# Patient Record
Sex: FEMALE | Race: WHITE | NOT HISPANIC OR LATINO | Employment: OTHER | URBAN - METROPOLITAN AREA
[De-identification: names, ages, dates, MRNs, and addresses within clinical notes are randomized per-mention and may not be internally consistent; named-entity substitution may affect disease eponyms.]

---

## 2017-08-29 ENCOUNTER — TELEPHONE (OUTPATIENT)
Dept: SURGERY | Facility: CLINIC | Age: 62
End: 2017-08-29

## 2017-08-29 NOTE — TELEPHONE ENCOUNTER
----- Message from Elisabeth Felisa sent at 8/25/2017 12:44 PM CDT -----  Regarding: PT AND DAUGHTER INTERESTED IN INTRA-GASTRIC BALLOON SURGERY  Contact: [phone]  Pt's daughter Zelda called and wanted to get more information/consultantion on Intra-Gastric Balloon surgery for both her and her mother.     Please give Zelda a call back at [phone].    Thank you,    Elisabeth  Call Center    Please DO NOT send message and or reply back to sender. Call center Representatives DO NOT respond to Messages.

## 2017-08-29 NOTE — TELEPHONE ENCOUNTER
Reviewed gastric balloon process with daughter.   Patient does not have any insurance and did not want to pay for the initial consult or initial dietician visit.  She also felt that we were too expensive compared to other places.  Recommend (after discussing with , Susan Whittaker RN) that she consider other locations that would better suit her needs. Given contact info for [facility].

## 2018-03-21 ENCOUNTER — NURSE TRIAGE (OUTPATIENT)
Dept: NURSING | Facility: CLINIC | Age: 63
End: 2018-03-21

## 2018-03-22 NOTE — TELEPHONE ENCOUNTER
"Daughter, Asia calls, Mom is present and on speaker phone.  Large wall cabinet fell from wall and hit patient in the lip.  She has a 1 cm gash on lip which is no longer bleeding, but is \"gaping and seems deep.\"  Placed an ice pack on lip.   Denies falling down or hitting head.  Has remained alert and oriented with normal activity.  Patient does not have insurance and is concerned about cost of an Emergency Room visit.    Protocol and care advice reviewed  Caller states understanding of the recommended disposition.  Advised patient proceed to Emergency Room or Urgent Care.  They will consider their options.  Advised to call back if further questions or concerns      Reason for Disposition    Skin is split open or gaping  (or length > 1/2 inch or 12 mm on the skin, 1/4 inch or 6 mm on the face)    Additional Information    Negative: [1] Major bleeding (e.g., actively dripping or spurting) AND [2] can't be stopped    Negative: Amputation    Negative: Shock suspected (e.g., cold/pale/clammy skin, too weak to stand, low BP, rapid pulse)    Negative: [1] Knife wound (or other possibly deep cut) AND [2] to chest, abdomen, back, neck, or head    Negative: [1] Cutter (self-mutilator) AND [2] suicidal or out-of-control    Negative: Sounds like a life-threatening emergency to the triager    Negative: [1] Bleeding AND [2] won't stop after 10 minutes of direct pressure (using correct technique)    Protocols used: CUTS AND LACERATIONS-ADULT-    "

## 2018-11-29 ENCOUNTER — HOSPITAL ENCOUNTER (EMERGENCY)
Facility: CLINIC | Age: 63
Discharge: HOME | End: 2018-11-30
Attending: EMERGENCY MEDICINE | Admitting: EMERGENCY MEDICINE
Payer: MEDICAID

## 2018-11-29 ENCOUNTER — NURSE TRIAGE (OUTPATIENT)
Dept: NURSING | Facility: CLINIC | Age: 63
End: 2018-11-29

## 2018-11-29 ENCOUNTER — APPOINTMENT (OUTPATIENT)
Dept: GENERAL RADIOLOGY | Facility: CLINIC | Age: 63
End: 2018-11-29
Attending: EMERGENCY MEDICINE
Payer: MEDICAID

## 2018-11-29 DIAGNOSIS — R10.13 ABDOMINAL PAIN, EPIGASTRIC: ICD-10-CM

## 2018-11-29 LAB
BASOPHILS # BLD AUTO: 0.1 10E9/L (ref 0–0.2)
BASOPHILS NFR BLD AUTO: 0.7 %
DIFFERENTIAL METHOD BLD: NORMAL
EOSINOPHIL # BLD AUTO: 0.2 10E9/L (ref 0–0.7)
EOSINOPHIL NFR BLD AUTO: 2.1 %
ERYTHROCYTE [DISTWIDTH] IN BLOOD BY AUTOMATED COUNT: 12.8 % (ref 10–15)
HCT VFR BLD AUTO: 41.5 % (ref 35–47)
HGB BLD-MCNC: 13.6 G/DL (ref 11.7–15.7)
IMM GRANULOCYTES # BLD: 0 10E9/L (ref 0–0.4)
IMM GRANULOCYTES NFR BLD: 0.4 %
LYMPHOCYTES # BLD AUTO: 3.2 10E9/L (ref 0.8–5.3)
LYMPHOCYTES NFR BLD AUTO: 35.7 %
MCH RBC QN AUTO: 30.3 PG (ref 26.5–33)
MCHC RBC AUTO-ENTMCNC: 32.8 G/DL (ref 31.5–36.5)
MCV RBC AUTO: 92 FL (ref 78–100)
MONOCYTES # BLD AUTO: 0.9 10E9/L (ref 0–1.3)
MONOCYTES NFR BLD AUTO: 9.8 %
NEUTROPHILS # BLD AUTO: 4.6 10E9/L (ref 1.6–8.3)
NEUTROPHILS NFR BLD AUTO: 51.3 %
NRBC # BLD AUTO: 0 10*3/UL
NRBC BLD AUTO-RTO: 0 /100
PLATELET # BLD AUTO: 258 10E9/L (ref 150–450)
RBC # BLD AUTO: 4.49 10E12/L (ref 3.8–5.2)
WBC # BLD AUTO: 9.1 10E9/L (ref 4–11)

## 2018-11-29 PROCEDURE — 83735 ASSAY OF MAGNESIUM: CPT | Performed by: EMERGENCY MEDICINE

## 2018-11-29 PROCEDURE — 93005 ELECTROCARDIOGRAM TRACING: CPT

## 2018-11-29 PROCEDURE — 85025 COMPLETE CBC W/AUTO DIFF WBC: CPT | Performed by: EMERGENCY MEDICINE

## 2018-11-29 PROCEDURE — 83605 ASSAY OF LACTIC ACID: CPT | Performed by: EMERGENCY MEDICINE

## 2018-11-29 PROCEDURE — 25000125 ZZHC RX 250: Performed by: EMERGENCY MEDICINE

## 2018-11-29 PROCEDURE — 80053 COMPREHEN METABOLIC PANEL: CPT | Performed by: EMERGENCY MEDICINE

## 2018-11-29 PROCEDURE — 99285 EMERGENCY DEPT VISIT HI MDM: CPT | Mod: 25

## 2018-11-29 PROCEDURE — 84484 ASSAY OF TROPONIN QUANT: CPT | Performed by: EMERGENCY MEDICINE

## 2018-11-29 PROCEDURE — 71046 X-RAY EXAM CHEST 2 VIEWS: CPT

## 2018-11-29 PROCEDURE — 25000132 ZZH RX MED GY IP 250 OP 250 PS 637: Performed by: EMERGENCY MEDICINE

## 2018-11-29 PROCEDURE — 96374 THER/PROPH/DIAG INJ IV PUSH: CPT

## 2018-11-29 RX ADMIN — RANITIDINE 150 MG: 150 TABLET ORAL at 23:51

## 2018-11-29 RX ADMIN — LIDOCAINE HYDROCHLORIDE 30 ML: 20 SOLUTION ORAL; TOPICAL at 23:52

## 2018-11-29 ASSESSMENT — ENCOUNTER SYMPTOMS
ABDOMINAL PAIN: 1
VOMITING: 0
NAUSEA: 0
CHILLS: 0
DIZZINESS: 0
LIGHT-HEADEDNESS: 0
DIARRHEA: 0
FEVER: 0
TROUBLE SWALLOWING: 1
SHORTNESS OF BREATH: 1

## 2018-11-29 NOTE — ED AVS SNAPSHOT
[facility] Emergency Department    [address]    [facility] [address]    Phone:  [phone]    Fax:  [phone]                                       Ramona Ferrer   MRN: [id number]    Department:  [facility] Emergency Department   Date of Visit:  11/29/2018           Patient Information     Date Of Birth          1955        Your diagnoses for this visit were:     Abdominal pain, epigastric        You were seen by Josse Self MD.      Follow-up Information     Go to [facility] Emergency Department.    Specialty:  EMERGENCY MEDICINE    Why:  If symptoms worsen    Contact information:    [address]  [facility] [id number] [phone]        Discharge Instructions       Discharge Instructions  Abdominal Pain    Abdominal pain (belly pain) can be caused by many things. Your evaluation today does not show the exact cause for your pain. Your provider today has decided that it is unlikely your pain is due to a life threatening problem, or a problem requiring surgery or hospital admission. Sometimes those problems cannot be found right away, so it is very important that you follow up as directed.  Sometimes only the changes which occur over time allow the cause of your pain to be found.    Generally, every Emergency Department visit should have a follow-up clinic visit with either a primary or a specialty clinic/provider. Please follow-up as instructed by your emergency provider today. With abdominal pain, we often recommend very close follow-up, such as the following day.    ADULTS:  Return to the Emergency Department right away if:      You get an oral temperature above 102oF or as directed by your provider.    You have blood in your stools. This may be bright red or appear as black, tarry stools.      You keep vomiting (throwing up) or cannot drink liquids.    You see blood when you vomit.     You cannot have a bowel movement or  you cannot pass gas.    Your stomach gets bloated or bigger.    Your skin or the whites of your eyes look yellow.    You faint.    You have bloody, frequent or painful urination (peeing).    You have new symptoms or anything that worries you.    CHILDREN:  Return to the Emergency Department right away if your child has any of the above-listed symptoms or the following:      Pushes your hand away or screams/cries when his/her belly is touched.    You notice your child is very fussy or weak.    Your child is very tired and is too tired to eat or drink.    Your child is dehydrated.  Signs of dehydration can be:  o Significant change in the amount of wet diapers/urine.  o Your infant or child starts to have dry mouth and lips, or no saliva (spit) or tears.    PREGNANT WOMEN:  Return to the Emergency Department right away if you have any of the above-listed symptoms or the following:      You have bleeding, leaking fluid or passing tissue from the vagina.    You have worse pain or cramping, or pain in your shoulder or back.    You have vomiting that will not stop.    You have a temperature of 100oF or more.    Your baby is not moving as much as usual.    You faint.    You get a bad headache with or without eye problems and abdominal pain.    You have a seizure.    You have unusual discharge from your vagina and abdominal pain.    Abdominal pain is pretty common during pregnancy.  Your pain may or may not be related to your pregnancy. You should follow-up closely with your OB provider so they can evaluate you and your baby.  Until you follow-up with your regular provider, do the following:       Avoid sex and do not put anything in your vagina.    Drink clear fluids.    Only take medications approved by your provider.    MORE INFORMATION:    Appendicitis:  A possible cause of abdominal pain in any person who still has their appendix is acute appendicitis. Appendicitis is often hard to diagnose.  Testing does not always  "rule out early appendicitis or other causes of abdominal pain. Close follow-up with your provider and re-evaluations may be needed to figure out the reason for your abdominal pain.    Follow-up:  It is very important that you make an appointment with your clinic and go to the appointment.  If you do not follow-up with your primary provider, it may result in missing an important development which could result in permanent injury or disability and/or lasting pain.  If there is any problem keeping your appointment, call your provider or return to the Emergency Department.    Medications:  Take your medications as directed by your provider today.  Before using over-the-counter medications, ask your provider and make sure to take the medications as directed.  If you have any questions about medications, ask your provider.    Diet:  Resume your normal diet as much as possible, but do not eat fried, fatty or spicy foods while you have pain.  Do not drink alcohol or have caffeine.  Do not smoke tobacco.    Probiotics: If you have been given an antibiotic, you may want to also take a probiotic pill or eat yogurt with live cultures. Probiotics have \"good bacteria\" to help your intestines stay healthy. Studies have shown that probiotics help prevent diarrhea (loose stools) and other intestine problems (including C. diff infection) when you take antibiotics. You can buy these without a prescription in the pharmacy section of the store.     If you were given a prescription for medicine here today, be sure to read all of the information (including the package insert) that comes with your prescription.  This will include important information about the medicine, its side effects, and any warnings that you need to know about.  The pharmacist who fills the prescription can provide more information and answer questions you may have about the medicine.  If you have questions or concerns that the pharmacist cannot address, please call " or return to the Emergency Department.       Remember that you can always come back to the Emergency Department if you are not able to see your regular provider in the amount of time listed above, if you get any new symptoms, or if there is anything that worries you.      24 Hour Appointment Hotline       To make an appointment at any [facility], call [phone] ([phone]). If you don't have a family doctor or clinic, we will help you find one. [facility] are conveniently located to serve the needs of you and your family.             Review of your medicines      START taking        Dose / Directions Last dose taken    ranitidine 150 MG tablet   Commonly known as:  ZANTAC   Dose:  150 mg   Quantity:  20 tablet        Take 1 tablet (150 mg) by mouth 2 times daily for 10 days   Refills:  0          Our records show that you are taking the medicines listed below. If these are incorrect, please call your family doctor or clinic.        Dose / Directions Last dose taken    acetaminophen 325 MG tablet   Commonly known as:  TYLENOL   Quantity:  100 tablet        Take  by mouth every 4 hours as needed for pain.   Refills:  0        ALLEGRA PO        Take  by mouth. 1 tablet daily   Refills:  0        aspirin-acetaminophen-caffeine 250-250-65 MG tablet   Commonly known as:  EXCEDRIN MIGRAINE   Dose:  1 tablet   Quantity:  30 tablet        Take 1 tablet by mouth every 6 hours as needed for pain.   Refills:  0        cyclobenzaprine 10 MG tablet   Commonly known as:  FLEXERIL   Dose:  10 mg   Quantity:  30 tablet        Take 1 tablet by mouth 2 times daily as needed for muscle spasms.   Refills:  0        fluticasone 50 MCG/ACT nasal spray   Commonly known as:  FLONASE   Dose:  2 spray   Quantity:  1 Package        Spray 2 sprays into both nostrils daily.   Refills:  10        levothyroxine 100 MCG tablet   Commonly known as:  SYNTHROID/LEVOTHROID   Dose:  100 mcg   Quantity:  31 tablet        Take 1  tablet by mouth daily.   Refills:  3        order for DME   Quantity:  30 each        Provent nasal EPAP Use over nostrils nightly.   Refills:  0        PATANOL 0.1 % ophthalmic solution   Dose:  1 drop   Quantity:  5 mL   Generic drug:  olopatadine        Place 1 drop into both eyes 2 times daily.   Refills:  3        zolpidem 10 MG tablet   Commonly known as:  AMBIEN   Quantity:  1 tablet        Take PO on night of the study   Refills:  0                Prescriptions were sent or printed at these locations (1 Prescription)                   Other Prescriptions                Printed at Department/Unit printer (1 of 1)         ranitidine (ZANTAC) 150 MG tablet                Procedures and tests performed during your visit     Procedure/Test Number of Times Performed    Abdomen US, limited (RUQ only) 1    CBC with platelets differential 1    Comprehensive metabolic panel 1    EKG 12-lead, tracing only 1    Lactic acid whole blood 1    Lipase 1    Magnesium 1    Troponin I 2    XR Chest 2 Views 1      Orders Needing Specimen Collection     None      Pending Results     Date and Time Order Name Status Description    11/29/2018 2327 EKG 12-lead, tracing only Preliminary             Pending Culture Results     No orders found for last 3 day(s).            Pending Results Instructions     If you had any lab results that were not finalized at the time of your Discharge, you can call the ED Lab Result RN at [phone]. You will be contacted by this team for any positive Lab results or changes in treatment. The nurses are available 7 days a week from 10A to 6:30P.  You can leave a message 24 hours per day and they will return your call.        Test Results From Your Hospital Stay        11/29/2018 11:57 PM      Component Results     Component Value Ref Range & Units Status    WBC 9.1 4.0 - 11.0 10e9/L Final    RBC Count 4.49 3.8 - 5.2 10e12/L Final    Hemoglobin 13.6 11.7 - 15.7 g/dL Final    Hematocrit 41.5 35.0 - 47.0  % Final    MCV 92 78 - 100 fl Final    MCH 30.3 26.5 - 33.0 pg Final    MCHC 32.8 31.5 - 36.5 g/dL Final    RDW 12.8 10.0 - 15.0 % Final    Platelet Count 258 150 - 450 10e9/L Final    Diff Method Automated Method  Final    % Neutrophils 51.3 % Final    % Lymphocytes 35.7 % Final    % Monocytes 9.8 % Final    % Eosinophils 2.1 % Final    % Basophils 0.7 % Final    % Immature Granulocytes 0.4 % Final    Nucleated RBCs 0 0 /100 Final    Absolute Neutrophil 4.6 1.6 - 8.3 10e9/L Final    Absolute Lymphocytes 3.2 0.8 - 5.3 10e9/L Final    Absolute Monocytes 0.9 0.0 - 1.3 10e9/L Final    Absolute Eosinophils 0.2 0.0 - 0.7 10e9/L Final    Absolute Basophils 0.1 0.0 - 0.2 10e9/L Final    Abs Immature Granulocytes 0.0 0 - 0.4 10e9/L Final    Absolute Nucleated RBC 0.0  Final         11/30/2018 12:16 AM      Component Results     Component Value Ref Range & Units Status    Sodium 140 133 - 144 mmol/L Final    Potassium 3.8 3.4 - 5.3 mmol/L Final    Chloride 106 94 - 109 mmol/L Final    Carbon Dioxide 25 20 - 32 mmol/L Final    Anion Gap 9 3 - 14 mmol/L Final    Glucose 103 (H) 70 - 99 mg/dL Final    Urea Nitrogen 18 7 - 30 mg/dL Final    Creatinine 0.74 0.52 - 1.04 mg/dL Final    GFR Estimate 80 >60 mL/min/1.7m2 Final    Non  GFR Calc    GFR Estimate If Black >90 >60 mL/min/1.7m2 Final    African American GFR Calc    Calcium 9.1 8.5 - 10.1 mg/dL Final    Bilirubin Total 0.4 0.2 - 1.3 mg/dL Final    Albumin 3.6 3.4 - 5.0 g/dL Final    Protein Total 7.5 6.8 - 8.8 g/dL Final    Alkaline Phosphatase 79 40 - 150 U/L Final    ALT 41 0 - 50 U/L Final    AST 23 0 - 45 U/L Final         11/30/2018 12:26 AM      Component Results     Component Value Ref Range & Units Status    Lactic Acid 0.9 0.7 - 2.0 mmol/L Final         11/30/2018 12:16 AM      Component Results     Component Value Ref Range & Units Status    Troponin I ES <0.015 0.000 - 0.045 ug/L Final    The 99th percentile for upper reference range is 0.045 ug/L.   Troponin values   in the range of 0.045 - 0.120 ug/L may be associated with risks of adverse   clinical events.           11/30/2018  1:50 AM      Narrative     CHEST 2 VIEWS  11/30/2018 12:29 AM     HISTORY: Dyspnea and chest pain.    COMPARISON: 1/22/2006.    FINDINGS: The lungs are clear. Normal-sized cardiac silhouette.  Tortuous or ectatic thoracic aorta.        Impression     IMPRESSION: No evidence of active cardiopulmonary disease.    TIANA MASTERS MD         11/30/2018 12:16 AM      Component Results     Component Value Ref Range & Units Status    Magnesium 2.0 1.6 - 2.3 mg/dL Final         11/30/2018  2:34 AM      Narrative     ULTRASOUND ABDOMEN LIMITED RIGHT UPPER QUADRANT  11/30/2018 1:24 AM     HISTORY: Epigastric pain.    COMPARISON: 7/14/2011 - CT abdomen and pelvis.    FINDINGS: Moderate diffuse increased hepatic echogenicity, consistent  with fatty infiltration. No hepatic masses. The gallbladder is  unremarkable without gallstones, wall thickening or pericholecystic  fluid. No focal tenderness over the gallbladder. No intra- or  extrahepatic biliary dilatation. The common duct measures 0.4 cm in  diameter. The right kidney has normal size and echogenicity, measuring  12.0 cm in length. No right intrarenal collecting system dilatation or  calculi. 3.1 x 3.0 x 3.0 cm simple cyst in the inferior pole of the  right kidney. No free fluid in the upper right hemiabdomen.        Impression     IMPRESSION:  1. Unremarkable appearance of the gallbladder.  2. No biliary dilatation.  3. Moderate diffuse fatty infiltration of the liver.    TIANA MASTERS MD         11/30/2018  2:36 AM      Component Results     Component Value Ref Range & Units Status    Troponin I ES <0.015 0.000 - 0.045 ug/L Final    The 99th percentile for upper reference range is 0.045 ug/L.  Troponin values   in the range of 0.045 - 0.120 ug/L may be associated with risks of adverse   clinical events.           11/30/2018  2:36 AM       Component Results     Component Value Ref Range & Units Status    Lipase 208 73 - 393 U/L Final                Clinical Quality Measure: Blood Pressure Screening     Your blood pressure was checked while you were in the emergency department today. The last reading we obtained was  BP: 112/50 . Please read the guidelines below about what these numbers mean and what you should do about them.  If your systolic blood pressure (the top number) is less than 120 and your diastolic blood pressure (the bottom number) is less than 80, then your blood pressure is normal. There is nothing more that you need to do about it.  If your systolic blood pressure (the top number) is 120-139 or your diastolic blood pressure (the bottom number) is 80-89, your blood pressure may be higher than it should be. You should have your blood pressure rechecked within a year by a primary care provider.  If your systolic blood pressure (the top number) is 140 or greater or your diastolic blood pressure (the bottom number) is 90 or greater, you may have high blood pressure. High blood pressure is treatable, but if left untreated over time it can put you at risk for heart attack, stroke, or kidney failure. You should have your blood pressure rechecked by a primary care provider within the next 4 weeks.  If your provider in the emergency department today gave you specific instructions to follow-up with your doctor or provider even sooner than that, you should follow that instruction and not wait for up to 4 weeks for your follow-up visit.        Thank you for choosing [facility]       Thank you for choosing [facility] for your care. Our goal is always to provide you with excellent care. Hearing back from our patients is one way we can continue to improve our services. Please take a few minutes to complete the written survey that you may receive in the mail after you visit with us. Thank you!        [employer] Information     [employer] lets you send messages  "to your doctor, view your test results, renew your prescriptions, schedule appointments and more. To sign up, go to [email or web address] . Click on \"Log in\" on the left side of the screen, which will take you to the Welcome page. Then click on \"Sign up Now\" on the right side of the page.     You will be asked to enter the access code listed below, as well as some personal information. Please follow the directions to create your username and password.     Your access code is: [email or web address]  Expires: 2019  2:52 AM     Your access code will  in 90 days. If you need help or a new code, please call your [place] clinic or [phone].        Care EveryWhere ID     This is your Care EveryWhere ID. This could be used by other organizations to access your [place] medical records  [id number]        Equal Access to Services     STEVENSON LENTZ : Hadii curry Jenkins, watony payne, qaybta kaalmada raymond, alecia brenner . [facility] [phone].    ATENCIÓN: Si habla español, tiene a blanchard disposición servicios gratuitos de asistencia lingüística. Llame al [phone].    We comply with applicable federal civil rights laws and [place] laws. We do not discriminate on the basis of race, color, national origin, age, disability, sex, sexual orientation, or gender identity.            After Visit Summary       This is your record. Keep this with you and show to your community pharmacist(s) and doctor(s) at your next visit.                  "

## 2018-11-29 NOTE — ED AVS SNAPSHOT
[facility] Emergency Department    [address]    [facility] [address]    Phone:  [phone]    Fax:  [phone]                                       Ramona Ferrer   MRN: [id number]    Department:  [facility] Emergency Department   Date of Visit:  11/29/2018           After Visit Summary Signature Page     I have received my discharge instructions, and my questions have been answered. I have discussed any challenges I see with this plan with the nurse or doctor.    ..........................................................................................................................................  Patient/Patient Representative Signature      ..........................................................................................................................................  Patient Representative Print Name and Relationship to Patient    ..................................................               ................................................  Date                                   Time    ..........................................................................................................................................  Reviewed by Signature/Title    ...................................................              ..............................................  Date                                               Time          22EPIC Rev 08/18

## 2018-11-30 ENCOUNTER — APPOINTMENT (OUTPATIENT)
Dept: ULTRASOUND IMAGING | Facility: CLINIC | Age: 63
End: 2018-11-30
Attending: EMERGENCY MEDICINE
Payer: MEDICAID

## 2018-11-30 VITALS
RESPIRATION RATE: 12 BRPM | WEIGHT: 220 LBS | OXYGEN SATURATION: 95 % | DIASTOLIC BLOOD PRESSURE: 67 MMHG | TEMPERATURE: 97.8 F | HEART RATE: 70 BPM | SYSTOLIC BLOOD PRESSURE: 109 MMHG | HEIGHT: 64 IN | BODY MASS INDEX: 37.56 KG/M2

## 2018-11-30 LAB
ALBUMIN SERPL-MCNC: 3.6 G/DL (ref 3.4–5)
ALP SERPL-CCNC: 79 U/L (ref 40–150)
ALT SERPL W P-5'-P-CCNC: 41 U/L (ref 0–50)
ANION GAP SERPL CALCULATED.3IONS-SCNC: 9 MMOL/L (ref 3–14)
AST SERPL W P-5'-P-CCNC: 23 U/L (ref 0–45)
BILIRUB SERPL-MCNC: 0.4 MG/DL (ref 0.2–1.3)
BUN SERPL-MCNC: 18 MG/DL (ref 7–30)
CALCIUM SERPL-MCNC: 9.1 MG/DL (ref 8.5–10.1)
CHLORIDE SERPL-SCNC: 106 MMOL/L (ref 94–109)
CO2 SERPL-SCNC: 25 MMOL/L (ref 20–32)
CREAT SERPL-MCNC: 0.74 MG/DL (ref 0.52–1.04)
GFR SERPL CREATININE-BSD FRML MDRD: 80 ML/MIN/1.7M2
GLUCOSE SERPL-MCNC: 103 MG/DL (ref 70–99)
INTERPRETATION ECG - MUSE: NORMAL
LACTATE BLD-SCNC: 0.9 MMOL/L (ref 0.7–2)
LIPASE SERPL-CCNC: 208 U/L (ref 73–393)
MAGNESIUM SERPL-MCNC: 2 MG/DL (ref 1.6–2.3)
POTASSIUM SERPL-SCNC: 3.8 MMOL/L (ref 3.4–5.3)
PROT SERPL-MCNC: 7.5 G/DL (ref 6.8–8.8)
SODIUM SERPL-SCNC: 140 MMOL/L (ref 133–144)
TROPONIN I SERPL-MCNC: <0.015 UG/L (ref 0–0.04)
TROPONIN I SERPL-MCNC: <0.015 UG/L (ref 0–0.04)

## 2018-11-30 PROCEDURE — 25000128 H RX IP 250 OP 636: Performed by: EMERGENCY MEDICINE

## 2018-11-30 PROCEDURE — 84484 ASSAY OF TROPONIN QUANT: CPT | Performed by: EMERGENCY MEDICINE

## 2018-11-30 PROCEDURE — 76705 ECHO EXAM OF ABDOMEN: CPT

## 2018-11-30 PROCEDURE — 83690 ASSAY OF LIPASE: CPT | Performed by: EMERGENCY MEDICINE

## 2018-11-30 PROCEDURE — 36415 COLL VENOUS BLD VENIPUNCTURE: CPT | Performed by: EMERGENCY MEDICINE

## 2018-11-30 RX ORDER — NITROGLYCERIN 0.4 MG/1
0.4 TABLET SUBLINGUAL EVERY 5 MIN PRN
Status: DISCONTINUED | OUTPATIENT
Start: 2018-11-30 | End: 2018-11-30 | Stop reason: HOSPADM

## 2018-11-30 RX ORDER — LORAZEPAM 2 MG/ML
0.5 INJECTION INTRAMUSCULAR ONCE
Status: COMPLETED | OUTPATIENT
Start: 2018-11-30 | End: 2018-11-30

## 2018-11-30 RX ADMIN — LORAZEPAM 0.5 MG: 2 INJECTION INTRAMUSCULAR; INTRAVENOUS at 02:36

## 2018-11-30 NOTE — ED PROVIDER NOTES
"  History     Chief Complaint:  Allergic Reaction      HPI   Ramona Ferrer is a 63 year old female who presents with abdominal pain. The patient states that she ingested hazelnuts two hours ago. Right after eating the nut she noticed epigastric pain with associated chest pain. She also noticed a change in her voice, describing it as more hoarse. Here in the ED, she feels slightly short of breath and feels like her throat is swollen. The  reports to eating the same hazelnuts with no symptoms. She denies having known hazelnut allergy. The patient denies fever, chills, nausea, vomiting, or diarrhea.     Allergies:  NKDA    Medications:    Flexeril  Levothyroxine  Ambien    Past Medical History:    DDD  Endometriosis  Hypothyroidism  Spinal Stenosis    Past Surgical History:    Hysterectomy  Colonoscopy  Lithotripsy  Pelvis Laparoscopy    Family History:    DM  Thyroid disease  Cancer  Heart disease    Social History:  Marital Status:   [2]  Negative for tobacco use.  Negative for alcohol use.      Review of Systems   Constitutional: Negative for chills and fever.   HENT: Positive for trouble swallowing.    Respiratory: Positive for shortness of breath.    Cardiovascular: Positive for chest pain.   Gastrointestinal: Positive for abdominal pain. Negative for diarrhea, nausea and vomiting.   Neurological: Negative for dizziness and light-headedness.   All other systems reviewed and are negative.    Physical Exam     Patient Vitals for the past 24 hrs:   BP Temp Temp src Pulse Heart Rate Resp SpO2 Height Weight   11/30/18 0215 112/50 - - - - - 93 % - -   11/30/18 0055 113/50 - - - - - 95 % - -   11/30/18 0041 95/73 - - - - - 95 % - -   11/29/18 2345 - - - - 75 12 94 % - -   11/29/18 2319 - - - - - - 96 % - -   11/29/18 2318 132/74 - - - - - - - -   11/29/18 2308 137/86 97.8  F (36.6  C) Oral 70 70 18 98 % 1.626 m (5' 4\") 99.8 kg (220 lb)         Physical Exam  Constitutional: vitals reviewed  HENT: Moist " oral mucosa.  No oral pharyngeal, tongue, lip swelling.  Posterior pharynx is clear  Eyes: Grossly normal vision. Pupils are equal and round. Extraocular movements intact.  Sclera clear and without icterus. Conjunctiva without pallor.  Cardiovascular: Normal rate. Regular rhythm. S1 and S2 without audible murmurs. 2+ radial pulses. Normal capillary refill.  Respiratory: Effort normal. Clear breath sounds bilaterally.  Gastrointestinal: Soft. No distension.  Epigastric tenderness without guarding.  Neurologic: Alert and awake. Coordinated movement of extremities. Speech normal.  Skin: No diaphoresis, rashes, ecchymoses, or lesions.  Musculoskeletal: No lower extremity edema. No gross deformity. No joint swelling.  Psychiatric: Appropriate affect. Behavior is normal. Intact recent and remote memory. Linear thought process.  Emergency Department Course   ECG:  Indication: Chest pain  Time: 2346  Vent. Rate 79 bpm. OH interval 174. QRS duration 82. QT/QTc 438/502. P-R-T axis 33 -7 24.  Normal sinus rhythm. Voltage criteria for left ventricular hypertrophy. Prolonged QT. Abnormal EKG. Read time: 2349    Imaging:  XR Chest 2 views:   IMPRESSION: No evidence of active cardiopulmonary disease.  As per radiology.     US Abdomen, RUQ:  IMPRESSION:  1. Unremarkable appearance of the gallbladder.  2. No biliary dilatation.  3. Moderate diffuse fatty infiltration of the liver.  As per radiology.       Laboratory:  CBC: WBC: 9.1, HGB: 13.6, PLT: 258  CMP: Glucose 103(H) o/w WNL (Creatinine: 0.74)  Magnesium: 2.0    2349 Lactic acid: 0.9    2349 Troponin: <0.015  0211 Troponin: <0.015    Interventions:  2351 Zantac 150mg Oral  2352 GI Cocktail 30ml Oral  0236 Ativan 0.5mg IV    Emergency Department Course:  Nursing notes and vitals reviewed. (1117) I performed an exam of the patient as documented above.     IV inserted. Medicine administered as documented above. Blood drawn. This was sent to the lab for further testing, results  above.    EKG was done, interpretation as above.    The patient was sent for a Chest XR while in the emergency department, findings above.     (0245) I rechecked the patient and discussed the results of her workup thus far.     Findings and plan explained to the Patient. Patient discharged home with instructions regarding supportive care, medications, and reasons to return. The importance of close follow-up was reviewed. The patient was prescribed Zantac    I personally reviewed the laboratory results with the Patient and answered all related questions prior to discharge.       Impression & Plan    Medical Decision Making:  Patient presents with symptoms of epigastric and low chest pain that started after eating hazelnuts 2 hours ago.  No recent exertional symptoms and no history of similar decreases my suspicion of ACS.  EKG without signs of ischemia and troponin testing x2 are undetectable.  Her ED ACS score is 4, making her very low risk with the above investigations.  She had no improvement in her symptoms after GI cocktail and Zantac.  Other serum labs are unremarkable.  She does not have any urinary symptoms to raise this as a concern.  She was observed for multiple hours without any development of allergic symptoms.  Gallbladder ultrasound unremarkable and chest x-ray unremarkable.  Repeat abdominal exam shows no change and some ongoing epigastric tenderness.  It was explained to the patient that the next step in investigation would be a CT scan of the abdomen and pelvis, but she elects to be discharged home at this time and would rather follow-up with her regular doctor if her symptoms do not improve.  Given the location of her pain and onset after eating, we will treat her as gastritis with a 10-day prescription for Zantac.  Return precautions discussed and she left the emergency department in stable condition.    Diagnosis:    ICD-10-CM    1. Abdominal pain, epigastric R10.13         Disposition:  discharged to home    Discharge Medications:  New Prescriptions    RANITIDINE (ZANTAC) 150 MG TABLET    Take 1 tablet (150 mg) by mouth 2 times daily for 10 days       Scribe Disclosure:  I, Priya Ortiz, am serving as a scribe on 11/29/2018 at 11:17 PM to personally document services performed by Josse Self MD based on my observations and the provider's statements to me.       Priya Ortiz  11/29/2018   [facility] EMERGENCY DEPARTMENT       Josse Self MD  11/30/18 0347

## 2018-11-30 NOTE — ED TRIAGE NOTES
Epigastric pain started 2 hours ago right after eating some hazelnut, associated with chest pain and voice changed, feels like throat is swelling, mild difficulty breathing. No known hazelnut allergy. Denies any hives/itchiness. ABCs intact.

## 2018-11-30 NOTE — TELEPHONE ENCOUNTER
Daughter calls with patient.  Patient was eating hazelnuts about an hour ago and now she feels a lump in her stomach and swelling in her throat.  Patient is able to speak and breath but feels tightness.  Reviewed guideline and care advice with caller.  Daughter says they live 5 minutes away from the ED and she will take them there. FNA advised to call 911 if symptoms worsen.  Caller verbalizes understanding.          Reason for Disposition    [1] Tightness in the chest or throat AND [2] begins within 2 hours of exposure to allergic substance    Additional Information    Negative: Wheezing, stridor, hoarseness, or difficulty breathing    Negative: [1] Life-threatening reaction in the past to similar substance (e.g., food, insect bite/sting, medication, etc.) AND [2] < 2 hours since exposure    Protocols used: ANAPHYLAXIS-ADULT-AH

## 2018-12-07 ENCOUNTER — OFFICE VISIT (OUTPATIENT)
Dept: INTERNAL MEDICINE | Facility: CLINIC | Age: 63
End: 2018-12-07
Payer: MEDICAID

## 2018-12-07 DIAGNOSIS — I10 ESSENTIAL HYPERTENSION: ICD-10-CM

## 2018-12-07 DIAGNOSIS — E03.9 HYPOTHYROIDISM, UNSPECIFIED TYPE: ICD-10-CM

## 2018-12-07 DIAGNOSIS — Z23 NEED FOR PROPHYLACTIC VACCINATION AND INOCULATION AGAINST INFLUENZA: ICD-10-CM

## 2018-12-07 DIAGNOSIS — Z23 NEED FOR TDAP VACCINATION: ICD-10-CM

## 2018-12-07 DIAGNOSIS — K29.70 GASTRITIS, PRESENCE OF BLEEDING UNSPECIFIED, UNSPECIFIED CHRONICITY, UNSPECIFIED GASTRITIS TYPE: ICD-10-CM

## 2018-12-07 DIAGNOSIS — Z00.00 ENCOUNTER FOR ROUTINE ADULT HEALTH EXAMINATION WITHOUT ABNORMAL FINDINGS: Primary | ICD-10-CM

## 2018-12-07 DIAGNOSIS — M51.369 DDD (DEGENERATIVE DISC DISEASE), LUMBAR: ICD-10-CM

## 2018-12-07 DIAGNOSIS — E66.01 MORBID OBESITY (H): ICD-10-CM

## 2018-12-07 LAB — HGB BLD-MCNC: 13.9 G/DL (ref 11.7–15.7)

## 2018-12-07 PROCEDURE — 90471 IMMUNIZATION ADMIN: CPT | Performed by: INTERNAL MEDICINE

## 2018-12-07 PROCEDURE — 80053 COMPREHEN METABOLIC PANEL: CPT | Performed by: INTERNAL MEDICINE

## 2018-12-07 PROCEDURE — 90682 RIV4 VACC RECOMBINANT DNA IM: CPT | Performed by: INTERNAL MEDICINE

## 2018-12-07 PROCEDURE — 36415 COLL VENOUS BLD VENIPUNCTURE: CPT | Performed by: INTERNAL MEDICINE

## 2018-12-07 PROCEDURE — 90715 TDAP VACCINE 7 YRS/> IM: CPT | Performed by: INTERNAL MEDICINE

## 2018-12-07 PROCEDURE — 84443 ASSAY THYROID STIM HORMONE: CPT | Performed by: INTERNAL MEDICINE

## 2018-12-07 PROCEDURE — 85018 HEMOGLOBIN: CPT | Performed by: INTERNAL MEDICINE

## 2018-12-07 PROCEDURE — 99386 PREV VISIT NEW AGE 40-64: CPT | Mod: 25 | Performed by: INTERNAL MEDICINE

## 2018-12-07 PROCEDURE — 99213 OFFICE O/P EST LOW 20 MIN: CPT | Mod: 25 | Performed by: INTERNAL MEDICINE

## 2018-12-07 PROCEDURE — 80061 LIPID PANEL: CPT | Performed by: INTERNAL MEDICINE

## 2018-12-07 PROCEDURE — 90472 IMMUNIZATION ADMIN EACH ADD: CPT | Performed by: INTERNAL MEDICINE

## 2018-12-07 RX ORDER — LISINOPRIL 10 MG/1
10 TABLET ORAL DAILY
Qty: 30 TABLET | Refills: 1 | Status: SHIPPED | OUTPATIENT
Start: 2018-12-07 | End: 2019-03-16

## 2018-12-07 ASSESSMENT — ENCOUNTER SYMPTOMS
DIARRHEA: 0
ABDOMINAL PAIN: 1
PSYCHIATRIC NEGATIVE: 1
COUGH: 0
HEMATURIA: 0
CONSTIPATION: 0
CHILLS: 0
EYE PAIN: 1
MUSCULOSKELETAL NEGATIVE: 1
HEMATOCHEZIA: 0
DIZZINESS: 0

## 2018-12-07 NOTE — NURSING NOTE
"/82  Pulse 84  Temp 98  F (36.7  C) (Oral)  Resp 17  Ht 5' 4\" (1.626 m)  Wt 223 lb 14.4 oz (101.6 kg)  SpO2 96%  BMI 38.43 kg/m2  Patient in for annual female physical.  Analisa Pantoja CMA    "

## 2018-12-07 NOTE — MR AVS SNAPSHOT
After Visit Summary   12/7/2018    Ramona Ferrer    MRN: [id number]           Patient Information     Date Of Birth          1955        Visit Information        Provider Department      12/7/2018 1:00 PM Eugene Michelle MD [facility]        Today's Diagnoses     Encounter for routine adult health examination without abnormal findings    -  1    Morbid obesity (H)        Essential hypertension        Gastritis, presence of bleeding unspecified, unspecified chronicity, unspecified gastritis type        Hypothyroidism, unspecified type        DDD (degenerative disc disease), lumbar        Need for prophylactic vaccination and inoculation against influenza        Need for Tdap vaccination          Care Instructions      Preventive Health Recommendations  Female Ages 50 - 64    Yearly exam: See your health care provider every year in order to  o Review health changes.   o Discuss preventive care.    o Review your medicines if your doctor has prescribed any.      Get a Pap test every three years (unless you have an abnormal result and your provider advises testing more often).    If you get Pap tests with HPV test, you only need to test every 5 years, unless you have an abnormal result.     You do not need a Pap test if your uterus was removed (hysterectomy) and you have not had cancer.    You should be tested each year for STDs (sexually transmitted diseases) if you're at risk.     Have a mammogram every 1 to 2 years.    Have a colonoscopy at age 50, or have a yearly FIT test (stool test). These exams screen for colon cancer.      Have a cholesterol test every 5 years, or more often if advised.    Have a diabetes test (fasting glucose) every three years. If you are at risk for diabetes, you should have this test more often.     If you are at risk for osteoporosis (brittle bone disease), think about having a bone density scan (DEXA).    Shots: Get a flu shot each year.  Get a tetanus shot every 10 years.    Nutrition:     Eat at least 5 servings of fruits and vegetables each day.    Eat whole-grain bread, whole-wheat pasta and brown rice instead of white grains and rice.    Get adequate Calcium and Vitamin D.     Lifestyle    Exercise at least 150 minutes a week (30 minutes a day, 5 days a week). This will help you control your weight and prevent disease.    Limit alcohol to one drink per day.    No smoking.     Wear sunscreen to prevent skin cancer.     See your dentist every six months for an exam and cleaning.    See your eye doctor every 1 to 2 years.            Follow-ups after your visit        Additional Services     GASTROENTEROLOGY ADULT REF PROCEDURE ONLY       Last Lab Result: Creatinine (mg/dL)       Date                     Value                 11/29/2018               0.74             ----------  Body mass index is 38.43 kg/(m^2).      Patient will be contacted to schedule procedure.     Please be aware that coverage of these services is subject to the terms and limitations of your health insurance plan.  Call member services at your health plan with any benefit or coverage questions.  Any procedures must be performed at a [facility] facility OR coordinated by your clinic's referral office.    Please bring the following with you to your appointment:    (1) Any X-Rays, CTs or MRIs which have been performed.  Contact the facility where they were done to arrange for  prior to your scheduled appointment.    (2) List of current medications   (3) This referral request   (4) Any documents/labs given to you for this referral            [facility] PT, HAND, AND CHIROPRACTIC REFERRAL       Physical Therapy, Hand Therapy and Chiropractic Care are available through:  *[facility]  *Hand Therapy (Occupational Therapy or Physical Therapy)  *[facility]    Call one number to schedule at any of the above locations: [phone].    Physical therapy, Hand therapy and/or Chiropractic care has been recommended by your physician as an excellent treatment option to reduce pain and help people return to normal activities, including sports.  Therapy and/or chiropractic care services are a great complement or alternative to expensive and invasive surgery, injections, or long-term use of prescription medications. The primary goal is to identify the underlying problem and provide you the tools to manage your condition on your own.     Please be aware that coverage of these services is subject to the terms and limitations of your health insurance plan.  Call member services at your health plan with any benefit or coverage questions.      Please bring the following to your appointment:  *Your personal calendar for scheduling future appointments  *Comfortable clothing                  Future tests that were ordered for you today     Open Future Orders        Priority Expected Expires Ordered    MG PT, HAND, AND CHIROPRACTIC REFERRAL Routine  12/7/2019 12/7/2018    DX Hip/Pelvis/Spine Routine  12/7/2019 12/7/2018            Who to contact     If you have questions or need follow up information about today's clinic visit or your schedule please contact [facility] directly at [phone].  Normal or non-critical lab and imaging results will be communicated to you by MyChart, letter or phone within 4 business days after the clinic has received the results. If you do not hear from us within 7 days, please contact the clinic through [employer] or phone. If you have a critical or abnormal lab result, we will notify you by phone as soon as possible.  Submit refill requests through [employer] or call your pharmacy and they will forward the refill request to us. Please allow 3 business days for your refill to be completed.          Additional Information About Your Visit        [employer] Information     [employer] lets you send messages to your  "doctor, view your test results, renew your prescriptions, schedule appointments and more. To sign up, go to [email or web address] . Click on \"Log in\" on the left side of the screen, which will take you to the Welcome page. Then click on \"Sign up Now\" on the right side of the page.     You will be asked to enter the access code listed below, as well as some personal information. Please follow the directions to create your username and password.     Your access code is: [email or web address]  Expires: 2019  2:52 AM     Your access code will  in 90 days. If you need help or a new code, please call your [facility] clinic or [phone].        Care EveryWhere ID     This is your Care EveryWhere ID. This could be used by other organizations to access your [facility] medical records  [id number]        Your Vitals Were     Pulse Temperature Respirations Height Pulse Oximetry BMI (Body Mass Index)    84 98  F (36.7  C) (Oral) 17 5' 4\" (1.626 m) 96% 38.43 kg/m2       Blood Pressure from Last 3 Encounters:   18 126/82   18 109/67   12 113/42    Weight from Last 3 Encounters:   18 223 lb 14.4 oz (101.6 kg)   18 220 lb (99.8 kg)   12 212 lb (96.2 kg)              We Performed the Following     Comprehensive metabolic panel     FLU VACCINE, (RIV4) RECOMBINANT HA  , IM (FluBlok, egg free) [06909]- >18 YRS (FM[facility] recommended  50-64 YRS)     GASTROENTEROLOGY ADULT REF PROCEDURE ONLY     Hemoglobin     Lipid panel reflex to direct LDL Fasting     OFFICE/OUTPT VISIT,EST,LEVL III     TDAP VACCINE (ADACEL)     TSH with free T4 reflex     Vaccine Administration, Initial [77683]          Today's Medication Changes          These changes are accurate as of 18 11:59 PM.  If you have any questions, ask your nurse or doctor.               Start taking these medicines.        Dose/Directions    lisinopril 10 MG tablet   Commonly known as:  PRINIVIL/ZESTRIL   Used for:  Encounter for routine adult " health examination without abnormal findings   Started by:  Eugene Michelle MD        Dose:  10 mg   Take 1 tablet (10 mg) by mouth daily   Quantity:  30 tablet   Refills:  1       omeprazole 20 MG DR capsule   Commonly known as:  priLOSEC   Used for:  Gastritis, presence of bleeding unspecified, unspecified chronicity, unspecified gastritis type   Started by:  Eugene Michelle MD        Dose:  20 mg   Take 1 capsule (20 mg) by mouth daily   Quantity:  30 capsule   Refills:  1         Stop taking these medicines if you haven't already. Please contact your care team if you have questions.     cyclobenzaprine 10 MG tablet   Commonly known as:  FLEXERIL   Stopped by:  Eugene Michelle MD           zolpidem 10 MG tablet   Commonly known as:  AMBIEN   Stopped by:  Eugene Michelle MD                Where to get your medicines      These medications were sent to [id number] - [facility] [address]  [address] [id number]     Phone:  [phone]     lisinopril 10 MG tablet    omeprazole 20 MG DR capsule                Primary Care Provider Office Phone # Fax #    Eugene Michelle [phone]       [address] [facility] [address]        Equal Access to Services     [facility] CenterSARAHI : Hadii aad ku hadasho Soomaali, waaxda luqadaha, qaybta kaalmada adeegyada, alecia morse. So [facility] [phone].    ATENCIÓN: Si habla español, tiene a blanchard disposición servicios gratuitos de asistencia lingüística. Llame al [phone].    We comply with applicable federal civil rights laws and [place] laws. We do not discriminate on the basis of race, color, national origin, age, disability, sex, sexual orientation, or gender identity.            Thank you!     Thank you for choosing [facility]  for your care. Our goal is always to provide you with excellent care. Hearing back from  our patients is one way we can continue to improve our services. Please take a few minutes to complete the written survey that you may receive in the mail after your visit with us. Thank you!             Your Updated Medication List - Protect others around you: Learn how to safely use, store and throw away your medicines at www.disposemymeds.org.          This list is accurate as of 12/7/18 11:59 PM.  Always use your most recent med list.                   Brand Name Dispense Instructions for use Diagnosis    acetaminophen 325 MG tablet    TYLENOL    100 tablet    Take  by mouth every 4 hours as needed for pain.        ALLEGRA PO      Take  by mouth. 1 tablet daily        aspirin-acetaminophen-caffeine 250-250-65 MG tablet    EXCEDRIN MIGRAINE    30 tablet    Take 1 tablet by mouth every 6 hours as needed for pain.        fluticasone 50 MCG/ACT nasal spray    FLONASE    1 Package    Spray 2 sprays into both nostrils daily.    ETD (eustachian tube dysfunction)       levothyroxine 100 MCG tablet    SYNTHROID/LEVOTHROID    31 tablet    Take 1 tablet by mouth daily.    Hypothyroidism       lisinopril 10 MG tablet    PRINIVIL/ZESTRIL    30 tablet    Take 1 tablet (10 mg) by mouth daily    Encounter for routine adult health examination without abnormal findings       omeprazole 20 MG DR capsule    priLOSEC    30 capsule    Take 1 capsule (20 mg) by mouth daily    Gastritis, presence of bleeding unspecified, unspecified chronicity, unspecified gastritis type       order for DME     30 each    Provent nasal EPAP Use over nostrils nightly.    LALO (obstructive sleep apnea)       PATANOL 0.1 % ophthalmic solution   Generic drug:  olopatadine     5 mL    Place 1 drop into both eyes 2 times daily.        ranitidine 150 MG tablet    ZANTAC    20 tablet    Take 1 tablet (150 mg) by mouth 2 times daily for 10 days

## 2018-12-07 NOTE — Clinical Note
Please abstract the following data from this visit with this patient into the appropriate field in Epic:  Mammogram done on this date: 07/18 (approximately), by this group: [facility] [place] , results were normal .

## 2018-12-07 NOTE — LETTER
December 10, 2018      Ramona Ferrer  [address]  [place] [address]        Dear ,    Your lab results came back within normal limits. Please review the attach results and give us a call with any questions you may have.    Resulted Orders   Hemoglobin   Result Value Ref Range    Hemoglobin 13.9 11.7 - 15.7 g/dL   Comprehensive metabolic panel   Result Value Ref Range    Sodium 140 133 - 144 mmol/L    Potassium 3.9 3.4 - 5.3 mmol/L    Chloride 104 94 - 109 mmol/L    Carbon Dioxide 27 20 - 32 mmol/L    Anion Gap 9 3 - 14 mmol/L    Glucose 98 70 - 99 mg/dL      Comment:      Fasting specimen    Urea Nitrogen 11 7 - 30 mg/dL    Creatinine 0.72 0.52 - 1.04 mg/dL    GFR Estimate 83 >60 mL/min/1.7m2      Comment:      Non  GFR Calc    GFR Estimate If Black >90 >60 mL/min/1.7m2      Comment:       GFR Calc    Calcium 9.6 8.5 - 10.1 mg/dL    Bilirubin Total 0.5 0.2 - 1.3 mg/dL    Albumin 3.8 3.4 - 5.0 g/dL    Protein Total 7.5 6.8 - 8.8 g/dL    Alkaline Phosphatase 65 40 - 150 U/L    ALT 42 0 - 50 U/L    AST 31 0 - 45 U/L   Lipid panel reflex to direct LDL Fasting   Result Value Ref Range    Cholesterol 182 <200 mg/dL    Triglycerides 89 <150 mg/dL      Comment:      Fasting specimen    HDL Cholesterol 93 >49 mg/dL    LDL Cholesterol Calculated 71 <100 mg/dL      Comment:      Desirable:       <100 mg/dl    Non HDL Cholesterol 89 <130 mg/dL   TSH with free T4 reflex   Result Value Ref Range    TSH 2.58 0.40 - 4.00 mU/L       If you have any questions or concerns, please call the clinic at the number listed above.       Sincerely,        Eugene Michelle MD

## 2018-12-07 NOTE — PROGRESS NOTES

## 2018-12-07 NOTE — PROGRESS NOTES
SUBJECTIVE:   CC: Ramona Ferrer is an 63 year old woman who presents for preventive health visit.     Physical   Annual:     Getting at least 3 servings of Calcium per day:  NO    Bi-annual eye exam:  Yes    Dental care twice a year:  NO    Sleep apnea or symptoms of sleep apnea:  Sleep apnea    Diet:  Low salt and Low fat/cholesterol    Frequency of exercise:  1 day/week    Duration of exercise:  Less than 15 minutes    Taking medications regularly:  Yes    Barriers to taking medications:  None    Medication side effects:  None    Additional concerns today:  No    PHQ-2 Total Score: 0       Pt is here to follow up on high BP, pt says she has been checking her blood pressures and has been elevated a few days ago her blood pressure was 160/103 patient has been taking her mother's lisinopril.  Last dose 3 days ago..  Patient also has history of degenerative lumbar disc disease. patient has had cortisone injection in the back, continues to have back pain .   Patient is also here to follow-up on hypothyroidism, currently on Levoxy l100 mcg daily  Patient has h/o allergies and is on Flonase,patanol,and allegra with symptom relief.   Pt also c/o upper abd pain since many months, has belching/burping and nausea , no vomiting , takes occ zantac w/o much relief       Today's PHQ-2 Score:   PHQ-2 ( 1999 Pf[employer]) 12/7/2018   Q1: Little interest or pleasure in doing things 0   Q2: Feeling down, depressed or hopeless 0   PHQ-2 Score 0   Q1: Little interest or pleasure in doing things Not at all   Q2: Feeling down, depressed or hopeless Not at all   PHQ-2 Score 0       Abuse: Current or Past(Physical, Sexual or Emotional)- No  Do you feel safe in your environment? Yes    Past Medical History:   Diagnosis Date     DDD (degenerative disc disease), lumbar      Endometriosis      Hypothyroidism           Seasonal allergies      Spinal stenosis, lumbar        Past Surgical History:   Procedure Laterality Date     C TOTAL ABDOM  HYSTERECTOMY      MARYA BSO for stage 4 endometriosis     COLONOSCOPY       COLONOSCOPY  2012    Procedure:COLONOSCOPY; COLONOSCOPY ; Surgeon:ARUN KITCHEN; Location:[facility]     HC CYSTOURETHROSCOPY W/ URETEROSCOPY &/OR PYELOSCOPY; W/ LITHOTRIPSY       HYSTERECTOMY, PAP NO LONGER INDICATED       LASER YAG IRIDOTOMY  2012    Procedure: LASER YAG IRIDOTOMY;  LEFT EYE YAG LASER PUPIL IRIDOTOMY ;  Surgeon: Dakotah Humphreys MD;  Location:  [facility]     LIGATN/STRIP LONG OR SHORT SAPHEN      x's2     PELVIS LAPAROSCOPY,DX         Current Outpatient Prescriptions   Medication Sig Dispense Refill     acetaminophen (TYLENOL) 325 MG tablet Take  by mouth every 4 hours as needed for pain. 100 tablet 0     aspirin-acetaminophen-caffeine (EXCEDRIN MIGRAINE) 250-250-65 MG per tablet Take 1 tablet by mouth every 6 hours as needed for pain. 30 tablet 0     Fexofenadine HCl (ALLEGRA PO) Take  by mouth. 1 tablet daily         fluticasone (FLONASE) 50 MCG/ACT nasal spray Spray 2 sprays into both nostrils daily. 1 Package 10     levothyroxine (SSYNTHROID,LEVOTHROID) 100 MCG tablet Take 1 tablet by mouth daily. 31 tablet 3     lisinopril (PRINIVIL/ZESTRIL) 10 MG tablet Take 1 tablet (10 mg) by mouth daily 30 tablet 1     olopatadine (PATANOL) 0.1 % ophthalmic solution Place 1 drop into both eyes 2 times daily. 5 mL 3     omeprazole (PRILOSEC) 20 MG DR capsule Take 1 capsule (20 mg) by mouth daily 30 capsule 1     ORDER FOR DME Provent nasal EPAP  Use over nostrils nightly.   30 each 0     ranitidine (ZANTAC) 150 MG tablet Take 1 tablet (150 mg) by mouth 2 times daily for 10 days 20 tablet 0     Family History   Problem Relation Age of Onset     Diabetes Mother      Thyroid Disease Mother      Hypertension Mother      Cancer Father       of stomach CA     Heart Disease Father      MI at age 58     Cerebrovascular Disease No family hx of        Social History   Substance Use Topics     Smoking status: Never Smoker     Smokeless  "tobacco: Never Used     Alcohol use No       Alcohol Use 12/7/2018   If you drink alcohol do you typically have greater than 3 drinks per day OR greater than 7 drinks per week? No   No flowsheet data found.    Reviewed orders with patient.  Reviewed health maintenance and updated orders accordingly - Yes        Last mammograms  07/18 at [facility]     History of abnormal Pap smear: NO - age 30- 65 PAP every 3 years recommended  PAP / HPV 9/8/2008   PAP NIL     Reviewed and updated as needed this visit by clinical staff  Tobacco  Allergies  Meds  Med Hx  Surg Hx  Fam Hx  Soc Hx        Reviewed and updated as needed this visit by Provider          Review of Systems   Constitutional: Negative for chills.   HENT: Negative for congestion and ear pain.    Respiratory: Negative for cough.    Cardiovascular: Negative for chest pain.   Gastrointestinal: Positive for abdominal pain. Negative for constipation, diarrhea and hematochezia.   Endocrine:        Thyroid dz   Genitourinary: Negative for hematuria.   Musculoskeletal: Negative.    Neurological: Negative for dizziness.   Psychiatric/Behavioral: Negative.          OBJECTIVE:   /90  Pulse 84  Temp 98  F (36.7  C) (Oral)  Resp 17  Ht 5' 4\" (1.626 m)  Wt 223 lb 14.4 oz (101.6 kg)  SpO2 96%  BMI 38.43 kg/m2  Physical Exam  GENERAL: healthy, alert and no distress  EYES: Eyes grossly normal to inspection, PERRL and conjunctivae and sclerae normal  HENT: ear canals and TM's normal, nose and mouth without ulcers or lesions  NECK: no adenopathy, no asymmetry, masses, or scars and thyroid normal to palpation  RESP: lungs clear to auscultation - no rales, rhonchi or wheezes  BREAST: normal without masses, tenderness or nipple discharge and no palpable axillary masses or adenopathy  CV: regular rate and rhythm, normal S1 S2, no S3 or S4, no murmur, click or rub, no peripheral edema and peripheral pulses strong  ABDOMEN: soft,  Tenderness in epigastric area, and " bowel sounds normal  MS: no vertebral tenderness at this belle, SLR negative at this time no gross musculoskeletal defects noted, no edema  NEURO: Normal strength and tone, mentation intact and speech normal  PSYCH: mentation appears normal, affect normal/bright       ASSESSMENT/PLAN:       (Z00.00) Encounter for routine adult health examination without abnormal findings  (primary encounter diagnosis)  Plan:Hemoglobin, Comprehensive metabolic panel,         Lipid panel reflex to direct LDL Fasting, DX         Hip/Pelvis/Spine, GASTROENTEROLOGY ADULT REF         PROCEDURE ONLY                  (I10) Essential hypertension  Plan:elevated BP , started on lisinopril (PRINIVIL/ZESTRIL) 10 MG tablet,as directed.explained clearly about the medication,insructions and side effects.Discussed sodium restriction, maintaining ideal body weight and regular exercise program as physiologic means to achieve blood pressure control. The patient will strive towards this.   Continue home readings and return in 1 month.     (E66.01) Morbid obesity (H)  Plan;. Discussed in detail about Diet,calorie intake,and importance of regular exercise    (K29.70) Gastritis, presence of bleeding unspecified, unspecified chronicity, unspecified gastritis type  Plan: started on omeprazole (PRILOSEC) 20 MG DR capsule, as directed.explained clearly about the medication,insructions and side effects. Discussed the etiology of GERD with patient.   Discussed raising the head of the bed 4 to 6 inches; avoiding chocolate, coffee, peppermint, fruit juices, tomatoes, NSAID's,greasy and spicy foods.  Encouraged moderation of alcoholic beverages, and avoidance of smoking.          (E03.9) Hypothyroidism, unspecified type  Plan: on levoxyl 100 mcg daily, check TSH with free T4 reflex,            (M51.36) DDD (degenerative disc disease), lumbar  Comment:s/p cortisone inj, continue to have pain   Plan:referred to  [facility] REFERR[facility]          (Z23)  "Need for prophylactic vaccination and inoculation against influenza  Plan: FLU VACCINE, (RIV4) RECOMBINANT HA  , IM         (FluBlok, egg free) [42884]- >18 YRS ([facility]         recommended  50-64 YRS), Vaccine         Administration, Initial [67565]            (Z23) Need for Tdap vaccination  Plan: TDAP VACCINE (ADACEL)        COUNSELING:  Reviewed preventive health counseling, as reflected in patient instructions       Regular exercise       Healthy diet/nutrition       Immunizations    Vaccinated for: Influenza and TDAP          BP Readings from Last 1 Encounters:   12/07/18 126/82     Estimated body mass index is 38.43 kg/(m^2) as calculated from the following:    Height as of this encounter: 5' 4\" (1.626 m).    Weight as of this encounter: 223 lb 14.4 oz (101.6 kg).       Weight management plan: Discussed healthy diet and exercise guidelines     reports that she has never smoked. She has never used smokeless tobacco.    Additional 15 minutes is spent with the patient discussing multiple health concerns; Over 50% Counselling/Education provided.    Counseling Resources:  ATP IV Guidelines  Pooled Cohorts Equation Calculator  Breast Cancer Risk Calculator  FRAX Risk Assessment  ICSI Preventive Guidelines  Dietary Guidelines for Americans, 2010  USDA's MyPlate  ASA Prophylaxis  Lung CA Screening    Eugene Michelle MD  [facility]      Please abstract the following data from this visit with this patient into the appropriate field in Epic:    Mammogram done on this date: 07/18 (approximately), by this group: [facility] , results were normal .   "

## 2018-12-08 VITALS
HEIGHT: 64 IN | SYSTOLIC BLOOD PRESSURE: 150 MMHG | OXYGEN SATURATION: 96 % | WEIGHT: 223.9 LBS | DIASTOLIC BLOOD PRESSURE: 90 MMHG | BODY MASS INDEX: 38.22 KG/M2 | TEMPERATURE: 98 F | HEART RATE: 84 BPM | RESPIRATION RATE: 17 BRPM

## 2018-12-08 LAB
ALBUMIN SERPL-MCNC: 3.8 G/DL (ref 3.4–5)
ALP SERPL-CCNC: 65 U/L (ref 40–150)
ALT SERPL W P-5'-P-CCNC: 42 U/L (ref 0–50)
ANION GAP SERPL CALCULATED.3IONS-SCNC: 9 MMOL/L (ref 3–14)
AST SERPL W P-5'-P-CCNC: 31 U/L (ref 0–45)
BILIRUB SERPL-MCNC: 0.5 MG/DL (ref 0.2–1.3)
BUN SERPL-MCNC: 11 MG/DL (ref 7–30)
CALCIUM SERPL-MCNC: 9.6 MG/DL (ref 8.5–10.1)
CHLORIDE SERPL-SCNC: 104 MMOL/L (ref 94–109)
CHOLEST SERPL-MCNC: 182 MG/DL
CO2 SERPL-SCNC: 27 MMOL/L (ref 20–32)
CREAT SERPL-MCNC: 0.72 MG/DL (ref 0.52–1.04)
GFR SERPL CREATININE-BSD FRML MDRD: 83 ML/MIN/1.7M2
GLUCOSE SERPL-MCNC: 98 MG/DL (ref 70–99)
HDLC SERPL-MCNC: 93 MG/DL
LDLC SERPL CALC-MCNC: 71 MG/DL
NONHDLC SERPL-MCNC: 89 MG/DL
POTASSIUM SERPL-SCNC: 3.9 MMOL/L (ref 3.4–5.3)
PROT SERPL-MCNC: 7.5 G/DL (ref 6.8–8.8)
SODIUM SERPL-SCNC: 140 MMOL/L (ref 133–144)
TRIGL SERPL-MCNC: 89 MG/DL
TSH SERPL DL<=0.005 MIU/L-ACNC: 2.58 MU/L (ref 0.4–4)

## 2018-12-18 ENCOUNTER — THERAPY VISIT (OUTPATIENT)
Dept: PHYSICAL THERAPY | Facility: CLINIC | Age: 63
End: 2018-12-18
Payer: MEDICAID

## 2018-12-18 DIAGNOSIS — M51.369 DDD (DEGENERATIVE DISC DISEASE), LUMBAR: ICD-10-CM

## 2018-12-18 DIAGNOSIS — M54.50 LUMBAGO: ICD-10-CM

## 2018-12-18 PROCEDURE — 97161 PT EVAL LOW COMPLEX 20 MIN: CPT | Mod: GP | Performed by: PHYSICAL THERAPIST

## 2018-12-18 PROCEDURE — 97110 THERAPEUTIC EXERCISES: CPT | Mod: GP | Performed by: PHYSICAL THERAPIST

## 2018-12-18 NOTE — PROGRESS NOTES
"[facility] Initial Evaluation  Subjective:  The history is provided by the patient. No  was used.   Ramona Ferrer is a 63 year old female with a lumbar condition.  Condition occurred with:  Insidious onset.  Condition occurred: for unknown reasons.  This is a new condition  Patient reports a gradual onset of low back pain beginning about 6-7 months ago.  Patient c/o difficulty walking, lifting, sitting in bleachers, sitting in car, and transfers.  Patient's sleep is impaired d/t LBP.  Physical therapy was ordered 12/7/2018.    Patient reports pain:  Lumbar spine right.  Radiates to:  Thigh right.  Quality: \"just terrible pain, bone pain, deep inside\" and is constant and reported as 7/10.  Associated with: patient denies radicular symptoms. Pain is worse in the P.M..  Symptoms are exacerbated by walking, standing, sitting and lifting and relieved by analgesics.  Since onset symptoms are gradually worsening.  Special testing: none.  Previous treatment: none.    General health as reported by patient is fair.  Pertinent medical history includes:  Overweight.  Medical allergies: no.  Other surgeries include:  Other.  Current medications:  Thyroid medication, sleep medication and pain medication.  Current occupation is PCA.        Barriers include:  None as reported by the patient.    Red flags:  None as reported by the patient.                        Objective:  Standing Alignment:        Lumbar:  Lordosis incr            Gait:    Gait Type:  Antalgic                    Lumbar/SI Evaluation  ROM:    AROM Lumbar:   Flexion:          Min Loss (++)  Ext:                    Min Loss (-) - Pain and ROM improve with repeated movement.   Side Bend:        Left:  WNL    Right:  ERP  Rotation:           Left:     Right:   Side Glide:        Left:     Right:         Strength: Poor core strength  Lumbar Myotomes:    T12-L3 (Hip Flex):  Left: 5    Right: 5  L2-4 (Quads):  Left:  5    " Right:  5  L4 (Ankle DF):  Left:  5    Right:  5  L5 (Great Toe Ext): Left: 5    Right: 5   S1 (Toe Raise):  Left: 5    Right: 5        Neural Tension/Mobility:      Left side:SLR or SLR w/DF  negative.     Right side:   SLR w/DF or SLR  negative.   Lumbar Palpation:      Tenderness not present at Left:    Quadratus Lumborum; Erector Spinae or Vertebral    Tenderness not present at Right:  Quadratus Lumborum; Erector Spinae or Vertebral    Lumbar Provocation:      Left negative with:  PROM hip    Right negative with:  PROM hip                                                 General     ROS    Assessment/Plan:    Patient is a 63 year old female with lumbar complaints.    Patient has the following significant findings with corresponding treatment plan.                Diagnosis 1:  Lumbar derangement  Pain -  self management, education and home program  Decreased ROM/flexibility - therapeutic exercise, therapeutic activity and home program  Decreased strength - therapeutic exercise, therapeutic activities and home program  Impaired gait - gait training and home program  Impaired muscle performance - neuro re-education and home program  Decreased function - therapeutic activities and home program  Impaired posture - neuro re-education, therapeutic activities and home program    Therapy Evaluation Codes:   1) History comprised of:   Personal factors that impact the plan of care:      None.    Comorbidity factors that impact the plan of care are:      Overweight.     Medications impacting care: Pain and Sleep.  2) Examination of Body Systems comprised of:   Body structures and functions that impact the plan of care:      Lumbar spine.   Activity limitations that impact the plan of care are:      Lifting, Standing, Walking, Sleeping and Transfers, Sitting.  3) Clinical presentation characteristics are:   Stable/Uncomplicated.  4) Decision-Making    Low complexity using standardized patient assessment instrument and/or  measureable assessment of functional outcome.  Cumulative Therapy Evaluation is: Low complexity.    Previous and current functional limitations:  (See Goal Flow Sheet for this information)    Short term and Long term goals: (See Goal Flow Sheet for this information)     Communication ability:  Patient appears to be able to clearly communicate and understand verbal and written communication and follow directions correctly.  Treatment Explanation - The following has been discussed with the patient:   RX ordered/plan of care  Anticipated outcomes  Possible risks and side effects  This patient would benefit from PT intervention to resume normal activities.   Rehab potential is good.    Frequency:  1 X week, once daily  Duration:  for 8 weeks  Discharge Plan:  Achieve all LTG.  Independent in home treatment program.  Reach maximal therapeutic benefit.    Please refer to the daily flowsheet for treatment today, total treatment time and time spent performing 1:1 timed codes.

## 2018-12-18 NOTE — LETTER
"DEPARTMENT OF HEALTH AND HUMAN SERVICES  CENTERS FOR MEDICARE & MEDICAID SERVICES    PLAN/UPDATED PLAN OF PROGRESS FOR OUTPATIENT REHABILITATION    PATIENTS NAME:  Ramona Ferrer   : 1955  PROVIDER NUMBER:    [id number]  [id number]  [facility]OVIDER NAME: [facility]  MEDICAL RECORD NUMBER: [id number]   START OF CARE DATE:  SOC Date: 18   TYPE:  PT  PRIMARY/TREATMENT DIAGNOSIS: (Pertinent Medical Diagnosis)     DDD (degenerative disc disease), lumbar  Lumbago    VISITS FROM START OF CARE:  Rxs Used: 1     [facility] Initial Evaluation  Subjective:  The history is provided by the patient. No  was used.   Ramona Ferrer is a 63 year old female with a lumbar condition.  Condition occurred with:  Insidious onset.  Condition occurred: for unknown reasons.  This is a new condition  Patient reports a gradual onset of low back pain beginning about 6-7 months ago.  Patient c/o difficulty walking, lifting, sitting in bleachers, sitting in car, and transfers.  Patient's sleep is impaired d/t LBP.  Physical therapy was ordered 2018.    Patient reports pain:  Lumbar spine right.  Radiates to:  Thigh right.  Quality: \"just terrible pain, bone pain, deep inside\" and is constant and reported as 7/10.  Associated with: patient denies radicular symptoms. Pain is worse in the P.M..  Symptoms are exacerbated by walking, standing, sitting and lifting and relieved by analgesics.  Since onset symptoms are gradually worsening.  Special testing: none.  Previous treatment: none.    General health as reported by patient is fair.  Pertinent medical history includes:  Overweight.  Medical allergies: no.  Other surgeries include:  Other.  Current medications:  Thyroid medication, sleep medication and pain medication.  Current occupation is PCA.        Barriers include:  None as reported by the patient.    Red flags:  None as reported by the patient.       Objective:  Standing " Alignment:      Lumbar:  Lordosis incr    Gait:    Gait Type:  Antalgic          Lumbar/SI Evaluation  ROM:    AROM Lumbar:   Flexion:          Min Loss (++)  Ext:                    Min Loss (-) - Pain and ROM improve with repeated movement.   Side Bend:        Left:  WNL    Right:  ERP  Rotation:           Left:     Right:   Side Glide:        Left:     Right:         Strength: Poor core strength  PATIENTS NAME:  Ramona Ferrer   : 1955    Lumbar Myotomes:    T12-L3 (Hip Flex):  Left: 5    Right: 5  L2-4 (Quads):  Left:  5    Right:  5  L4 (Ankle DF):  Left:  5    Right:  5  L5 (Great Toe Ext): Left: 5    Right: 5   S1 (Toe Raise):  Left: 5    Right: 5    Neural Tension/Mobility:      Left side:SLR or SLR w/DF  negative.     Right side:   SLR w/DF or SLR  negative.   Lumbar Palpation:      Tenderness not present at Left:    Quadratus Lumborum; Erector Spinae or Vertebral    Tenderness not present at Right:  Quadratus Lumborum; Erector Spinae or Vertebral    Lumbar Provocation:      Left negative with:  PROM hip    Right negative with:  PROM hip    Assessment/Plan:    Patient is a 63 year old female with lumbar complaints.    Patient has the following significant findings with corresponding treatment plan.                Diagnosis 1:  Lumbar derangement  Pain -  self management, education and home program  Decreased ROM/flexibility - therapeutic exercise, therapeutic activity and home program  Decreased strength - therapeutic exercise, therapeutic activities and home program  Impaired gait - gait training and home program  Impaired muscle performance - neuro re-education and home program  Decreased function - therapeutic activities and home program  Impaired posture - neuro re-education, therapeutic activities and home program    Therapy Evaluation Codes:   1) History comprised of:   Personal factors that impact the plan of care:      None.    Comorbidity factors that impact the plan of care are:   "    Overweight.     Medications impacting care: Pain and Sleep.  2) Examination of Body Systems comprised of:   Body structures and functions that impact the plan of care:      Lumbar spine.   Activity limitations that impact the plan of care are:      Lifting, Standing, Walking, Sleeping and Transfers, Sitting.  3) Clinical presentation characteristics are:   Stable/Uncomplicated.  4) Decision-Making    Low complexity using standardized patient assessment instrument and/or measureable assessment of functional outcome.  Cumulative Therapy Evaluation is: Low complexity.      PATIENTS NAME:  Ramona Ferrer   : 1955    Previous and current functional limitations:  (See Goal Flow Sheet for this information)    Short term and Long term goals: (See Goal Flow Sheet for this information)     Communication ability:  Patient appears to be able to clearly communicate and understand verbal and written communication and follow directions correctly.  Treatment Explanation - The following has been discussed with the patient:   RX ordered/plan of care  Anticipated outcomes  Possible risks and side effects  This patient would benefit from PT intervention to resume normal activities.   Rehab potential is good.    Frequency:  1 X week, once daily  Duration:  for 8 weeks  Discharge Plan:  Achieve all LTG.  Independent in home treatment program.  Reach maximal therapeutic benefit.          Caregiver Signature/Credentials _____________________________ Date ________       Treating Provider: Jose Miguel Jama PT   I have reviewed and certified the need for these services and plan of treatment while under my care.        PHYSICIAN'S SIGNATURE:   _____________________________________  Date___________                      Eugene Michelle MD    Certification period:  Beginning of Cert date period: 18 to  End of Cert period date: 02/15/19     Functional Level Progress Report: Please see attached \"Goal Flow sheet for " "Functional level.\"    ____X____ Continue Services or       ________ DC Services                Service dates: From  SOC Date: 12/18/18 date to present                         "

## 2018-12-21 ENCOUNTER — TELEPHONE (OUTPATIENT)
Dept: INTERNAL MEDICINE | Facility: CLINIC | Age: 63
End: 2018-12-21

## 2018-12-21 DIAGNOSIS — M48.061 SPINAL STENOSIS, LUMBAR: ICD-10-CM

## 2018-12-21 DIAGNOSIS — M51.369 DDD (DEGENERATIVE DISC DISEASE), LUMBAR: Primary | ICD-10-CM

## 2018-12-21 NOTE — TELEPHONE ENCOUNTER
Reason for Call:  Other call back    Detailed comments: pt states the appt for  hip dexa on 7th is incorrect it should be for hand? Would  Also like it sooner    Phone Number Patient can be reached at: Cell number on file:    Telephone Information:   Mobile [phone]       Best Time: any    Can we leave a detailed message on this number? YES    Call taken on 12/21/2018 at 4:40 PM by Marga Sierra

## 2018-12-24 ENCOUNTER — TELEPHONE (OUTPATIENT)
Dept: INTERNAL MEDICINE | Facility: CLINIC | Age: 63
End: 2018-12-24

## 2018-12-24 NOTE — TELEPHONE ENCOUNTER
Fax received from [facility] for review and signature.  Put in Dr. Michelle's in basket.

## 2018-12-26 NOTE — TELEPHONE ENCOUNTER
Pts message below states her scheduled Dexa should be of the hand? Not the hip?     Please advise.     Pt weighs 223 lbs.

## 2018-12-27 ENCOUNTER — THERAPY VISIT (OUTPATIENT)
Dept: PHYSICAL THERAPY | Facility: CLINIC | Age: 63
End: 2018-12-27
Payer: MEDICAID

## 2018-12-27 DIAGNOSIS — M54.50 LUMBAGO: ICD-10-CM

## 2018-12-27 PROCEDURE — 97110 THERAPEUTIC EXERCISES: CPT | Mod: GP | Performed by: PHYSICAL THERAPIST

## 2018-12-27 PROCEDURE — 97140 MANUAL THERAPY 1/> REGIONS: CPT | Mod: GP | Performed by: PHYSICAL THERAPIST

## 2018-12-31 NOTE — TELEPHONE ENCOUNTER
Bone density is usually done with back and hip densities,   . I am not sure why pt is requesting bone density of hand. Please check with Dr Hampton who reads bone densities if we do dexa of hand?

## 2018-12-31 NOTE — TELEPHONE ENCOUNTER
Reviewed with Dr Hampotn. They do not do the hand xrays.     Call to pt. She agrees with the Dexa scan.     She is also asking about getting regular x-rays of her hip and low back.    She states she is having Terrible pain in her low back and right buttocks/hip.     Had OV on 12/7/18.     She states PT is not helping at all.     Please advise.

## 2019-01-01 NOTE — TELEPHONE ENCOUNTER
Pt has had MRI lumbar spine in the past and it showed multilevel degenerative(arthritic)  disc and facet disease. Also foraminal stenosis. Please advise pt to f/u with her ortho or [facility] ortho  or we can refer her to [facility].. Pl inform pt .

## 2019-01-07 ENCOUNTER — ANCILLARY PROCEDURE (OUTPATIENT)
Dept: BONE DENSITY | Facility: CLINIC | Age: 64
End: 2019-01-07
Attending: INTERNAL MEDICINE
Payer: MEDICAID

## 2019-01-07 DIAGNOSIS — Z00.00 ENCOUNTER FOR ROUTINE ADULT HEALTH EXAMINATION WITHOUT ABNORMAL FINDINGS: ICD-10-CM

## 2019-01-07 PROCEDURE — 77080 DXA BONE DENSITY AXIAL: CPT | Performed by: INTERNAL MEDICINE

## 2019-01-10 ENCOUNTER — OFFICE VISIT (OUTPATIENT)
Dept: NEUROSURGERY | Facility: CLINIC | Age: 64
End: 2019-01-10
Attending: NURSE PRACTITIONER
Payer: MEDICAID

## 2019-01-10 ENCOUNTER — HOSPITAL ENCOUNTER (OUTPATIENT)
Dept: MRI IMAGING | Facility: CLINIC | Age: 64
Discharge: HOME | End: 2019-01-10
Attending: NURSE PRACTITIONER | Admitting: NURSE PRACTITIONER
Payer: MEDICAID

## 2019-01-10 VITALS
HEART RATE: 80 BPM | TEMPERATURE: 98.8 F | HEIGHT: 64 IN | OXYGEN SATURATION: 94 % | DIASTOLIC BLOOD PRESSURE: 82 MMHG | BODY MASS INDEX: 37.56 KG/M2 | SYSTOLIC BLOOD PRESSURE: 128 MMHG | WEIGHT: 220 LBS

## 2019-01-10 DIAGNOSIS — M54.16 LUMBAR RADICULAR PAIN: Primary | ICD-10-CM

## 2019-01-10 DIAGNOSIS — M54.16 LUMBAR RADICULAR PAIN: ICD-10-CM

## 2019-01-10 PROCEDURE — 72148 MRI LUMBAR SPINE W/O DYE: CPT

## 2019-01-10 PROCEDURE — G0463 HOSPITAL OUTPT CLINIC VISIT: HCPCS | Mod: 25

## 2019-01-10 PROCEDURE — 99203 OFFICE O/P NEW LOW 30 MIN: CPT | Performed by: NURSE PRACTITIONER

## 2019-01-10 ASSESSMENT — PAIN SCALES - GENERAL: PAINLEVEL: MODERATE PAIN (5)

## 2019-01-10 ASSESSMENT — MIFFLIN-ST. JEOR: SCORE: 1547.91

## 2019-01-10 NOTE — LETTER
1/10/2019         RE: Ramona Ferrer  [address]  [place] [address]        Dear Colleague,    Thank you for referring your patient, Ramona Ferrer, to the [facility]. Please see a copy of my visit note below.    Dr. Niranjan Mayer  [facility]  Neurosurgery Clinic Visit        CC: low back and right leg pain     Primary care Provider: Eugene Michelle      Reason For Visit:   I was asked by Dr. Michelle to consult on the patient for lumbar radicular pain on the right.      HPI: Ramona Ferrer is a 63 year old female with lumbar radicular pain on the right. She notes that this started over 6months ago.  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  She notes that the pain has gotten worse and now is in her low back and radiates down her right lateral thigh pain to the knee. She notes that the leg pain is severe and is worse then the back pain. She notes activity makes it worse.  She states that sitting makes it better.  She states that night time is very hard for her.  Denies any foot drop or drag. She started PT and it caused severe pain so she stopped. She has not had any injections for her pain.     Pain at its worst 10  Pain right now:  5    Past Medical History:   Diagnosis Date     DDD (degenerative disc disease), lumbar      Endometriosis      Hypothyroidism           Seasonal allergies      Spinal stenosis, lumbar        Past Medical History reviewed with patient during visit.    Past Surgical History:   Procedure Laterality Date     C TOTAL ABDOM HYSTERECTOMY  2003    MARYA BSO for stage 4 endometriosis     COLONOSCOPY       COLONOSCOPY  2/20/2012    Procedure:COLONOSCOPY; COLONOSCOPY ; Surgeon:ARUN KITCHEN; Location: GI     HC CYSTOURETHROSCOPY W/ URETEROSCOPY &/OR PYELOSCOPY; W/ LITHOTRIPSY       HYSTERECTOMY, PAP NO LONGER INDICATED       LASER YAG IRIDOTOMY  8/8/2012    Procedure: LASER YAG IRIDOTOMY;  LEFT EYE  YAG LASER PUPIL IRIDOTOMY ;  Surgeon: Dakotah Humphreys MD;  Location:  [facility]     LIGATN/STRIP LONG OR SHORT SAPHEN      x's2     PELVIS LAPAROSCOPY,DX       Past Surgical History reviewed with patient during visit.    Current Outpatient Medications   Medication     acetaminophen (TYLENOL) 325 MG tablet     aspirin-acetaminophen-caffeine (EXCEDRIN MIGRAINE) 250-250-65 MG per tablet     Fexofenadine HCl (ALLEGRA PO)     fluticasone (FLONASE) 50 MCG/ACT nasal spray     levothyroxine (SSYNTHROID,LEVOTHROID) 100 MCG tablet     lisinopril (PRINIVIL/ZESTRIL) 10 MG tablet     olopatadine (PATANOL) 0.1 % ophthalmic solution     omeprazole (PRILOSEC) 20 MG DR capsule     ORDER FOR DME     No current facility-administered medications for this visit.        Allergies   Allergen Reactions     Seasonal Allergies        Social History     Socioeconomic History     Marital status:      Spouse name: Not on file     Number of children: Not on file     Years of education: Not on file     Highest education level: Not on file   Social Needs     Financial resource strain: Not on file     Food insecurity - worry: Not on file     Food insecurity - inability: Not on file     Transportation needs - medical: Not on file     Transportation needs - non-medical: Not on file   Occupational History     Not on file   Tobacco Use     Smoking status: Never Smoker     Smokeless tobacco: Never Used   Substance and Sexual Activity     Alcohol use: No     Drug use: No     Sexual activity: Yes     Partners: Male     Comment: MARYA LONDONO   Other Topics Concern     Parent/sibling w/ CABG, MI or angioplasty before 65F 55M? Not Asked   Social History Narrative     Not on file       Family History   Problem Relation Age of Onset     Diabetes Mother      Thyroid Disease Mother      Hypertension Mother      Cancer Father          of stomach CA     Heart Disease Father         MI at age 58     Cerebrovascular Disease No family hx of          Review Of  "Systems  Skin: negative  Eyes: negative  Ears/Nose/Throat: negative  Respiratory: LALO  Cardiovascular: HTN/HLD  Gastrointestinal: negative  Genitourinary: negative  Musculoskeletal: back pain  Neurologic: right leg pain   Psychiatric: depression  Hematologic/Lymphatic/Immunologic: negative  Endocrine: thyroid disorder     ROS: 10 point ROS neg other than the symptoms noted above in the HPI.    Vital Signs: /82   Pulse 80   Temp 98.8  F (37.1  C) (Oral)   Ht 5' 4\" (1.626 m)   Wt 220 lb (99.8 kg)   SpO2 94%   BMI 37.76 kg/m          Examination:  Constitutional:  Alert, well nourished, NAD.  Memory: recent and remote memory intact   HEENT: Normocephalic, atraumatic.   Pulm:  Without shortness of breath   CV:  No pitting edema of BLE.    Neurological:  Awake  Alert  Oriented x 3  Speech clear  Cranial nerves II - XII intact  PERRL  EOMI  Face symmetric  Tongue midline  Motor exam   Shoulder Abduction:  Right:  5/5   Left:  5/5  Biceps:                      Right:  5/5   Left:  5/5  Triceps:                     Right:  5/5   Left:  5/5  Wrist Extensors:       Right:  5/5   Left:  5/5  Wrist Flexors:           Right:  5/5   Left:  5/5  Intrinsics:                   Right:  5/5   Left:  5/5   Hip Flexor:                Right: 5/5  Left:  5/5  Hip Adductor:             Right:  5/5  Left:  5/5  Hip Abductor:             Right:  5/5  Left:  5/5  Gastroc Soleus:        Right:  5/5  Left:  5/5  Tib/Ant:                      Right:  5/5  Left:  5/5  EHL:                          Right:  5/5  Left:  5/5   Sensation normal to bilateral upper and lower extremities  Muscle tone to bilateral upper and lower extremities normal   Gait: Able to stand from a seated position. Normal non-antalgic, non-myelopathic gait.  Able to heel/toe walk without loss of balance    Radicular pain to right lateral hip and thigh with heel and toe walking.     Lumbar examination reveals tenderness of the spine and paraspinous muscles. Pain " with lumbar extension. Hip height is symmetrical. Negative SI joint, sciatic notch or greater trochanteric tenderness to palpation bilaterally.  Straight leg raise is negative bilaterally.      Imaging:     NONE    Assessment/Plan:   Ramona Ferrer is a 63 year old female with lumbar radicular pain on the right. She notes that this started over 6months ago.  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  She notes that the pain has gotten worse and now is in her low back and radiates down her right lateral thigh pain to the knee. She notes that the leg pain is severe and is worse then the back pain. She notes activity makes it worse.  She states that sitting makes it better.  She states that night time is very hard for her.  Denies any foot drop or drag. She started PT and it caused severe pain so she stopped. She has not had any injections for her pain. The pt is neurologically intact. She does note radicular pain on the right with walking. At this time it was recommended that she undergo a lumbar MRI. We did briefly discuss the use of injections for pain relief as well.     Patient Instructions   1. Please call [facility] to have lumbar MRI completed. Call [phone] to schedule your scan.  I will contact you with results.                Jocelin Mancuso CNP  [facility]  [address] [address]    Tel [phone]  Pager [phone]      Again, thank you for allowing me to participate in the care of your patient.        Sincerely,        EFRAIN Jacobson CNP

## 2019-01-10 NOTE — PROGRESS NOTES
Dr. Niranjan Mayer  [facility]  Neurosurgery Clinic Visit        CC: low back and right leg pain     Primary care Provider: Eugene Michelle      Reason For Visit:   I was asked by Dr. Michelle to consult on the patient for lumbar radicular pain on the right.      HPI: Ramona Ferrer is a 63 year old female with lumbar radicular pain on the right. She notes that this started over 6months ago.  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  She notes that the pain has gotten worse and now is in her low back and radiates down her right lateral thigh pain to the knee. She notes that the leg pain is severe and is worse then the back pain. She notes activity makes it worse.  She states that sitting makes it better.  She states that night time is very hard for her.  Denies any foot drop or drag. She started PT and it caused severe pain so she stopped. She has not had any injections for her pain.     Pain at its worst 10  Pain right now:  5    Past Medical History:   Diagnosis Date     DDD (degenerative disc disease), lumbar      Endometriosis      Hypothyroidism           Seasonal allergies      Spinal stenosis, lumbar        Past Medical History reviewed with patient during visit.    Past Surgical History:   Procedure Laterality Date     C TOTAL ABDOM HYSTERECTOMY  2003    MARYA BSO for stage 4 endometriosis     COLONOSCOPY       COLONOSCOPY  2/20/2012    Procedure:COLONOSCOPY; COLONOSCOPY ; Surgeon:ARUN KITCHEN; Location: GI     HC CYSTOURETHROSCOPY W/ URETEROSCOPY &/OR PYELOSCOPY; W/ LITHOTRIPSY       HYSTERECTOMY, PAP NO LONGER INDICATED       LASER YAG IRIDOTOMY  8/8/2012    Procedure: LASER YAG IRIDOTOMY;  LEFT EYE YAG LASER PUPIL IRIDOTOMY ;  Surgeon: Dakotah Humphreys MD;  Location: [facility]     LIGATN/STRIP LONG OR SHORT SAPHEN      x's2     PELVIS LAPAROSCOPY,DX       Past Surgical History reviewed with patient during visit.    Current Outpatient Medications   Medication      acetaminophen (TYLENOL) 325 MG tablet     aspirin-acetaminophen-caffeine (EXCEDRIN MIGRAINE) 250-250-65 MG per tablet     Fexofenadine HCl (ALLEGRA PO)     fluticasone (FLONASE) 50 MCG/ACT nasal spray     levothyroxine (SSYNTHROID,LEVOTHROID) 100 MCG tablet     lisinopril (PRINIVIL/ZESTRIL) 10 MG tablet     olopatadine (PATANOL) 0.1 % ophthalmic solution     omeprazole (PRILOSEC) 20 MG DR capsule     ORDER FOR DME     No current facility-administered medications for this visit.        Allergies   Allergen Reactions     Seasonal Allergies        Social History     Socioeconomic History     Marital status:      Spouse name: Not on file     Number of children: Not on file     Years of education: Not on file     Highest education level: Not on file   Social Needs     Financial resource strain: Not on file     Food insecurity - worry: Not on file     Food insecurity - inability: Not on file     Transportation needs - medical: Not on file     Transportation needs - non-medical: Not on file   Occupational History     Not on file   Tobacco Use     Smoking status: Never Smoker     Smokeless tobacco: Never Used   Substance and Sexual Activity     Alcohol use: No     Drug use: No     Sexual activity: Yes     Partners: Male     Comment: MARYA LONDONO   Other Topics Concern     Parent/sibling w/ CABG, MI or angioplasty before 65F 55M? Not Asked   Social History Narrative     Not on file       Family History   Problem Relation Age of Onset     Diabetes Mother      Thyroid Disease Mother      Hypertension Mother      Cancer Father          of stomach CA     Heart Disease Father         MI at age 58     Cerebrovascular Disease No family hx of          Review Of Systems  Skin: negative  Eyes: negative  Ears/Nose/Throat: negative  Respiratory: LALO  Cardiovascular: HTN/HLD  Gastrointestinal: negative  Genitourinary: negative  Musculoskeletal: back pain  Neurologic: right leg pain   Psychiatric:  "depression  Hematologic/Lymphatic/Immunologic: negative  Endocrine: thyroid disorder     ROS: 10 point ROS neg other than the symptoms noted above in the HPI.    Vital Signs: /82   Pulse 80   Temp 98.8  F (37.1  C) (Oral)   Ht 5' 4\" (1.626 m)   Wt 220 lb (99.8 kg)   SpO2 94%   BMI 37.76 kg/m         Examination:  Constitutional:  Alert, well nourished, NAD.  Memory: recent and remote memory intact   HEENT: Normocephalic, atraumatic.   Pulm:  Without shortness of breath   CV:  No pitting edema of BLE.    Neurological:  Awake  Alert  Oriented x 3  Speech clear  Cranial nerves II - XII intact  PERRL  EOMI  Face symmetric  Tongue midline  Motor exam   Shoulder Abduction:  Right:  5/5   Left:  5/5  Biceps:                      Right:  5/5   Left:  5/5  Triceps:                     Right:  5/5   Left:  5/5  Wrist Extensors:       Right:  5/5   Left:  5/5  Wrist Flexors:           Right:  5/5   Left:  5/5  Intrinsics:                   Right:  5/5   Left:  5/5   Hip Flexor:                Right: 5/5  Left:  5/5  Hip Adductor:             Right:  5/5  Left:  5/5  Hip Abductor:             Right:  5/5  Left:  5/5  Gastroc Soleus:        Right:  5/5  Left:  5/5  Tib/Ant:                      Right:  5/5  Left:  5/5  EHL:                          Right:  5/5  Left:  5/5   Sensation normal to bilateral upper and lower extremities  Muscle tone to bilateral upper and lower extremities normal   Gait: Able to stand from a seated position. Normal non-antalgic, non-myelopathic gait.  Able to heel/toe walk without loss of balance    Radicular pain to right lateral hip and thigh with heel and toe walking.     Lumbar examination reveals tenderness of the spine and paraspinous muscles. Pain with lumbar extension. Hip height is symmetrical. Negative SI joint, sciatic notch or greater trochanteric tenderness to palpation bilaterally.  Straight leg raise is negative bilaterally.      Imaging:     NONE    Assessment/Plan: "   Ramona Ferrer is a 63 year old female with lumbar radicular pain on the right. She notes that this started over 6months ago.  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  She notes that the pain has gotten worse and now is in her low back and radiates down her right lateral thigh pain to the knee. She notes that the leg pain is severe and is worse then the back pain. She notes activity makes it worse.  She states that sitting makes it better.  She states that night time is very hard for her.  Denies any foot drop or drag. She started PT and it caused severe pain so she stopped. She has not had any injections for her pain. The pt is neurologically intact. She does note radicular pain on the right with walking. At this time it was recommended that she undergo a lumbar MRI. We did briefly discuss the use of injections for pain relief as well.     Patient Instructions   1. Please call [facility] to have lumbar MRI completed. Call [phone] to schedule your scan.  I will contact you with results.                Jocelni Mancuso [facility]  [facility]  [address] [address]    Tel [phone]  Pager [phone]

## 2019-01-10 NOTE — PATIENT INSTRUCTIONS
1. Please call [facility] to have lumbar MRI completed. Call [phone] to schedule your scan.  I will contact you with results.

## 2019-01-11 NOTE — NURSING NOTE
"Ramona Ferrer is a 63 year old female who presents for:  Chief Complaint   Patient presents with     Neurologic Problem     low back pain that radiates to right hip and leg         Initial Vitals:  /82   Pulse 80   Temp 98.8  F (37.1  C) (Oral)   Ht 5' 4\" (1.626 m)   Wt 220 lb (99.8 kg)   SpO2 94%   BMI 37.76 kg/m   Estimated body mass index is 37.76 kg/m  as calculated from the following:    Height as of this encounter: 5' 4\" (1.626 m).    Weight as of this encounter: 220 lb (99.8 kg).. Body surface area is 2.12 meters squared. BP completed using cuff size: large  Moderate Pain (5)        Nursing Comments: Patient is here for low back, right hip and leg pain.        Lisa Kinney RN    "

## 2019-01-14 ENCOUNTER — TELEPHONE (OUTPATIENT)
Dept: NEUROSURGERY | Facility: CLINIC | Age: 64
End: 2019-01-14

## 2019-01-14 DIAGNOSIS — M54.16 LUMBAR RADICULAR PAIN: Primary | ICD-10-CM

## 2019-01-23 ENCOUNTER — ANCILLARY PROCEDURE (OUTPATIENT)
Dept: GENERAL RADIOLOGY | Facility: CLINIC | Age: 64
End: 2019-01-23
Payer: MEDICAID

## 2019-01-23 ENCOUNTER — RADIOLOGY INJECTION OFFICE VISIT (OUTPATIENT)
Dept: PALLIATIVE MEDICINE | Facility: CLINIC | Age: 64
End: 2019-01-23
Payer: MEDICAID

## 2019-01-23 VITALS — HEART RATE: 71 BPM | DIASTOLIC BLOOD PRESSURE: 60 MMHG | OXYGEN SATURATION: 94 % | SYSTOLIC BLOOD PRESSURE: 119 MMHG

## 2019-01-23 DIAGNOSIS — M54.16 LUMBAR RADICULOPATHY: Primary | ICD-10-CM

## 2019-01-23 DIAGNOSIS — M54.16 LUMBAR RADICULAR PAIN: ICD-10-CM

## 2019-01-23 PROCEDURE — 64483 NJX AA&/STRD TFRM EPI L/S 1: CPT | Mod: RT | Performed by: ANESTHESIOLOGY

## 2019-01-23 NOTE — PATIENT INSTRUCTIONS
[facility] Procedure Discharge Instructions    Today you saw:   Dr. Marine Reynolds   Your procedure: Lumbar Epidural steroid injection     Medications used:  Lidocaine (anesthetic) Dexamethasone (steroid) Omnipaque (contrast)       If you were holding your blood thinning medication, please restart taking it: N/A          Be cautious when walking as numbness and/or weakness in the legs may occur up to 6-8 hours after the procedure due to effect of the local anesthetic    Do not drive for 6 hours. The effect of the local anesthetic could slow your reflexes.     Avoid strenuous activity for the first 24 hours. You may resume your regular activities after that.     You may shower, however avoid swimming, tub baths or hot tubs for 24 hours following your procedure    You may have a mild to moderate increase in pain for several days following the injection.      You may use ice packs for 10-15 minutes, 3 to 4 times a day at the injection site for comfort    Do not use heat to painful areas for 6 to 8 hours. This will give the local anesthetic time to wear off and prevent you from accidentally burning your skin.    You may use anti-inflammatory medications (such as Ibuprofen/Advil or Aleve) or Tylenol for pain control if necessary    With diabetes, check your blood sugar more frequently than usual as your blood sugar may be higher than normal for 10-14 days following a steroid injection. Contact your doctor who manages your diabetes if your blood sugar is higher than usual    Possible side effects of steroids that you may experience include flushing, elevated blood pressure, increased appetite, mild headaches and restlessness.  All of these symptoms will get better with time.    It may take up to 14 days for the steroid medication to start working although you may feel the effect as early as a few days after the procedure.     Follow up with your referring provider in 2-3 weeks      If you experience any of the  following, call the pain center line during work hours at [phone] or on-call physician after hours at [phone]:  -Fever over 100 degree F  -Swelling, bleeding, redness, drainage, warmth at the injection site  -Progressive weakness or numbness in your legs  -Loss of bowel or bladder function  -Unusual headache that is not relieved by Tylenol or your regular headache medication  -Unusual new onset of pain that is not improving

## 2019-01-23 NOTE — PROGRESS NOTES
[facility] - Procedure Note    Date of Service: 1/23/2019    Procedure performed: Attempted right L5-S1 transforaminal epidural steroid injection with fluoroscopic guidance; Performed Right S1 transforaminal epidural steroid injection  Diagnosis: Lumbar spondylosis; Lumbar radiculitis/radiculopathy  : Marine Reynolds MD  Anesthesia: none    Indications: Ramona Ferrer is a 63 year old female who is seen at the request of Jocelin Mancuso CNP for lumbar transforaminal epidural steroid injection. The patient describes bilateral low back pain and right hip and lateral thigh pain not extending below the knee. The patient has been exhibiting symptoms consistent with lumbar intraspinal inflammation and radiculopathy. Symptoms have been persistent, disabling, and intermittently severe. The patient reports minimal improvement with conservative treatment, including physical therapy and medications.    Lumbar MRI was done on 1/10/2019 which showed   FINDINGS: There are five lumbar-type vertebrae for the purposes of  this dictation.      There is minimal degenerative anterolisthesis of L3 upon L4 and of L5  upon S1. Alignment of the lumbar vertebrae is otherwise normal.  Vertebral body heights of the lumbar spine are normal. There is  reactive, degenerative-type bone marrow signal change in the posterior  aspects of the opposing endplates of the L1-L2 level. There is  scattered reactive, degenerative Type II marrow signal change in the  opposing endplates at the L2-L3, L4-L5 and L5-S1 levels. Marrow signal  throughout the lumbar vertebrae is otherwise normal. There is no  evidence for fracture or pathologic bony lesion of the lumbar spine.     There is loss of disc height, disc desiccation and posterior disc  bulging to varying degrees at all levels of the lumbar spine.     The tip of the conus medullaris is at the lower L1 level which is  within normal limits. There is no evidence for  intrathecal  abnormality.      Level by level:      L1-L2: There is a circumferential disc bulge with a tiny superimposed  left lateral (foraminal zone) disc herniation (protrusion) and mild  facet arthropathy bilaterally. These findings result in mild bilateral  neural foraminal narrowing and minimal spinal canal narrowing.     L2-L3: There is a circumferential disc bulge, moderate thickening of  the ligamentum flavum bilaterally and moderate facet arthropathy  bilaterally. These findings result in mild spinal canal narrowing but  no foraminal stenosis on either side.     L3-L4: There is a circumferential disc bulge, moderate thickening of  the ligamentum flavum bilaterally and severe facet arthropathy  bilaterally. These findings result in mild spinal canal narrowing and  minimal bilateral neural foraminal narrowing.     L4-L5: There is a circumferential disc bulge, mild thickening of the  ligamentum flavum bilaterally and moderate facet arthropathy  bilaterally. These findings result in mild left foraminal narrowing,  minimal right foraminal narrowing and mild spinal canal narrowing with  moderate narrowing of the lateral recesses of the spinal canal  bilaterally.     L5-S1: There is a circumferential disc bulge and moderate facet  arthropathy bilaterally. These findings result in severe left  foraminal stenosis, severe right foraminal stenosis and minimal spinal  canal narrowing.                                                                    IMPRESSION: Degenerative changes of the lumbar spine as described  above.    Allergies:      Allergies   Allergen Reactions     Seasonal Allergies         Vitals:  /68   Pulse 77   SpO2 94%     Review of Systems: The patient denies recent fever, chills, illness, use of antibiotics or anticoagulants. All other 10-point review of systems negative.     Procedure: The procedure and risks were explained, and informed written consent was obtained from the patient.  Risks include but are not limited to: infection, bleeding, increased pain, and damage to soft tissue, nerve, muscle, and vasculature structures. After getting informed consent, patient was brought into the procedure suite and was placed in a prone position on the procedure table. A Pause for the Cause was performed. Patient was prepped and draped in sterile fashion.     After identifying the right S1 neuroforamen, the C-arm was rotated to a cephalad angle.  A total of 4.5 mL of Lidocaine 1% was used to anesthetize the skin and the needle track at a skin entry site coaxial with the fluoroscopy beam, and overriding the superior aspect of the neuroforamen.  A 22 gauge 5 inch spinal needle was advanced under intermittent fluoroscopy until it entered the foramen superiorly.    The position was then inspected from anteroposterior and lateral views, and the needle adjusted appropriately.  After negative aspiration, a total of 2 mL of Omnipaque-300 was injected using static and continuous fluoroscopy confirming appropriate position, with spread along the nerve root sheath and into the epidural space, with no intravascular or intrathecal uptake. 8 mL of Omnipaque-300 was wasted.    2mL of 1% lidocaine with 20 mg of dexamethasone was injected.  The needle was removed. Hemostasis was achieved, the area was cleaned, and bandaids were placed when appropriate. Images were saved to PACS.    The patient tolerated the procedure well, and was taken to the recovery room, and there was no evidence of procedural complications. No new sensory or motor deficits were noted following the procedure. The patient was stable and able to ambulate on discharge home. Post-procedure instructions were provided.     Pre-procedure pain score: 6/10 in the back, 8/10 in the leg  Post-procedure pain score: 0/10 in the back, 0/10 in the leg    Assessment/Plan: Ramona Ferrer is a 63 year old female s/p Right L5-S1 transforaminal epidural steroid  injection today for lumbar spondylosis, radiculitis/radiculopathy.     1. Following today's procedure, the patient was advised to contact the [facility] for any of the following:   Fever, chills, or night sweats   New onset of pain, numbness, or weakness   Any questions/concerns regarding the procedure  If unable to contact the Pain Center, the patient was instructed to go to a local Emergency Room for any complications.   2. The patient will receive a follow-up call in 1 week.  3. The patient should follow-up with the referring provider in 2 weeks for post-procedure evaluation.      Marine Reynolds MD   [facility]

## 2019-01-23 NOTE — NURSING NOTE
Discharge Information    IV Discontiued Time:  NA    Amount of Fluid Infused:  NA    Discharge Criteria = When patient returns to baseline or as per MD order    Consciousness:  Pt is fully awake    Circulation:  BP +/- 20% of pre-procedure level    Respiration:  Patient is able to breathe deeply    O2 Sat:  Patient is able to maintain O2 Sat >92% on room air    Activity:  Moves 4 extremities on command    Ambulation:  Patient is able to stand and walk or stand and pivot into wheelchair    Dressing:  Clean/dry or No Dressing    Notes:   Discharge instructions and AVS given to patient    Patient meets criteria for discharge?  YES    Admitted to PCU?  No    Responsible adult present to accompany patient home?  Yes    Signature/Title:    Lesley Santos RN Care Coordinator  [facility]

## 2019-01-25 ENCOUNTER — OFFICE VISIT (OUTPATIENT)
Dept: OPTOMETRY | Facility: CLINIC | Age: 64
End: 2019-01-25
Payer: MEDICAID

## 2019-01-25 DIAGNOSIS — H52.203 HYPEROPIA OF BOTH EYES WITH ASTIGMATISM: Primary | ICD-10-CM

## 2019-01-25 DIAGNOSIS — H10.13 ALLERGIC CONJUNCTIVITIS OF BOTH EYES: ICD-10-CM

## 2019-01-25 DIAGNOSIS — H50.111 EXOTROPIA OF RIGHT EYE: ICD-10-CM

## 2019-01-25 DIAGNOSIS — H52.4 PRESBYOPIA: ICD-10-CM

## 2019-01-25 DIAGNOSIS — H02.889 MEIBOMIAN GLAND DYSFUNCTION: ICD-10-CM

## 2019-01-25 DIAGNOSIS — H53.001 AMBLYOPIA OF RIGHT EYE: ICD-10-CM

## 2019-01-25 DIAGNOSIS — H40.039 ANATOMICAL NARROW ANGLE: ICD-10-CM

## 2019-01-25 DIAGNOSIS — H04.129 DRY EYE: ICD-10-CM

## 2019-01-25 DIAGNOSIS — H52.03 HYPEROPIA OF BOTH EYES WITH ASTIGMATISM: Primary | ICD-10-CM

## 2019-01-25 PROCEDURE — 92004 COMPRE OPH EXAM NEW PT 1/>: CPT | Performed by: OPTOMETRIST

## 2019-01-25 PROCEDURE — 92015 DETERMINE REFRACTIVE STATE: CPT | Performed by: OPTOMETRIST

## 2019-01-25 RX ORDER — GLYCERIN/PROPYLENE GLYCOL 0.6 %-0.6%
1 DROPPERETTE, SINGLE-USE DROP DISPENSER OPHTHALMIC (EYE) 3 TIMES DAILY
Qty: 1 BOTTLE | Refills: 11 | COMMUNITY
Start: 2019-01-25 | End: 2022-01-06

## 2019-01-25 RX ORDER — PREDNISOLONE ACETATE 10 MG/ML
1 SUSPENSION/ DROPS OPHTHALMIC
Qty: 1 BOTTLE | Refills: 0 | Status: SHIPPED | OUTPATIENT
Start: 2019-01-25 | End: 2019-02-01

## 2019-01-25 RX ORDER — OLOPATADINE HYDROCHLORIDE 1 MG/ML
SOLUTION/ DROPS OPHTHALMIC
Qty: 5 ML | Refills: 6 | Status: SHIPPED | OUTPATIENT
Start: 2019-01-25 | End: 2020-04-20

## 2019-01-25 ASSESSMENT — REFRACTION_MANIFEST
OS_ADD: +2.50
OD_SPHERE: +3.50
OD_ADD: +2.50
OD_SPHERE: +2.75
OS_CYLINDER: +1.75
OD_AXIS: 103
OS_SPHERE: +2.75
METHOD_AUTOREFRACTION: 1
OS_AXIS: 094
OS_SPHERE: +3.25
OS_CYLINDER: +1.25
OD_CYLINDER: +3.00
OD_AXIS: 105
OD_CYLINDER: +3.00
OS_AXIS: 092

## 2019-01-25 ASSESSMENT — REFRACTION_WEARINGRX
OD_SPHERE: +3.75
OS_AXIS: 064
OD_CYLINDER: +2.25
OD_ADD: +2.25
OS_SPHERE: +3.25
OS_CYLINDER: +1.75
SPECS_TYPE: PAL
OS_ADD: +2.25
OD_AXIS: 118

## 2019-01-25 ASSESSMENT — TONOMETRY
IOP_METHOD: APPLANATION
OS_IOP_MMHG: 14
OD_IOP_MMHG: 14

## 2019-01-25 ASSESSMENT — VISUAL ACUITY
OS_CC: 20/40
METHOD: SNELLEN - LINEAR
OD_SC+: -1
OS_SC: 20/200
CORRECTION_TYPE: GLASSES
OS_CC: 20/25
OD_CC+: -2
OS_CC+: -2
OD_CC: 20/60
OD_SC: 20/125
OD_CC: 20/120

## 2019-01-25 ASSESSMENT — CONF VISUAL FIELD
OS_NORMAL: 1
OD_NORMAL: 1
METHOD: COUNTING FINGERS

## 2019-01-25 ASSESSMENT — EXTERNAL EXAM - RIGHT EYE: OD_EXAM: NORMAL

## 2019-01-25 ASSESSMENT — SLIT LAMP EXAM - LIDS
COMMENTS: NORMAL
COMMENTS: NORMAL

## 2019-01-25 ASSESSMENT — CUP TO DISC RATIO
OS_RATIO: 0.15
OD_RATIO: 0.15

## 2019-01-25 ASSESSMENT — EXTERNAL EXAM - LEFT EYE: OS_EXAM: NORMAL

## 2019-01-25 NOTE — LETTER
1/25/2019         RE: Ramona Ferrer  [address]  [place] [id number]        Dear Colleague,    Thank you for referring your patient, Ramona Ferrer, to the [facility]. Please see a copy of my visit note below.    Chief Complaint   Patient presents with     Annual Eye Exam      Patient states she had lazy eye as a child, also had laser procedure for glaucoma done about 4 years ago  POHX history of LPI each eye  She has tried over the counter allergy drops and no relief, she uses PF once in a while from Dr Humphreys and gone  Has history of dry eye and   Last Eye Exam: 1 year ago at [facility]  Dilated Previously: Yes    What are you currently using to see?  glasses       Distance Vision Acuity: Noticed gradual change in both eyes    Near Vision Acuity: Satisfied with vision while reading  with glasses    Eye Comfort: dry and tired  Do you use eye drops? 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       OBJECTIVE: See Ophthalmology exam    ASSESSMENT:    ICD-10-CM    1. Hyperopia of both eyes with astigmatism H52.03     H52.203    2. Presbyopia H52.4    3. Meibomian gland dysfunction H02.889    4. Dry eye H04.129    5. Allergic conjunctivitis of both eyes H10.13     narrow angle with patent PI    PLAN:   Long time discussion of eye drops  Discontinue visine   increase use of patanol in spring before symptoms two times   daily so she does not get so inflamed that she is needing the pred forte , we discussed glaucoma and cataracts as side effect    Ana Ludwig OD     Again, thank you for allowing me to participate in the care of your patient.         Sincerely,        Ana Ludwig, OD

## 2019-01-25 NOTE — PATIENT INSTRUCTIONS
Discontinue clear eyes or red eye relief   DRY EYE TREATMENT    I recommend using artificial tears for your dry eye. There are over the counter drops that work well and may be used up to 4x daily. ( systane balance, refresh optive, soothe xp)   If you need more than 4 drops daily, use a preservative free product which come in individual vials which may be used for 24 hours and discarded.     Artificial tears work best as a preventative and not as well after your eyes are starting to bother you.  It may take 4- 6 weeks of using the drops before you notice improvement.  If after that time you are still having problems schedule an appointment for an evaluation and discussion of different treatments.  Dry eyes are a chronic condition and you may have more symptoms at certain times of the year.      Additional recommended treatment:  Warm compresses once to twice daily for 5-10 minutes    Directions for warm soaks  There are few methods for hot compresses. Moisten a washcloth with hot water, or microwave for 10 seconds, being careful to not get the cloth too hot.   Then put the washcloth onto your eyelids for 5 minutes. It will cool quickly so a rice pack or eyemask that can be heated and laid on top of the washcloth will help retain the heat.          Omega 3 fatty acid supplements taken 1-2x daily  Recommend  at least  2000mg omega 3  800 EPA  600 DHA    Blink regularly  Stay hydrated/ increase humidity  Wear sunglasses        Meibomian gland dysfunction or Posterior Blepharitis, is characterized by inflammation along both the uppper and lower eyelid margins. A single row of these glands is present in each lid with openings along the lid margins.  It is often found in association with skin conditions such as rosacea and seborrheic dermatitis.    Symptoms include:  ?Red eyes  ?Gritty or burning sensation  ?Excessive tearing  ?Itchy eyelids  ?Red, swollen eyelids  ?Crusting or matting of eyelashes in the morning  ?Light  sensitivity  ?Blurred vision    It is important to keep cosmetics from blocking these oil glands. If blocked, they do not   excrete oil into the tear film, which causes the tears to evaporate quickly.   This may result in watery eyes.  There is also an increase of bacterial growth when the tear film is unstable, leading to further ocular surface inflammation.    Warm compresses are very beneficial to the normal functioning of the eye.  Warm compresses for 5-10 minutes twice daily and keeping the lid margins clean  and help maintain a good tear film.   Moisten a washcloth with hot water, or microwave for 10 seconds, being careful to not get the cloth too hot.   Then put the washcloth onto your eyelids for 5 minutes. It will cool quickly so a rice pack or eyemask that can be heated and laid on top of the washcloth will help retain the heat.    Omega 3 fatty acid supplements taken once to twice daily and artificial tears such as Soothe xp, Refresh optive , Retaine and systane balance are also an additional treatment to control inflammation and help soothe your eyes.     use patanol twice daily for allergies at least 2 weeks during allergy season  Use pred forte once in a while during spring if patanol is not helping , but stay on the patanol  Keep eyes clean  Overabundance of bacterial microorganisms along the eyelashes and lid margins induce stress on the tear film and promote inflammation.  Regular lid hygiene helps diminish the bacterial population to prevent inflammation and infection.  Use a warm compress to loosen crusts   Cleanse lids once daily with a lid cleansing product as directed such as Ocusoft or Sterilid which can be purchased at most pharmacies. Diluted baby shampoo will also work, but not as well.

## 2019-01-25 NOTE — PROGRESS NOTES
Chief Complaint   Patient presents with     Annual Eye Exam      Patient states she had lazy eye as a child, also had laser procedure for glaucoma done about 4 years ago  POHX history of LPI each eye  She has tried over the counter allergy drops and no relief, she uses PF once in a while from Dr Humphreys and gone  Has history of dry eye and   Last Eye Exam: 1 year ago at [facility]  Dilated Previously: Yes    What are you currently using to see?  glasses       Distance Vision Acuity: Noticed gradual change in both eyes    Near Vision Acuity: Satisfied with vision while reading  with glasses    Eye Comfort: dry and tired  Do you use eye drops? 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       OBJECTIVE: See Ophthalmology exam    ASSESSMENT:    ICD-10-CM    1. Hyperopia of both eyes with astigmatism H52.03     H52.203    2. Presbyopia H52.4    3. Meibomian gland dysfunction H02.889    4. Dry eye H04.129    5. Allergic conjunctivitis of both eyes H10.13     narrow angle with patent PI    PLAN:   Long time discussion of eye drops  Discontinue visine   increase use of patanol in spring before symptoms two times   daily so she does not get so inflamed that she is needing the pred forte , we discussed glaucoma and cataracts as side effect    Ana Ludwig OD

## 2019-01-28 ENCOUNTER — NURSE TRIAGE (OUTPATIENT)
Dept: NURSING | Facility: CLINIC | Age: 64
End: 2019-01-28

## 2019-01-28 ENCOUNTER — OFFICE VISIT (OUTPATIENT)
Dept: OBGYN | Facility: CLINIC | Age: 64
End: 2019-01-28
Payer: MEDICAID

## 2019-01-28 VITALS — WEIGHT: 224 LBS | DIASTOLIC BLOOD PRESSURE: 80 MMHG | SYSTOLIC BLOOD PRESSURE: 122 MMHG | BODY MASS INDEX: 38.45 KG/M2

## 2019-01-28 DIAGNOSIS — N02.9 IDIOPATHIC HEMATURIA, UNSPECIFIED WHETHER GLOMERULAR MORPHOLOGIC CHANGES PRESENT: ICD-10-CM

## 2019-01-28 DIAGNOSIS — R10.32 LLQ ABDOMINAL PAIN: Primary | ICD-10-CM

## 2019-01-28 PROCEDURE — 99214 OFFICE O/P EST MOD 30 MIN: CPT | Performed by: OBSTETRICS & GYNECOLOGY

## 2019-01-28 PROCEDURE — 88112 CYTOPATH CELL ENHANCE TECH: CPT | Performed by: OBSTETRICS & GYNECOLOGY

## 2019-01-28 PROCEDURE — 88112 CYTOPATH CELL ENHANCE TECH: CPT | Mod: 26 | Performed by: OBSTETRICS & GYNECOLOGY

## 2019-01-28 PROCEDURE — 88120 CYTP URNE 3-5 PROBES EA SPEC: CPT | Performed by: OBSTETRICS & GYNECOLOGY

## 2019-01-28 NOTE — TELEPHONE ENCOUNTER
Pain on going for months. Physical done. They didn't check anything OB/GYN related. Treated for bladder infection. That's not what this is.  Constant pain below belly button, can't walk, painful to walk. Daughter wanted to speak with the OB/GYN at the [facility]. I connected her to that line.  Lexie Vila RN-[facility]

## 2019-01-28 NOTE — PATIENT INSTRUCTIONS
You can reach your [facility] Care Team any time of the day by calling [phone]. This number will put you in touch with the 24 hour nurse line if the clinic is closed.    To contact your OB/GYN Station Coordinator/Surgery Scheduler please call [phone]. This is a direct number for your care team between 8 a.m. and 4 p.m. Monday through Friday.    [facility] is open for your convenience:  Monday through Friday 8 a.m. to 6 p.m.  Closed weekends and all major holidays.

## 2019-01-28 NOTE — Clinical Note
Can you check to see when the CT scan is scheduled and when her F/U appointment with me is.  My note says that the CBC and CT were cancelledSaige Lezama M.D.

## 2019-01-28 NOTE — Clinical Note
Neel Lezama M.D. FACOGFairview Ridges Clinic[address], [place]  [phone]    [phone]    FaxDear Dr Michelle,       Thank you for the opportunity to see your patient. Please see my office visit notes for your review.  As always, I appreciate the opportunity to participate in your patient's care. Sincerely yours,Neel Lezama M.D.

## 2019-01-28 NOTE — PROGRESS NOTES
HPI:  Ramona Ferrer is a 63 year old female  1 December 10, 2002 underwent total abdominal hysterectomy, BSO extensive lysis of adhesions for [employer] stage IV pelvic endometriosis and a persistent left adnexal mass  who presents for evaluation of suprapubic and lower abdominal pelvic discomfort that radiates into her back region.  Patient states the discomfort developed about a year ago it is becoming worse.  She uses 6 extra strength Tylenol daily and while it helps a little bit it does not take her pain away.  She denies nausea vomiting fevers or chills.  Nothing seems to make it better.  Pain is worse when she is sitting or bending.  The discomfort can also radiate into her hip area.  She denies any changes in bowel or bladder function thick black and tarry stools or bloody stools..January 10 2019 the patient underwent MRI of the lower lumbar spine without contrast to evaluate lumbar radicular pain.  Degenerative changes of the lumbar spine with bulging disks were noted.  No mention was made of intra-abdominal or pelvic structures  Patient also had an abdominal ultrasound exam done 2018 showing a normal-appearing gallbladder with no biliary dilatation moderate diffuse fatty infiltration of the liver with a normal right kidney.  No right intrarenal collecting system dilation was noted no calculi seen.  No free fluid in the upper right hem of the abdomen.  No mention was made of the left kidney  Past Medical History:   Diagnosis Date     DDD (degenerative disc disease), lumbar      Endometriosis      Hypothyroidism           Seasonal allergies      Spinal stenosis, lumbar      Past Surgical History:   Procedure Laterality Date     C TOTAL ABDOM HYSTERECTOMY      MARYA BSO for stage 4 endometriosis     COLONOSCOPY       COLONOSCOPY  2012    Procedure:COLONOSCOPY; COLONOSCOPY ; Surgeon:ARUN KITCHEN; Location: GI      CYSTOURETHROSCOPY W/ URETEROSCOPY &/OR  PYELOSCOPY; W/ LITHOTRIPSY       HC TRABECULOPLASTY BY LASER SURGERY       HYSTERECTOMY, PAP NO LONGER INDICATED       LASER YAG IRIDOTOMY  2012    Procedure: LASER YAG IRIDOTOMY;  LEFT EYE YAG LASER PUPIL IRIDOTOMY ;  Surgeon: Dakotah Humphreys MD;  Location:  [facility]     LIGATN/STRIP LONG OR SHORT SAPHEN      x's2     PELVIS LAPAROSCOPY,DX       STRABISMUS SURGERY       Family History   Problem Relation Age of Onset     Diabetes Mother      Thyroid Disease Mother      Hypertension Mother      Cancer Father          of stomach CA     Heart Disease Father         MI at age 58     Glaucoma No family hx of      Macular Degeneration No family hx of      Social History     Socioeconomic History     Marital status:      Spouse name: Not on file     Number of children: Not on file     Years of education: Not on file     Highest education level: Not on file   Social Needs     Financial resource strain: Not on file     Food insecurity - worry: Not on file     Food insecurity - inability: Not on file     Transportation needs - medical: Not on file     Transportation needs - non-medical: Not on file   Occupational History     Not on file   Tobacco Use     Smoking status: Never Smoker     Smokeless tobacco: Never Used   Substance and Sexual Activity     Alcohol use: No     Drug use: No     Sexual activity: Yes     Partners: Male     Comment: MARYA BSO   Other Topics Concern     Parent/sibling w/ CABG, MI or angioplasty before 65F 55M? Not Asked   Social History Narrative     Not on file       Allergies:  Seasonal allergies    Current Outpatient Medications   Medication Sig Dispense Refill     acetaminophen (TYLENOL) 325 MG tablet Take  by mouth every 4 hours as needed for pain. 100 tablet 0     Artificial Tear Solution (SOOTHE XP) SOLN Apply 1 drop to eye 3 times daily 1 Bottle 11     aspirin-acetaminophen-caffeine (EXCEDRIN MIGRAINE) 250-250-65 MG per tablet Take 1 tablet by mouth every 6 hours as needed for pain. 30  tablet 0     Fexofenadine HCl (ALLEGRA PO) Take  by mouth. 1 tablet daily         fluticasone (FLONASE) 50 MCG/ACT nasal spray Spray 2 sprays into both nostrils daily. 1 Package 10     levothyroxine (SSYNTHROID,LEVOTHROID) 100 MCG tablet Take 1 tablet by mouth daily. 31 tablet 3     lisinopril (PRINIVIL/ZESTRIL) 10 MG tablet Take 1 tablet (10 mg) by mouth daily 30 tablet 1     olopatadine (PATANOL) 0.1 % ophthalmic solution Place 1 drop into both eyes 2 times daily. 5 mL 3     olopatadine (PATANOL) 0.1 % ophthalmic solution One drop, both eyes during the spring 5 mL 6     omeprazole (PRILOSEC) 20 MG DR capsule Take 1 capsule (20 mg) by mouth daily (Patient not taking: Reported on 1/28/2019) 30 capsule 1     ORDER FOR DME Provent nasal EPAP  Use over nostrils nightly.   (Patient not taking: Reported on 1/28/2019) 30 each 0     prednisoLONE acetate (PRED FORTE) 1 % ophthalmic suspension Place 1 drop into both eyes 2 times daily for 7 days (Patient not taking: Reported on 1/28/2019) 1 Bottle 0       Review Of Systems   ROS: 10 point ROS neg other than the symptoms noted above in the HPI.    Exam:  /80   Wt 101.6 kg (224 lb)   BMI 38.45 kg/m     { Constitutional: healthy, alert and mild distress  Head: Normocephalic. No masses, lesions, tenderness or abnormalities  Neck: Neck supple. No adenopathy. Thyroid symmetric, normal size,, Carotids without bruits.  ENT: NEGATIVE for ear, mouth and throat problems  Cardiovascular: negative, PMI normal. No lifts, heaves, or thrills. RRR. No murmurs, clicks gallops or rub  Respiratory: negative, Percussion normal. Good diaphragmatic excursion. Lungs clear  Gastrointestinal: Abdomen soft, mild-tender in the left lower quadrant greater than the right lower quadrant no rebound or guarding. BS normal. No masses, organomegaly  Genitourinary: Normal external genitalia without lesions and speculum normal-appearing vaginal epithelium absent cervix, bimanual exam the uterus is  absent right adnexa is benign left adnexa demonstrates decreased mobility no distinct masses noted.  Rectovaginal exam decreased mobility in the left adnexal region no other masses are noted  Musculoskeletalextremities normal- no gross deformities noted, gait normal and normal muscle tone  Integument: no suspicious lesions or rashes  Neurologic: Gait normal. Reflexes normal and symmetric. Sensation grossly WNL.  Psychiatric: mentation appears normal and affect normal/bright    Assessment/Plan:  (R10.32) LLQ abdominal pain  (primary encounter diagnosis)  Comment: I reviewed the findings with the patient and discussed the risk benefits and alternative forms of therapy.  Concerned that she may have diverticular disease that may be a cause of her discomfort in light of her extensive adhesive disease may make her clinical presentation more challenging.  I would like to check a CBC with differential and platelets get a CT of the abdomen and pelvis with contrast and see her back for reevaluation  Plan: CT Abdomen Pelvis w Contrast, Cytology non gyn,        CBC with platelets, CANCELED: CBC with         platelets, CANCELED: CBC with platelets        I gave the patient a detailed written outline of our plan    (N02.9) Idiopathic hematuria, unspecified whether glomerular morphologic changes present  Comment: In reviewing the patient's past medical record on January 11, 2019 she had a urine analysis done with greater than 100 red blood cells per high-power field with minimal pyuria  Plan: Cytology non gyn, UROLOGY ADULT REFERRAL, CBC         with platelets, CANCELED: CBC with platelets        There was no nephrolithiasis seen on her previous abdominal ultrasound.  I will talk to radiology to visualize still left renal collecting system.  We will get a urine for cytology and also with the CT scan to look at the kidneys and collecting system would anticipate referral to urology for cystoscopy and further workup.  Findings  reviewed      Neel Lezama M.D.

## 2019-02-02 ENCOUNTER — HOSPITAL ENCOUNTER (OUTPATIENT)
Dept: CT IMAGING | Facility: CLINIC | Age: 64
Discharge: HOME | End: 2019-02-02
Attending: OBSTETRICS & GYNECOLOGY | Admitting: OBSTETRICS & GYNECOLOGY
Payer: COMMERCIAL

## 2019-02-02 DIAGNOSIS — R10.32 LLQ ABDOMINAL PAIN: ICD-10-CM

## 2019-02-02 PROCEDURE — 74177 CT ABD & PELVIS W/CONTRAST: CPT

## 2019-02-02 PROCEDURE — 25000128 H RX IP 250 OP 636: Performed by: OBSTETRICS & GYNECOLOGY

## 2019-02-02 RX ORDER — IOPAMIDOL 755 MG/ML
100 INJECTION, SOLUTION INTRAVASCULAR ONCE
Status: COMPLETED | OUTPATIENT
Start: 2019-02-02 | End: 2019-02-02

## 2019-02-02 RX ADMIN — SODIUM CHLORIDE 65 ML: 9 INJECTION, SOLUTION INTRAVENOUS at 10:08

## 2019-02-02 RX ADMIN — IOPAMIDOL 100 ML: 755 INJECTION, SOLUTION INTRAVENOUS at 10:08

## 2019-02-04 ENCOUNTER — OFFICE VISIT (OUTPATIENT)
Dept: OBGYN | Facility: CLINIC | Age: 64
End: 2019-02-04
Payer: COMMERCIAL

## 2019-02-04 ENCOUNTER — TELEPHONE (OUTPATIENT)
Dept: INTERNAL MEDICINE | Facility: CLINIC | Age: 64
End: 2019-02-04

## 2019-02-04 VITALS
BODY MASS INDEX: 38.24 KG/M2 | HEART RATE: 92 BPM | SYSTOLIC BLOOD PRESSURE: 124 MMHG | WEIGHT: 224 LBS | HEIGHT: 64 IN | DIASTOLIC BLOOD PRESSURE: 82 MMHG

## 2019-02-04 DIAGNOSIS — N20.0 NEPHROLITHIASIS: Primary | ICD-10-CM

## 2019-02-04 DIAGNOSIS — R10.2 PELVIC PAIN IN FEMALE: ICD-10-CM

## 2019-02-04 DIAGNOSIS — Z12.31 VISIT FOR SCREENING MAMMOGRAM: Primary | ICD-10-CM

## 2019-02-04 DIAGNOSIS — R31.29 OTHER MICROSCOPIC HEMATURIA: ICD-10-CM

## 2019-02-04 PROCEDURE — 99213 OFFICE O/P EST LOW 20 MIN: CPT | Performed by: OBSTETRICS & GYNECOLOGY

## 2019-02-04 ASSESSMENT — MIFFLIN-ST. JEOR: SCORE: 1566.06

## 2019-02-04 NOTE — NURSING NOTE
"Chief Complaint   Patient presents with     Follow Up     Follow up to discuss CT results.        Initial /82   Pulse 92   Ht 1.626 m (5' 4\")   Wt 101.6 kg (224 lb)   Breastfeeding? No   BMI 38.45 kg/m   Estimated body mass index is 38.45 kg/m  as calculated from the following:    Height as of this encounter: 1.626 m (5' 4\").    Weight as of this encounter: 101.6 kg (224 lb).  BP completed using cuff size: regular    Questioned patient about current smoking habits.  Pt. has never smoked.          The following HM Due: NONE      The following patient reported/Care Every where data was sent to:  P ABSTRACT QUALITY INITIATIVES [[id number]]  Margarita Gomez LPN             "

## 2019-02-04 NOTE — TELEPHONE ENCOUNTER
Panel Management Review      Patient has the following on her problem list:     Hypertension   Last three blood pressure readings:  BP Readings from Last 3 Encounters:   02/04/19 124/82   01/28/19 122/80   01/23/19 119/60     Blood pressure: MONITOR    HTN Guidelines:  Age 18-59 BP range:  Less than 140/90  Age 60-85 with Diabetes:  Less than 140/90  Age 60-85 without Diabetes:  less than 150/90      Composite cancer screening  Chart review shows that this patient is due/due soon for the following None  Summary:    Patient is due/failing the following:   MAMMOGRAM    Action needed:   Patient needs office visit for mammogram.    Type of outreach:    Discussed at OV.    Questions for provider review:    None                                                                                                                                    Analisa Pantoja CMA       Chart routed to none .

## 2019-02-04 NOTE — PROGRESS NOTES
Ramona Ferrer is a 63 year old female  1 December 10, 2002 underwent total abdominal hysterectomy, BSO extensive lysis of adhesions for American fertility Society stage IV pelvic endometriosis and a persistent left adnexal mass  who presents for evaluation of suprapubic and lower abdominal pelvic discomfort that radiates into her back region.  Patient states the discomfort developed about a year ago it is becoming worse.  She uses 6 extra strength Tylenol daily and while it helps a little bit it does not take her pain away.  She denies nausea vomiting fevers or chills.  Nothing seems to make it better.  Pain is worse when she is sitting or bending.  The discomfort can also radiate into her hip area.  She denies any changes in bowel or bladder function thick black and tarry stools or bloody stools..January 10 2019 the patient underwent MRI of the lower lumbar spine without contrast to evaluate lumbar radicular pain.  Degenerative changes of the lumbar spine with bulging disks were noted.  On 19  The pt had an abdominal U/S that was unremarkable but did not visualize the L renal collecting system.  THe pt also had a UA on 19 showing > 100 RBC/HPF.  She stated that it felt like she had a UTI, took abx but did not feel better.  The pt had a CT of the abdomin  and pelvis on 19 showing the following   FINDINGS:  There is distal colonic diverticulosis, but no convincing  diverticulitis is seen. No bowel obstruction, free fluid, or abscess.  No free air. Appendix not identified. No signs of appendicitis.     No hydronephrosis. However, there is an elongated stone just within  the bladder lumen at the ureterovesical junction measuring 1.0 x 0.6  cm, series 2 image 76. Suggestion of some mild wall thickening of the  distal ureter, image 75.     Stable nonobstructing 0.1 cm stone lower left kidney, series 3 image  91. No right hydronephrosis. No right urolithiasis. Right renal cysts  are noted. Some  hypodensities faintly demonstrated within the right  renal cortex are indeterminate given their small sizes. One of these  is 0.4 cm, series 2 image 35.     Fatty liver. Gallbladder, adrenals, spleen, and pancreas show no acute  Abnormalities.   she also had a FISH urine for cytology that was neg for malignancy  She presents today for f/u  She had an epidural injection in her low back for the DJD and has not had resolution to date.  At present she also notes RLQ discomfort radiating to the inner thigh and hip regions    Past Medical History:   Diagnosis Date     DDD (degenerative disc disease), lumbar      Endometriosis      Hypothyroidism           Seasonal allergies      Spinal stenosis, lumbar       ROS: 10 point ROS neg other than the symptoms noted above in the HPI.  Past Surgical History:   Procedure Laterality Date     C TOTAL ABDOM HYSTERECTOMY  2003    MARYA BSO for stage 4 endometriosis     COLONOSCOPY       COLONOSCOPY  2/20/2012    Procedure:COLONOSCOPY; COLONOSCOPY ; Surgeon:ARUN KITCHEN; Location: GI     HC CYSTOURETHROSCOPY W/ URETEROSCOPY &/OR PYELOSCOPY; W/ LITHOTRIPSY       HC TRABECULOPLASTY BY LASER SURGERY       HYSTERECTOMY, PAP NO LONGER INDICATED       LASER YAG IRIDOTOMY  8/8/2012    Procedure: LASER YAG IRIDOTOMY;  LEFT EYE YAG LASER PUPIL IRIDOTOMY ;  Surgeon: Dakotah Humphreys MD;  Location:  [facility]     LIGATN/STRIP LONG OR SHORT SAPHEN      x's2     PELVIS LAPAROSCOPY,DX       STRABISMUS SURGERY       Constitutional: healthy, alert and mild distress    (N20.0) Nephrolithiasis  (primary encounter diagnosis)  Comment: I believe this may be related to her discomfort and hematuria  I reviewed this finding with the pt today as well as the urine FISH  Plan: UROLOGY ADULT REFERRAL        done    (R10.2) Pelvic pain in female  Comment: as above  Will resolve nephrolithiasis and then reassess  Plan: UROLOGY ADULT REFERRAL        done    (R31.29) Other microscopic hematuria  Comment: I believe this is  related to the stone  Do not see evidence of malignancy  Plan: UROLOGY ADULT REFERRAL        As above  15 min spent w > 50% in counseling

## 2019-02-04 NOTE — PATIENT INSTRUCTIONS
You can reach your [facility] Care Team any time of the day by calling [phone]. This number will put you in touch with the 24 hour nurse line if the clinic is closed.    To contact your OB/GYN Station Coordinator/Surgery Scheduler please call [phone]. This is a direct number for your care team between 8 a.m. and 4 p.m. Monday through Friday.    [facility] is open for your convenience:  Monday through Friday 8 a.m. to 6 p.m.  Closed weekends and all major holidays.

## 2019-02-08 LAB — COPATH REPORT: NORMAL

## 2019-02-11 LAB — COPATH REPORT: NORMAL

## 2019-02-13 ENCOUNTER — OFFICE VISIT (OUTPATIENT)
Dept: UROLOGY | Facility: CLINIC | Age: 64
End: 2019-02-13
Attending: OBSTETRICS & GYNECOLOGY
Payer: COMMERCIAL

## 2019-02-13 VITALS
WEIGHT: 224 LBS | SYSTOLIC BLOOD PRESSURE: 124 MMHG | BODY MASS INDEX: 38.24 KG/M2 | DIASTOLIC BLOOD PRESSURE: 78 MMHG | HEIGHT: 64 IN

## 2019-02-13 DIAGNOSIS — R31.29 MICROHEMATURIA: ICD-10-CM

## 2019-02-13 DIAGNOSIS — N20.0 KIDNEY STONE: Primary | ICD-10-CM

## 2019-02-13 DIAGNOSIS — N21.0 BLADDER STONE: ICD-10-CM

## 2019-02-13 LAB
ALBUMIN UR-MCNC: NEGATIVE MG/DL
APPEARANCE UR: CLEAR
BILIRUB UR QL STRIP: NEGATIVE
COLOR UR AUTO: YELLOW
GLUCOSE UR STRIP-MCNC: NEGATIVE MG/DL
HGB UR QL STRIP: ABNORMAL
KETONES UR STRIP-MCNC: NEGATIVE MG/DL
LEUKOCYTE ESTERASE UR QL STRIP: ABNORMAL
NITRATE UR QL: NEGATIVE
PH UR STRIP: 6.5 PH (ref 5–7)
SOURCE: ABNORMAL
SP GR UR STRIP: 1.02 (ref 1–1.03)
UROBILINOGEN UR STRIP-ACNC: 0.2 EU/DL (ref 0.2–1)

## 2019-02-13 PROCEDURE — 99203 OFFICE O/P NEW LOW 30 MIN: CPT | Mod: 25 | Performed by: UROLOGY

## 2019-02-13 PROCEDURE — 81003 URINALYSIS AUTO W/O SCOPE: CPT | Performed by: UROLOGY

## 2019-02-13 PROCEDURE — 52000 CYSTOURETHROSCOPY: CPT | Performed by: UROLOGY

## 2019-02-13 RX ORDER — CIPROFLOXACIN 500 MG/1
500 TABLET, FILM COATED ORAL ONCE
Qty: 1 TABLET | Refills: 0 | Status: ON HOLD | OUTPATIENT
Start: 2019-02-13 | End: 2019-02-15

## 2019-02-13 ASSESSMENT — PAIN SCALES - GENERAL: PAINLEVEL: NO PAIN (0)

## 2019-02-13 ASSESSMENT — MIFFLIN-ST. JEOR: SCORE: 1566.06

## 2019-02-13 NOTE — LETTER
RE: Ramona Ferrer  [address]  [place] [address]     Dear Colleague,    Thank you for referring your patient, Ramona Ferrer, to the [facility] [place] [facility]. Please see a copy of my visit note below.    Ramona is a 63-year-old female with a history of calcium urolithiasis.  She was a patient of Bassem Mary MD in the past and was last seen in .  She has been having left flank pain and a recent CT scan shows a large stone exiting the left ureteral orifice and a smaller stone in the lower pole of the left kidney.  She has normal electrolytes, creatinine and serum calcium levels from last December.  Her urinalysis this morning shows a pH of 6.5, small blood, specific gravity 1.020  She is having no flank pain now and probably has passed this large stone into her bladder.  She has not seen a stone pass in her urine.  Other past medical history: Degenerative joint disease, endometriosis, hypothyroidism, spinal stenosis, non-smoker, hysterectomy, pelvic laparoscopy, vein ligation, colonoscopy, stone extraction, eye surgeries  Family history: Stomach cancer, heart disease, diabetes.  No history of urolithiasis  Medications: Tylenol, artificial tears, Excedrin Migraine, Allegra, Flonase spray, Synthroid, lisinopril, eyedrops, omeprazole, Zantac  Allergies: Seasonal  Exam: Normal appearance, alert and oriented, normal vital signs.  Normocephalic, normal respirations, neuro grossly intact.  Normal external genitalia and urethral meatus  Flexible cystoscopy- no stone in bladder, normal bladder mucosa, both ureteral orifices normal in position and configuration  Assessment: 1 cm x 0.6 cm stone passed from left ureteral orifice where he was  on a CT scan 11 days ago.  Plan: Cipro 500 mg x1 today.  See me in 6 months with KUB.  Refer to [facility] for metabolic stone evaluation    Again, thank you for allowing  me to participate in the care of your patient.      Sincerely,    Josue Nieto MD

## 2019-02-13 NOTE — LETTER
2019      RE: Ramona Ferrer  [address]  [place] [address]       Ramona is a 63-year-old female with a history of calcium urolithiasis.  She was a patient of Bassem Mary MD in the past and was last seen in .  She has been having left flank pain and a recent CT scan shows a large stone exiting the left ureteral orifice and a smaller stone in the lower pole of the left kidney.  She has normal electrolytes, creatinine and serum calcium levels from last December.  Her urinalysis this morning shows a pH of 6.5, small blood, specific gravity 1.020  She is having no flank pain now and probably has passed this large stone into her bladder.  She has not seen a stone pass in her urine.  Other past medical history: Degenerative joint disease, endometriosis, hypothyroidism, spinal stenosis, non-smoker, hysterectomy, pelvic laparoscopy, vein ligation, colonoscopy, stone extraction, eye surgeries  Family history: Stomach cancer, heart disease, diabetes.  No history of urolithiasis  Medications: Tylenol, artificial tears, Excedrin Migraine, Allegra, Flonase spray, Synthroid, lisinopril, eyedrops, omeprazole, Zantac  Allergies: Seasonal  Exam: Normal appearance, alert and oriented, normal vital signs.  Normocephalic, normal respirations, neuro grossly intact.  Normal external genitalia and urethral meatus  Flexible cystoscopy- no stone in bladder, normal bladder mucosa, both ureteral orifices normal in position and configuration  Assessment: 1 cm x 0.6 cm stone passed from left ureteral orifice where he was  on a CT scan 11 days ago.  Plan: Cipro 500 mg x1 today.  See me in 6 months with KUB.  Refer to [facility] for metabolic stone evaluation    Josue Nieto MD

## 2019-02-13 NOTE — PATIENT INSTRUCTIONS

## 2019-02-13 NOTE — PROGRESS NOTES
Ramona is a 63-year-old female with a history of calcium urolithiasis.  She was a patient of Bassem Mary MD in the past and was last seen in .  She has been having left flank pain and a recent CT scan shows a large stone exiting the left ureteral orifice and a smaller stone in the lower pole of the left kidney.  She has normal electrolytes, creatinine and serum calcium levels from last December.  Her urinalysis this morning shows a pH of 6.5, small blood, specific gravity 1.020  She is having no flank pain now and probably has passed this large stone into her bladder.  She has not seen a stone pass in her urine.  Other past medical history: Degenerative joint disease, endometriosis, hypothyroidism, spinal stenosis, non-smoker, hysterectomy, pelvic laparoscopy, vein ligation, colonoscopy, stone extraction, eye surgeries  Family history: Stomach cancer, heart disease, diabetes.  No history of urolithiasis  Medications: Tylenol, artificial tears, Excedrin Migraine, Allegra, Flonase spray, Synthroid, lisinopril, eyedrops, omeprazole, Zantac  Allergies: Seasonal  Exam: Normal appearance, alert and oriented, normal vital signs.  Normocephalic, normal respirations, neuro grossly intact.  Normal external genitalia and urethral meatus  Flexible cystoscopy- no stone in bladder, normal bladder mucosa, both ureteral orifices normal in position and configuration  Assessment: 1 cm x 0.6 cm stone passed from left ureteral orifice where he was  on a CT scan 11 days ago.  Plan: Cipro 500 mg x1 today.  See me in 6 months with KUB.  Refer to [facility] for metabolic stone evaluation

## 2019-02-13 NOTE — NURSING NOTE
Chief Complaint   Patient presents with     Consult     New pt consult here for kidney stone     Invasive Procedure Safety Checklist:    Procedure:     Action: Complete sections and checkboxes as appropriate.    Pre-procedure:  1. Patient ID Verified with 2 identifiers (Suzanne and  or MRN) : YES    2. Procedure and site verified with patient/designee (when able) : YES    3. Accurate consent documentation in medical record : YES    4. H&P (or appropriate assessment) documented in medical record : YES  H&P must be up to 30 days prior to procedure an updated within 24 hours of                 Procedure as applicable.     5. Relevant diagnostic and radiology test results appropriately labeled and displayed as applicable : YES    6. Blood products, implants, devices, and/or special equipment available for the procedure as applicable : YES    7. Procedure site(s) marked with provider initials [Exclusions: N/A] : NO    8. Marking not required. Reason : Yes  Procedure does not require site marking    Time Out:     Time-Out performed immediately prior to starting procedure, including verbal and active participation of all team members addressing: YES    1. Correct patient identity.  2. Confirmed that the correct side and site are marked.  3. An accurate procedure to be done.  4. Agreement on the procedure to be done.  5. Correct patient position.  6. Relevant images and results are properly labeled and appropriately displayed.  7. The need to administer antibiotics or fluids for irrigation purposes during the procedure as applicable.  8. Safety precautions based on patient history or medication use.    During Procedure: Verification of correct person, site, and procedure occurs any time the responsibility for care of the patient is transferred to another member of the care team.    5mL 2% lidocaine hydrochloride Urojet instilled into urethra.    NDC# [address]-01  Lot #: [id number]  Expiration Date:  10-20

## 2019-02-15 ENCOUNTER — HOSPITAL ENCOUNTER (OUTPATIENT)
Facility: CLINIC | Age: 64
Discharge: HOME | End: 2019-02-15
Attending: COLON & RECTAL SURGERY | Admitting: COLON & RECTAL SURGERY
Payer: COMMERCIAL

## 2019-02-15 VITALS
SYSTOLIC BLOOD PRESSURE: 136 MMHG | DIASTOLIC BLOOD PRESSURE: 80 MMHG | HEART RATE: 70 BPM | OXYGEN SATURATION: 99 % | RESPIRATION RATE: 12 BRPM

## 2019-02-15 PROBLEM — M54.50 LUMBAGO: Status: RESOLVED | Noted: 2018-12-18 | Resolved: 2018-12-27

## 2019-02-15 LAB — COLONOSCOPY: NORMAL

## 2019-02-15 PROCEDURE — 99153 MOD SED SAME PHYS/QHP EA: CPT

## 2019-02-15 PROCEDURE — 88305 TISSUE EXAM BY PATHOLOGIST: CPT | Mod: 26,76 | Performed by: COLON & RECTAL SURGERY

## 2019-02-15 PROCEDURE — G0500 MOD SEDAT ENDO SERVICE >5YRS: HCPCS | Performed by: COLON & RECTAL SURGERY

## 2019-02-15 PROCEDURE — 45380 COLONOSCOPY AND BIOPSY: CPT | Performed by: COLON & RECTAL SURGERY

## 2019-02-15 PROCEDURE — 45385 COLONOSCOPY W/LESION REMOVAL: CPT | Mod: PT | Performed by: COLON & RECTAL SURGERY

## 2019-02-15 PROCEDURE — 88305 TISSUE EXAM BY PATHOLOGIST: CPT | Performed by: COLON & RECTAL SURGERY

## 2019-02-15 PROCEDURE — 25000128 H RX IP 250 OP 636: Performed by: COLON & RECTAL SURGERY

## 2019-02-15 RX ORDER — FLUMAZENIL 0.1 MG/ML
0.2 INJECTION, SOLUTION INTRAVENOUS
Status: DISCONTINUED | OUTPATIENT
Start: 2019-02-15 | End: 2019-02-15 | Stop reason: HOSPADM

## 2019-02-15 RX ORDER — LIDOCAINE 40 MG/G
CREAM TOPICAL
Status: DISCONTINUED | OUTPATIENT
Start: 2019-02-15 | End: 2019-02-15 | Stop reason: HOSPADM

## 2019-02-15 RX ORDER — ONDANSETRON 2 MG/ML
4 INJECTION INTRAMUSCULAR; INTRAVENOUS EVERY 6 HOURS PRN
Status: DISCONTINUED | OUTPATIENT
Start: 2019-02-15 | End: 2019-02-15 | Stop reason: HOSPADM

## 2019-02-15 RX ORDER — ONDANSETRON 2 MG/ML
4 INJECTION INTRAMUSCULAR; INTRAVENOUS
Status: DISCONTINUED | OUTPATIENT
Start: 2019-02-15 | End: 2019-02-15 | Stop reason: HOSPADM

## 2019-02-15 RX ORDER — FENTANYL CITRATE 50 UG/ML
INJECTION, SOLUTION INTRAMUSCULAR; INTRAVENOUS PRN
Status: DISCONTINUED | OUTPATIENT
Start: 2019-02-15 | End: 2019-02-15 | Stop reason: HOSPADM

## 2019-02-15 RX ORDER — ONDANSETRON 4 MG/1
4 TABLET, ORALLY DISINTEGRATING ORAL EVERY 6 HOURS PRN
Status: DISCONTINUED | OUTPATIENT
Start: 2019-02-15 | End: 2019-02-15 | Stop reason: HOSPADM

## 2019-02-15 RX ORDER — NALOXONE HYDROCHLORIDE 0.4 MG/ML
.1-.4 INJECTION, SOLUTION INTRAMUSCULAR; INTRAVENOUS; SUBCUTANEOUS
Status: DISCONTINUED | OUTPATIENT
Start: 2019-02-15 | End: 2019-02-15 | Stop reason: HOSPADM

## 2019-02-15 NOTE — H&P
Pre-Endoscopy History and Physical     Ramona Ferrer MRN# [id number]   YOB: 1955 Age: 63 year old     Date of Procedure: (Not on file)  Primary care provider: Eugene Michelle  Type of Endoscopy: Colonoscopy  Reason for Procedure: History of polyps  Type of Anesthesia Anticipated: Moderate Sedation    HPI:    Ramona is a 63 year old female who will be undergoing the above procedure.      A history and physical has been performed. The patient's medications and allergies have been reviewed. The risks and benefits of the procedure and the sedation options and risks were discussed with the patient.  All questions were answered and informed consent was obtained.      She denies a personal or family history of anesthesia complications or bleeding disorders.     Allergies   Allergen Reactions     Seasonal Allergies           No current facility-administered medications on file prior to encounter.   Current Outpatient Medications on File Prior to Encounter:  acetaminophen (TYLENOL) 325 MG tablet Take  by mouth every 4 hours as needed for pain.   Artificial Tear Solution (SOOTHE XP) SOLN Apply 1 drop to eye 3 times daily   aspirin-acetaminophen-caffeine (EXCEDRIN MIGRAINE) 250-250-65 MG per tablet Take 1 tablet by mouth every 6 hours as needed for pain.   Fexofenadine HCl (ALLEGRA PO) Take  by mouth. 1 tablet daily   fluticasone (FLONASE) 50 MCG/ACT nasal spray Spray 2 sprays into both nostrils daily.   levothyroxine (SSYNTHROID,LEVOTHROID) 100 MCG tablet Take 1 tablet by mouth daily.   lisinopril (PRINIVIL/ZESTRIL) 10 MG tablet Take 1 tablet (10 mg) by mouth daily   olopatadine (PATANOL) 0.1 % ophthalmic solution One drop, both eyes during the spring (Patient not taking: Reported on 2/13/2019)   olopatadine (PATANOL) 0.1 % ophthalmic solution Place 1 drop into both eyes 2 times daily.   omeprazole (PRILOSEC) 20 MG DR capsule Take 1 capsule (20 mg) by mouth daily (Patient not taking: Reported on  2019)   ORDER FOR DME Provent nasal EPAPUse over nostrils nightly.   [] prednisoLONE acetate (PRED FORTE) 1 % ophthalmic suspension Place 1 drop into both eyes 2 times daily for 7 days (Patient not taking: Reported on 2019)   [] ranitidine (ZANTAC) 150 MG tablet Take 1 tablet (150 mg) by mouth 2 times daily for 10 days       Patient Active Problem List   Diagnosis     Thyroid activity decreased     Varicose veins of legs     Low back pain     CARDIOVASCULAR SCREENING; LDL GOAL LESS THAN 160     Hypothyroidism     DDD (degenerative disc disease), lumbar     Spinal stenosis, lumbar     Obesity     LALO (obstructive sleep apnea)     Obesity (BMI 35.0-39.9) with comorbidity (H)     Essential hypertension     Anatomical narrow angle - Both Eyes     Exotropia of right eye     Allergic conjunctivitis of both eyes        Past Medical History:   Diagnosis Date     DDD (degenerative disc disease), lumbar      Endometriosis      Hypothyroidism           Seasonal allergies      Spinal stenosis, lumbar         Past Surgical History:   Procedure Laterality Date     C TOTAL ABDOM HYSTERECTOMY      MARYA BSO for stage 4 endometriosis     COLONOSCOPY       COLONOSCOPY  2012    Procedure:COLONOSCOPY; COLONOSCOPY ; Surgeon:ARUN KITCHEN; Location: GI     HC CYSTOURETHROSCOPY W/ URETEROSCOPY &/OR PYELOSCOPY; W/ LITHOTRIPSY       HC TRABECULOPLASTY BY LASER SURGERY       HYSTERECTOMY, PAP NO LONGER INDICATED       LASER YAG IRIDOTOMY  2012    Procedure: LASER YAG IRIDOTOMY;  LEFT EYE YAG LASER PUPIL IRIDOTOMY ;  Surgeon: Dakotah Humphreys MD;  Location:  [facility]     LIGATN/STRIP LONG OR SHORT SAPHEN      x's2     PELVIS LAPAROSCOPY,DX       STRABISMUS SURGERY         Social History     Tobacco Use     Smoking status: Never Smoker     Smokeless tobacco: Never Used   Substance Use Topics     Alcohol use: No       Family History   Problem Relation Age of Onset     Diabetes Mother      Thyroid Disease Mother   "    Hypertension Mother      Cancer Father          of stomach CA     Heart Disease Father         MI at age 58     Glaucoma No family hx of      Macular Degeneration No family hx of        REVIEW OF SYSTEMS:     5 point ROS negative except as noted above in HPI, including Gen., Resp., CV, GI &  system review.      PHYSICAL EXAM:   There were no vitals taken for this visit. Estimated body mass index is 38.45 kg/m  as calculated from the following:    Height as of 19: 1.626 m (5' 4\").    Weight as of 19: 101.6 kg (224 lb).   GENERAL APPEARANCE: healthy and alert  MENTAL STATUS: alert  RESP: lungs clear to auscultation - no rales, rhonchi or wheezes  CV: regular rates and rhythm and normal S1 S2, no S3 or S4      IMPRESSION   ASA Class 2 - Mild systemic disease        PLAN:     Plan for colonoscopy. We discussed the risks, benefits and alternatives and the patient wished to proceed.    The above has been forwarded to the consulting provider.      Connor Sanderson MD  [facility]  Phone: [phone]  February 15, 2019    "

## 2019-02-18 ENCOUNTER — TELEPHONE (OUTPATIENT)
Dept: NEUROSURGERY | Facility: CLINIC | Age: 64
End: 2019-02-18

## 2019-02-18 DIAGNOSIS — M54.16 LUMBAR RADICULAR PAIN: Primary | ICD-10-CM

## 2019-02-18 NOTE — TELEPHONE ENCOUNTER
Patient returned call relayed below message from Jocelin DAILEY. Patient would like to try a repeat injection, order placed with pain management.

## 2019-02-18 NOTE — TELEPHONE ENCOUNTER
LVM with patient requesting a return call.    Per Jocelin DAILEY No surgery indicated. Mild disc bulging and degeneration. Would recommend repeat injections/chronic pain care.

## 2019-02-18 NOTE — TELEPHONE ENCOUNTER
REASON FOR CALL: Pt called asking if she can make an appt w/Jocelin Mancuso. Pt states that the INJ that was done on 01/23/2019 has failed. Pt is still in a lot of pain. Requesting to be seen on 02/27. Explained to pt that a msg will be routed in regards to pain and next steps. Pt states that she has an appt w/[facility] today from 1-3 PM. Please call her back before 1 PM, or after 3 PM.

## 2019-02-19 LAB — COPATH REPORT: NORMAL

## 2019-02-21 ENCOUNTER — TELEPHONE (OUTPATIENT)
Dept: PALLIATIVE MEDICINE | Facility: CLINIC | Age: 64
End: 2019-02-21

## 2019-02-21 NOTE — TELEPHONE ENCOUNTER
Pre-screening Questions for Radiology Injections:    Injection to be done at which interventional clinic site? [facility]    Instruct patient to arrive as directed prior to the scheduled appointment time:    [place] minutes before      [place]: 30 minutes before; if IV needed 1 hour before     Procedure ordered by Bartolome    Procedure ordered? Lumbar Epidural Steroid Injection    What insurance would patient like us to bill for this procedure? [employer]/Medicare      Worker's comp or MVA (motor vehicle accident) -Any injection DO NOT SCHEDULE and route to Tali Brian.      [employer] insurance - For SI joint injections, DO NOT SCHEDULE and route Tali Brian.       [employer] - Any injection besides hip/shoulder/knee joint DO NOT SCHEDULE and route to Tali Brian. She will obtain PA and call pt back to schedule procedure or notify pt of denial.       [employer]-Route to Tali for review        IF SCHEDULING IN [place] AND NEEDS A PA, IT IS OKAY TO SCHEDULE. [place] HANDLES THEIR OWN PA'S AFTER THE PATIENT IS SCHEDULED. PLEASE SCHEDULE AT LEAST 1 WEEK OUT SO A PA CAN BE OBTAINED.      Any chance of pregnancy? NO   If YES, do NOT schedule and route to RN pool    Is an  needed? No     Patient has a drive home? (mandatory) YES: ok    Is patient taking any blood thinners (plavix, coumadin, jantoven, warfarin, heparin, pradaxa or dabigatran )? No   If hold needed, do NOT schedule, route to RN pool     Is patient taking any aspirin products (includes Excedrin and Fiorinal)? No     If more than 325mg/day do NOT schedule; route to RN pool     For CERVICAL procedures, hold all aspirin products for 6 days.     Tell pt that if aspirin product is not held for 6 days, the procedure WILL BE cancelled.      Does the patient have a bleeding or clotting disorder? No     If YES, okay to schedule AND route to RN nurse pool    For any patients with platelet count <100, must be forwarded to provider    Is  patient diabetic?  No  If YES, have them bring their glucometer.    Does patient have an active infection or treated for one within the past week? No     Is patient currently taking any antibiotics?  No     For patients on chronic, preventative, or prophylactic antibiotics, procedures may be scheduled.     For patients on antibiotics for active or recent infection:    Lauren Morris Burton, [place]-antibiotic course must have been completed for 4 days    Is patient currently taking any steroid medications? (i.e. Prednisone, Medrol)  No     For patients on steroid medications:    Lauren Morris Burton, [place]-steroid course must have been completed for 4 days    Reviewed with patient:  If you are started on any steroids or antibiotics between now and your appointment, you must contact us because the procedure may need to be cancelled.  Yes    Is patient actively being treated for cancer or immunocompromised? No  If YES, do NOT schedule and route to RN pool     Are you able to get on and off an exam table with minimal or no assistance? Yes  If NO, do NOT schedule and route to RN pool    Are you able to roll over and lay on your stomach with minimal or no assistance? Yes  If NO, do NOT schedule and route to RN pool     Any allergies to contrast dye, iodine, shellfish, or numbing and steroid medications? No  If YES, route to RN pool AND add allergy information to appointment notes    Allergies: Seasonal allergies      Has the patient had a flu shot or any other vaccinations within 7 days before or after the procedure.  No     Does patient have an MRI/CT?  YES: MRI  (SI joint, hip injections, lumbar sympathetic blocks, and stellate ganglion blocks do not require an MRI)    Was the MRI done w/in the last 3 years?  Yes    Was MRI done at [facility]? Yes      If not, where was it done? N/A       If MRI was not done at [facility], [facility] or [facility] do NOT schedule and route to nursing.  If pt has an  imaging disc, the injection may be scheduled but pt has to bring disc to appt. If they show up w/out disc the injection cannot be done    Reminders (please tell patient if applicable):       Instructed pt to arrive 30 minutes early for IV start if this is for a cervical procedure, ALL sympathetic (stellate ganglion, hypogastric, or lumbar sympathetic block) and all sedation procedures (RFA, spinal cord stimulation trials).  Not Applicable   -IVs are not routinely placed for Dr. Reynolds cervical cases   -Dr. Marks: IVs for cervical ESIs and cervical TBDs (not CMBBs/facet inj)      If NPO for sedation, informed patient that it is okay to take medications with sips of water (except if they are to hold blood thinners).  Not Applicable   *DO take blood pressure medication if it is prescribed*      If this is for a cervical RYLAND, informed patient that aspirin needs to be held for 6 days.   Not Applicable      For all patients not having spinal cord stimulator (SCS) trials or radiofrequency ablations (RFAs), informed patient:    IV sedation is not provided for this procedure.  If you feel that an oral anti-anxiety medication is needed, you can discuss this further with your referring provider or primary care provider.  The Pain Clinic provider will discuss specifics of what the procedure includes at your appointment.  Most procedures last 10-20 minutes.  We use numbing medications to help with any discomfort during the procedure.  Not Applicable      Do not schedule procedures requiring IV placement in the first appointment of the day or first appointment after lunch. Do NOT schedule at 0745, 0815 or 1245.       For patients 85 or older we recommend having an adult stay w/ them for the remainder of the day.       Does the patient have any questions?  NO  Rupal Roa  [facility]

## 2019-03-16 DIAGNOSIS — Z00.00 ENCOUNTER FOR ROUTINE ADULT HEALTH EXAMINATION WITHOUT ABNORMAL FINDINGS: ICD-10-CM

## 2019-03-18 NOTE — TELEPHONE ENCOUNTER
"Requested Prescriptions   Pending Prescriptions Disp Refills     lisinopril (PRINIVIL/ZESTRIL) 10 MG tablet [Pharmacy Med Name: Lisinopril Oral Tablet 10 MG] 30 tablet 0    Last Written Prescription Date:  12/07/2018  Last Fill Quantity: 30,  # refills: 1   Last office visit: 12/7/2018 with prescribing provider:     Future Office Visit:   Sig: Take 1 tablet (10 mg) by mouth daily    ACE Inhibitors (Including Combos) Protocol Passed - 3/16/2019  1:20 PM       Passed - Blood pressure under 140/90 in past 12 months    BP Readings from Last 3 Encounters:   02/15/19 136/80   02/13/19 124/78   02/04/19 124/82                Passed - Recent (12 mo) or future (30 days) visit within the authorizing provider's specialty    Patient had office visit in the last 12 months or has a visit in the next 30 days with authorizing provider or within the authorizing provider's specialty.  See \"Patient Info\" tab in inbasket, or \"Choose Columns\" in Meds & Orders section of the refill encounter.             Passed - Medication is active on med list       Passed - Patient is age 18 or older       Passed - No active pregnancy on record       Passed - Normal serum creatinine on file in past 12 months    Recent Labs   Lab Test 12/07/18  1340   CR 0.72            Passed - Normal serum potassium on file in past 12 months    Recent Labs   Lab Test 12/07/18  1340   POTASSIUM 3.9            Passed - No positive pregnancy test within past 12 months        "

## 2019-03-19 RX ORDER — LISINOPRIL 10 MG/1
10 TABLET ORAL DAILY
Qty: 90 TABLET | Refills: 0 | Status: SHIPPED | OUTPATIENT
Start: 2019-03-19 | End: 2019-04-09

## 2019-03-19 NOTE — TELEPHONE ENCOUNTER
Per PCP's note from 12/7/18:     (I10) Essential hypertension  Plan:elevated BP , started on lisinopril (PRINIVIL/ZESTRIL) 10 MG tablet,as directed.explained clearly about the medication,insructions and side effects.Discussed sodium restriction, maintaining ideal body weight and regular exercise program as physiologic means to achieve blood pressure control. The patient will strive towards this.   Continue home readings and return in 1 month.      Patient has not been in for another visit with PCP, however, most recent BPs at other provider's offices have been within range.  Please advise if patient needs to follow up with PCP or have additional labs done at this time.  Ashleigh Ruvalcaba RN

## 2019-03-25 NOTE — PROGRESS NOTES
[facility] - Procedure Note    Date of Service: 3/25/2019    Procedure performed: Right L4-5 Transforaminal epidural steroid injection  Diagnosis: Lumbar spondylosis; Lumbar radiculitis/radiculopathy  : Nargis Olvrea MD  Anesthesia: None  Complications: None    Indications: Ramona Ferrer is a 63 year old female who is seen at the request of Jocelin Mancuso CNP for lumbar transforaminal epidural steroid injection. The patient describes bilateral low back pain and right hip and lateral thigh pain not extending below the knee. The patient has been exhibiting symptoms consistent with lumbar intraspinal inflammation and radiculopathy. Symptoms have been persistent, disabling, and intermittently severe. The patient reports minimal improvement with conservative treatment, including physical therapy and medications.    She had a right S1 Transforaminal epidural steroid injection on 1/23/2019 without benefit.     Lumbar MRI was done on 1/10/2019 which showed   FINDINGS: There are five lumbar-type vertebrae for the purposes of  this dictation.      There is minimal degenerative anterolisthesis of L3 upon L4 and of L5  upon S1. Alignment of the lumbar vertebrae is otherwise normal.  Vertebral body heights of the lumbar spine are normal. There is  reactive, degenerative-type bone marrow signal change in the posterior  aspects of the opposing endplates of the L1-L2 level. There is  scattered reactive, degenerative Type II marrow signal change in the  opposing endplates at the L2-L3, L4-L5 and L5-S1 levels. Marrow signal  throughout the lumbar vertebrae is otherwise normal. There is no  evidence for fracture or pathologic bony lesion of the lumbar spine.     There is loss of disc height, disc desiccation and posterior disc  bulging to varying degrees at all levels of the lumbar spine.     The tip of the conus medullaris is at the lower L1 level which is  within normal limits. There is no evidence  for intrathecal  abnormality.      Level by level:      L1-L2: There is a circumferential disc bulge with a tiny superimposed  left lateral (foraminal zone) disc herniation (protrusion) and mild  facet arthropathy bilaterally. These findings result in mild bilateral  neural foraminal narrowing and minimal spinal canal narrowing.     L2-L3: There is a circumferential disc bulge, moderate thickening of  the ligamentum flavum bilaterally and moderate facet arthropathy  bilaterally. These findings result in mild spinal canal narrowing but  no foraminal stenosis on either side.     L3-L4: There is a circumferential disc bulge, moderate thickening of  the ligamentum flavum bilaterally and severe facet arthropathy  bilaterally. These findings result in mild spinal canal narrowing and  minimal bilateral neural foraminal narrowing.     L4-L5: There is a circumferential disc bulge, mild thickening of the  ligamentum flavum bilaterally and moderate facet arthropathy  bilaterally. These findings result in mild left foraminal narrowing,  minimal right foraminal narrowing and mild spinal canal narrowing with  moderate narrowing of the lateral recesses of the spinal canal  bilaterally.     L5-S1: There is a circumferential disc bulge and moderate facet  arthropathy bilaterally. These findings result in severe left  foraminal stenosis, severe right foraminal stenosis and minimal spinal  canal narrowing.                                                                    IMPRESSION: Degenerative changes of the lumbar spine as described  above.    Allergies:      Allergies   Allergen Reactions     Seasonal Allergies         Vitals:  BP (!) 133/91 (BP Location: Left arm, Patient Position: Sitting, Cuff Size: Adult Large)   Pulse 77   SpO2 95%     Review of Systems: The patient denies recent fever, chills, illness, use of antibiotics or anticoagulants. All other 10-point review of systems negative.     Procedure: The procedure and  risks were explained, and informed written consent was obtained from the patient. Risks include but are not limited to: infection, bleeding, increased pain, and damage to soft tissue, nerve, muscle, and vasculature structures. After getting informed consent, patient was brought into the procedure suite and was placed in a prone position on the procedure table. A Pause for the Cause was performed. Patient was prepped and draped in sterile fashion.     The C-arm was rotated to a right oblique view to identify the right L4-5 neuroforamen, A total of 2 mL of Lidocaine 1% was used to anesthetize the skin and the needle track at a skin entry site coaxial with the fluoroscopy beam, and overriding the superior aspect of the neuroforamen.  A 22 gauge 5 inch spinal needle was advanced under intermittent fluoroscopy until it entered the foramen superiorly.    The position was then inspected from anteroposterior and lateral views, and the needle adjusted appropriately.  After negative aspiration, a total of 0.5 mL of Omnipaque-300 was injected using static and continuous fluoroscopy confirming appropriate position, with spread along the nerve root sheath and into the epidural space, with no intravascular or intrathecal uptake. 9.5 mL of Omnipaque-300 was wasted.    2mL of 1% lidocaine with 10 mg of dexamethasone was injected.  The needle was removed. Hemostasis was achieved, the area was cleaned, and bandaids were placed when appropriate. Images were saved to PACS.    The patient tolerated the procedure well, and was taken to the recovery room, and there was no evidence of procedural complications. No new sensory or motor deficits were noted following the procedure. The patient was stable and able to ambulate on discharge home. Post-procedure instructions were provided.     Pre-procedure pain score: 5/10 in the back, 7/10 in the leg  Post-procedure pain score: 4/10 in the back, 6/10 in the leg    Assessment/Plan: Ramona DE LEON  Carissa is a 63 year old female s/p Right L4-5 transforaminal epidural steroid injection today for lumbar spondylosis, radiculitis/radiculopathy.     1. Following today's procedure, the patient was advised to contact the [facility] for any of the following:   Fever, chills, or night sweats   New onset of pain, numbness, or weakness   Any questions/concerns regarding the procedure  If unable to contact the Pain Center, the patient was instructed to go to a local Emergency Room for any complications.   2. The patient will receive a follow-up call in 1 week.  3. The patient should follow-up with the referring provider in 2 weeks for post-procedure evaluation.      Nargis Olvera MD  [facility]

## 2019-03-26 ENCOUNTER — ANCILLARY PROCEDURE (OUTPATIENT)
Dept: GENERAL RADIOLOGY | Facility: CLINIC | Age: 64
End: 2019-03-26
Attending: PHYSICAL MEDICINE & REHABILITATION
Payer: COMMERCIAL

## 2019-03-26 ENCOUNTER — RADIOLOGY INJECTION OFFICE VISIT (OUTPATIENT)
Dept: PALLIATIVE MEDICINE | Facility: CLINIC | Age: 64
End: 2019-03-26
Attending: NURSE PRACTITIONER
Payer: COMMERCIAL

## 2019-03-26 VITALS — HEART RATE: 82 BPM | SYSTOLIC BLOOD PRESSURE: 136 MMHG | OXYGEN SATURATION: 97 % | DIASTOLIC BLOOD PRESSURE: 77 MMHG

## 2019-03-26 DIAGNOSIS — E03.9 HYPOTHYROIDISM: ICD-10-CM

## 2019-03-26 DIAGNOSIS — M54.16 LUMBAR RADICULOPATHY: ICD-10-CM

## 2019-03-26 DIAGNOSIS — M54.16 LUMBAR RADICULOPATHY: Primary | ICD-10-CM

## 2019-03-26 DIAGNOSIS — E03.9 HYPOTHYROIDISM, UNSPECIFIED TYPE: Primary | ICD-10-CM

## 2019-03-26 PROCEDURE — 64483 NJX AA&/STRD TFRM EPI L/S 1: CPT | Mod: RT | Performed by: PHYSICAL MEDICINE & REHABILITATION

## 2019-03-26 NOTE — TELEPHONE ENCOUNTER
"Requested Prescriptions   Pending Prescriptions Disp Refills     levothyroxine (SYNTHROID/LEVOTHROID) 100 MCG tablet  Last Written Prescription Date:  05/24/12  Last Fill Quantity: 31,  # refills: 3   Last office visit: 12/7/2018 with prescribing provider:  12/07/18   Future Office Visit:   Next 5 appointments (look out 90 days)    Apr 09, 2019  9:00 AM CDT  SHORT with Eugene Michelle MD  [facility] ([facility]) [address]  [facility] [address]  [phone]          31 tablet 3     Sig: Take 1 tablet (100 mcg) by mouth daily    Thyroid Protocol Passed - 3/26/2019  2:16 PM       Passed - Patient is 12 years or older       Passed - Recent (12 mo) or future (30 days) visit within the authorizing provider's specialty    Patient had office visit in the last 12 months or has a visit in the next 30 days with authorizing provider or within the authorizing provider's specialty.  See \"Patient Info\" tab in inbasket, or \"Choose Columns\" in Meds & Orders section of the refill encounter.             Passed - Medication is active on med list       Passed - Normal TSH on file in past 12 months    Recent Labs   Lab Test 12/07/18  1340   TSH 2.58             Passed - No active pregnancy on record    If patient is pregnant or has had a positive pregnancy test, please check TSH.         Passed - No positive pregnancy test in past 12 months    If patient is pregnant or has had a positive pregnancy test, please check TSH.            "

## 2019-03-26 NOTE — NURSING NOTE
Pre-procedure Intake    Have you been fasting? NA    If yes, for how long?     Are you taking a prescribed blood thinner such as coumadin, Plavix, Xarelto?    No    If yes, when did you take your last dose?     Do you take aspirin?  325mg- one daily, held 5 days    If cervical procedure, have you held aspirin for 6 days?   NA    Do you have any allergies to contrast dye, iodine, steroid and/or numbing medications?  NO    Are you currently taking antibiotics or have an active infection?  NO    Have you had a fever/elevated temperature within the past week? NO    Are you currently taking oral steroids? NO    Do you have a ? Yes       Are you pregnant or breastfeeding?  NO    Are the vital signs normal?  Yes

## 2019-03-26 NOTE — NURSING NOTE
Discharge Information    IV Discontiued Time:  NA    Amount of Fluid Infused:  NA    Discharge Criteria = When patient returns to baseline or as per MD order    Consciousness:  Pt is fully awake    Circulation:  BP +/- 20% of pre-procedure level    Respiration:  Patient is able to breathe deeply    O2 Sat:  Patient is able to maintain O2 Sat >92% on room air    Activity:  Moves 4 extremities on command    Ambulation:  Patient is able to stand and walk or stand and pivot into wheelchair    Dressing:  Clean/dry or No Dressing    Notes:   Discharge instructions and AVS given to patient    Patient meets criteria for discharge?  YES    Admitted to PCU?  No    Responsible adult present to accompany patient home?  Yes    Signature/Title:    Analisa Curran  RN Care Coordinator  [facility]

## 2019-03-26 NOTE — PATIENT INSTRUCTIONS
[facility] Procedure Discharge Instructions    Today you saw:Dr. Nargis Olvera     Your procedure:  Epidural steroid injection   Medications used:  Lidocaine (anesthetic)  Dexamethasone (steroid) Omnipaque (contrast)               Be cautious when walking as numbness and/or weakness in the legs may occur up to 6-8 hours after the procedure due to effect of the local anesthetic    Do not drive for 6 hours. The effect of the local anesthetic could slow your reflexes.     Avoid strenuous activity for the first 24 hours. You may resume your regular activities after that.     You may shower, however avoid swimming, tub baths or hot tubs for 24 hours following your procedure    You may have a mild to moderate increase in pain for several days following the injection.      You may use ice packs for 10-15 minutes, 3 to 4 times a day at the injection site for comfort    Do not use heat to painful areas for 6 to 8 hours. This will give the local anesthetic time to wear off and prevent you from accidentally burning your skin.    You may use anti-inflammatory medications (such as Ibuprofen/Advil or Aleve) or Tylenol for pain control if necessary    With diabetes, check your blood sugar more frequently than usual as your blood sugar may be higher than normal for 10-14 days following a steroid injection. Contact your doctor who manages your diabetes if your blood sugar is higher than usual    Possible side effects of steroids that you may experience include flushing, elevated blood pressure, increased appetite, mild headaches and restlessness.  All of these symptoms will get better with time.    It may take up to 14 days for the steroid medication to start working although you may feel the effect as early as a few days after the procedure.     Follow up with your referring provider in 2-3 weeks      If you experience any of the following, call the pain center line during work hours at [phone] or on-call physician  after hours at [phone]:  -Fever over 100 degree F  -Swelling, bleeding, redness, drainage, warmth at the injection site  -Progressive weakness or numbness in your legs or arms  -Loss of bowel or bladder function  -Unusual headache that is not relieved by Tylenol or your regular headache medication  -Unusual new onset of pain that is not improving

## 2019-03-28 RX ORDER — LEVOTHYROXINE SODIUM 100 UG/1
100 TABLET ORAL DAILY
Qty: 90 TABLET | Refills: 1 | Status: SHIPPED | OUTPATIENT
Start: 2019-03-28 | End: 2019-10-14

## 2019-03-28 NOTE — TELEPHONE ENCOUNTER
"Routing refill request to provider for review/approval because:  Last prescription from primary care provider 5/24/2012    Per 12/7/18 office visit note:  \"(E03.9) Hypothyroidism, unspecified type  Plan: on levoxyl 100 mcg daily, check TSH with free T4 reflex\"  "

## 2019-04-02 ENCOUNTER — OFFICE VISIT (OUTPATIENT)
Dept: OPTOMETRY | Facility: CLINIC | Age: 64
End: 2019-04-02
Payer: COMMERCIAL

## 2019-04-02 DIAGNOSIS — H04.129 DRY EYE: ICD-10-CM

## 2019-04-02 DIAGNOSIS — H10.13 ALLERGIC CONJUNCTIVITIS OF BOTH EYES: ICD-10-CM

## 2019-04-02 DIAGNOSIS — H00.029 MEIBOMIANITIS, UNSPECIFIED LATERALITY: Primary | ICD-10-CM

## 2019-04-02 PROCEDURE — 92002 INTRM OPH EXAM NEW PATIENT: CPT | Performed by: OPTOMETRIST

## 2019-04-02 RX ORDER — DOXYCYCLINE 50 MG/1
CAPSULE ORAL
Qty: 60 CAPSULE | Refills: 6 | Status: SHIPPED | OUTPATIENT
Start: 2019-04-02 | End: 2020-06-04

## 2019-04-02 ASSESSMENT — REFRACTION_WEARINGRX
OD_CYLINDER: +2.00
OS_ADD: +2.25
OD_SPHERE: +3.50
OS_SPHERE: +2.50
OD_ADD: +2.25
OS_AXIS: 074
OD_AXIS: 117
SPECS_TYPE: PAL
OS_CYLINDER: +2.25

## 2019-04-02 ASSESSMENT — REFRACTION_MANIFEST
OD_ADD: +2.50
OD_CYLINDER: +2.50
OS_CYLINDER: +1.75
OD_SPHERE: +3.00
OS_AXIS: 094
OS_SPHERE: +2.75
OD_AXIS: 100
OS_ADD: +2.50

## 2019-04-02 ASSESSMENT — VISUAL ACUITY
OS_CC: 20/25
OD_CC+: -3
OS_CC+: -1
OD_CC: 20/50
METHOD: SNELLEN - LINEAR
CORRECTION_TYPE: GLASSES
OS_CC: 20/30
OD_CC: 20/80

## 2019-04-02 ASSESSMENT — EXTERNAL EXAM - RIGHT EYE: OD_EXAM: NORMAL

## 2019-04-02 ASSESSMENT — EXTERNAL EXAM - LEFT EYE: OS_EXAM: NORMAL

## 2019-04-02 NOTE — PATIENT INSTRUCTIONS
Overabundance of bacterial microorganisms along the eyelashes and lid margins induce stress on the tear film and promote inflammation.  Regular lid hygiene helps diminish the bacterial population to prevent inflammation and infection.  Use a warm compress to loosen crusts   Cleanse lids once daily with a lid cleansing product as directed such as Ocusoft or Sterilid which can be purchased at most pharmacies. Diluted baby shampoo will also work, but not as well.      Continue with soothe xp four times daily  Use Patanol, two times daily       Continue with warm compresses   Meibomian gland dysfunction or Posterior Blepharitis, is characterized by inflammation along both the uppper and lower eyelid margins. A single row of these glands is present in each lid with openings along the lid margins.  It is often found in association with skin conditions such as rosacea and seborrheic dermatitis.    Symptoms include:  ?Red eyes  ?Gritty or burning sensation  ?Excessive tearing  ?Itchy eyelids  ?Red, swollen eyelids  ?Crusting or matting of eyelashes in the morning  ?Light sensitivity  ?Blurred vision    It is important to keep cosmetics from blocking these oil glands. If blocked, they do not   excrete oil into the tear film, which causes the tears to evaporate quickly.   This may result in watery eyes.  There is also an increase of bacterial growth when the tear film is unstable, leading to further ocular surface inflammation.    Warm compresses are very beneficial to the normal functioning of the eye.  Warm compresses for 5-10 minutes twice daily and keeping the lid margins clean  and help maintain a good tear film.   Moisten a washcloth with hot water, or microwave for 10 seconds, being careful to not get the cloth too hot.   Then put the washcloth onto your eyelids for 5 minutes. It will cool quickly so a rice pack or eyemask that can be heated and laid on top of the washcloth will help retain the heat.    Omega 3 fatty  acid supplements taken once to twice daily and artificial tears such as Soothe xp, Refresh optive , Retaine and systane balance are also an additional treatment to control inflammation and help soothe your eyes.  Add antibiotic for several months  Add lid cleansing  Overabundance of bacterial microorganisms along the eyelashes and lid margins induce stress on the tear film and promote inflammation.  Regular lid hygiene helps diminish the bacterial population to prevent inflammation and infection.  Use a warm compress to loosen crusts   Cleanse lids once daily with a lid cleansing product as directed such as Ocusoft or Sterilid which can be purchased at most pharmacies. Diluted baby shampoo will also work, but not as well.

## 2019-04-02 NOTE — LETTER
4/2/2019         RE: Ramona Ferrer  [address]  [place] [address]        Dear Colleague,    Thank you for referring your patient, Ramona Ferrer[facility]. Please see a copy of my visit note below.    Patient reports she picked up her new glasses at [employer] and reading was blurry with them on. States the distance seemed clear to her.     Ended up returning the glasses. Patient states she does not feel the rx was wrong, but is questioning how the lenses were made.    Glasses rx from 1/25/2019 exam:    +2.75 +3.00 103  +2.75 +1.75 092   +2.50 add OU    Also reports her eyes are red and irritated all the time. Discontinued use of Visine, increased Patanol to twice daily and has not gotten any relief. Eyes feel dry all the time. Also states she is using Pred Forte drop and does not help. Feels like this whole problem started after she had glaucoma laser surgery.    Currently doing warm compresses four times daily   patanol two times daily  Artificial tears , used PF after visit without relief  Taking Allegra    Chief Complaint   Patient presents with     glasses recheck     Ana Ludwig, OD         HPI and ROS performed by Optometrist      Medical, surgical and family histories reviewed and updated 4/2/2019.         OBJECTIVE: See Ophthalmology exam    ASSESSMENT:    ICD-10-CM    1. Meibomianitis, unspecified laterality H00.029 doxycycline monohydrate (MONODOX) 50 MG capsule   2. Dry eye H04.129    3. Allergic conjunctivitis of both eyes H10.13       PLAN:  Continue with above treatment , warm compresses/ artificial tears qid  / or NP as needed  patanol two times daily could add PF for a week to settle down inflammation  add lid hygiene and Oral doxy for several months     Ana Ludwig OD       Again, thank you for allowing me to participate in the care of your patient.        Sincerely,        Ana Ludwig, OD

## 2019-04-02 NOTE — PROGRESS NOTES
Patient reports she picked up her new glasses at [employer] and reading was blurry with them on. States the distance seemed clear to her.     Ended up returning the glasses. Patient states she does not feel the rx was wrong, but is questioning how the lenses were made.    Glasses rx from 1/25/2019 exam:    +2.75 +3.00 103  +2.75 +1.75 092   +2.50 add OU    Also reports her eyes are red and irritated all the time. Discontinued use of Visine, increased Patanol to twice daily and has not gotten any relief. Eyes feel dry all the time. Also states she is using Pred Forte drop and does not help. Feels like this whole problem started after she had glaucoma laser surgery.    Currently doing warm compresses four times daily   patanol two times daily  Artificial tears , used PF after visit without relief  Taking Allegra    Chief Complaint   Patient presents with     glasses recheck     Ana Ludwig, OD         HPI and ROS performed by Optometrist      Medical, surgical and family histories reviewed and updated 4/2/2019.         OBJECTIVE: See Ophthalmology exam    ASSESSMENT:    ICD-10-CM    1. Meibomianitis, unspecified laterality H00.029 doxycycline monohydrate (MONODOX) 50 MG capsule   2. Dry eye H04.129    3. Allergic conjunctivitis of both eyes H10.13       PLAN:  Continue with above treatment , warm compresses/ artificial tears qid  / or NP as needed  patanol two times daily could add PF for a week to settle down inflammation  add lid hygiene and Oral doxy for several months     Ana Ludwig OD

## 2019-04-09 ENCOUNTER — OFFICE VISIT (OUTPATIENT)
Dept: INTERNAL MEDICINE | Facility: CLINIC | Age: 64
End: 2019-04-09
Payer: COMMERCIAL

## 2019-04-09 VITALS
HEIGHT: 64 IN | RESPIRATION RATE: 17 BRPM | DIASTOLIC BLOOD PRESSURE: 76 MMHG | WEIGHT: 227.6 LBS | SYSTOLIC BLOOD PRESSURE: 114 MMHG | BODY MASS INDEX: 38.86 KG/M2 | OXYGEN SATURATION: 95 % | HEART RATE: 83 BPM | TEMPERATURE: 98.4 F

## 2019-04-09 DIAGNOSIS — E66.01 MORBID OBESITY (H): ICD-10-CM

## 2019-04-09 DIAGNOSIS — I10 ESSENTIAL HYPERTENSION: ICD-10-CM

## 2019-04-09 DIAGNOSIS — E03.9 HYPOTHYROIDISM, UNSPECIFIED TYPE: Primary | ICD-10-CM

## 2019-04-09 PROCEDURE — 99214 OFFICE O/P EST MOD 30 MIN: CPT | Performed by: INTERNAL MEDICINE

## 2019-04-09 PROCEDURE — 36415 COLL VENOUS BLD VENIPUNCTURE: CPT | Performed by: INTERNAL MEDICINE

## 2019-04-09 PROCEDURE — 84443 ASSAY THYROID STIM HORMONE: CPT | Performed by: INTERNAL MEDICINE

## 2019-04-09 RX ORDER — LISINOPRIL 10 MG/1
10 TABLET ORAL DAILY
Qty: 90 TABLET | Refills: 0 | Status: CANCELLED | OUTPATIENT
Start: 2019-04-09

## 2019-04-09 RX ORDER — LEVOTHYROXINE SODIUM 100 UG/1
100 TABLET ORAL DAILY
Qty: 90 TABLET | Refills: 1 | Status: CANCELLED | OUTPATIENT
Start: 2019-04-09

## 2019-04-09 ASSESSMENT — PATIENT HEALTH QUESTIONNAIRE - PHQ9: SUM OF ALL RESPONSES TO PHQ QUESTIONS 1-9: 0

## 2019-04-09 ASSESSMENT — MIFFLIN-ST. JEOR: SCORE: 1582.39

## 2019-04-09 NOTE — PROGRESS NOTES
SUBJECTIVE:                                                    Ramona Ferrer is a 63 year old female who presents to clinic today for the following health issues:      Hypertension Follow-up      Outpatient blood pressures are being checked at home.  Results are <140/90. Pt has stopped taking lisinopril 1 month ago and BP have bee normal     Low Salt Diet: no added salt    Hypothyroidism Follow-up      Since last visit, patient describes the following symptoms: weight gain of 3 lbs, also has some dry skin and hair loss    Obesity ; pt has been trying to eat healthy and exercise w/o loosing wt.    Allergic rhinitis; stable on fexofenadine, Flonase nasal spray and Patanol eyedrops        Amount of exercise or physical activity: None    Problems taking medications regularly: No    Medication side effects: none    Diet: low salt         Patient Active Problem List   Diagnosis     Thyroid activity decreased     Varicose veins of legs     Low back pain     CARDIOVASCULAR SCREENING; LDL GOAL LESS THAN 160     Hypothyroidism     DDD (degenerative disc disease), lumbar     Spinal stenosis, lumbar     Obesity     LALO (obstructive sleep apnea)     Obesity (BMI 35.0-39.9) with comorbidity (H)     Essential hypertension     Anatomical narrow angle - Both Eyes     Exotropia of right eye     Allergic conjunctivitis of both eyes     Past Surgical History:   Procedure Laterality Date     C TOTAL ABDOM HYSTERECTOMY  2003    MARYA BSO for stage 4 endometriosis     COLONOSCOPY       COLONOSCOPY  2/20/2012    Procedure:COLONOSCOPY; COLONOSCOPY ; Surgeon:ARUN KITCHEN; Location: GI     COLONOSCOPY N/A 2/15/2019    Procedure: COMBINED COLONOSCOPY, SINGLE OR MULTIPLE BIOPSY/POLYPECTOMY BY BIOPSY;  Surgeon: Connor Sanderson MD;  Location:  GI     HC CYSTOURETHROSCOPY W/ URETEROSCOPY &/OR PYELOSCOPY; W/ LITHOTRIPSY       HC TRABECULOPLASTY BY LASER SURGERY       HYSTERECTOMY, PAP NO LONGER INDICATED       LASER YAG IRIDOTOMY   2012    Procedure: LASER YAG IRIDOTOMY;  LEFT EYE YAG LASER PUPIL IRIDOTOMY ;  Surgeon: Dakotah Humphreys MD;  Location: [facility]     LIGATN/STRIP LONG OR SHORT SAPHEN      x's2     PELVIS LAPAROSCOPY,DX       STRABISMUS SURGERY         Social History     Tobacco Use     Smoking status: Never Smoker     Smokeless tobacco: Never Used   Substance Use Topics     Alcohol use: No     Family History   Problem Relation Age of Onset     Diabetes Mother      Thyroid Disease Mother      Hypertension Mother      Cancer Father          of stomach CA     Heart Disease Father         MI at age 58     Glaucoma No family hx of      Macular Degeneration No family hx of          Current Outpatient Medications   Medication Sig Dispense Refill     acetaminophen (TYLENOL) 325 MG tablet Take  by mouth every 4 hours as needed for pain. 100 tablet 0     Artificial Tear Solution (SOOTHE XP) SOLN Apply 1 drop to eye 3 times daily 1 Bottle 11     aspirin-acetaminophen-caffeine (EXCEDRIN MIGRAINE) 250-250-65 MG per tablet Take 1 tablet by mouth every 6 hours as needed for pain. 30 tablet 0     Fexofenadine HCl (ALLEGRA PO) Take  by mouth. 1 tablet daily         fluticasone (FLONASE) 50 MCG/ACT nasal spray Spray 2 sprays into both nostrils daily. 1 Package 10     levothyroxine (SYNTHROID/LEVOTHROID) 100 MCG tablet Take 1 tablet (100 mcg) by mouth daily 90 tablet 1     olopatadine (PATANOL) 0.1 % ophthalmic solution One drop, both eyes during the spring 5 mL 6     ORDER FOR DME Provent nasal EPAP  Use over nostrils nightly.   30 each 0     doxycycline monohydrate (MONODOX) 50 MG capsule 2 x daily for 1 month then once daily for 2 months. (Patient not taking: Reported on 2019) 60 capsule 6       ROS:  CONSTITUTIONAL: NEGATIVE for fever, chills, has 3 lb wt gain   EYES: NEGATIVE for vision changes or irritation  ENT/MOUTH: NEGATIVE for ear, mouth and throat problems  RESP: NEGATIVE for significant cough or SOB  CV: NEGATIVE for chest pain,  "palpitations or peripheral edema  MUSCULOSKELETAL: NEGATIVE for significant arthralgias or myalgia  ENDOCRINE: NEGATIVE for temperature intolerance, skin/hair changes    OBJECTIVE:                                                    /76   Pulse 83   Temp 98.4  F (36.9  C) (Oral)   Resp 17   Ht 1.626 m (5' 4\")   Wt 103.2 kg (227 lb 9.6 oz)   SpO2 95%   BMI 39.07 kg/m    Body mass index is 39.07 kg/m .   GENERAL: healthy, alert, well nourished, well hydrated, no distress  EYES: Eyes grossly normal to inspection, extraocular movements - intact, and PERRL  HENT:  Mouth- no ulcers, no lesions  NECK: no tenderness, no adenopathy, no asymmetry, no masses, no stiffness;   RESP: lungs clear to auscultation - no rales, no rhonchi, no wheezes  CV: regular rates and rhythm,  -  MS: extremities- no gross deformities noted, no edema  NEURO: strength and tone- normal, sensory exam- grossly normal, mentation- intact, speech- normal, reflexes- symmetric       ASSESSMENT/PLAN:                                                       (E03.9) Hypothyroidism, unspecified type     Plan: On Levoxyl 100 mcg daily, check TSH with free T4 reflex, and adjust Levoxyl dose after results            (I10) Essential hypertension  Comment: Blood pressure well controlled at this time  Plan: Patient has stopped taking lisinopril since, 1 month, was advised to monitor blood pressure and all if blood pressure is 140/90 , continue to follow low-sodium diet and regular exercise and follow-up in 3 months , check comprehensive metabolic panel          (E66.01) Obesity (BMI 35.0-39.9) with comorbidity (H)  Plan: Patient has been trying to eat healthy and exercise without losing weight referred to medical weight management ,BARIATRIC ADULT REFERRAL            Eugene Michelle MD  [facility]        "

## 2019-04-09 NOTE — LETTER
April 11, 2019      Ramona Ferrer  [address]  [place] [address]        Dear ,    Your TSH results came back within acceptable limits please continue same treatment.    Resulted Orders   TSH with free T4 reflex   Result Value Ref Range    TSH 2.30 0.40 - 4.00 mU/L       If you have any questions or concerns, please call the clinic at the number listed above.       Sincerely,        Eugene Michelle MD

## 2019-04-09 NOTE — NURSING NOTE
"/76   Pulse 83   Temp 98.4  F (36.9  C) (Oral)   Resp 17   Ht 1.626 m (5' 4\")   Wt 103.2 kg (227 lb 9.6 oz)   SpO2 95%   BMI 39.07 kg/m    Patient in for follow up on thyroid and HTN.  Analisa Pantoja CMA    "

## 2019-04-10 LAB — TSH SERPL DL<=0.005 MIU/L-ACNC: 2.3 MU/L (ref 0.4–4)

## 2019-05-20 ENCOUNTER — TELEPHONE (OUTPATIENT)
Dept: INTERNAL MEDICINE | Facility: CLINIC | Age: 64
End: 2019-05-20

## 2019-05-20 NOTE — TELEPHONE ENCOUNTER
Daughter Zelda calling(C2C) present. Her mom is having very bad pain due to a kidney stone that she has not been able to pass. Patient refuses to come in and be seen. Please advise.

## 2019-05-21 ENCOUNTER — OFFICE VISIT (OUTPATIENT)
Dept: SURGERY | Facility: CLINIC | Age: 64
End: 2019-05-21
Payer: COMMERCIAL

## 2019-05-21 VITALS
WEIGHT: 226.9 LBS | SYSTOLIC BLOOD PRESSURE: 109 MMHG | BODY MASS INDEX: 40.2 KG/M2 | HEART RATE: 78 BPM | HEIGHT: 63 IN | DIASTOLIC BLOOD PRESSURE: 70 MMHG | OXYGEN SATURATION: 94 %

## 2019-05-21 DIAGNOSIS — M54.41 CHRONIC RIGHT-SIDED LOW BACK PAIN WITH RIGHT-SIDED SCIATICA: ICD-10-CM

## 2019-05-21 DIAGNOSIS — Z13.0 SCREENING FOR IRON DEFICIENCY ANEMIA: ICD-10-CM

## 2019-05-21 DIAGNOSIS — Z13.228 SCREENING FOR ENDOCRINE, NUTRITIONAL, METABOLIC AND IMMUNITY DISORDER: ICD-10-CM

## 2019-05-21 DIAGNOSIS — E66.01 MORBID OBESITY WITH BMI OF 40.0-44.9, ADULT (H): Primary | ICD-10-CM

## 2019-05-21 DIAGNOSIS — N39.3 STRESS INCONTINENCE: ICD-10-CM

## 2019-05-21 DIAGNOSIS — I10 ESSENTIAL HYPERTENSION: ICD-10-CM

## 2019-05-21 DIAGNOSIS — Z13.29 SCREENING FOR ENDOCRINE, NUTRITIONAL, METABOLIC AND IMMUNITY DISORDER: ICD-10-CM

## 2019-05-21 DIAGNOSIS — Z13.0 SCREENING FOR ENDOCRINE, NUTRITIONAL, METABOLIC AND IMMUNITY DISORDER: ICD-10-CM

## 2019-05-21 DIAGNOSIS — G47.33 OSA (OBSTRUCTIVE SLEEP APNEA): ICD-10-CM

## 2019-05-21 DIAGNOSIS — Z13.21 SCREENING FOR ENDOCRINE, NUTRITIONAL, METABOLIC AND IMMUNITY DISORDER: ICD-10-CM

## 2019-05-21 DIAGNOSIS — G89.29 CHRONIC RIGHT-SIDED LOW BACK PAIN WITH RIGHT-SIDED SCIATICA: ICD-10-CM

## 2019-05-21 DIAGNOSIS — M51.369 DDD (DEGENERATIVE DISC DISEASE), LUMBAR: ICD-10-CM

## 2019-05-21 DIAGNOSIS — E66.01 MORBID OBESITY (H): ICD-10-CM

## 2019-05-21 PROCEDURE — 99205 OFFICE O/P NEW HI 60 MIN: CPT | Performed by: PHYSICIAN ASSISTANT

## 2019-05-21 PROCEDURE — 97802 MEDICAL NUTRITION INDIV IN: CPT | Performed by: DIETITIAN, REGISTERED

## 2019-05-21 RX ORDER — LISINOPRIL 10 MG/1
TABLET ORAL
Refills: 0 | COMMUNITY
Start: 2019-04-27 | End: 2019-10-31

## 2019-05-21 RX ORDER — OMEGA-3/DHA/EPA/FISH OIL 60 MG-90MG
CAPSULE ORAL DAILY
COMMUNITY

## 2019-05-21 ASSESSMENT — MIFFLIN-ST. JEOR: SCORE: 1555.4

## 2019-05-21 NOTE — LETTER
Ramona Ferrer  May 21, 2019        Bariatric Task List      Required Weight loss:    Lose 5 lbs prior to surgery.  Goal Weight: 221.9 lbs  Tasks:  Have preoperative laboratory tests drawn.     Psychological Evaluation with MMPI and clearance for weight loss surgery.    Achieve clearance from dietitian to see surgeon.    A total of 6 structured dietitian weight loss visits are required by your insurance.

## 2019-05-21 NOTE — Clinical Note
Thank you for referring this patient to our bariatric clinic for an initial evaluation. During our physical exam today, I saw Ramona's dentition is not adequate for chewing post surgery. Pt does not feel she will be able to get her teeth replaced. We reviewed options.  I gave her our list of affordable dentistry options in the [facility].We discussed medical weight loss as an alternative to bariatric surgery. She will see our dietician today for medical weight loss. She will schedule an appointment with our bariatrician or [facility] in medical weight loss for an intial evaluation following.  In addition, she will look into having her teeth replaced.  If she is able to do this and wants to return to the bariatric program, she can do that at anytime. Here is a copy of my visit.  Sincerely,Analisa Carty PA-C

## 2019-05-21 NOTE — PROGRESS NOTES
"MEDICAL WEIGHT LOSS INITIAL EVALUATION  DIAGNOSIS:  Class III Obesity    NUTRITION HISTORY:  Breakfast: oatmeal sometimes + raisins and honey or bagel with cream cheese,  black coffee + 1 piece of dark chocolate @9:30-10:00 (at 8:00)  Lunch: 2 cups of homemade [place] soup (chicken or beef, vegetables, rice), bread @ 1:00  Dinner: chicken or fish or beef or pork, rice or pasta, green beans or cauliflower, hot tea  Snacks: fresh fruit or cookies or baklava or protein drink or crackers  3-4X per day  Beverage choices: ~ 50 oz water, black coffee, black tea, ETOH- only on holidays or special occasions  Dining out: 1X per week-fish, vegetables, potatoes  Binge eating: denies at this time (said yes in questionnaire). Do not suspect true binge eating pattern,  Emotional eating: denies at this time (said yes in questionnaire)  Night time eating: denies  Exercise: none due to back, knee and leg pain  Other: Patient was scheduled for a surgical weight loss visit, but prefers to start in the Medical Weight loss  Program. Patient does all of the cooking for: spouse, mom who is 94, and grand kids who visit every day. Enjoys preparing [place] type foods, but eats all types of cuisine. 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 Explained to patient she would see a MD or a PCA at her next visit to discuss possible use of weight loss medications. Patient reports she can not swim in a pool due to varicose veins.     ANTHROPOMETRICS:  Height: 5'2.5\"   Weight: 226.9 lb   BMI:  40.84 kg/m2  NUTRITION DIAGNOSIS:   Obese, class III related to excess energy intake as evidence by BMI of  40.84  NUTRITION INTERVENTIONS  Nutrition Prescription:  Recommend modified energy- nutrient intake  Implementation:  Nutrition Education (Content):    Discussed portion sizes and \"My plate Planner\"    Reviewed high protein food list and encouraged 60-70 g (1.2-1.4 g kg IBW)  protein per day    Reviewed the value of tracking " "intake (food log sheets provided)            Provided   Tips for Weight Loss and Weight management\"    Nutrition Education (Application):     Patient to practice goals as stated below    Patient verbalizes understanding of diet by acknowledging the need to cut back on snacking    Anticipate fair-good compliance    Goals:  Track intake on sheet provided  Reduce snacking by 50%  Include protein at every meal; aiming for 60-70 g per day    FOLLOW UP AND MONITORING:   PA-C or MD-first available   Other  - follow up in 8 weeks.     TIME SPENT WITH PATIENT:   40 minutes   Prashanth Weaver RD, LD  [facility]  [phone]    "

## 2019-05-21 NOTE — PROGRESS NOTES
"New Bariatric Nutrition Consultation Note    Reason For Visit: Nutrition Assessment    Ramona Ferrer is a 63 year old presenting today for new bariatric nutrition consult.  Pt is interested in laparoscopic { SURGERY-PBS PROCEDURES:595376095}.  Patient is accompanied by {ACCOMPANIED BY (ADULT):438399}.    Support System Reviewed With Patient 5/11/2019   Who do you have in your support network that can be available to help you for the first 2 weeks after surgery? My  and daughter   Who can you count on for support throughout your weight loss surgery journey? My  and daughter       ANTHROPOMETRICS:  Estimated body mass index is 40.84 kg/m  as calculated from the following:    Height as of an earlier encounter on 5/21/19: 1.588 m (5' 2.5\").    Weight as of an earlier encounter on 5/21/19: 102.9 kg (226 lb 14.4 oz).    Required weight loss goal pre-op: *** lbs from initial consult weight (goal weight *** lbs or less before surgery)       5/11/2019   I have tried the following methods to lose weight Watching portions or calories, Exercise, Weight Watchers, Atkins type diet (low carb/high protein), Slimfast, OTC Medications       Weight Loss Questions Reviewed With Patient 5/11/2019   How long have you been overweight? Following one or more pregnancies       SUPPLEMENT INFORMATION:  ***    NUTRITION HISTORY:  Recall Diet Questions Reviewed With Patient 5/11/2019   Describe what you typically consume for breakfast (typical or most recent): Eggs, fruit, oatmeal   Describe what you typically consume for lunch (typical or most recent): Protein with rice, soups, veggies   Describe what you typically consume for supper (typical or most recent): [place] or protein with rice veggies, fruit   Describe what you typically consume as snacks (typical or most recent): Fruit, salty or sweets (coffee with sweet)   How many ounces of water, or other low calorie drinks, do you drink daily (8 oz=1 glass)? 64 oz or more "   How many ounces of caffeine (coffee, tea, pop) do you drink daily (8 oz=1 glass)? 16 oz   How many ounces of carbonated (pop, beer, sparkling water) drinks do you drinky daily (8 oz=1 glass)? 8 oz   How many ounces of juice, pop, sweet tea, sports drinks, protein drinks, other sweetened drinks, do you drink daily (8 oz=1 glass)? 8 oz   How many ounces of milk do you drink daily (8 oz=1 glass) 8 oz   Please indicate the type of milk: 1%   How often do you drink alcohol? Monthly or less   If you do drink alcohol, how many drinks might you have in a day? (one drink = 5 oz. wine, 1 can/bottle of beer, 1 shot liquor) 1 or 2       Eating Habits 5/11/2019   Do you have any dietary restrictions? No   Do you currently binge eat (eat a large amount of food in a short time)? Yes   Are you an emotional eater? Yes   Do you get up to eat after falling asleep? No   What foods do you crave? Sweets       ADDITIONAL INFORMATION:  ***    Dining Out History Reviewed With Patient 5/11/2019   How often do you dine out? Rarely.   Where do you dine out? (select all that apply) sit-down restaurants   What types of food do you order when you dine out? Fish with rice and veggies       Physical Activity Reviewed With Patient 5/11/2019   How often do you exercise? Never   What keeps you from being more active?  Pain       NUTRITION DIAGNOSIS:  Obesity r/t long history of self-monitoring deficit and excessive energy intake aeb BMI >30 kg/m2.    INTERVENTION:  Intervention Provided/Education Provided on post-op diet guidelines, vitamins/minerals essential post-operatively, GI anatomy of bariatric surgeries, ways to help prepare for post-op diet guidelines pre-operatively, portion/calorie-control, mindful eating ***.  Provided pt with list of goals RD contact information.      Questions Reviewed With Patient 5/11/2019   How ready are you to make changes regarding your weight? Number 1 = Not ready at all to make changes up to 10 = very ready. 10    How confident are you that you can change? 1 = Not confident that you will be successful making changes up to 10 = very confident. 10       Patient Understanding: { :903020}  Expected Compliance: { :227972}    GOALS:  Relating To Eating:  {UC SURGERY-GOALS RELATED EATIN}    Relating to beverages:  {UC SURGERY GOALS RELATING BEVERAGE:531278294}    Relating to dietary supplements:  {UC SURGERY GOALS DIETARY SUP:601493591}    Relating to activity:  {UC SURGERY GOALS RELATING ACTVITY:318231325}    Relating to cravings:  {UC SURGERY GOALS RELATING CRAVIN}    Follow-Up:   Recommend *** follow up visits to assist with lifestyle changes or per insurance.      Time spent with patient: *** minutes.    Prashanth Weaver RD, LD  [facility]  [phone]

## 2019-05-21 NOTE — PROGRESS NOTES
"New Bariatric Surgery Consultation Note    May 21, 2019    RE: Ramona Ferrer  MR#: [id number]  : 1955      Referring provider:       2019   Who referred you? Dr. Barnard       Chief Complaint/Reason for visit: evaluation for possible weight loss surgery    Dear Eugene Michelle MD (General),    I had the pleasure of seeing your patient, Ramona Ferrer, to evaluate her obesity and consider her for possible weight loss surgery. As you know, Ramona Ferrer is 63 year old.  She has a height of 5' 2.5\", a weight of 226 lbs 14.4 oz, and calculated Body mass index is 40.84 kg/m .      HISTORY OF PRESENT ILLNESS:  Weight Loss History Reviewed with Patient 2019   How long have you been overweight? Following one or more pregnancies   What is the most that you have ever weighed? 230   What is the most weight you have lost? 45   I have tried the following methods to lose weight Watching portions or calories, Exercise, Weight Watchers, Atkins type diet (low carb/high protein), Slimfast, OTC Medications   Have you ever had weight loss surgery? No   Was able to lose weight in the past with exercise.  However, now has trouble losing weight because her back pain with sciatica limits her ability to exercise.      CO-MORBIDITIES OF OBESITY INCLUDE:     2019   I have the following co-morbidities associated with obesity: High Blood Pressure, Weight Bearing Joint Pain, Stress Incontinence   Migraine headaches.  Uses Excedrin Migraine to get red of headaches.  Gets these 2x a month.      PAST MEDICAL HISTORY:  Past Medical History:   Diagnosis Date     DDD (degenerative disc disease), lumbar      Endometriosis      Hypothyroidism           Seasonal allergies      Spinal stenosis, lumbar    Kidney Stones    PAST SURGICAL HISTORY: No previous bleeding, anesthesia, or nausea concerns with surgery.    Past Surgical History:   Procedure Laterality Date     C TOTAL ABDOM HYSTERECTOMY      MARYA BSO for " stage 4 endometriosis     COLONOSCOPY       COLONOSCOPY  2/20/2012    Procedure:COLONOSCOPY; COLONOSCOPY ; Surgeon:ARUN KITCHEN; Location: GI     COLONOSCOPY N/A 2/15/2019    Procedure: COMBINED COLONOSCOPY, SINGLE OR MULTIPLE BIOPSY/POLYPECTOMY BY BIOPSY;  Surgeon: Connor Sanderson MD;  Location:  GI     HC CYSTOURETHROSCOPY W/ URETEROSCOPY &/OR PYELOSCOPY; W/ LITHOTRIPSY       HC TRABECULOPLASTY BY LASER SURGERY       HYSTERECTOMY, PAP NO LONGER INDICATED       LASER YAG IRIDOTOMY  8/8/2012    Procedure: LASER YAG IRIDOTOMY;  LEFT EYE YAG LASER PUPIL IRIDOTOMY ;  Surgeon: Dakotah Humphreys MD;  Location:  [facility]     LIGATN/STRIP LONG OR SHORT SAPHEN      x's2     PELVIS LAPAROSCOPY,DX       STRABISMUS SURGERY         FAMILY HISTORY:   Family History   Problem Relation Age of Onset     Diabetes Mother      Thyroid Disease Mother      Hypertension Mother      Heart Disease Father         MI at age 58     Glaucoma No family hx of      Macular Degeneration No family hx of        SOCIAL HISTORY:   Social History Questions Reviewed With Patient 5/11/2019   Which best describes your employment status (select all that apply) I work full-time   If you work, 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 Yes, 1 daughter and 2 grandchildren   Who do you have in your support network that can be available to help you for the first 2 weeks after surgery? My  and daughter   Who can you count on for support throughout your weight loss surgery journey? My  and daughter   Can you afford 3 meals a day?  Yes   Can you afford 50-60 dollars a month for vitamins? Yes       HABITS:     5/11/2019   How often do you drink alcohol? Monthly or less   If you do drink alcohol, how many drinks might you have in a day? (one drink = 5 oz. wine, 1 can/bottle of beer, 1 shot liquor) 1 or 2   Have you ever used any of the following nicotine  products? No   Have you or are you currently using street drugs or prescription strength medication for which you do not have a prescription for? No   Do you have a history of chemical dependency (alcohol or drug abuse)? No       PSYCHOLOGICAL HISTORY:   Psychological History Reviewed With Patient 5/11/2019   Have you ever attempted suicide? Never.   Have you had thoughts of suicide in the past year? No   Have you ever been hospitalized for mental illness or a suicide attempt? Never.   Do you have a history of chronic pain? No   Have you ever been diagnosed with fibromyalgia? No   Are you currently seeing a therapist or counselor?  No   Are you currently seeing a psychiatrist? No       ROS:     5/11/2019   Skin:  Leg swelling, Varicose veins   Musculoskeletal: Joint Pain, Back pain, Limited mobility, Swelling of legs, Arthritis   Cardiovascular: None of the above   Pulmonary: Shortness of breath with activity, Snoring   Gastrointestinal: Heartburn- occurs once a week,    Genitourinary: Kidney stones x 1   Hematological: None of the above   Neurological: Migraine headaches about 2x a month, relieved with Excedrin migraine   Female only: None of the above       EATING BEHAVIORS:     5/11/2019   Have you or anyone else thought that you had an eating disorder? No   Do you currently binge eat (eat a large amount of food in a short time)? No   Are you an emotional eater? No   Do you get up to eat after falling asleep? No       EXERCISE:     5/11/2019   How often do you exercise? Never   What keeps you from being more active?  Pain       MEDICATIONS:  Current Outpatient Medications   Medication Sig Dispense Refill     acetaminophen (TYLENOL) 325 MG tablet Take  by mouth every 4 hours as needed for pain. 100 tablet 0     Artificial Tear Solution (SOOTHE XP) SOLN Apply 1 drop to eye 3 times daily 1 Bottle 11     aspirin-acetaminophen-caffeine (EXCEDRIN MIGRAINE) 250-250-65 MG per tablet Take 1 tablet by mouth every 6 hours  "as needed for pain. 30 tablet 0     Fexofenadine HCl (ALLEGRA PO) Take  by mouth. 1 tablet daily         fluticasone (FLONASE) 50 MCG/ACT nasal spray Spray 2 sprays into both nostrils daily. 1 Package 10     levothyroxine (SYNTHROID/LEVOTHROID) 100 MCG tablet Take 1 tablet (100 mcg) by mouth daily 90 tablet 1     lisinopril (PRINIVIL/ZESTRIL) 10 MG tablet   0     olopatadine (PATANOL) 0.1 % ophthalmic solution One drop, both eyes during the spring 5 mL 6     doxycycline monohydrate (MONODOX) 50 MG capsule 2 x daily for 1 month then once daily for 2 months. (Patient not taking: Reported on 4/9/2019) 60 capsule 6     ORDER FOR DME Provent nasal EPAP  Use over nostrils nightly.   30 each 0       ALLERGIES:  Allergies   Allergen Reactions     Seasonal Allergies        LABS/IMAGING/MEDICAL RECORDS REVIEW:   Component      Latest Ref Rng & Units 4/9/2019   TSH      0.40 - 4.00 mU/L 2.30       PHYSICAL EXAM:  /70 (BP Location: Left arm, Patient Position: Sitting, Cuff Size: Adult Large)   Pulse 78   Ht 5' 2.5\" (1.588 m)   Wt 226 lb 14.4 oz (102.9 kg)   SpO2 94%   BMI 40.84 kg/m    GENERAL:  Good development and normal affect in no acute distress. Patient is alert and orientated x 3 and answers all questions appropriately.  HEENT: Head is normocephalic, nontraumatic. Pupils equal and round without conjunctival injection. Neck is supple without lymphadenopathy, thyroidmegaly, or mass. Trachea midline. Dentition shows large gap on right upper where 1-2 molars would be.  1 missing molar on upper left.  1 missing molar on lower left. 1 missing molar on lower right.   CARDIOVASCULAR:  Regular rate and rhythm without murmurs, rubs, or gallops.  RESPIRATORY: Lungs are clear to auscultation bilaterally with good breath sounds.  GASTROINTESTINAL: Abdomen is obese, nondistended, soft, nontender, without organomegaly or masses. No bruits.  LOWER EXTREMITIES: Compression stockings On.   MUSCULOSKELETAL:  Moves all 4 " extremities equal and strong. Has a normal gait.   NEUROLOGIC: Cranial nerves II-XII grossly intact.  SKIN: No intertriginous irritation or rash.       In summary, Ramona Ferrer has Class III obesity with a body mass index of Body mass index is 40.84 kg/m . kg/m2 and the comorbidities stated above. She completed an informational seminar and is a candidate for the laparoscopic gastric sleeve.  We formulated the following task list today:    Lose 5 lbs prior to surgery.  Goal Weight: 221.9 lbs  Have preoperative laboratory tests drawn.   Psychological Evaluation with MMPI and clearance for weight loss surgery.  Achieve clearance from dietitian to see surgeon.  A total of 6 structured dietitian weight loss visits are required by your insurance.  Start process to get upper missing teeth replaced (must have adequate chewing surface following surgery)    Near the end of our visit, during our physical exam today, I saw [place] dentition is not adequate for chewing post surgery. She will need to have upper molars replaced or dentures fitted.  Pt does not feel she will be able to get her teeth replaced. We reviewed options.  I gave her our list of affordable dentistry options in the [facility]. I have given her written information regarding the options at [facility] weight loss. We discussed medical weight loss as an alternative to bariatric surgery.  She is looking to lose no more than 50 lbs.  She has never worked with a dietician and medical provider in a comprehensive program before.  She is quite interested in this.      She will see our dietician today for medical weight loss. She will schedule an appointment with our bariatrician or PA-C in medical weight loss for an intial evaluation following.  In addition, she will look into having her teeth replaced.  If she is able to do this and wants to return to the bariatric program, she can do that at anytime. All questions were answered.      Sincerely,        Analisa Carty PA-C    I spent a total of 60 minutes face to face with the patient during today's office visit. Over 50% of this time was spent counseling the patient and/or coordinating care.

## 2019-05-22 DIAGNOSIS — R10.9 FLANK PAIN: Primary | ICD-10-CM

## 2019-05-24 ENCOUNTER — OFFICE VISIT (OUTPATIENT)
Dept: UROLOGY | Facility: CLINIC | Age: 64
End: 2019-05-24
Payer: COMMERCIAL

## 2019-05-24 VITALS
OXYGEN SATURATION: 98 % | DIASTOLIC BLOOD PRESSURE: 78 MMHG | WEIGHT: 220 LBS | BODY MASS INDEX: 38.98 KG/M2 | SYSTOLIC BLOOD PRESSURE: 118 MMHG | HEART RATE: 75 BPM | HEIGHT: 63 IN

## 2019-05-24 DIAGNOSIS — R10.9 FLANK PAIN: Primary | ICD-10-CM

## 2019-05-24 DIAGNOSIS — R10.9 FLANK PAIN: ICD-10-CM

## 2019-05-24 LAB
ALBUMIN UR-MCNC: NEGATIVE MG/DL
APPEARANCE UR: CLEAR
BILIRUB UR QL STRIP: NEGATIVE
COLOR UR AUTO: YELLOW
GLUCOSE UR STRIP-MCNC: NEGATIVE MG/DL
HGB UR QL STRIP: ABNORMAL
KETONES UR STRIP-MCNC: NEGATIVE MG/DL
LEUKOCYTE ESTERASE UR QL STRIP: NEGATIVE
NITRATE UR QL: NEGATIVE
PH UR STRIP: 6 PH (ref 5–7)
SOURCE: ABNORMAL
SP GR UR STRIP: 1.02 (ref 1–1.03)
UROBILINOGEN UR STRIP-ACNC: 0.2 EU/DL (ref 0.2–1)

## 2019-05-24 PROCEDURE — 81003 URINALYSIS AUTO W/O SCOPE: CPT | Performed by: UROLOGY

## 2019-05-24 PROCEDURE — 99213 OFFICE O/P EST LOW 20 MIN: CPT | Performed by: UROLOGY

## 2019-05-24 ASSESSMENT — PAIN SCALES - GENERAL: PAINLEVEL: SEVERE PAIN (6)

## 2019-05-24 ASSESSMENT — MIFFLIN-ST. JEOR: SCORE: 1532.04

## 2019-05-24 NOTE — LETTER
5/24/2019      RE: Ramona Ferrer  [address]  [place] [address]       Ramona returns today with right flank pain.  She points to her left hip area and claims the pain radiates down the back or side of her right thigh.  She passed a large stone in February and CT scan at that time showed a tiny right calyceal calculus.  She has had no blood in her urine or dysuria.  She has had no fever or chills.  Apparently she did see [facility] and is on a low-salt, high water intake diet because of elevated urine calcium.  Other past medical history, medications and allergies reviewed  Exam: Alert and oriented, normal appearance, normal vital signs.  Normal respirations, neuro grossly intact  Urinalysis: pH 6.0, specific gravity 1.020, trace of blood only  KUB was ordered for today-patient did not complete  Assessment: Right back pain with radiation down right thigh-probably related to her back problems or a right hip issue.  Recommend she obtain a CT of the abdomen and pelvis tomorrow without contrast to rule out a ureteral calculus on the right side.  I suspect that we will not find one.  Recommend she talk to Dr. Michelle about seeing a physiatry wrist or other referral for her back issues.      Josue Nieto MD

## 2019-05-24 NOTE — PROGRESS NOTES
Ramona returns today with right flank pain.  She points to her left hip area and claims the pain radiates down the back or side of her right thigh.  She passed a large stone in February and CT scan at that time showed a tiny right calyceal calculus.  She has had no blood in her urine or dysuria.  She has had no fever or chills.  Apparently she did see [facility] and is on a low-salt, high water intake diet because of elevated urine calcium.  Other past medical history, medications and allergies reviewed  Exam: Alert and oriented, normal appearance, normal vital signs.  Normal respirations, neuro grossly intact  Urinalysis: pH 6.0, specific gravity 1.020, trace of blood only  KUB was ordered for today-patient did not complete  Assessment: Right back pain with radiation down right thigh-probably related to her back problems or a right hip issue.  Recommend she obtain a CT of the abdomen and pelvis tomorrow without contrast to rule out a ureteral calculus on the right side.  I suspect that we will not find one.  Recommend she talk to Dr. Michelle about seeing a physiatry wrist or other referral for her back issues.

## 2019-05-24 NOTE — NURSING NOTE
"Chief Complaint   Patient presents with     Kidney Stone Related     Pt here for flank pain, no KUB done prior       Blood pressure 118/78, pulse 75, height 1.6 m (5' 3\"), weight 99.8 kg (220 lb), SpO2 98 %, not currently breastfeeding. Body mass index is 38.97 kg/m .    Patient Active Problem List   Diagnosis     Varicose veins of legs     Low back pain     CARDIOVASCULAR SCREENING; LDL GOAL LESS THAN 160     Hypothyroidism     DDD (degenerative disc disease), lumbar     Spinal stenosis, lumbar     LALO (obstructive sleep apnea)     Obesity (BMI 35.0-39.9) with comorbidity (H)     Essential hypertension     Anatomical narrow angle - Both Eyes     Exotropia of right eye     Allergic conjunctivitis of both eyes     Stress incontinence     Morbid obesity with BMI of 40.0-44.9, adult (H)       Allergies   Allergen Reactions     Seasonal Allergies        Current Outpatient Medications   Medication Sig Dispense Refill     acetaminophen (TYLENOL) 325 MG tablet Take  by mouth every 4 hours as needed for pain. 100 tablet 0     Artificial Tear Solution (SOOTHE XP) SOLN Apply 1 drop to eye 3 times daily 1 Bottle 11     aspirin-acetaminophen-caffeine (EXCEDRIN MIGRAINE) 250-250-65 MG per tablet Take 1 tablet by mouth every 6 hours as needed for pain. 30 tablet 0     doxycycline monohydrate (MONODOX) 50 MG capsule 2 x daily for 1 month then once daily for 2 months. 60 capsule 6     Fexofenadine HCl (ALLEGRA PO) Take  by mouth. 1 tablet daily         fluticasone (FLONASE) 50 MCG/ACT nasal spray Spray 2 sprays into both nostrils daily. 1 Package 10     levothyroxine (SYNTHROID/LEVOTHROID) 100 MCG tablet Take 1 tablet (100 mcg) by mouth daily 90 tablet 1     lisinopril (PRINIVIL/ZESTRIL) 10 MG tablet   0     Multiple Vitamins-Minerals (MULTIVITAMIN ADULT PO)        olopatadine (PATANOL) 0.1 % ophthalmic solution One drop, both eyes during the spring 5 mL 6     Omega-3 Fatty Acids (FISH OIL) 500 MG CAPS Take by mouth daily       " ORDER FOR DME Provent nasal EPAP  Use over nostrils nightly.   30 each 0     VITAMIN D, CHOLECALCIFEROL, PO Take by mouth daily         Social History     Tobacco Use     Smoking status: Never Smoker     Smokeless tobacco: Never Used   Substance Use Topics     Alcohol use: No     Drug use: No       UA RESULTS:  Recent Labs   Lab Test 05/24/19  1307  07/14/11  2045   COLOR Yellow   < > Light Yellow   APPEARANCE Clear   < > Clear   URINEGLC Negative   < > Negative   URINEBILI Negative   < > Negative   URINEKETONE Negative   < > Negative   SG 1.020   < > 1.008   UBLD Trace*   < > Small*   URINEPH 6.0   < > 7.0   PROTEIN Negative   < > Negative   UROBILINOGEN 0.2   < >  --    NITRITE Negative   < > Negative   LEUKEST Negative   < > Large*   RBCU  --   --  20*   WBCU  --   --  36*    < > = values in this interval not displayed.         Karlos Mensah, EMT  5/24/2019

## 2019-05-24 NOTE — LETTER
5/24/2019     RE: Ramona Ferrer  [address]  [place] [address]     Dear Colleague,    Thank you for referring your patient, Ramona Ferrer, to the [facility] [place] [facility]. Please see a copy of my visit note below.    Ramona returns today with right flank pain.  She points to her left hip area and claims the pain radiates down the back or side of her right thigh.  She passed a large stone in February and CT scan at that time showed a tiny right calyceal calculus.  She has had no blood in her urine or dysuria.  She has had no fever or chills.  Apparently she did see [facility] and is on a low-salt, high water intake diet because of elevated urine calcium.  Other past medical history, medications and allergies reviewed  Exam: Alert and oriented, normal appearance, normal vital signs.  Normal respirations, neuro grossly intact  Urinalysis: pH 6.0, specific gravity 1.020, trace of blood only  KUB was ordered for today-patient did not complete  Assessment: Right back pain with radiation down right thigh-probably related to her back problems or a right hip issue.  Recommend she obtain a CT of the abdomen and pelvis tomorrow without contrast to rule out a ureteral calculus on the right side.  I suspect that we will not find one.  Recommend she talk to Dr. Michelle about seeing a physiatry wrist or other referral for her back issues.    Again, thank you for allowing me to participate in the care of your patient.      Sincerely,    Josue Nieto MD

## 2019-05-26 ENCOUNTER — HOSPITAL ENCOUNTER (OUTPATIENT)
Dept: CT IMAGING | Facility: CLINIC | Age: 64
Discharge: HOME | End: 2019-05-26
Attending: UROLOGY | Admitting: UROLOGY
Payer: COMMERCIAL

## 2019-05-26 DIAGNOSIS — R10.9 FLANK PAIN: ICD-10-CM

## 2019-05-26 PROCEDURE — 74176 CT ABD & PELVIS W/O CONTRAST: CPT

## 2019-06-10 ENCOUNTER — OFFICE VISIT (OUTPATIENT)
Dept: SURGERY | Facility: CLINIC | Age: 64
End: 2019-06-10
Payer: COMMERCIAL

## 2019-06-10 VITALS
OXYGEN SATURATION: 95 % | WEIGHT: 229 LBS | HEART RATE: 81 BPM | SYSTOLIC BLOOD PRESSURE: 113 MMHG | DIASTOLIC BLOOD PRESSURE: 57 MMHG | HEIGHT: 63 IN | BODY MASS INDEX: 40.57 KG/M2

## 2019-06-10 DIAGNOSIS — E66.01 MORBID OBESITY (H): ICD-10-CM

## 2019-06-10 DIAGNOSIS — E66.01 MORBID OBESITY (H): Primary | ICD-10-CM

## 2019-06-10 DIAGNOSIS — I10 ESSENTIAL HYPERTENSION: ICD-10-CM

## 2019-06-10 DIAGNOSIS — G47.33 OSA (OBSTRUCTIVE SLEEP APNEA): ICD-10-CM

## 2019-06-10 LAB — HBA1C MFR BLD: 6.1 % (ref 0–5.6)

## 2019-06-10 PROCEDURE — 36415 COLL VENOUS BLD VENIPUNCTURE: CPT | Performed by: FAMILY MEDICINE

## 2019-06-10 PROCEDURE — 80053 COMPREHEN METABOLIC PANEL: CPT | Performed by: FAMILY MEDICINE

## 2019-06-10 PROCEDURE — 83036 HEMOGLOBIN GLYCOSYLATED A1C: CPT | Performed by: FAMILY MEDICINE

## 2019-06-10 PROCEDURE — 99215 OFFICE O/P EST HI 40 MIN: CPT | Performed by: FAMILY MEDICINE

## 2019-06-10 RX ORDER — PHENTERMINE HYDROCHLORIDE 37.5 MG/1
TABLET ORAL
Qty: 90 TABLET | Refills: 0 | Status: SHIPPED | OUTPATIENT
Start: 2019-06-10 | End: 2019-09-16

## 2019-06-10 ASSESSMENT — MIFFLIN-ST. JEOR: SCORE: 1572.87

## 2019-06-10 NOTE — PATIENT INSTRUCTIONS

## 2019-06-10 NOTE — PROGRESS NOTES
HPI:  Ramona Ferrer is a 63 year old year old female who I was asked to see by Analisa Carty for medical bariatric consultation. Weight related co-morbidities include   Patient Active Problem List   Diagnosis     Varicose veins of legs     Low back pain     CARDIOVASCULAR SCREENING; LDL GOAL LESS THAN 160     Hypothyroidism     DDD (degenerative disc disease), lumbar     Spinal stenosis, lumbar     LALO (obstructive sleep apnea)     Obesity (BMI 35.0-39.9) with comorbidity (H)     Essential hypertension     Anatomical narrow angle - Both Eyes     Exotropia of right eye     Allergic conjunctivitis of both eyes     Stress incontinence     Morbid obesity with BMI of 40.0-44.9, adult (H)   .  Her BMI is Body mass index is 40.57 kg/m ..      Assessment/ Plan:  Ramona is a 63 year old year old female who presents for medical weight management.     Diagnosis Comments   1. Morbid obesity (H)  We discussed healthy habits to assist with weight loss. She will try to walk as much as tolerated. I discussed pool therapy. She has an allergy to chlorine. I suggested we call to see if any of the therapy pools had salt water and she declined. I ordered an A1C and there is a future order for CMP in her chart. We discussed medication that may assist with weight loss. Phentermine was prescribed. Risks/ benefits and possible side effects were discussed and questions were answered. Written information was given.      2. LALO (obstructive sleep apnea)  This may improve with weight loss         Follow up: in 1 month with our dietician and in 3 months with myself      >40 minutes spent with patient, > 50% spent in counseling          Counseling:  We discussed [facility] Bariatric Basics including:  -eating 3 meals daily  -eating protein first  -eating slowly, chewing food well  -avoiding/limiting calorie containing beverages  -choosing wheat, not white with breads, crackers, pastas, servando, bagels, tortillas, rice  -limiting carbohydrates in  general  -limiting restaurant or cafeteria eating to twice a week or less    We discussed the importance of restorative sleep and stress management in maintaining a healthy weight.    We reviewed medications associated with weight gain.    We discussed insulin resistance and glycemic index as it relates to appetite and weight control.     We discussed the National Weight Control Registry healthy weight maintenance strategies and ways to optimize metabolism.  We discussed the importance of physical activity including cardiovascular and strength training in maintaining a healthier weight and explored viable options.        History Surrouding Consultation:  Patient states that diets do not work for her. She feels her biggest obstacle to weight loss is her inability to exercise due to pain from varicose veins and back pain.  The most weight lost was: unsure  Unfortunately there was not durable weight maintenance.  History of bulimia, anorexia, or binge eating disorder? no  If Present has eating disorder been in remission at least 3 years? na    Dietary History  Meals per day: 3  Snacks: 2-3  Typical Snack: crackers, fruits, nuts  Who does the grocery shopping? patient  Who does the cooking? patient  A typical meal includes: B: eggs with veggies and bread and fruit and coffee  L: soup and meat and sometimes vegetables D: chicken or fish, sometimes starch, vegetables  Regular Pop: none  Juice: none  Caffeine: coffee 1-2 cups per week  Amount of restaurant eating per week: 0-1  Eating a the table with the TV off? TV on    Physical Activity Patterns  Current physical activity routine includes: none    Limitations from being physically active on a regular basis includes: leg pain due to varicose veins, back pain        PMH:  Past Medical History:   Diagnosis Date     Complication of anesthesia      DDD (degenerative disc disease), lumbar      Endometriosis      Hypothyroidism           Seasonal allergies      Spinal  stenosis, lumbar        Past Surgical Hx:  Past Surgical History:   Procedure Laterality Date     C TOTAL ABDOM HYSTERECTOMY      MARYA BSO for stage 4 endometriosis     COLONOSCOPY       COLONOSCOPY  2012    Procedure:COLONOSCOPY; COLONOSCOPY ; Surgeon:ARUN KITCHEN; Location: GI     COLONOSCOPY N/A 2/15/2019    Procedure: COMBINED COLONOSCOPY, SINGLE OR MULTIPLE BIOPSY/POLYPECTOMY BY BIOPSY;  Surgeon: Connor Sanderson MD;  Location:  GI     HC CYSTOURETHROSCOPY W/ URETEROSCOPY &/OR PYELOSCOPY; W/ LITHOTRIPSY       HC TRABECULOPLASTY BY LASER SURGERY       HYSTERECTOMY, PAP NO LONGER INDICATED       LASER YAG IRIDOTOMY  2012    Procedure: LASER YAG IRIDOTOMY;  LEFT EYE YAG LASER PUPIL IRIDOTOMY ;  Surgeon: Dakotah Humphreys MD;  Location:  [facility]     LIGATN/STRIP LONG OR SHORT SAPHEN      x's2     PELVIS LAPAROSCOPY,DX       STRABISMUS SURGERY         Medication:  Current Outpatient Medications   Medication     acetaminophen (TYLENOL) 325 MG tablet     Artificial Tear Solution (SOOTHE XP) SOLN     aspirin-acetaminophen-caffeine (EXCEDRIN MIGRAINE) 250-250-65 MG per tablet     doxycycline monohydrate (MONODOX) 50 MG capsule     Fexofenadine HCl (ALLEGRA PO)     fluticasone (FLONASE) 50 MCG/ACT nasal spray     levothyroxine (SYNTHROID/LEVOTHROID) 100 MCG tablet     Multiple Vitamins-Minerals (MULTIVITAMIN ADULT PO)     olopatadine (PATANOL) 0.1 % ophthalmic solution     Omega-3 Fatty Acids (FISH OIL) 500 MG CAPS     VITAMIN D, CHOLECALCIFEROL, PO     lisinopril (PRINIVIL/ZESTRIL) 10 MG tablet     ORDER FOR DME     No current facility-administered medications for this visit.      Facility-Administered Medications Ordered in Other Visits   Medication     lidocaine 2 % (URO-JET) jelly 5 mL       Allergies:Seasonal allergies    Family Hx:  Family History   Problem Relation Age of Onset     Diabetes Mother      Thyroid Disease Mother      Hypertension Mother      Cancer Father          of stomach CA     Heart  Disease Father         MI at age 58     Glaucoma No family hx of      Macular Degeneration No family hx of        Social Hx:  Social History     Socioeconomic History     Marital status:      Spouse name: Not on file     Number of children: Not on file     Years of education: Not on file     Highest education level: Not on file   Occupational History     Not on file   Social Needs     Financial resource strain: Not on file     Food insecurity:     Worry: Not on file     Inability: Not on file     Transportation needs:     Medical: Not on file     Non-medical: Not on file   Tobacco Use     Smoking status: Never Smoker     Smokeless tobacco: Never Used   Substance and Sexual Activity     Alcohol use: No     Drug use: No     Sexual activity: Yes     Partners: Male     Birth control/protection: None     Comment: MARYA LONDONO   Lifestyle     Physical activity:     Days per week: Not on file     Minutes per session: Not on file     Stress: Not on file   Relationships     Social connections:     Talks on phone: Not on file     Gets together: Not on file     Attends Orthodoxy service: Not on file     Active member of club or organization: Not on file     Attends meetings of clubs or organizations: Not on file     Relationship status: Not on file     Intimate partner violence:     Fear of current or ex partner: Not on file     Emotionally abused: Not on file     Physically abused: Not on file     Forced sexual activity: Not on file   Other Topics Concern     Parent/sibling w/ CABG, MI or angioplasty before 65F 55M? Not Asked   Social History Narrative     Not on file       Tobacco Use Hx:  History   Smoking Status     Never Smoker   Smokeless Tobacco     Never Used       ROS  General  Fatigue: yes  Sleep Quality:good per patient- tested for sleep apnea 5 years ago- mild to moderate, no CPAP  HEENT  Hx of glaucoma: no per patient  Vision changes: yes- needed new glasses this year  Cardiovascular  Chest Pain with Exertion:  "no  Palpitations: no  Hx of heart disease: no  Pulmonary  Shortness of breath at rest: no  Shortness of breath with exertion: no  Snoring: yes  Stop-bang score: known sleep apnea  Romeo Score: na  Gastrointestinal  Heartburn: rare  Abdominal pain: no  Psychiatric  Moods Stable: no  Endocrine  Polydipsia: yes  Polyuria: yes  Neurologic:  Hx of seizures: no        /57 (BP Location: Right arm, Patient Position: Sitting, Cuff Size: Adult Large)   Pulse 81   Ht 5' 3\" (1.6 m)   Wt 229 lb (103.9 kg)   SpO2 95%   BMI 40.57 kg/m    Wt Readings from Last 2 Encounters:   06/10/19 229 lb (103.9 kg)   05/24/19 220 lb (99.8 kg)     Body mass index is 40.57 kg/m .        Physical Exam:    GEN: Alert and oriented in no acute distress.   HEENT: PERRLA, mucous membranes moist. Airway adequate  NECK: Supple without LAD or thyromegaly. Carotid bruits absent.  LUNGS: CTA without wheezes or crackles, good air movement throughout  CV: RRR no MRG  ABDOMEN: mild protuberance, BS normal, non tender to palpation, no rebound or guarding, no HSM  SKIN: no rashes, no skin tags, positive acanthosis nigrans      Labs:    Lab Results   Component Value Date    WBC 9.1 11/29/2018    HGB 13.9 12/07/2018    HCT 41.5 11/29/2018    MCV 92 11/29/2018     11/29/2018     Lab Results   Component Value Date    CHOL 182 12/07/2018    CHOL 178 05/23/2012    CHOL 187 05/16/2011     Lab Results   Component Value Date    HDL 93 12/07/2018    HDL 82 05/23/2012    HDL 82 05/16/2011     No components found for: LDLCALC  Lab Results   Component Value Date    TRIG 89 12/07/2018    TRIG 72 05/23/2012    TRIG 57 05/16/2011     No components found for: CHOLHDL  Lab Results   Component Value Date    ALT 42 12/07/2018    AST 31 12/07/2018    ALKPHOS 65 12/07/2018     No components found for: HGBA1C  No components found for: [id number]  "

## 2019-06-11 LAB
ALBUMIN SERPL-MCNC: 3.7 G/DL (ref 3.4–5)
ALP SERPL-CCNC: 69 U/L (ref 40–150)
ALT SERPL W P-5'-P-CCNC: 61 U/L (ref 0–50)
ANION GAP SERPL CALCULATED.3IONS-SCNC: 10 MMOL/L (ref 3–14)
AST SERPL W P-5'-P-CCNC: 33 U/L (ref 0–45)
BILIRUB SERPL-MCNC: 0.4 MG/DL (ref 0.2–1.3)
BUN SERPL-MCNC: 13 MG/DL (ref 7–30)
CALCIUM SERPL-MCNC: 9.6 MG/DL (ref 8.5–10.1)
CHLORIDE SERPL-SCNC: 105 MMOL/L (ref 94–109)
CO2 SERPL-SCNC: 25 MMOL/L (ref 20–32)
CREAT SERPL-MCNC: 0.69 MG/DL (ref 0.52–1.04)
GFR SERPL CREATININE-BSD FRML MDRD: >90 ML/MIN/{1.73_M2}
GLUCOSE SERPL-MCNC: 95 MG/DL (ref 70–99)
POTASSIUM SERPL-SCNC: 4.1 MMOL/L (ref 3.4–5.3)
PROT SERPL-MCNC: 7.6 G/DL (ref 6.8–8.8)
SODIUM SERPL-SCNC: 140 MMOL/L (ref 133–144)

## 2019-06-13 PROBLEM — R73.03 PREDIABETES: Status: ACTIVE | Noted: 2019-06-13

## 2019-06-24 DIAGNOSIS — R79.89 ELEVATED LIVER FUNCTION TESTS: Primary | ICD-10-CM

## 2019-07-09 DIAGNOSIS — Z00.00 ENCOUNTER FOR ROUTINE ADULT HEALTH EXAMINATION WITHOUT ABNORMAL FINDINGS: ICD-10-CM

## 2019-07-09 NOTE — TELEPHONE ENCOUNTER
"Requested Prescriptions   Pending Prescriptions Disp Refills     lisinopril (PRINIVIL/ZESTRIL) 10 MG tablet [Pharmacy Med Name: Lisinopril Oral   Tablet 10 MG]    Last Written Prescription Date:  4/27/19  Last Fill Quantity: -,  # refills: PATIENT REPORTED   Last office visit: 4/9/2019 with prescribing provider:  Viviana   Future Office Visit:     30 tablet 0     Sig: Take 1 tablet (10 mg) by mouth daily       ACE Inhibitors (Including Combos) Protocol Passed - 7/9/2019  4:28 PM        Passed - Blood pressure under 140/90 in past 12 months     BP Readings from Last 3 Encounters:   06/10/19 113/57   05/24/19 118/78   05/21/19 109/70                 Passed - Recent (12 mo) or future (30 days) visit within the authorizing provider's specialty     Patient had office visit in the last 12 months or has a visit in the next 30 days with authorizing provider or within the authorizing provider's specialty.  See \"Patient Info\" tab in inbasket, or \"Choose Columns\" in Meds & Orders section of the refill encounter.              Passed - Medication is active on med list        Passed - Patient is age 18 or older        Passed - No active pregnancy on record        Passed - Normal serum creatinine on file in past 12 months     Recent Labs   Lab Test 06/10/19  1458   CR 0.69             Passed - Normal serum potassium on file in past 12 months     Recent Labs   Lab Test 06/10/19  1458   POTASSIUM 4.1             Passed - No positive pregnancy test within past 12 months          "

## 2019-07-10 RX ORDER — LISINOPRIL 10 MG/1
10 TABLET ORAL DAILY
Qty: 90 TABLET | Refills: 0 | OUTPATIENT
Start: 2019-07-10

## 2019-07-10 NOTE — TELEPHONE ENCOUNTER
This medication was discontinued by in April, and patient was advised to follow-up in 3 months to recheck on the blood pressure control she is due for appointment please inform patient and please inform pharmacy

## 2019-07-10 NOTE — TELEPHONE ENCOUNTER
Lisinopril refill request.  Pt reported.     Last OV 4/9/19    BP Readings from Last 3 Encounters:   06/10/19 113/57   05/24/19 118/78   05/21/19 109/70     Pt had stopped this but now states she is taking this.     Provider approval needed.

## 2019-07-10 NOTE — TELEPHONE ENCOUNTER
Called patient and advised her of the message below. Patient states she will call back to schedule.    Denied request with message to pharmacy stating patient needs an appointment per primary care provider's request.

## 2019-09-12 ENCOUNTER — OFFICE VISIT (OUTPATIENT)
Dept: SURGERY | Facility: CLINIC | Age: 64
End: 2019-09-12
Payer: COMMERCIAL

## 2019-09-12 VITALS
OXYGEN SATURATION: 98 % | HEART RATE: 73 BPM | BODY MASS INDEX: 38.29 KG/M2 | WEIGHT: 216.1 LBS | DIASTOLIC BLOOD PRESSURE: 68 MMHG | HEIGHT: 63 IN | SYSTOLIC BLOOD PRESSURE: 129 MMHG

## 2019-09-12 DIAGNOSIS — R73.03 PREDIABETES: ICD-10-CM

## 2019-09-12 DIAGNOSIS — E66.01 MORBID OBESITY (H): Primary | ICD-10-CM

## 2019-09-12 PROCEDURE — 99214 OFFICE O/P EST MOD 30 MIN: CPT | Performed by: FAMILY MEDICINE

## 2019-09-12 ASSESSMENT — MIFFLIN-ST. JEOR: SCORE: 1509.35

## 2019-09-12 NOTE — Clinical Note
Gianluca Perez,I know that phentermine is not recommended in a patient who has glaucoma, but is it contraindicated in someone who had surgery to correct her glaucoma?I appreciate your input.Alicia

## 2019-09-12 NOTE — PROGRESS NOTES
[facility] Non Surgical Follow up Visit   Date of visit: 9/12/2019  Physician: MARILU Turner MD, MD  Primary Care is Eugene Michelle  Ramona Ferrer   64 year old  female     Initial Weight: 226#  Initial BMI: 40.84  Today's Weight:   Wt Readings from Last 1 Encounters:   09/12/19 216 lb 1.6 oz (98 kg)     Body mass index is 38.28 kg/m .           Assessment and Plan   Assessment: Ramona is a 64 year old year old female who presents for medical weight management.      Plan:     Diagnosis Comments   1. Morbid obesity (H)  Patient was congratulated on her success thus far. Healthy habits to assist with further weight loss were discussed. Upon careful review of her chart, I noticed a history of glaucoma surgery. I will touch bases with her eye doctor to determine whether or not phentermine is safe with surgically corrected glaucoma. I have asked her to hold it for now. If it is safe to resume the phentermine we will call her and ask where she would like the script called in.   2. Pre-diabetes We discussed insulin resistance and how it can interfere with weight loss efforts. We discussed dietary changes and exercise to reduce insulin resistance. We discussed the use of metformin. Patient had her questions answered. She declined starting it.       Follow up next available with our dietician and in 3 months with myself           INTERIM HISTORY  Patient has been taking the phentermine and feels that it helps to control her appetite. She has not started walking because she hurt her foot and was in a cast. She does feel that she can start now.     DIETARY HISTORY  Meals Per Day: 3  Eating Protein First?: yes  Food Diary: B:eggs and spinach and vegetables and coffee and 1 cookie or piece of dark chocolate L:soup or chicken and vegetables, whole wheat bread D:soup or chicken and vegetables and bread  Snacks Per Day: 2-3  Typical Snack: fruit or crackers or nuts  Fluid Intake: 3 bottles of  water  Portion Control: yes  Calorie Containing Beverages: sometimes 1 tsp of sugar in her coffee  Typical Protein Food Choices: meat, eggs  Choosing Whole Grains: yes  Meals at Restaurant per week:1      Positive Changes Since Last Visit: protein first, decreased sugar, smaller portions, decreased snacking  Struggling With: exercise    Knowledgeable in Reading Food Labels: yes  Getting Adequate Protein: usually  Sleeping 7-8 hours/day : yes      PHYSICAL ACTIVITY PATTERNS:  Cardiovascular: none, hurt her her leg  Strength Training: none    REVIEW OF SYSTEMS  GENERAL/CONSTITUTIONAL:  Fatigue: yes  HEENT:  Vision changes, glaucoma: History of glaucoma surgery in her chart, patient denies  CARDIOVASCULAR:  Chest Pain with Exertion: no  PULMONARY:  Dyspnea on exertion: no  NEUROLOGIC:  Paresthesias: no  PSYCHIATRIC:  Moods: stable  MUSCULOSKELETAL/RHEUMATOLOGIC  Arthralgias: yes  Myalgias: no  ENDOCRINE:  Monitoring Blood Sugars: na  Sugars Well Controlled: na       Patient Profile   Social History     Social History Narrative     Not on file        Past Medical History   Past Medical History:   Diagnosis Date     Complication of anesthesia      DDD (degenerative disc disease), lumbar      Endometriosis      Hypothyroidism           Seasonal allergies      Spinal stenosis, lumbar      Patient Active Problem List   Diagnosis     Varicose veins of legs     Low back pain     CARDIOVASCULAR SCREENING; LDL GOAL LESS THAN 160     Hypothyroidism     DDD (degenerative disc disease), lumbar     Spinal stenosis, lumbar     LALO (obstructive sleep apnea)     Obesity (BMI 35.0-39.9) with comorbidity (H)     Essential hypertension     Anatomical narrow angle - Both Eyes     Exotropia of right eye     Allergic conjunctivitis of both eyes     Stress incontinence     Morbid obesity with BMI of 40.0-44.9, adult (H)     Prediabetes     [unfilled]    Past Surgical History  She has a past surgical history that includes TOTAL ABDOM  "HYSTERECTOMY (2003); LIGATN/STRIP LONG OR SHORT SAPHEN; hysterectomy, pap no longer indicated; colonoscopy; CYSTOURETHROSCOPY W/ URETEROSCOPY &/OR PYELOSCOPY; W/ LITHOTRIPSY; Colonoscopy (2/20/2012); Laser YAG iridotomy (8/8/2012); strabismus surgery; TRABECULOPLASTY BY LASER SURGERY; pelvis laparoscopy,dx; and Colonoscopy (N/A, 2/15/2019).     Examination   /68 (BP Location: Left arm, Patient Position: Sitting, Cuff Size: Adult Large)   Pulse 73   Ht 5' 3\" (1.6 m)   Wt 216 lb 1.6 oz (98 kg)   SpO2 98%   BMI 38.28 kg/m    [unfilled]  General:  Alert and ambulatory, NAD  HEENT:  No conjunctival pallor, moist mucous Membranes, neck is without LAD  Pulmonary:  Normal respiratory effort, no cough, no audible wheezes/crackles.  CV:  Regular rate and Rhythm, no murmurs  Abdominal: BS normal,soft, NT without rebound or guarding  Pscyh/Mood: stable         Counseling:   We reviewed the important post op bariatric recommendations:  -eating 3 meals daily  -eating protein first, getting >60gm protein daily  -eating slowly, chewing food well  -avoiding/limiting calorie containing beverages  -limiting starchy vegetables and carbohydrates, choosing wheat, not white with breads,   crackers, pastas, servando, bagels, tortillas, rice  -limiting restaurant or cafeteria eating to twice a week or less    We discussed the importance of restorative sleep and stress management in maintaining a healthy weight.  We discussed the National Weight Control Registry healthy weight maintenance strategies and ways to optimize metabolism.  We discussed the importance of physical activity including cardiovascular and strength training in maintaining a healthier weight.    > 25 min spent with patient, > 50% spent in counseling         MARILU Turner MD  [facility]           "

## 2019-09-16 ENCOUNTER — TELEPHONE (OUTPATIENT)
Dept: SURGERY | Facility: CLINIC | Age: 64
End: 2019-09-16

## 2019-09-16 DIAGNOSIS — E66.01 MORBID OBESITY (H): ICD-10-CM

## 2019-09-16 RX ORDER — PHENTERMINE HYDROCHLORIDE 37.5 MG/1
TABLET ORAL
Qty: 90 TABLET | Refills: 0 | Status: SHIPPED | OUTPATIENT
Start: 2019-09-16 | End: 2019-12-16

## 2019-09-16 NOTE — TELEPHONE ENCOUNTER
Called patient and LM for her to return call to Dr. Turner.    When patient returns call please inform her of the following:  Patient can continue Phentermine and ask where she would like it called in and call in a 90 day supply? Also inform her that we suspect that the prescription will trigger a denial and subsequent PA.  Ava Che, MS, RD, RN

## 2019-09-16 NOTE — TELEPHONE ENCOUNTER
----- Message from MINOO Turner MD sent at 9/16/2019  8:38 AM CDT -----  I was waiting for the ok to continue phentermine with her history of surgically corrected glaucoma from her eye doctor. Could you please let the patient know that she can continue it and ask where she would like it called in and call in a 90 day supply? I suspect that will trigger the denial and subsequent PA.  ----- Message -----  From: Ava Douglass RN  Sent: 9/12/2019   3:41 PM  To: MINOO Turner MD    Could we even start a PA for her since we didn't get a denial for the phentermine from her pharmacy?  We can discuss on Monday.  Ricky MORALES

## 2019-09-16 NOTE — TELEPHONE ENCOUNTER
Phentermine rx called into [facility] as directed by MD from rx signed today.  See medications for details.    VORB given to pharmacist Ana.  No further questions.    Isabelle Crouch RN on 9/16/2019 at 12:06 PM

## 2019-09-16 NOTE — TELEPHONE ENCOUNTER
Pt called back about her rx for phentermine. Informed as below; that patient may continue on her phentermine but it may need a prior authorization to be filled.     Pt asks to have this called in to [facility] at this time.  Isabelle Crouch RN on 9/16/2019 at 11:25 AM

## 2019-10-14 DIAGNOSIS — E03.9 HYPOTHYROIDISM, UNSPECIFIED TYPE: ICD-10-CM

## 2019-10-15 NOTE — TELEPHONE ENCOUNTER
"Requested Prescriptions   Pending Prescriptions Disp Refills     levothyroxine (SYNTHROID/LEVOTHROID) 100 MCG tablet [Pharmacy Med Name: Levothyroxine Sodium Oral Tablet 100 MCG]  Last Written Prescription Date:  3/28/2019  Last Fill Quantity: 90,  # refills: 1   Last office visit: 4/9/2019 with prescribing provider:     Future Office Visit:   30 tablet 0     Sig: Take 1 tablet (100 mcg) by mouth daily       Thyroid Protocol Passed - 10/14/2019 12:30 PM        Passed - Patient is 12 years or older        Passed - Recent (12 mo) or future (30 days) visit within the authorizing provider's specialty     Patient has had an office visit with the authorizing provider or a provider within the authorizing providers department within the previous 12 mos or has a future within next 30 days. See \"Patient Info\" tab in inbasket, or \"Choose Columns\" in Meds & Orders section of the refill encounter.              Passed - Medication is active on med list        Passed - Normal TSH on file in past 12 months     Recent Labs   Lab Test 04/09/19  0924   TSH 2.30              Passed - No active pregnancy on record     If patient is pregnant or has had a positive pregnancy test, please check TSH.          Passed - No positive pregnancy test in past 12 months     If patient is pregnant or has had a positive pregnancy test, please check TSH.          "

## 2019-10-16 RX ORDER — LEVOTHYROXINE SODIUM 100 UG/1
100 TABLET ORAL DAILY
Qty: 90 TABLET | Refills: 1 | Status: SHIPPED | OUTPATIENT
Start: 2019-10-16 | End: 2020-06-05

## 2019-10-16 NOTE — TELEPHONE ENCOUNTER
TSH   Date Value Ref Range Status   04/09/2019 2.30 0.40 - 4.00 mU/L Final     Pres[place] [facility] [place].

## 2019-10-31 DIAGNOSIS — I10 BENIGN ESSENTIAL HYPERTENSION: Primary | ICD-10-CM

## 2019-10-31 RX ORDER — LISINOPRIL 10 MG/1
10 TABLET ORAL DAILY
Qty: 90 TABLET | Refills: 1 | Status: SHIPPED | OUTPATIENT
Start: 2019-10-31 | End: 2020-06-29

## 2019-10-31 NOTE — TELEPHONE ENCOUNTER
"Patient has appointment on 11/19/2019 with Dr Michelle    Requested Prescriptions   Pending Prescriptions Disp Refills      lisinopril (PRINIVIL/ZESTRIL) 10 MG tablet Last Written Prescription Date:  4/27/2019  Last Fill Quantity: ?,  # refills: ?   Last office visit: 4/9/2019 with prescribing provider:  4/9/2019  Future Office Visit:   Next 5 appointments (look out 90 days)    Nov 19, 2019 11:00 AM CST  SHORT with Eugene Michelle MD  [facility] [address]  [facility] [address]  [phone]         0       ACE Inhibitors (Including Combos) Protocol Passed - 10/31/2019  2:05 PM        Passed - Blood pressure under 140/90 in past 12 months     BP Readings from Last 3 Encounters:   09/12/19 129/68   06/10/19 113/57   05/24/19 118/78                 Passed - Recent (12 mo) or future (30 days) visit within the authorizing provider's specialty     Patient has had an office visit with the authorizing provider or a provider within the authorizing providers department within the previous 12 mos or has a future within next 30 days. See \"Patient Info\" tab in inbasket, or \"Choose Columns\" in Meds & Orders section of the refill encounter.              Passed - Medication is active on med list        Passed - Patient is age 18 or older        Passed - No active pregnancy on record        Passed - Normal serum creatinine on file in past 12 months     Recent Labs   Lab Test 06/10/19  1458   CR 0.69             Passed - Normal serum potassium on file in past 12 months     Recent Labs   Lab Test 06/10/19  1458   POTASSIUM 4.1             Passed - No positive pregnancy test within past 12 months        "

## 2019-11-04 ENCOUNTER — HEALTH MAINTENANCE LETTER (OUTPATIENT)
Age: 64
End: 2019-11-04

## 2019-11-14 ENCOUNTER — ALLIED HEALTH/NURSE VISIT (OUTPATIENT)
Dept: SURGERY | Facility: CLINIC | Age: 64
End: 2019-11-14
Payer: COMMERCIAL

## 2019-11-14 ENCOUNTER — TELEPHONE (OUTPATIENT)
Dept: SURGERY | Facility: CLINIC | Age: 64
End: 2019-11-14

## 2019-11-14 VITALS — HEIGHT: 63 IN | BODY MASS INDEX: 37.19 KG/M2 | WEIGHT: 209.9 LBS

## 2019-11-14 DIAGNOSIS — E66.01 MORBID OBESITY (H): ICD-10-CM

## 2019-11-14 DIAGNOSIS — R73.03 PREDIABETES: Primary | ICD-10-CM

## 2019-11-14 DIAGNOSIS — E66.01 MORBID OBESITY WITH BMI OF 40.0-44.9, ADULT (H): ICD-10-CM

## 2019-11-14 PROCEDURE — 97803 MED NUTRITION INDIV SUBSEQ: CPT | Performed by: DIETITIAN, REGISTERED

## 2019-11-14 RX ORDER — METFORMIN HCL 500 MG
TABLET, EXTENDED RELEASE 24 HR ORAL
Qty: 90 TABLET | Refills: 1 | Status: SHIPPED | OUTPATIENT
Start: 2019-11-14 | End: 2020-03-23

## 2019-11-14 ASSESSMENT — MIFFLIN-ST. JEOR: SCORE: 1481.23

## 2019-11-14 NOTE — TELEPHONE ENCOUNTER
Called pt as directed. Instructed her on use of metformin. Discussed medication, use, possible side effects.  Questions answered.  No further needs at this time.    Isabelle Crouch RN on 11/14/2019 at 3:28 PM

## 2019-11-14 NOTE — TELEPHONE ENCOUNTER
----- Message from MINOO Turner MD sent at 11/14/2019  3:20 PM CST -----  Regarding: FW: Phentermine Qs  Hi Isabelle,  Will you please call her and let her know that she can not go higher on the phentermine. However, she is a candidate for metformin and this can be taken with the phentermine. I faxed it to her pharmacy.   ----- Message -----  From: oTrie Orellana  Sent: 11/14/2019   2:48 PM CST  To: MINOO Turner MD  Subject: Phentermine Qs                                   Hi Dr. Turner     Ramona is wondering if she can increase her dose and/or do anything different, medication-wise. She's currently taking a full dose of Phentermine. Feels like it's helping but dissatisfied with weight loss progress.     I discussed the importance of also focusing on diet and exercise to improve weight trends; pt agreeable to increasing exercise.     Let me know if you have any other questions,     Torie

## 2019-11-14 NOTE — PROGRESS NOTES
MEDICAL WEIGHT LOSS FOLLOW UP    Reason for visit: medical weight loss     DIAGNOSIS:  Obesity Class II    ANTHROPOMETRICS:  Initial Weight: 226.9 pounds (5/21/2019)  Previous Weight:  216.1 pounds (9/12/2019)  Current Weight:  209 lbs 14.4 oz  Weight Change: -6.2 lbs since last appointment  -17 lbs overall   BMI: Body mass index is 37.18 kg/m .    Weight Loss Medications:   Phentermine    NUTRITION HISTORY:  Breakfast: oatmeal + coffee  eggs + vegetables  2x/week - oatmeal cookies + coffee   Lunch:  Soup + chicken + vegetables + fruits   Dinner:  Chicken + vegetables + fruit   Snacks:  Protein bar, fruit, nuts, cheese  Beverage choices:  Coffee, tomato juice, water, tea     Dining Out:  How often do you dine out: 1-2 x/week   What types of food do you order: fish + vegetables  steak       Exercise:  Type: walking  Duration: 120 minutes  Frequency: 2-3x/week    Additional Information:   Pt has focused on eating more fruits and vegetables. Believes she is eating relatively small portions, so wants to focus on exercise. Discussed adding strength training as well as more forms of aerobic exercise. Recommended limit snacks to 2x/day.     EVALUATION/PROGRESS TOWARDS GOALS:  Previous Goals:   Track intake on sheet provided - met initially (no longer tracking)  Reduce snacking by 50% - not met  Include protein at every meal; aiming for 60-70 g per day - met    Previous Nutrition Diagnosis:  Obese class II related to excess energy intake and self-monitoring deficit as evidenced by BMI of 40.84 kg/m2.  No change, modified below     Current Nutrition Diagnosis:   Obese class II related to excess energy intake and self-monitoring deficit as evidenced by BMI of 37.18 kg/m2.    INTERVENTION:    Nutrition Prescription:  Recommend modified nutrient intake by decreasing energy intake.    Implementation:    Nutrition Education (Content):    Discussed previous goals and determined new goals    Encourage physical  activity    Supported patient in attempted weight loss and behavior changes    Congratulated patient on successful weight loss     Patient verbalizes understanding of diet by stating she will experiment with new forms of exercise and reduce snacking    Anticipate fair-good compliance    Goals:  Add in strength training 2x/week  Go to gym 1x/week to do an activity other than walking (stationary bike, elliptical, etc)  Limit snacks to 2x/day    Follow Up/Monitoring:  Other  -  patient to follow up in 8 weeks    Time Spent With Patient:  23 Minutes    Torie Orellana RD, LD  Clinical Dietitian   HPI      ROS      Physical Exam

## 2019-11-19 ENCOUNTER — OFFICE VISIT (OUTPATIENT)
Dept: INTERNAL MEDICINE | Facility: CLINIC | Age: 64
End: 2019-11-19
Payer: COMMERCIAL

## 2019-11-19 VITALS
SYSTOLIC BLOOD PRESSURE: 122 MMHG | HEART RATE: 90 BPM | WEIGHT: 206.2 LBS | HEIGHT: 63 IN | OXYGEN SATURATION: 97 % | TEMPERATURE: 98.4 F | DIASTOLIC BLOOD PRESSURE: 86 MMHG | BODY MASS INDEX: 36.54 KG/M2 | RESPIRATION RATE: 17 BRPM

## 2019-11-19 DIAGNOSIS — E03.9 HYPOTHYROIDISM, UNSPECIFIED TYPE: ICD-10-CM

## 2019-11-19 DIAGNOSIS — R73.03 PREDIABETES: ICD-10-CM

## 2019-11-19 DIAGNOSIS — I10 ESSENTIAL HYPERTENSION: ICD-10-CM

## 2019-11-19 DIAGNOSIS — Z12.31 ENCOUNTER FOR SCREENING MAMMOGRAM FOR BREAST CANCER: Primary | ICD-10-CM

## 2019-11-19 LAB — HBA1C MFR BLD: 5.6 % (ref 0–5.6)

## 2019-11-19 PROCEDURE — 36415 COLL VENOUS BLD VENIPUNCTURE: CPT | Performed by: INTERNAL MEDICINE

## 2019-11-19 PROCEDURE — 90682 RIV4 VACC RECOMBINANT DNA IM: CPT | Performed by: INTERNAL MEDICINE

## 2019-11-19 PROCEDURE — 82947 ASSAY GLUCOSE BLOOD QUANT: CPT | Performed by: INTERNAL MEDICINE

## 2019-11-19 PROCEDURE — 83036 HEMOGLOBIN GLYCOSYLATED A1C: CPT | Performed by: INTERNAL MEDICINE

## 2019-11-19 PROCEDURE — 99214 OFFICE O/P EST MOD 30 MIN: CPT | Mod: 25 | Performed by: INTERNAL MEDICINE

## 2019-11-19 PROCEDURE — 90471 IMMUNIZATION ADMIN: CPT | Performed by: INTERNAL MEDICINE

## 2019-11-19 PROCEDURE — 84443 ASSAY THYROID STIM HORMONE: CPT | Performed by: INTERNAL MEDICINE

## 2019-11-19 ASSESSMENT — MIFFLIN-ST. JEOR: SCORE: 1464.45

## 2019-11-19 NOTE — NURSING NOTE
"/86   Pulse 90   Temp 98.4  F (36.9  C) (Oral)   Resp 17   Ht 1.6 m (5' 3\")   Wt 93.5 kg (206 lb 3.2 oz)   SpO2 97%   BMI 36.53 kg/m    Patient in for follow up on HTN and thyroid.  Analisa Pantoja, KAYLEE    "

## 2019-11-20 LAB
GLUCOSE SERPL-MCNC: 86 MG/DL (ref 70–99)
TSH SERPL DL<=0.005 MIU/L-ACNC: 1.35 MU/L (ref 0.4–4)

## 2019-12-16 ENCOUNTER — OFFICE VISIT (OUTPATIENT)
Dept: SURGERY | Facility: CLINIC | Age: 64
End: 2019-12-16
Payer: COMMERCIAL

## 2019-12-16 VITALS
HEART RATE: 82 BPM | OXYGEN SATURATION: 95 % | HEIGHT: 63 IN | BODY MASS INDEX: 36.41 KG/M2 | WEIGHT: 205.5 LBS | DIASTOLIC BLOOD PRESSURE: 77 MMHG | SYSTOLIC BLOOD PRESSURE: 125 MMHG

## 2019-12-16 DIAGNOSIS — I10 ESSENTIAL HYPERTENSION: ICD-10-CM

## 2019-12-16 DIAGNOSIS — E66.01 MORBID OBESITY (H): Primary | ICD-10-CM

## 2019-12-16 DIAGNOSIS — R73.03 PREDIABETES: ICD-10-CM

## 2019-12-16 DIAGNOSIS — E66.01 MORBID OBESITY (H): ICD-10-CM

## 2019-12-16 PROCEDURE — 99213 OFFICE O/P EST LOW 20 MIN: CPT | Performed by: FAMILY MEDICINE

## 2019-12-16 RX ORDER — PHENTERMINE HYDROCHLORIDE 37.5 MG/1
TABLET ORAL
Qty: 90 TABLET | Refills: 0 | Status: SHIPPED | OUTPATIENT
Start: 2019-12-16 | End: 2020-03-16

## 2019-12-16 ASSESSMENT — MIFFLIN-ST. JEOR: SCORE: 1461.27

## 2019-12-16 NOTE — PROGRESS NOTES
[facility] Follow up Visit   Date of visit: 12/16/2019  Physician: MARILU Turner MD, MD  Primary Care is Eugene Michelle  Ramona Ferrer   64 year old  female     Initial Weight: 226#  Initial BMI: 40.84  Today's Weight:   Wt Readings from Last 1 Encounters:   12/16/19 205 lb 8 oz (93.2 kg)     Body mass index is 36.4 kg/m .           Assessment and Plan   Assessment: Ramona is a 64 year old year old female who presents for medical weight management.      Plan:     Diagnosis Comments   1. Obesity (BMI 35.0-39.9) with comorbidity (H)  Patient was congratulated on her success thus far. Healthy habits to assist with further weight loss were discussed. She will continue the phentermine and metformin.   2. Essential hypertension  This may improve with healthy habits and weight loss.       Follow up in 1 month with our dietician and in 3 months with myself           INTERIM HISTORY  She is taking both the phentermine and metformin and feels that they are helping. She is tolerating them without side effects. She is very happy with her success thus far.     DIETARY HISTORY  Meals Per Day: 3  Eating Protein First?: yes  Food Diary: B:eggs and some veggies and toast and coffee and 1 small oatmeal/stevia cookie or small piece of dark chocolate L:meat or eggs or fish and vegetable and vegetable juice (she makes her own) D:protein and vegetable  Snacks Per Day: cut back to 2-3 x per day  Typical Snack: fruit or nuts   Fluid Intake: 64 oz  Portion Control: yes  Calorie Containing Beverages: none  Typical Protein Food Choices: meat or fish or eggs  Choosing Whole Grains: usually  Meals at Restaurant per week:1    Positive Changes Since Last Visit: protein first, vegetable intake, water intake, no sugared beverages  Struggling With: unable to weight train due to neck pain    Knowledgeable in Reading Food Labels: yes  Getting Adequate Protein: yes  Sleeping 7-8 hours/day yes  Stress management  "not discussed    PHYSICAL ACTIVITY PATTERNS:  Cardiovascular: walking on treadmill, occasional bicycle for 45-60 minutes 3 x per week  Strength Training: she tried- it hurt her neck    REVIEW OF SYSTEMS  GENERAL/CONSTITUTIONAL:  Fatigue: sometimes  HEENT:  Vision changes, glaucoma: no  CARDIOVASCULAR:  Chest Pain with Exertion: no  PULMONARY:  Dyspnea on exertion: yes  PSYCHIATRIC:  Moods: stable  MUSCULOSKELETAL/RHEUMATOLOGIC  Arthralgias: yes  Myalgias: yes  ENDOCRINE:  Monitoring Blood Sugars: na  Sugars Well Controlled: na       Patient Profile   Social History     Social History Narrative     Not on file        Past Medical History   Past Medical History:   Diagnosis Date     Complication of anesthesia      DDD (degenerative disc disease), lumbar      Endometriosis      Hypothyroidism           Seasonal allergies      Spinal stenosis, lumbar      Patient Active Problem List   Diagnosis     Varicose veins of legs     Low back pain     CARDIOVASCULAR SCREENING; LDL GOAL LESS THAN 160     Hypothyroidism     DDD (degenerative disc disease), lumbar     Spinal stenosis, lumbar     LALO (obstructive sleep apnea)     Obesity (BMI 35.0-39.9) with comorbidity (H)     Essential hypertension     Anatomical narrow angle - Both Eyes     Exotropia of right eye     Allergic conjunctivitis of both eyes     Stress incontinence     Morbid obesity with BMI of 40.0-44.9, adult (H)     Prediabetes     [unfilled]    Past Surgical History  She has a past surgical history that includes TOTAL ABDOM HYSTERECTOMY (2003); LIGATN/STRIP LONG OR SHORT SAPHEN; hysterectomy, pap no longer indicated; colonoscopy; CYSTOURETHROSCOPY W/ URETEROSCOPY &/OR PYELOSCOPY; W/ LITHOTRIPSY; Colonoscopy (2/20/2012); Laser YAG iridotomy (8/8/2012); strabismus surgery; TRABECULOPLASTY BY LASER SURGERY; pelvis laparoscopy,dx; and Colonoscopy (N/A, 2/15/2019).     Examination   Pulse 82   Ht 5' 3\" (1.6 m)   Wt 205 lb 8 oz (93.2 kg)   SpO2 95%   BMI " 36.40 kg/m    [unfilled]  General:  Alert and ambulatory, NAD  HEENT:  No conjunctival pallor, moist mucous Membranes, neck is without LAD  Pulmonary:  Normal respiratory effort, no cough, no audible wheezes/crackles.  CV:  Regular rate and Rhythm, no murmurs  Abdominal: BS normal,soft, NT without rebound or guarding  Pscyh/Mood: stable         Counseling:   We reviewed the important post op bariatric recommendations:  -eating 3 meals daily  -eating protein first, getting >60gm protein daily  -eating slowly, chewing food well  -avoiding/limiting calorie containing beverages  -limiting starchy vegetables and carbohydrates, choosing wheat, not white with breads,   crackers, pastas, servando, bagels, tortillas, rice  -limiting restaurant or cafeteria eating to twice a week or less    We discussed the importance of restorative sleep and stress management in maintaining a healthy weight.  We discussed the National Weight Control Registry healthy weight maintenance strategies and ways to optimize metabolism.  We discussed the importance of physical activity including cardiovascular and strength training in maintaining a healthier weight.    > 15 min spent with patient, > 50% spent in counseling         MARILU Turner MD  [facility]

## 2020-01-13 ENCOUNTER — TELEPHONE (OUTPATIENT)
Dept: SURGERY | Facility: CLINIC | Age: 65
End: 2020-01-13

## 2020-01-13 NOTE — TELEPHONE ENCOUNTER
MD states ok to call in order as pt requested to [facility].      Spoke to pharmacist Ana.  PAMELA given for rx as above.  Repeated back for accuracy.  No further questions.  Isabelle Crouch RN on 1/13/2020 at 12:02 PM

## 2020-01-13 NOTE — TELEPHONE ENCOUNTER
Pt called clinic re: phentermine rx.  States she was told at Dec. 16, 2019 appt that her refill would be called in to [facility].  States she was not given paper rx.        Per order in Epic, rx was locally printed, not sent electronically.  Pt states she has not filled this.  Will be out today; asks to have rx called in to [facility].  Will consult MD for approval.    Isabelle Crouch RN on 1/13/2020 at 11:55 AM

## 2020-01-24 ENCOUNTER — ALLIED HEALTH/NURSE VISIT (OUTPATIENT)
Dept: SURGERY | Facility: CLINIC | Age: 65
End: 2020-01-24
Payer: COMMERCIAL

## 2020-01-24 VITALS — WEIGHT: 202.7 LBS | HEIGHT: 63 IN | BODY MASS INDEX: 35.91 KG/M2

## 2020-01-24 DIAGNOSIS — E66.01 MORBID OBESITY WITH BMI OF 40.0-44.9, ADULT (H): ICD-10-CM

## 2020-01-24 DIAGNOSIS — E66.01 MORBID OBESITY (H): ICD-10-CM

## 2020-01-24 PROCEDURE — 97803 MED NUTRITION INDIV SUBSEQ: CPT | Performed by: DIETITIAN, REGISTERED

## 2020-01-24 ASSESSMENT — MIFFLIN-ST. JEOR: SCORE: 1448.57

## 2020-01-24 NOTE — PROGRESS NOTES
MEDICAL WEIGHT LOSS FOLLOW UP    Reason for visit: medical weight loss     DIAGNOSIS:  Obesity Class II    ANTHROPOMETRICS:  Initial Weight: 226.9 pounds (5/21/2019)  Previous Weight:  205.5 pounds (12/16/2019)  Current Weight: 202 lbs 11.2 oz  Weight Change: -2.8 lbs   -24.2 lbs overall   BMI: Body mass index is 35.91 kg/m .    Weight Loss Medications:   Phentermine + Metformin    NUTRITION HISTORY:  Breakfast: eggs + spinach + 1 oatmeal cookie or piece of chocolate  Lunch:  Soup (chicken, beef)  chicken or fish + vegetable + fruit +/- nuts  Dinner:  Meat + vegetables   Snacks:  Fruit, nuts   Beverage choices: coffee, tea (green or black), water (64oz)    Dining Out:  How often do you dine out:1x/week   What types of food do you order: fish + rice, steak + glass of wine    Exercise:  Type: walking, free weights nightly   Duration: 90 minutes  Frequency: 2x/week    Additional Information: Pt frustrated with progress; shares that she was able to lose weight more rapidly at 42 years old. Discussed changes in metabolism and hormonal regulation of fat stores following menopause; recommended increase exercise. Pt limited in ability to increase variety of exercise d/t varicose veins and also hesitant to leave house for extra workouts at gym d/t elderly mother's confusion. Is willing to try home exercise videos.     EVALUATION/PROGRESS TOWARDS GOALS:  Previous Goals:   Add in strength training 2x/week - met  Go to gym 1x/week to do an activity other than walking (stationary bike, elliptical, etc) - not met  Limit snacks to 2x/day - improving    Previous Nutrition Diagnosis:  Obese class II related to excess energy intake and self-monitoring deficit as evidenced by BMI of 37.18 kg/m2.  No change, modified below     Current Nutrition Diagnosis:   Obese class II related to excess energy intake and self-monitoring deficit as evidenced by BMI of 35.91 kg/m2.    INTERVENTION:    Nutrition Prescription:  Recommend modified  nutrient intake by decreasing energy intake.    Implementation:    Nutrition Education (Content):    Discussed previous goals and determined new goals    Encourage physical activity    Supported patient in attempted weight loss and behavior changes    Congratulated patient on successful weight loss     Patient verbalizes understanding of diet by stating she will increase exercise.    Anticipate fair-good compliance    Goals:  Increase exercise to 3x/week  Continue to limit snacks to 2x/day  Limit nuts to 1 serving per day    Follow Up/Monitoring:  Other  -  patient to follow up in 8-12 weeks    Time Spent With Patient:  25 Minutes    Torie Orellana RD, LD  Clinical Dietitian

## 2020-02-16 ENCOUNTER — HEALTH MAINTENANCE LETTER (OUTPATIENT)
Age: 65
End: 2020-02-16

## 2020-03-16 DIAGNOSIS — E66.01 MORBID OBESITY (H): ICD-10-CM

## 2020-03-16 RX ORDER — PHENTERMINE HYDROCHLORIDE 37.5 MG/1
TABLET ORAL
Qty: 90 TABLET | Refills: 0 | Status: SHIPPED | OUTPATIENT
Start: 2020-03-16 | End: 2020-03-23

## 2020-03-16 NOTE — TELEPHONE ENCOUNTER
Noted pt has rescheduled her clinic appointment.     Called rx in to pharmacy as directed.  Spoke to pharmacist Ana.  PAMELA given for rx as above.    Isabelle Crouch RN on 3/16/2020 at 2:26 PM

## 2020-03-16 NOTE — TELEPHONE ENCOUNTER
Updated per  who spoke to patient.  Patient is ill today and cannot come to her clinic appointment.  However she needs refill on her phentermine.  Will forward to MD. Isabelle Crouch RN on 3/16/2020 at 8:56 AM'

## 2020-03-23 ENCOUNTER — OFFICE VISIT (OUTPATIENT)
Dept: SURGERY | Facility: CLINIC | Age: 65
End: 2020-03-23
Payer: COMMERCIAL

## 2020-03-23 VITALS — BODY MASS INDEX: 35.44 KG/M2 | WEIGHT: 200 LBS | HEIGHT: 63 IN

## 2020-03-23 DIAGNOSIS — E66.9 OBESITY (BMI 30-39.9): Primary | ICD-10-CM

## 2020-03-23 DIAGNOSIS — R73.03 PREDIABETES: ICD-10-CM

## 2020-03-23 PROCEDURE — 99442 ZZC PHYSICIAN TELEPHONE EVALUATION 11-20 MIN: CPT | Performed by: FAMILY MEDICINE

## 2020-03-23 RX ORDER — PHENTERMINE HYDROCHLORIDE 37.5 MG/1
TABLET ORAL
Qty: 90 TABLET | Refills: 0 | Status: SHIPPED | OUTPATIENT
Start: 2020-03-23 | End: 2020-12-11

## 2020-03-23 RX ORDER — METFORMIN HCL 500 MG
TABLET, EXTENDED RELEASE 24 HR ORAL
Qty: 180 TABLET | Refills: 1 | Status: SHIPPED | OUTPATIENT
Start: 2020-03-23 | End: 2021-06-04

## 2020-03-23 ASSESSMENT — MIFFLIN-ST. JEOR: SCORE: 1428.38

## 2020-03-23 NOTE — PROGRESS NOTES
"Ramona Ferrer is a 64 year old female who is being evaluated via a billable telephone visit.      The patient has been notified of following:     \"This telephone visit will be conducted via a call between you and your physician/provider. We have found that certain health care needs can be provided without the need for a physical exam.  This service lets us provide the care you need with a short phone conversation.  If a prescription is necessary we can send it directly to your pharmacy.  If lab work is needed we can place an order for that and you can then stop by our lab to have the test done at a later time.    If during the course of the call the physician/provider feels a telephone visit is not appropriate, you will not be charged for this service.\"     Ramona Ferrer complains of    Chief Complaint   Patient presents with     RECHECK     return weight management; PLR       I have reviewed and updated the patient's Past Medical History, Social History, Family History and Medication List.    ALLERGIES  Seasonal allergies    Additional provider notes:   2020      Return Telephonic Medical Weight Management Note     Ramona Ferrer  MRN:  [id number]  :  1955      Dear Eugene Michelle,    I had the pleasure of doing a telephonic visit with your patient Ramona Ferrer.  She is a 64 year old female who I am continuing to see for treatment of obesity related to:       2019   I have the following health issues associated with obesity: High Blood Pressure, Weight Bearing Joint Pain, Stress Incontinence       INTERVAL HISTORY:  She is taking the phentermine and she feels like it is helping to control her appetite. She is not having any side effects. She is taking the metformin and is tolerating that also.     CURRENT WEIGHT (PATIENT REPORTED):  200 lbs 0 oz    Wt Readings from Last 4 Encounters:   20 200 lb (90.7 kg)   20 202 lb 11.2 oz (91.9 kg)   19 205 lb 8 oz " (93.2 kg)   11/19/19 206 lb 3.2 oz (93.5 kg)       DIETARY HISTORY  Meals Per Day: 3  Food Diary:   B:sausage and tomatoes and kashi or eggs with vegetables   L:soup with meat   D:meat or cheese or chicken with small amount of pasta or vegetables  Snacks Per Day: 2  Typical Snack: apple or orange or grapefruit and nuts (once per day)  Fluid Intake: working on it  Calorie Containing Beverages: none  Typical Protein Food Choices: meat, eggs, chicken, fish  Meals at Restaurant per week:rare    Positive Changes Since Last Visit: good choices with nutrition  Struggling With: exercise    Getting Adequate Protein: yes  Sleeping 7-8 hours/day : yes  Stress management : cooking, baking watching TV      Exercise:   Some hand weights, walking up and down stairs with ADLs      ROS:  General  Fatigue: no  Insomnia: no  HEENT  Dry Mouth: no  Hx of glaucoma: no  Vision changes: no  Cardiovascular  Chest Pain with Exertion: no  Palpitations: no  Hx of heart disease: no  BP: checking at home, now reports normal readings 110/70  Psychiatric  Moods: stable    Current birth control: menopause    MEDICATIONS:   Current Outpatient Medications   Medication     acetaminophen (TYLENOL) 325 MG tablet     Artificial Tear Solution (SOOTHE XP) SOLN     aspirin-acetaminophen-caffeine (EXCEDRIN MIGRAINE) 250-250-65 MG per tablet     doxycycline monohydrate (MONODOX) 50 MG capsule     Fexofenadine HCl (ALLEGRA PO)     fluticasone (FLONASE) 50 MCG/ACT nasal spray     levothyroxine (SYNTHROID/LEVOTHROID) 100 MCG tablet     lisinopril (PRINIVIL/ZESTRIL) 10 MG tablet     metFORMIN (GLUCOPHAGE-XR) 500 MG 24 hr tablet     Multiple Vitamins-Minerals (MULTIVITAMIN ADULT PO)     olopatadine (PATANOL) 0.1 % ophthalmic solution     Omega-3 Fatty Acids (FISH OIL) 500 MG CAPS     ORDER FOR DME     VITAMIN D, CHOLECALCIFEROL, PO     phentermine (ADIPEX-P) 37.5 MG tablet     No current facility-administered medications for this visit.   "    Facility-Administered Medications Ordered in Other Visits   Medication     lidocaine 2 % (URO-JET) jelly 5 mL         PE:  Ht 5' 2.5\" (1.588 m)   Wt 200 lb (90.7 kg)   BMI 36.00 kg/m    GENERAL Pt sounds in NAD      ASSESSMENT/PLAN:   Obesity (BMI 35.0-39.9) with comorbidity (H)  Patient was congratulated on her success thus far. Healthy habits to assist with further weight loss were discussed. She will try to increase her exercise in an effort to increase her metabolism. She will continue the phentermine and metformin.    Prediabetes  This may improve with healthy habits and weight loss. She will continue the metformin.         Call start time: 11:48  Call end time: 12:02            Phone call duration: 14 minutes    PCooper Turner MD  "

## 2020-03-23 NOTE — ASSESSMENT & PLAN NOTE
Patient was congratulated on her success thus far. Healthy habits to assist with further weight loss were discussed. She will try to increase her exercise in an effort to increase her metabolism. She will continue the phentermine and metformin.

## 2020-04-15 ENCOUNTER — TELEPHONE (OUTPATIENT)
Dept: OPTOMETRY | Facility: CLINIC | Age: 65
End: 2020-04-15

## 2020-04-15 DIAGNOSIS — H10.13 ALLERGIC CONJUNCTIVITIS OF BOTH EYES: ICD-10-CM

## 2020-04-20 RX ORDER — PREDNISOLONE ACETATE 10 MG/ML
1 SUSPENSION/ DROPS OPHTHALMIC 4 TIMES DAILY
Qty: 1 BOTTLE | Refills: 0 | Status: SHIPPED | OUTPATIENT
Start: 2020-04-20 | End: 2020-04-30

## 2020-04-20 RX ORDER — OLOPATADINE HYDROCHLORIDE 1 MG/ML
SOLUTION/ DROPS OPHTHALMIC
Qty: 5 ML | Refills: 6 | Status: SHIPPED | OUTPATIENT
Start: 2020-04-20 | End: 2021-08-06

## 2020-04-20 NOTE — TELEPHONE ENCOUNTER
Pt is calling about her Rx Olopatadine.  Pt stated that she contacted us last week, and still haven't received. Would like to get this filled. Please send to [facility] pharmacy in [place].

## 2020-04-23 NOTE — PROGRESS NOTES
"Ramona Ferrer is a 64 year old female who is being evaluated via a billable telephone visit.      The patient has been notified of following:     \"This telephone visit will be conducted via a call between you and your physician/provider. We have found that certain health care needs can be provided without the need for a physical exam.  This service lets us provide the care you need with a short phone conversation.  If a prescription is necessary we can send it directly to your pharmacy.  If lab work is needed we can place an order for that and you can then stop by our lab to have the test done at a later time.    Telephone visits are billed at different rates depending on your insurance coverage. During this emergency period, for some insurers they may be billed the same as an in-person visit.  Please reach out to your insurance provider with any questions.    If during the course of the call the physician/provider feels a telephone visit is not appropriate, you will not be charged for this service.\"    Patient has given verbal consent for Telephone visit?  Yes    MEDICAL WEIGHT LOSS FOLLOW UP    DIAGNOSIS:  Obese, class II    NUTRITION HISTORY:  B:sausage and tomatoes and kashi or 2 eggs with vegetables + blueberries + coffee with 1 oatmeal cookie  L:grilled chicken, vegetables, fruit, wild rice   D: soup (chicken noddle) + vegetables + sausage + blueberries   Snacks Per Day: 2  Typical Snack: apple or orange or pears and nuts (once per day)  Fluid Intake: water (48 oz) + coffee + green tea  Calorie Containing Beverages: none  Typical Protein Food Choices: meat, eggs, chicken, fish  Meals at Restaurant per week: rare    Exercise: Patient is exercising at home.  Patient has kidney stone and is in pain so is unable to exercise at this time.       Other: Patient frustrated with slow weight loss.  Reviewed total weight loss since starting program and congratulated for her success with weight loss during the " pandemic.     ANTHROPOMETRICS:  Initial Weight: 226.9 pounds (5/21/2019)  Previous Weight:  202 lbs (1/24/2020)  Current Weight: 198 lbs (4/24/2020)  Weight Change: 4 lbs   -28.9 lbs overall   BMI: Body mass index is 35.6 kg/m .      MEDICATIONS:  Phentermine and metformin     EVALUATION/PROGRESS TOWARDS GOALS:  Previous Goals:  Increase exercise to 3x/week-not met due to kidney stone pain   Continue to limit snacks to 2x/day-met  Limit nuts to 1 serving per day-met      Previous Nutrition Diagnosis:  Obese class II related to excess energy intake as evidence by BMI of 35.9 kg/m2-no change      Current Nutrition Diagnosis:   Obese class II related to excess energy intake as evidence by BMI of 35.6 kg/m2    INTERVENTION:    Nutrition Prescription:  Recommend modified nutrient intake by decreasing energy intake    Implementation:    Meals and Snacks: 3 meals + snacks when hungry     Nutrition Education (Content):    Discussed previous goals and determined new goals    Encouraged physical activity    Supported patient in attempted weight loss and behavior changes    Congratulated patient on successful weight loss     Patient verbalizes understanding of diet by stating will continue the same goals    Anticipate fair-good compliance    Goals:  Increase exercise to 3x/week  Continue to limit snacks to 2x/day  Limit nuts to 1 serving per day    Follow Up/Monitoring:  Other  -  patient to follow up in 12 weeks    Time Spent With Patient:  15 Minutes    Koko Hernandez, RD, LD  [facility] Outpatient Dietitian  [phone] (office phone)

## 2020-04-24 ENCOUNTER — ALLIED HEALTH/NURSE VISIT (OUTPATIENT)
Dept: SURGERY | Facility: CLINIC | Age: 65
End: 2020-04-24
Payer: COMMERCIAL

## 2020-04-24 VITALS — BODY MASS INDEX: 35.64 KG/M2 | WEIGHT: 198 LBS

## 2020-04-24 PROCEDURE — 97803 MED NUTRITION INDIV SUBSEQ: CPT | Mod: 95 | Performed by: DIETITIAN, REGISTERED

## 2020-05-19 DIAGNOSIS — R10.9 FLANK PAIN: Primary | ICD-10-CM

## 2020-05-19 DIAGNOSIS — R10.9 FLANK PAIN: ICD-10-CM

## 2020-05-19 DIAGNOSIS — Z87.442 PERSONAL HISTORY OF URINARY CALCULI: Primary | ICD-10-CM

## 2020-06-04 DIAGNOSIS — E03.9 HYPOTHYROIDISM, UNSPECIFIED TYPE: ICD-10-CM

## 2020-06-04 DIAGNOSIS — H00.029 MEIBOMIANITIS, UNSPECIFIED LATERALITY: ICD-10-CM

## 2020-06-04 RX ORDER — DOXYCYCLINE 50 MG/1
CAPSULE ORAL
Qty: 60 CAPSULE | Refills: 2 | Status: SHIPPED | OUTPATIENT
Start: 2020-06-04 | End: 2021-08-06

## 2020-06-05 RX ORDER — LEVOTHYROXINE SODIUM 100 UG/1
100 TABLET ORAL DAILY
Qty: 90 TABLET | Refills: 1 | Status: SHIPPED | OUTPATIENT
Start: 2020-06-05 | End: 2020-12-27 | Stop reason: DRUGHIGH

## 2020-06-05 NOTE — TELEPHONE ENCOUNTER
Pending Prescriptions:                       Disp   Refills    levothyroxine (SYNTHROID/LEVOTHROID) 100 *90 tab*1            Sig: TAKE 1 TABLET (100 MCG) BY MOUTH DAILY    Prescription [place] per [facility] [place]

## 2020-06-27 DIAGNOSIS — I10 BENIGN ESSENTIAL HYPERTENSION: ICD-10-CM

## 2020-06-29 RX ORDER — LISINOPRIL 10 MG/1
10 TABLET ORAL DAILY
Qty: 90 TABLET | Refills: 0 | Status: SHIPPED | OUTPATIENT
Start: 2020-06-29 | End: 2021-05-14

## 2020-11-22 ENCOUNTER — HEALTH MAINTENANCE LETTER (OUTPATIENT)
Age: 65
End: 2020-11-22

## 2020-12-07 ENCOUNTER — VIRTUAL VISIT (OUTPATIENT)
Dept: INTERNAL MEDICINE | Facility: CLINIC | Age: 65
End: 2020-12-07
Payer: COMMERCIAL

## 2020-12-07 DIAGNOSIS — R50.9 FEVER AND CHILLS: Primary | ICD-10-CM

## 2020-12-07 PROCEDURE — 99213 OFFICE O/P EST LOW 20 MIN: CPT | Mod: 95 | Performed by: INTERNAL MEDICINE

## 2020-12-07 RX ORDER — MULTIVIT-MIN/IRON/FOLIC ACID/K 18-600-40
CAPSULE ORAL
COMMUNITY
End: 2022-04-05

## 2020-12-07 NOTE — PROGRESS NOTES
"Ramona Ferrer is a 65 year old female who is being evaluated via a billable telephone visit.      The patient has been notified of following:     \"This video visit will be conducted via a call between you and your physician/provider. We have found that certain health care needs can be provided without the need for an in-person physical exam.  This service lets us provide the care you need with a telephone conversation.  If a prescription is necessary we can send it directly to your pharmacy.  If lab work is needed we can place an order for that and you can then stop by our lab to have the test done at a later time.    Telephone visits are billed at different rates depending on your insurance coverage.  Please reach out to your insurance provider with any questions.    If during the course of the call the physician/provider feels a telephone visit is not appropriate, you will not be charged for this service.\"    Patient has given verbal consent for telephone visit? Yes  How would you like to obtain your AVS? Mail a copy    Subjective     Ramona Ferrer is a 65 year old female who presents today via video visit for the following health issues:    HPI     Complaints of fever, body aches: she first started to feel ill yesterday morning after awakening with bad chills. She had some chills and myalgias yesterday with temp 99 but today has had continued symptoms and had temp of 100.8. She had a saliva Covid test yesterday that was negative. She has not had cough, has a little soreness in chest with deep breath only. No dyspnea.  She has not had any loss of taste or smell, no cough, no sore throat, dental infection, sinus symptoms.     She had no nausea, abdominal pain, diarrhea, urinary burning.               Patient Active Problem List   Diagnosis     Varicose veins of legs     Low back pain     CARDIOVASCULAR SCREENING; LDL GOAL LESS THAN 160     Hypothyroidism     DDD (degenerative disc disease), lumbar     Spinal " stenosis, lumbar     LALO (obstructive sleep apnea)     Obesity (BMI 35.0-39.9) with comorbidity (H)     Essential hypertension     Anatomical narrow angle - Both Eyes     Exotropia of right eye     Allergic conjunctivitis of both eyes     Stress incontinence     Morbid obesity with BMI of 40.0-44.9, adult (H)     Prediabetes     Current Outpatient Medications   Medication Sig Dispense Refill     Artificial Tear Solution (SOOTHE XP) SOLN Apply 1 drop to eye 3 times daily 1 Bottle 11     Ascorbic Acid (VITAMIN C) 500 MG CAPS        aspirin-acetaminophen-caffeine (EXCEDRIN MIGRAINE) 250-250-65 MG per tablet Take 1 tablet by mouth every 6 hours as needed for pain. 30 tablet 0     Fexofenadine HCl (ALLEGRA PO) Take  by mouth. 1 tablet daily         fluticasone (FLONASE) 50 MCG/ACT nasal spray Spray 2 sprays into both nostrils daily. 1 Package 10     levothyroxine (SYNTHROID/LEVOTHROID) 100 MCG tablet TAKE 1 TABLET (100 MCG) BY MOUTH DAILY 90 tablet 1     lisinopril (ZESTRIL) 10 MG tablet Take 1 tablet (10 mg) by mouth daily  90 tablet 0     metFORMIN (GLUCOPHAGE-XR) 500 MG 24 hr tablet 1 tablet with dinner daily for 2 weeks then 2 tablets with dinner 180 tablet 1     Multiple Vitamins-Minerals (MULTIVITAMIN ADULT PO)        olopatadine (PATANOL) 0.1 % ophthalmic solution One drop, both eyes during the spring 5 mL 6     Omega-3 Fatty Acids (FISH OIL) 500 MG CAPS Take by mouth daily       ORDER FOR DME Provent nasal EPAP  Use over nostrils nightly.   30 each 0     phentermine (ADIPEX-P) 37.5 MG tablet Take 1 tablet every morning with breakfast. 90 tablet 0     VITAMIN D, CHOLECALCIFEROL, PO Take by mouth daily       acetaminophen (TYLENOL) 325 MG tablet Take  by mouth every 4 hours as needed for pain. 100 tablet 0     doxycycline monohydrate (MONODOX) 50 MG capsule Take 1 capsule by mouth 2 times daily for 1 month then once daily for 2 months. (Patient not taking: Reported on 12/7/2020) 60 capsule 2      Social History      Tobacco Use     Smoking status: Never Smoker     Smokeless tobacco: Never Used   Substance Use Topics     Alcohol use: No     Drug use: No        Review of Systems       She has not had cough, has a little soreness in chest with deep breath only. She has not had any loss of taste or smell, no cough, no sore throat, dental infection, sinus symptoms. No dyspnea She had no nausea, abdominal pain, diarrhea, urinary burning.     Objective           Vitals:  No vitals were obtained today due to virtual visit.    Physical Exam     Telephone visit so not able to do exam.   No respiratory distress              Assessment & Plan     Fever and chills  Advised that her symptoms are likely to be viral infection. Advised she could still have COVID as saliva tests may have more false negatives. It is not likely influenza as there are very few cases in [place] yet. Advised about symptomatic treatments including tylenol, nsaid. Advised if worsening, persistent fever she would need to be evaluated in person, may need another COVID test.   She was asking for treatment but advised that there isn't anything that suggests bacterial infection at this time so there is no treatment.         Return in about 2 months (around 2/7/2021) for Physical Exam with Dr. Michelle.    Lou Hampton MD  [facility]    Telephone visit duration: 14 minutes

## 2020-12-11 ENCOUNTER — OFFICE VISIT (OUTPATIENT)
Dept: INTERNAL MEDICINE | Facility: CLINIC | Age: 65
End: 2020-12-11
Payer: COMMERCIAL

## 2020-12-11 VITALS
HEIGHT: 63 IN | RESPIRATION RATE: 16 BRPM | SYSTOLIC BLOOD PRESSURE: 130 MMHG | DIASTOLIC BLOOD PRESSURE: 80 MMHG | WEIGHT: 218.5 LBS | TEMPERATURE: 98.5 F | HEART RATE: 80 BPM | BODY MASS INDEX: 38.71 KG/M2 | OXYGEN SATURATION: 95 %

## 2020-12-11 DIAGNOSIS — I10 ESSENTIAL HYPERTENSION: ICD-10-CM

## 2020-12-11 DIAGNOSIS — J30.9 ALLERGIC RHINITIS, UNSPECIFIED SEASONALITY, UNSPECIFIED TRIGGER: ICD-10-CM

## 2020-12-11 DIAGNOSIS — E03.9 HYPOTHYROIDISM, UNSPECIFIED TYPE: ICD-10-CM

## 2020-12-11 DIAGNOSIS — E66.01 MORBID OBESITY WITH BMI OF 40.0-44.9, ADULT (H): ICD-10-CM

## 2020-12-11 DIAGNOSIS — Z00.00 ROUTINE HISTORY AND PHYSICAL EXAMINATION OF ADULT: Primary | ICD-10-CM

## 2020-12-11 DIAGNOSIS — R73.03 PREDIABETES: ICD-10-CM

## 2020-12-11 PROCEDURE — 99396 PREV VISIT EST AGE 40-64: CPT | Performed by: INTERNAL MEDICINE

## 2020-12-11 PROCEDURE — 99213 OFFICE O/P EST LOW 20 MIN: CPT | Mod: 25 | Performed by: INTERNAL MEDICINE

## 2020-12-11 RX ORDER — CETIRIZINE HYDROCHLORIDE 10 MG/1
10 TABLET ORAL DAILY
Qty: 90 TABLET | Refills: 0 | Status: SHIPPED | OUTPATIENT
Start: 2020-12-11 | End: 2021-04-27

## 2020-12-11 ASSESSMENT — ENCOUNTER SYMPTOMS
CHILLS: 0
EYES NEGATIVE: 1
CONSTIPATION: 0
HEMATURIA: 0
PSYCHIATRIC NEGATIVE: 1
DIARRHEA: 0
HEMATOCHEZIA: 0
ABDOMINAL PAIN: 0
MUSCULOSKELETAL NEGATIVE: 1
NEUROLOGICAL NEGATIVE: 1
COUGH: 0

## 2020-12-11 ASSESSMENT — MIFFLIN-ST. JEOR: SCORE: 1515.24

## 2020-12-11 NOTE — PROGRESS NOTES
SUBJECTIVE:   CC: Ramona Ferrer is an 65 year old woman who presents for preventive health visit.       Patient has been advised of split billing requirements and indicates understanding: Yes  Healthy Habits:     Getting at least 3 servings of Calcium per day:  Yes    Bi-annual eye exam:  Yes    Dental care twice a year:  Yes    Sleep apnea or symptoms of sleep apnea:  None    Diet:  Low fat/cholesterol    Frequency of exercise:  1 day/week    Duration of exercise:  15-30 minutes    Taking medications regularly:  Yes    Medication side effects:  None and Other    PHQ-2 Total Score: 0    Additional concerns today:  No     Hypertension Follow-up      Do you check your blood pressure regularly outside of the clinic? Yes home /80, pt says she did not take her med this am .      Are you following a low salt diet? Yes    Are your blood pressures ever more than 140 on the top number (systolic) OR more   than 90 on the bottom number (diastolic), for example 140/90? No      Hypothyroidism Follow-up  Since last visit, patient describes the following symptoms: Weight gain no hair loss, no skin changes, no constipation, no loose stools    Allergic rhinitis, on allegra and Flonase, pt is requesting  Cetrizine     Prediabetics; on metformin     Pt had chills and muscle aches on 12/07/20 , had negative saliva covid test per pt , and symptoms have resolved since.    Patient was seeing a weight management clinic in [place] and was on phentermine but patient has stopped going to the clinic and has stopped taking phentermine, currently on metformin.      Today's PHQ-2 Score:   PHQ-2 ( 1999 Pfizer) 12/11/2020   Q1: Little interest or pleasure in doing things 0   Q2: Feeling down, depressed or hopeless 0   PHQ-2 Score 0   Q1: Little interest or pleasure in doing things Not at all   Q2: Feeling down, depressed or hopeless Not at all   PHQ-2 Score 0       Abuse: Current or Past (Physical, Sexual or Emotional) - No  Do you feel  safe in your environment? Yes      Past Medical History:   Diagnosis Date     Complication of anesthesia      DDD (degenerative disc disease), lumbar      Endometriosis      Hypothyroidism           Seasonal allergies      Spinal stenosis, lumbar        Past Surgical History:   Procedure Laterality Date     C TOTAL ABDOM HYSTERECTOMY  2003    MARYA BSO for stage 4 endometriosis     COLONOSCOPY       COLONOSCOPY  2/20/2012    Procedure:COLONOSCOPY; COLONOSCOPY ; Surgeon:ARUN KITCHEN; Location: GI     COLONOSCOPY N/A 2/15/2019    Procedure: COMBINED COLONOSCOPY, SINGLE OR MULTIPLE BIOPSY/POLYPECTOMY BY BIOPSY;  Surgeon: Connor Sanderson MD;  Location:  GI     HC CYSTOURETHROSCOPY W/ URETEROSCOPY &/OR PYELOSCOPY; W/ LITHOTRIPSY       HC TRABECULOPLASTY BY LASER SURGERY       HYSTERECTOMY, PAP NO LONGER INDICATED       LASER YAG IRIDOTOMY  8/8/2012    Procedure: LASER YAG IRIDOTOMY;  LEFT EYE YAG LASER PUPIL IRIDOTOMY ;  Surgeon: Dakotah Humphreys MD;  Location:  [facility]     LIGATN/STRIP LONG OR SHORT SAPHEN      x's2     PELVIS LAPAROSCOPY,DX       STRABISMUS SURGERY         Current Outpatient Medications   Medication Sig Dispense Refill     acetaminophen (TYLENOL) 325 MG tablet Take  by mouth every 4 hours as needed for pain. 100 tablet 0     Artificial Tear Solution (SOOTHE XP) SOLN Apply 1 drop to eye 3 times daily 1 Bottle 11     Ascorbic Acid (VITAMIN C) 500 MG CAPS        aspirin-acetaminophen-caffeine (EXCEDRIN MIGRAINE) 250-250-65 MG per tablet Take 1 tablet by mouth every 6 hours as needed for pain. 30 tablet 0     cetirizine (ZYRTEC) 10 MG tablet Take 1 tablet (10 mg) by mouth daily 90 tablet 0     doxycycline monohydrate (MONODOX) 50 MG capsule Take 1 capsule by mouth 2 times daily for 1 month then once daily for 2 months. 60 capsule 2     fluticasone (FLONASE) 50 MCG/ACT nasal spray Spray 2 sprays into both nostrils daily. 1 Package 10     levothyroxine (SYNTHROID/LEVOTHROID) 100 MCG tablet TAKE 1 TABLET (100  MCG) BY MOUTH DAILY 90 tablet 1     lisinopril (ZESTRIL) 10 MG tablet Take 1 tablet (10 mg) by mouth daily  90 tablet 0     metFORMIN (GLUCOPHAGE-XR) 500 MG 24 hr tablet 1 tablet with dinner daily for 2 weeks then 2 tablets with dinner 180 tablet 1     Multiple Vitamins-Minerals (MULTIVITAMIN ADULT PO)        olopatadine (PATANOL) 0.1 % ophthalmic solution One drop, both eyes during the spring 5 mL 6     Omega-3 Fatty Acids (FISH OIL) 500 MG CAPS Take by mouth daily       ORDER FOR DME Provent nasal EPAP  Use over nostrils nightly.   30 each 0     phentermine (ADIPEX-P) 37.5 MG tablet Take 1 tablet every morning with breakfast. 90 tablet 0     VITAMIN D, CHOLECALCIFEROL, PO Take by mouth daily         Family History   Problem Relation Age of Onset     Diabetes Mother      Thyroid Disease Mother      Hypertension Mother      Cancer Father          of stomach CA     Heart Disease Father         MI at age 58     Glaucoma No family hx of      Macular Degeneration No family hx of        Social History     Tobacco Use     Smoking status: Never Smoker     Smokeless tobacco: Never Used   Substance Use Topics     Alcohol use: No         Alcohol Use 2020   Prescreen: >3 drinks/day or >7 drinks/week? No   Prescreen: >3 drinks/day or >7 drinks/week? -       Reviewed orders with patient.  Reviewed health maintenance and updated orders accordingly - Yes     Pertinent mammograms are reviewed under the imaging tab.  History of abnormal Pap smear: Status post benign hysterectomy. Health Maintenance and Surgical History updated.  PAP / HPV 2008   PAP NIL     Reviewed and updated as needed this visit by clinical staff                 Reviewed and updated as needed this visit by Provider                    Review of Systems   Constitutional: Negative for chills.   HENT: Negative for congestion.    Eyes: Negative.    Respiratory: Negative for cough.    Cardiovascular: Negative for chest pain.   Gastrointestinal: Negative  "for abdominal pain, constipation, diarrhea and hematochezia.   Endocrine:        Thyroid dz   Breasts:  negative.    Genitourinary: Negative for hematuria.   Musculoskeletal: Negative.    Allergic/Immunologic: Positive for environmental allergies.   Neurological: Negative.    Psychiatric/Behavioral: Negative.          OBJECTIVE:   /80   Pulse 80   Temp 98.5  F (36.9  C) (Oral)   Resp 16   Ht 1.6 m (5' 3\")   Wt 99.1 kg (218 lb 8 oz)   SpO2 95%   BMI 38.71 kg/m    Physical Exam  GENERAL: healthy, alert and no distress  EYES: Eyes grossly normal to inspection, PERRL and conjunctivae and sclerae normal  NECK: no adenopathy, no asymmetry, masses, or scars and thyroid normal to palpation  RESP: lungs clear to auscultation - no rales, rhonchi or wheezes  BREAST: normal without masses, tenderness or nipple discharge and no palpable axillary masses or adenopathy  CV: regular rate and rhythm, normal S1 S2, no S3 or S4, no murmur, click or rub, no peripheral edema and peripheral pulses strong  ABDOMEN: soft, nontender, no hepatosplenomegaly, no masses and bowel sounds normal  MS: no gross musculoskeletal defects noted, no edema  NEURO: Normal strength and tone, mentation intact and speech normal  PSYCH: mentation appears normal, affect normal/bright      ASSESSMENT/PLAN:         (Z00.00) Routine history and physical examination of adult  (primary encounter diagnosis)  Plan: MA Screening Digital Bilateral, Hemoglobin,         Comprehensive metabolic panel, Lipid panel         reflex to direct LDL Fasting            (R73.03) Prediabetes  Plan: on metformin through wt loss clinic , check  Hemoglobin A1c            (E66.01,  Z68.41) Morbid obesity with BMI of 40.0-44.9, adult (H)  Plan:  Discussed in detail about Diet,calorie intake,and importance of regular exercise, Patient was seeing a weight management clinic in [place] and was on phentermine but patient has stopped going to the clinic and has stopped taking " "phentermine, currently on metformin.      (E03.9) Hypothyroidism, unspecified type  Plan: on levoxyl 100 mcg daily , check  TSH with free T4 reflex, adjust dose after results.             (I10) Essential hypertension  Plan: continue lisinopril.Advised to follow low salt diet and exercise and f/u in 6 mths.       (J30.9) Allergic rhinitis, unspecified seasonality, unspecified trigger  Plan: prescribed  cetirizine (ZYRTEC) 10 MG tablet as directed.explained clearly about the medication,insructions and side effects.       Patient has been advised of split billing requirements and indicates understanding: Yes  COUNSELING:  Reviewed preventive health counseling, as reflected in patient instructions       Regular exercise       Healthy diet/nutrition       Immunizations    Declined: Influenza          Estimated body mass index is 38.71 kg/m  as calculated from the following:    Height as of this encounter: 1.6 m (5' 3\").    Weight as of this encounter: 99.1 kg (218 lb 8 oz).    Weight management plan: Discussed healthy diet and exercise guidelines    She reports that she has never smoked. She has never used smokeless tobacco.      Counseling Resources:  ATP IV Guidelines  Pooled Cohorts Equation Calculator  Breast Cancer Risk Calculator  BRCA-Related Cancer Risk Assessment: FHS-7 Tool  FRAX Risk Assessment  ICSI Preventive Guidelines  Dietary Guidelines for Americans, 2010  USDA's MyPlate  ASA Prophylaxis  Lung CA Screening    Eugene Michelle MD  [facility]  "

## 2020-12-11 NOTE — NURSING NOTE
"BP (!) 142/88   Pulse 80   Temp 98.5  F (36.9  C) (Oral)   Resp 16   Ht 1.6 m (5' 3\")   Wt 99.1 kg (218 lb 8 oz)   SpO2 95%   BMI 38.71 kg/m    Patient in for Female Px.  Analisa Pantoja CMA    "

## 2020-12-21 DIAGNOSIS — Z00.00 ROUTINE HISTORY AND PHYSICAL EXAMINATION OF ADULT: ICD-10-CM

## 2020-12-21 DIAGNOSIS — R73.03 PREDIABETES: ICD-10-CM

## 2020-12-21 DIAGNOSIS — E03.9 HYPOTHYROIDISM, UNSPECIFIED TYPE: ICD-10-CM

## 2020-12-21 LAB
HBA1C MFR BLD: 5.9 % (ref 0–5.6)
HGB BLD-MCNC: 13.4 G/DL (ref 11.7–15.7)

## 2020-12-21 PROCEDURE — 36415 COLL VENOUS BLD VENIPUNCTURE: CPT | Performed by: INTERNAL MEDICINE

## 2020-12-21 PROCEDURE — 80061 LIPID PANEL: CPT | Performed by: INTERNAL MEDICINE

## 2020-12-21 PROCEDURE — 84439 ASSAY OF FREE THYROXINE: CPT | Performed by: INTERNAL MEDICINE

## 2020-12-21 PROCEDURE — 80053 COMPREHEN METABOLIC PANEL: CPT | Performed by: INTERNAL MEDICINE

## 2020-12-21 PROCEDURE — 85018 HEMOGLOBIN: CPT | Performed by: INTERNAL MEDICINE

## 2020-12-21 PROCEDURE — 84443 ASSAY THYROID STIM HORMONE: CPT | Performed by: INTERNAL MEDICINE

## 2020-12-21 PROCEDURE — 83036 HEMOGLOBIN GLYCOSYLATED A1C: CPT | Performed by: INTERNAL MEDICINE

## 2020-12-22 LAB
ALBUMIN SERPL-MCNC: 3.3 G/DL (ref 3.4–5)
ALP SERPL-CCNC: 74 U/L (ref 40–150)
ALT SERPL W P-5'-P-CCNC: 29 U/L (ref 0–50)
ANION GAP SERPL CALCULATED.3IONS-SCNC: 4 MMOL/L (ref 3–14)
AST SERPL W P-5'-P-CCNC: 24 U/L (ref 0–45)
BILIRUB SERPL-MCNC: 0.4 MG/DL (ref 0.2–1.3)
BUN SERPL-MCNC: 13 MG/DL (ref 7–30)
CALCIUM SERPL-MCNC: 9.1 MG/DL (ref 8.5–10.1)
CHLORIDE SERPL-SCNC: 108 MMOL/L (ref 94–109)
CHOLEST SERPL-MCNC: 179 MG/DL
CO2 SERPL-SCNC: 27 MMOL/L (ref 20–32)
CREAT SERPL-MCNC: 0.68 MG/DL (ref 0.52–1.04)
GFR SERPL CREATININE-BSD FRML MDRD: >90 ML/MIN/{1.73_M2}
GLUCOSE SERPL-MCNC: 102 MG/DL (ref 70–99)
HDLC SERPL-MCNC: 94 MG/DL
LDLC SERPL CALC-MCNC: 71 MG/DL
NONHDLC SERPL-MCNC: 85 MG/DL
POTASSIUM SERPL-SCNC: 4.3 MMOL/L (ref 3.4–5.3)
PROT SERPL-MCNC: 7.3 G/DL (ref 6.8–8.8)
SODIUM SERPL-SCNC: 139 MMOL/L (ref 133–144)
T4 FREE SERPL-MCNC: 0.75 NG/DL (ref 0.76–1.46)
TRIGL SERPL-MCNC: 68 MG/DL
TSH SERPL DL<=0.005 MIU/L-ACNC: 8.67 MU/L (ref 0.4–4)

## 2020-12-27 RX ORDER — LEVOTHYROXINE SODIUM 112 UG/1
112 TABLET ORAL DAILY
Qty: 90 TABLET | Refills: 0 | Status: SHIPPED | OUTPATIENT
Start: 2020-12-27 | End: 2021-06-05

## 2021-01-08 ENCOUNTER — HOSPITAL ENCOUNTER (OUTPATIENT)
Dept: MAMMOGRAPHY | Facility: CLINIC | Age: 66
Discharge: HOME | End: 2021-01-08
Attending: INTERNAL MEDICINE | Admitting: INTERNAL MEDICINE
Payer: COMMERCIAL

## 2021-01-08 DIAGNOSIS — Z00.00 ROUTINE HISTORY AND PHYSICAL EXAMINATION OF ADULT: ICD-10-CM

## 2021-01-08 PROCEDURE — 77067 SCR MAMMO BI INCL CAD: CPT

## 2021-03-23 ENCOUNTER — OFFICE VISIT (OUTPATIENT)
Dept: OPTOMETRY | Facility: CLINIC | Age: 66
End: 2021-03-23
Payer: COMMERCIAL

## 2021-03-23 DIAGNOSIS — H10.13 ALLERGIC CONJUNCTIVITIS, BILATERAL: Primary | ICD-10-CM

## 2021-03-23 PROCEDURE — 99203 OFFICE O/P NEW LOW 30 MIN: CPT | Performed by: OPTOMETRIST

## 2021-03-23 RX ORDER — PREDNISOLONE ACETATE 10 MG/ML
1-2 SUSPENSION/ DROPS OPHTHALMIC 4 TIMES DAILY
Qty: 10 ML | Refills: 0 | Status: SHIPPED | OUTPATIENT
Start: 2021-03-23 | End: 2021-04-02

## 2021-03-23 RX ORDER — PREDNISOLONE ACETATE 10 MG/ML
1-2 SUSPENSION/ DROPS OPHTHALMIC 4 TIMES DAILY
COMMUNITY
End: 2021-03-23

## 2021-03-23 ASSESSMENT — EXTERNAL EXAM - LEFT EYE: OS_EXAM: NORMAL

## 2021-03-23 ASSESSMENT — VISUAL ACUITY
CORRECTION_TYPE: GLASSES
OS_CC+: -2
OD_CC: 20/40
OD_CC+: -2
OS_CC: 20/25
METHOD: SNELLEN - LINEAR

## 2021-03-23 ASSESSMENT — EXTERNAL EXAM - RIGHT EYE: OD_EXAM: NORMAL

## 2021-03-23 ASSESSMENT — TONOMETRY
IOP_METHOD: APPLANATION
OD_IOP_MMHG: 15

## 2021-03-23 NOTE — LETTER
3/23/2021         RE: Ramona Ferrer  [address]  [place] [address]        Dear Colleague,    Thank you for referring your patient, Ramona Ferrer, to the [facility]. Please see a copy of my visit note below.    Chief Complaint   Patient presents with     Eye Problem Both Eyes       HPI    Eye Problem Both Eyes      In both eyes. Onset was sudden. This started 2 weeks ago. Charactertized as blurred vision. Severity is severe. Occurring constantly. It is worse throughout the day. Since onset it is gradually worsening. Associated symptoms include eye pain, redness, tearing, previous episodes, and itching. Treatments tried include eye drops, warm compresses, and artificial tears.   Comments      History of this happening March- September  Using drops: Systane on a daily basis, Clear Eyes sometimes, and Prednisolone (needs refill)  Prednisolone is the only thing that works.   Vision seems blurry, eyes water when looking at the computer             Soledad Vazquez CPO    See Review Of Systems     She has allergies year round, patanol no longer works,   Does not use cold compresses    Needs PF every fall   Medical, surgical and family histories reviewed and updated 3/23/2021.         OBJECTIVE: See Ophthalmology exam    ASSESSMENT:    ICD-10-CM    1. Allergic conjunctivitis, bilateral  H10.13 prednisoLONE acetate (PRED FORTE) 1 % ophthalmic suspension     ketotifen (ZADITOR) 0.025 % ophthalmic solution      PLAN:  If needed longer than 2 weeks patient told to return to clinic for IOP check  Try Zaditor two times daily after 10-14 days of PF  Cold compresses also can be added     Ana Ludwig OD         Again, thank you for allowing me to participate in the care of your patient.        Sincerely,        Ana Ludwig, OD

## 2021-03-23 NOTE — PROGRESS NOTES
Chief Complaint   Patient presents with     Eye Problem Both Eyes       HPI    Eye Problem Both Eyes      In both eyes. Onset was sudden. This started 2 weeks ago. Charactertized as blurred vision. Severity is severe. Occurring constantly. It is worse throughout the day. Since onset it is gradually worsening. Associated symptoms include eye pain, redness, tearing, previous episodes, and itching. Treatments tried include eye drops, warm compresses, and artificial tears.   Comments      History of this happening March- September  Using drops: Systane on a daily basis, Clear Eyes sometimes, and Prednisolone (needs refill)  Prednisolone is the only thing that works.   Vision seems blurry, eyes water when looking at the computer             Soledad Vazquez CPO    See Review Of Systems     She has allergies year round, patanol no longer works,   Does not use cold compresses    Needs PF every fall   Medical, surgical and family histories reviewed and updated 3/23/2021.         OBJECTIVE: See Ophthalmology exam    ASSESSMENT:    ICD-10-CM    1. Allergic conjunctivitis, bilateral  H10.13 prednisoLONE acetate (PRED FORTE) 1 % ophthalmic suspension     ketotifen (ZADITOR) 0.025 % ophthalmic solution      PLAN:  If needed longer than 2 weeks patient told to return to clinic for IOP check  Try Zaditor two times daily after 10-14 days of PF  Cold compresses also can be added     Ana Ludwig OD

## 2021-03-25 RX ORDER — OLOPATADINE HYDROCHLORIDE 2 MG/ML
1 SOLUTION/ DROPS OPHTHALMIC DAILY
Qty: 5 ML | Refills: 11 | Status: SHIPPED | OUTPATIENT
Start: 2021-03-25 | End: 2022-01-06 | Stop reason: ALTCHOICE

## 2021-04-23 DIAGNOSIS — J30.9 ALLERGIC RHINITIS, UNSPECIFIED SEASONALITY, UNSPECIFIED TRIGGER: ICD-10-CM

## 2021-04-27 RX ORDER — CETIRIZINE HYDROCHLORIDE 10 MG/1
10 TABLET ORAL DAILY
Qty: 90 TABLET | Refills: 1 | Status: SHIPPED | OUTPATIENT
Start: 2021-04-27 | End: 2022-06-06

## 2021-04-27 NOTE — TELEPHONE ENCOUNTER
Pending Prescriptions:                       Disp   Refills    cetirizine (ZYRTEC) 10 MG tablet [Pharmac*90 tab*1            Sig: Take 1 tablet (10 mg) by mouth daily    Prescription approved per [facility] Refill Protocol.

## 2021-05-13 DIAGNOSIS — I10 BENIGN ESSENTIAL HYPERTENSION: ICD-10-CM

## 2021-05-14 RX ORDER — LISINOPRIL 10 MG/1
10 TABLET ORAL DAILY
Qty: 30 TABLET | Refills: 0 | Status: SHIPPED | OUTPATIENT
Start: 2021-05-14 | End: 2021-06-04

## 2021-05-14 NOTE — TELEPHONE ENCOUNTER
Pending Prescriptions:                       Disp   Refills    lisinopril (ZESTRIL) 10 MG tablet [Pharmac*90 tab*0        Sig: Take 1 tablet (10 mg) by mouth daily     Routing refill request to provider for review/approval because:  A break in medication

## 2021-05-14 NOTE — TELEPHONE ENCOUNTER
Patient is due for clinic visit next month, medication filled for 1 month please advise patient to make a clinic visit appointment in June 2021

## 2021-06-03 DIAGNOSIS — E03.9 HYPOTHYROIDISM, UNSPECIFIED TYPE: ICD-10-CM

## 2021-06-04 ENCOUNTER — OFFICE VISIT (OUTPATIENT)
Dept: INTERNAL MEDICINE | Facility: CLINIC | Age: 66
End: 2021-06-04
Payer: COMMERCIAL

## 2021-06-04 VITALS
WEIGHT: 229.5 LBS | DIASTOLIC BLOOD PRESSURE: 80 MMHG | HEIGHT: 63 IN | OXYGEN SATURATION: 95 % | RESPIRATION RATE: 14 BRPM | SYSTOLIC BLOOD PRESSURE: 132 MMHG | TEMPERATURE: 97.9 F | HEART RATE: 94 BPM | BODY MASS INDEX: 40.66 KG/M2

## 2021-06-04 DIAGNOSIS — E03.9 HYPOTHYROIDISM, UNSPECIFIED TYPE: ICD-10-CM

## 2021-06-04 DIAGNOSIS — I10 ESSENTIAL HYPERTENSION: Primary | ICD-10-CM

## 2021-06-04 DIAGNOSIS — E66.01 MORBID OBESITY WITH BMI OF 40.0-44.9, ADULT (H): ICD-10-CM

## 2021-06-04 DIAGNOSIS — K29.70 GASTRITIS, PRESENCE OF BLEEDING UNSPECIFIED, UNSPECIFIED CHRONICITY, UNSPECIFIED GASTRITIS TYPE: ICD-10-CM

## 2021-06-04 DIAGNOSIS — R73.03 PREDIABETES: ICD-10-CM

## 2021-06-04 LAB — HBA1C MFR BLD: 6.3 % (ref 0–5.6)

## 2021-06-04 PROCEDURE — 84443 ASSAY THYROID STIM HORMONE: CPT | Performed by: INTERNAL MEDICINE

## 2021-06-04 PROCEDURE — 99214 OFFICE O/P EST MOD 30 MIN: CPT | Performed by: INTERNAL MEDICINE

## 2021-06-04 PROCEDURE — 80053 COMPREHEN METABOLIC PANEL: CPT | Performed by: INTERNAL MEDICINE

## 2021-06-04 PROCEDURE — 83036 HEMOGLOBIN GLYCOSYLATED A1C: CPT | Performed by: INTERNAL MEDICINE

## 2021-06-04 PROCEDURE — 36415 COLL VENOUS BLD VENIPUNCTURE: CPT | Performed by: INTERNAL MEDICINE

## 2021-06-04 RX ORDER — LISINOPRIL 10 MG/1
10 TABLET ORAL DAILY
Qty: 90 TABLET | Refills: 1 | Status: SHIPPED | OUTPATIENT
Start: 2021-06-04 | End: 2021-12-16

## 2021-06-04 RX ORDER — OMEPRAZOLE 40 MG/1
40 CAPSULE, DELAYED RELEASE ORAL DAILY
Qty: 60 CAPSULE | Refills: 0 | Status: SHIPPED | OUTPATIENT
Start: 2021-06-04 | End: 2021-10-14

## 2021-06-04 RX ORDER — METFORMIN HCL 500 MG
500 TABLET, EXTENDED RELEASE 24 HR ORAL 2 TIMES DAILY WITH MEALS
Qty: 180 TABLET | Refills: 1 | Status: SHIPPED | OUTPATIENT
Start: 2021-06-04 | End: 2021-12-16

## 2021-06-04 ASSESSMENT — MIFFLIN-ST. JEOR: SCORE: 1565.14

## 2021-06-04 NOTE — TELEPHONE ENCOUNTER
Pending Prescriptions:                       Disp   Refills    levothyroxine (SYNTHROID/LEVOTHROID) 112 M*90 tab*0        Sig: Take 1 tablet (112 mcg) by mouth daily    Routing refill request to provider for review/approval because:  A break in medication  TSH   Date Value Ref Range Status   12/21/2020 8.67 (H) 0.40 - 4.00 mU/L Final

## 2021-06-04 NOTE — PROGRESS NOTES
"    Assessment & Plan     (I10) Essential hypertension  (primary encounter diagnosis)  Comment: Blood pressure stable  Plan: Comprehensive metabolic panel, refilled lisinopril (ZESTRIL) 10 MG tablet as directed.explained clearly about the medication,insructions and side effects.Advised to follow low salt diet and exercise and f/u in 6 mths.        (E66.01,  Z68.41) Morbid obesity with BMI of 40.0-44.9, adult (H)  Plan: Patient was seeing weight loss clinic and has been started on Metformin. -Discussed in detail about Diet,calorie intake,and importance of regular exercise,       (E03.9) Hypothyroidism, unspecified type  Plan: Currently on Levoxyl 112 mcg daily, check TSH with free T4 reflex and adjust Levoxyl dose after results          (R73.03) Prediabetes  Plan: Check Hemoglobin A1c, continue to follow ADA diet regular exercise, refilled metFORMIN (GLUCOPHAGE-XR) 500         MG 24 hr tablet.explained clearly about the medication,insructions and side effects.            (K29.70) Gastritis, presence of bleeding unspecified, unspecified chronicity, unspecified gastritis type  Plan: Discussed the etiology of Gastritis /GERD with patient. Discussed raising the head of the bed 4 to 6 inches; avoiding chocolate, coffee, peppermint, fruit juices, tomatoes, NSAID's, greasy and spicy foods.   Started on omeprazole (PRILOSEC) 40 MG DR capsule as directed.explained clearly about the medication,insructions and side effects. Call or return to clinic prn if these symtoms worsen, fail to improve as anticipated, or if new symptoms develop.        Patient states she does not check my chart messages and would like to deactivate my chart.  Deactivated my chart per patient's request    Review of the result(s) of each unique test - A1c, TSH , CMP  Prescription drug management       BMI:   Estimated body mass index is 40.65 kg/m  as calculated from the following:    Height as of this encounter: 1.6 m (5' 3\").    Weight as of this " encounter: 104.1 kg (229 lb 8 oz).   Weight management plan: Discussed healthy diet and exercise guidelines    Return in about 6 months (around 12/4/2021).    Eugeen Michelle MD  [facility] is a 65 year [facility] who presents for the following health issues     HPI     Hypertension Follow-up      Do you check your blood pressure regularly outside of the clinic? Yes     Are you following a low salt diet? Yes    Are your blood pressures ever more than 140 on the top number (systolic) OR more   than 90 on the bottom number (diastolic), for example 140/90? No    Hypothyroidism Follow-up      Since last visit, patient describes the following symptoms: Weight up, no hair loss, no skin changes, no constipation, no loose stools    Prediabetes, on ADA diet and exercise and has been started on Metformin 500 mg 1 tablet twice daily by weight loss clinic last year.    Morbid obesity; has been to weight loss clinic and on Metformin and requesting refills    Pt also c/o upper abd pain on and off since 4 months also  c/o heart burn, reflux, , belching, burping, no nausea/vomiting,         How many servings of fruits and vegetables do you eat daily?  2-3    On average, how many sweetened beverages do you drink each day (Examples: soda, juice, sweet tea, etc.  Do NOT count diet or artificially sweetened beverages)?   1    How many days per week do you exercise enough to make your heart beat faster? 3 or less    How many minutes a day do you exercise enough to make your heart beat faster? 9 or less    How many days per week do you miss taking your medication? 0    Past Medical History:   Diagnosis Date     Complication of anesthesia      DDD (degenerative disc disease), lumbar      Endometriosis      Hypothyroidism           Seasonal allergies      Spinal stenosis, lumbar        Current Outpatient Medications   Medication Sig Dispense Refill     acetaminophen (TYLENOL) 325 MG  tablet Take  by mouth every 4 hours as needed for pain. 100 tablet 0     Artificial Tear Solution (SOOTHE XP) SOLN Apply 1 drop to eye 3 times daily 1 Bottle 11     Ascorbic Acid (VITAMIN C) 500 MG CAPS        aspirin-acetaminophen-caffeine (EXCEDRIN MIGRAINE) 250-250-65 MG per tablet Take 1 tablet by mouth every 6 hours as needed for pain. 30 tablet 0     cetirizine (ZYRTEC) 10 MG tablet Take 1 tablet (10 mg) by mouth daily 90 tablet 1     doxycycline monohydrate (MONODOX) 50 MG capsule Take 1 capsule by mouth 2 times daily for 1 month then once daily for 2 months. 60 capsule 2     fluticasone (FLONASE) 50 MCG/ACT nasal spray Spray 2 sprays into both nostrils daily. 1 Package 10     levothyroxine (SYNTHROID/LEVOTHROID) 112 MCG tablet Take 1 tablet (112 mcg) by mouth daily 90 tablet 0     lisinopril (ZESTRIL) 10 MG tablet Take 1 tablet (10 mg) by mouth daily 90 tablet 1     metFORMIN (GLUCOPHAGE-XR) 500 MG 24 hr tablet Take 1 tablet (500 mg) by mouth 2 times daily (with meals) 1 tablet with dinner daily for 2 weeks then 2 tablets with dinner 180 tablet 1     Multiple Vitamins-Minerals (MULTIVITAMIN ADULT PO)        olopatadine (PATADAY) 0.2 % ophthalmic solution Place 1 drop into both eyes daily 5 mL 11     olopatadine (PATANOL) 0.1 % ophthalmic solution One drop, both eyes during the spring 5 mL 6     Omega-3 Fatty Acids (FISH OIL) 500 MG CAPS Take by mouth daily       omeprazole (PRILOSEC) 40 MG DR capsule Take 1 capsule (40 mg) by mouth daily 60 capsule 0     ORDER FOR DME Provent nasal EPAP  Use over nostrils nightly.   30 each 0     VITAMIN D, CHOLECALCIFEROL, PO Take by mouth daily           Review of Systems   CONSTITUTIONAL: NEGATIVE for fever, chills, change in weight  EYES: NEGATIVE for vision changes or irritation  RESP: NEGATIVE for significant cough or SOB  CV: NEGATIVE for chest pain, palpitations or peripheral edema  GI; upper abd pain   MUSCULOSKELETAL: NEGATIVE for significant arthralgias or  "myalgia  NEURO: NEGATIVE for weakness, dizziness or paresthesias  ENDOCRINE: NEGATIVE for temperature intolerance, skin/hair changes  PSYCHIATRIC: NEGATIVE for changes in mood or affect      Objective    /80   Pulse 94   Temp 97.9  F (36.6  C) (Oral)   Resp 14   Ht 1.6 m (5' 3\")   Wt 104.1 kg (229 lb 8 oz)   SpO2 95%   BMI 40.65 kg/m    Body mass index is 40.65 kg/m .  Physical Exam   GENERAL: healthy, alert and no distress  EYES: Eyes grossly normal to inspection, PERRL and conjunctivae and sclerae normal  NECK: no adenopathy, no asymmetry, masses, or scars and thyroid normal to palpation  RESP: lungs clear to auscultation - no rales, rhonchi or wheezes  CV: regular rate and rhythm,    ABDOMEN: soft, epigastric tenderness present and bowel sounds normal  MS: Varicose veins noted, no edema, no calf tenderness  NEURO: Normal strength and tone, mentation intact and speech normal  PSYCH: mentation appears normal, affect normal/bright               "

## 2021-06-04 NOTE — NURSING NOTE
"/80   Pulse 94   Temp 97.9  F (36.6  C) (Oral)   Resp 14   Ht 1.6 m (5' 3\")   Wt 104.1 kg (229 lb 8 oz)   SpO2 95%   BMI 40.65 kg/m    Patient in for medication follow up.  Analisa Pantoja, KAYLEE    "

## 2021-06-05 LAB
ALBUMIN SERPL-MCNC: 3.5 G/DL (ref 3.4–5)
ALP SERPL-CCNC: 74 U/L (ref 40–150)
ALT SERPL W P-5'-P-CCNC: 52 U/L (ref 0–50)
ANION GAP SERPL CALCULATED.3IONS-SCNC: 2 MMOL/L (ref 3–14)
AST SERPL W P-5'-P-CCNC: 34 U/L (ref 0–45)
BILIRUB SERPL-MCNC: 0.3 MG/DL (ref 0.2–1.3)
BUN SERPL-MCNC: 12 MG/DL (ref 7–30)
CALCIUM SERPL-MCNC: 9.7 MG/DL (ref 8.5–10.1)
CHLORIDE SERPL-SCNC: 105 MMOL/L (ref 94–109)
CO2 SERPL-SCNC: 30 MMOL/L (ref 20–32)
CREAT SERPL-MCNC: 0.74 MG/DL (ref 0.52–1.04)
GFR SERPL CREATININE-BSD FRML MDRD: 85 ML/MIN/{1.73_M2}
GLUCOSE SERPL-MCNC: 91 MG/DL (ref 70–99)
POTASSIUM SERPL-SCNC: 4.2 MMOL/L (ref 3.4–5.3)
PROT SERPL-MCNC: 7.3 G/DL (ref 6.8–8.8)
SODIUM SERPL-SCNC: 137 MMOL/L (ref 133–144)
TSH SERPL DL<=0.005 MIU/L-ACNC: 2.35 MU/L (ref 0.4–4)

## 2021-06-05 RX ORDER — LEVOTHYROXINE SODIUM 112 UG/1
112 TABLET ORAL DAILY
Qty: 90 TABLET | Refills: 3 | Status: SHIPPED | OUTPATIENT
Start: 2021-06-05 | End: 2022-07-15

## 2021-06-07 RX ORDER — LEVOTHYROXINE SODIUM 112 UG/1
112 TABLET ORAL DAILY
Qty: 90 TABLET | Refills: 0 | OUTPATIENT
Start: 2021-06-07

## 2021-06-11 ENCOUNTER — TELEPHONE (OUTPATIENT)
Dept: INTERNAL MEDICINE | Facility: CLINIC | Age: 66
End: 2021-06-11

## 2021-06-11 NOTE — TELEPHONE ENCOUNTER
Reason for call:Patient requesting someone from PCP team call patient back as patient body aches and PCP is out of office today. Patient requesting to discuss on what patient should do.     Phone number to reach patient:  Home number on file [phone] (home)    Best Time:  Any Time     Can we leave a detailed message on this number?  YES

## 2021-06-14 ENCOUNTER — APPOINTMENT (OUTPATIENT)
Dept: ULTRASOUND IMAGING | Facility: CLINIC | Age: 66
End: 2021-06-14
Attending: EMERGENCY MEDICINE
Payer: COMMERCIAL

## 2021-06-14 ENCOUNTER — APPOINTMENT (OUTPATIENT)
Dept: CT IMAGING | Facility: CLINIC | Age: 66
End: 2021-06-14
Attending: EMERGENCY MEDICINE
Payer: COMMERCIAL

## 2021-06-14 ENCOUNTER — OFFICE VISIT (OUTPATIENT)
Dept: URGENT CARE | Facility: URGENT CARE | Age: 66
End: 2021-06-14
Payer: COMMERCIAL

## 2021-06-14 ENCOUNTER — HOSPITAL ENCOUNTER (EMERGENCY)
Facility: CLINIC | Age: 66
Discharge: HOME | End: 2021-06-14
Attending: EMERGENCY MEDICINE | Admitting: EMERGENCY MEDICINE
Payer: COMMERCIAL

## 2021-06-14 VITALS
BODY MASS INDEX: 39.63 KG/M2 | DIASTOLIC BLOOD PRESSURE: 80 MMHG | OXYGEN SATURATION: 96 % | WEIGHT: 223.7 LBS | TEMPERATURE: 98.3 F | RESPIRATION RATE: 16 BRPM | HEART RATE: 73 BPM | SYSTOLIC BLOOD PRESSURE: 130 MMHG

## 2021-06-14 VITALS
OXYGEN SATURATION: 96 % | DIASTOLIC BLOOD PRESSURE: 68 MMHG | SYSTOLIC BLOOD PRESSURE: 117 MMHG | HEART RATE: 73 BPM | TEMPERATURE: 96.8 F | RESPIRATION RATE: 18 BRPM

## 2021-06-14 DIAGNOSIS — R07.9 CHEST PAIN, UNSPECIFIED TYPE: Primary | ICD-10-CM

## 2021-06-14 DIAGNOSIS — M79.10 MYALGIA: ICD-10-CM

## 2021-06-14 DIAGNOSIS — U07.1 2019 NOVEL CORONAVIRUS DISEASE (COVID-19): ICD-10-CM

## 2021-06-14 DIAGNOSIS — R60.0 LEG EDEMA: ICD-10-CM

## 2021-06-14 LAB
ANION GAP SERPL CALCULATED.3IONS-SCNC: 6 MMOL/L (ref 3–14)
BASOPHILS # BLD AUTO: 0 10E9/L (ref 0–0.2)
BASOPHILS NFR BLD AUTO: 1 %
BUN SERPL-MCNC: 8 MG/DL (ref 7–30)
CALCIUM SERPL-MCNC: 8.8 MG/DL (ref 8.5–10.1)
CHLORIDE SERPL-SCNC: 106 MMOL/L (ref 94–109)
CO2 SERPL-SCNC: 24 MMOL/L (ref 20–32)
CREAT SERPL-MCNC: 0.75 MG/DL (ref 0.52–1.04)
D DIMER PPP FEU-MCNC: 0.8 UG/ML FEU (ref 0–0.5)
DIFFERENTIAL METHOD BLD: ABNORMAL
EOSINOPHIL # BLD AUTO: 0 10E9/L (ref 0–0.7)
EOSINOPHIL NFR BLD AUTO: 1 %
ERYTHROCYTE [DISTWIDTH] IN BLOOD BY AUTOMATED COUNT: 13.2 % (ref 10–15)
GFR SERPL CREATININE-BSD FRML MDRD: 83 ML/MIN/{1.73_M2}
GLUCOSE SERPL-MCNC: 86 MG/DL (ref 70–99)
HCT VFR BLD AUTO: 41.3 % (ref 35–47)
HGB BLD-MCNC: 13.4 G/DL (ref 11.7–15.7)
LYMPHOCYTES # BLD AUTO: 1.6 10E9/L (ref 0.8–5.3)
LYMPHOCYTES NFR BLD AUTO: 44 %
MCH RBC QN AUTO: 30 PG (ref 26.5–33)
MCHC RBC AUTO-ENTMCNC: 32.4 G/DL (ref 31.5–36.5)
MCV RBC AUTO: 93 FL (ref 78–100)
MONOCYTES # BLD AUTO: 0.4 10E9/L (ref 0–1.3)
MONOCYTES NFR BLD AUTO: 12 %
NEUTROPHILS # BLD AUTO: 1.7 10E9/L (ref 1.6–8.3)
NEUTROPHILS NFR BLD AUTO: 42 %
PLATELET # BLD AUTO: 180 10E9/L (ref 150–450)
POTASSIUM SERPL-SCNC: 3.7 MMOL/L (ref 3.4–5.3)
RBC # BLD AUTO: 4.46 10E12/L (ref 3.8–5.2)
SODIUM SERPL-SCNC: 136 MMOL/L (ref 133–144)
TROPONIN I SERPL-MCNC: <0.015 UG/L (ref 0–0.04)
WBC # BLD AUTO: 3.7 10E9/L (ref 4–11)

## 2021-06-14 PROCEDURE — 250N000011 HC RX IP 250 OP 636: Performed by: EMERGENCY MEDICINE

## 2021-06-14 PROCEDURE — 71275 CT ANGIOGRAPHY CHEST: CPT | Mod: 26 | Performed by: RADIOLOGY

## 2021-06-14 PROCEDURE — 250N000013 HC RX MED GY IP 250 OP 250 PS 637: Performed by: EMERGENCY MEDICINE

## 2021-06-14 PROCEDURE — 93970 EXTREMITY STUDY: CPT | Mod: 26 | Performed by: RADIOLOGY

## 2021-06-14 PROCEDURE — 93005 ELECTROCARDIOGRAM TRACING: CPT

## 2021-06-14 PROCEDURE — 99207 PR NO CHARGE LOS: CPT | Performed by: PHYSICIAN ASSISTANT

## 2021-06-14 PROCEDURE — 99285 EMERGENCY DEPT VISIT HI MDM: CPT | Mod: 25

## 2021-06-14 PROCEDURE — 36415 COLL VENOUS BLD VENIPUNCTURE: CPT | Performed by: EMERGENCY MEDICINE

## 2021-06-14 PROCEDURE — 85025 COMPLETE CBC W/AUTO DIFF WBC: CPT | Performed by: EMERGENCY MEDICINE

## 2021-06-14 PROCEDURE — 84484 ASSAY OF TROPONIN QUANT: CPT | Performed by: EMERGENCY MEDICINE

## 2021-06-14 PROCEDURE — 71275 CT ANGIOGRAPHY CHEST: CPT

## 2021-06-14 PROCEDURE — 85379 FIBRIN DEGRADATION QUANT: CPT | Performed by: EMERGENCY MEDICINE

## 2021-06-14 PROCEDURE — 80048 BASIC METABOLIC PNL TOTAL CA: CPT | Performed by: EMERGENCY MEDICINE

## 2021-06-14 PROCEDURE — 250N000009 HC RX 250: Performed by: EMERGENCY MEDICINE

## 2021-06-14 PROCEDURE — 93971 EXTREMITY STUDY: CPT

## 2021-06-14 RX ORDER — OXYCODONE HYDROCHLORIDE 5 MG/1
5 TABLET ORAL ONCE
Status: COMPLETED | OUTPATIENT
Start: 2021-06-14 | End: 2021-06-14

## 2021-06-14 RX ORDER — IOPAMIDOL 755 MG/ML
500 INJECTION, SOLUTION INTRAVASCULAR ONCE
Status: COMPLETED | OUTPATIENT
Start: 2021-06-14 | End: 2021-06-14

## 2021-06-14 RX ADMIN — SODIUM CHLORIDE 94 ML: 9 INJECTION, SOLUTION INTRAVENOUS at 18:16

## 2021-06-14 RX ADMIN — IOPAMIDOL 91 ML: 755 INJECTION, SOLUTION INTRAVENOUS at 18:16

## 2021-06-14 RX ADMIN — OXYCODONE HYDROCHLORIDE 5 MG: 5 TABLET ORAL at 17:26

## 2021-06-14 ASSESSMENT — ENCOUNTER SYMPTOMS
NAUSEA: 1
MYALGIAS: 1
VOMITING: 1
FEVER: 0
APPETITE CHANGE: 1
SHORTNESS OF BREATH: 1

## 2021-06-14 NOTE — ED PROVIDER NOTES
History   Chief Complaint:  Generalized Body Aches and Covid Concern       HPI   Ramona Ferrer is a 66 year old female with history of hypertension, LALO, and prediabetes who presents with myalgias, weakness, nausea and vomiting, and loss of appetite. She also complains of intermittent chest pain and shortness of breath. She began experiencing these symptoms 6/5 and tested positive for Covid 6/7. She initially had a fever but has not had one for a few days. She has been taking ibuprofen and acetaminophen with relief. She noted she does have a history of varicose veins and her legs have been more painful lately.    Review of Systems   Constitutional: Positive for appetite change. Negative for fever.   Respiratory: Positive for shortness of breath.    Cardiovascular: Positive for chest pain.   Gastrointestinal: Positive for nausea and vomiting.   Musculoskeletal: Positive for myalgias.   All other systems reviewed and are negative.      Allergies:  Seasonal Allergies    Medications:  Monodox  Levothyroxine  Lisinopril  Metformin  Prilosec     Past Medical History:    DDD  Endometriosis  Hypothyroidism  Spinal stenosis, lumbar   Gastritis  Prediabetes  Morbid obesity  GERD  Hypertension  LALO  Varicose veins      Past Surgical History:    Hysterectomy  Ureteroscopy  Strabismus surgery  Iridotomy   Vein stripping      Family History:    Diabetes - mother  Thyroid - mother  Hypertension - mother  Stomach cancer - father  MI - father    Social History:  Patient is accompanied by their spouse.    Physical Exam     Patient Vitals for the past 24 hrs:   BP Temp Temp src Pulse Resp SpO2   06/14/21 1700 117/68 -- -- 73 -- 96 %   06/14/21 1326 119/74 96.8  F (36  C) Temporal 68 18 97 %       Physical Exam  General: Patient is alert and interactive when I enter the room  Head:  The scalp, face, and head appear normal  Eyes:  Conjunctivae are normal  ENT:    The nose is normal    Pinnae are normal    External acoustic canals  are normal  Neck:  Trachea midline  CV:  Pulses are normal, RRR  Resp:  No respiratory distress, CTAB   Abdomen:      Soft, non-tender, non-distended  Musc:  Normal muscular tone    No major joint effusions    No asymmetric leg swelling  Skin:  No rash or lesions noted  Neuro:  Speech is normal and fluent. Face is symmetric.     Moving all extremities well.   Psych: Awake. Alert.  Normal affect.  Appropriate interactions.      Emergency Department Course   ECG  ECG taken at 1415, ECG read at 1706  Normal sinus rhythm. Minimal voltage criteria for LVH, may be normal variant. Borderline ECG.   Rate 66 bpm. DE interval 176 ms. QRS duration 88 ms. QT/QTc 436/457 ms. P-R-T axes 31 4 28.     Imaging:  US Lower Extremity Venous Duplex Limited Bilateral  1.  No deep venous thrombosis in the bilateral lower extremities.  As read by Radiology.    CT Chest PE w Contrast  1.  No pulmonary embolus.  2.  Diffuse peripheral groundglass opacities consistent with Covid pneumonia.  3.  Mediastinal and hilar lymphadenopathy.  4.  Hepatic steatosis.  As read by Radiology.    Laboratory:  BMP: WNL (creatinine 0.75)   CBC: WBC 3.7(L), HGB 13.4,      D Dimer: 0.8(H)  Troponin (Collected 1658): <0.015    Emergency Department Course:    Reviewed:  I reviewed nursing notes, vitals, past medical history and care everywhere    Assessments:  1633 I obtained history and examined the patient as noted above.   1854 I rechecked the patient and explained findings. I answered all questions prior to discharge.    Interventions:  1726 oxycodone 5 mg PO    Disposition:  The patient was discharged to home.       Impression & Plan   CMS Diagnoses: None    Medical Decision Making:  Ramona Ferrer is a 66 year old female who presents for evaluation of myalgia with known Covid diagnosis.  Is also complaining of leg pain and intermittent shortness of breath so a D-dimer was obtained.  This was mildly elevated.  Ultrasound and CT PE study were  unremarkable.  Blood work was otherwise abnormal.  Suspect this is quality of her Covid diagnosis.  Discussed ibuprofen and Tylenol.  Is not hypoxic and otherwise well-appearing patient discharged    Covid-19  Ramona Ferrer was evaluated during a global COVID-19 pandemic, which necessitated consideration that the patient might be at risk for infection with the SARS-CoV-2 virus that causes COVID-19.   Applicable protocols for evaluation were followed during the patient's care.   COVID-19 was considered as part of the patient's evaluation. The plan for testing is:  a test was obtained at a previous visit and reviewed & considered today    Diagnosis:    ICD-10-CM    1. Myalgia  M79.10    2. 2019 novel coronavirus disease (COVID-19)  U07.1        Scribe Disclosure:  I, Alfredo Alejandro, am serving as a scribe at 4:21 PM on 6/14/2021 to document services personally performed by Shawna Vinson MD based on my observations and the provider's statements to me.              Shawna Vinson MD  06/14/21 3049

## 2021-06-14 NOTE — ED TRIAGE NOTES
Pt with covid diagnosis on June 7th and c/o all over body aches today. Denies vomiting. ABC's intact, alert and oriented X3.

## 2021-06-14 NOTE — TELEPHONE ENCOUNTER
Daughter with patient calling with concerns about her symptoms..  Symptoms started on 6/3 and she was tested for COVID on 6/7.  Test came back positive on 6/8/21.    Pt has been progressively not feeling well.  She states she has severe body aches, headache, vomited twice, constantly nauseated, fever, and concerned for a pneumonia or blood clot in her legs due to varicose veins.      Advised she be seen in urgent care today as there are no openings in the [place], [place], [place], [place], or [place] clinics.    Pt agrees and [facility] staff updated    Sherman Gaffney RN, BSN

## 2021-06-14 NOTE — PATIENT INSTRUCTIONS
Sent to ER for evaluation of chest pain and bilateral leg edema. Diagnosed with COVID last week. Ddx includes pneumonia, PE, CHF, DVT, among others.

## 2021-06-15 LAB — INTERPRETATION ECG - MUSE: NORMAL

## 2021-08-06 ENCOUNTER — OFFICE VISIT (OUTPATIENT)
Dept: INTERNAL MEDICINE | Facility: CLINIC | Age: 66
End: 2021-08-06
Payer: COMMERCIAL

## 2021-08-06 VITALS
DIASTOLIC BLOOD PRESSURE: 78 MMHG | HEART RATE: 87 BPM | TEMPERATURE: 98.6 F | BODY MASS INDEX: 40.26 KG/M2 | SYSTOLIC BLOOD PRESSURE: 122 MMHG | OXYGEN SATURATION: 96 % | HEIGHT: 63 IN | WEIGHT: 227.2 LBS | RESPIRATION RATE: 16 BRPM

## 2021-08-06 DIAGNOSIS — U07.1 COVID-19 VIRUS INFECTION: Primary | ICD-10-CM

## 2021-08-06 DIAGNOSIS — R73.03 PREDIABETES: ICD-10-CM

## 2021-08-06 PROCEDURE — 99213 OFFICE O/P EST LOW 20 MIN: CPT | Performed by: INTERNAL MEDICINE

## 2021-08-06 ASSESSMENT — MIFFLIN-ST. JEOR: SCORE: 1549.7

## 2021-08-06 NOTE — PROGRESS NOTES
Assessment & Plan     (U07.1) COVID-19 virus infection  (primary encounter diagnosis)  Plan: clinically improved, denies any symptoms at this time. Continue to monitor .Call or return to clinic prn if if new symptoms develop.       Return in about 4 months (around 12/13/2021).    Eugene Michelle MD  [facility] CAITLYN Greene is a 66 year old who presents for the following health issues    HPI     ED/UC Followup:    Facility:  [facility]  Date of visit: 6/14/21  Reason for visit: COVID  Current Status: feeling better        Pt is a 66 year old female who is seen here to day to f/u on COVID 19 infection, was diagnosed with COVID-19 infection on 6/7/2021 and was evaluated in ER diagnosed with Covid pneumonia and discharged home.  Patient states her symptoms lasted for 2 weeks and since then she is feeling much better, no shortness of breath, no cough, no fever or chills.    Past Medical History:   Diagnosis Date     Complication of anesthesia      DDD (degenerative disc disease), lumbar      Endometriosis      Hypothyroidism           Seasonal allergies      Spinal stenosis, lumbar      Current Outpatient Medications   Medication Sig Dispense Refill     acetaminophen (TYLENOL) 325 MG tablet Take  by mouth every 4 hours as needed for pain. 100 tablet 0     Artificial Tear Solution (SOOTHE XP) SOLN Apply 1 drop to eye 3 times daily 1 Bottle 11     Ascorbic Acid (VITAMIN C) 500 MG CAPS        aspirin-acetaminophen-caffeine (EXCEDRIN MIGRAINE) 250-250-65 MG per tablet Take 1 tablet by mouth every 6 hours as needed for pain. 30 tablet 0     cetirizine (ZYRTEC) 10 MG tablet Take 1 tablet (10 mg) by mouth daily 90 tablet 1     fluticasone (FLONASE) 50 MCG/ACT nasal spray Spray 2 sprays into both nostrils daily. 1 Package 10     levothyroxine (SYNTHROID/LEVOTHROID) 112 MCG tablet Take 1 tablet (112 mcg) by mouth daily 90 tablet 3     lisinopril (ZESTRIL) 10 MG tablet Take 1 tablet  "(10 mg) by mouth daily 90 tablet 1     metFORMIN (GLUCOPHAGE-XR) 500 MG 24 hr tablet Take 1 tablet (500 mg) by mouth 2 times daily (with meals) 1 tablet with dinner daily for 2 weeks then 2 tablets with dinner 180 tablet 1     Multiple Vitamins-Minerals (MULTIVITAMIN ADULT PO)        olopatadine (PATADAY) 0.2 % ophthalmic solution Place 1 drop into both eyes daily 5 mL 11     Omega-3 Fatty Acids (FISH OIL) 500 MG CAPS Take by mouth daily       omeprazole (PRILOSEC) 40 MG DR capsule Take 1 capsule (40 mg) by mouth daily 60 capsule 0     ORDER FOR DME Provent nasal EPAP  Use over nostrils nightly.   30 each 0     VITAMIN D, CHOLECALCIFEROL, PO Take by mouth daily           Review of Systems   CONSTITUTIONAL: NEGATIVE for fever, chills, change in weight  ENT/MOUTH: NEGATIVE for ear, mouth and throat problems  RESP: NEGATIVE for significant cough or SOB  CV: NEGATIVE for chest pain, palpitations or peripheral edema  MUSCULOSKELETAL: NEGATIVE for significant arthralgias or myalgia  PSYCHIATRIC: NEGATIVE for changes in mood or affect      Objective    /78   Pulse 87   Temp 98.6  F (37  C) (Oral)   Resp 16   Ht 1.6 m (5' 3\")   Wt 103.1 kg (227 lb 3.2 oz)   SpO2 96%   BMI 40.25 kg/m    Body mass index is 40.25 kg/m .  Physical Exam   GENERAL: healthy, alert and no distress  RESP: lungs clear to auscultation - no rales, rhonchi or wheezes  CV: regular rate and rhythm,   MS: no gross musculoskeletal defects noted, no edema  PSYCH: mentation appears normal, affect normal/bright               "

## 2021-08-06 NOTE — NURSING NOTE
"/78   Pulse 87   Temp 98.6  F (37  C) (Oral)   Resp 16   Ht 1.6 m (5' 3\")   Wt 103.1 kg (227 lb 3.2 oz)   SpO2 96%   BMI 40.25 kg/m    Patient in for ED follow up.  Analisa Pantoja, KAYLEE    "

## 2021-10-14 DIAGNOSIS — H00.029 MEIBOMIANITIS, UNSPECIFIED LATERALITY: ICD-10-CM

## 2021-10-14 NOTE — TELEPHONE ENCOUNTER
Why is this medication is sent to me again ??? , I just replied that it has to be forwarded to her Optometrist.

## 2021-10-19 RX ORDER — DOXYCYCLINE 50 MG/1
CAPSULE ORAL
Qty: 60 CAPSULE | Refills: 0 | Status: SHIPPED | OUTPATIENT
Start: 2021-10-19 | End: 2022-02-02

## 2021-12-09 ENCOUNTER — VIRTUAL VISIT (OUTPATIENT)
Dept: INTERNAL MEDICINE | Facility: CLINIC | Age: 66
End: 2021-12-09
Payer: COMMERCIAL

## 2021-12-09 DIAGNOSIS — R30.0 DYSURIA: Primary | ICD-10-CM

## 2021-12-09 PROCEDURE — 99213 OFFICE O/P EST LOW 20 MIN: CPT | Mod: 95 | Performed by: NURSE PRACTITIONER

## 2021-12-09 RX ORDER — CIPROFLOXACIN 250 MG/1
250 TABLET, FILM COATED ORAL 2 TIMES DAILY
Qty: 6 TABLET | Refills: 0 | Status: SHIPPED | OUTPATIENT
Start: 2021-12-09 | End: 2021-12-13

## 2021-12-09 NOTE — PROGRESS NOTES
"Ramona is a 66 year old who is being evaluated via a billable telephone visit.      What phone number would you like to be contacted at? [phone]  How would you like to obtain your AVS? Mail a copy    Assessment & Plan     Dysuria    - ciprofloxacin (CIPRO) 250 MG tablet; Take 1 tablet (250 mg) by mouth 2 times daily for 3 days    F/u PCP if not improving         BMI:   Estimated body mass index is 40.25 kg/m  as calculated from the following:    Height as of 8/6/21: 1.6 m (5' 3\").    Weight as of 8/6/21: 103.1 kg (227 lb 3.2 oz).           Rema Singh NP  [facility]    Antonina Greene is a 66 year old who presents for the following health issues     HPI     Genitourinary - Female  Onset/Duration: 4 days  Description:   Painful urination (Dysuria): YES           Frequency: YES  Blood in urine (Hematuria): YES  Delay in urine (Hesitency): no  Intensity: moderate  Progression of Symptoms:  same  Accompanying Signs & Symptoms:  Fever/chills: no  Flank pain: no  Nausea and vomiting: no  Vaginal symptoms: none  Abdominal/Pelvic Pain: YES  History:   History of frequent UTI s: YES  History of kidney stones: no  Sexually Active: no  Possibility of pregnancy: No  Precipitating or alleviating factors: None  Therapies tried and outcome: Increase fluid intake        Review of Systems   Constitutional, HEENT, cardiovascular, pulmonary, gi and gu systems are negative, except as otherwise noted.      Objective           Vitals:  No vitals were obtained today due to virtual visit.    Physical Exam     PSYCH: Alert and oriented times 3; coherent speech, normal   rate and volume, able to articulate logical thoughts, able   to abstract reason, no tangential thoughts, no hallucinations   or delusions  Her affect is normal  RESP: No cough, no audible wheezing, able to talk in full sentences  Remainder of exam unable to be completed due to telephone visits                Phone call duration: 8 " minutes

## 2021-12-13 ENCOUNTER — OFFICE VISIT (OUTPATIENT)
Dept: INTERNAL MEDICINE | Facility: CLINIC | Age: 66
End: 2021-12-13
Payer: COMMERCIAL

## 2021-12-13 DIAGNOSIS — N63.0 LUMP OR MASS IN BREAST: ICD-10-CM

## 2021-12-13 DIAGNOSIS — D58.2 ELEVATED HEMOGLOBIN (H): ICD-10-CM

## 2021-12-13 DIAGNOSIS — R73.03 PREDIABETES: ICD-10-CM

## 2021-12-13 DIAGNOSIS — E03.9 HYPOTHYROIDISM, UNSPECIFIED TYPE: ICD-10-CM

## 2021-12-13 DIAGNOSIS — I10 ESSENTIAL HYPERTENSION: ICD-10-CM

## 2021-12-13 DIAGNOSIS — Z00.00 ROUTINE HISTORY AND PHYSICAL EXAMINATION OF ADULT: ICD-10-CM

## 2021-12-13 DIAGNOSIS — E66.01 MORBID OBESITY WITH BMI OF 40.0-44.9, ADULT (H): ICD-10-CM

## 2021-12-13 DIAGNOSIS — R06.02 SOB (SHORTNESS OF BREATH): ICD-10-CM

## 2021-12-13 DIAGNOSIS — U09.9 POST COVID-19 CONDITION, UNSPECIFIED: Primary | ICD-10-CM

## 2021-12-13 LAB
ERYTHROCYTE [DISTWIDTH] IN BLOOD BY AUTOMATED COUNT: 13.6 % (ref 10–15)
HBA1C MFR BLD: 6.2 % (ref 0–5.6)
HCT VFR BLD AUTO: 52.9 % (ref 35–47)
HGB BLD-MCNC: 17.5 G/DL (ref 11.7–15.7)
MCH RBC QN AUTO: 30.3 PG (ref 26.5–33)
MCHC RBC AUTO-ENTMCNC: 33.1 G/DL (ref 31.5–36.5)
MCV RBC AUTO: 92 FL (ref 78–100)
PLATELET # BLD AUTO: 181 10E3/UL (ref 150–450)
RBC # BLD AUTO: 5.78 10E6/UL (ref 3.8–5.2)
WBC # BLD AUTO: 3.3 10E3/UL (ref 4–11)

## 2021-12-13 PROCEDURE — 99396 PREV VISIT EST AGE 40-64: CPT | Performed by: INTERNAL MEDICINE

## 2021-12-13 PROCEDURE — 83036 HEMOGLOBIN GLYCOSYLATED A1C: CPT | Performed by: INTERNAL MEDICINE

## 2021-12-13 PROCEDURE — 85027 COMPLETE CBC AUTOMATED: CPT | Performed by: INTERNAL MEDICINE

## 2021-12-13 PROCEDURE — 82043 UR ALBUMIN QUANTITATIVE: CPT | Performed by: INTERNAL MEDICINE

## 2021-12-13 PROCEDURE — 36415 COLL VENOUS BLD VENIPUNCTURE: CPT | Performed by: INTERNAL MEDICINE

## 2021-12-13 PROCEDURE — 84443 ASSAY THYROID STIM HORMONE: CPT | Performed by: INTERNAL MEDICINE

## 2021-12-13 PROCEDURE — 80053 COMPREHEN METABOLIC PANEL: CPT | Performed by: INTERNAL MEDICINE

## 2021-12-13 PROCEDURE — 99214 OFFICE O/P EST MOD 30 MIN: CPT | Mod: 25 | Performed by: INTERNAL MEDICINE

## 2021-12-13 PROCEDURE — 80061 LIPID PANEL: CPT | Performed by: INTERNAL MEDICINE

## 2021-12-13 ASSESSMENT — ENCOUNTER SYMPTOMS
HEMATURIA: 0
CHILLS: 0
ABDOMINAL PAIN: 1
HEMATOCHEZIA: 0
FREQUENCY: 0
EYE PAIN: 1
SHORTNESS OF BREATH: 1
ARTHRALGIAS: 1
DIZZINESS: 1
NERVOUS/ANXIOUS: 0
NAUSEA: 0
HEARTBURN: 1
SORE THROAT: 0
MYALGIAS: 1
PALPITATIONS: 1
CONSTIPATION: 0
HEADACHES: 1
DIARRHEA: 0
FEVER: 0
JOINT SWELLING: 0
DYSURIA: 0
COUGH: 0
WEAKNESS: 1

## 2021-12-13 ASSESSMENT — MIFFLIN-ST. JEOR: SCORE: 1559.23

## 2021-12-13 NOTE — NURSING NOTE
"BP (!) 150/90   Pulse 93   Temp 98.3  F (36.8  C) (Axillary)   Resp 14   Ht 1.6 m (5' 3\")   Wt 104 kg (229 lb 4.8 oz)   SpO2 95%   BMI 40.62 kg/m    Patient in for Female Px.    "

## 2021-12-13 NOTE — PROGRESS NOTES
SUBJECTIVE:   CC: Ramona Ferrer is an 66 year old woman who presents for preventive health visit.       Patient has been advised of split billing requirements and indicates understanding: Yes  Healthy Habits:     Getting at least 3 servings of Calcium per day:  NO    Bi-annual eye exam:  Yes    Dental care twice a year:  Yes    Sleep apnea or symptoms of sleep apnea:  Excessive snoring    Diet:  Diabetic    Frequency of exercise:  None    Taking medications regularly:  Yes    Medication side effects:  None    PHQ-2 Total Score: 0    Additional concerns today:  Yes      Prediabetes Follow-up- on metformin   How often are you checking your blood sugar? One  times daily, -150       Hypertension Follow-up      Do you check your blood pressure regularly outside of the clinic? Yes     Are you following a low salt diet? Yes    Are your blood pressures ever more than 140 on the top number (systolic) OR more   than 90 on the bottom number (diastolic), for example 140/90? Yes    Hypothyroidism Follow-up      Since last visit, patient describes the following symptoms: Weight stable, no hair loss, no skin changes, no constipation, no loose stools    Pt had Covid 19 infection in 06/21 and since then has been having fatigue, joint pains ,leg pains.no fever/chills. No cough.    Pt also c/o sob with activity, which started after recovered from Covid. No orthopnea, no chest pain or palpitations.    Pt also c/o lump in lt breast for few months, no change in size, no pain , no f/h of breast cancer , last mammogram 01/21      Today's PHQ-2 Score:   PHQ-2 ( 1999 Pfizer) 12/13/2021   Q1: Little interest or pleasure in doing things 0   Q2: Feeling down, depressed or hopeless 0   PHQ-2 Score 0   PHQ-2 Total Score (12-17 Years)- Positive if 3 or more points; Administer PHQ-A if positive -   Q1: Little interest or pleasure in doing things Not at all   Q2: Feeling down, depressed or hopeless Not at all   PHQ-2 Score 0       Abuse:  Current or Past (Physical, Sexual or Emotional) - No  Do you feel safe in your environment? Yes    Past Medical History:   Diagnosis Date     Complication of anesthesia      DDD (degenerative disc disease), lumbar      Endometriosis      Hypothyroidism           Seasonal allergies      Spinal stenosis, lumbar        Past Surgical History:   Procedure Laterality Date     C TOTAL ABDOM HYSTERECTOMY  2003    MARYA BSO for stage 4 endometriosis     COLONOSCOPY       COLONOSCOPY  2/20/2012    Procedure:COLONOSCOPY; COLONOSCOPY ; Surgeon:ARUN KITCHEN; Location: GI     COLONOSCOPY N/A 2/15/2019    Procedure: COMBINED COLONOSCOPY, SINGLE OR MULTIPLE BIOPSY/POLYPECTOMY BY BIOPSY;  Surgeon: Connor Sanderson MD;  Location:  GI     HC CYSTOURETHROSCOPY W/ URETEROSCOPY &/OR PYELOSCOPY; W/ LITHOTRIPSY       HC TRABECULOPLASTY BY LASER SURGERY       HYSTERECTOMY, PAP NO LONGER INDICATED       LASER YAG IRIDOTOMY  8/8/2012    Procedure: LASER YAG IRIDOTOMY;  LEFT EYE YAG LASER PUPIL IRIDOTOMY ;  Surgeon: Dakotah Humphreys MD;  Location:  [facility]     LIGATN/STRIP LONG OR SHORT SAPHEN      x's2     PELVIS LAPAROSCOPY,DX       STRABISMUS SURGERY         Current Outpatient Medications   Medication Sig Dispense Refill     acetaminophen (TYLENOL) 325 MG tablet Take  by mouth every 4 hours as needed for pain. 100 tablet 0     Artificial Tear Solution (SOOTHE XP) SOLN Apply 1 drop to eye 3 times daily 1 Bottle 11     Ascorbic Acid (VITAMIN C) 500 MG CAPS        aspirin-acetaminophen-caffeine (EXCEDRIN MIGRAINE) 250-250-65 MG per tablet Take 1 tablet by mouth every 6 hours as needed for pain. 30 tablet 0     cetirizine (ZYRTEC) 10 MG tablet Take 1 tablet (10 mg) by mouth daily 90 tablet 1     doxycycline monohydrate (MONODOX) 50 MG capsule Take 1 capsule by mouth 2 times daily for 1 month then once daily for 2 months.  60 capsule 0     fluticasone (FLONASE) 50 MCG/ACT nasal spray Spray 2 sprays into both nostrils daily. 1 Package 10      levothyroxine (SYNTHROID/LEVOTHROID) 112 MCG tablet Take 1 tablet (112 mcg) by mouth daily 90 tablet 3     lisinopril (ZESTRIL) 10 MG tablet Take 1 tablet (10 mg) by mouth daily 90 tablet 1     metFORMIN (GLUCOPHAGE-XR) 500 MG 24 hr tablet Take 1 tablet (500 mg) by mouth 2 times daily (with meals) 1 tablet with dinner daily for 2 weeks then 2 tablets with dinner 180 tablet 1     Multiple Vitamins-Minerals (MULTIVITAMIN ADULT PO)        olopatadine (PATADAY) 0.2 % ophthalmic solution Place 1 drop into both eyes daily 5 mL 11     Omega-3 Fatty Acids (FISH OIL) 500 MG CAPS Take by mouth daily        omeprazole (PRILOSEC) 40 MG DR capsule Take 1 capsule (40 mg) by mouth daily 90 capsule 0     ORDER FOR DME Provent nasal EPAP  Use over nostrils nightly.   30 each 0     VITAMIN D, CHOLECALCIFEROL, PO Take by mouth daily         Family History   Problem Relation Age of Onset     Diabetes Mother      Thyroid Disease Mother      Hypertension Mother      Cancer Father          of stomach CA     Heart Disease Father         MI at age 58     Glaucoma No family hx of      Macular Degeneration No family hx of        Social History     Tobacco Use     Smoking status: Never Smoker     Smokeless tobacco: Never Used   Substance Use Topics     Alcohol use: No     If you drink alcohol do you typically have >3 drinks per day or >7 drinks per week? No    Alcohol Use 2021   Prescreen: >3 drinks/day or >7 drinks/week? No   Prescreen: >3 drinks/day or >7 drinks/week? -   No flowsheet data found.    Reviewed orders with patient.  Reviewed health maintenance and updated orders accordingly - Yes  Labs reviewed in EPIC    Breast Cancer Screening:    Breast CA Risk Assessment (FHS-7) 2021   Do you have a family history of breast, colon, or ovarian cancer? No / Unknown      Pertinent mammograms are reviewed under the imaging tab.    History of abnormal Pap smear: Status post benign hysterectomy. Health Maintenance and Surgical  "History updated.  PAP / HPV 9/8/2008   PAP (Historical) NIL     Reviewed and updated as needed this visit by clinical staff  Tobacco     Med Hx  Surg Hx  Fam Hx  Soc Hx       Reviewed and updated as needed this visit by Provider                   Review of Systems   Constitutional: Negative for chills and fever.   HENT: Negative for congestion, ear pain, hearing loss and sore throat.    Eyes: Positive for pain and visual disturbance.        Follows up with optometrist   Respiratory: Positive for shortness of breath. Negative for cough.    Cardiovascular: Negative for chest pain.   Gastrointestinal: Positive for heartburn. Negative for constipation, diarrhea, hematochezia and nausea.   Endocrine: Negative.    Breasts:  Positive for breast mass.   Genitourinary: Negative for dysuria, frequency, genital sores, hematuria and urgency.   Musculoskeletal: Positive for arthralgias. Negative for joint swelling.   Neurological: Positive for weakness.   Psychiatric/Behavioral: Positive for mood changes. The patient is not nervous/anxious.         OBJECTIVE:   /86   Pulse 93   Temp 98.3  F (36.8  C) (Axillary)   Resp 14   Ht 1.6 m (5' 3\")   Wt 104 kg (229 lb 4.8 oz)   SpO2 95%   BMI 40.62 kg/m    Physical Exam  GENERAL APPEARANCE: healthy, alert and no distress  EYES: Eyes grossly normal to inspection, PERRL and conjunctivae and sclerae normal  NECK: no adenopathy, no asymmetry, masses, or scars and thyroid normal to palpation  RESP: lungs clear to auscultation - no rales, rhonchi or wheezes  BREAST:small pea sized nodule felt lt breast 2 clock position , no  tenderness or nipple discharge and no palpable axillary masses or adenopathy  CV: regular rate and rhythm, normal S1 S2,   ABDOMEN: soft, nontender,   and bowel sounds normal  MS: no musculoskeletal defects are noted and gait is age appropriate without ataxia  NEURO: Normal strength and tone, sensory exam grossly normal, mentation intact and speech " "normal  PSYCH: mentation appears normal and affect normal/bright     ASSESSMENT/PLAN:     (Z00.00) Routine history and physical examination of adult  Plan: CBC with platelets, CMP, Lipid panel, uptodate on colonoscopy          (R73.03) Prediabetes  Plan: Hemoglobin A1c, Albumin Random Urine         Quantitative with Creat Ratio        (R06.02) SOB (shortness of breath)  Plan: Echocardiogram Exercise Stress.pt was told I will contact her after results and proceed accordingly.     (E03.9) Hypothyroidism, unspecified type  Plan: on levoxyl 112 mcg daily , check TSH with free T4 reflex and adjust levoxyl dose after results            (N63.0) Lump or mass in breast  Plan: US Breast Left Complete 4 Quadrants           (E66.01,  Z68.41) Morbid obesity with BMI of 40.0-44.9, adult (H)  Plan:  Discussed in detail about Diet,calorie intake,and importance of regular exercise        (U09.9) Post covid-19 condition, unspecified    Plan: fatigue, joint pains, sob- Adult Post Covid Clinic Referral           Patient has been advised of split billing requirements and indicates understanding: Yes  COUNSELING:  Reviewed preventive health counseling, as reflected in patient instructions       Regular exercise       Healthy diet/nutrition       Immunizations    Declined: Influenza            Estimated body mass index is 40.62 kg/m  as calculated from the following:    Height as of this encounter: 1.6 m (5' 3\").    Weight as of this encounter: 104 kg (229 lb 4.8 oz).    Weight management plan: Discussed healthy diet and exercise guidelines    She reports that she has never smoked. She has never used smokeless tobacco.      Counseling Resources:  ATP IV Guidelines  Pooled Cohorts Equation Calculator  Breast Cancer Risk Calculator  BRCA-Related Cancer Risk Assessment: FHS-7 Tool  FRAX Risk Assessment  ICSI Preventive Guidelines  Dietary Guidelines for Americans, 2010  USDA's MyPlate  ASA Prophylaxis  Lung CA Screening    Eugene GRULLON " MD Viviana  [facility]

## 2021-12-14 LAB
ALBUMIN SERPL-MCNC: 3.2 G/DL (ref 3.4–5)
ALP SERPL-CCNC: 72 U/L (ref 40–150)
ALT SERPL W P-5'-P-CCNC: 47 U/L (ref 0–50)
ANION GAP SERPL CALCULATED.3IONS-SCNC: 5 MMOL/L (ref 3–14)
AST SERPL W P-5'-P-CCNC: 24 U/L (ref 0–45)
BILIRUB SERPL-MCNC: 0.4 MG/DL (ref 0.2–1.3)
BUN SERPL-MCNC: 15 MG/DL (ref 7–30)
CALCIUM SERPL-MCNC: 9.4 MG/DL (ref 8.5–10.1)
CHLORIDE BLD-SCNC: 106 MMOL/L (ref 94–109)
CO2 SERPL-SCNC: 27 MMOL/L (ref 20–32)
CREAT SERPL-MCNC: 0.73 MG/DL (ref 0.52–1.04)
CREAT UR-MCNC: 72 MG/DL
GFR SERPL CREATININE-BSD FRML MDRD: 87 ML/MIN/1.73M2
GLUCOSE BLD-MCNC: 102 MG/DL (ref 70–99)
MICROALBUMIN UR-MCNC: 33 MG/L
MICROALBUMIN/CREAT UR: 45.83 MG/G CR (ref 0–25)
POTASSIUM BLD-SCNC: 4.1 MMOL/L (ref 3.4–5.3)
PROT SERPL-MCNC: 7.4 G/DL (ref 6.8–8.8)
SODIUM SERPL-SCNC: 138 MMOL/L (ref 133–144)
TSH SERPL DL<=0.005 MIU/L-ACNC: 0.74 MU/L (ref 0.4–4)

## 2021-12-16 VITALS
HEIGHT: 63 IN | OXYGEN SATURATION: 95 % | RESPIRATION RATE: 14 BRPM | BODY MASS INDEX: 40.63 KG/M2 | WEIGHT: 229.3 LBS | SYSTOLIC BLOOD PRESSURE: 132 MMHG | HEART RATE: 93 BPM | DIASTOLIC BLOOD PRESSURE: 86 MMHG | TEMPERATURE: 98.3 F

## 2021-12-16 PROBLEM — E78.5 HYPERLIPIDEMIA LDL GOAL <100: Status: RESOLVED | Noted: 2021-12-16 | Resolved: 2021-12-16

## 2021-12-16 PROBLEM — R73.03 PREDIABETES: Status: RESOLVED | Noted: 2019-06-13 | Resolved: 2021-12-16

## 2021-12-16 PROBLEM — R73.03 PREDIABETES: Status: ACTIVE | Noted: 2021-12-16

## 2021-12-16 PROBLEM — E78.5 HYPERLIPIDEMIA LDL GOAL <100: Status: ACTIVE | Noted: 2021-12-16

## 2021-12-16 RX ORDER — LISINOPRIL 10 MG/1
10 TABLET ORAL DAILY
Qty: 90 TABLET | Refills: 1 | Status: SHIPPED | OUTPATIENT
Start: 2021-12-16 | End: 2022-06-06

## 2021-12-16 RX ORDER — METFORMIN HCL 500 MG
1000 TABLET, EXTENDED RELEASE 24 HR ORAL
Qty: 180 TABLET | Refills: 1 | Status: SHIPPED | OUTPATIENT
Start: 2021-12-16 | End: 2022-07-20

## 2021-12-16 ASSESSMENT — ENCOUNTER SYMPTOMS
ENDOCRINE NEGATIVE: 1
BREAST MASS: 1

## 2021-12-17 ENCOUNTER — HOSPITAL ENCOUNTER (OUTPATIENT)
Dept: CARDIOLOGY | Facility: CLINIC | Age: 66
Discharge: HOME | End: 2021-12-17
Attending: INTERNAL MEDICINE | Admitting: INTERNAL MEDICINE
Payer: COMMERCIAL

## 2021-12-17 DIAGNOSIS — R06.02 SOB (SHORTNESS OF BREATH): ICD-10-CM

## 2021-12-17 LAB
CHOLEST SERPL-MCNC: 174 MG/DL
FASTING STATUS PATIENT QL REPORTED: YES
HDLC SERPL-MCNC: 94 MG/DL
LDLC SERPL CALC-MCNC: 65 MG/DL
NONHDLC SERPL-MCNC: 80 MG/DL
TRIGL SERPL-MCNC: 76 MG/DL

## 2021-12-17 PROCEDURE — 255N000002 HC RX 255 OP 636: Performed by: INTERNAL MEDICINE

## 2021-12-17 PROCEDURE — 93321 DOPPLER ECHO F-UP/LMTD STD: CPT | Mod: 26 | Performed by: INTERNAL MEDICINE

## 2021-12-17 PROCEDURE — 93325 DOPPLER ECHO COLOR FLOW MAPG: CPT | Mod: TC

## 2021-12-17 PROCEDURE — 93016 CV STRESS TEST SUPVJ ONLY: CPT | Performed by: INTERNAL MEDICINE

## 2021-12-17 PROCEDURE — 93325 DOPPLER ECHO COLOR FLOW MAPG: CPT | Mod: 26 | Performed by: INTERNAL MEDICINE

## 2021-12-17 PROCEDURE — 93018 CV STRESS TEST I&R ONLY: CPT | Performed by: INTERNAL MEDICINE

## 2021-12-17 PROCEDURE — 93350 STRESS TTE ONLY: CPT | Mod: 26 | Performed by: INTERNAL MEDICINE

## 2021-12-17 RX ADMIN — HUMAN ALBUMIN MICROSPHERES AND PERFLUTREN 3 ML: 10; .22 INJECTION, SOLUTION INTRAVENOUS at 13:35

## 2021-12-22 ENCOUNTER — HOSPITAL ENCOUNTER (OUTPATIENT)
Dept: ULTRASOUND IMAGING | Facility: CLINIC | Age: 66
End: 2021-12-22
Attending: INTERNAL MEDICINE
Payer: COMMERCIAL

## 2021-12-22 ENCOUNTER — HOSPITAL ENCOUNTER (OUTPATIENT)
Dept: MAMMOGRAPHY | Facility: CLINIC | Age: 66
End: 2021-12-22
Attending: INTERNAL MEDICINE
Payer: COMMERCIAL

## 2021-12-22 DIAGNOSIS — N63.0 LUMP OR MASS IN BREAST: ICD-10-CM

## 2021-12-22 PROBLEM — D58.2 ELEVATED HEMOGLOBIN (H): Status: ACTIVE | Noted: 2021-12-22

## 2021-12-22 PROCEDURE — 76642 ULTRASOUND BREAST LIMITED: CPT | Mod: LT

## 2021-12-22 PROCEDURE — 77062 BREAST TOMOSYNTHESIS BI: CPT

## 2021-12-27 ENCOUNTER — LAB (OUTPATIENT)
Dept: LAB | Facility: CLINIC | Age: 66
End: 2021-12-27
Payer: COMMERCIAL

## 2021-12-27 DIAGNOSIS — R73.03 PREDIABETES: ICD-10-CM

## 2021-12-27 DIAGNOSIS — D58.2 ELEVATED HEMOGLOBIN (H): ICD-10-CM

## 2021-12-27 LAB
ERYTHROCYTE [DISTWIDTH] IN BLOOD BY AUTOMATED COUNT: 13.6 % (ref 10–15)
HBA1C MFR BLD: 6.3 % (ref 0–5.6)
HCT VFR BLD AUTO: 42.9 % (ref 35–47)
HGB BLD-MCNC: 14 G/DL (ref 11.7–15.7)
MCH RBC QN AUTO: 30.4 PG (ref 26.5–33)
MCHC RBC AUTO-ENTMCNC: 32.6 G/DL (ref 31.5–36.5)
MCV RBC AUTO: 93 FL (ref 78–100)
PLATELET # BLD AUTO: 261 10E3/UL (ref 150–450)
RBC # BLD AUTO: 4.61 10E6/UL (ref 3.8–5.2)
WBC # BLD AUTO: 5.7 10E3/UL (ref 4–11)

## 2021-12-27 PROCEDURE — 85027 COMPLETE CBC AUTOMATED: CPT

## 2021-12-27 PROCEDURE — 36415 COLL VENOUS BLD VENIPUNCTURE: CPT

## 2021-12-27 PROCEDURE — 82728 ASSAY OF FERRITIN: CPT

## 2021-12-27 PROCEDURE — 80061 LIPID PANEL: CPT

## 2021-12-27 PROCEDURE — 83036 HEMOGLOBIN GLYCOSYLATED A1C: CPT

## 2021-12-27 PROCEDURE — 80053 COMPREHEN METABOLIC PANEL: CPT

## 2021-12-28 LAB
ALBUMIN SERPL-MCNC: 3.6 G/DL (ref 3.4–5)
ALP SERPL-CCNC: 73 U/L (ref 40–150)
ALT SERPL W P-5'-P-CCNC: 54 U/L (ref 0–50)
ANION GAP SERPL CALCULATED.3IONS-SCNC: 4 MMOL/L (ref 3–14)
AST SERPL W P-5'-P-CCNC: 29 U/L (ref 0–45)
BILIRUB SERPL-MCNC: 0.5 MG/DL (ref 0.2–1.3)
BUN SERPL-MCNC: 12 MG/DL (ref 7–30)
CALCIUM SERPL-MCNC: 9.3 MG/DL (ref 8.5–10.1)
CHLORIDE BLD-SCNC: 106 MMOL/L (ref 94–109)
CHOLEST SERPL-MCNC: 173 MG/DL
CO2 SERPL-SCNC: 28 MMOL/L (ref 20–32)
CREAT SERPL-MCNC: 0.71 MG/DL (ref 0.52–1.04)
FASTING STATUS PATIENT QL REPORTED: YES
FERRITIN SERPL-MCNC: 95 NG/ML (ref 8–252)
GFR SERPL CREATININE-BSD FRML MDRD: >90 ML/MIN/1.73M2
GLUCOSE BLD-MCNC: 108 MG/DL (ref 70–99)
HDLC SERPL-MCNC: 99 MG/DL
LDLC SERPL CALC-MCNC: 59 MG/DL
NONHDLC SERPL-MCNC: 74 MG/DL
POTASSIUM BLD-SCNC: 4.2 MMOL/L (ref 3.4–5.3)
PROT SERPL-MCNC: 7.4 G/DL (ref 6.8–8.8)
SODIUM SERPL-SCNC: 138 MMOL/L (ref 133–144)
TRIGL SERPL-MCNC: 77 MG/DL

## 2022-01-03 ENCOUNTER — HOSPITAL ENCOUNTER (OUTPATIENT)
Dept: ULTRASOUND IMAGING | Facility: CLINIC | Age: 67
End: 2022-01-03
Attending: INTERNAL MEDICINE
Payer: COMMERCIAL

## 2022-01-03 ENCOUNTER — HOSPITAL ENCOUNTER (OUTPATIENT)
Dept: MAMMOGRAPHY | Facility: CLINIC | Age: 67
End: 2022-01-03
Attending: INTERNAL MEDICINE
Payer: COMMERCIAL

## 2022-01-03 DIAGNOSIS — N63.0 LUMP OR MASS IN BREAST: ICD-10-CM

## 2022-01-03 PROCEDURE — 88305 TISSUE EXAM BY PATHOLOGIST: CPT | Mod: 26 | Performed by: PATHOLOGY

## 2022-01-03 PROCEDURE — 88377 M/PHMTRC ALYS ISHQUANT/SEMIQ: CPT | Mod: 26 | Performed by: MEDICAL GENETICS

## 2022-01-03 PROCEDURE — 88305 TISSUE EXAM BY PATHOLOGIST: CPT | Mod: TC | Performed by: INTERNAL MEDICINE

## 2022-01-03 PROCEDURE — 88342 IMHCHEM/IMCYTCHM 1ST ANTB: CPT | Mod: 26 | Performed by: PATHOLOGY

## 2022-01-03 PROCEDURE — 88377 M/PHMTRC ALYS ISHQUANT/SEMIQ: CPT | Performed by: INTERNAL MEDICINE

## 2022-01-03 PROCEDURE — 999N000065 MA POST PROCEDURE LEFT

## 2022-01-03 PROCEDURE — 19083 BX BREAST 1ST LESION US IMAG: CPT | Mod: LT

## 2022-01-03 PROCEDURE — 88360 TUMOR IMMUNOHISTOCHEM/MANUAL: CPT | Mod: 26 | Performed by: PATHOLOGY

## 2022-01-03 PROCEDURE — 250N000009 HC RX 250: Performed by: RADIOLOGY

## 2022-01-03 PROCEDURE — 19084 BX BREAST ADD LESION US IMAG: CPT | Mod: LT

## 2022-01-03 RX ADMIN — LIDOCAINE HYDROCHLORIDE 5 ML: 10 INJECTION, SOLUTION EPIDURAL; INFILTRATION; INTRACAUDAL; PERINEURAL at 13:20

## 2022-01-03 RX ADMIN — LIDOCAINE HYDROCHLORIDE 5 ML: 10 INJECTION, SOLUTION EPIDURAL; INFILTRATION; INTRACAUDAL; PERINEURAL at 13:26

## 2022-01-03 NOTE — DISCHARGE INSTRUCTIONS

## 2022-01-05 ENCOUNTER — TELEPHONE (OUTPATIENT)
Dept: MAMMOGRAPHY | Facility: CLINIC | Age: 67
End: 2022-01-05
Payer: COMMERCIAL

## 2022-01-05 NOTE — TELEPHONE ENCOUNTER
Pathology report reviewed with our breast radiologist Dr Acosta, who confirmed the recent breast imaging is concordant with the final surgical pathology results below.    I phoned Ms. Ferrer, confirmed her full name, date of birth, and notified patient of Ultrasound Guided left X2 Breast Biopsy results showing A. Left breast, 12:00, 4.0 cm from nipple, ultrasound guided needle biopsy-  -Invasive lobular carcinoma, [place] grade 1/3(Cathy score 5/9) (please see comment)  -Lobular carcinoma in situ, classic subtype  -Estrogen receptor is positive (> 70%) and progesterone receptor is positive (>90%)  -Her 2 by FISH is ordered and pending and will be reported separately  B. Left breast, 1:00, 2.0 cm from nipple, ultrasound guided needle biopsy-  -Invasive lobular carcinoma, [place] grade 1/3(Cathy score 5/9) (please see comment)  -Lobular carcinoma in situ, classic subtype  -Estrogen receptor is positive (> 70%) and progesterone receptor is positive (>90%)  - Her 2 by FISH is ordered and pending and will be reported separately.      Patient states no problems with biopsy site.  Recommended follow up is surgical consult.  Surgical Consult has been arranged with Dr Ford on 1-7-22 at 10am.   Patient has directions and phone numbers.    Questions were answered and I explained my role as Breast Care Nurse Coordinator in assisting her with appointments, resources and social support.  New diagnosis information packet will be available for patient at surgical consult.  I will follow up with the patient. She has my phone number if she has further questions.  Patient verbalized understanding and agrees with the plan of care.  Ordering provider- Dr Michelle has been notified of the results, recommendations for follow up and scheduled surgical consultation.  I will forward this note, along with the pathology results.      Michelle Courtney, RN CBCN  Breast Care Nurse Coordinator  [facility]  [place]  [phone]

## 2022-01-06 ENCOUNTER — OFFICE VISIT (OUTPATIENT)
Dept: OPTOMETRY | Facility: CLINIC | Age: 67
End: 2022-01-06
Payer: COMMERCIAL

## 2022-01-06 DIAGNOSIS — H52.203 HYPEROPIA OF BOTH EYES WITH ASTIGMATISM: Primary | ICD-10-CM

## 2022-01-06 DIAGNOSIS — H00.029 MEIBOMIANITIS, UNSPECIFIED LATERALITY: ICD-10-CM

## 2022-01-06 DIAGNOSIS — H01.001 BLEPHARITIS OF UPPER EYELIDS OF BOTH EYES, UNSPECIFIED TYPE: ICD-10-CM

## 2022-01-06 DIAGNOSIS — H10.13 ALLERGIC CONJUNCTIVITIS, BILATERAL: ICD-10-CM

## 2022-01-06 DIAGNOSIS — H01.004 BLEPHARITIS OF UPPER EYELIDS OF BOTH EYES, UNSPECIFIED TYPE: ICD-10-CM

## 2022-01-06 DIAGNOSIS — H50.111 EXOTROPIA, RIGHT EYE: ICD-10-CM

## 2022-01-06 DIAGNOSIS — H52.4 PRESBYOPIA: ICD-10-CM

## 2022-01-06 DIAGNOSIS — H04.129 DRY EYE: ICD-10-CM

## 2022-01-06 DIAGNOSIS — H52.03 HYPEROPIA OF BOTH EYES WITH ASTIGMATISM: Primary | ICD-10-CM

## 2022-01-06 LAB — INTERPRETATION: NORMAL

## 2022-01-06 PROCEDURE — 92015 DETERMINE REFRACTIVE STATE: CPT | Performed by: OPTOMETRIST

## 2022-01-06 PROCEDURE — 92014 COMPRE OPH EXAM EST PT 1/>: CPT | Performed by: OPTOMETRIST

## 2022-01-06 RX ORDER — GLYCERIN/PROPYLENE GLYCOL 0.6 %-0.6%
1 DROPPERETTE, SINGLE-USE DROP DISPENSER OPHTHALMIC (EYE) 3 TIMES DAILY
Qty: 15 ML | Refills: 8 | Status: SHIPPED | OUTPATIENT
Start: 2022-01-06

## 2022-01-06 RX ORDER — PREDNISOLONE ACETATE 10 MG/ML
SUSPENSION/ DROPS OPHTHALMIC
Qty: 5 ML | Refills: 0 | Status: SHIPPED | OUTPATIENT
Start: 2022-01-06 | End: 2023-04-06

## 2022-01-06 RX ORDER — OLOPATADINE HYDROCHLORIDE 1 MG/ML
1 SOLUTION/ DROPS OPHTHALMIC 2 TIMES DAILY
Qty: 5 ML | Refills: 12 | Status: SHIPPED | OUTPATIENT
Start: 2022-01-06

## 2022-01-06 ASSESSMENT — VISUAL ACUITY
OD_CC+: -2
METHOD: SNELLEN - LINEAR
OD_SC: 20/150
OS_CC: 20/30
OS_CC+: -2
CORRECTION_TYPE: GLASSES
OD_CC: 20/40
OS_SC: 20/150
OD_CC: 20/70
OS_CC: 20/25

## 2022-01-06 ASSESSMENT — REFRACTION_MANIFEST
OS_CYLINDER: +1.50
OD_AXIS: 100
OS_SPHERE: +3.00
OS_AXIS: 079
OD_CYLINDER: +2.50
OD_SPHERE: +3.50
OD_AXIS: 106
METHOD_AUTOREFRACTION: 1
OS_CYLINDER: +1.25
OD_SPHERE: +4.50
OD_CYLINDER: +1.75
OS_ADD: +2.50
OS_AXIS: 085
OS_SPHERE: +3.50
OD_ADD: +2.50

## 2022-01-06 ASSESSMENT — CUP TO DISC RATIO
OS_RATIO: 0.2
OD_RATIO: 0.2

## 2022-01-06 ASSESSMENT — KERATOMETRY
OD_AXISANGLE2_DEGREES: 011
OD_K2POWER_DIOPTERS: 44.37
OS_K1POWER_DIOPTERS: 41.87
OS_K2POWER_DIOPTERS: 44.37
OS_AXISANGLE2_DEGREES: 170
OD_AXISANGLE_DEGREES: 101
OS_AXISANGLE_DEGREES: 080
OD_K1POWER_DIOPTERS: 41.50

## 2022-01-06 ASSESSMENT — REFRACTION_WEARINGRX
OS_CYLINDER: +1.75
SPECS_TYPE: PAL
OS_SPHERE: +2.75
OD_ADD: +2.50
OS_AXIS: 094
OD_SPHERE: +3.00
OS_ADD: +2.50
OD_CYLINDER: +2.50
OD_AXIS: 100

## 2022-01-06 ASSESSMENT — EXTERNAL EXAM - RIGHT EYE: OD_EXAM: NORMAL

## 2022-01-06 ASSESSMENT — CONF VISUAL FIELD
OD_NORMAL: 1
OS_NORMAL: 1

## 2022-01-06 ASSESSMENT — EXTERNAL EXAM - LEFT EYE: OS_EXAM: NORMAL

## 2022-01-06 ASSESSMENT — TONOMETRY
IOP_METHOD: APPLANATION
OS_IOP_MMHG: 10
OD_IOP_MMHG: 11

## 2022-01-06 NOTE — PROGRESS NOTES
Chief Complaint   Patient presents with     Annual Eye Exam      Accompanied by self  Last Eye Exam: 1-  Dilated Previously: Yes    What are you currently using to see?  glasses       Distance Vision Acuity: Noticed gradual change in both eyes    Near Vision Acuity: Satisfied with vision while reading  with glasses    Eye Comfort: itchy  Do you use eye drops? : Yes: artificial tears and patanol need refills  Occupation or Hobbies: none    Yancy Seymour Optometric Assistant, A[employer]      POHX:   Allergic conjunctivitis   Has severe rxn in spring and needs PF two times daily for a week, otherwise has used patanol two times daily year round  Artificial tears  Soothe xp along with warm compresses   Doxy for mgd currently off  Warm compresses daily/ lid hygiene at bedtime   Anatomical narrow angle with PI  Mild NS  amblyopia right eye XT  Medical, surgical and family histories reviewed and updated 1/6/2022.       OBJECTIVE: See Ophthalmology exam    ASSESSMENT:    ICD-10-CM    1. Hyperopia of both eyes with astigmatism  H52.03     H52.203    2. Presbyopia  H52.4    3. Dry eye  H04.129    4. Allergic conjunctivitis, bilateral  H10.13    5. Meibomianitis, unspecified laterality  H00.029    6. Blepharitis of upper eyelids of both eyes, unspecified type  H01.001     H01.004    mixed blepharitis  Mild change in L eye  Narrow angles with patent PI    PLAN:   Add lid hygiene ordered refills artificial tears , allergy medication as well as PF for spring    Continue warm compresses   Optional prescription update    Ana Ludwig OD

## 2022-01-06 NOTE — LETTER
1/6/2022         RE: Ramona Ferrer  [address]  [place] [address]        Dear Colleague,    Thank you for referring your patient, Ramona Ferrer, [facility]. Please see a copy of my visit note below.    Chief Complaint   Patient presents with     Annual Eye Exam      Accompanied by self  Last Eye Exam: 1-  Dilated Previously: Yes    What are you currently using to see?  glasses       Distance Vision Acuity: Noticed gradual change in both eyes    Near Vision Acuity: Satisfied with vision while reading  with glasses    Eye Comfort: itchy  Do you use eye drops? : Yes: artificial tears and patanol need refills  Occupation or Hobbies: none    Yancy Seymour Optometric Assistant, A.B.O[employer]      POHX:   Allergic conjunctivitis   Has severe rxn in spring and needs PF two times daily for a week, otherwise has used patanol two times daily year round  Artificial tears  Soothe xp along with warm compresses   Doxy for mgd currently off  Warm compresses daily/ lid hygiene at bedtime   Anatomical narrow angle with PI  Mild NS  amblyopia right eye XT  Medical, surgical and family histories reviewed and updated 1/6/2022.       OBJECTIVE: See Ophthalmology exam    ASSESSMENT:    ICD-10-CM    1. Hyperopia of both eyes with astigmatism  H52.03     H52.203    2. Presbyopia  H52.4    3. Dry eye  H04.129    4. Allergic conjunctivitis, bilateral  H10.13    5. Meibomianitis, unspecified laterality  H00.029    6. Blepharitis of upper eyelids of both eyes, unspecified type  H01.001     H01.004    mixed blepharitis  Mild change in L eye  Narrow angles with patent PI    PLAN:   Add lid hygiene ordered refills artificial tears , allergy medication as well as PF for spring    Continue warm compresses   Optional prescription update    Ana Ludwig OD       Again, thank you for allowing me to participate in the care of your patient.        Sincerely,        Ana Ludwig, OD

## 2022-01-06 NOTE — PATIENT INSTRUCTIONS
Sent allergy refills and artificial tears  To pharmacy   Small change in L eye    Blepharitis is a chronic or long term inflammation of the eyelids and eyelashes. It affects all ages. Causes include poor eyelid hygiene, excess oil production, staph bacteria or an allergic reaction.    Blepharitis may appear as greasy flakes on the base of the eyelashes, crusting of eyelashes and mild redness of the eyelid margins.  Sometimes it may result in an acute infection of a gland in the eyelid called a stye and sometimes painless firm nodules can form in the eyelid that do not resolve on their own and must be surgically removed.    Treatment includes warm compresses and lid hygiene with an antimicrobial lid scrub and sometimes a prescription ointment is needed.      Hot compresses/ warm soaks  Warm compresses are very beneficial to the normal functioning of the eye.   They help loosen up the eyelid debris that has collected on the eyelash follicles.  Overabundance of bacterial microorganisms along the eyelashes and lid margins induce stress on the tear film and promote inflammation.  Regular lid hygiene helps diminish the bacterial population to prevent inflammation and infection.  Cleanse lids once daily with a lid cleansing product as directed such as Ocusoft or Sterilid which can be purchased at most pharmacies. Eyelid cleansers maintain clean and healthy eyelid margins. Ocusoft or sterilid are commercial products that are available as individual wrapped cleansing pads.  Diluted baby shampoo will work, but not as well and is a cheaper alternative.    Directions for warm soaks  There are few methods for hot compresses. Moisten a washcloth with hot water, or microwave for 10 seconds, being careful to not get the cloth too hot.   Then put the washcloth onto your eyelids for 5 minutes. It will cool quickly so a rice pack or eyemask that can be heated and laid on top of the washcloth will help retain the heat.      Meibomian  gland dysfunction or Posterior Blepharitis, is characterized by inflammation along both the uppper and lower eyelid margins. A single row of these glands is present in each lid with openings along the lid margins.  It is often found in association with skin conditions such as rosacea and seborrheic dermatitis.    Symptoms include:  ?Red eyes  ?Gritty or burning sensation  ?Excessive tearing  ?Itchy eyelids  ?Red, swollen eyelids  ?Crusting or matting of eyelashes in the morning  ?Light sensitivity  ?Blurred vision    It is important to keep cosmetics from blocking these oil glands. If blocked, they do not   excrete oil into the tear film, which causes the tears to evaporate quickly.   This may result in watery eyes.  There is also an increase of bacterial growth when the tear film is unstable, leading to further ocular surface inflammation.    Treatment:  1. Warm compresses for 5-10 minutes twice daily     2.  Keep the eyelid margins clean by using a commercial eye scrub or mild baby shampoo on a washcloth 1-2x daily    3. Use preservative free artificial tears 4-8x daily     For warm compresses    Moisten a washcloth with hot water, or microwave for 10 seconds, being careful to not get the cloth too hot.   Then put the washcloth onto your eyelids for 5 minutes. It will cool quickly so a rice pack or eyemask that can be heated and laid on top of the washcloth will help retain the heat.    Omega 3 fatty acid supplements taken once to twice daily and artificial tears such as Soothe xp, Refresh optive , Retaine and systane balance are also an additional treatment to control inflammation and help soothe your eyes.      DRY EYE TREATMENT    I recommend using artificial tears for your dry eye. There are over the counter drops that work well and may be used up to 4x daily. ( systane balance or ultra, blink, refresh optive, soothe xp) stay away from any red eye relief products such as visine and clear eyes.     If you need  more than 4 drops daily, use a preservative free product which come in individual vials and may be used for 24 hours and discarded.   The more severe the dry eye, the more frequent instillation of artificial tears  that are needed to reduce irritation/ inflammation. (Sometimes every 1-2 hours for a couple of days).  You can also add a lubricating ointment in the lower lid at bedtime. ( over the counter refresh pm, genteal gel, lacrilube etc.)    Artificial tears work best as a preventative and not as well after your eyes are starting to bother you and/ or are red.  It may take 4- 6 weeks of using the drops before you notice improvement.  If after that time you are still having problems schedule an appointment for an evaluation to discuss different treatments.    Dry eyes are a chronic condition and you may have more symptoms at certain times of the year.      Additional recommended treatment:  Warm compresses once to twice daily for 5-10 minutes    Directions for warm soaks  There are few methods for hot compresses. Moisten a washcloth with hot water, or microwave for 10 seconds, being careful to not get the cloth too hot.   Then put the washcloth onto your eyelids for 5 minutes. It will cool quickly so a rice pack or eyemask that can be heated and laid on top of the washcloth will help retain the heat.      Overabundance of bacterial microorganisms along the eyelashes and lid margins induce stress on the tear film and promote inflammation.  Regular lid hygiene helps diminish the bacterial population to prevent inflammation and infection.  Use a warm compress to loosen crusts   Cleanse lids once daily with a lid cleansing product as directed such as Ocusoft or Sterilid which can be purchased at most pharmacies. Diluted baby shampoo will also work, but not as well as it dries the skin and can irritate eyelids.  Hypo chlor spray may also be used on closed lids.      Omega 3 fatty acid supplements taken 1-2x  daily  Recommend  at least  2000mg omega 3  800 EPA  600 DHA    Blink regularly  Stay hydrated  Increase humidity  Wear sunglasses   Avoid direct exposure to forced air--turn air vents in the car away and keep fans from blowing directly on your face

## 2022-01-07 ENCOUNTER — OFFICE VISIT (OUTPATIENT)
Dept: SURGERY | Facility: CLINIC | Age: 67
End: 2022-01-07
Payer: COMMERCIAL

## 2022-01-07 VITALS
BODY MASS INDEX: 35.44 KG/M2 | DIASTOLIC BLOOD PRESSURE: 94 MMHG | WEIGHT: 200 LBS | HEIGHT: 63 IN | OXYGEN SATURATION: 97 % | RESPIRATION RATE: 16 BRPM | HEART RATE: 78 BPM | SYSTOLIC BLOOD PRESSURE: 128 MMHG

## 2022-01-07 DIAGNOSIS — C50.912 MALIGNANT NEOPLASM OF LEFT BREAST IN FEMALE, ESTROGEN RECEPTOR POSITIVE, UNSPECIFIED SITE OF BREAST (H): Primary | ICD-10-CM

## 2022-01-07 DIAGNOSIS — Z17.0 MALIGNANT NEOPLASM OF LEFT BREAST IN FEMALE, ESTROGEN RECEPTOR POSITIVE, UNSPECIFIED SITE OF BREAST (H): Primary | ICD-10-CM

## 2022-01-07 PROCEDURE — 99205 OFFICE O/P NEW HI 60 MIN: CPT | Performed by: SURGERY

## 2022-01-07 PROCEDURE — 99417 PROLNG OP E/M EACH 15 MIN: CPT | Performed by: SURGERY

## 2022-01-07 ASSESSMENT — MIFFLIN-ST. JEOR: SCORE: 1426.32

## 2022-01-07 NOTE — PATIENT INSTRUCTIONS
BILATERAL BREAST MRI    Date: 1-25-22  Time: 1:00 PM     Location: [facility]  [id number] [place] [address]  [id number]        Please check in at 12:30 PM

## 2022-01-07 NOTE — PROGRESS NOTES
Breast Patients      BREAST PATIENTS (FEMALE)    At what age did your periods begin? 16    What was the date of your last menstrual period? 2003    Have you begun menopause? Yes  Age Menopause began:  2003 after hysterectomy     Are you using hormone replacement therapy?  No    Number of full-term pregnancies: 1    Did you nurse your children? Yes    Are you pregnant now? No    Do you have breast implants? No         BREAST PATIENTS (ALL)    Have you had a previous breast biopsy? Yes  Side: Left  Date: About 3 or 4 years ago    Have you had previous Breast Cancer? No

## 2022-01-07 NOTE — PROGRESS NOTES
Assessment:  Left breast invasive lobular carcinoma, grade 1, ER +, ME +, [id number] -with 2 lesions, each with identical morphology.  1 it is at the 12 o'clock position and measures 2.3 cm in size, the other at the 1 o'clock position measuring 1.3 cm in size.  There is concern that these could be a single mass biologically.  Clinically, there is what probably represents a 5 to 6 cm mass, though this is fairly subtle and could be related to recent biopsy.    Plan:  Lumpectomy, mastectomy, bilateral mastectomies, sentinel lymph node biopsy with possible axillary node dissection and reconstructive options have been offered and discussed at length.  I feel the cosmetic outcome with lumpectomy would be challenging.    Breast MRI:  yes  Oncology consult:  yes  Plastic surgery consult:  undecided  Radiation oncology consult: Undecided  Will discuss further when MRI complete.  This can be done either by phone or in person.  At this point, it is unclear whether the patient may be a candidate for neoadjuvant chemotherapy.  She certainly has a large area which will require resection.  It is possible that a quadrantectomy could be performed, though certainly consideration of mastectomy must also be given.      HPI:  Left breast mass a few months ago.  She is not aware of a significant change in size  Skin rashes, dimpling or nipple changes:  none  Nipple discharge:  none  Previous breast biopsies: Yes -the patient had a left breast biopsy for a mass in that region several years ago.  This biopsy yielded a diagnosis of atypical ductal hyperplasia.  Excisional biopsy was recommended, the but the patient canceled that surgery and elected for close mammographic follow-up.  The patient denies any family history of breast cancer.      Mammography shows a fair amount of density in the left breast, but it is difficult to delineate a specific mass.    US shows 2 separate lesions in the left breast.  One measures 2.3 cm at the 12 o'clock  position 4 cm from the nipple.  The other measures 1.3 cm at the 1 o'clock position 2 cm from the nipple.    Percutaneous core needle biopsy, left: invasive lobular carcinoma, grade 1, ER +, CA +, Nme5zym -.  The biology of both lesions appears identical.    Past Medical History:   has a past medical history of Complication of anesthesia, DDD (degenerative disc disease), lumbar, Endometriosis, Hypertension, Hypothyroidism, Seasonal allergies, and Spinal stenosis, lumbar.    Past Surgical History:  Past Surgical History:   Procedure Laterality Date     COLONOSCOPY       COLONOSCOPY  2/20/2012    Procedure:COLONOSCOPY; COLONOSCOPY ; Surgeon:ARUN KITCHEN; Location: GI     COLONOSCOPY N/A 2/15/2019    Procedure: COMBINED COLONOSCOPY, SINGLE OR MULTIPLE BIOPSY/POLYPECTOMY BY BIOPSY;  Surgeon: Connor Sanderson MD;  Location:  GI     HC CYSTOURETHROSCOPY W/ URETEROSCOPY &/OR PYELOSCOPY; W/ LITHOTRIPSY       HC TRABECULOPLASTY BY LASER SURGERY      PI OU     HYSTERECTOMY, PAP NO LONGER INDICATED       LASER YAG IRIDOTOMY  8/8/2012    Procedure: LASER YAG IRIDOTOMY;  LEFT EYE YAG LASER PUPIL IRIDOTOMY ;  Surgeon: Dakotah Humphreys MD;  Location:  EC     LIGATN/STRIP LONG OR SHORT SAPHEN      x's2     PELVIS LAPAROSCOPY,DX       STRABISMUS SURGERY       ZZC TOTAL ABDOM HYSTERECTOMY  2003    MARYA BSO for stage 4 endometriosis        Social History:  Social History     Socioeconomic History     Marital status:      Spouse name: Not on file     Number of children: Not on file     Years of education: Not on file     Highest education level: Not on file   Occupational History     Not on file   Tobacco Use     Smoking status: Never Smoker     Smokeless tobacco: Never Used   Vaping Use     Vaping Use: Never used   Substance and Sexual Activity     Alcohol use: No     Drug use: No     Sexual activity: Yes     Partners: Male     Birth control/protection: None     Comment: MARYA BSO   Other Topics Concern     Parent/sibling w/  "CABG, MI or angioplasty before 65F 55M? Not Asked   Social History Narrative     Not on file     Social Determinants of Health     Financial Resource Strain: Not on file   Food Insecurity: Not on file   Transportation Needs: Not on file   Physical Activity: Not on file   Stress: Not on file   Social Connections: Not on file   Intimate Partner Violence: Not on file   Housing Stability: Not on file        ROS:  The 10 point review of systems is negative other than noted in the HPI and above.    PE:  Vitals: BP (!) 128/94   Pulse 78   Resp 16   Ht 1.6 m (5' 3\")   Wt 90.7 kg (200 lb)   SpO2 97%   BMI 35.43 kg/m    General appearance: well-nourished, sitting comfortably, no apparent distress  HEENT:  Head normocephalic and atraumatic, pupils equal and round, conjunctivae clear, mucous membranes moist, external ears and nose normal  Neck: Supple without thyromegaly or lymphadenopathy  Lungs: Respirations unlabored  Lymphatic: No cervical, or supraclavicular lymphadenopathy  Extremities: Without edema  Musculoskeletal:  Normal station and gait  Neurologic: nonfocal, grossly intact times four extremities, alert and oriented times three  Psychiatric: Mood and affect are appropriate  Skin: Without lesions or rashes  Breast:    Symmetrical with no skin or nipple changes.     Contour is normal.   Parenchyma is extremely dense.   Masses- left - 6 cm diameter   Ecchymosis- left   Incisional scar- none    Lymph:       No supraclavicular/infraclavicular adenopathy.   Axillary adenopathy: none.  Exam is limited by body habitus.      Time spent today in chart review, patient examination and discussion, and documentation was 90 minutes.    Moris Ford MD      Please route or send letter to:  Referring Provider          "

## 2022-01-07 NOTE — LETTER
2022       Re: Ramona Ferrer - 1955    Assessment:  Left breast invasive lobular carcinoma, grade 1, ER +, NE +, [id number] -with 2 lesions, each with identical morphology.  1 it is at the 12 o'clock position and measures 2.3 cm in size, the other at the 1 o'clock position measuring 1.3 cm in size.  There is concern that these could be a single mass biologically.  Clinically, there is what probably represents a 5 to 6 cm mass, though this is fairly subtle and could be related to recent biopsy.     Plan:  Lumpectomy, mastectomy, bilateral mastectomies, sentinel lymph node biopsy with possible axillary node dissection and reconstructive options have been offered and discussed at length.  I feel the cosmetic outcome with lumpectomy would be challenging.     Breast MRI:  yes  Oncology consult:  yes  Plastic surgery consult:  undecided  Radiation oncology consult: Undecided  Will discuss further when MRI complete.  This can be done either by phone or in person.  At this point, it is unclear whether the patient may be a candidate for neoadjuvant chemotherapy.  She certainly has a large area which will require resection.  It is possible that a quadrantectomy could be performed, though certainly consideration of mastectomy must also be given.        HPI:  Left breast mass a few months ago.  She is not aware of a significant change in size  Skin rashes, dimpling or nipple changes:  none  Nipple discharge:  none  Previous breast biopsies: Yes -the patient had a left breast biopsy for a mass in that region several years ago.  This biopsy yielded a diagnosis of atypical ductal hyperplasia.  Excisional biopsy was recommended, the but the patient canceled that surgery and elected for close mammographic follow-up.  The patient denies any family history of breast cancer.        Mammography shows a fair amount of density in the left breast, but it is difficult to delineate a specific mass.     US shows 2 separate  "lesions in the left breast.  One measures 2.3 cm at the 12 o'clock position 4 cm from the nipple.  The other measures 1.3 cm at the 1 o'clock position 2 cm from the nipple.     Percutaneous core needle biopsy, left: invasive lobular carcinoma, grade 1, ER +, TN +, [id number] -. The biology of both lesions appears identical.     Past Medical History:  has a past medical history of Complication of anesthesia, DDD (degenerative disc disease), lumbar, Endometriosis, Hypertension, Hypothyroidism, Seasonal allergies, and Spinal stenosis, lumbar.          ROS:  The 10 point review of systems is negative other than noted in the HPI and above.     PE:  Vitals: BP (!) 128/94   Pulse 78   Resp 16   Ht 1.6 m (5' 3\")   Wt 90.7 kg (200 lb)   SpO2 97%   BMI 35.43 kg/m    General appearance: well-nourished, sitting comfortably, no apparent distress  HEENT:  Head normocephalic and atraumatic, pupils equal and round, conjunctivae clear, mucous membranes moist, external ears and nose normal  Neck: Supple without thyromegaly or lymphadenopathy  Lungs: Respirations unlabored  Lymphatic: No cervical, or supraclavicular lymphadenopathy  Extremities: Without edema  Musculoskeletal:  Normal station and gait  Neurologic: nonfocal, grossly intact times four extremities, alert and oriented times three  Psychiatric: Mood and affect are appropriate  Skin: Without lesions or rashes  Breast:               Symmetrical with no skin or nipple changes.                Contour is normal.              Parenchyma is extremely dense.              Masses- left - 6 cm diameter              Ecchymosis- left              Incisional scar- none     Lymph:                         No supraclavicular/infraclavicular adenopathy.              Axillary adenopathy: none.  Exam is limited by body habitus.        Time spent today in chart review, patient examination and discussion, and documentation was 90 minutes.     Moris Ford MD    "

## 2022-01-08 ENCOUNTER — HEALTH MAINTENANCE LETTER (OUTPATIENT)
Age: 67
End: 2022-01-08

## 2022-01-24 ENCOUNTER — TRANSCRIBE ORDERS (OUTPATIENT)
Dept: OTHER | Age: 67
End: 2022-01-24
Payer: COMMERCIAL

## 2022-01-24 ENCOUNTER — TELEPHONE (OUTPATIENT)
Dept: INTERNAL MEDICINE | Facility: CLINIC | Age: 67
End: 2022-01-24
Payer: COMMERCIAL

## 2022-01-24 DIAGNOSIS — C50.912 MALIGNANT NEOPLASM OF LEFT FEMALE BREAST, UNSPECIFIED ESTROGEN RECEPTOR STATUS, UNSPECIFIED SITE OF BREAST (H): Primary | ICD-10-CM

## 2022-01-24 NOTE — TELEPHONE ENCOUNTER
Asking for a referral to  Dr Kenneth Cisneros at [facility]    For 2nd opinion for surgery      Zelda [phone] call when referral is sent so they can schedule  Ok to leave message

## 2022-01-24 NOTE — TELEPHONE ENCOUNTER
Spoke with Pt and stated Kenneth Cisneros MD at [facility] Appointment Line: [phone] is in the [facility] network.    If Dr. Michelle agrees, please place Order for second opinion about left breat surgery.    Lou Obregon, [facility] Referral Rep

## 2022-01-24 NOTE — TELEPHONE ENCOUNTER
I have put in the referral, please inform patient Kenneth Cisneros MD at [facility] Appointment Line: [phone]

## 2022-01-25 ENCOUNTER — PATIENT OUTREACH (OUTPATIENT)
Dept: ONCOLOGY | Facility: CLINIC | Age: 67
End: 2022-01-25
Payer: COMMERCIAL

## 2022-01-25 ENCOUNTER — HOSPITAL ENCOUNTER (OUTPATIENT)
Dept: MRI IMAGING | Facility: CLINIC | Age: 67
Discharge: HOME | End: 2022-01-25
Attending: SURGERY | Admitting: SURGERY
Payer: COMMERCIAL

## 2022-01-25 ENCOUNTER — TRANSCRIBE ORDERS (OUTPATIENT)
Dept: OTHER | Age: 67
End: 2022-01-25
Payer: COMMERCIAL

## 2022-01-25 DIAGNOSIS — C50.912 MALIGNANT NEOPLASM OF LEFT BREAST IN FEMALE, ESTROGEN RECEPTOR POSITIVE, UNSPECIFIED SITE OF BREAST (H): ICD-10-CM

## 2022-01-25 DIAGNOSIS — C50.919 BREAST CANCER (H): Primary | ICD-10-CM

## 2022-01-25 DIAGNOSIS — Z17.0 MALIGNANT NEOPLASM OF LEFT BREAST IN FEMALE, ESTROGEN RECEPTOR POSITIVE, UNSPECIFIED SITE OF BREAST (H): ICD-10-CM

## 2022-01-25 PROCEDURE — 255N000002 HC RX 255 OP 636: Performed by: SURGERY

## 2022-01-25 PROCEDURE — 77049 MRI BREAST C-+ W/CAD BI: CPT

## 2022-01-25 PROCEDURE — A9585 GADOBUTROL INJECTION: HCPCS | Performed by: SURGERY

## 2022-01-25 RX ORDER — GADOBUTROL 604.72 MG/ML
9 INJECTION INTRAVENOUS ONCE
Status: COMPLETED | OUTPATIENT
Start: 2022-01-25 | End: 2022-01-25

## 2022-01-25 RX ADMIN — GADOBUTROL 9 ML: 604.72 INJECTION INTRAVENOUS at 13:36

## 2022-01-25 NOTE — PROGRESS NOTES
New Patient Oncology Nurse Navigator Note     Referring provider: Eugene Sterling MD      Referring Clinic/Organization: [facility]     Referred to (specialty: Medical oncology     Requested provider (if applicable): Dr. Kenneth Cisneros     Date Referral Received: January 25, 2022     Evaluation for:  Breast cancer     Clinical History (per Nurse review of records provided):    Sammie had a bilateral diagnostic mammogram on 12/22/21 to evaluate a lump in the LEFT breast present for the past couple of months. On mammogram, focal asymmetries with associated architectural distortion are seen in the LEFT breast deep to the palpable marker, spanning approximately 12:00-1:00, 2-4 cm from the nipple. On ultrasound an irregularly-shaped hypoechoic mass is seen at 12:00 4 cm from the nipple on the LEFT. This measures 2.3 x 2.2 x 1.5 cm and accounts for the patient's palpable area of concern. Additionally, at 1:00 2 cm from the nipple on the LEFT is a second irregularly-shaped hypoechoic mass with indistinct margins measuring 1.3 x 1.1 x 0.8 cm.  LEFT axillary ultrasound survey demonstrates multiple normal-appearing lymph nodes.    1/3/22 -   A. Left breast, 12:00, 4.0 cm from nipple, ultrasound guided needle biopsy-  -Invasive lobular carcinoma, [place] grade 1/3([place] score 5/9) (please see comment)  -Lobular carcinoma in situ, classic subtype  -Estrogen receptor is positive (> 70%) and progesterone receptor is positive (>90%)  -Her 2 by FISH is ordered and pending and will be reported separately  B. Left breast, 1:00, 2.0 cm from nipple, ultrasound guided needle biopsy-  -Invasive lobular carcinoma, [place] grade 1/3([place] score 5/9) (please see comment)  -Lobular carcinoma in situ, classic subtype  -Estrogen receptor is positive (> 70%) and progesterone receptor is positive (>90%)  - Her 2 by FISH is ordered and pending and will be reported separately    HER2 by FISH   Block  A1: Group 5 (ELSA Negative)  Avg. number HER2 signals/nucleus: 2.0  Avg. number SEBASTIAN-17 signals/nucleus: 1.8  HER2/SEBASTIAN 17 ratio: 1.1  Block B1: Group 5 (ELSA Negative)  Avg. number HER2 signals/nucleus: 1.9  Avg. number SEBASTIAN-17 signals/nucleus: 1.8  HER2/SEBASTIAN 17 ratio: 1.1    Patient met with Dr. Moris Ford for surgical consult on 1/7/22.       1/25/22 - Bilateral breast MRI  Right Breast: There is mild background parenchymal enhancement.  There are no areas of suspicious enhancement or lymphadenopathy.  Left Breast: There is mild background parenchymal enhancement.  In the left breast at 12:00 4 cm from the nipple, there is a heterogeneously enhancing irregular mass measuring 3.4 cm AP by 2.8 cm ML by 2.8 cm SI, corresponding to the known biopsy-proven malignancy.  In the left breast at 1:00 2 cm from the nipple, there is a heterogeneously enhancing oval mass measuring 1.3 cm in maximal diameter; this likely corresponds to the second biopsy-proven malignancy. Taken together, the full extent of disease spans approximately 5.2 cm AP by 4.4 cm ML.     No axillary lymphadenopathy appreciated. No internal mammary lymphadenopathy.     Records Location: See Bookmarked material     Writer learned of this referral after 16:00 today.  Referral from 1/24/22 was apparently not in the [facility] Referrals WorkQ of [address]. Appears to have been entered as an external referral.  The referral placed today was seen successfully.     With assistance of [employer]  #[id number], telephoned and spoke with Sammie to assist in scheduling medical oncology consult with Dr. Kenneth Cisneros.  Appointment is on hold for 2/1 at 4:00pm and NPS will finalize this during office hours 1/26.  Sammie states she will also gain second opinion from Dr. Julienne Quispe at [facility] tomorrow.       Of note, the latest appointment of the day in Dr. Cisneros's schedule was utilized to allow patient's daughter to attend appointment. Eloisa Gutierrez  RN

## 2022-01-26 ENCOUNTER — TRANSFERRED RECORDS (OUTPATIENT)
Dept: HEALTH INFORMATION MANAGEMENT | Facility: CLINIC | Age: 67
End: 2022-01-26
Payer: COMMERCIAL

## 2022-01-26 ENCOUNTER — TRANSFERRED RECORDS (OUTPATIENT)
Dept: SURGERY | Facility: CLINIC | Age: 67
End: 2022-01-26
Payer: COMMERCIAL

## 2022-01-26 NOTE — TELEPHONE ENCOUNTER
RECORDS STATUS - BREAST    RECORDS REQUESTED FROM: [facility]   DATE REQUESTED: 1/27   NOTES DETAILS STATUS   OFFICE NOTE from referring provider [facility] Eugene Michelle MD   OFFICE NOTE from medical oncologist [facility]/Received 1/27 Dr. Julienne Quispe: 1/26/22   OFFICE NOTE from surgeon     OFFICE NOTE from radiation oncologist     DISCHARGE SUMMARY from hospital     DISCHARGE REPORT from the ER     OPERATIVE REPORT     MEDICATION LIST [facility] 1/6/22   CLINICAL TRIAL TREATMENTS TO DATE     LABS     REQUEST BLOCKS FOR ALL BREAST CANCER PTS     PATHOLOGY REPORTS  (Tissue diagnosis, Stage, ER/SC percentage positive and intensity of staining, HER2 IHC, FISH, and all biopsies from breast and any distant metastasis)                 [facility] 1/3/22: Surg Path/HER 2 PAOLA FISH   GENONOMIC TESTING     TYPE:   (Next Generation Sequencing, including Foundation One testing, and Oncotype score)     IMAGING (NEED IMAGES & REPORT)     CT SCANS     MRI PACS [facility]   MAMMO PACS Epic   ULTRASOUND PACS Epic   PET     BONE SCAN     BRAIN MRI       Action    Action Taken 1/26/22  Spoke w/ Kathryn @ [facility] - pt's note not signed off. They will try to send today, or tomorrow.  3:13 PM

## 2022-01-28 NOTE — PROGRESS NOTES
Sammie called to inform writer she will not need an  for her appointment with Dr. Cisneros.  She feels comfortable with her daughter interpreting for her.  I thanked her for her call and explained that Dr. Cisneros may still wish to have an .

## 2022-02-01 ENCOUNTER — ONCOLOGY VISIT (OUTPATIENT)
Dept: ONCOLOGY | Facility: CLINIC | Age: 67
End: 2022-02-01
Attending: INTERNAL MEDICINE
Payer: COMMERCIAL

## 2022-02-01 ENCOUNTER — PRE VISIT (OUTPATIENT)
Dept: ONCOLOGY | Facility: CLINIC | Age: 67
End: 2022-02-01
Payer: COMMERCIAL

## 2022-02-01 VITALS
RESPIRATION RATE: 14 BRPM | HEIGHT: 63 IN | TEMPERATURE: 97.7 F | OXYGEN SATURATION: 97 % | SYSTOLIC BLOOD PRESSURE: 123 MMHG | WEIGHT: 235.5 LBS | HEART RATE: 74 BPM | BODY MASS INDEX: 41.73 KG/M2 | DIASTOLIC BLOOD PRESSURE: 71 MMHG

## 2022-02-01 DIAGNOSIS — C50.412 MALIGNANT NEOPLASM OF UPPER-OUTER QUADRANT OF LEFT BREAST IN FEMALE, ESTROGEN RECEPTOR POSITIVE (H): ICD-10-CM

## 2022-02-01 DIAGNOSIS — D75.1 POLYCYTHEMIA: Primary | ICD-10-CM

## 2022-02-01 DIAGNOSIS — C50.919 BREAST CANCER (H): ICD-10-CM

## 2022-02-01 DIAGNOSIS — Z17.0 MALIGNANT NEOPLASM OF UPPER-OUTER QUADRANT OF LEFT BREAST IN FEMALE, ESTROGEN RECEPTOR POSITIVE (H): ICD-10-CM

## 2022-02-01 DIAGNOSIS — B19.10 HEPATITIS B INFECTION WITHOUT DELTA AGENT WITHOUT HEPATIC COMA, UNSPECIFIED CHRONICITY: ICD-10-CM

## 2022-02-01 PROCEDURE — G0463 HOSPITAL OUTPT CLINIC VISIT: HCPCS

## 2022-02-01 PROCEDURE — 99205 OFFICE O/P NEW HI 60 MIN: CPT | Performed by: INTERNAL MEDICINE

## 2022-02-01 ASSESSMENT — MIFFLIN-ST. JEOR: SCORE: 1581.6

## 2022-02-01 ASSESSMENT — PAIN SCALES - GENERAL: PAINLEVEL: NO PAIN (0)

## 2022-02-01 NOTE — LETTER
2022     RE: Ramona Ferrer  [address]  [place] [address]    Dear Colleague,    Thank you for referring your patient, Ramona Ferrer, to the [facility]. Please see a copy of my visit note below.    Dada Mendiola MD  [facility]  [address], [facility] [address]  [place] [address]    RE:  Ramona Ferrer  MRN:  [id number]  :  1955    Dear Dr. Mendiola:    I would like to refer to you Sammie Ferrer, a 66-year-old woman with a recent diagnosis of a stage IIB, T3 N0 MX, invasive lobular carcinoma of the left breast.  She self-palpated a lump in the upper outer quadrant of the left breast.  She underwent evaluation with a diagnostic mammogram and ultrasound, which was BI-RADS 4, showing in the upper outer quadrant of the left breast irregularly shaped hypoechoic mass at the 12 o'clock position 4 cm from the nipple on the left.  This mass measured 2.3 x 2.2 x 1.5 cm and accounted for the patient's area of palpable concern.  Additionally, at the 1 o'clock position 2 cm from the nipple in the left breast, there is a second irregularly shaped hypoechoic mass with indistinct margins measuring 1.3 x 1.1 x 0.8 cm.  She also underwent a breast MRI which showed in the right breast mild background parenchymal enhancement and no suspicious areas of enhancement or lymphadenopathy.  In the left breast there was mild background parenchymal enhancement, and at the 12 o'clock position 4 cm from the nipple there was a heterogeneously enhancing irregular mass measuring 3.4 in AP dimension x 2.8 cm ML and 2.8 cm SI corresponding to the known biopsy-proven malignancy in the left breast.  In the left breast at the 1 o'clock position 2 cm from the nipple there was also a heterogeneously enhancing oval mass measuring 1.3 cm in maximal diameter.  This likely corresponds to the second biopsy-proven malignancy.  Together, the full extent of the disease spans a  total of 5.2 cm AP x 4.4 cm ML, BI-RADS 6.    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  In part B in the left breast at the 1 o'clock position 2 cm from the nipple, ultrasound-guided needle biopsy showed invasive lobular carcinoma, Cathy grade 1/3 lobular carcinoma in situ was present, classic subtype, estrogen receptors positive in greater than 70% of the cells, progesterone receptors positive in greater than 90% of cells, and HER2 was nonamplified, Ki-67 was also ordered but is not yet reported.  She now comes to our clinic for recommendations.    Sammie was seen in clinic today with her daughter, Ivy, and was also seen with a relative in [place] who attended by cell phone with the patient's permission.    Sammie reports that the breast mass had been there for approximately 1 month.  She has no pain, mild fatigue and denies depression or anxiety.    She has worked in the past as a personal care assistant and lives in the [facility].  Her daughter accompanied her to the visit.  Her sister who is in [place] also attended by [email or web address].     Sammie has had no weight loss.  Her diet has not changed.  No loss of energy.  She does not sleep during the day and can perform all of her household chores.  She has a self-palpated mass in the upper outer quadrant of the left breast adjacent to the nipple areolar complex.  She has not palpated any masses in the right breast.    REVIEW OF SYSTEMS:  She denies fever, headache, cough, chest pain, shortness of breath, hemoptysis, loss of appetite, nausea, vomiting, abdominal pain, constipation, diarrhea, bone pain, back pain, muscle or joint complaints, numbness  "or tingling in the hands and feet, hearing loss or depression.  Remainder of a 14-point review of systems was negative.    PAST MEDICAL HISTORY:  There is no history of breast cancer in the past.  No history of breast cancer surgery.  She has no history of radiation therapy in the past or radiation exposure.  No history of prior tumor of any kind.  She denies heart problems, heart attack, breathing problems, blood clots, seizures, peptic ulcer disease, osteoporosis or bone fractures.  She does report a history of arthritis.    FAMILY HISTORY:  Positive for breast cancer in a paternal aunt who was diagnosed with breast cancer at an old age.    Her father has a history of stomach cancer.  There is no family history of pancreatic cancer, melanoma, lung cancer or ovarian cancer.    No history of male breast cancer.    ALLERGIES:  No history of drug allergies.  She denies allergies to seafood, iodine, or contrast dye.    PAST MENSTRUAL HISTORY:  She has been pregnant 3 times with 2 live births at age 29 and 31 and 1 miscarriage.  Age at first menstrual period was 14.  She did have a total abdominal hysterectomy and bilateral salpingo-oophorectomy performed in 2003 for endometriosis.  She has no history of hormone replacement therapy.  No history of oral contraceptives.    HABITS: She denies tobacco history.  She denies alcohol history and drinks alcohol only occasionally.    PAST MEDICAL HISTORY:  Past medical history is also remarkable for type 2 diabetes, and she was begun on metformin 2 years ago.  She also has a history of varicose veins and a history of a lipoma resected from her left leg.     Chronic Hepatitis B.  Hepatitis C negative.  HIV negative.      Prior COVID 6-14-21    PHYSICAL EXAMINATION:    VITAL SIGNS:  /71 (BP Location: Right arm, Patient Position: Sitting, Cuff Size: Adult Large)   Pulse 74   Temp 97.7  F (36.5  C) (Oral)   Resp 14   Ht 1.591 m (5' 2.64\")   Wt 106.8 kg (235 lb 8 oz)   " SpO2 97%   BMI 42.20 kg/m    GENERAL:  Sammie appeared generally well.  She had no alopecia.  HEENT:  Examination of the oropharynx is without lesions.  There is somewhat poor dentition.  LYMPH:  There is no cervical, supraclavicular, subclavicular or axillary lymphadenopathy.  BREASTS:  Examination of the right breast reveals no masses.  Examination of the left breast revealed a 4 x 4 cm mass which is mobile in the left breast centered 2 cm from the nipple areolar complex in the upper outer quadrant of the left breast at the 2 o'clock position.  LUNGS:  Clear to percussion and auscultation.  HEART:  There is a regular rate and rhythm, S1, S2.  ABDOMEN:  Soft, nontender, consistent with increased body mass index.  EXTREMITIES:  Without edema.  PSYCH:  Mood and affect were normal.    LABORATORY DATA:  CMP from 12/27/2021 was remarkable for an ALT of 54 and was otherwise normal.  CBC from 12/27/2021 showed WBC of 5.4, hemoglobin 14.0 and platelets of 262,000.  She has had intermittent findings of elevated hemoglobin, and the hemoglobin was 17.5 on 12/13/2021    ASSESSMENT AND PLAN:  1.  Sammie Ferrer is a 66-year-old woman with a recent diagnosis of a stage IIB, T3 N0 MX, invasive lobular carcinoma of the upper outer quadrant of the left breast which is multifocal grade 1, ER positive in greater than 70% of cells, MN positive in greater than 90% of cells and HER2 nonamplified.  She now comes to clinic with her daughter, Ivy, for recommendations regarding evaluation and treatment.  Her daughter wanted to serve as the [place]  for this visit, which was complex because of her sister also speaking in [place] by [employer].  There appeared to be good agreement and understanding between Sammie's relatives in terms of my explanations and appropriate questions were asked.   2.  I discussed with Ivy and her daughter that I do recommend a MammaPrint test, which can help risk stratify her breast cancer.  I  discussed that even though the majority of lobular breast cancers are not responsive to chemotherapy and this is a grade 1 tumor, it would be very reasonable to send a MammaPrint test because it provides personalized data on risk.  She was also presented the option of the I-SPY-2 clinical trial where she could potentially be found eligible for the main trial or the endocrine optimization protocol (EOP).  The arm that is open right now is amcenestrant as a single agent.  I discussed the clinical trial screening consent process, and we would need to have a [place] consent form as well as a [place]  present at the time of the consent process.  I did go over this with a Stefania Alston, our I-SPY nurse coordinator.  After some discussion, Sammie in collaboration with her daughter and also with the relative in [place] decided that she would not want to participate in a clinical trial and would like standard-of-care therapy.   3.  Discussion of standard of care therapy.  I discussed with Sammie that there are 2 potential options that would be standard of care.  One is to go directly to a mastectomy, which would be the most likely recommended surgery because of the large and multifocal primary and the other would be to undergo neoadjuvant endocrine therapy.  I discussed that giving adjuvant therapy or neoadjuvant therapy results in similar outcomes in breast cancer, but the advantage of giving neoadjuvant therapy is that it is possible to tell whether the breast cancer in a personalized way is responsive to the treatment agent.  I discussed that neoadjuvant endocrine therapy is unlikely to change the surgery plan because the degree of reduction of the primary tumor is likely to be modest.  I discussed that off trial, a reasonable neoadjuvant option would be letrozole, which is an aromatase inhibitor.  I do not favor offering exemestane because exemestane appeared to have less activity against lobular breast cancer  in the MA.27 trial.    4.  Patient preference. Sammie is skeptical about whether she wants neoadjuvant therapy, but after some discussion, she and her daughter were in agreement that she undergo a MammaPrint test.  I did discuss with them that this can take 10 business days to come back.  They understand that and they understand that beginning treatment 3-4 weeks hence would be reasonable in terms of a treatment plan and would not be associated with increased risk from her breast cancer.  I discussed that the information from the MammaPrint could be valuable as evidenced by the I-SPY 2 experience, even if she does not go on the trial.  She is in agreement with this approach.    5.  Discussion of a surgical consultation.  I recommended a surgical consultation with Dr. Dada Mendiola.  6.  Discussion of genetics.  I do recommend germ line genetic testing.  She does have a family history positive for breast cancer in a second-degree relative.  I did discuss that genetics could be useful to identify BRCA mutations or other mutations associated with familial breast cancer risk.  She would like to proceed with that.  7.  Discussion of exercise.  I recommended 150 minutes of exercise per week as recommended by the LACE and PATHWAY studies where such exercise was associated with decreased risk of recurrence of breast cancer.  8.  Discussion of diet.  I recommended a diet that is Mediterranean in style, low in saturated fat but not low in fat, with more fruits and vegetables and less in the way of red meat and more fish.  9.  Discussion of bone health.  I discussed that I do recommend calcium 1000 mg per day and vitamin D3 2000 International Units per day along with weightbearing exercise and check of a baseline DEXA scan.  Ramona is in agreement with this plan.  10.  Chronic Hepatitis B.  Hepatology consult in 1 to 2 weeks.    11.  Followup.  We will see Sammie in followup in our clinic in 3 weeks to go over the results of the  MammaPrint test.  All of her questions were answered.  Follow up with me February 22 with CBC, CMP. Genetics referral. Serum epo level. Hepatitis delta antibody. GI consult with Dr. Mccray in next week or so.    Thank you for allowing us to participate in Ramona Ferrre's care.    Sincerely,    Kenneth Cisneros M.D.   Professor   Division of Hematology, Oncology, Transplant   Department of Medicine   [facility] [employer]   [phone]     ADDENDUM:    This addendum is performed to incorporate results of Ki-67 immunostains performed on specimen A&B.  Ki-67 shows labeling of 30-40% of tumor nuclei in specimen A and 10 % labeling of tumor nuclei in specimen B.  In the ALTERNATE trial patients with Ki67 of greater than 10% were triaged to chemotherapy.   I requested that the MammaPrint be sent on Specimen A.      I spent 70 minutes with the patient more than 50% of which was in counseling and coordination of care.

## 2022-02-01 NOTE — NURSING NOTE
"Oncology Rooming Note    February 1, 2022 4:15 PM   Ramona Ferrer is a 66 year old female who presents for:    Chief Complaint   Patient presents with     Oncology Clinic Visit     BREAST CANCER     Initial Vitals: /71 (BP Location: Right arm, Patient Position: Sitting, Cuff Size: Adult Large)   Pulse 74   Temp 97.7  F (36.5  C) (Oral)   Resp 14   Ht 1.591 m (5' 2.64\")   Wt 106.8 kg (235 lb 8 oz)   SpO2 97%   BMI 42.20 kg/m   Estimated body mass index is 42.2 kg/m  as calculated from the following:    Height as of this encounter: 1.591 m (5' 2.64\").    Weight as of this encounter: 106.8 kg (235 lb 8 oz). Body surface area is 2.17 meters squared.  No Pain (0) Comment: Data Unavailable   No LMP recorded. Patient has had a hysterectomy.  Allergies reviewed: Yes  Medications reviewed: Yes    Medications: Medication refills not needed today.  Pharmacy name entered into [facility]:    [id number] - [place], [place] - [address]  [id number] - [place], [place] - [address]    Clinical concerns: None.        Jessenia Thomas CMA            "

## 2022-02-02 DIAGNOSIS — H00.029 MEIBOMIANITIS, UNSPECIFIED LATERALITY: ICD-10-CM

## 2022-02-02 LAB
PATH REPORT.ADDENDUM SPEC: NORMAL
PATH REPORT.COMMENTS IMP SPEC: NORMAL
PATH REPORT.FINAL DX SPEC: NORMAL
PATH REPORT.GROSS SPEC: NORMAL
PATH REPORT.MICROSCOPIC SPEC OTHER STN: NORMAL
PATH REPORT.MICROSCOPIC SPEC OTHER STN: NORMAL
PHOTO IMAGE: NORMAL

## 2022-02-02 PROCEDURE — 88360 TUMOR IMMUNOHISTOCHEM/MANUAL: CPT | Mod: 26 | Performed by: PATHOLOGY

## 2022-02-02 RX ORDER — DOXYCYCLINE 50 MG/1
CAPSULE ORAL
Qty: 60 CAPSULE | Refills: 0 | Status: SHIPPED | OUTPATIENT
Start: 2022-02-02 | End: 2022-03-15

## 2022-02-02 NOTE — PROGRESS NOTES
Dada Mendiola MD  [facility]  [address] [place] [address], [facility] [address]  [place] [address]    RE:  Ramona Ferrer  MRN:  [id number]  :  1955    Dear Dr. Mendiola:    I would like to refer to you Sammie Ferrer, a 66-year-old woman with a recent diagnosis of a stage IIB, T3 N0 MX, invasive lobular carcinoma of the left breast.  She self-palpated a lump in the upper outer quadrant of the left breast.  She underwent evaluation with a diagnostic mammogram and ultrasound, which was BI-RADS 4, showing in the upper outer quadrant of the left breast irregularly shaped hypoechoic mass at the 12 o'clock position 4 cm from the nipple on the left.  This mass measured 2.3 x 2.2 x 1.5 cm and accounted for the patient's area of palpable concern.  Additionally, at the 1 o'clock position 2 cm from the nipple in the left breast, there is a second irregularly shaped hypoechoic mass with indistinct margins measuring 1.3 x 1.1 x 0.8 cm.  She also underwent a breast MRI which showed in the right breast mild background parenchymal enhancement and no suspicious areas of enhancement or lymphadenopathy.  In the left breast there was mild background parenchymal enhancement, and at the 12 o'clock position 4 cm from the nipple there was a heterogeneously enhancing irregular mass measuring 3.4 in AP dimension x 2.8 cm ML and 2.8 cm SI corresponding to the known biopsy-proven malignancy in the left breast.  In the left breast at the 1 o'clock position 2 cm from the nipple there was also a heterogeneously enhancing oval mass measuring 1.3 cm in maximal diameter.  This likely corresponds to the second biopsy-proven malignancy.  Together, the full extent of the disease spans a total of 5.2 cm AP x 4.4 cm ML, BI-RADS 6.    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  In part B in the left breast at the 1 o'clock position 2 cm from the nipple, ultrasound-guided needle biopsy showed invasive lobular carcinoma, Cathy grade 1/3 lobular carcinoma in situ was present, classic subtype, estrogen receptors positive in greater than 70% of the cells, progesterone receptors positive in greater than 90% of cells, and HER2 was nonamplified, Ki-67 was also ordered but is not yet reported.  She now comes to our clinic for recommendations.    Sammie was seen in clinic today with her daughter, Ivy, and was also seen with a relative in [place] who attended by cell phone with the patient's permission.    Sammie reports that the breast mass had been there for approximately 1 month.  She has no pain, mild fatigue and denies depression or anxiety.    She has worked in the past as a personal care assistant and lives in the [facility].  Her daughter accompanied her to the visit.  Her sister who is in [place] also attended by [employer].     Sammie has had no weight loss.  Her diet has not changed.  No loss of energy.  She does not sleep during the day and can perform all of her household chores.  She has a self-palpated mass in the upper outer quadrant of the left breast adjacent to the nipple areolar complex.  She has not palpated any masses in the right breast.    REVIEW OF SYSTEMS:  She denies fever, headache, cough, chest pain, shortness of breath, hemoptysis, loss of appetite, nausea, vomiting, abdominal pain, constipation, diarrhea, bone pain, back pain, muscle or joint complaints, numbness or tingling in the hands and feet, hearing loss or depression.  Remainder of a 14-point review of systems was negative.    PAST MEDICAL HISTORY:  There is no history of breast cancer in the past.  No history of breast cancer surgery.  She has no  "history of radiation therapy in the past or radiation exposure.  No history of prior tumor of any kind.  She denies heart problems, heart attack, breathing problems, blood clots, seizures, peptic ulcer disease, osteoporosis or bone fractures.  She does report a history of arthritis.    FAMILY HISTORY:  Positive for breast cancer in a paternal aunt who was diagnosed with breast cancer at an old age.    Her father has a history of stomach cancer.  There is no family history of pancreatic cancer, melanoma, lung cancer or ovarian cancer.    No history of male breast cancer.    ALLERGIES:  No history of drug allergies.  She denies allergies to seafood, iodine, or contrast dye.    PAST MENSTRUAL HISTORY:  She has been pregnant 3 times with 2 live births at age 29 and 31 and 1 miscarriage.  Age at first menstrual period was 14.  She did have a total abdominal hysterectomy and bilateral salpingo-oophorectomy performed in 2003 for endometriosis.  She has no history of hormone replacement therapy.  No history of oral contraceptives.    HABITS: She denies tobacco history.  She denies alcohol history and drinks alcohol only occasionally.    PAST MEDICAL HISTORY:  Past medical history is also remarkable for type 2 diabetes, and she was begun on metformin 2 years ago.  She also has a history of varicose veins and a history of a lipoma resected from her left leg.     Chronic Hepatitis B.  Hepatitis C negative.  HIV negative.      Prior COVID 6-14-21    GERM LINE GENETICS: INVITAE testing of 47 genes was negative.     PHYSICAL EXAMINATION:    VITAL SIGNS:  /71 (BP Location: Right arm, Patient Position: Sitting, Cuff Size: Adult Large)   Pulse 74   Temp 97.7  F (36.5  C) (Oral)   Resp 14   Ht 1.591 m (5' 2.64\")   Wt 106.8 kg (235 lb 8 oz)   SpO2 97%   BMI 42.20 kg/m    GENERAL:  Sammie appeared generally well.  She had no alopecia.  HEENT:  Examination of the oropharynx is without lesions.  There is somewhat poor " dentition.  LYMPH:  There is no cervical, supraclavicular, subclavicular or axillary lymphadenopathy.  BREASTS:  Examination of the right breast reveals no masses.  Examination of the left breast revealed a 4 x 4 cm mass which is mobile in the left breast centered 2 cm from the nipple areolar complex in the upper outer quadrant of the left breast at the 2 o'clock position.  LUNGS:  Clear to percussion and auscultation.  HEART:  There is a regular rate and rhythm, S1, S2.  ABDOMEN:  Soft, nontender, consistent with increased body mass index.  EXTREMITIES:  Without edema.  PSYCH:  Mood and affect were normal.    LABORATORY DATA:  CMP from 12/27/2021 was remarkable for an ALT of 54 and was otherwise normal.  CBC from 12/27/2021 showed WBC of 5.4, hemoglobin 14.0 and platelets of 262,000.  She has had intermittent findings of elevated hemoglobin, and the hemoglobin was 17.5 on 12/13/2021      ASSESSMENT AND PLAN:  1.  Sammie Ferrer is a 66-year-old woman with a recent diagnosis of a stage IIB, T3 N0 MX, invasive lobular carcinoma of the upper outer quadrant of the left breast which is multifocal grade 1, ER positive in greater than 70% of cells, PA positive in greater than 90% of cells and HER2 nonamplified.  She now comes to clinic with her daughter, Ivy, for recommendations regarding evaluation and treatment.  Her daughter wanted to serve as the [place]  for this visit, which was complex because of her sister also speaking in [place] by [employer].  There appeared to be good agreement and understanding between Sammie's relatives in terms of my explanations and appropriate questions were asked.   2.  I discussed with Ivy and her daughter that I do recommend a MammaPrint test, which can help risk stratify her breast cancer.  I discussed that even though the majority of lobular breast cancers are not responsive to chemotherapy and this is a grade 1 tumor, it would be very reasonable to send a MammaPrint  test because it provides personalized data on risk.  She was also presented the option of the I-SPY-2 clinical trial where she could potentially be found eligible for the main trial or the endocrine optimization protocol (EOP).  The arm that is open right now is amcenestrant as a single agent.  I discussed the clinical trial screening consent process, and we would need to have a [place] consent form as well as a [place]  present at the time of the consent process.  I did go over this with a Stefania Alston, our I-SPY nurse coordinator.  After some discussion, Sammie in collaboration with her daughter and also with the relative in [place] decided that she would not want to participate in a clinical trial and would like standard-of-care therapy.   3.  Discussion of standard of care therapy.  I discussed with Sammie that there are 2 potential options that would be standard of care.  One is to go directly to a mastectomy, which would be the most likely recommended surgery because of the large and multifocal primary and the other would be to undergo neoadjuvant endocrine therapy.  I discussed that giving adjuvant therapy or neoadjuvant therapy results in similar outcomes in breast cancer, but the advantage of giving neoadjuvant therapy is that it is possible to tell whether the breast cancer in a personalized way is responsive to the treatment agent.  I discussed that neoadjuvant endocrine therapy is unlikely to change the surgery plan because the degree of reduction of the primary tumor is likely to be modest.  I discussed that off trial, a reasonable neoadjuvant option would be letrozole, which is an aromatase inhibitor.  I do not favor offering exemestane because exemestane appeared to have less activity against lobular breast cancer in the [place].27 trial.    4.  Patient preference. Sammie is skeptical about whether she wants neoadjuvant therapy, but after some discussion, she and her daughter were in agreement  that she undergo a MammaPrint test.  I did discuss with them that this can take 10 business days to come back.  They understand that and they understand that beginning treatment 3-4 weeks hence would be reasonable in terms of a treatment plan and would not be associated with increased risk from her breast cancer.  I discussed that the information from the MammaPrint could be valuable as evidenced by the I-SPY 2 experience, even if she does not go on the trial.  She is in agreement with this approach.    5.  Discussion of a surgical consultation.  I recommended a surgical consultation with Dr. Dada Mendiola.  6.  Discussion of genetics.  I do recommend germ line genetic testing.  She does have a family history positive for breast cancer in a second-degree relative.  I did discuss that genetics could be useful to identify BRCA mutations or other mutations associated with familial breast cancer risk.  She would like to proceed with that.  7.  Discussion of exercise.  I recommended 150 minutes of exercise per week as recommended by the LACE and PATHWAY studies where such exercise was associated with decreased risk of recurrence of breast cancer.  8.  Discussion of diet.  I recommended a diet that is Mediterranean in style, low in saturated fat but not low in fat, with more fruits and vegetables and less in the way of red meat and more fish.  9.  Discussion of bone health.  I discussed that I do recommend calcium 1000 mg per day and vitamin D3 2000 International Units per day along with weightbearing exercise and check of a baseline DEXA scan.  Ramona is in agreement with this plan.  10.  Chronic Hepatitis B.  Hepatology consult in 1 to 2 weeks.    11.  Followup.  We will see Sammie in followup in our clinic in 3 weeks to go over the results of the MammaPrint test.  All of her questions were answered.  Follow up with me February 22 with CBC, CMP. Genetics referral. Serum epo level. Hepatitis delta antibody. GI consult with  Dr. Mccray in next week or so.    Thank you for allowing us to participate in Ramona Ferrer's care.    Sincerely,    Kenneth Cisneros M.D.   Professor   Division of Hematology, Oncology, Transplant   Department of Medicine   [facility] [employer]   [phone]     ADDENDUM:    This addendum is performed to incorporate results of Ki-67 immunostains performed on specimen A&B.  Ki-67 shows labeling of 30-40% of tumor nuclei in specimen A and 10 % labeling of tumor nuclei in specimen B.  In the ALTERNATE trial patients with Ki67 of greater than 10% were triaged to chemotherapy.   I requested that the MammaPrint be sent on Specimen A.      A.  Left breast, 12:00, 4.0 cm from nipple, ultrasound guided needle biopsy-  -Invasive lobular carcinoma, Cathy grade 1/3(Cathy score 5/9) (please see comment)  -Lobular carcinoma in situ, classic subtype  -Estrogen receptor is positive (> 70%) and progesterone receptor is positive (>90%)  -Her 2 by FISH is ordered and pending and will be reported separately      B. Left breast, 1:00, 2.0 cm from nipple, ultrasound guided needle biopsy-  -Invasive lobular carcinoma, Cathy grade 1/3(Cathy score 5/9) (please see comment)  -Lobular carcinoma in situ, classic subtype  -Estrogen receptor is positive (> 70%) and progesterone receptor is positive (>90%)  - Her 2 by FISH is ordered and pending and will be reported separately     ADDENDUM2:  Liver MRI 3-2-22  IMPRESSION:  1.  Diffuse fatty infiltration of the liver and probable hepatomegaly.  2.  No suspicious liver lesions are identified.     HYACINTH JARRELL MD     I spent 70 minutes with the patient more than 50% of which was in counseling and coordination of care.

## 2022-02-02 NOTE — TELEPHONE ENCOUNTER
Routing refill request to provider for review/approval because:  Drug not on the [facility] refill protocol   Sherman Gaffney RN, BSN

## 2022-02-02 NOTE — PROGRESS NOTES
Referral received from Dr. Kenneth Cisneros for patient to see Dr. Dada Mendiola for cancer surgery consultation.  Chart reviewed and instructions sent to NPS to book next available with Dr. Mendiola at  for 40 minutes OR Dr. Mendiola at [facility] for 45 minutes in person or VIDEO. Eloisa Gutierrez RN

## 2022-02-04 ENCOUNTER — TUMOR CONFERENCE (OUTPATIENT)
Dept: ONCOLOGY | Facility: CLINIC | Age: 67
End: 2022-02-04
Payer: COMMERCIAL

## 2022-02-06 ENCOUNTER — PATIENT OUTREACH (OUTPATIENT)
Dept: ONCOLOGY | Facility: CLINIC | Age: 67
End: 2022-02-06
Payer: COMMERCIAL

## 2022-02-06 DIAGNOSIS — C50.412 MALIGNANT NEOPLASM OF UPPER-OUTER QUADRANT OF LEFT BREAST IN FEMALE, ESTROGEN RECEPTOR POSITIVE (H): Primary | ICD-10-CM

## 2022-02-06 DIAGNOSIS — Z17.0 MALIGNANT NEOPLASM OF UPPER-OUTER QUADRANT OF LEFT BREAST IN FEMALE, ESTROGEN RECEPTOR POSITIVE (H): Primary | ICD-10-CM

## 2022-02-07 ENCOUNTER — PATIENT OUTREACH (OUTPATIENT)
Dept: ONCOLOGY | Facility: CLINIC | Age: 67
End: 2022-02-07

## 2022-02-07 DIAGNOSIS — B18.1 CHRONIC VIRAL HEPATITIS B WITHOUT DELTA AGENT AND WITHOUT COMA (H): Primary | ICD-10-CM

## 2022-02-07 NOTE — PROGRESS NOTES
Returned call to patient's daughter Zelda and left a VM to return call to writer with questions regarding testing on previous breast cancer tissue. Irene Todd RN, BSN Breast Center Nurse Coordinator   Patient's daughter returned call and discussed Mammaprint was ordered and there is a return visit 2/22/2022 to discuss the result. Patient's daughter will return call to writer on 2/21/2022 to verify the Mammaprint is resulted out.  Answered all patient's daughter's questions and verbalized understanding. Irene Todd RN, BSN.

## 2022-02-08 NOTE — PROGRESS NOTES
Telephoned and spoke with patient's daughter Zelda Ferrer regarding time frame for surgical consult with Dr. Mendiola, currently scheduled on 2/18.  Dr. Cisneros's RNCC Irene helped them understand Mammaprint timing yesterday, and they now are content with seeing Dr. Mendiola on 2/18.  Eloisa Gutierrez RN

## 2022-02-09 ENCOUNTER — VIRTUAL VISIT (OUTPATIENT)
Dept: ONCOLOGY | Facility: CLINIC | Age: 67
End: 2022-02-09
Attending: INTERNAL MEDICINE
Payer: COMMERCIAL

## 2022-02-09 DIAGNOSIS — Z17.0 MALIGNANT NEOPLASM OF UPPER-OUTER QUADRANT OF LEFT BREAST IN FEMALE, ESTROGEN RECEPTOR POSITIVE (H): Primary | ICD-10-CM

## 2022-02-09 DIAGNOSIS — C50.412 MALIGNANT NEOPLASM OF UPPER-OUTER QUADRANT OF LEFT BREAST IN FEMALE, ESTROGEN RECEPTOR POSITIVE (H): Primary | ICD-10-CM

## 2022-02-09 DIAGNOSIS — Z80.3 FAMILY HISTORY OF MALIGNANT NEOPLASM OF BREAST: ICD-10-CM

## 2022-02-09 DIAGNOSIS — Z80.0 FAMILY HISTORY- STOMACH CANCER: ICD-10-CM

## 2022-02-09 PROCEDURE — 96040 HC GENETIC COUNSELING, EACH 30 MINUTES: CPT | Mod: TEL,95 | Performed by: GENETIC COUNSELOR, MS

## 2022-02-09 NOTE — LETTER
2/9/2022         RE: Ramona Ferrer  [address]  [facility] [address]        Dear Colleague,    Thank you for referring your patient, Ramona Ferrer, to the [facility]. Please see a copy of my visit note below.    2/9/2022    Sammie is a 66 year old who is being evaluated via a billable telephone visit.      What phone number would you like to be contacted at? [phone]  How would you like to obtain your AVS? Jennahart    Phone call duration: 60 minutes    Referring Provider: Kenneth Cisneros MD    Present for Today's Visit: Ramona, and Kyrgyz      Presenting Information:   I met with Ramona Ferrer today for genetic counseling (telephone visit due to covid19) to discuss her personal and family history of cancer.  She is here today to review this history, cancer screening recommendations, and available genetic testing options.    Personal History:  Ramona is a 66 year old female. She was recently diagnosed with a breast cancer; biopsy on 1/3/2022 revealed multifocal invasive lobular carcinoma. She has met with Dr. Cisneros and Mammaprint testing is in progress. She plans to follow up with him again on 2/22/2022 for treatment planning. She has an surgical consult with  scheduled for 2/18/2022.       She had her first menstrual period at age 14, her first child at age 30, and went through post-surgical menopause at age 48 after her total abdominal hysterectomy and bilateral salpingo-oophorectomy due to endometriosis. She reports no history of oral contraceptive use and that she has never been on hormone replacement therapy.      She has had three colonoscopies in 2009 (one tubulovillous adenoma removed), 2012 (normal), and 2019 (three tubular adenomas and hyperplastic polyps removed). Follow-up has been recommended in 3 years.  She does not regularly do any other cancer screening at this time.  Ramona reported no tobacco use, and social  alcohol use.    Family History: Cancer family history and pertinent information reviewed below (Please see scanned pedigree for detailed family history information)    Mother passed at age 95 with a history of skin cancer (non-melanoma) diagnosed in her 90's.     Maternal grandfather passed at age 68 from stomach cancer diagnosed at age 68.    Maternal grandfather's brother had cancer history, although Sammie is unsure of the specific diagnosis.     Father passed at age 68 from stomach cancer diagnosed at age 66.     Paternal uncle passed at age 80 from an unspecified type of cancer.     Paternal aunt, age 84, has a history of breast cancer diagnosed at age 80; treatment included unilateral mastectomy.     Paternal grandfather passed in his 70's from stomach cancer diagnosed in his 70's.    Her maternal ethnicity is [place]. Her paternal ethnicity is [place].  There is no known Ashkenazi Sikhism ancestry on either side of her family. There is no reported consanguinity.    Discussion:    We discussed that Ramona's personal and family history of breast cancer and family history of stomach cancer may be suggestive of a hereditary cancer syndrome. While not overly concerning for a hereditary cancer syndrome, she has a relatively limited family structure on her maternal side, and the specific types of stomach cancer for her relatives are unknown and may have been intestinal vs diffuse. She does report that her father, paternal grandfather and maternal grandfather all passed relatively quickly after being diagnosed.      We reviewed the features of sporadic, familial, and hereditary cancers. In looking at Ramona's family history, it is possible that a cancer susceptibility gene is present due to her lobular breast cancer diagnosis, her paternal aunt's breast cancer history, and her father's and paternal grandfather's stomach cancer diagnosis as well as her maternal grandfather's stomach cancer history.     It would be  useful to know if Ramona could find out whether any of her relatives with stomach cancer were diagnosed with the diffuse type of stomach cancer as her personal and family history would be concerning for Hereditary Diffuse Gastric Cancer syndrome, however all relatives with stomach cancer history have passed; so records may be difficult to track down, and it would not be feasible to hold genetic testing for Ramona as results for her may have a significant impact on her treatment plan.     We discussed the natural history and genetics of hereditary cancers. A detailed handout regarding the information we discussed will be sent to Ramona via [employer]. Topics included: inheritance pattern, cancer risks, cancer screening recommendations, and also risks, benefits and limitations of testing.  We reviewed that the most common cause of hereditary breast cancer is Hereditary Breast and Ovarian Cancer (HBOC) syndrome, which is caused by mutations in the genes BRCA1 and BRCA2. Women with a mutation in either of these genes are at increased risk for breast and ovarian cancer. There is also an increased risk for a second primary breast cancer for women. Men with a mutation in either BRCA1 or BRCA2 are at increased risk for male breast and prostate cancer. Both women and men may also be at increased risk for pancreatic cancer and melanoma.   Additionally, we discussed Hereditary Diffuse Gastric Cancer (HDGC), caused by CDH1 gene mutations.  HDGC is associated with increased risks for diffuse gastric cancer and lobular breast cancer.  Diffuse gastric cancer infiltrates the stomach wall without forming a distinct mass.  Women with HDGC are at increased risk for lobular breast cancer.     Ramona's current personal and family history does not meet strict National Comprehensive Cancer Network guidelines (NCCN) guidelines for genetic testing for high-penetrance breast cancer susceptibility genes, however, given that the types of stomach  cancer her multiple relatives had is unknown and that her maternal family structure is relatively small, it would be reasonable for Ramona to pursue genetic testing herself.     Additionally, according to the American Society of Breast Surgeons Consensus Guideline on Genetic Testing for Hereditary Breast Cancer, genetic testing should be available to all patients who have been diagnosed with breast cancer.  The guidelines state that testing should include BRCA1, BRCA2, and PALB2, and that additional testing may be warranted based on personal and/or family history.     We discussed that there are additional genes that could cause increased risk for breast and gastrointestinal cancers cancer. As many of these genes present with overlapping features in a family and accurate cancer risk cannot always be established based upon the pedigree analysis alone, it would be reasonable for Ramona to consider panel genetic testing to analyze multiple genes at once.    We discussed genetic testing options for Ramona given her personal and family history including a targeted panel of genes associated with increased risk for breast and stomach cancers ([facility] custom panel) as well as expanded options including genes associated with common hereditary cancers ([facility] Common Hereditary Cancers panel; 47 genes) or genes associated with common and rare hereditary cancers ([facility] Multi-Cancer panel; 84 genes). After our discussion, Ramona opted to proceed with testing via the Common Hereditary Cancers panel through [facility].  Genetic testing is available for 47 genes associated with cancers of the breast, ovary, uterus, prostate and gastrointestinal system: [facility] Common Hereditary Cancers panel (APC, XIOMARA, AXIN2, BARD1, BMPR1A, BRCA1, BRCA2, BRIP1, CDH1, CDK4, CDKN2A, CHEK2, CTNNA1, DICER1, EPCAM, GREM1, HOXB13, KIT, MEN1, MLH1, MSH2, MSH3, MSH6, MUTYH, NBN, NF1, NTHL1, PALB2, PDGFRA, PMS2, POLD1, POLE, PTEN,RAD50, RAD51C, RAD51D,  SDHA, SDHB, SDHC, SDHD, SMAD4, SMARCA4, STK11, TP53,TSC1, TSC2, VHL).    We discussed that many of these genes are associated with specific hereditary cancer syndromes and published management guidelines: Hereditary Breast and Ovarian Cancer syndrome (BRCA1, BRCA2), Farooq syndrome (MLH1, MSH2, MSH6, PMS2, EPCAM), Familial Adenomatous Polyposis (APC), Hereditary Diffuse Gastric Cancer (CDH1), Familial Atypical Multiple Mole Melanoma syndrome (CDK4, CDKN2A), Multiple Endocrine Neoplasia type 1 (MEN1), Juvenile Polyposis syndrome (BMPR1A, SMAD4), Cowden syndrome (PTEN), Li Fraumeni syndrome (TP53), Hereditary Paraganglioma and Pheochromocytoma syndrome (SDHA, SDHB, SDHC, SDHD), Peutz-Jeghers syndrome (STK11), MUTYH Associated Polyposis (MUTYH), Tuberous sclerosis complex (TSC1, TSC2), Von Hippel-Lindau disease (VHL), and Neurofibromatosis type 1 (NF1).   The XIOMARA, AXIN2, BRIP1, CHEK2, GREM1, MSH3, NBN, NTHL1, PALB2, POLD1, POLE, RAD51C, and RAD51D genes are associated with increased cancer risk and have published management guidelines for certain cancers.   The remaining genes (BARD1, CTNNA1, DICER1, HOXB13, KIT, PDGFRA, RAD50, and SMARCA4) are associated with increased cancer risk and may allow us to make medical recommendations when mutations are identified.     Consent was obtained over the phone with no witness required due to the current covid19 global pandemic.    Medical Management: For Ramona, we reviewed that the information from genetic testing may determine:    surgery to treat Ramona's active cancer diagnosis (i.e. lumpectomy versus bilateral mastectomy, partial versus total colectomy, etc.),    additional cancer screening for which Ramona may qualify (i.e. mammogram and breast MRI, more frequent colonoscopies, more frequent dermatologic exams, etc.),    options for risk reducing surgeries Ramona could consider (i.e. bilateral mastectomy, etc.),      and targeted chemotherapies for Ramona's active cancer, or  if she were to develop certain cancers in the future (i.e. immunotherapy for individuals with Farooq syndrome, PARP inhibitors, etc.).     These recommendations will be discussed in detail once genetic testing is completed.     We discussed that Ramona's treatment planning is pending genetic testing results. She has a surgical consult schedule for 2/18. For that reason, I will place a STAT request on the BRCAPlus genes (XIOMARA, BRCA1, BRCA2, CDH1, CHEK2, PALB2, PTEN, and TP53) in order to get these results back as soon as possible.     Ramona will have her blood drawn when [facility] into [facility] for Dr. Cisneros's labs tomorrow.       Plan:  1) Today Ramona elected to proceed with Common Hereditary Cancers panel genetic testing (47 genes) through [facility].  2) A STAT request was placed on the BRCAPlus genes (including BRCA1 and BRCA2) and we will get these results back in 7-10 days. I will call Ramona with these results as soon as they are available.       Again, thank you for allowing me to participate in the care of your patient.      Sincerely,    Carrie Cadet, GC

## 2022-02-09 NOTE — PATIENT INSTRUCTIONS
Assessing Cancer Risk  Only about 5-10% of cancers are thought to be due to an inherited cancer susceptibility gene.    These families often have:    Several people with the same or related types of cancer    Cancers diagnosed at a young age (before age 50)    Individuals with more than one primary cancer    Multiple generations of the family affected with cancer    Some people may be candidates for genetic testing of more than one gene.  For these families, genetic testing using a cancer panel may be offered.  These panels will test different genes known to increase the risk for breast, ovarian, uterine, and/or other cancers. All of the genes discussed below have published clinical management guidelines for individuals who are found to carry a mutation. The purpose of this handout is to serve as a brief summary of the genes analyzed by the panels used to inquire about hereditary breast and gynecologic cancer:  XIOMARA, BRCA1, BRCA2, BRIP1, CDH1, CHEK2, MLH1, MSH2, MSH6, PMS2, EPCAM, PTEN, PALB2, RAD51C, RAD51D, and TP53.  ______________________________________________________________________________  Hereditary Breast and Ovarian Cancer Syndrome   (BRCA1 and BRCA2)  A single mutation in one of the copies of BRCA1 or BRCA2 increases the risk for breast and ovarian cancer, among others.  The risk for pancreatic cancer and melanoma may also be slightly increased in some families.  The chart below shows the chance that someone with a BRCA mutation would develop cancer in his or her lifetime1,2,3,4.        A person s ethnic background is also important to consider, as individuals of Ashkenazi [place] ancestry have a higher chance of having a BRCA gene mutation.  There are three BRCA mutations that occur more frequently in this population.    Farooq Syndrome   (MLH1, MSH2, MSH6, PMS2, and EPCAM)  Currently five genes are known to cause Farooq Syndrome: MLH1, MSH2, MSH6, PMS2, and EPCAM.  A single mutation in one of the  Farooq Syndrome genes increases the risk for colon, endometrial, ovarian, and stomach cancers.  Other cancers that occur less commonly in Farooq Syndrome include urinary tract, skin, and brain cancers.  The chart below shows the chance that a person with Farooq syndrome would develop cancer in his or her lifetime5.      *Cancer risk varies depending on Farooq syndrome gene found    Cowden Syndrome   (PTEN)  Cowden syndrome is a hereditary condition that increases the risk for breast, thyroid, endometrial, colon, and kidney cancer.  Cowden syndrome is caused by a mutation in the PTEN gene.  A single mutation in one of the copies of PTEN causes Cowden syndrome and increases cancer risk.  The chart below shows the chance that someone with a PTEN mutation would develop cancer in their lifetime6,7.  Other benign features seen in some individuals with Cowden syndrome include benign skin lesions (facial papules, keratoses, lipomas), learning disability, autism, thyroid nodules, colon polyps, and larger head size.      *One recent study found breast cancer risk to be increased to 85%    Li-Fraumeni Syndrome   (TP53)  Li-Fraumeni Syndrome (LFS) is a cancer predisposition syndrome caused by a mutation in the TP53 gene. A single mutation in one of the copies of TP53 increases the risk for multiple cancers. Individuals with LFS are at an increased risk for developing cancer at a young age. The lifetime risk for development of a LFS-associated cancer is 50% by age 30 and 90% by age 60.   Core Cancers: Sarcomas, Breast, Brain, Lung, Leukemias/Lymphomas, Adrenocortical carcinomas  Other Cancers: Gastrointestinal, Thyroid, Skin, Genitourinary    Hereditary Diffuse Gastric Cancer   (CDH1)  Currently, one gene is known to cause hereditary diffuse gastric cancer (HDGC): CDH1.  Individuals with HDGC are at increased risk for diffuse gastric cancer and lobular breast cancer. Of people diagnosed with HDGC, 30-50% have a mutation in the CDH1  gene.  This suggests there are likely other genes that may cause HDGC that have not been identified yet.      Lifetime Cancer Risks    General Population HDGC    Diffuse Gastric  <1% ~80%   Breast 12% 39-52%         Additional Genes  XIOMARA  XIOMARA is a moderate-risk breast cancer gene. Women who have a mutation in XIOMARA can have between a 2-4 fold increased risk for breast cancer compared to the general population8. XIOMARA mutations have also been associated with increased risk for pancreatic cancer, however an estimate of this cancer risk is not well understood9. Individuals who inherit two XIOMARA mutations have a condition called ataxia-telangiectasia (AT).  This rare autosomal recessive condition affects the nervous system and immune system, and is associated with progressive cerebellar ataxia beginning in childhood.  Individuals with ataxia-telangiectasia often have a weakened immune system and have an increased risk for childhood cancers.    PALB2  Mutations in PALB2 have been shown to increase the risk of breast cancer up to 33-58% in some families; where individuals fall within this risk range is dependent upon family [id number]. PALB2 mutations have also been associated with increased risk for pancreatic cancer, although this risk has not been quantified yet.  Individuals who inherit two PALB2 mutations--one from their mother and one from their father--have a condition called Fanconi Anemia.  This rare autosomal recessive condition is associated with short stature, developmental delay, bone marrow failure, and increased risk for childhood cancers.    CHEK2   CHEK2 is a moderate-risk breast cancer gene.  Women who have a mutation in CHEK2 have around a 2-fold increased risk for breast cancer compared to the general population, and this risk may be higher depending upon family history.11,12,13 Mutations in CHEK2 have also been shown to increase the risk of a number of other cancers, including colon and prostate, however  these cancer risks are currently not well understood.    BRIP1, RAD51C and RAD51D  Mutations in BRIP1, RAD51C, and RAD51D have been shown to increase the risk of ovarian cancer and possibly female breast cancer as well14,15 .       Lifetime Cancer Risk    General Population BRIP1 RAD51C RAD51D   Ovarian 1-2% ~5-8% ~5-9% ~7-15%           Inheritance  All of the cancer syndromes reviewed above are inherited in an autosomal dominant pattern.  This means that if a parent has a mutation, each of his or her children will have a 50% chance of inheriting that same mutation.  Therefore, each child--male or female--would have a 50% chance of being at increased risk for developing cancer.      Image obtained from Genetics Home Reference, 2013     Mutations in some genes can occur de danyell, which means that a person s mutation occurred for the first time in them and was not inherited from a parent.  Now that they have the mutation, however, it can be passed on to future generations.    Genetic Testing  Genetic testing involves a blood test and will look at the genetic information in the XIOMARA, BRCA1, BRCA2, BRIP1, CDH1, CHEK2, MLH1, MSH2, MSH6, PMS2, EPCAM, PTEN, PALB2, RAD51C, RAD51D, and TP53 genes for any harmful mutations that are associated with increased cancer risk.  If possible, it is recommended that the person(s) who has had cancer be tested before other family members.  That person will give us the most useful information about whether or not a specific gene is associated with the cancer in the family.    Results  There are three possible results of genetic testing:    Positive--a harmful mutation was identified in one or more of the genes    Negative--no mutation was identified in any of the genes on this panel    Variant of unknown significance--a variation in one of the genes was identified, but it is unclear how this impacts cancer risk in the family    Advantages and Disadvantages   There are advantages and  disadvantages to genetic testing.    Advantages    May clarify your cancer risk    Can help you make medical decisions    May explain the cancers in your family    May give useful information to your family members (if you share your results)    Disadvantages    Possible negative emotional impact of learning about inherited cancer risk    Uncertainty in interpreting a negative test result in some situations    Possible genetic discrimination concerns (see below)    Genetic Information Nondiscrimination Act (ANN)  ANN is a federal law that protects individuals from health insurance or employment discrimination based on a genetic test result alone.  Although rare, there are currently no legal discrimination protections in terms of life insurance, long term care, or disability insurances.  Visit the [employer] [place] website to learn more.    Reducing Cancer Risk  All of the genes described above have nationally recognized cancer screening guidelines that would be recommended for individuals who test positive.  In addition to increased cancer screening, surgeries may be offered or recommended to reduce cancer risk.  Recommendations are based upon an individual s genetic test result as well as their personal and family history of cancer.    Questions to Think About Regarding Genetic Testing:    What effect will the test result have on me and my relationship with my family members if I have an inherited gene mutation?  If I don t have a gene mutation?    Should I share my test results, and how will my family react to this news, which may also affect them?    Are my children ready to learn new information that may one day affect their own health?    Hereditary Cancer Resources    FORCE: Facing Our Risk of Cancer Empowered facingourrisk.org   Bright Pink bebrightpink.org   Li-Fraumeni Syndrome Association lfsassociation.org   PTEN World PTENworld.com   No stomach for cancer, Inc.  nostomachforcancer.org   Stomach cancer relief network Scrnet.org   Collaborative Group of the Americas on Inherited Colorectal Cancer (CGA) cgaicc.com    Cancer Care cancercare.org   American Cancer Society (ACS) cancer.org   National Cancer [place] (NCI) cancer.gov     Please call us if you have any questions or concerns.   Cancer Risk Management Program [phone] ([phone])  ? Urban Wilburn, MS, [facility] [phone]  ? Celina Medina, MS, [facility]  [phone]  ? Jaimie Soliman, MS, [facility]  [phone]  ? Racheal Porfirio, MS, [facility] [phone]  ? Carrie Indy, MS, [facility] [phone]  ? Guera Sabas, MS, [facility]  [phone]  ? Masha Subramanian, MS  [phone]    References  1. Jersey DE LEON, Nicholas PDP, Kike S, Nini MILLIGAN, Brianna JE, Ginger JL, Donna N, Feliciano H, Noe O, Minna A, Patrick B, Radichris P, Manjosiah S, Kevin DM, Gray N, George E, Leonie H, Chan E, Hemanth J, Gr[place] J, Amy B, Bryant H, Thorlacius S, Eerola H, Nevhiginiona H, Yohannes K, Shantanu OP. Average risks of breast and ovarian cancer associated with BRCA1 or BRCA2 mutations detected in case series unselected for family history: a combined analysis of 222 studies. Am J Hum [place]. 2003;72:1117-30.  2. Viola N, Cadence M, Maurilio G.  BRCA1 and BRCA2 Hereditary Breast and Ovarian Cancer. Gene Reviews online. 2013.  3. Ismael YC, Jose E S, Georgina G, Worley S. Breast cancer risk among male BRCA1 and BRCA2 mutation carriers. J Natl Cancer Inst. 2007;99:1811-4.  4. Yossi DIAZ, [place] I, Kirby J, Cameron E, Zeus ER, Agusto F. Risk of breast cancer in male BRCA2 carriers. J Med [place]. 2010;47:710-1.  5. National Comprehensive Cancer Network. Clinical practice guidelines in oncology, colorectal cancer screening. Available online (registration required). 2015.  6. Michael SCOTT, Alee J, Christiana J, Helga LA, Reanna DELGADO, Eng C. Lifetime cancer risks in individuals with germline PTEN mutations. Clin Cancer Res. 2012;18:400-7.  7. Pilarski R. Cowden  Syndrome: A Critical Review of the Clinical Literature. J [place] . 2009:18:13-27.  8. Sadie A, Mahendra D, Tammy S, Caprice P, Hugh T, Merle M, Ramiro B, Ashley H, Leslie R, Tuyet K, Kurt L, Yossi DG, Kevin D, Dagoberto DF, Brenda MR, The Breast Cancer Susceptibility Collaboration (UK) & Noah MELISSA. XIOMARA mutations that cause ataxia-telangiectasia are breast cancer susceptibility alleles. Nature Genetics. 2006;38:873-875  9. Jitendra N , Samantha Y, Bekah J, Jovani L, Darci GM , Sol ML, Gallinger S, Celestin AG, Syngal S, Vini ML, Jannet J , Katarina R, Davy SZ, Bria JR, Julian VE, Brandon M, Voconnor B, Blanka N, Gerardo RH, Arlene KW, and Sherry AP. XIOMARA mutations in patients with hereditary pancreatic cancer. Cancer Discover. 2012;2:41-46  10. Jersey LAGUNA, et al. Breast-Cancer Risk in Families with Mutations in PALB2. NEJM. 2014; 371(6):497-506.  11. CHEK2 Breast Cancer Case-Control Consortium. CHEK2*1100delC and susceptibility to breast cancer: A collaborative analysis involving 10,860 breast cancer cases and 9,065 controls from 10 studies. Am J Hum [place], 74 (2004), pp. 1674-5264  12. Isaias T, Jay S, Tiny K, et al. Spectrum of Mutations in BRCA1, BRCA2, CHEK2, and TP53 in Families at High Risk of Breast Cancer. SHAUNA. 2006;295(12):5925-8506.   13. Liseth C, Marilee D, Fanta A, et al. Risk of breast cancer in women with a CHEK2 mutation with and without a family history of breast cancer. J Clin Oncol. 2011;29:1340-5342.  14. Burak H, Cristobal E, Margot SJ, et al. Contribution of germline mutations in the RAD51B, RAD51C, and RAD51D genes to ovarian cancer in the population. J Clin Oncol. 2015;33(26):9274-7013. Doi:10.1200/JCO.2015.61.2408.  15. Wisam T, Kimmy GOFF, Shakira P, et al. Mutations in BRIP1 confer high risk of ovarian cancer. Sabiha [place]. 2011;43(11):0379-2079. doi:10.1038/ng.955.

## 2022-02-10 ENCOUNTER — LAB (OUTPATIENT)
Dept: LAB | Facility: CLINIC | Age: 67
End: 2022-02-10
Attending: INTERNAL MEDICINE
Payer: COMMERCIAL

## 2022-02-10 ENCOUNTER — ANCILLARY PROCEDURE (OUTPATIENT)
Dept: ULTRASOUND IMAGING | Facility: CLINIC | Age: 67
End: 2022-02-10
Attending: INTERNAL MEDICINE
Payer: COMMERCIAL

## 2022-02-10 DIAGNOSIS — B18.1 CHRONIC VIRAL HEPATITIS B WITHOUT DELTA AGENT AND WITHOUT COMA (H): ICD-10-CM

## 2022-02-10 DIAGNOSIS — Z80.3 FAMILY HISTORY OF MALIGNANT NEOPLASM OF BREAST: ICD-10-CM

## 2022-02-10 DIAGNOSIS — C50.412 MALIGNANT NEOPLASM OF UPPER-OUTER QUADRANT OF LEFT BREAST IN FEMALE, ESTROGEN RECEPTOR POSITIVE (H): ICD-10-CM

## 2022-02-10 DIAGNOSIS — Z80.0 FAMILY HISTORY- STOMACH CANCER: ICD-10-CM

## 2022-02-10 DIAGNOSIS — Z17.0 MALIGNANT NEOPLASM OF UPPER-OUTER QUADRANT OF LEFT BREAST IN FEMALE, ESTROGEN RECEPTOR POSITIVE (H): ICD-10-CM

## 2022-02-10 LAB
ALBUMIN SERPL-MCNC: 3.8 G/DL (ref 3.4–5)
ALP SERPL-CCNC: 83 U/L (ref 40–150)
ALT SERPL W P-5'-P-CCNC: 61 U/L (ref 0–50)
ANION GAP SERPL CALCULATED.3IONS-SCNC: 6 MMOL/L (ref 3–14)
AST SERPL W P-5'-P-CCNC: 34 U/L (ref 0–45)
BASOPHILS # BLD AUTO: 0.1 10E3/UL (ref 0–0.2)
BASOPHILS NFR BLD AUTO: 1 %
BILIRUB SERPL-MCNC: 0.5 MG/DL (ref 0.2–1.3)
BUN SERPL-MCNC: 9 MG/DL (ref 7–30)
CALCIUM SERPL-MCNC: 9.7 MG/DL (ref 8.5–10.1)
CHLORIDE BLD-SCNC: 106 MMOL/L (ref 94–109)
CO2 SERPL-SCNC: 29 MMOL/L (ref 20–32)
CREAT SERPL-MCNC: 0.7 MG/DL (ref 0.52–1.04)
EOSINOPHIL # BLD AUTO: 0.2 10E3/UL (ref 0–0.7)
EOSINOPHIL NFR BLD AUTO: 3 %
ERYTHROCYTE [DISTWIDTH] IN BLOOD BY AUTOMATED COUNT: 12.8 % (ref 10–15)
GFR SERPL CREATININE-BSD FRML MDRD: >90 ML/MIN/1.73M2
GLUCOSE BLD-MCNC: 100 MG/DL (ref 70–99)
HBV SURFACE AB SERPL IA-ACNC: 4.34 M[IU]/ML
HBV SURFACE AG SERPL QL IA: NONREACTIVE
HCT VFR BLD AUTO: 45.9 % (ref 35–47)
HGB BLD-MCNC: 15 G/DL (ref 11.7–15.7)
IMM GRANULOCYTES # BLD: 0 10E3/UL
IMM GRANULOCYTES NFR BLD: 0 %
LYMPHOCYTES # BLD AUTO: 2.1 10E3/UL (ref 0.8–5.3)
LYMPHOCYTES NFR BLD AUTO: 45 %
MCH RBC QN AUTO: 30.3 PG (ref 26.5–33)
MCHC RBC AUTO-ENTMCNC: 32.7 G/DL (ref 31.5–36.5)
MCV RBC AUTO: 93 FL (ref 78–100)
MONOCYTES # BLD AUTO: 0.5 10E3/UL (ref 0–1.3)
MONOCYTES NFR BLD AUTO: 9 %
NEUTROPHILS # BLD AUTO: 2 10E3/UL (ref 1.6–8.3)
NEUTROPHILS NFR BLD AUTO: 42 %
NRBC # BLD AUTO: 0 10E3/UL
NRBC BLD AUTO-RTO: 0 /100
PLATELET # BLD AUTO: 264 10E3/UL (ref 150–450)
POTASSIUM BLD-SCNC: 3.8 MMOL/L (ref 3.4–5.3)
PROT SERPL-MCNC: 7.9 G/DL (ref 6.8–8.8)
RBC # BLD AUTO: 4.95 10E6/UL (ref 3.8–5.2)
SODIUM SERPL-SCNC: 141 MMOL/L (ref 133–144)
WBC # BLD AUTO: 4.8 10E3/UL (ref 4–11)

## 2022-02-10 PROCEDURE — 86692 HEPATITIS DELTA AGENT ANTBDY: CPT

## 2022-02-10 PROCEDURE — 86704 HEP B CORE ANTIBODY TOTAL: CPT

## 2022-02-10 PROCEDURE — 80053 COMPREHEN METABOLIC PANEL: CPT

## 2022-02-10 PROCEDURE — 87340 HEPATITIS B SURFACE AG IA: CPT

## 2022-02-10 PROCEDURE — 36415 COLL VENOUS BLD VENIPUNCTURE: CPT

## 2022-02-10 PROCEDURE — 82040 ASSAY OF SERUM ALBUMIN: CPT

## 2022-02-10 PROCEDURE — 87517 HEPATITIS B DNA QUANT: CPT

## 2022-02-10 PROCEDURE — 86707 HEPATITIS BE ANTIBODY: CPT

## 2022-02-10 PROCEDURE — 85025 COMPLETE CBC W/AUTO DIFF WBC: CPT

## 2022-02-10 PROCEDURE — 76705 ECHO EXAM OF ABDOMEN: CPT | Performed by: RADIOLOGY

## 2022-02-10 PROCEDURE — 86706 HEP B SURFACE ANTIBODY: CPT

## 2022-02-10 PROCEDURE — 86705 HEP B CORE ANTIBODY IGM: CPT

## 2022-02-10 PROCEDURE — 87350 HEPATITIS BE AG IA: CPT

## 2022-02-10 NOTE — NURSING NOTE
Chief Complaint   Patient presents with     Labs Only     Labs drawn via  by RN.     Labs collected from venipuncture by RN.     Ana Rob RN     134.9

## 2022-02-11 LAB
HBV CORE AB SERPL QL IA: REACTIVE
HBV CORE IGM SERPL QL IA: NONREACTIVE
HBV DNA SERPL NAA+PROBE-ACNC: NOT DETECTED IU/ML
HBV E AG SERPL QL IA: NEGATIVE

## 2022-02-13 LAB — HBV E AB SERPL QL IA: POSITIVE

## 2022-02-14 ENCOUNTER — VIRTUAL VISIT (OUTPATIENT)
Dept: GASTROENTEROLOGY | Facility: CLINIC | Age: 67
End: 2022-02-14
Attending: INTERNAL MEDICINE
Payer: COMMERCIAL

## 2022-02-14 DIAGNOSIS — R76.8 HEPATITIS B CORE ANTIBODY POSITIVE: Primary | ICD-10-CM

## 2022-02-14 DIAGNOSIS — K76.0 FATTY LIVER: ICD-10-CM

## 2022-02-14 LAB — HDV AB SER QL IA: NEGATIVE

## 2022-02-14 PROCEDURE — 99443 PR PHYSICIAN TELEPHONE EVALUATION 21-30 MIN: CPT | Mod: 95 | Performed by: INTERNAL MEDICINE

## 2022-02-14 ASSESSMENT — PAIN SCALES - GENERAL: PAINLEVEL: NO PAIN (0)

## 2022-02-14 NOTE — LETTER
2/14/2022     RE: Ramona Ferrer  [address]  [place] [address]    Dear Colleague,    Thank you for referring your patient, Ramona Ferrer, to the [facility] [place]. Please see a copy of my visit note below.    [facility]  Liver Clinic Consultation  Telephone Visit (pt did not know how to connect via video)    REASON FOR CONSULTATION: Hepatitis B positive tests with breast cancer  REFERRING PROVIDER: Dr. Kenneth Cisneros    A/P  Ramona Ferrer is a 66 Y F with hepatitis B core dez positive, negative DNA and new dx breast cancer. This would need to be treated for prophylaxis if she is treated with chemotherapy. Dr. Cisneros will let me know if that is the case. If not, no follow up is required for monitoring HBcore postivity, unless she is on immunosuppressants.    Liver lesion on US will require cross-sectional imaging. Will discuss with Dr. Cisneros if he needs any cross-sectional imaging. If not, will get MRI. If he needs CT, will switch to CT. Message sent to Dr. Cisneros.  Hepatic steatosis Elevated ALT  61 (1.5 ULN). Consistent with NAFLD. RF: BMI 40, HTN, DM. Discussed briefly fatty liver. BMI 40. Can readdress in the future.    Verenice Mccray MD  Hepatology/Liver Transplant  Medical Director, Liver Transplantation  [facility]  Subjective  Ramona Ferrer is a 66 Y F referred for hepatitis B positive tests that were done in workup for new dx breast cancer. She is in process of completing evaluation for this and will see surg onc, Dr. Mendiola. Determination of treatment plan has not yet been made.    She states she had hepatitis B in 1992. She states she was treated with a pill at that time. There was no injection. She did not turn yellow. There was no follow up treatment after that. She had HBV testing 7 years ago about and was told HBV was gone. She has no concerns about her liver    HBV DNA neg  HBcore total pos  HBcore IgM neg  HBeAby  pos  HBeAg neg  HBsAg 4.34    Rare alcohol use, on holidays  RF for NAFLD: obese, BMI 40, elevated glucose, HTN, DM on metformin    US  Hepatic steatosis.  Ill-defined area of relative hypoechogenicity in the right lobe adjacent to the gallbladder. Given history of chronic HBV, recommend liver MRI for further evaluation.  Differential includes HCC or benign focal fatty sparing.    Lab Test 02/10/22  1218   PROTTOTAL 7.9   ALBUMIN 3.8   BILITOTAL 0.5   ALKPHOS 83   AST 34   ALT 61*     Lab Test 02/10/22  1218   WBC 4.8   RBC 4.95   HGB 15.0   HCT 45.9   MCV 93   MCH 30.3   MCHC 32.7   RDW 12.8        Past Medical History:   Diagnosis Date     Complication of anesthesia      DDD (degenerative disc disease), lumbar      Endometriosis      Hypertension      Hypothyroidism           Seasonal allergies      Spinal stenosis, lumbar    Breast cancer    Social History     Socioeconomic History     Marital status:      Spouse name: Not on file     Number of children: Not on file     Years of education: Not on file     Highest education level: Not on file   Occupational History     Not on file   Tobacco Use     Smoking status: Never Smoker     Smokeless tobacco: Never Used   Vaping Use     Vaping Use: Never used   Substance and Sexual Activity     Alcohol use: No     Drug use: No     Sexual activity: Yes     Partners: Male     Birth control/protection: None     Comment: MARYA BSO   Other Topics Concern     Parent/sibling w/ CABG, MI or angioplasty before 65F 55M? Not Asked   Social History Narrative     Not on file     Social Determinants of Health     Financial Resource Strain: Not on file   Food Insecurity: Not on file   Transportation Needs: Not on file   Physical Activity: Not on file   Stress: Not on file   Social Connections: Not on file   Intimate Partner Violence: Not on file   Housing Stability: Not on file       Family History   Problem Relation Age of Onset     Diabetes Mother      Thyroid Disease Mother  "     Hypertension Mother      Cancer Father          of stomach CA     Heart Disease Father         MI at age 58     Glaucoma No family hx of      Macular Degeneration No family hx of    No HBV or liver disease in family  F d  stomach ca  M d  from Covid age 95  1 bro unknown HBV status  1 dtr HBV status unknown    ROS: 10 point ROS neg other than the symptoms noted above in the HPI.    Ramona Ferrer is a 66 year old female who is being evaluated via a billable telephone visi.  The patient has been notified of following:   \"This telephone visit will be conducted via a call between you and your physician/provider. We have found that certain health care needs can be provided without the need for a physical exam.  This service lets us provide the care you need with a short phone conversation.  If a prescription is necessary we can send it directly to your pharmacy.  If lab work is needed we can place an order for that and you can then stop by our lab to have the test done at a later time.  If during the course of the call the physician/provider feels a telephone visit is not appropriate, you will not be charged for this service.\" Telephone visits are billed at different rates depending on your insurance coverage. During this emergency period, for some insurers they may be billed the same as an in-person visit.  Please reach out to your insurance provider with any questions.  Consent has been obtained for this service by 1 care team member: yes  I have reviewed and updated the patient's Past Medical History, Social History, Family History and Medication List.    What phone number would you like to be contacted at?     Phone call contact time  Call Started at 940  Call Ended at 1015  On phone patient,  and dtr    Again, thank you for allowing me to participate in the care of your patient.      Sincerely,    Verenice Mccray MD  "

## 2022-02-14 NOTE — PROGRESS NOTES
"Sammie is a 66 year old who is being evaluated via a billable video visit.      How would you like to obtain your AVS? Mail a copy  If the video visit is dropped, the invitation should be resent by: Text to cell phone: [phone]  Will anyone else be joining your video visit? No  {If patient encounters technical issues they should call [phone] :[id number]}    Video Start Time: {video visit start/end time for provider to select:152948}  Video-Visit Details    Type of service:  Video Visit    Video End Time:{video visit start/end time for provider to select:152948}    Originating Location (pt. Location): {video visit patient location:761685::\"Home\"}    Distant Location (provider location):  [facility] [place]     Platform used for Video Visit: {Virtual Visit Platforms:232381::\"Netpulse\"}    "

## 2022-02-14 NOTE — PROGRESS NOTES
[facility]  Liver Clinic Consultation  Telephone Visit (pt did not know how to connect via video)    REASON FOR CONSULTATION: Hepatitis B positive tests with breast cancer  REFERRING PROVIDER: Dr. Kenneth Cisneros    A/P  Ramona Ferrer is a 66 Y F with hepatitis B core dez positive, negative DNA and new dx breast cancer. This would need to be treated for prophylaxis if she is treated with chemotherapy. Dr. Cisneros will let me know if that is the case. If not, no follow up is required for monitoring HBcore postivity, unless she is on immunosuppressants.    Liver lesion on US will require cross-sectional imaging. Will discuss with Dr. Cisneros if he needs any cross-sectional imaging. If not, will get MRI. If he needs CT, will switch to CT. Message sent to Dr. Cisneros.  Hepatic steatosis Elevated ALT  61 (1.5 ULN). Consistent with NAFLD. RF: BMI 40, HTN, DM. Discussed briefly fatty liver. BMI 40. Can readdress in the future.    Verenice Mccray MD  Hepatology/Liver Transplant  Medical Director, Liver Transplantation  [facility]  Subjective  Ramona Ferrer is a 66 Y F referred for hepatitis B positive tests that were done in workup for new dx breast cancer. She is in process of completing evaluation for this and will see surg onc, Dr. Mendiola. Determination of treatment plan has not yet been made.    She states she had hepatitis B in 1992. She states she was treated with a pill at that time. There was no injection. She did not turn yellow. There was no follow up treatment after that. She had HBV testing 7 years ago about and was told HBV was gone. She has no concerns about her liver    HBV DNA neg  HBcore total pos  HBcore IgM neg  HBeAby pos  HBeAg neg  HBsAg 4.34    Rare alcohol use, on holidays  RF for NAFLD: obese, BMI 40, elevated glucose, HTN, DM on metformin    US  Hepatic steatosis.  Ill-defined area of relative hypoechogenicity in the right lobe adjacent to the gallbladder. Given history  of chronic HBV, recommend liver MRI for further evaluation.  Differential includes HCC or benign focal fatty sparing.    Lab Test 02/10/22  1218   PROTTOTAL 7.9   ALBUMIN 3.8   BILITOTAL 0.5   ALKPHOS 83   AST 34   ALT 61*     Lab Test 02/10/22  1218   WBC 4.8   RBC 4.95   HGB 15.0   HCT 45.9   MCV 93   MCH 30.3   MCHC 32.7   RDW 12.8        Past Medical History:   Diagnosis Date     Complication of anesthesia      DDD (degenerative disc disease), lumbar      Endometriosis      Hypertension      Hypothyroidism           Seasonal allergies      Spinal stenosis, lumbar    Breast cancer    Social History     Socioeconomic History     Marital status:      Spouse name: Not on file     Number of children: Not on file     Years of education: Not on file     Highest education level: Not on file   Occupational History     Not on file   Tobacco Use     Smoking status: Never Smoker     Smokeless tobacco: Never Used   Vaping Use     Vaping Use: Never used   Substance and Sexual Activity     Alcohol use: No     Drug use: No     Sexual activity: Yes     Partners: Male     Birth control/protection: None     Comment: MARYA BSO   Other Topics Concern     Parent/sibling w/ CABG, MI or angioplasty before 65F 55M? Not Asked   Social History Narrative     Not on file     Social Determinants of Health     Financial Resource Strain: Not on file   Food Insecurity: Not on file   Transportation Needs: Not on file   Physical Activity: Not on file   Stress: Not on file   Social Connections: Not on file   Intimate Partner Violence: Not on file   Housing Stability: Not on file       Family History   Problem Relation Age of Onset     Diabetes Mother      Thyroid Disease Mother      Hypertension Mother      Cancer Father          of stomach CA     Heart Disease Father         MI at age 58     Glaucoma No family hx of      Macular Degeneration No family hx of    No HBV or liver disease in family  F d  stomach ca  M d  from  "Covid age 95  1 bro unknown HBV status  1 dtr HBV status unknown    ROS: 10 point ROS neg other than the symptoms noted above in the HPI.    Ramona Ferrer is a 66 year old female who is being evaluated via a billable telephone visi.  The patient has been notified of following:   \"This telephone visit will be conducted via a call between you and your physician/provider. We have found that certain health care needs can be provided without the need for a physical exam.  This service lets us provide the care you need with a short phone conversation.  If a prescription is necessary we can send it directly to your pharmacy.  If lab work is needed we can place an order for that and you can then stop by our lab to have the test done at a later time.  If during the course of the call the physician/provider feels a telephone visit is not appropriate, you will not be charged for this service.\" Telephone visits are billed at different rates depending on your insurance coverage. During this emergency period, for some insurers they may be billed the same as an in-person visit.  Please reach out to your insurance provider with any questions.  Consent has been obtained for this service by 1 care team member: yes  I have reviewed and updated the patient's Past Medical History, Social History, Family History and Medication List.    What phone number would you like to be contacted at?     Phone call contact time  Call Started at [phone]  Call Ended at 1015  On phone patient,  and dtr        "

## 2022-02-18 ENCOUNTER — ONCOLOGY VISIT (OUTPATIENT)
Dept: ONCOLOGY | Facility: CLINIC | Age: 67
End: 2022-02-18
Attending: INTERNAL MEDICINE
Payer: COMMERCIAL

## 2022-02-18 VITALS
DIASTOLIC BLOOD PRESSURE: 81 MMHG | BODY MASS INDEX: 41.14 KG/M2 | WEIGHT: 232.2 LBS | TEMPERATURE: 97.9 F | HEART RATE: 67 BPM | SYSTOLIC BLOOD PRESSURE: 127 MMHG | OXYGEN SATURATION: 98 % | HEIGHT: 63 IN

## 2022-02-18 DIAGNOSIS — Z17.0 MALIGNANT NEOPLASM OF UPPER-OUTER QUADRANT OF LEFT BREAST IN FEMALE, ESTROGEN RECEPTOR POSITIVE (H): ICD-10-CM

## 2022-02-18 DIAGNOSIS — C50.412 MALIGNANT NEOPLASM OF UPPER-OUTER QUADRANT OF LEFT BREAST IN FEMALE, ESTROGEN RECEPTOR POSITIVE (H): ICD-10-CM

## 2022-02-18 DIAGNOSIS — Z80.3 FAMILY HISTORY OF MALIGNANT NEOPLASM OF BREAST: Primary | ICD-10-CM

## 2022-02-18 PROCEDURE — G0463 HOSPITAL OUTPT CLINIC VISIT: HCPCS

## 2022-02-18 PROCEDURE — 99215 OFFICE O/P EST HI 40 MIN: CPT | Performed by: SURGERY

## 2022-02-18 ASSESSMENT — PAIN SCALES - GENERAL: PAINLEVEL: NO PAIN (0)

## 2022-02-18 NOTE — PROGRESS NOTES
HISTORY OF PRESENT ILLNESS:  Ramona Ferrer is a 66-year-old woman I was asked to see at the request of Dr. Kenneth Cisneros for evaluation of a new left breast cancer.  The patient palpated a left breast mass.  She underwent a mammogram and ultrasound which I have reviewed.  This demonstrated a large abnormality in her left breast.  Her right breast was normal.  She subsequently had a breast MRI on 01/25 which demonstrated 2 masses, one at the 12 o'clock position measuring 3.4 cm and one at the 1 o'clock position measuring 1.3 cm.  However, it looks like there are multiple masses in this area on my review of the MRI today.  She underwent a biopsy of those 2 areas.  Both demonstrated a grade 1 invasive lobular breast cancer, ER positive, WV positive, HER-2/yung negative.  The 2 clips were about 8 cm apart.  On evaluation of her mammography and MRI, I think the whole area is about 8 cm as well.  She is now here to talk about treatment options.  She has not had any prior history of any breast problems.  Her family history is significant for a paternal aunt who had breast cancer.  When she saw Dr. Cisneros, he obtained blood for genetic testing.  She has type 2 diabetes and a history of hepatitis B.    IMPRESSION:  T3 N0 M0 invasive lobular breast cancer.    PLAN:  I talked to her about the various treatment options including mastectomy with or without reconstruction versus lumpectomy plus radiation therapy.  I told her she is not a good candidate for breast conserving surgery due to the size of her tumor.  I did recommend a sentinel lymph node biopsy.  I told her that endocrine therapy would be recommended.  I told her that invasive lobular breast cancer is usually not responsive to chemotherapy.  Dr. Cisneros did order a MammaPrint which is still pending.  I told her that neoadjuvant endocrine therapy would not change her surgical procedure.  I am going to tentatively schedule her for a left skin-sparing mastectomy and  sentinel lymph node biopsy with immediate reconstruction.  I am going to get her set up with a Plastic Surgery consult.  We will change this plan if she has a high MammaPrint or if she has a genetic mutation.    TOTAL TIME:  Sixty minutes which included reviewing her imaging, in-person visit and coordinating her care.

## 2022-02-18 NOTE — NURSING NOTE
"Oncology Rooming Note    February 18, 2022 10:15 AM   Ramona Ferrer is a 66 year old female who presents for:    Chief Complaint   Patient presents with     Oncology Clinic Visit     Breast Cancer      Initial Vitals: /81 (BP Location: Right arm, Patient Position: Sitting, Cuff Size: Adult Large)   Pulse 67   Temp 97.9  F (36.6  C) (Oral)   Ht 1.591 m (5' 2.64\")   Wt 105.3 kg (232 lb 3.2 oz)   SpO2 98%   BMI 41.61 kg/m   Estimated body mass index is 41.61 kg/m  as calculated from the following:    Height as of this encounter: 1.591 m (5' 2.64\").    Weight as of this encounter: 105.3 kg (232 lb 3.2 oz). Body surface area is 2.16 meters squared.  No Pain (0) Comment: Data Unavailable   No LMP recorded. Patient has had a hysterectomy.  Allergies reviewed: Yes  Medications reviewed: Yes    Medications: Medication refills not needed today.  Pharmacy name entered into [facility]:    [id number] - [place], [place] - [address]  [id number] - [place], [place] - [address]    Clinical concerns: None     Yamil Barbour            "

## 2022-02-18 NOTE — LETTER
2/18/2022     RE: Ramona Ferrer  [address]  [place] [address]    Dear Colleague,    Thank you for referring your patient, Ramona Ferrer, to the [facility]. Please see a copy of my visit note below.    HISTORY OF PRESENT ILLNESS:  Ramona Ferrer is a 66-year-old woman I was asked to see at the request of Dr. Kenneth Cisneros for evaluation of a new left breast cancer.  The patient palpated a left breast mass.  She underwent a mammogram and ultrasound which I have reviewed.  This demonstrated a large abnormality in her left breast.  Her right breast was normal.  She subsequently had a breast MRI on 01/25 which demonstrated 2 masses, one at the 12 o'clock position measuring 3.4 cm and one at the 1 o'clock position measuring 1.3 cm.  However, it looks like there are multiple masses in this area on my review of the MRI today.  She underwent a biopsy of those 2 areas.  Both demonstrated a grade 1 invasive lobular breast cancer, ER positive, MO positive, HER-2/yung negative.  The 2 clips were about 8 cm apart.  On evaluation of her mammography and MRI, I think the whole area is about 8 cm as well.  She is now here to talk about treatment options.  She has not had any prior history of any breast problems.  Her family history is significant for a paternal aunt who had breast cancer.  When she saw Dr. Cisneros, he obtained blood for genetic testing.  She has type 2 diabetes and a history of hepatitis B.    IMPRESSION:  T3 N0 M0 invasive lobular breast cancer.    PLAN:  I talked to her about the various treatment options including mastectomy with or without reconstruction versus lumpectomy plus radiation therapy.  I told her she is not a good candidate for breast conserving surgery due to the size of her tumor.  I did recommend a sentinel lymph node biopsy.  I told her that endocrine therapy would be recommended.  I told her that invasive lobular breast cancer is usually not  responsive to chemotherapy.  Dr. Cisneros did order a MammaPrint which is still pending.  I told her that neoadjuvant endocrine therapy would not change her surgical procedure.  I am going to tentatively schedule her for a left skin-sparing mastectomy and sentinel lymph node biopsy with immediate reconstruction.  I am going to get her set up with a Plastic Surgery consult.  We will change this plan if she has a high MammaPrint or if she has a genetic mutation.    TOTAL TIME:  Sixty minutes which included reviewing her imaging, in-person visit and coordinating her care.    Dada Mendiola MD

## 2022-02-21 ENCOUNTER — PATIENT OUTREACH (OUTPATIENT)
Dept: ONCOLOGY | Facility: CLINIC | Age: 67
End: 2022-02-21
Payer: COMMERCIAL

## 2022-02-21 ENCOUNTER — TELEPHONE (OUTPATIENT)
Dept: ONCOLOGY | Facility: CLINIC | Age: 67
End: 2022-02-21
Payer: COMMERCIAL

## 2022-02-21 LAB
Lab: 1102
PERFORMING LABORATORY: NORMAL
SPECIMEN STATUS: NORMAL
TEST NAME: NORMAL

## 2022-02-21 NOTE — PROGRESS NOTES
Patient's daughter Catherine called to verify the Mammaprint result will be available for the consult with Dr Cisneros tomorrow. Writer spoke to Garo and the result was faxed to the correct fax number and e-mailed to Dr Cisneros on 2/14/2022. Updated patient's daughter with the Mammaprint results to be discussed at visit tomorrow.  Answered all patient's daughter's questions and verbalized understanding. Irene Todd RN, BSN.

## 2022-02-22 ENCOUNTER — TELEPHONE (OUTPATIENT)
Dept: ONCOLOGY | Facility: CLINIC | Age: 67
End: 2022-02-22

## 2022-02-22 NOTE — TELEPHONE ENCOUNTER
Tier 1 Case Request received to schedule a combo case with Dr. Mendiola and Dr. Feliciano:    Patient Name: Ramona Ferrer   MRN: [id number]   Case#: [id number]   Surgeon(s) and Role:      * Dada Mendiola MD - Primary      * Dr. Feliciano - plastics  Date requested: * No dates entered *   Location: [facility]   Procedure(s):   LEFT SKIN SPARING MASTECTOMY WITH SENTINEL LYMPH NODE BIOPSY (Left)     URGENCY: within 3-4 weeks. No sooner since we are waiting for additional testing. Combo with Braden     Pre Procedure testing needed:   PAC OR PCP - PRE OP     Additional Instructions Case Request       Surgeon Procedure time (incision-close in minutes): 120 MIN  H&P to be completed by?: PAC OR PCP   Postop appointment? 2-3 weeks   Surgical assistants?: No  Other\additional comments as needed?:  Nuclear Med to be delivered to OR    On 2/22/2022:  Sent message to Dr. Feliciano's  Palma [facility] asking if 3/23 could work for this combo surgery, or on one of the OR block overflow dates week of 3/14 or 3/22. Awaiting response.    On 2/23/2022:  Palma JUAN replied that 3/23 at 1:30 PM would work. Patient is meeting with Dr. Feliciano on 3/1/2022, Palma JUAN said to hold off on scheduling until after Dr. Feliciano places his orders.    Jen Rob  Berenice-op Coordinator  [facility]  Direct Phone: [phone]
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
## 2022-02-23 ENCOUNTER — TELEPHONE (OUTPATIENT)
Dept: ONCOLOGY | Facility: CLINIC | Age: 67
End: 2022-02-23
Payer: COMMERCIAL

## 2022-02-23 DIAGNOSIS — C50.412 MALIGNANT NEOPLASM OF UPPER-OUTER QUADRANT OF LEFT BREAST IN FEMALE, ESTROGEN RECEPTOR POSITIVE (H): Primary | ICD-10-CM

## 2022-02-23 DIAGNOSIS — Z80.3 FAMILY HISTORY OF MALIGNANT NEOPLASM OF BREAST: ICD-10-CM

## 2022-02-23 DIAGNOSIS — Z17.0 MALIGNANT NEOPLASM OF UPPER-OUTER QUADRANT OF LEFT BREAST IN FEMALE, ESTROGEN RECEPTOR POSITIVE (H): Primary | ICD-10-CM

## 2022-02-23 DIAGNOSIS — Z80.0 FAMILY HISTORY- STOMACH CANCER: ICD-10-CM

## 2022-02-23 NOTE — LETTER
Cancer Risk Management  Program [facility]  [facility]  Mailing Address  Cancer Risk Management Program  [address]  [place] [address]    New patient appointments  [phone]  February 23, 2022    Ramona Ferrer  [address]  [place] [address]      Dear Ramona,    It was a pleasure speaking with you on the phone on 2/23/2022.  Here is a copy of the progress note from our discussion. If you have any additional questions, please feel free to call.      Referring Provider: Dr. Cisneros, Dr. Mendiola    Presenting Information:  I spoke to Ramona by phone today to discuss her genetic testing results. Her blood was drawn on 2/10/2021. Common Hereditary Cancers panel testing was ordered from [facility]. This testing was done because of Ramona's personal history of breast cancer and family history of breast and stomach cancer. We discussed that we initially planned on reviewing her STAT results first. However, I explained that in some cases, the laboratory reports the full results out with the STAT results if they are available. This was the case for Ramona's results, so full results were reviewed today.     Genetic Testing Result: NEGATIVE  Ramona is negative for mutations in APC, XIOMARA, AXIN2, BARD1, BMPR1A, BRCA1, BRCA2, BRIP1, CDH1, CDK4, CDKN2A, CHEK2, CTNNA1, DICER1, EPCAM, GREM1, HOXB13, KIT, MEN1, MLH1, MSH2, MSH3, MSH6, MUTYH, NBN, NF1, NTHL1, PALB2, PDGFRA, PMS2, POLD1, POLE, PTEN, RAD50, RAD51C, RAD51D, SDHA, SDHB, SDHC, SDHD, SMAD4, SMARCA4, STK11, TP53, TSC1, TSC2, and VHL. No mutations were found in any of the 47 genes analyzed. This test involved sequencing and deletion/duplication analysis of all genes with the exceptions of EPCAM (deletions/duplications only) and SDHA  "(sequencing only).    A copy of the test report can be found in the Laboratory tab, dated 2/10/2022, and named \"LABORATORY MISCELLANEOUS ORDER\". The report is scanned in as a linked document.    Interpretation:  We discussed several different interpretations of this negative test result.    1. One explanation may be that there is a different gene or combination of genes and environment that are associated with the cancers in this family.  2. It is possible that her other relatives with cancer history did have a mutation in one of the genes above, and she did not inherit it.  3. There is also a small possibility that there is a mutation in one of these genes, and the testing laboratory could not find it with their current testing methods.       Screening:  Based on this negative test result, it is important for Ramona and her relatives to refer back to the family history for appropriate cancer screening.      Ramona should continue to follow her oncology team's recommendations for the treatment and follow-up for her breast cancer.    Ramona s close female relatives remain at increased risk for breast cancer given their family history. Breast cancer screening is generally recommended to begin approximately 10 years younger than the earliest age of breast cancer diagnosis in the family, or at age 40, whichever comes first. Breast screening options should be discussed with an individual's primary care provider and a genetic counselor, to determine at what age to begin screening, what screening is appropriate, and if additional screening (such as breast MRI) is necessary based on personal/family history factors.     We discussed that Ramona likely has some increased risk for stomach cancer given her family history, but routine screening is typically not recommended.  Ramona is encouraged to discuss this history with her care providers.      Other population cancer screening options, such as those recommended by the " American Cancer Society and the National Comprehensive Cancer Network (NCCN), are also appropriate for Ramona and her family. These screening recommendations may change if there are changes to Ramona's personal and/or family history. Final screening recommendations should be made by each individual's managing physician.      Inheritance:  We reviewed the autosomal dominant inheritance of mutations in these 47 genes. We discussed that Ramona cannot/did not pass on an identifiable mutation in these genes to her children based on this test result.  Mutations in these genes do not skip generations.      Additional Testing Considerations:  No further genetic is recommended for Ramona or her family at this time. Ramona was encouraged to contact me should her personal or family history change as this may change genetic testing recommendations.    Summary:  We do not have an explanation for Ramona's personal and family history of cancer. While no genetic changes were identified, Ramona may still be at risk for certain cancers due to family history, environmental factors, or other genetic causes not identified by this test.  Because of that, it is important that she continue with cancer screening based on her personal and family history as discussed above.    Genetic testing is rapidly advancing, and new cancer susceptibility genes will most likely be identified in the future.  Therefore, I encouraged Ramona to contact me annually or if there are changes in her personal or family history.  This may change how we assess her cancer risk, screening, and the testing we would offer.    Plan:  1. A copy of the test results will be mailed to Ramona per her request.  2. She plans to follow-up with her oncology team and primary care providers.  3. She should contact me regularly, or sooner if her family history changes.    If Ramona has any further questions, I encouraged her to contact me at [phone].    Carrie Putnam MS,  [facility]  Licensed, Certified Genetic Counselor  [facility]  [phone]  [email or web address]

## 2022-02-23 NOTE — Clinical Note
Please send copy of letter along with copy of scanned in test report from Laboratory Miscellaneous Order [id number]

## 2022-02-23 NOTE — TELEPHONE ENCOUNTER
"2/23/2022    Referring Provider: Darrel, Dr. Mendiola    Presenting Information:  I spoke to Ramona by phone today to discuss her genetic testing results. Her blood was drawn on 2/10/2021. Common Hereditary Cancers panel testing was ordered from [employer]. This testing was done because of Ramona's personal history of breast cancer and family history of breast and stomach cancer. We discussed that we initially planned on reviewing her STAT results first. However, I explained that in some cases, the laboratory reports the full results out with the STAT results if they are available. This was the case for Ramona's results, so full results were reviewed today.     Genetic Testing Result: NEGATIVE  Ramona is negative for mutations in APC, XIOMARA, AXIN2, BARD1, BMPR1A, BRCA1, BRCA2, BRIP1, CDH1, CDK4, CDKN2A, CHEK2, CTNNA1, DICER1, EPCAM, GREM1, HOXB13, KIT, MEN1, MLH1, MSH2, MSH3, MSH6, MUTYH, NBN, NF1, NTHL1, PALB2, PDGFRA, PMS2, POLD1, POLE, PTEN, RAD50, RAD51C, RAD51D, SDHA, SDHB, SDHC, SDHD, SMAD4, SMARCA4, STK11, TP53, TSC1, TSC2, and VHL. No mutations were found in any of the 47 genes analyzed. This test involved sequencing and deletion/duplication analysis of all genes with the exceptions of EPCAM (deletions/duplications only) and SDHA (sequencing only).    A copy of the test report can be found in the Laboratory tab, dated 2/10/2022, and named \"LABORATORY MISCELLANEOUS ORDER\". The report is scanned in as a linked document.    Interpretation:  We discussed several different interpretations of this negative test result.    1. One explanation may be that there is a different gene or combination of genes and environment that are associated with the cancers in this family.  2. It is possible that her other relatives with cancer history did have a mutation in one of the genes above, and she did not inherit it.  3. There is also a small possibility that there is a mutation in one of these genes, and the testing laboratory " could not find it with their current testing methods.       Screening:  Based on this negative test result, it is important for Ramona and her relatives to refer back to the family history for appropriate cancer screening.      Ramona should continue to follow her oncology team's recommendations for the treatment and follow-up for her breast cancer.    Ramona s close female relatives remain at increased risk for breast cancer given their family history. Breast cancer screening is generally recommended to begin approximately 10 years younger than the earliest age of breast cancer diagnosis in the family, or at age 40, whichever comes first. Breast screening options should be discussed with an individual's primary care provider and a genetic counselor, to determine at what age to begin screening, what screening is appropriate, and if additional screening (such as breast MRI) is necessary based on personal/family history factors.     We discussed that Ramona likely has some increased risk for stomach cancer given her family history, but routine screening is typically not recommended.  Ramona is encouraged to discuss this history with her care providers.      Other population cancer screening options, such as those recommended by the American Cancer Society and the National Comprehensive Cancer Network (NCCN), are also appropriate for Ramona and her family. These screening recommendations may change if there are changes to Ramona's personal and/or family history. Final screening recommendations should be made by each individual's managing physician.      Inheritance:  We reviewed the autosomal dominant inheritance of mutations in these 47 genes. We discussed that Ramona cannot/did not pass on an identifiable mutation in these genes to her children based on this test result.  Mutations in these genes do not skip generations.      Additional Testing Considerations:  No further genetic is recommended for Ramona or her  family at this time. Ramona was encouraged to contact me should her personal or family history change as this may change genetic testing recommendations.    Summary:  We do not have an explanation for Ramona's personal and family history of cancer. While no genetic changes were identified, Ramona may still be at risk for certain cancers due to family history, environmental factors, or other genetic causes not identified by this test.  Because of that, it is important that she continue with cancer screening based on her personal and family history as discussed above.    Genetic testing is rapidly advancing, and new cancer susceptibility genes will most likely be identified in the future.  Therefore, I encouraged Ramona to contact me annually or if there are changes in her personal or family history.  This may change how we assess her cancer risk, screening, and the testing we would offer.    Plan:  1. A copy of the test results will be mailed to Ramona per her request.  2. She plans to follow-up with her oncology team and primary care providers.  3. She should contact me regularly, or sooner if her family history changes.    If Ramona has any further questions, I encouraged her to contact me at [phone].    Time spent on the phone: 15 minutes.    Carrie Putnam MS, [facility]  Licensed, Certified Genetic Counselor  [facility]  [phone]  [email or web address]

## 2022-03-01 ENCOUNTER — PREP FOR PROCEDURE (OUTPATIENT)
Dept: SURGERY | Facility: CLINIC | Age: 67
End: 2022-03-01

## 2022-03-01 ENCOUNTER — OFFICE VISIT (OUTPATIENT)
Dept: PLASTIC SURGERY | Facility: CLINIC | Age: 67
End: 2022-03-01
Attending: PLASTIC SURGERY
Payer: COMMERCIAL

## 2022-03-01 VITALS
WEIGHT: 234.1 LBS | HEIGHT: 63 IN | OXYGEN SATURATION: 97 % | SYSTOLIC BLOOD PRESSURE: 116 MMHG | HEART RATE: 74 BPM | RESPIRATION RATE: 18 BRPM | DIASTOLIC BLOOD PRESSURE: 76 MMHG | BODY MASS INDEX: 41.48 KG/M2 | TEMPERATURE: 97.6 F

## 2022-03-01 DIAGNOSIS — C50.912 MALIGNANT NEOPLASM OF LEFT FEMALE BREAST, UNSPECIFIED ESTROGEN RECEPTOR STATUS, UNSPECIFIED SITE OF BREAST (H): Primary | ICD-10-CM

## 2022-03-01 DIAGNOSIS — C50.412 MALIGNANT NEOPLASM OF UPPER-OUTER QUADRANT OF LEFT BREAST IN FEMALE, ESTROGEN RECEPTOR POSITIVE (H): Primary | ICD-10-CM

## 2022-03-01 DIAGNOSIS — Z17.0 MALIGNANT NEOPLASM OF UPPER-OUTER QUADRANT OF LEFT BREAST IN FEMALE, ESTROGEN RECEPTOR POSITIVE (H): Primary | ICD-10-CM

## 2022-03-01 PROCEDURE — 99205 OFFICE O/P NEW HI 60 MIN: CPT | Performed by: PLASTIC SURGERY

## 2022-03-01 PROCEDURE — G0463 HOSPITAL OUTPT CLINIC VISIT: HCPCS

## 2022-03-01 RX ORDER — CEFAZOLIN SODIUM 2 G/50ML
2 SOLUTION INTRAVENOUS SEE ADMIN INSTRUCTIONS
Status: CANCELLED | OUTPATIENT
Start: 2022-03-01

## 2022-03-01 RX ORDER — CEFAZOLIN SODIUM 2 G/50ML
2 SOLUTION INTRAVENOUS
Status: CANCELLED | OUTPATIENT
Start: 2022-03-01

## 2022-03-01 ASSESSMENT — PAIN SCALES - GENERAL: PAINLEVEL: NO PAIN (0)

## 2022-03-01 NOTE — PROGRESS NOTES
CONSULTATION NOTE    REFERRING PROVIDER:  Dr. Dada Mendiola, [facility] Surgery     PRESENTING COMPLAINT:  Consultation for breast reconstruction.    HISTORY OF PRESENTING COMPLAINT:  Ms. Ferrer is 66 years old.  She has been diagnosed with a left-sided breast cancer, is undergoing a left-sided nipple-non-sparing mastectomy, interested in breast reconstruction.  She is here with her daughter acting as her , although she states that she can understand English well and does not want a formal .  The patient wears a D cup.  She would like to be around the same size as her right side.  She does not definitively need radiation therapy.  It all depends on her pathology.  She is here to discuss options for breast reconstruction.    PAST MEDICAL HISTORY:  Hypertension, diabetes, hypothyroidism, history of hepatitis B.    PAST SURGICAL HISTORY:  Hysterectomy and varicose vein removal.    MEDICATIONS:  Include:  1.  Levothyroxine.    2.  Lisinopril.    3.  Metformin.    ALLERGIES:  None.    SOCIAL HISTORY:  Does not smoke, socially drinks.  Lives in [place].    REVIEW OF SYSTEMS:  Denies chest pain, shortness of breath, MI, CVA, DVT and PE.    PHYSICAL EXAMINATION:    VITAL SIGNS:  Stable.  She is afebrile, in no obvious distress.  She is 5 feet 3 inches, 230 pounds, BMI of 42 kg/m2.    CHEST:  On examination of her breasts, she has grade 3 ptosis.  Left side is larger than the right.  She has enough tissue to easily give her the size that she wants.    ABDOMEN:  She has no hernias.    BACK:  No scars on the back.    ASSESSMENT AND PLAN:  Based on above findings, a diagnosis of a left breast cancer, undergoing left-sided nipple-non-sparing mastectomy, interested in immediate reconstruction was made.  I had a neeraj detailed discussion with the patient in the presence of my nurse, Lorna Ludwig, who was present from beginning to end.  Discussed with the patient and her daughter about the concepts behind breast  reconstruction, elective nature of reconstruction, staged nature of reconstruction, immediate versus delayed reconstruction, implant-based versus autologous reconstruction and the pros and cons of each option.  Expectations and perioperative care of each of these options were also explained.  After much discussion, the patient is interested in proceeding with autologous reconstruction.  Discussed with the patient that the first stage would entail a nipple-non-sparing mastectomy and expander placement as long as the blood flow to the skin flaps looks healthy.  Stage 2 would then be removal of the expander and placement of a free RUTH flap and stage III would be symmetry enhancement procedures and touchup surgeries.  The concept behind all of this was explained.  All risks, benefits and alternatives of the first stage including pain, infection, bleeding, scarring, asymmetry, seromas, hematomas, wound breakdown, wound dehiscence, infectious complications and removal of the expander for expander exposure or infection, numbness of the chest wall, DVT, PE, MI, CVA, pneumonia, renal failure and death were all explained.  She understood everything and wants to proceed.  All her questions were answered.  She was happy with her visit.  Look forward to helping her out in the near future.    Total time spent with chart review, visit itself and post-visit paperwork was 60 minutes.    NOHEMI Feliciano MD    cc:  Dada Mendiola MD    Physicians  [address] [facility] [address]  [place] [address]

## 2022-03-01 NOTE — LETTER
3/1/2022         RE: Ramona Ferrer  [address]  [facility] [address]        Dear Colleague,    Thank you for referring your patient, Ramona Ferrer, to the [facility] BRE[facility]. Please see a copy of my visit note below.    CONSULTATION NOTE    REFERRING PROVIDER:  Dr. Dada Mendiola, [facility] Surgery     PRESENTING COMPLAINT:  Consultation for breast reconstruction.    HISTORY OF PRESENTING COMPLAINT:  Ms. Ferrer is 66 years old.  She has been diagnosed with a left-sided breast cancer, is undergoing a left-sided nipple-non-sparing mastectomy, interested in breast reconstruction.  She is here with her daughter acting as her , although she states that she can understand English well and does not want a formal .  The patient wears a D cup.  She would like to be around the same size as her right side.  She does not definitively need radiation therapy.  It all depends on her pathology.  She is here to discuss options for breast reconstruction.    PAST MEDICAL HISTORY:  Hypertension, diabetes, hypothyroidism, history of hepatitis B.    PAST SURGICAL HISTORY:  Hysterectomy and varicose vein removal.    MEDICATIONS:  Include:  1.  Levothyroxine.    2.  Lisinopril.    3.  Metformin.    ALLERGIES:  None.    SOCIAL HISTORY:  Does not smoke, socially drinks.  Lives in [place].    REVIEW OF SYSTEMS:  Denies chest pain, shortness of breath, MI, CVA, DVT and PE.    PHYSICAL EXAMINATION:    VITAL SIGNS:  Stable.  She is afebrile, in no obvious distress.  She is 5 feet 3 inches, 230 pounds, BMI of 42 kg/m2.    CHEST:  On examination of her breasts, she has grade 3 ptosis.  Left side is larger than the right.  She has enough tissue to easily give her the size that she wants.    ABDOMEN:  She has no hernias.    BACK:  No scars on the back.    ASSESSMENT AND PLAN:  Based on above findings, a diagnosis of a left breast cancer, undergoing left-sided nipple-non-sparing  mastectomy, interested in immediate reconstruction was made.  I had a neeraj detailed discussion with the patient in the presence of my nurse, Lorna Ludwig, who was present from beginning to end.  Discussed with the patient and her daughter about the concepts behind breast reconstruction, elective nature of reconstruction, staged nature of reconstruction, immediate versus delayed reconstruction, implant-based versus autologous reconstruction and the pros and cons of each option.  Expectations and perioperative care of each of these options were also explained.  After much discussion, the patient is interested in proceeding with autologous reconstruction.  Discussed with the patient that the first stage would entail a nipple-non-sparing mastectomy and expander placement as long as the blood flow to the skin flaps looks healthy.  Stage 2 would then be removal of the expander and placement of a free RUTH flap and stage III would be symmetry enhancement procedures and touchup surgeries.  The concept behind all of this was explained.  All risks, benefits and alternatives of the first stage including pain, infection, bleeding, scarring, asymmetry, seromas, hematomas, wound breakdown, wound dehiscence, infectious complications and removal of the expander for expander exposure or infection, numbness of the chest wall, DVT, PE, MI, CVA, pneumonia, renal failure and death were all explained.  She understood everything and wants to proceed.  All her questions were answered.  She was happy with her visit.  Look forward to helping her out in the near future.    Total time spent with chart review, visit itself and post-visit paperwork was 60 minutes.        Again, thank you for allowing me to participate in the care of your patient.      Sincerely,    NOHEMI Feliciano MD

## 2022-03-01 NOTE — NURSING NOTE
"Oncology Rooming Note    March 1, 2022 9:40 AM   Ramona Ferrer is a 66 year old female who presents for:    Chief Complaint   Patient presents with     Oncology Clinic Visit     Patient with breast cancer here for provider consult     Initial Vitals: /76 (BP Location: Right arm, Patient Position: Sitting, Cuff Size: Adult Large)   Pulse 74   Temp 97.6  F (36.4  C) (Tympanic)   Resp 18   Ht 1.591 m (5' 2.64\")   Wt 106.2 kg (234 lb 1.6 oz)   SpO2 97%   BMI 41.95 kg/m   Estimated body mass index is 41.95 kg/m  as calculated from the following:    Height as of this encounter: 1.591 m (5' 2.64\").    Weight as of this encounter: 106.2 kg (234 lb 1.6 oz). Body surface area is 2.17 meters squared.  No Pain (0) Comment: Data Unavailable   No LMP recorded. Patient has had a hysterectomy.  Allergies reviewed: Yes  Medications reviewed: Yes    Medications: Medication refills not needed today.  Pharmacy name entered into [facility]:    [id number] - [place], [place] - [address]  [facility] # [id number] - [place], [place] - [address] [address]    Clinical concerns:         Aleshia Soares CMA              "

## 2022-03-02 ENCOUNTER — HOSPITAL ENCOUNTER (OUTPATIENT)
Dept: MRI IMAGING | Facility: CLINIC | Age: 67
Discharge: HOME | End: 2022-03-02
Attending: INTERNAL MEDICINE | Admitting: INTERNAL MEDICINE
Payer: COMMERCIAL

## 2022-03-02 DIAGNOSIS — Z11.59 ENCOUNTER FOR SCREENING FOR OTHER VIRAL DISEASES: Primary | ICD-10-CM

## 2022-03-02 DIAGNOSIS — B18.1 VIRAL HEPATITIS B CHRONIC (H): ICD-10-CM

## 2022-03-02 PROCEDURE — A9585 GADOBUTROL INJECTION: HCPCS | Performed by: INTERNAL MEDICINE

## 2022-03-02 PROCEDURE — 255N000002 HC RX 255 OP 636: Performed by: INTERNAL MEDICINE

## 2022-03-02 PROCEDURE — 74183 MRI ABD W/O CNTR FLWD CNTR: CPT

## 2022-03-02 RX ORDER — GADOBUTROL 604.72 MG/ML
11 INJECTION INTRAVENOUS ONCE
Status: COMPLETED | OUTPATIENT
Start: 2022-03-02 | End: 2022-03-02

## 2022-03-02 RX ADMIN — GADOBUTROL 11 ML: 604.72 INJECTION INTRAVENOUS at 16:22

## 2022-03-02 NOTE — TELEPHONE ENCOUNTER
Dr. Feliciano's  Palma JUAN scheduled patient's combo case with Dr. Mendiola as discussed, on 3/23/2022 at 1:30 PM start time at [place] [facility].    Left message for patient's daughter Zelda to review scheduling information and instructions as follows. Also sent Surgery Packet to patient via [employer] and Mail with this info:    Your surgery is scheduled:    Date: Wednesday March 23, 2022  ________________________________    Time: 1:30 PM*  ________________________________    Please arrive at:  11:30 AM*  ______________________    Surgeons' Names:   - Dr. Dada Mendiola  - Dr. NOHEMI Feliciano  _______________________        Pre-Op Physical Fax Numbers:         [facility] Pre-Admissions  [facility] Fax:  [phone] / Phone:  [phone]        Your surgery is located at:      [facility]      [facility] campus      [address] [place], [place] [address] Floor()      [place] [address]      [phone]      [email or web address]     *Times are tentative and may change. You can expect a call from the pre-admission nurses to confirm arrival and surgery start times if the times should change.    Surgery: Left Skin Sparing Mastectomy with Sentinal Lymph Node Biopsy,   Left breast reconstruction with expander and SPY    Your Upcoming Surgery and Related Appointment Information        Mar 15, 2022 10:45 AM      Consult with surgeon  (Arrive by 10:30 AM)  Return Visit with NOHEMI Feliciano MD  [facility] ([facility] ) [phone]    [facility]-2nd Floor  [address] [place] [address]     Mar 15, 2022 12:00 PM      Pre-op History & Physical  (Arrive by 11:45 AM)  PRE-OP with EFRAIN [facility] [place]         ([facility] )   [phone]    [facility]-5th Floor  [address] [address]     Mar 21, 2022  10:00 AM      Pre-op Covid PCR Test  Pre-procedure Covid PCR Test with [place] COVID LAB  [facility] ([facility] )   [phone]    [address] [facility] [address]     Mar 23, 2022 12:30 PM  NM SENTINEL NODE INJECTION BILATERAL with [id number]  [facility] ([facility] ) [phone]    PLEASE DISREGARD THIS APPOINTMENT       NOTE regarding required pre-op physicals: Please remember to have a pre-operative history and physical completed within 30 days of your surgery. This is scheduled with the Nurse Practitioner Clinic on 3/15/2022; however, if you would prefer to schedule this with your Primary Care Provider, then you can do that instead. If you do not have one of these completed and in our system, your surgery will be subject to cancellation. If you cannot get an appointment with your primary doctor to complete this, please reach out to us and we will schedule you with our PAC department. If your history and physical is out of the [employer] system, please have them fax your completed pre-op note to: [phone].      Post-operative Appointments:    Apr 08, 2022 10:45 AM  (Arrive by 10:30 AM)  Post-Op with Vidhi Austin PA-C  [facility] [place] ([facility] )   [phone]    [facility]-4th Floor(4K)  [address] [address]     Apr 11, 2022  3:30 PM  (Arrive by 3:15 PM)  Return Visit with Dada Mendiola MD  [facility] [place] ([facility] [place] ) [phone]    [facility]-2nd Floor(2B)  [address] [address]     Jen Rob  Berenice-op Coordinator  Breast Oncology  Direct Phone: [phone]

## 2022-03-03 ENCOUNTER — PATIENT OUTREACH (OUTPATIENT)
Dept: ONCOLOGY | Facility: CLINIC | Age: 67
End: 2022-03-03
Payer: COMMERCIAL

## 2022-03-03 NOTE — PROGRESS NOTES
Patient referred for GC, already seen by Carrie Putnam and results discussed on 2/23. Referral canceled.

## 2022-03-04 LAB — SPECIMEN STATUS: NORMAL

## 2022-03-04 NOTE — TELEPHONE ENCOUNTER
As of 3/4/2022, the Surgery Packet sent to patient via [employer] has not been read.  It was also mailed. No response from previous voicemail message left for Zelda requesting that she please call back to confirm surgery and related appointment dates.    Need to confirm surgery date and related appointments. Per patient's contact information, ALL scheduling is to go through patient's daughter Zelda.    With , I left 2 messages today - 1 in English, the other in Wallisian stating that it is very important that I speak with Zelda soon to confirm all scheduling for her mother Sammie.    Awaiting response from Zelda.    Jen Rob  Berenice-op Coordinator  [place]-Rectal Surgery  Direct Phone: [phone]

## 2022-03-08 ENCOUNTER — TELEPHONE (OUTPATIENT)
Dept: ONCOLOGY | Facility: CLINIC | Age: 67
End: 2022-03-08
Payer: COMMERCIAL

## 2022-03-08 NOTE — TELEPHONE ENCOUNTER
Patient's daughter called in with questions which are all included in the Surgery Packet sent via [employer] and Mail on 3/2/2022. I reviewed these with her and encouraged her to read the Surgery Packet in [employer] or Mail, and to check the appointments scheduled also in [employer] appointments list.    Jen Rob  Berenice-op Coordinator  [place]-Rectal Surgery  Direct Phone: [phone]

## 2022-03-10 NOTE — TELEPHONE ENCOUNTER
"As of 3/10/2022, the Surgery Packet sent to patient via [employer] has not been read.  It was also mailed on 3/2/2022, so should have been received by now.     No response from previous voicemail message left for Zelda in English and in Senegalese (with ) requesting that she please call back to confirm surgery and related appointment dates.    Need to confirm surgery date and related appointments. Per patient's contact information, ALL scheduling is to go through patient's daughter Zelda.    Awaiting response from Zelda.    Zelda called back, confirmed surgery and all appointments.    In response to my conversation with Zelda, sent the following message to Dr. Mendiola's nurses:    \"Yasemin Quezada,    Aftercare questions - please call dtr Zelda, English-speaking.    Wondering about drains, anything else requiring special equipment, and whether certain pillows or comfort items could help.    She wants to have this conversation while patient is in surgery, if possible, since it's hard for her to take off of work. I said I'd ask if a call could be arranged, but I was not sure about that.    Thank-you,  Jen\"    Jen Rob  Berenice-op Coordinator  [place]-Rectal Surgery  Direct Phone: [phone]    "

## 2022-03-14 ENCOUNTER — TELEPHONE (OUTPATIENT)
Dept: ONCOLOGY | Facility: CLINIC | Age: 67
End: 2022-03-14
Payer: COMMERCIAL

## 2022-03-14 NOTE — TELEPHONE ENCOUNTER
I called and let her know that the liver MRI showed fatty liver but no suspicious findings.     Kenneth Cisneros MD

## 2022-03-15 ENCOUNTER — TELEPHONE (OUTPATIENT)
Dept: ONCOLOGY | Facility: CLINIC | Age: 67
End: 2022-03-15

## 2022-03-15 ENCOUNTER — OFFICE VISIT (OUTPATIENT)
Dept: PLASTIC SURGERY | Facility: CLINIC | Age: 67
End: 2022-03-15
Attending: PLASTIC SURGERY
Payer: COMMERCIAL

## 2022-03-15 ENCOUNTER — OFFICE VISIT (OUTPATIENT)
Dept: FAMILY MEDICINE | Facility: CLINIC | Age: 67
End: 2022-03-15
Payer: COMMERCIAL

## 2022-03-15 ENCOUNTER — LAB (OUTPATIENT)
Dept: LAB | Facility: CLINIC | Age: 67
End: 2022-03-15
Payer: COMMERCIAL

## 2022-03-15 VITALS
OXYGEN SATURATION: 99 % | BODY MASS INDEX: 41.75 KG/M2 | DIASTOLIC BLOOD PRESSURE: 84 MMHG | SYSTOLIC BLOOD PRESSURE: 139 MMHG | HEART RATE: 77 BPM | WEIGHT: 233 LBS | TEMPERATURE: 97.9 F

## 2022-03-15 VITALS
BODY MASS INDEX: 41.29 KG/M2 | DIASTOLIC BLOOD PRESSURE: 76 MMHG | SYSTOLIC BLOOD PRESSURE: 129 MMHG | WEIGHT: 233.03 LBS | HEART RATE: 68 BPM | HEIGHT: 63 IN | TEMPERATURE: 97.5 F

## 2022-03-15 DIAGNOSIS — C50.412 MALIGNANT NEOPLASM OF UPPER-OUTER QUADRANT OF LEFT BREAST IN FEMALE, ESTROGEN RECEPTOR POSITIVE (H): Primary | ICD-10-CM

## 2022-03-15 DIAGNOSIS — Z17.0 MALIGNANT NEOPLASM OF UPPER-OUTER QUADRANT OF LEFT BREAST IN FEMALE, ESTROGEN RECEPTOR POSITIVE (H): ICD-10-CM

## 2022-03-15 DIAGNOSIS — Z01.818 PRE-OP EXAM: ICD-10-CM

## 2022-03-15 DIAGNOSIS — C50.412 MALIGNANT NEOPLASM OF UPPER-OUTER QUADRANT OF LEFT BREAST IN FEMALE, ESTROGEN RECEPTOR POSITIVE (H): ICD-10-CM

## 2022-03-15 DIAGNOSIS — Z17.0 MALIGNANT NEOPLASM OF UPPER-OUTER QUADRANT OF LEFT BREAST IN FEMALE, ESTROGEN RECEPTOR POSITIVE (H): Primary | ICD-10-CM

## 2022-03-15 LAB
ALBUMIN SERPL-MCNC: 3.6 G/DL (ref 3.4–5)
ALP SERPL-CCNC: 78 U/L (ref 40–150)
ALT SERPL W P-5'-P-CCNC: 57 U/L (ref 0–50)
ANION GAP SERPL CALCULATED.3IONS-SCNC: 5 MMOL/L (ref 3–14)
AST SERPL W P-5'-P-CCNC: 29 U/L (ref 0–45)
BASOPHILS # BLD AUTO: 0.1 10E3/UL (ref 0–0.2)
BASOPHILS NFR BLD AUTO: 1 %
BILIRUB SERPL-MCNC: 0.4 MG/DL (ref 0.2–1.3)
BUN SERPL-MCNC: 13 MG/DL (ref 7–30)
CALCIUM SERPL-MCNC: 9.5 MG/DL (ref 8.5–10.1)
CHLORIDE BLD-SCNC: 106 MMOL/L (ref 94–109)
CO2 SERPL-SCNC: 29 MMOL/L (ref 20–32)
CREAT SERPL-MCNC: 0.82 MG/DL (ref 0.52–1.04)
EOSINOPHIL # BLD AUTO: 0.1 10E3/UL (ref 0–0.7)
EOSINOPHIL NFR BLD AUTO: 2 %
ERYTHROCYTE [DISTWIDTH] IN BLOOD BY AUTOMATED COUNT: 12.9 % (ref 10–15)
GFR SERPL CREATININE-BSD FRML MDRD: 79 ML/MIN/1.73M2
GLUCOSE BLD-MCNC: 108 MG/DL (ref 70–99)
HCT VFR BLD AUTO: 43.5 % (ref 35–47)
HGB BLD-MCNC: 14 G/DL (ref 11.7–15.7)
IMM GRANULOCYTES # BLD: 0 10E3/UL
IMM GRANULOCYTES NFR BLD: 1 %
LYMPHOCYTES # BLD AUTO: 2.3 10E3/UL (ref 0.8–5.3)
LYMPHOCYTES NFR BLD AUTO: 40 %
MCH RBC QN AUTO: 30 PG (ref 26.5–33)
MCHC RBC AUTO-ENTMCNC: 32.2 G/DL (ref 31.5–36.5)
MCV RBC AUTO: 93 FL (ref 78–100)
MONOCYTES # BLD AUTO: 0.7 10E3/UL (ref 0–1.3)
MONOCYTES NFR BLD AUTO: 12 %
NEUTROPHILS # BLD AUTO: 2.6 10E3/UL (ref 1.6–8.3)
NEUTROPHILS NFR BLD AUTO: 44 %
NRBC # BLD AUTO: 0 10E3/UL
NRBC BLD AUTO-RTO: 0 /100
PLATELET # BLD AUTO: 256 10E3/UL (ref 150–450)
POTASSIUM BLD-SCNC: 4.5 MMOL/L (ref 3.4–5.3)
PROT SERPL-MCNC: 7.5 G/DL (ref 6.8–8.8)
RBC # BLD AUTO: 4.66 10E6/UL (ref 3.8–5.2)
SODIUM SERPL-SCNC: 140 MMOL/L (ref 133–144)
WBC # BLD AUTO: 5.8 10E3/UL (ref 4–11)

## 2022-03-15 PROCEDURE — 99215 OFFICE O/P EST HI 40 MIN: CPT | Performed by: NURSE PRACTITIONER

## 2022-03-15 PROCEDURE — G0463 HOSPITAL OUTPT CLINIC VISIT: HCPCS

## 2022-03-15 PROCEDURE — 99214 OFFICE O/P EST MOD 30 MIN: CPT | Performed by: PLASTIC SURGERY

## 2022-03-15 PROCEDURE — 85025 COMPLETE CBC W/AUTO DIFF WBC: CPT | Performed by: PATHOLOGY

## 2022-03-15 PROCEDURE — 80053 COMPREHEN METABOLIC PANEL: CPT | Performed by: PATHOLOGY

## 2022-03-15 PROCEDURE — 36415 COLL VENOUS BLD VENIPUNCTURE: CPT | Performed by: PATHOLOGY

## 2022-03-15 ASSESSMENT — ENCOUNTER SYMPTOMS
NEUROLOGICAL NEGATIVE: 1
MUSCULOSKELETAL NEGATIVE: 1
EYES NEGATIVE: 1
CARDIOVASCULAR NEGATIVE: 1
CONSTITUTIONAL NEGATIVE: 1
GASTROINTESTINAL NEGATIVE: 1
RESPIRATORY NEGATIVE: 1

## 2022-03-15 ASSESSMENT — PAIN SCALES - GENERAL: PAINLEVEL: NO PAIN (0)

## 2022-03-15 NOTE — PATIENT INSTRUCTIONS
Go to the lab today for your blood draw. Hold Lisiopril and Metformin the day of your surgery.     Proceed to surgery as planned.       EFRAIN Wright CNP

## 2022-03-15 NOTE — NURSING NOTE
"Oncology Rooming Note    March 15, 2022 10:48 AM   Ramona Ferrer is a 66 year old female who presents for:    Chief Complaint   Patient presents with     Oncology Clinic Visit     rtn for pre-surgical consult     Initial Vitals: /84   Pulse 77   Temp 97.9  F (36.6  C) (Oral)   Wt 105.7 kg (233 lb)   SpO2 99%   BMI 41.75 kg/m   Estimated body mass index is 41.75 kg/m  as calculated from the following:    Height as of 3/1/22: 1.591 m (5' 2.64\").    Weight as of this encounter: 105.7 kg (233 lb). Body surface area is 2.16 meters squared.  No Pain (0) Comment: Data Unavailable   No LMP recorded. Patient has had a hysterectomy.  Allergies reviewed: Yes  Medications reviewed: Yes    Medications: Medication refills not needed today.  Pharmacy name entered into [employer]:    [facility][id number] [place], [place] - [address]  [facility] # [id number] [place], [place] - [address] [address]    Clinical concerns: Pt has questions to address with provider. MD was NOT notified.      Angelic Kennedy, EMT            "

## 2022-03-15 NOTE — PROGRESS NOTES
[facility] CLIN[facility] [address]  [address] [facility] [address]  Phone: [phone]  Fax: [phone]  Primary Provider: Eugene Michelle  Pre-op Performing Provider:    FABI GARCIA  VIDEO,    is present today with patient.       PREOPERATIVE EVALUATION:  Today's date: 3/15/2022    Ramona Ferrer is a 66 year old female who presents for a preoperative evaluation.    Surgical Information:  Surgery/Procedure: LEFT SKIN SPARING MASTECTOMY WITH SENTINEL LYMPH NODE BIOPSY  Surgery Location: [facility]  Surgeon: Dada Mendiola MD  Surgery Date: 03/23/2022  Time of Surgery: 1330  Where patient plans to recover: At home with family  Fax number for surgical facility: Note does not need to be faxed, will be available electronically in Epic.    Type of Anesthesia Anticipated: General    Assessment & Plan     The proposed surgical procedure is considered INTERMEDIATE risk.    (C50.412,  Z17.0) Malignant neoplasm of upper-outer quadrant of left breast in female, estrogen receptor positive (H)  (primary encounter diagnosis)  Plan: Hemoglobin, Potassium          (Z01.818) Pre-op exam  Plan: Hemoglobin, Potassium          Risks and Recommendations:  The patient has the following additional risks and recommendations for perioperative complications:  Cardiovascular:   - hypertension, well controlled.   Diabetes:  - Patient is not on insulin therapy: regular NPO guidelines can be followed.  Hold metformin the morning of surgery ( typically takes at dinner time)   Obstructive Sleep Apnea:   Patient is not aware of sleep apnea diagnosis. Is listed as a previous diagnosis. Denies SOB, wheezing, daytime sleepiness, CPAP use, snoring.     Medication Instructions:  Patient is to take all scheduled medications on the day of surgery EXCEPT for modifications listed below: lisinopril, metformin.     RECOMMENDATION:  APPROVAL GIVEN to proceed with proposed  procedure, without further diagnostic evaluation.      45 minutes spent on the date of the encounter doing chart review, review of test results, patient visit, documentation and discussion with family     {  Subjective     HPI related to upcoming procedure: The patient states that she palpated a mass in her left breast as documented in her PCP visit on 12/12/21 .  She underwent a mammogram and ultrasound  That showed a large abnormality in her left breast.  Her right breast was normal.      She subsequently had a breast MRI on 01/25 which demonstrated 2 masses, one at the 12 o'clock position measuring 3.4 cm and one at the 1 o'clock position measuring 1.3 cm.  However, it looks like there are multiple masses in this area on my review of the MRI today.  She underwent a biopsy of those 2 areas.  Both demonstrated a grade 1 invasive lobular breast cancer, ER positive, SC positive, HER-2/yung negative.      1. No - Have you ever had a heart attack or stroke?  2. No - Have you ever had surgery on your heart or blood vessels, such as a stent, coronary (heart) bypass, or surgery on an artery in the head, neck, heart, or legs?  3. No - Do you have chest pain when you are physically active?  4. No - Do you have a history of heart failure?  5. No - Do you currently have a cold, bronchitis, or symptoms of other respiratory (head and chest) infections?  6. No - Do you have a cough, shortness of breath, or wheezing?  7. No - Do you or anyone in your family have a history of blood clots?  8. No - Do you or anyone in your family have a serious bleeding problem, such as long-lasting bleeding after surgeries or cuts?  9. No - Have you ever had anemia or been told to take iron pills?  10. No - Have you had any abnormal blood loss such as black, tarry or bloody stools, or abnormal vaginal bleeding?  11. No - Have you ever had a blood transfusion?  12. Yes - Are you willing to have a blood transfusion if it is medically needed before,  during, or after your surgery?  13. No - Have you or anyone in your family ever had problems with anesthesia (sedation for surgery)?  14. No - Do you have sleep apnea, excessive snoring, or daytime drowsiness?   15. No - Do you have any artifical heart valves or other implanted medical devices, such as a pacemaker, defibrillator, or continuous glucose monitor?  16. No - Do you have any artifical joints?  17. No - Are you allergic to latex?  18. No - Is there any chance that you may be pregnant?        Health Care Directive:  Patient does not have a Health Care Directive or Living Will: Discussed advance care planning with patient; however, patient declined at this time.    Preoperative Review of :   reviewed - no record of controlled substances prescribed.      Status of Chronic Conditions:  DIABETES - Patient has a longstanding history of DiabetesType Type II . Patient is being treated with oral agents and denies significant side effects. Control has been good. Complicating factors include but are not limited to: morbid obesity . Last A1C on 12/27/21 was 6.3.     HYPERTENSION - Patient has longstanding history of HTN , currently denies any symptoms referable to elevated blood pressure. Specifically denies chest pain, palpitations, dyspnea, orthopnea, PND or peripheral edema. Blood pressure readings have been in normal range. Current medication regimen is as listed below. Patient denies any side effects of medication.     HYPOTHYROIDISM - Patient has a longstanding history of chronic Hypothyroidism. Patient has been doing well, noting no tremor, insomnia, hair loss or changes in skin texture. Continues to take medications as directed, without adverse reactions or side effects. Last TSH   Lab Results   Component Value Date    TSH 0.74 12/13/2021   .      Chronic hepatitis B- Hep B core antibody IgM tested 2/10/22 was non reactive. Hep B surface antigen tested 2/10/22 was non reactive. Hep B surface antibody was  4.24 on 2/10/22. Hp B core antibody total was reactive on 2/10/22. AST 32, ALT 61 on 2/10/21.     Review of Systems   Constitutional: Negative.    HENT: Negative.    Eyes: Negative.    Respiratory: Negative.    Cardiovascular: Negative.    Gastrointestinal: Negative.    Genitourinary: Negative.    Musculoskeletal: Negative.    Skin: Negative.    Neurological: Negative.          Patient Active Problem List    Diagnosis Date Noted     Breast cancer of upper-outer quadrant of left female breast (H) 02/01/2022     Priority: Medium     Dry eye 01/06/2022     Priority: Medium     Allergic conjunctivitis, bilateral 01/06/2022     Priority: Medium     Meibomianitis, unspecified laterality 01/06/2022     Priority: Medium     Elevated hemoglobin (H) 12/22/2021     Priority: Medium     Prediabetes 12/16/2021     Priority: Medium     Gastritis, presence of bleeding unspecified, unspecified chronicity, unspecified gastritis type 06/04/2021     Priority: Medium     Stress incontinence 05/21/2019     Priority: Medium     Morbid obesity with BMI of 40.0-44.9, adult (H) 05/21/2019     Priority: Medium     Anatomical narrow angle - Both Eyes 01/25/2019     Priority: Medium     Exotropia of right eye 01/25/2019     Priority: Medium     Allergic conjunctivitis of both eyes 01/25/2019     Priority: Medium     Obesity (BMI 35.0-39.9) with comorbidity (H) 12/07/2018     Priority: Medium     Essential hypertension 12/07/2018     Priority: Medium     LALO (obstructive sleep apnea) 09/24/2012     Priority: Medium     mild AHI 6.4/hour, RDI 10.7/hour. Desats to 85%.       DDD (degenerative disc disease), lumbar      Priority: Medium     Spinal stenosis, lumbar      Priority: Medium     Hypothyroidism      Priority: Medium     Varicose veins of legs 09/08/2008     Priority: Medium     Low back pain 09/08/2008     Priority: Medium      Past Medical History:   Diagnosis Date     Complication of anesthesia      DDD (degenerative disc disease),  lumbar      Endometriosis      Hypertension      Hypothyroidism           Seasonal allergies      Spinal stenosis, lumbar      Past Surgical History:   Procedure Laterality Date     COLONOSCOPY       COLONOSCOPY  2/20/2012    Procedure:COLONOSCOPY; COLONOSCOPY ; Surgeon:ARUN KITCHEN; Location: GI     COLONOSCOPY N/A 2/15/2019    Procedure: COMBINED COLONOSCOPY, SINGLE OR MULTIPLE BIOPSY/POLYPECTOMY BY BIOPSY;  Surgeon: Connor Sanderson MD;  Location:  GI     HC CYSTOURETHROSCOPY W/ URETEROSCOPY &/OR PYELOSCOPY; W/ LITHOTRIPSY       HC TRABECULOPLASTY BY LASER SURGERY      PI OU     HYSTERECTOMY, PAP NO LONGER INDICATED       LASER YAG IRIDOTOMY  8/8/2012    Procedure: LASER YAG IRIDOTOMY;  LEFT EYE YAG LASER PUPIL IRIDOTOMY ;  Surgeon: Dakotah Humphreys MD;  Location:  [facility]     LIGATN/STRIP LONG OR SHORT SAPHEN      x's2     PELVIS LAPAROSCOPY,DX       STRABISMUS SURGERY       ZZC TOTAL ABDOM HYSTERECTOMY  2003    [facility] BSO for stage 4 endometriosis     Current Outpatient Medications   Medication Sig Dispense Refill     acetaminophen (TYLENOL) 325 MG tablet Take  by mouth every 4 hours as needed for pain. 100 tablet 0     Artificial Tear Solution (SOOTHE XP) SOLN Apply 1 drop to eye 3 times daily 15 mL 8     Ascorbic Acid (VITAMIN C) 500 MG CAPS        aspirin-acetaminophen-caffeine (EXCEDRIN MIGRAINE) 250-250-65 MG per tablet Take 1 tablet by mouth every 6 hours as needed for pain. 30 tablet 0     cetirizine (ZYRTEC) 10 MG tablet Take 1 tablet (10 mg) by mouth daily 90 tablet 1     fluticasone (FLONASE) 50 MCG/ACT nasal spray Spray 2 sprays into both nostrils daily. 1 Package 10     levothyroxine (SYNTHROID/LEVOTHROID) 112 MCG tablet Take 1 tablet (112 mcg) by mouth daily 90 tablet 3     lisinopril (ZESTRIL) 10 MG tablet Take 1 tablet (10 mg) by mouth daily 90 tablet 1     metFORMIN (GLUCOPHAGE-XR) 500 MG 24 hr tablet Take 2 tablets (1,000 mg) by mouth daily (with dinner) 180 tablet 1     Multiple Vitamins-Minerals  "(MULTIVITAMIN ADULT PO)        olopatadine (PATANOL) 0.1 % ophthalmic solution Place 1 drop into both eyes 2 times daily 5 mL 12     Omega-3 Fatty Acids (FISH OIL) 500 MG CAPS Take by mouth daily        omeprazole (PRILOSEC) 40 MG DR capsule Take 1 capsule (40 mg) by mouth daily 90 capsule 0     ORDER FOR DME Provent nasal EPAP  Use over nostrils nightly.   30 each 0     prednisoLONE acetate (PRED FORTE) 1 % ophthalmic suspension Use  One drop two times daily both eyes in the spring for 7-10 days 5 mL 0     VITAMIN D, CHOLECALCIFEROL, PO Take by mouth daily         Allergies   Allergen Reactions     Seasonal Allergies         Social History     Tobacco Use     Smoking status: Never Smoker     Smokeless tobacco: Never Used   Substance Use Topics     Alcohol use: No     Family History   Problem Relation Age of Onset     Diabetes Mother      Thyroid Disease Mother      Hypertension Mother      Cancer Father          of stomach CA     Heart Disease Father         MI at age 58     Glaucoma No family hx of      Macular Degeneration No family hx of      History   Drug Use No         Objective     /76 (BP Location: Right arm, Patient Position: Sitting, Cuff Size: Adult Regular)   Pulse 68   Temp 97.5  F (36.4  C) (Oral)   Ht 1.591 m (5' 2.64\")   Wt 105.7 kg (233 lb 0.4 oz)   BMI 41.76 kg/m      Physical Exam  Constitutional:       Appearance: Normal appearance.   HENT:      Head: Normocephalic and atraumatic.      Nose: Nose normal.      Mouth/Throat:      Mouth: Mucous membranes are moist.   Eyes:      Pupils: Pupils are equal, round, and reactive to light.   Cardiovascular:      Rate and Rhythm: Normal rate and regular rhythm.      Heart sounds: Normal heart sounds.   Pulmonary:      Effort: Pulmonary effort is normal.      Breath sounds: Normal breath sounds.   Abdominal:      General: Abdomen is flat. Bowel sounds are normal.      Palpations: Abdomen is soft.   Musculoskeletal:         General: Normal " range of motion.      Cervical back: Normal range of motion and neck supple.   Skin:     General: Skin is warm and dry.   Neurological:      General: No focal deficit present.      Mental Status: She is alert and oriented to person, place, and time.   Psychiatric:         Mood and Affect: Mood normal.         Behavior: Behavior normal.           Recent Labs   Lab Test 02/10/22  1218 12/27/21  1158 12/13/21  1142   HGB 15.0 14.0 17.5*    261 181    138 138   POTASSIUM 3.8 4.2 4.1   CR 0.70 0.71 0.73   A1C  --  6.3* 6.2*        Diagnostics:  Labs pending at this time.  Results will be reviewed when available.   No EKG required for low risk surgery (cataract, skin procedure, breast biopsy, etc).    Revised Cardiac Risk Index (RCRI):  The patient has the following serious cardiovascular risks for perioperative complications:   - No serious cardiac risks = 0 points     RCRI Interpretation: 0 points: Class I (very low risk - 0.4% complication rate)           Signed Electronically by: EFRAIN Wright CNP  Copy of this evaluation report is provided to requesting physician.

## 2022-03-15 NOTE — TELEPHONE ENCOUNTER
"In regards to incoming call from patient's daughter Zelda, sent the following message to Dr. Mendiola's nurses and Dr. Feliciano's :    \"Yasemin Quezada Kayla,    Patient's daugher Zelda called about patient's surgery with Dr. Mendiola and Dr. Feliciano on 3/23/2022.    She's concerned about this being an SDS d/t patient's history of blood clots and getting very sick from anesthesia, and also age. She's hoping that patient may be able to be kept overnight for observation.     I told her that I'd bring this to your attention. Please call Zelda about her concerns.    Thank-you,    Jen Rob  [facility]-op Coordinator  Direct Phone: [phone]\"  "

## 2022-03-15 NOTE — PROGRESS NOTES
[facility] NURSE PRACTITION[facility] CLINIC [address]  5TH FLOOR  [facility] [address]  Phone: [phone]  Fax: [phone]  Primary Provider: Eugene Michelle  {FV AMB Performing Provider (Optional):272823}    {Provider  Link to PREOP SmartSet  Use this to apply standard patient instructions to AVS; includes medication directions, common orders, guidelines for anemia, warfarin, additional testing   :362258}  PREOPERATIVE EVALUATION:  Today's date: 3/15/2022    Ramona Ferrer is a 66 year old female who presents for a preoperative evaluation.    Surgical Information:  Surgery/Procedure: ***  Surgery Location: ***  Surgeon: ***  Surgery Date: ***  Time of Surgery: ***  Where patient plans to recover: {Preop post recovery plans :836257}  Fax number for surgical facility: {SURGERY FAX NUMBER:[id number]}    Type of Anesthesia Anticipated: {ANESTHESIA:668580}    {2021 Provider Charting Preference for Preop :705975}    Subjective     HPI related to upcoming procedure: ***    {Click here to pull in Questionnaire Data after Qnr completed :490493}  Health Care Directive:  Patient does not have a Health Care Directive or Living Will: {ADVANCE_DIRECTIVE_STATUS:126210}    Preoperative Review of :  {Mnpmpreport:523152}  {Review MNPMP for all patients per ICSI MNPMP Profile:461652}    {Chronic problem details (Optional) :147269}    Review of Systems  {ROS Preop Choices:984396}    Patient Active Problem List    Diagnosis Date Noted     Breast cancer of upper-outer quadrant of left female breast (H) 02/01/2022     Priority: Medium     Dry eye 01/06/2022     Priority: Medium     Allergic conjunctivitis, bilateral 01/06/2022     Priority: Medium     Meibomianitis, unspecified laterality 01/06/2022     Priority: Medium     Elevated hemoglobin (H) 12/22/2021     Priority: Medium     Prediabetes 12/16/2021     Priority: Medium     Gastritis, presence of bleeding unspecified, unspecified  chronicity, unspecified gastritis type 06/04/2021     Priority: Medium     Stress incontinence 05/21/2019     Priority: Medium     Morbid obesity with BMI of 40.0-44.9, adult (H) 05/21/2019     Priority: Medium     Anatomical narrow angle - Both Eyes 01/25/2019     Priority: Medium     Exotropia of right eye 01/25/2019     Priority: Medium     Allergic conjunctivitis of both eyes 01/25/2019     Priority: Medium     Obesity (BMI 35.0-39.9) with comorbidity (H) 12/07/2018     Priority: Medium     Essential hypertension 12/07/2018     Priority: Medium     LALO (obstructive sleep apnea) 09/24/2012     Priority: Medium     mild AHI 6.4/hour, RDI 10.7/hour. Desats to 85%.       DDD (degenerative disc disease), lumbar      Priority: Medium     Spinal stenosis, lumbar      Priority: Medium     Hypothyroidism      Priority: Medium     Varicose veins of legs 09/08/2008     Priority: Medium     Low back pain 09/08/2008     Priority: Medium      Past Medical History:   Diagnosis Date     Complication of anesthesia      DDD (degenerative disc disease), lumbar      Endometriosis      Hypertension      Hypothyroidism           Seasonal allergies      Spinal stenosis, lumbar      Past Surgical History:   Procedure Laterality Date     COLONOSCOPY       COLONOSCOPY  2/20/2012    Procedure:COLONOSCOPY; COLONOSCOPY ; Surgeon:ARUN KITCHEN; Location: GI     COLONOSCOPY N/A 2/15/2019    Procedure: COMBINED COLONOSCOPY, SINGLE OR MULTIPLE BIOPSY/POLYPECTOMY BY BIOPSY;  Surgeon: Connor Sanderson MD;  Location:  GI     HC CYSTOURETHROSCOPY W/ URETEROSCOPY &/OR PYELOSCOPY; W/ LITHOTRIPSY       HC TRABECULOPLASTY BY LASER SURGERY      PI OU     HYSTERECTOMY, PAP NO LONGER INDICATED       LASER YAG IRIDOTOMY  8/8/2012    Procedure: LASER YAG IRIDOTOMY;  LEFT EYE YAG LASER PUPIL IRIDOTOMY ;  Surgeon: Dakotah Humphreys MD;  Location:  [facility]     LIGATN/STRIP LONG OR SHORT SAPHEN      x's2     PELVIS LAPAROSCOPY,DX       STRABISMUS SURGERY       [facility]  TOTAL ABDOM HYSTERECTOMY  2003    [facility] BSO for stage 4 endometriosis     Current Outpatient Medications   Medication Sig Dispense Refill     acetaminophen (TYLENOL) 325 MG tablet Take  by mouth every 4 hours as needed for pain. 100 tablet 0     Artificial Tear Solution (SOOTHE XP) SOLN Apply 1 drop to eye 3 times daily 15 mL 8     Ascorbic Acid (VITAMIN C) 500 MG CAPS        aspirin-acetaminophen-caffeine (EXCEDRIN MIGRAINE) 250-250-65 MG per tablet Take 1 tablet by mouth every 6 hours as needed for pain. 30 tablet 0     cetirizine (ZYRTEC) 10 MG tablet Take 1 tablet (10 mg) by mouth daily 90 tablet 1     fluticasone (FLONASE) 50 MCG/ACT nasal spray Spray 2 sprays into both nostrils daily. 1 Package 10     levothyroxine (SYNTHROID/LEVOTHROID) 112 MCG tablet Take 1 tablet (112 mcg) by mouth daily 90 tablet 3     lisinopril (ZESTRIL) 10 MG tablet Take 1 tablet (10 mg) by mouth daily 90 tablet 1     metFORMIN (GLUCOPHAGE-XR) 500 MG 24 hr tablet Take 2 tablets (1,000 mg) by mouth daily (with dinner) 180 tablet 1     Multiple Vitamins-Minerals (MULTIVITAMIN ADULT PO)        olopatadine (PATANOL) 0.1 % ophthalmic solution Place 1 drop into both eyes 2 times daily 5 mL 12     Omega-3 Fatty Acids (FISH OIL) 500 MG CAPS Take by mouth daily        omeprazole (PRILOSEC) 40 MG DR capsule Take 1 capsule (40 mg) by mouth daily 90 capsule 0     ORDER FOR DME Provent nasal EPAP  Use over nostrils nightly.   30 each 0     prednisoLONE acetate (PRED FORTE) 1 % ophthalmic suspension Use  One drop two times daily both eyes in the spring for 7-10 days 5 mL 0     VITAMIN D, CHOLECALCIFEROL, PO Take by mouth daily         Allergies   Allergen Reactions     Seasonal Allergies         Social History     Tobacco Use     Smoking status: Never Smoker     Smokeless tobacco: Never Used   Substance Use Topics     Alcohol use: No     {FAMILY HISTORY (Optional):625316519}  History   Drug Use No         Objective     /76 (BP Location: Right  "arm, Patient Position: Sitting, Cuff Size: Adult Regular)   Pulse 68   Temp 97.5  F (36.4  C) (Oral)   Ht 1.591 m (5' 2.64\")   Wt 105.7 kg (233 lb 0.4 oz)   BMI 41.76 kg/m      Physical Exam  {EXAM Preop Choices:821498}    Recent Labs   Lab Test 02/10/22  1218 21  1158 21  1142   HGB 15.0 14.0 17.5*    261 181    138 138   POTASSIUM 3.8 4.2 4.1   CR 0.70 0.71 0.73   A1C  --  6.3* 6.2*        Diagnostics:  {LABS:565200}   {EK}    Revised Cardiac Risk Index (RCRI):  The patient has the following serious cardiovascular risks for perioperative complications:  {PREOP REVISED CARDIAC RISK INDEX (RCRI) :168495::\" - No serious cardiac risks = 0 points\"}     RCRI Interpretation: {REVISED CARDIAC RISK INTERPRETATION :964941}         Signed Electronically by: EFRAIN Wright CNP  Copy of this evaluation report is provided to requesting physician.    {Provider Resources  Preop [facility] Preop Guidelines  Revised Cardiac Risk Index :410738}  "

## 2022-03-15 NOTE — LETTER
3/15/2022         RE: Ramona Ferrer  [address]  [place] [address]        Dear Colleague,    Thank you for referring your patient, Ramona Ferrer, to the [facility]. Please see a copy of my visit note below.    PRESENTING COMPLAINT:  Preoperative visit for upcoming left breast reconstruction with expanders for left nipple-nonsparing mastectomy for breast cancer scheduled for 03/23/2022.    HISTORY OF PRESENTING COMPLAINT:  Ms. Ferrer is 66 years old, here for preoperative visit.  She is here with her  through an  in the presence of my nurse, Lorna Ludwig.  No change in her history and physical exam.  Plan is a left-sided nipple-nonsparing mastectomy and immediate expander-based reconstruction with SPY.    ASSESSMENT AND PLAN:  Based upon the above findings, a diagnosis of left breast cancer undergoing a left-sided nipple-nonsparing mastectomy and is proceeding with a left sided expander-based immediate reconstruction.  Went over the planned procedure with the patient and her  in detail.  Plan is to proceed with a SPY evaluation of the blood flow to the skin flaps at the time of the mastectomy and, as long as they are adequate, to proceed with reconstruction.  If not, we will not proceed.  Discussed with the patient how the procedure would be done, what to expect from the procedure including numbness of her chest and the fact that the expanders are not the final product, and the aesthetics of the breast after the first stage do not look good.  Risks of pain, infection, bleeding, scarring, asymmetry, seromas, hematomas, wound breakdown, wound dehiscence, infection of the implant requiring removal, palpability, visibility of the expander, injury to deeper structures, DVT, PE, MI, CVA, pneumonia, renal failure and death were all explained.  She understood everything and wants to proceed.  All questions were answered in detail.  She  was happy with the visit.    Total time spent with chart review, visit itself and post-visit paperwork was 30 minutes.          Again, thank you for allowing me to participate in the care of your patient.      Sincerely,    NOHEMI Feliciano MD

## 2022-03-15 NOTE — NURSING NOTE
Pre Op Teaching Flowsheet       Pre and Post op Patient Education  Relevant Diagnosis:  Breast cancer  Surgical procedure:  L mastectomy and lymph node bx, with expander placement  Teaching Topic:  Pre and post op teaching  Person Involved in teaching: Yes    Motivation Level:  Asks Questions: Yes  Eager to Learn: Yes  Cooperative: Yes  Receptive (willing/able to accept information):  Yes    Patient demonstrates understanding of the following:  Date of surgery:  3/23/22  Location of surgery:  [facility]  History and Physical and any other testing necessary prior to surgery: Yes  Required time line for completion of History and Physical and any pre-op testing: Yes    Patient demonstrates understanding of the following:  Pre-op bowel prep:  N/A  Pre-op showering/scrub information with PCMX Soap: Yes  Blood thinner medications discussed and when to stop (if applicable):  Per PCP at Pre-op    Infection Prevention:   Patient demonstrates understanding of the following:  Surgical procedure site care taught: Yes  Signs and symptoms of infection taught: Yes      Post-op follow-up:  Discussed how to contact the hospital, nurse, and clinic scheduling staff if necessary.

## 2022-03-15 NOTE — PROGRESS NOTES
PRESENTING COMPLAINT:  Preoperative visit for upcoming left breast reconstruction with expanders for left nipple-nonsparing mastectomy for breast cancer scheduled for 03/23/2022.    HISTORY OF PRESENTING COMPLAINT:  Ms. Ferrer is 66 years old, here for preoperative visit.  She is here with her  through an  in the presence of my nurse, Lorna Ludwig.  No change in her history and physical exam.  Plan is a left-sided nipple-nonsparing mastectomy and immediate expander-based reconstruction with SPY.    ASSESSMENT AND PLAN:  Based upon the above findings, a diagnosis of left breast cancer undergoing a left-sided nipple-nonsparing mastectomy and is proceeding with a left sided expander-based immediate reconstruction.  Went over the planned procedure with the patient and her  in detail.  Plan is to proceed with a SPY evaluation of the blood flow to the skin flaps at the time of the mastectomy and, as long as they are adequate, to proceed with reconstruction.  If not, we will not proceed.  Discussed with the patient how the procedure would be done, what to expect from the procedure including numbness of her chest and the fact that the expanders are not the final product, and the aesthetics of the breast after the first stage do not look good.  Risks of pain, infection, bleeding, scarring, asymmetry, seromas, hematomas, wound breakdown, wound dehiscence, infection of the implant requiring removal, palpability, visibility of the expander, injury to deeper structures, DVT, PE, MI, CVA, pneumonia, renal failure and death were all explained.  She understood everything and wants to proceed.  All questions were answered in detail.  She was happy with the visit.    Total time spent with chart review, visit itself and post-visit paperwork was 30 minutes.

## 2022-03-21 ENCOUNTER — LAB (OUTPATIENT)
Dept: LAB | Facility: CLINIC | Age: 67
End: 2022-03-21
Attending: SURGERY
Payer: COMMERCIAL

## 2022-03-21 DIAGNOSIS — Z11.59 ENCOUNTER FOR SCREENING FOR OTHER VIRAL DISEASES: ICD-10-CM

## 2022-03-21 PROCEDURE — U0005 INFEC AGEN DETEC AMPLI PROBE: HCPCS

## 2022-03-21 PROCEDURE — U0003 INFECTIOUS AGENT DETECTION BY NUCLEIC ACID (DNA OR RNA); SEVERE ACUTE RESPIRATORY SYNDROME CORONAVIRUS 2 (SARS-COV-2) (CORONAVIRUS DISEASE [COVID-19]), AMPLIFIED PROBE TECHNIQUE, MAKING USE OF HIGH THROUGHPUT TECHNOLOGIES AS DESCRIBED BY CMS-2020-01-R: HCPCS

## 2022-03-22 ENCOUNTER — ANESTHESIA EVENT (OUTPATIENT)
Dept: SURGERY | Facility: CLINIC | Age: 67
End: 2022-03-22
Payer: COMMERCIAL

## 2022-03-22 LAB — SARS-COV-2 RNA RESP QL NAA+PROBE: NEGATIVE

## 2022-03-23 ENCOUNTER — MEDICAL CORRESPONDENCE (OUTPATIENT)
Dept: HEALTH INFORMATION MANAGEMENT | Facility: CLINIC | Age: 67
End: 2022-03-23

## 2022-03-23 ENCOUNTER — HOSPITAL ENCOUNTER (OUTPATIENT)
Dept: NUCLEAR MEDICINE | Facility: CLINIC | Age: 67
Setting detail: NUCLEAR MEDICINE
Discharge: HOME | End: 2022-03-23
Attending: SURGERY | Admitting: SURGERY
Payer: COMMERCIAL

## 2022-03-23 ENCOUNTER — HOSPITAL ENCOUNTER (OUTPATIENT)
Facility: CLINIC | Age: 67
Setting detail: OBSERVATION
Discharge: HOME | End: 2022-03-24
Attending: SURGERY | Admitting: SURGERY
Payer: COMMERCIAL

## 2022-03-23 ENCOUNTER — ANESTHESIA (OUTPATIENT)
Dept: SURGERY | Facility: CLINIC | Age: 67
End: 2022-03-23
Payer: COMMERCIAL

## 2022-03-23 DIAGNOSIS — Z17.0 MALIGNANT NEOPLASM OF UPPER-OUTER QUADRANT OF LEFT BREAST IN FEMALE, ESTROGEN RECEPTOR POSITIVE (H): ICD-10-CM

## 2022-03-23 DIAGNOSIS — Z98.890 S/P BREAST RECONSTRUCTION, LEFT: Primary | ICD-10-CM

## 2022-03-23 DIAGNOSIS — Z80.3 FAMILY HISTORY OF MALIGNANT NEOPLASM OF BREAST: ICD-10-CM

## 2022-03-23 DIAGNOSIS — C50.412 MALIGNANT NEOPLASM OF UPPER-OUTER QUADRANT OF LEFT BREAST IN FEMALE, ESTROGEN RECEPTOR POSITIVE (H): ICD-10-CM

## 2022-03-23 LAB
GLUCOSE BLDC GLUCOMTR-MCNC: 131 MG/DL (ref 70–99)
GLUCOSE BLDC GLUCOMTR-MCNC: 175 MG/DL (ref 70–99)
GLUCOSE BLDC GLUCOMTR-MCNC: 93 MG/DL (ref 70–99)

## 2022-03-23 PROCEDURE — 38900 IO MAP OF SENT LYMPH NODE: CPT | Mod: LT | Performed by: SURGERY

## 2022-03-23 PROCEDURE — 360N000076 HC SURGERY LEVEL 3, PER MIN: Performed by: SURGERY

## 2022-03-23 PROCEDURE — 250N000011 HC RX IP 250 OP 636: Performed by: ANESTHESIOLOGY

## 2022-03-23 PROCEDURE — 19303 MAST SIMPLE COMPLETE: CPT | Mod: LT | Performed by: SURGERY

## 2022-03-23 PROCEDURE — 88307 TISSUE EXAM BY PATHOLOGIST: CPT | Mod: 26 | Performed by: PATHOLOGY

## 2022-03-23 PROCEDURE — 250N000011 HC RX IP 250 OP 636: Performed by: STUDENT IN AN ORGANIZED HEALTH CARE EDUCATION/TRAINING PROGRAM

## 2022-03-23 PROCEDURE — 250N000009 HC RX 250

## 2022-03-23 PROCEDURE — 343N000001 HC RX 343: Performed by: SURGERY

## 2022-03-23 PROCEDURE — L8600 IMPLANT BREAST SILICONE/EQ: HCPCS | Performed by: SURGERY

## 2022-03-23 PROCEDURE — 250N000011 HC RX IP 250 OP 636

## 2022-03-23 PROCEDURE — A9520 TC99 TILMANOCEPT DIAG 0.5MCI: HCPCS | Performed by: SURGERY

## 2022-03-23 PROCEDURE — 258N000003 HC RX IP 258 OP 636

## 2022-03-23 PROCEDURE — 250N000009 HC RX 250: Performed by: SURGERY

## 2022-03-23 PROCEDURE — 250N000011 HC RX IP 250 OP 636: Performed by: PLASTIC SURGERY

## 2022-03-23 PROCEDURE — 272N000001 HC OR GENERAL SUPPLY STERILE: Performed by: SURGERY

## 2022-03-23 PROCEDURE — 250N000011 HC RX IP 250 OP 636: Performed by: NURSE ANESTHETIST, CERTIFIED REGISTERED

## 2022-03-23 PROCEDURE — 99207 NM LYMPHOSCINTIGRAPHY INJECTION ONLY: CPT | Performed by: RADIOLOGY

## 2022-03-23 PROCEDURE — 250N000013 HC RX MED GY IP 250 OP 250 PS 637: Performed by: STUDENT IN AN ORGANIZED HEALTH CARE EDUCATION/TRAINING PROGRAM

## 2022-03-23 PROCEDURE — 88305 TISSUE EXAM BY PATHOLOGIST: CPT | Mod: TC | Performed by: SURGERY

## 2022-03-23 PROCEDURE — 96375 TX/PRO/DX INJ NEW DRUG ADDON: CPT | Mod: 59

## 2022-03-23 PROCEDURE — 88305 TISSUE EXAM BY PATHOLOGIST: CPT | Mod: 26 | Performed by: PATHOLOGY

## 2022-03-23 PROCEDURE — 250N000009 HC RX 250: Performed by: PLASTIC SURGERY

## 2022-03-23 PROCEDURE — 120N000002 HC R&B MED SURG/OB UMMC

## 2022-03-23 PROCEDURE — 19357 TISS XPNDR PLMT BRST RCNSTJ: CPT | Mod: LT | Performed by: PLASTIC SURGERY

## 2022-03-23 PROCEDURE — 250N000025 HC SEVOFLURANE, PER MIN: Performed by: SURGERY

## 2022-03-23 PROCEDURE — 88342 IMHCHEM/IMCYTCHM 1ST ANTB: CPT | Mod: 26 | Performed by: PATHOLOGY

## 2022-03-23 PROCEDURE — 37799 UNLISTED PX VASCULAR SURGERY: CPT | Performed by: PLASTIC SURGERY

## 2022-03-23 PROCEDURE — 82962 GLUCOSE BLOOD TEST: CPT | Mod: 91

## 2022-03-23 PROCEDURE — 38525 BIOPSY/REMOVAL LYMPH NODES: CPT | Mod: LT | Performed by: SURGERY

## 2022-03-23 PROCEDURE — 258N000003 HC RX IP 258 OP 636: Performed by: ANESTHESIOLOGY

## 2022-03-23 PROCEDURE — 250N000009 HC RX 250: Performed by: NURSE ANESTHETIST, CERTIFIED REGISTERED

## 2022-03-23 PROCEDURE — 38792 RA TRACER ID OF SENTINL NODE: CPT

## 2022-03-23 PROCEDURE — 999N000141 HC STATISTIC PRE-PROCEDURE NURSING ASSESSMENT: Performed by: SURGERY

## 2022-03-23 PROCEDURE — 370N000017 HC ANESTHESIA TECHNICAL FEE, PER MIN: Performed by: SURGERY

## 2022-03-23 PROCEDURE — 96374 THER/PROPH/DIAG INJ IV PUSH: CPT

## 2022-03-23 PROCEDURE — 88360 TUMOR IMMUNOHISTOCHEM/MANUAL: CPT | Mod: 26 | Performed by: PATHOLOGY

## 2022-03-23 PROCEDURE — 710N000010 HC RECOVERY PHASE 1, LEVEL 2, PER MIN: Performed by: SURGERY

## 2022-03-23 PROCEDURE — 250N000009 HC RX 250: Performed by: STUDENT IN AN ORGANIZED HEALTH CARE EDUCATION/TRAINING PROGRAM

## 2022-03-23 DEVICE — IMPLANTABLE DEVICE
Type: IMPLANTABLE DEVICE | Site: BREAST | Status: NON-FUNCTIONAL
Removed: 2022-08-25

## 2022-03-23 RX ORDER — SODIUM CHLORIDE, SODIUM LACTATE, POTASSIUM CHLORIDE, CALCIUM CHLORIDE 600; 310; 30; 20 MG/100ML; MG/100ML; MG/100ML; MG/100ML
INJECTION, SOLUTION INTRAVENOUS CONTINUOUS
Status: DISCONTINUED | OUTPATIENT
Start: 2022-03-23 | End: 2022-03-23 | Stop reason: HOSPADM

## 2022-03-23 RX ORDER — FENTANYL CITRATE 50 UG/ML
25 INJECTION, SOLUTION INTRAMUSCULAR; INTRAVENOUS
Status: DISCONTINUED | OUTPATIENT
Start: 2022-03-23 | End: 2022-03-23 | Stop reason: HOSPADM

## 2022-03-23 RX ORDER — LIDOCAINE HYDROCHLORIDE 20 MG/ML
INJECTION, SOLUTION INFILTRATION; PERINEURAL PRN
Status: DISCONTINUED | OUTPATIENT
Start: 2022-03-23 | End: 2022-03-23

## 2022-03-23 RX ORDER — INDOCYANINE GREEN AND WATER 25 MG
KIT INJECTION PRN
Status: DISCONTINUED | OUTPATIENT
Start: 2022-03-23 | End: 2022-03-23

## 2022-03-23 RX ORDER — OXYCODONE HYDROCHLORIDE 5 MG/1
5 TABLET ORAL EVERY 4 HOURS PRN
Status: DISCONTINUED | OUTPATIENT
Start: 2022-03-23 | End: 2022-03-23 | Stop reason: HOSPADM

## 2022-03-23 RX ORDER — NALOXONE HYDROCHLORIDE 0.4 MG/ML
0.4 INJECTION, SOLUTION INTRAMUSCULAR; INTRAVENOUS; SUBCUTANEOUS
Status: DISCONTINUED | OUTPATIENT
Start: 2022-03-23 | End: 2022-03-23 | Stop reason: HOSPADM

## 2022-03-23 RX ORDER — HALOPERIDOL 5 MG/ML
1 INJECTION INTRAMUSCULAR
Status: DISCONTINUED | OUTPATIENT
Start: 2022-03-23 | End: 2022-03-23 | Stop reason: HOSPADM

## 2022-03-23 RX ORDER — NALOXONE HYDROCHLORIDE 0.4 MG/ML
0.2 INJECTION, SOLUTION INTRAMUSCULAR; INTRAVENOUS; SUBCUTANEOUS
Status: DISCONTINUED | OUTPATIENT
Start: 2022-03-23 | End: 2022-03-23 | Stop reason: HOSPADM

## 2022-03-23 RX ORDER — HYDRALAZINE HYDROCHLORIDE 20 MG/ML
2.5-5 INJECTION INTRAMUSCULAR; INTRAVENOUS EVERY 10 MIN PRN
Status: DISCONTINUED | OUTPATIENT
Start: 2022-03-23 | End: 2022-03-23 | Stop reason: HOSPADM

## 2022-03-23 RX ORDER — ONDANSETRON 2 MG/ML
INJECTION INTRAMUSCULAR; INTRAVENOUS PRN
Status: DISCONTINUED | OUTPATIENT
Start: 2022-03-23 | End: 2022-03-23

## 2022-03-23 RX ORDER — HYDROMORPHONE HYDROCHLORIDE 1 MG/ML
0.2 INJECTION, SOLUTION INTRAMUSCULAR; INTRAVENOUS; SUBCUTANEOUS EVERY 5 MIN PRN
Status: DISCONTINUED | OUTPATIENT
Start: 2022-03-23 | End: 2022-03-23 | Stop reason: HOSPADM

## 2022-03-23 RX ORDER — DEXAMETHASONE SODIUM PHOSPHATE 10 MG/ML
INJECTION, SOLUTION INTRAMUSCULAR; INTRAVENOUS
Status: COMPLETED | OUTPATIENT
Start: 2022-03-23 | End: 2022-03-23

## 2022-03-23 RX ORDER — ISOSULFAN BLUE 50 MG/5ML
INJECTION, SOLUTION SUBCUTANEOUS PRN
Status: DISCONTINUED | OUTPATIENT
Start: 2022-03-23 | End: 2022-03-23 | Stop reason: HOSPADM

## 2022-03-23 RX ORDER — POLYETHYLENE GLYCOL 3350 17 G/17G
17 POWDER, FOR SOLUTION ORAL DAILY
Status: DISCONTINUED | OUTPATIENT
Start: 2022-03-24 | End: 2022-03-24 | Stop reason: HOSPADM

## 2022-03-23 RX ORDER — FENTANYL CITRATE 50 UG/ML
25 INJECTION, SOLUTION INTRAMUSCULAR; INTRAVENOUS EVERY 5 MIN PRN
Status: DISCONTINUED | OUTPATIENT
Start: 2022-03-23 | End: 2022-03-23 | Stop reason: HOSPADM

## 2022-03-23 RX ORDER — EPHEDRINE SULFATE 50 MG/ML
INJECTION, SOLUTION INTRAMUSCULAR; INTRAVENOUS; SUBCUTANEOUS PRN
Status: DISCONTINUED | OUTPATIENT
Start: 2022-03-23 | End: 2022-03-23

## 2022-03-23 RX ORDER — CEFAZOLIN SODIUM 1 G/3ML
INJECTION, POWDER, FOR SOLUTION INTRAMUSCULAR; INTRAVENOUS PRN
Status: DISCONTINUED | OUTPATIENT
Start: 2022-03-23 | End: 2022-03-23

## 2022-03-23 RX ORDER — LIDOCAINE 40 MG/G
CREAM TOPICAL
Status: DISCONTINUED | OUTPATIENT
Start: 2022-03-23 | End: 2022-03-23 | Stop reason: HOSPADM

## 2022-03-23 RX ORDER — BUPIVACAINE HYDROCHLORIDE 2.5 MG/ML
INJECTION, SOLUTION EPIDURAL; INFILTRATION; INTRACAUDAL
Status: COMPLETED | OUTPATIENT
Start: 2022-03-23 | End: 2022-03-23

## 2022-03-23 RX ORDER — AMOXICILLIN 250 MG
1 CAPSULE ORAL 2 TIMES DAILY
Status: DISCONTINUED | OUTPATIENT
Start: 2022-03-23 | End: 2022-03-24 | Stop reason: HOSPADM

## 2022-03-23 RX ORDER — NICOTINE POLACRILEX 4 MG
15-30 LOZENGE BUCCAL
Status: DISCONTINUED | OUTPATIENT
Start: 2022-03-23 | End: 2022-03-24 | Stop reason: HOSPADM

## 2022-03-23 RX ORDER — SODIUM CHLORIDE, SODIUM LACTATE, POTASSIUM CHLORIDE, CALCIUM CHLORIDE 600; 310; 30; 20 MG/100ML; MG/100ML; MG/100ML; MG/100ML
INJECTION, SOLUTION INTRAVENOUS CONTINUOUS
Status: DISCONTINUED | OUTPATIENT
Start: 2022-03-23 | End: 2022-03-24 | Stop reason: HOSPADM

## 2022-03-23 RX ORDER — LABETALOL HYDROCHLORIDE 5 MG/ML
10 INJECTION, SOLUTION INTRAVENOUS
Status: DISCONTINUED | OUTPATIENT
Start: 2022-03-23 | End: 2022-03-23 | Stop reason: HOSPADM

## 2022-03-23 RX ORDER — FENTANYL CITRATE 50 UG/ML
25-50 INJECTION, SOLUTION INTRAMUSCULAR; INTRAVENOUS
Status: DISCONTINUED | OUTPATIENT
Start: 2022-03-23 | End: 2022-03-23 | Stop reason: HOSPADM

## 2022-03-23 RX ORDER — NALOXONE HYDROCHLORIDE 0.4 MG/ML
0.4 INJECTION, SOLUTION INTRAMUSCULAR; INTRAVENOUS; SUBCUTANEOUS
Status: DISCONTINUED | OUTPATIENT
Start: 2022-03-23 | End: 2022-03-24 | Stop reason: HOSPADM

## 2022-03-23 RX ORDER — ONDANSETRON 4 MG/1
4 TABLET, ORALLY DISINTEGRATING ORAL EVERY 6 HOURS PRN
Status: DISCONTINUED | OUTPATIENT
Start: 2022-03-23 | End: 2022-03-24 | Stop reason: HOSPADM

## 2022-03-23 RX ORDER — FENTANYL CITRATE 50 UG/ML
INJECTION, SOLUTION INTRAMUSCULAR; INTRAVENOUS PRN
Status: DISCONTINUED | OUTPATIENT
Start: 2022-03-23 | End: 2022-03-23

## 2022-03-23 RX ORDER — OXYCODONE HYDROCHLORIDE 5 MG/1
5 TABLET ORAL EVERY 4 HOURS PRN
Status: DISCONTINUED | OUTPATIENT
Start: 2022-03-23 | End: 2022-03-24 | Stop reason: HOSPADM

## 2022-03-23 RX ORDER — SODIUM CHLORIDE, SODIUM LACTATE, POTASSIUM CHLORIDE, CALCIUM CHLORIDE 600; 310; 30; 20 MG/100ML; MG/100ML; MG/100ML; MG/100ML
INJECTION, SOLUTION INTRAVENOUS CONTINUOUS PRN
Status: DISCONTINUED | OUTPATIENT
Start: 2022-03-23 | End: 2022-03-23

## 2022-03-23 RX ORDER — ACETAMINOPHEN 325 MG/1
650 TABLET ORAL EVERY 4 HOURS PRN
Status: DISCONTINUED | OUTPATIENT
Start: 2022-03-26 | End: 2022-03-24 | Stop reason: HOSPADM

## 2022-03-23 RX ORDER — ACETAMINOPHEN 325 MG/1
975 TABLET ORAL EVERY 8 HOURS
Status: DISCONTINUED | OUTPATIENT
Start: 2022-03-23 | End: 2022-03-24 | Stop reason: HOSPADM

## 2022-03-23 RX ORDER — ONDANSETRON 2 MG/ML
4 INJECTION INTRAMUSCULAR; INTRAVENOUS EVERY 6 HOURS PRN
Status: DISCONTINUED | OUTPATIENT
Start: 2022-03-23 | End: 2022-03-24 | Stop reason: HOSPADM

## 2022-03-23 RX ORDER — ONDANSETRON 4 MG/1
4 TABLET, ORALLY DISINTEGRATING ORAL EVERY 30 MIN PRN
Status: DISCONTINUED | OUTPATIENT
Start: 2022-03-23 | End: 2022-03-23 | Stop reason: HOSPADM

## 2022-03-23 RX ORDER — NALOXONE HYDROCHLORIDE 0.4 MG/ML
0.2 INJECTION, SOLUTION INTRAMUSCULAR; INTRAVENOUS; SUBCUTANEOUS
Status: DISCONTINUED | OUTPATIENT
Start: 2022-03-23 | End: 2022-03-24 | Stop reason: HOSPADM

## 2022-03-23 RX ORDER — DEXAMETHASONE SODIUM PHOSPHATE 4 MG/ML
INJECTION, SOLUTION INTRA-ARTICULAR; INTRALESIONAL; INTRAMUSCULAR; INTRAVENOUS; SOFT TISSUE PRN
Status: DISCONTINUED | OUTPATIENT
Start: 2022-03-23 | End: 2022-03-23

## 2022-03-23 RX ORDER — PROCHLORPERAZINE MALEATE 10 MG
10 TABLET ORAL EVERY 6 HOURS PRN
Status: DISCONTINUED | OUTPATIENT
Start: 2022-03-23 | End: 2022-03-24 | Stop reason: HOSPADM

## 2022-03-23 RX ORDER — ALBUTEROL SULFATE 0.83 MG/ML
2.5 SOLUTION RESPIRATORY (INHALATION) EVERY 4 HOURS PRN
Status: DISCONTINUED | OUTPATIENT
Start: 2022-03-23 | End: 2022-03-23 | Stop reason: HOSPADM

## 2022-03-23 RX ORDER — HYDROMORPHONE HYDROCHLORIDE 1 MG/ML
0.2 INJECTION, SOLUTION INTRAMUSCULAR; INTRAVENOUS; SUBCUTANEOUS EVERY 10 MIN PRN
Status: DISCONTINUED | OUTPATIENT
Start: 2022-03-23 | End: 2022-03-23 | Stop reason: HOSPADM

## 2022-03-23 RX ORDER — BISACODYL 10 MG
10 SUPPOSITORY, RECTAL RECTAL DAILY PRN
Status: DISCONTINUED | OUTPATIENT
Start: 2022-03-23 | End: 2022-03-24 | Stop reason: HOSPADM

## 2022-03-23 RX ORDER — FLUMAZENIL 0.1 MG/ML
0.2 INJECTION, SOLUTION INTRAVENOUS
Status: DISCONTINUED | OUTPATIENT
Start: 2022-03-23 | End: 2022-03-23 | Stop reason: HOSPADM

## 2022-03-23 RX ORDER — PANTOPRAZOLE SODIUM 40 MG/1
40 TABLET, DELAYED RELEASE ORAL DAILY
Status: DISCONTINUED | OUTPATIENT
Start: 2022-03-24 | End: 2022-03-24 | Stop reason: HOSPADM

## 2022-03-23 RX ORDER — LABETALOL HYDROCHLORIDE 5 MG/ML
5 INJECTION, SOLUTION INTRAVENOUS
Status: DISCONTINUED | OUTPATIENT
Start: 2022-03-23 | End: 2022-03-23 | Stop reason: HOSPADM

## 2022-03-23 RX ORDER — DEXMEDETOMIDINE HYDROCHLORIDE 4 UG/ML
INJECTION, SOLUTION INTRAVENOUS
Status: COMPLETED | OUTPATIENT
Start: 2022-03-23 | End: 2022-03-23

## 2022-03-23 RX ORDER — MEPERIDINE HYDROCHLORIDE 25 MG/ML
12.5 INJECTION INTRAMUSCULAR; INTRAVENOUS; SUBCUTANEOUS
Status: DISCONTINUED | OUTPATIENT
Start: 2022-03-23 | End: 2022-03-23 | Stop reason: HOSPADM

## 2022-03-23 RX ORDER — DEXTROSE MONOHYDRATE 25 G/50ML
25-50 INJECTION, SOLUTION INTRAVENOUS
Status: DISCONTINUED | OUTPATIENT
Start: 2022-03-23 | End: 2022-03-24 | Stop reason: HOSPADM

## 2022-03-23 RX ORDER — LEVOTHYROXINE SODIUM 112 UG/1
112 TABLET ORAL DAILY
Status: DISCONTINUED | OUTPATIENT
Start: 2022-03-24 | End: 2022-03-24 | Stop reason: HOSPADM

## 2022-03-23 RX ORDER — LIDOCAINE 40 MG/G
CREAM TOPICAL
Status: DISCONTINUED | OUTPATIENT
Start: 2022-03-23 | End: 2022-03-24 | Stop reason: HOSPADM

## 2022-03-23 RX ORDER — SULFAMETHOXAZOLE/TRIMETHOPRIM 800-160 MG
1 TABLET ORAL 2 TIMES DAILY
Qty: 28 TABLET | Refills: 0 | Status: SHIPPED | OUTPATIENT
Start: 2022-03-23 | End: 2022-04-05

## 2022-03-23 RX ORDER — ONDANSETRON 2 MG/ML
4 INJECTION INTRAMUSCULAR; INTRAVENOUS EVERY 30 MIN PRN
Status: DISCONTINUED | OUTPATIENT
Start: 2022-03-23 | End: 2022-03-23 | Stop reason: HOSPADM

## 2022-03-23 RX ORDER — PROPOFOL 10 MG/ML
INJECTION, EMULSION INTRAVENOUS PRN
Status: DISCONTINUED | OUTPATIENT
Start: 2022-03-23 | End: 2022-03-23

## 2022-03-23 RX ADMIN — FENTANYL CITRATE 50 MCG: 50 INJECTION, SOLUTION INTRAMUSCULAR; INTRAVENOUS at 15:29

## 2022-03-23 RX ADMIN — SODIUM CHLORIDE, POTASSIUM CHLORIDE, SODIUM LACTATE AND CALCIUM CHLORIDE: 600; 310; 30; 20 INJECTION, SOLUTION INTRAVENOUS at 13:42

## 2022-03-23 RX ADMIN — FENTANYL CITRATE 25 MCG: 50 INJECTION INTRAMUSCULAR; INTRAVENOUS at 17:10

## 2022-03-23 RX ADMIN — HYDROMORPHONE HYDROCHLORIDE 0.5 MG: 1 INJECTION, SOLUTION INTRAMUSCULAR; INTRAVENOUS; SUBCUTANEOUS at 14:52

## 2022-03-23 RX ADMIN — OXYCODONE HYDROCHLORIDE 5 MG: 5 TABLET ORAL at 20:05

## 2022-03-23 RX ADMIN — PHENYLEPHRINE HYDROCHLORIDE 100 MCG: 10 INJECTION INTRAVENOUS at 14:26

## 2022-03-23 RX ADMIN — PROCHLORPERAZINE EDISYLATE 10 MG: 5 INJECTION INTRAMUSCULAR; INTRAVENOUS at 21:43

## 2022-03-23 RX ADMIN — FENTANYL CITRATE 100 MCG: 50 INJECTION, SOLUTION INTRAMUSCULAR; INTRAVENOUS at 13:49

## 2022-03-23 RX ADMIN — MIDAZOLAM 0.5 MG: 1 INJECTION INTRAMUSCULAR; INTRAVENOUS at 13:11

## 2022-03-23 RX ADMIN — HYDROMORPHONE HYDROCHLORIDE 0.2 MG: 1 INJECTION, SOLUTION INTRAMUSCULAR; INTRAVENOUS; SUBCUTANEOUS at 18:49

## 2022-03-23 RX ADMIN — DEXMEDETOMIDINE 20 MCG: 100 INJECTION, SOLUTION, CONCENTRATE INTRAVENOUS at 13:20

## 2022-03-23 RX ADMIN — ROCURONIUM BROMIDE 10 MG: 50 INJECTION, SOLUTION INTRAVENOUS at 15:41

## 2022-03-23 RX ADMIN — FENTANYL CITRATE 50 MCG: 50 INJECTION, SOLUTION INTRAMUSCULAR; INTRAVENOUS at 14:19

## 2022-03-23 RX ADMIN — PHENYLEPHRINE HYDROCHLORIDE 100 MCG: 10 INJECTION INTRAVENOUS at 13:59

## 2022-03-23 RX ADMIN — SODIUM CHLORIDE, POTASSIUM CHLORIDE, SODIUM LACTATE AND CALCIUM CHLORIDE: 600; 310; 30; 20 INJECTION, SOLUTION INTRAVENOUS at 14:08

## 2022-03-23 RX ADMIN — SUGAMMADEX 200 MG: 100 INJECTION, SOLUTION INTRAVENOUS at 16:27

## 2022-03-23 RX ADMIN — PHENYLEPHRINE HYDROCHLORIDE 50 MCG: 10 INJECTION INTRAVENOUS at 14:00

## 2022-03-23 RX ADMIN — SODIUM CHLORIDE, POTASSIUM CHLORIDE, SODIUM LACTATE AND CALCIUM CHLORIDE 500 ML: 600; 310; 30; 20 INJECTION, SOLUTION INTRAVENOUS at 22:39

## 2022-03-23 RX ADMIN — ONDANSETRON 4 MG: 2 INJECTION INTRAMUSCULAR; INTRAVENOUS at 16:27

## 2022-03-23 RX ADMIN — PHENYLEPHRINE HYDROCHLORIDE 50 MCG: 10 INJECTION INTRAVENOUS at 13:50

## 2022-03-23 RX ADMIN — ROCURONIUM BROMIDE 100 MG: 50 INJECTION, SOLUTION INTRAVENOUS at 13:50

## 2022-03-23 RX ADMIN — DEXAMETHASONE SODIUM PHOSPHATE 1 MG: 10 INJECTION, SOLUTION INTRAMUSCULAR; INTRAVENOUS at 13:20

## 2022-03-23 RX ADMIN — HYDROMORPHONE HYDROCHLORIDE 0.2 MG: 1 INJECTION, SOLUTION INTRAMUSCULAR; INTRAVENOUS; SUBCUTANEOUS at 18:12

## 2022-03-23 RX ADMIN — ACETAMINOPHEN 650 MG: 325 TABLET ORAL at 18:53

## 2022-03-23 RX ADMIN — PROPOFOL 170 MG: 10 INJECTION, EMULSION INTRAVENOUS at 13:50

## 2022-03-23 RX ADMIN — DEXAMETHASONE SODIUM PHOSPHATE 8 MG: 4 INJECTION, SOLUTION INTRA-ARTICULAR; INTRALESIONAL; INTRAMUSCULAR; INTRAVENOUS; SOFT TISSUE at 13:50

## 2022-03-23 RX ADMIN — BUPIVACAINE HYDROCHLORIDE 40 ML: 2.5 INJECTION, SOLUTION EPIDURAL; INFILTRATION; INTRACAUDAL; PERINEURAL at 13:20

## 2022-03-23 RX ADMIN — Medication 10 MG: at 14:26

## 2022-03-23 RX ADMIN — CEFAZOLIN 2 G: 1 INJECTION, POWDER, FOR SOLUTION INTRAMUSCULAR; INTRAVENOUS at 13:48

## 2022-03-23 RX ADMIN — INDOCYANINE GREEN AND WATER 12.5 MG: KIT at 16:02

## 2022-03-23 RX ADMIN — LIDOCAINE HYDROCHLORIDE 100 MG: 20 INJECTION, SOLUTION INFILTRATION; PERINEURAL at 13:49

## 2022-03-23 RX ADMIN — ONDANSETRON 4 MG: 2 INJECTION INTRAMUSCULAR; INTRAVENOUS at 20:05

## 2022-03-23 RX ADMIN — FENTANYL CITRATE 50 MCG: 50 INJECTION, SOLUTION INTRAMUSCULAR; INTRAVENOUS at 13:11

## 2022-03-23 ASSESSMENT — COPD QUESTIONNAIRES: COPD: 0

## 2022-03-23 ASSESSMENT — ACTIVITIES OF DAILY LIVING (ADL)
WALKING_OR_CLIMBING_STAIRS_DIFFICULTY: NO
FALL_HISTORY_WITHIN_LAST_SIX_MONTHS: NO
DIFFICULTY_COMMUNICATING: NO
ADLS_ACUITY_SCORE: 8
WEAR_GLASSES_OR_BLIND: YES
HEARING_DIFFICULTY_OR_DEAF: NO
DIFFICULTY_EATING/SWALLOWING: NO
ADLS_ACUITY_SCORE: 12
ADLS_ACUITY_SCORE: 12
CHANGE_IN_FUNCTIONAL_STATUS_SINCE_ONSET_OF_CURRENT_ILLNESS/INJURY: NO
ADLS_ACUITY_SCORE: 8
ADLS_ACUITY_SCORE: 12
DRESSING/BATHING_DIFFICULTY: NO
TOILETING_ISSUES: NO
ADLS_ACUITY_SCORE: 8
DOING_ERRANDS_INDEPENDENTLY_DIFFICULTY: NO
CONCENTRATING,_REMEMBERING_OR_MAKING_DECISIONS_DIFFICULTY: NO

## 2022-03-23 ASSESSMENT — LIFESTYLE VARIABLES: TOBACCO_USE: 0

## 2022-03-23 ASSESSMENT — ENCOUNTER SYMPTOMS
DYSRHYTHMIAS: 0
SEIZURES: 0

## 2022-03-23 NOTE — ANESTHESIA CARE TRANSFER NOTE
Patient: Ramona Ferrer    Procedure: Procedure(s):  LEFT SKIN SPARING MASTECTOMY WITH SENTINEL LYMPH NODE BIOPSY  Left breast reconstruction with expander and SPY       Diagnosis: Malignant neoplasm of upper-outer quadrant of left breast in female, estrogen receptor positive (H) [C50.412, Z17.0]  Diagnosis Additional Information: No value filed.    Anesthesia Type:   General     Note:    Oropharynx: oropharynx clear of all foreign objects  Level of Consciousness: awake  Oxygen Supplementation: face mask  Level of Supplemental Oxygen (L/min / FiO2): 6  Independent Airway: airway patency satisfactory and stable  Dentition: dentition unchanged  Vital Signs Stable: post-procedure vital signs reviewed and stable    Patient transferred to: PACU    Handoff Report: Identifed the Patient, Identified the Reponsible Provider, Reviewed the pertinent medical history, Discussed the surgical course, Reviewed Intra-OP anesthesia mangement and issues during anesthesia, Set expectations for post-procedure period and Allowed opportunity for questions and acknowledgement of understanding      Vitals:  Vitals Value Taken Time   BP     Temp     Pulse     Resp     SpO2         Electronically Signed By: EFRAIN Alvarez CRNA  March 23, 2022  4:53 PM

## 2022-03-23 NOTE — ANESTHESIA PROCEDURE NOTES
Airway       Patient location during procedure: OR       Procedure Start/Stop Times: 3/23/2022 1:55 PM  Staff -        Anesthesiologist:  Fareed Mehta MD       Resident/Fellow: Urban Ham MD       Performed By: resident  Consent for Airway        Urgency: elective  Indications and Patient Condition       Indications for airway management: markell-procedural       Induction type:intravenous       Mask difficulty assessment: 1 - vent by mask    Final Airway Details       Final airway type: endotracheal airway       Successful airway: ETT - single  Endotracheal Airway Details        ETT size (mm): 7.5       Cuffed: yes       Successful intubation technique: direct laryngoscopy       DL Blade Type: MAC 3       Grade View of Cords: 2       Adjucts: stylet       Position: Right       Measured from: lips       Secured at (cm): 23       Bite block used: Soft    Post intubation assessment        Placement verified by: capnometry, equal breath sounds and chest rise        Number of attempts at approach: 1       Number of other approaches attempted: 0       Secured with: pink tape       Ease of procedure: easy       Dentition: Intact    Additional Comments       Routine intubation.

## 2022-03-23 NOTE — OP NOTE
PREOPERATIVE DIAGNOSIS: Status post left breast cancer requiring left nipple-non-sparing mastectomy and immediate reconstruction.     POSTOPERATIVE DIAGNOSIS: Status post left breast cancer requiring left nipple-non-sparing mastectomy and immediate reconstruction.     PROCEDURES:   1. Left immediate breast reconstruction using pre-pectoral expander (Allergan smooth breast expander with a base diameter 15.0 cm and a total fill volume of 800 mL filled with air without tension for about a 1075 gm mastectomy specimen. Medical Indication for Use in the Pre-pectoral plane: Prevent animation defect, decrease markell-operative pain, decrease anatomical destruction/distortion of the pectoral muscle).   2. Intraoperative chemical angiography with injection of ICG dye and interpretation of the angiogram using the SPY Elite System (indication of use was the fact the patient had undergone a nipple-non-sparing mastectomy and we needed to evaluate the blood flow to the skin flaps to decide appropriate reconstruction as well as debridement).    IMPLANTS:     Implant Name Type Inv. Item Serial No.  Lot No. LRB No. Used Action   NATRELLE TISSUE EXPANDER WITH SUTURE TABS 800CC   [id number] ALLERGAN  Left 1 Implanted         SURGEON: Grover Feliciano MD     RESIDENT: Marcos Gillespie MD.    ANESTHESIA: General anesthesia with endotracheal intubation.     COMPLICATIONS: Nil.     DRAINS: One 15-Libyan channel JAMES drain    SPECIMENS: Nil.     BLOOD LOSS: 5 cc      DESCRIPTION OF PROCEDURE: After informed consent was taken from the patient, the proper site and procedure was ascertained with her and she was appropriately marked and taken to the operating room. She was placed in a supine position with her knees comfortably flexed, pillows underneath them and pneumoboots placed and running prior to induction of anesthesia. Preoperative antibiotics were given in the OR and appropriately redosed during the case. General anesthesia was  administered without any complications. Her arms were abducted to about 50 degrees and appropriately padded. Surgical Oncology took over and began the procedure. The bilateral nipple-non sparing mastectomy was completed and I was called to the operating room. She was reprepped and draped and I began the procedure by first analyzing the skin flaps. Clinically everything looked good. I went ahead and carried out the preoperative chemical angiogram using the SPY Elite System and injection of ICG dye. A total of 5 mL was injected at this time and at 30 seconds we evaluated the blood flow to the surrounding skin flaps. Overall the skin flaps had some dark markell-incisional areas but overall the flow was good. These were marked out for later excision. Given the clinical findings, we decided to proceed with the immediate pre-pectoral reconstruction.The dead-space in the lateral areas were closed off and the LMF and IMF were recreated with 0-vicryl sutures. Key 0-Prolene sutures were placed in the MMF and IMF to sew the tabs of the expanders to. No Alloderm/ADM was used.  We measured the base width at about 15.0 cm. The surgical pocket was then irrigated with triple antibiotic solution and betadine. We changed our gloves. The expander was then removed from the package and were placed in the pockets. The medial and inferior tabs were sutured down to the present sutures. Air was instilled in the expander to fill the skin flaps without tension. A 15-Vietnamese channeled JAMES drain was then placed and brought out through a separate stab incision and sewn into position. The edges of the wounds were refreshened per the SPY and there was bleeding seen at the edges of the wounds and then the skin was closed using 2-0 Monocryl suture in a deep dermal layer. We went ahead and then placed a 3-0 Strattafix suture in a running intracuticular manner followed by Surgical Glue and Tape. Appropriate dressings were placed over the drain sites. The  patient was wrapped not tightly. The patient tolerated the procedure well. All counts were correct at the end of the case. The patient was extubated and sent to recovery room in stable condition.

## 2022-03-23 NOTE — BRIEF OP NOTE
[facility]    Brief Operative Note    Pre-operative diagnosis: Malignant neoplasm of upper-outer quadrant of left breast in female, estrogen receptor positive (H) [C50.412, Z17.0]  Post-operative diagnosis Same as pre-operative diagnosis    Procedure: Procedure(s):  LEFT SKIN SPARING MASTECTOMY WITH SENTINEL LYMPH NODE BIOPSY  Left breast reconstruction with expander and SPY  Surgeon: Surgeon(s) and Role:  Panel 1:     * Dada Mendiola MD - Primary  Panel 2:     * NOHEMI Feliciano MD - Primary     * Marcos Gillespie MD - Resident - Assisting  Anesthesia: Combined General with Block   Estimated Blood Loss: 5 cc    Drains: Serge-West  Specimens:   ID Type Source Tests Collected by Time Destination   1 : left axillary lymph node #1 Biopsy Lymph Node(s), Axillary, Left SURGICAL PATHOLOGY EXAM Dada Mendiola MD 3/23/2022  3:35 PM    2 : left mastectomy. stitch at 1200 Tissue Breast, Left SURGICAL PATHOLOGY EXAM Dada Mendiola MD 3/23/2022  3:20 PM      Findings:   Absent left breast.  Complications: None.  Implants:   Implant Name Type Inv. Item Serial No.  Lot No. LRB No. Used Action   NATRELLE TISSUE EXPANDER WITH SUTURE TABS 800CC   [id number] ALLERGGUILLERMINA  Left 1 Implanted       OK to discharge home from plastics stand point   Strip and record drain output daily  Limit lifting arms above head and twisting

## 2022-03-23 NOTE — DISCHARGE INSTRUCTIONS
TISSUE EXPANSION FOR BREAST RECONSTRUCTION  POST-OPERATIVE INSTRUCTIONS    Instructions     Shower with Bactoshield the night before and morning of each tissue     expansion.     You need someone to drive you to your appointment for the first 3 weeks     post-operatively or as long as you requiring narcotic pain medicine.    Activities     You cannot perform house hold chores or heavy lifting greater than 5 to     10 pounds for 6 weeks post-operatively.     No hot tubs, swimming in lakes until fully healed.  Be Aware:     You cannot have an MRI if you have tissue expanders in place.     The tissue expanders may be detected via the security scanners at the      airport. You will need a note from your Doctor if you plan to travel      indicating you have tissue expanders in place with metal.    Incision Care     You can shower 48 hours after the last drain tube has removed.     Avoid sun exposure to scars for at least 12 months after your last     surgery.     If sun exposure is unavoidable then always use a sun block with 50 SPF     or higher.     Shower regularly to keep the incisions clean and inspect for signs of      Infection.     You can use moisturizer on the incisions and surrounding skin starting 3      Weeks.    What to Expect     You may experience minor discomfort for the following 12 to 24 hours      after each tissue expansion. This discomfort usually subsides 2 to 3 days      after each tissue expansion.     The tissue expander may shift after the 1st or 2 nd expansion.     You may experience more discomfort on one side than the other if you      have bilateral tissue expanders placed.     Your posture may change causing you to have some upper and/or mid      back pain as you re expanded.     Your shoulder range of motion may become stiff requiring continuing to     perform the shoulder exercises during the tissue expansion process to     prevent shoulder stiffness and a frozen shoulder.     You may  find it difficult to sleep because you feel tight across the chest      and shoulders.     You may find it difficult to fit into certain types of tight fitting clothing.      You may need to wear oversized shirts until the final exchange of the      tissue expander (s).     You may feel chest persist tightness or heaviness secondary to being     expanded. This usually subsides with time.     The tissue expanders will remain in place for a minimum of 8 weeks after     completing the last tissue expansion. This will allow for the soft tissues     (i.e. skin and muscle) to heal and recover from being stretched before the     second surgery takes place for the exchange of the tissue expander for a     permanent breast implant.     You ll see your surgeon when we think you have achieve your desired     breast size to determine if you need additional expansions and for     surgical planning.    How can I treat discomfort?     Ibuprofen (or other NSAIDs) 600 mg by mouth every 6 to 8 hours with     food starting the morning of each tissue expansion and continue to take     around the clock for 3 to 5 days as needed for discomfort. You can start     this 2 weeks after surgery.     If you need additional pain control at night, you may take the prescribed      Vicodin or Norco you were prescribed for pain immediately after surgery.     You can take the ibuprofen with the Vicodin or Norco if additional pain      relief is required.    Will I achieve my desired breast size?     This is determined on an individual basis.     There is no scientific way to determine the exact breast size. It will be     determined by a number of different factors i.e.     How well you tolerate each tissue expansion.     How well your skin reacts to the expansions.     The size of the other breast (if a unilateral tissue expander).     Previous surgeries or amount of scarring     We recommend trying on a desired bra size as you approach your  desired      breast size to see how well the bra size fits.    Appearance     While the tissue is being expanded, a bulge will be created. Depending     upon the location of the tissue expander, the bulge may be considered     desirable or unsightly.     Following the exchange of the tissue expander, a more normal and     desirable look should be restored.     After the exchange for permanent implant, the breasts will feel softer     with less fullness in your axilla.    When to Call:     If you have increasing swelling or bruising.     If swelling and redness persist after a few days.     If you have increased redness along the incision.     If you have severe or increased pain not relieved by medication.     If you have any side effects to mediations; such as, rash, nauseas,      headache, vomiting, etc.     If you have an oral temperature of 100.5 degrees or higher.     If you have any yellow or greenish drainage from the incisions or notice a      foul smell.     If you have bleeding from the incisions that is difficult to control with      light pressure.     If you have loss of feeling or motion.    For Medical Questions, Please Call:     [phone], Monday - Friday, 8 a.m. - 4:30 p.m.     After hours and on weekends, call Hospital Paging at [phone] and     ask for the Plastic Surgeon on call.    Please note my office will call you 1-2 business days after your procedure to check up on how you're doing and to schedule your post-operative appointment.

## 2022-03-23 NOTE — ANESTHESIA POSTPROCEDURE EVALUATION
Patient: Ramona Ferrer    Procedure: Procedure(s):  LEFT SKIN SPARING MASTECTOMY WITH SENTINEL LYMPH NODE BIOPSY  Left breast reconstruction with expander and SPY       Anesthesia Type:  General    Note:  Disposition: Admission   Postop Pain Control: Uneventful            Sign Out: Well controlled pain   PONV: No   Neuro/Psych: Uneventful            Sign Out: Acceptable/Baseline neuro status   Airway/Respiratory: Uneventful            Sign Out: Acceptable/Baseline resp. status   CV/Hemodynamics: Uneventful            Sign Out: Acceptable CV status; No obvious hypovolemia; No obvious fluid overload   Other NRE: NONE   DID A NON-ROUTINE EVENT OCCUR? No           Last vitals:  Vitals Value Taken Time   /87 03/23/22 1745   Temp 36.7  C (98.1  F) 03/23/22 1730   Pulse 79 03/23/22 1750   Resp 15 03/23/22 1750   SpO2 97 % 03/23/22 1750   Vitals shown include unvalidated device data.    Electronically Signed By: Den Pineda MD  March 23, 2022  5:51 PM

## 2022-03-23 NOTE — ANESTHESIA PROCEDURE NOTES
Pectoralis (PECS 1 and 2) Procedure Note    Pre-Procedure   Staff -        Anesthesiologist:  Darvin Ruelas MD       Resident/Fellow: Gustavo Joseph MD       Performed By: resident       Location: pre-op       Procedure Start/Stop Times: 3/23/2022 1:12 PM and 3/23/2022 1:19 PM       Pre-Anesthestic Checklist: patient identified, IV checked, site marked, risks and benefits discussed, informed consent, monitors and equipment checked, pre-op evaluation, at physician/surgeon's request and post-op pain management  Timeout:       Correct Patient: Yes        Correct Procedure: Yes        Correct Site: Yes        Correct Position: Yes        Correct Laterality: Yes        Site Marked: Yes  Procedure Documentation  Procedure: Pectoralis (PECS 1 and 2)       Diagnosis: POST OPERATIVE PAIN       Laterality: left       Patient Position: supine       Patient Prep/Sterile Barriers: sterile gloves, mask       Skin prep: Chloraprep       Needle Type: short bevel       Needle Gauge: 21.        Needle Length (millimeters): 110        Ultrasound guided       1. Ultrasound was used to identify targeted nerve, plexus, vascular marker, or fascial plane and place a needle adjacent to it in real-time.       2. Ultrasound was used to visualize the spread of anesthetic in close proximity to the above referenced structure.       3. A permanent image is entered into the patient's record.       4. The visualized anatomic structures appeared normal.       5. There were no apparent abnormal pathologic findings.    Assessment/Narrative         The placement was negative for: blood aspirated, painful injection and site bleeding       Paresthesias: No.       Bolus given via needle..        Secured via.        Insertion/Infusion Method: Single Shot       Complications: none       Injection made incrementally with aspirations every 5 mL.    Medication(s) Administered   Bupivacaine 0.25% PF (Infiltration) -  Infiltration   40 mL - 3/23/2022 1:20:00 PM  Dexmedetomidine 4 mcg/mL (Perineural) - Perineural   20 mcg - 3/23/2022 1:20:00 PM  Dexamethasone 10 mg/mL PF (Perineural) - Perineural   1 mg - 3/23/2022 1:20:00 PM  Medication Administration Time: 3/23/2022 1:20 PM

## 2022-03-23 NOTE — ANESTHESIA PREPROCEDURE EVALUATION
Anesthesia Pre-Procedure Evaluation    Patient: Ramona Ferrer   MRN: [id number] : 1955        Procedure : Procedure(s):  LEFT SKIN SPARING MASTECTOMY WITH SENTINEL LYMPH NODE BIOPSY  Left breast reconstruction with expander and SPY          Past Medical History:   Diagnosis Date     Complication of anesthesia      DDD (degenerative disc disease), lumbar      Endometriosis      Hypertension      Hypothyroidism           Seasonal allergies      Spinal stenosis, lumbar       Past Surgical History:   Procedure Laterality Date     COLONOSCOPY       COLONOSCOPY  2012    Procedure:COLONOSCOPY; COLONOSCOPY ; Surgeon:ARUN KITCHEN; Location: GI     COLONOSCOPY N/A 2/15/2019    Procedure: COMBINED COLONOSCOPY, SINGLE OR MULTIPLE BIOPSY/POLYPECTOMY BY BIOPSY;  Surgeon: Connor Sanderson MD;  Location:  GI     HC CYSTOURETHROSCOPY W/ URETEROSCOPY &/OR PYELOSCOPY; W/ LITHOTRIPSY       HC TRABECULOPLASTY BY LASER SURGERY      PI OU     HYSTERECTOMY, PAP NO LONGER INDICATED       LASER YAG IRIDOTOMY  2012    Procedure: LASER YAG IRIDOTOMY;  LEFT EYE YAG LASER PUPIL IRIDOTOMY ;  Surgeon: Dakotah Humphreys MD;  Location:  [facility]     LIGATN/STRIP LONG OR SHORT SAPHEN      x's2     PELVIS LAPAROSCOPY,DX       STRABISMUS SURGERY       ZZC TOTAL ABDOM HYSTERECTOMY      MARYA BSO for stage 4 endometriosis      Allergies   Allergen Reactions     Seasonal Allergies       Social History     Tobacco Use     Smoking status: Never Smoker     Smokeless tobacco: Never Used   Substance Use Topics     Alcohol use: No      Wt Readings from Last 1 Encounters:   03/15/22 105.7 kg (233 lb 0.4 oz)        Anesthesia Evaluation   Pt has had prior anesthetic. Type: General.    History of anesthetic complications   Delayed emergence during 1st GA .    ROS/MED HX  ENT/Pulmonary:     (+) sleep apnea, uses CPAP,  (-) tobacco use, asthma and COPD   Neurologic: Comment: Lumbar spinal stenosis   (-) no seizures, no CVA, no TIA and migraines    Cardiovascular:     (+) hypertension-----Previous cardiac testing   Echo: Date: Results:    Stress Test: Date: 12/17/21 Results:  This was a normal stress echocardiogram with no evidence of stress-induced  ischemia.  Fair exercise tolerance ( 07:24 monidfied Venkata protocol / 6.2 Mets)  ECG Reviewed: Date: Results:    Cath: Date: Results:   (-) CAD, CHF and arrhythmias   METS/Exercise Tolerance: >4 METS    Hematologic:  - neg hematologic  ROS  (-) history of blood clots   Musculoskeletal: Comment: Chronic low back pain      GI/Hepatic:    (-) GERD   Renal/Genitourinary:  - neg Renal ROS  (-) renal disease   Endo:     (+) type II DM, Not using insulin, thyroid problem, hypothyroidism, Obesity,     Psychiatric/Substance Use:       Infectious Disease:       Malignancy:   (+) Malignancy, History of Breast.Breast CA Active status post.        Other:  - neg other ROS          Physical Exam    Airway        Mallampati: II   TM distance: > 3 FB   Neck ROM: full   Mouth opening: > 3 cm    Respiratory Devices and Support         Dental           Cardiovascular   cardiovascular exam normal       Rhythm and rate: regular and normal     Pulmonary   pulmonary exam normal        breath sounds clear to auscultation           OUTSIDE LABS:  CBC:   Lab Results   Component Value Date    WBC 5.8 03/15/2022    WBC 4.8 02/10/2022    HGB 14.0 03/15/2022    HGB 15.0 02/10/2022    HCT 43.5 03/15/2022    HCT 45.9 02/10/2022     03/15/2022     02/10/2022     BMP:   Lab Results   Component Value Date     03/15/2022     02/10/2022    POTASSIUM 4.5 03/15/2022    POTASSIUM 3.8 02/10/2022    CHLORIDE 106 03/15/2022    CHLORIDE 106 02/10/2022    CO2 29 03/15/2022    CO2 29 02/10/2022    BUN 13 03/15/2022    BUN 9 02/10/2022    CR 0.82 03/15/2022    CR 0.70 02/10/2022     (H) 03/15/2022     (H) 02/10/2022     COAGS: No results found for: PTT, INR, FIBR  POC: No results found for: BGM, HCG, HCGS  HEPATIC:    Lab Results   Component Value Date    ALBUMIN 3.6 03/15/2022    PROTTOTAL 7.5 03/15/2022    ALT 57 (H) 03/15/2022    AST 29 03/15/2022    ALKPHOS 78 03/15/2022    BILITOTAL 0.4 03/15/2022     OTHER:   Lab Results   Component Value Date    LACT 0.9 11/29/2018    A1C 6.3 (H) 12/27/2021    EMMA 9.5 03/15/2022    MAG 2.0 11/29/2018    LIPASE 208 11/30/2018    TSH 0.74 12/13/2021    T4 0.75 (L) 12/21/2020       Anesthesia Plan    ASA Status:  2   NPO Status:  NPO Appropriate    Anesthesia Type: General.     - Airway: ETT   Induction: Intravenous.   Maintenance: TIVA.   Techniques and Equipment:     - Lines/Monitors: BIS, 2nd IV     Consents    Anesthesia Plan(s) and associated risks, benefits, and realistic alternatives discussed. Questions answered and patient/representative(s) expressed understanding.     - Discussed: Risks, Benefits and Alternatives for BOTH SEDATION and the PROCEDURE were discussed     - Discussed with:  Patient      - Extended Intubation/Ventilatory Support Discussed: No.      - Patient is DNR/DNI Status: No    Use of blood products discussed: Yes.     - Discussed with: Patient.     - Consented: consented to blood products            Reason for refusal: other.     Postoperative Care    Pain management: IV analgesics, Multi-modal analgesia, Oral pain medications, Peripheral nerve block (Single Shot).   PONV prophylaxis: Ondansetron (or other 5HT-3), Dexamethasone or Solumedrol, Background Propofol Infusion     Comments:                Urban Ham MD

## 2022-03-24 VITALS
HEART RATE: 74 BPM | WEIGHT: 237.66 LBS | TEMPERATURE: 97.6 F | HEIGHT: 63 IN | RESPIRATION RATE: 18 BRPM | SYSTOLIC BLOOD PRESSURE: 103 MMHG | OXYGEN SATURATION: 97 % | BODY MASS INDEX: 42.11 KG/M2 | DIASTOLIC BLOOD PRESSURE: 51 MMHG

## 2022-03-24 LAB
ANION GAP SERPL CALCULATED.3IONS-SCNC: 5 MMOL/L (ref 3–14)
BUN SERPL-MCNC: 11 MG/DL (ref 7–30)
CALCIUM SERPL-MCNC: 9.2 MG/DL (ref 8.5–10.1)
CHLORIDE BLD-SCNC: 109 MMOL/L (ref 94–109)
CO2 SERPL-SCNC: 26 MMOL/L (ref 20–32)
CREAT SERPL-MCNC: 0.67 MG/DL (ref 0.52–1.04)
ERYTHROCYTE [DISTWIDTH] IN BLOOD BY AUTOMATED COUNT: 13.1 % (ref 10–15)
GFR SERPL CREATININE-BSD FRML MDRD: >90 ML/MIN/1.73M2
GLUCOSE BLD-MCNC: 128 MG/DL (ref 70–99)
GLUCOSE BLDC GLUCOMTR-MCNC: 107 MG/DL (ref 70–99)
HCT VFR BLD AUTO: 38.9 % (ref 35–47)
HGB BLD-MCNC: 12.6 G/DL (ref 11.7–15.7)
MCH RBC QN AUTO: 30.4 PG (ref 26.5–33)
MCHC RBC AUTO-ENTMCNC: 32.4 G/DL (ref 31.5–36.5)
MCV RBC AUTO: 94 FL (ref 78–100)
PLATELET # BLD AUTO: 233 10E3/UL (ref 150–450)
POTASSIUM BLD-SCNC: 4 MMOL/L (ref 3.4–5.3)
RBC # BLD AUTO: 4.14 10E6/UL (ref 3.8–5.2)
SODIUM SERPL-SCNC: 140 MMOL/L (ref 133–144)
WBC # BLD AUTO: 10.5 10E3/UL (ref 4–11)

## 2022-03-24 PROCEDURE — 36415 COLL VENOUS BLD VENIPUNCTURE: CPT | Performed by: STUDENT IN AN ORGANIZED HEALTH CARE EDUCATION/TRAINING PROGRAM

## 2022-03-24 PROCEDURE — 250N000011 HC RX IP 250 OP 636: Performed by: STUDENT IN AN ORGANIZED HEALTH CARE EDUCATION/TRAINING PROGRAM

## 2022-03-24 PROCEDURE — 250N000013 HC RX MED GY IP 250 OP 250 PS 637: Performed by: STUDENT IN AN ORGANIZED HEALTH CARE EDUCATION/TRAINING PROGRAM

## 2022-03-24 PROCEDURE — 80048 BASIC METABOLIC PNL TOTAL CA: CPT | Performed by: STUDENT IN AN ORGANIZED HEALTH CARE EDUCATION/TRAINING PROGRAM

## 2022-03-24 PROCEDURE — 85027 COMPLETE CBC AUTOMATED: CPT | Performed by: STUDENT IN AN ORGANIZED HEALTH CARE EDUCATION/TRAINING PROGRAM

## 2022-03-24 PROCEDURE — G0378 HOSPITAL OBSERVATION PER HR: HCPCS

## 2022-03-24 PROCEDURE — 258N000003 HC RX IP 258 OP 636: Performed by: STUDENT IN AN ORGANIZED HEALTH CARE EDUCATION/TRAINING PROGRAM

## 2022-03-24 PROCEDURE — 82962 GLUCOSE BLOOD TEST: CPT

## 2022-03-24 RX ORDER — ONDANSETRON 4 MG/1
4 TABLET, FILM COATED ORAL EVERY 6 HOURS PRN
Qty: 10 TABLET | Refills: 0 | Status: SHIPPED | OUTPATIENT
Start: 2022-03-24 | End: 2022-04-05

## 2022-03-24 RX ORDER — OXYCODONE HYDROCHLORIDE 5 MG/1
5 TABLET ORAL EVERY 4 HOURS PRN
Qty: 12 TABLET | Refills: 0 | Status: SHIPPED | OUTPATIENT
Start: 2022-03-24 | End: 2022-04-14

## 2022-03-24 RX ADMIN — PANTOPRAZOLE SODIUM 40 MG: 40 TABLET, DELAYED RELEASE ORAL at 08:28

## 2022-03-24 RX ADMIN — OXYCODONE HYDROCHLORIDE 5 MG: 5 TABLET ORAL at 11:53

## 2022-03-24 RX ADMIN — SODIUM CHLORIDE, POTASSIUM CHLORIDE, SODIUM LACTATE AND CALCIUM CHLORIDE: 600; 310; 30; 20 INJECTION, SOLUTION INTRAVENOUS at 06:02

## 2022-03-24 RX ADMIN — LEVOTHYROXINE SODIUM 112 MCG: 0.11 TABLET ORAL at 08:28

## 2022-03-24 RX ADMIN — SENNOSIDES AND DOCUSATE SODIUM 1 TABLET: 50; 8.6 TABLET ORAL at 08:29

## 2022-03-24 RX ADMIN — ACETAMINOPHEN 975 MG: 325 TABLET ORAL at 02:23

## 2022-03-24 RX ADMIN — ACETAMINOPHEN 975 MG: 325 TABLET ORAL at 08:28

## 2022-03-24 RX ADMIN — OXYCODONE HYDROCHLORIDE 5 MG: 5 TABLET ORAL at 02:23

## 2022-03-24 RX ADMIN — POLYETHYLENE GLYCOL 3350 17 G: 17 POWDER, FOR SOLUTION ORAL at 08:29

## 2022-03-24 RX ADMIN — OXYCODONE HYDROCHLORIDE 5 MG: 5 TABLET ORAL at 06:42

## 2022-03-24 RX ADMIN — ONDANSETRON 4 MG: 4 TABLET, ORALLY DISINTEGRATING ORAL at 14:36

## 2022-03-24 ASSESSMENT — ACTIVITIES OF DAILY LIVING (ADL)
ADLS_ACUITY_SCORE: 8

## 2022-03-24 NOTE — DISCHARGE SUMMARY
[facility] Discharge Summary    Ramona Ferrer MRN# [id number]   Age: 66 year old YOB: 1955     Date of Admission:  3/23/2022  Date of Discharge::  3/24/2022  Admitting Physician:  Dada Mendiola MD  Discharge Physician:  Dada Mendiola MD     PCP:  Eugene Michelle    Disposition: Patient discharged to home in stable condition.    Admission Diagnosis:  Past Medical History:   Diagnosis Date     Complication of anesthesia      DDD (degenerative disc disease), lumbar      Endometriosis      Hypertension      Hypothyroidism           Seasonal allergies      Spinal stenosis, lumbar        Discharge Diagnosis:  Patient Active Problem List   Diagnosis     Varicose veins of legs     Low back pain     Hypothyroidism     DDD (degenerative disc disease), lumbar     Spinal stenosis, lumbar     LALO (obstructive sleep apnea)     Obesity (BMI 35.0-39.9) with comorbidity (H)     Essential hypertension     Anatomical narrow angle - Both Eyes     Exotropia of right eye     Allergic conjunctivitis of both eyes     Stress incontinence     Morbid obesity with BMI of 40.0-44.9, adult (H)     Gastritis, presence of bleeding unspecified, unspecified chronicity, unspecified gastritis type     Prediabetes     Elevated hemoglobin (H)     Dry eye     Allergic conjunctivitis, bilateral     Meibomianitis, unspecified laterality     Breast cancer of upper-outer quadrant of left female breast (H)     S/P breast reconstruction, left       Discharge medications  Current Discharge Medication List      START taking these medications    Details   ondansetron (ZOFRAN) 4 MG tablet Take 1 tablet (4 mg) by mouth every 6 hours as needed for nausea  Qty: 10 tablet, Refills: 0    Associated Diagnoses: S/P breast reconstruction, left      oxyCODONE (ROXICODONE) 5 MG tablet Take 1 tablet (5 mg) by mouth every 4 hours as needed for moderate to severe pain  Qty: 12 tablet, Refills: 0    Associated Diagnoses:  S/P breast reconstruction, left      sulfamethoxazole-trimethoprim (BACTRIM DS) 800-160 MG tablet Take 1 tablet by mouth 2 times daily for 14 days  Qty: 28 tablet, Refills: 0    Associated Diagnoses: S/P breast reconstruction, left         CONTINUE these medications which have NOT CHANGED    Details   acetaminophen (TYLENOL) 325 MG tablet Take  by mouth every 4 hours as needed for pain.  Qty: 100 tablet, Refills: 0      Artificial Tear Solution (SOOTHE XP) SOLN Apply 1 drop to eye 3 times daily  Qty: 15 mL, Refills: 8    Associated Diagnoses: Dry eye      Ascorbic Acid (VITAMIN C) 500 MG CAPS       aspirin-acetaminophen-caffeine (EXCEDRIN MIGRAINE) 250-250-65 MG per tablet Take 1 tablet by mouth every 6 hours as needed for pain.  Qty: 30 tablet, Refills: 0      cetirizine (ZYRTEC) 10 MG tablet Take 1 tablet (10 mg) by mouth daily  Qty: 90 tablet, Refills: 1    Associated Diagnoses: Allergic rhinitis, unspecified seasonality, unspecified trigger      fluticasone (FLONASE) 50 MCG/ACT nasal spray Spray 2 sprays into both nostrils daily.  Qty: 1 Package, Refills: 10    Associated Diagnoses: ETD (eustachian tube dysfunction)      levothyroxine (SYNTHROID/LEVOTHROID) 112 MCG tablet Take 1 tablet (112 mcg) by mouth daily  Qty: 90 tablet, Refills: 3    Associated Diagnoses: Hypothyroidism, unspecified type      lisinopril (ZESTRIL) 10 MG tablet Take 1 tablet (10 mg) by mouth daily  Qty: 90 tablet, Refills: 1    Associated Diagnoses: Essential hypertension      metFORMIN (GLUCOPHAGE-XR) 500 MG 24 hr tablet Take 2 tablets (1,000 mg) by mouth daily (with dinner)  Qty: 180 tablet, Refills: 1    Associated Diagnoses: Prediabetes      Multiple Vitamins-Minerals (MULTIVITAMIN ADULT PO)       olopatadine (PATANOL) 0.1 % ophthalmic solution Place 1 drop into both eyes 2 times daily  Qty: 5 mL, Refills: 12    Comments: Okay to substitute optivar, or azelastine two times daily each eye  Associated Diagnoses: Allergic conjunctivitis,  "bilateral      Omega-3 Fatty Acids (FISH OIL) 500 MG CAPS Take by mouth daily       omeprazole (PRILOSEC) 40 MG DR capsule Take 1 capsule (40 mg) by mouth daily  Qty: 90 capsule, Refills: 0    Associated Diagnoses: Gastritis, presence of bleeding unspecified, unspecified chronicity, unspecified gastritis type      ORDER FOR DME Provent nasal EPAP  Use over nostrils nightly.    Qty: 30 each, Refills: 0    Associated Diagnoses: LALO (obstructive sleep apnea)      prednisoLONE acetate (PRED FORTE) 1 % ophthalmic suspension Use  One drop two times daily both eyes in the spring for 7-10 days  Qty: 5 mL, Refills: 0    Associated Diagnoses: Allergic conjunctivitis, bilateral      VITAMIN D, CHOLECALCIFEROL, PO Take by mouth daily             Follow up, Special Instructions:  After Care     Future Labs/Procedures    Diet     Comments:    Follow this diet upon discharge: Regular diet    Discharge Instructions     Comments:    From Dr Mendiola:    1. Patient to follow up with appointment in 2 weeks with Dr. Mendiola. Follow up with Dr. Feliciano in 1 week.   2. Do not drive while taking narcotic pain medication.   3. May take plain Tylenol as needed for pain.   4. Avoid non-steroidal anti-inflammatory medications (Advil, Ibuprofen, Naproxen, aspirin, etc) for 5-7 days or until approved by the Plastic Surgery team.   5. Caution with putting ice on the incision as it will be numb you will not realize it if you leave the ice for too long and can damage your skin.  6. If you develop any fever/chills, worsening pain, redness, swelling, or drainage from your wound please call the clinic (Monday through Friday 8:00am-5:00pm [phone]) or on-call surgical oncology resident (nights and weekends [phone] and ask \"I would like to page the Surgical Oncology Resident on call.\")   7. No lifting over 5-10 lbs and no strenuous physical activity for 6 weeks.   8. Keep any dressings clean and dry. Please refer to Dr Feliciano's wound care " "instructions.   9. Monitor the drain output. Record the drain output per 24 hours in mL   10. Keep chest wrapped with ace wrap. Remove ace wrap daily and reapply to comfort.     Drain care:  Please keep drain site clean and dry.   Okay to shower with drain sites covered per Plastic Surgery.   Please call the clinic (see phone numbers above) if:  Your drain falls out.  The stitch that is holding the drain in place at the skin is coming loose or missing.  The drainage fluid is very thick and/or has a bad odor.  The squeeze bulb does not stay collapsed.  The drainage suddenly stops or dramatically decreases when the drain has been having steady amounts of drainage.  Please keep a log of the drainage amounts every time you empty the drain.    Please \"strip\" the drain 3 times per day:  Wash your hands with soap and water and dry.  Gently squeeze the tubing if you see a clot to loosen it up.   and pinch the drain where it comes out of the skin with one hand, to steady it.  With the other hand,  and pinch the drain and slide your fingers down the tubing, towards the drainage bulb.  Then release your  on the end where the tube comes out of your body, followed by releasing your  at the end of the drainage bulb.    Repeat several times.  It may be easier to 'strip' the drain with an alcohol swab or with hand cleanser on the hand that is moving along the tube.  Wash your hands again.            Procedures:  Left skin sparing mastectomy with reconstruction, with sentinel lymph node biopsy    Consultations:  VASCULAR ACCESS CARE ADULT IP CONSULT    Brief HPI:  66 year old year old female with left sided breast cancer who presents electively for the above procedure.     Hospital Course:  The patient was admitted and underwent the above procedure. The patient tolerated the procedure well. The patient recovered well with no post-operative complications. Prior to discharge pain was controlled with oral pain " medication and the patient was able to ambulate and void without difficulty. The patient received appropriate education post operatively. On POD #1 the patient was discharged to home.    Surgical pathology  Pending     Marcos Cantu MD, PhD, PGY-3  General Surgery

## 2022-03-24 NOTE — PROGRESS NOTES
Plastic Surgery Progress Note      Subjective:  NAEON. Slightly hypotensive after surgery, admitted overnight for obs. BP's have improved since fluid bolus. Complains of pain but controlled with meds. Good UO.     Objective:    Temp:  [97  F (36.1  C)-98.2  F (36.8  C)] 97  F (36.1  C)  Pulse:  [63-94] 67  Resp:  [13-22] 18  BP: ()/(34-87) 110/46  SpO2:  [90 %-99 %] 96 %  I/O last 3 completed shifts:  In: 1878.75 [P.O.:420; I.V.:958.75; IV Piggyback:500]  Out: 560 [Urine:500; Blood:60]      CBCRecent Labs   Lab 03/24/22  0623   WBC 10.5   RBC 4.14   HGB 12.6   HCT 38.9   MCV 94   MCH 30.4   MCHC 32.4   RDW 13.1        BMP  Recent Labs   Lab 03/23/22  2146 03/23/22  1657 03/23/22  1150   * 131* 93       Gen: NAD  Resp: NLB  Abd: Soft, ND, NT  Left Breast: incisions c/d/i, surgical tape in place, flap skin with good CRT, no e/o necrosis. JAMES is place with sero-sang drainage.     ASSESSMENT: 66 year old female POD #1 s/p left breast SSM with expander placement.     PLAN:     - Doing well  - Okay to discharge from plastics perspective  - Drain cares- strip TID and record output  - f/u in clinic    BEATRICE Byrne, MS  Plastic Surgery, PGY2

## 2022-03-24 NOTE — PROGRESS NOTES
Forgot to write note day of, block was performed 3/23/22 in pre-op area 3C.    Left pectoralis block performed without complications, 0.5 mg versed, 50 mcg fentanyl given.  NSR, VSS, 2L O2 via NC.  Pt tolerated well.

## 2022-03-24 NOTE — PLAN OF CARE
[id number]:  OVSS on 2L O2, afebrile. On capno. BP improved after 500cc bolus, still soft but WNL. L chest/breast pain managed with PRN oxycodone x2. Denies N/V, SOB. Up with assist x1 to bedside commode. JAMES drain with minimal dark red output. Good UOP, blue colored d/t dye used in surgery. Slept between cares. No acute events. Plan to discharge today. Continue to monitor & with POC.

## 2022-03-24 NOTE — PLAN OF CARE
2608-1625  Pt had mastectomy at left side at OR, came from pacu around 2000. Pt is complaining of dizzy, nauseated, pain given IV compazine x 1, iv Zofran x 1, PRN oxycodone x 1.  Pt BP drop down to 86/45, 86/59, 100/34 MD notified. Started on LR Bolus. BG @ 2200 is 175 no sliding scale needed.  Continue to monitor care.

## 2022-03-24 NOTE — UTILIZATION REVIEW
"  Admission Status; Secondary Review Determination         Under the authority of the Utilization Management Committee, the utilization review process indicated a secondary review on the above patient.  The review outcome is based on review of the medical records, discussions with staff, and applying clinical experience noted on the date of the review.        ()      Inpatient Status Appropriate - This patient's medical care is consistent with medical management for inpatient care and reasonable inpatient medical practice.      (xxx) Observation Status Appropriate - This patient does not meet hospital inpatient criteria and is placed in observation status. If this patient's primary payer is Medicare and was admitted as an inpatient, Condition Code 44 should be used and patient status changed to \"observation\".   () Admission Status NOT Appropriate - This patient's medical care is not consistent with medical management for Inpatient or Observation Status.          RATIONALE FOR DETERMINATION     Sammie Ferrer is a 65 yo female who underwent left skin sparing mastectomy with reconstruction and sentinel lymph node biopsy on 3/22/2022.  The procedures went well.  She was admitted overnight for post-operative monitoring.  She was mildly hypotensive after surgery and BP's improved with fluid bolus. She has done well and is medically stable for discharge today.  Observation status is appropriate.    The severity of illness, intensity of service provided, expected LOS and risk for adverse outcome make the care complex, high risk and appropriate for hospital admission.        The information on this document is developed by the utilization review team in order for the business office to ensure compliance.  This only denotes the appropriateness of proper admission status and does not reflect the quality of care rendered.         The definitions of Inpatient Status and Observation Status used in making the determination above " are those provided in the CMS Coverage Manual, Chapter 1 and Chapter 6, section 70.4.      Sincerely,     Genny Sales MD  Physician Advisor   Utilization Review/ Case Management  [facility].

## 2022-03-25 ENCOUNTER — PATIENT OUTREACH (OUTPATIENT)
Dept: SURGERY | Facility: CLINIC | Age: 67
End: 2022-03-25
Payer: COMMERCIAL

## 2022-03-25 ENCOUNTER — PATIENT OUTREACH (OUTPATIENT)
Dept: CARE COORDINATION | Facility: CLINIC | Age: 67
End: 2022-03-25
Payer: COMMERCIAL

## 2022-03-25 ENCOUNTER — TELEPHONE (OUTPATIENT)
Dept: SURGERY | Facility: CLINIC | Age: 67
End: 2022-03-25
Payer: COMMERCIAL

## 2022-03-25 DIAGNOSIS — Z71.89 OTHER SPECIFIED COUNSELING: ICD-10-CM

## 2022-03-25 NOTE — PROGRESS NOTES
Clinic Care Coordination Contact  [facility]: Post-Discharge Note  SITUATION                                                      Admission:    Admission Date: 03/23/22   Reason for Admission: Mastectomy  Discharge:   Discharge Date: 03/24/22  Discharge Diagnosis: Mastectomy    BACKGROUND                                                      Per hospital discharge summary and inpatient provider notes:    66 year old year old female with left sided breast cancer who presents electively for the above procedure. The patient was admitted and underwent the above procedure. The patient tolerated the procedure well. The patient recovered well with no post-operative complications. Prior to discharge pain was controlled with oral pain medication and the patient was able to ambulate and void without difficulty. The patient received appropriate education post operatively. On POD #1 the patient was discharged to home.    ASSESSMENT      Enrollment  Primary Care Care Coordination Status: Declined    Discharge Assessment  How are you doing now that you are home?: She is in a lot of pain. Lots of swelling, the site is warm and it is very hard. Daughter has been in touch with a nurse and Dr. Mendiola and they told her she could try removing the wrap or loosening the wrap. Patient is currently sleeping and the daughter wants her to rest so she will try removing the wrap later.  How are your symptoms? (Red Flag symptoms escalate to triage hotline per guidelines): Unchanged  Do you feel your condition is stable enough to be safe at home until your provider visit?: Yes  Does the patient have their discharge instructions? : Yes  Does the patient have questions regarding their discharge instructions? : No  Were you started on any new medications or were there changes to any of your previous medications? : Yes  Does the patient have all of their medications?: Yes  Do you have questions regarding any of your medications? : No  (Patient's daughter said the pain medication is making the patient naseous and the Zofran is not helping. Daughter is going to reach out to Dr. Mendiola again to see if there is another pain medication the patient could take.)  Do you have all of your needed medical supplies or equipment (DME)?  (i.e. oxygen tank, CPAP, cane, etc.): Yes  Discharge follow-up appointment scheduled within 14 calendar days? : Yes  Discharge Follow Up Appointment Date: 04/11/22  Discharge Follow Up Appointment Scheduled with?: Specialty Care Provider                  PLAN                                                      Outpatient Plan: Patient to follow up with appointment in 2 weeks with Dr. Mendiola. Follow up with Dr. Feliciano in 1 week.    Future Appointments   Date Time Provider Department [place]   4/5/2022  4:15 PM [facility] LAB DRAW [facility]   4/5/2022  5:00 PM Kenneth Cisneros MD [facility]   4/8/2022 10:45 AM Vidhi Austin PA-C [facility]   4/11/2022  3:30 PM Dada Mendiola MD [facility]         For any urgent concerns, please contact our 24 hour nurse triage line: [phone] ([phone])         MANOJ Lima  [phone]  [facility]

## 2022-03-25 NOTE — PROGRESS NOTES
Surgical Oncology RN Care Coordination Note:     Called and spoke with patients daughter and informed her that Dr. Mendiola would like her to continue monitor the site and if she develops any increased redness, swelling continues, increased pain, or fevers to come to the ED for further evaluation. He also recommended she remove the wrap and rewrap the area higher or she can keep the wrap off if she is comfortable with it off. Patients daughter verbalized understanding and will continue to monitor.     Yasemin Ludwig, RN, BSN  Care Coordinator

## 2022-03-25 NOTE — PROGRESS NOTES
Surgical Oncology RN Care Coordination Note:     Patients daughter called to report swelling, hard area above the wrap. Per report the area is also warm to the touch. Asked patients daughter to take a picture of the area and send via [facility] [employer].     Yasemin Ludwig RN, BSN  Care Coordinator

## 2022-03-26 ENCOUNTER — NURSE TRIAGE (OUTPATIENT)
Dept: NURSING | Facility: CLINIC | Age: 67
End: 2022-03-26
Payer: COMMERCIAL

## 2022-03-26 NOTE — TELEPHONE ENCOUNTER
Received page that Zelda had called with questions about her mother. Called provided phone number back with no answer. Will retry again later. Left a message to call back with concerns.      Called back around 2100. Zelda concerned that Sammie has been experiencing some numbness in her face and upper leg today. She denies weakness, facial droop, confusion, changes in vision, other neurologic symptoms. No changes in skin color, no edema. Encouraged them to seek immediate help if any of these other symptoms occur. She has not been taking narcotics or any recent medications other than tylenol and advil. For now, reasonable to watch and wait. Patient and daughter comfortable with this plan.

## 2022-03-26 NOTE — TELEPHONE ENCOUNTER
"TRIAGE CALL     Patient and daughter Zelda calling  - both on the phone  Left - Mastectomy surgery 02/23/22   She is complaing fo:  Numbness or tingling  New today on her left arm  Face is getting better from yesterday  and Lefts Leg is the same as yesterday   Pt is alert oriented, has no numbness on her face anymore, but new numbness on her Left arm - She is concern about taking her antibiotics again.     Started yesterday and they spoke with her surgery team   AUBREY العراقي MD:  Received page that Zelda had called with questions about her mother. Called provided phone number back with no answer. Will retry again later. Left a message to call back with concerns.   Called back around 2100. Zelda concerned that Sammie has been experiencing some numbness in her face and upper leg today. She denies weakness, facial droop, confusion, changes in vision, other neurologic symptoms. No changes in skin color, no edema. Encouraged them to seek immediate help if any of these other symptoms occur. She has not been taking narcotics or any recent medications other than tylenol and advil. For now, reasonable to watch and wait. Patient and daughter comfortable with this plan     Medications/measures taken:   Daughter after doing research has STOPPED her antibiotic:  She is on sulfamethoxazole-trimethoprim (BACTRIM DS) 800-160 MG tablet, Take 1 tablet by mouth 2 times daily for 14 days   last time given was 24 hrs ago she has only taken 2 pils.    [facility]  Oncology Dada Mendiola MD Surgery  NOHEMI Feliciano MD -Plastic Surgery  Eugene Michelle MD her PCP    Paged ON CALL for  Oncology Dada Mendiola MD Surgery [facility] [employer] - Jeanne Pichardo at 1:13 pm    ON CALL DR Jules Greer call back - heard report n advised the family to continue with antibiotic, numbness and tingling without neurological symptoms maybe a consequent of \"repositioning during her surgery and should get better 3 - " "5 days after - call back clinic on Monday of not better -Her antibiotic does not have listed \"numbness\"  as a common side effect.     RN called back Family and relate msg to Daughter-  Patient verbalized understanding and agrees with plan  Diandra Cabrera RN Nurse Triage Advisor 1:49 PM 3/26/2022      "

## 2022-03-28 NOTE — OP NOTE
Procedure Date: 2022    PREOPERATIVE DIAGNOSIS:  Left breast cancer.    POSTOPERATIVE DIAGNOSIS:  Left breast cancer.    PROCEDURE:  Lymphatic mapping, left skin-sparing mastectomy, and left axillary lymph node biopsy.    ATTENDING SURGEON:  Dada Mendiola M.D.    ANESTHESIA:  General with endotracheal intubation.    INDICATIONS FOR PROCEDURE:  The patient is a 66-year-old woman who presents with a left breast cancer.  She has elected to undergo a mastectomy with immediate reconstruction.    DESCRIPTION OF PROCEDURE:  After informed consent, the patient was brought to the operating room, given a general anesthetic, and orotracheally intubated.  I injected technetium sulfur colloid and isosulfan blue into her left breast.  She was prepped and draped in the usual fashion.  I made an elliptical skin incision to include the nipple-areolar complex.  I did remove more skin than normal because of the large size of her breast.  I created a skin flap sharply with the Bovie cautery.  A superior skin flap was raised to the clavicle, a medial skin flap was raised to the lateral border of the sternum, and an inferior skin flap was raised to inframammary fold.  Next, the breast and pectoralis fascia were removed from the pectoralis major muscle from superior to inferior and from medial to lateral.  This specimen was dissected off the lateral border of the pectoralis major muscle, it was divided, oriented and sent to Pathology.  The clavipectoral fascia was incised.  We identified 1 lymph node that was not blue or radioactive.  This lymph node was sent for pathology.  There were no additional blue, radioactive or palpable lymph nodes.    Dr. Feliciano performed the reconstruction, which will be dictated in a separate note.    Dada Mendiola MD        D: 2022   T: 2022   MT: [facility]    Name:     AHMET BEY  MRN:      [id number]        Account:        [id number]   :      1955           Procedure Date:  03/23/2022     Document: [id number]

## 2022-03-30 ENCOUNTER — HOSPITAL ENCOUNTER (OUTPATIENT)
Dept: ULTRASOUND IMAGING | Facility: CLINIC | Age: 67
Discharge: HOME | End: 2022-03-30
Attending: PHYSICIAN ASSISTANT | Admitting: PHYSICIAN ASSISTANT
Payer: COMMERCIAL

## 2022-03-30 ENCOUNTER — PATIENT OUTREACH (OUTPATIENT)
Dept: ONCOLOGY | Facility: CLINIC | Age: 67
End: 2022-03-30
Payer: COMMERCIAL

## 2022-03-30 DIAGNOSIS — M79.605 PAIN OF LEFT LOWER EXTREMITY: ICD-10-CM

## 2022-03-30 DIAGNOSIS — Z98.890 S/P BREAST RECONSTRUCTION, LEFT: ICD-10-CM

## 2022-03-30 DIAGNOSIS — M79.605 PAIN OF LEFT LOWER EXTREMITY: Primary | ICD-10-CM

## 2022-03-30 PROCEDURE — 93971 EXTREMITY STUDY: CPT | Mod: LT

## 2022-03-30 RX ORDER — OXYCODONE HYDROCHLORIDE 5 MG/1
5 TABLET ORAL EVERY 6 HOURS PRN
Qty: 12 TABLET | Refills: 0 | Status: SHIPPED | OUTPATIENT
Start: 2022-03-30 | End: 2022-04-02

## 2022-03-30 RX ORDER — ONDANSETRON 4 MG/1
4 TABLET, FILM COATED ORAL EVERY 8 HOURS PRN
Qty: 10 TABLET | Refills: 0 | Status: SHIPPED | OUTPATIENT
Start: 2022-03-30 | End: 2022-04-05

## 2022-03-30 NOTE — PROGRESS NOTES
"RN CARE COORDINATION  Surgical Oncology    POST-OP CALL  Date: 3/30/2022       Patient: Eve Ferrer  S/P Procedure:  Lymphatic mapping, left skin-sparing mastectomy, and left axillary lymph node biopsy.  S/P Date: 3/23/2022        Fevers/chills: Denies  Eating/drinking:  Good PO intake  Bowel habits:  WNL with stool softner  Urine output:  WNL  Incisions:  C/D/I  Pain: . Pain in surgical area but under control with pain meds. She does have new onset of pain in LLE.(See below). Sammie developed a rash under her warp and took it off this morning. Has been placing hydrocortisone on rash with some improvement.       Spoke with daughter, Zelda, who acted as . She stated Sammie has been experiencing left sided face and upper leg numbness since discharge. She stated the numbness in her face has improved since yesterday, but the numbness in her LLE has gotten worse. It now extends lower than just her upper leg to her knee area. She also has new onset of pain in her LLE in the area, but separate from the numbness. She also states she feel shelia leg is \"hot.\" Sammie ran out of pain medication on 3/29 and is awaiting a refill.     Sammie is up and out of bed the majority of the day and able to ambulate. Pain worsens when lying down, but numbness improves somewhat. No swelling above baseline and no areas of actual heat felt or redness seen on lower extremities. Zelda stated her mother does have a history of blood clots which have contributed to her varicose veins.     Message sent to Carmen Ledesma PA-C and Dr. Mendiola re: symptoms and possible recommendations.       Plan: LLE US order placed by Carmen Vasques PA-C. US scheduled for 3/31 at 5:30 pm. Daughter, Zelda, is aware. Will monitor for results and provide appropriate follow-up.         NICOL Munroe, RN  RN Care Coordinator  [facility]  Surgical Onolcogy       "

## 2022-04-03 NOTE — PROGRESS NOTES
Dada Mendiola MD  [facility]  [address] [place] [address], [facility] [address]  [place] [address]     RE:  Ramona Ferrer  MRN:  [id number]  :  1955     Dear Dr. Mendiola:     I would like to refer to you Sammie Ferrer, a 66-year-old woman with a recent diagnosis of a stage IIB, T3 N0 MX, invasive lobular carcinoma of the left breast.  She self-palpated a lump in the upper outer quadrant of the left breast.  She underwent evaluation with a diagnostic mammogram and ultrasound, which was BI-RADS 4, showing in the upper outer quadrant of the left breast irregularly shaped hypoechoic mass at the 12 o'clock position 4 cm from the nipple on the left.  This mass measured 2.3 x 2.2 x 1.5 cm and accounted for the patient's area of palpable concern.  Additionally, at the 1 o'clock position 2 cm from the nipple in the left breast, there is a second irregularly shaped hypoechoic mass with indistinct margins measuring 1.3 x 1.1 x 0.8 cm.  She also underwent a breast MRI which showed in the right breast mild background parenchymal enhancement and no suspicious areas of enhancement or lymphadenopathy.  In the left breast there was mild background parenchymal enhancement, and at the 12 o'clock position 4 cm from the nipple there was a heterogeneously enhancing irregular mass measuring 3.4 in AP dimension x 2.8 cm ML and 2.8 cm SI corresponding to the known biopsy-proven malignancy in the left breast.  In the left breast at the 1 o'clock position 2 cm from the nipple there was also a heterogeneously enhancing oval mass measuring 1.3 cm in maximal diameter.  This likely corresponds to the second biopsy-proven malignancy.  Together, the full extent of the disease spans a total of 5.2 cm AP x 4.4 cm ML, BI-RADS 6.     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  In part B in the left breast at the 1 o'clock position 2 cm from the nipple, ultrasound-guided needle biopsy showed invasive lobular carcinoma, Cathy grade 1/3 lobular carcinoma in situ was present, classic subtype, estrogen receptors positive in greater than 70% of the cells, progesterone receptors positive in greater than 90% of cells, and HER2 was nonamplified, Ki-67 was also ordered but is not yet reported.  She now comes to our clinic for recommendations.     Sammie was seen in clinic today with her daughter, Ivy, and was also seen with a relative in [place] who attended by cell phone with the patient's permission.     Sammie reports that the breast mass had been there for approximately 1 month.  She has no pain, mild fatigue and denies depression or anxiety.     She has worked in the past as a personal care assistant and lives in the [facility].  Her daughter accompanied her to the visit.  Her sister who is in [place] also attended by [employer].      Sammie has had no weight loss.  Her diet has not changed.  No loss of energy.  She does not sleep during the day and can perform all of her household chores.  She has a self-palpated mass in the upper outer quadrant of the left breast adjacent to the nipple areolar complex.  She has not palpated any masses in the right breast.     REVIEW OF SYSTEMS:  She denies fever, headache, cough, chest pain, shortness of breath, hemoptysis, loss of appetite, nausea, vomiting, abdominal pain, constipation, diarrhea, bone pain, back pain, muscle or joint complaints, numbness or tingling in the hands and feet, hearing loss or depression.  Remainder of a 14-point review of systems was negative.     PAST MEDICAL HISTORY:  There is no history of breast cancer in the past.  No history of breast cancer surgery.  She has no  history of radiation therapy in the past or radiation exposure.  No history of prior tumor of any kind.  She denies heart problems, heart attack, breathing problems, blood clots, seizures, peptic ulcer disease, osteoporosis or bone fractures.  She does report a history of arthritis.     FAMILY HISTORY:  Positive for breast cancer in a paternal aunt who was diagnosed with breast cancer at an old age.     Her father has a history of stomach cancer.  There is no family history of pancreatic cancer, melanoma, lung cancer or ovarian cancer.     No history of male breast cancer.     ALLERGIES:  No history of drug allergies.  She denies allergies to seafood, iodine, or contrast dye.     PAST MENSTRUAL HISTORY:  She has been pregnant 3 times with 2 live births at age 29 and 31 and 1 miscarriage.  Age at first menstrual period was 14.  She did have a total abdominal hysterectomy and bilateral salpingo-oophorectomy performed in 2003 for endometriosis.  She has no history of hormone replacement therapy.  No history of oral contraceptives.     HABITS: She denies tobacco history.  She denies alcohol history and drinks alcohol only occasionally.     PAST MEDICAL HISTORY:  Past medical history is also remarkable for type 2 diabetes, and she was begun on metformin 2 years ago.  She also has a history of varicose veins and a history of a lipoma resected from her left leg.      Chronic Hepatitis B.  Hepatitis C negative.  HIV negative.       Prior COVID 6-14-21     GERM LINE GENETICS: INVITAE testing of 47 genes was negative.     INTERVAL HISTORY  Ramona returns to clinic today with her daughter.  She is postop and has a left drain in place after left mastectomy.  She says that the pain around the implant is significant.  Also, ever since the surgery, she has had severe pain in the region of the left femur.  She never had this pain before and it is severe enough that she is taking Tylenol for it.  She is also taking opiate pain  medicine for the post-mastectomy discomfort, oxycodone 5 mg p.r.n.  She denies fatigue, depression or anxiety, although she is very concerned about the left leg pain.  She did have a left leg ultrasound, which showed no DVT.    OTHER COMPLAINTS:  She complains of left arm numbness and left leg pain in the femur area.  There is no weakness or numbness or tingling in the left leg.     REVIEW OF SYSTEMS:  The remainder of a 10-point review of systems is negative.    Pathology is pending.  I explained to Ramona and her daughter that we do not have the pathology back yet, but we will call them with the results.     PHYSICAL EXAMINATION:    /70   Pulse 68   Temp 97.7  F (36.5  C) (Oral)   Resp 16   Wt 105.6 kg (232 lb 14.4 oz)   SpO2 97%   BMI 41.26 kg/m    GENERAL:  Ramona appeared to be in moderate discomfort.  There is no alopecia.  HEENT:  No lesions in the oropharynx.  LYMPH:  There is no palpable cervical, supraclavicular, subclavicular or axillary lymphadenopathy.  BREASTS:  Examination of the right breast is without masses.  Examination of the left breast reveals an implant in place. Well-healed incision without erythema or masses.  There is no erythema of the left breast. There is a drain in place.  There are no skin changes or areas that are suspicious for infection.  There is no palpable fluctuance.  In summary, no evidence of postoperative infection.  LUNGS:  Clear to percussion and auscultation.  HEART:  There is a regular rate and rhythm.  S1, S2.  ABDOMEN:  Soft, nontender, consistent with increased body mass index.  EXTREMITIES:  Without edema.  PSYCH: Mood was anxious.  Affect was appropriate.     LABORATORY DATA:     CBC, CMP normal.      ASSESSMENT AND PLAN:  1.  Sammie Ferrer is a 66-year-old woman with a recent diagnosis of a stage IIB, T3 N0 MX, invasive lobular carcinoma of the upper outer quadrant of the left breast which is multifocal grade 1, ER positive in greater than 70% of cells,  CA positive in greater than 90% of cells and HER2 nonamplified.  She now comes to clinic with her daughter, Ivy, for recommendations regarding evaluation and treatment.  Her daughter wanted to serve as the [place]  for this visit.  2.  MammaPrint came back as low risk.   3.  Sammie Ferrer underwent a left mastectomy.  We do not have the pathology back today and she and her daughter were disappointed about that.  I discussed that as soon as the pathology report is available I will share the results with them. I discussed that her breast cancer was MammaPrint low risk and that we would offer adjuvant hormonal therapy with letrozole.  She did want to start the adjuvant hormonal therapy today and I did send a prescription.  I discussed the risks and potential benefits of letrozole.  I discussed that hot flashes, vaginal dryness and also bone density are among the potential side effects.  All of her questions were answered.  Information sheet was given, and she is willing to start letrozole.  4. Pathology is pending.  I apologized for the delay.  5.  Discussion of radiation.  I discussed that I do not have the pathology report back and I do not know the full extent of the tumor.  I discussed that in some cases with large primary tumor that alone can be an indication for radiation.  I discussed that if there is an axillary sentinel lymph node that was found to be positive for tumor cells, then radiation to the axilla would be recommended.  I discussed that I strongly recommend evaluation by Radiation Oncology if Dr. Mendiola's review of the pathology leads him to consider such consultation.  6.  Palliative Care referral for pain in the left femur.  Two view films of the femur will be obtained as well as CT scan of the lumbosacral spine.  I discussed that we cannot do an MRI because she has an expander in place.  All of her questions and all of her daughter's questions were answered.  7.  Followup.   Follow up  with Dr. Mendiola and Dr. Feliciano as scheduled.  Follow up with me May 3 with CBC, CMP.  XR of femur, 2 view.  CT Lumbar spine.     Thank you for allowing us to continue to participate in Sammie Ferrer's care.     Sincerely,     Kenneth Cisneros M.D.   Professor   Division of Hematology, Oncology, Transplant   Department of Medicine   [facility]   [phone]     ADDENDUM:  We will need to discuss the pathology in conference.  PATHOLOGY 4-6-22  A. Lymph node, LEFT axillary, excision:  -One benign lymph node (0/1)  -See microscopic description     B. LEFT breast, skin-sparing mastectomy:  -INVASIVE BREAST CARCINOMA, MIXED INVASIVE LOBULAR CARCINOMA (predominant component) and INVASIVE DUCTAL CARCINOMA (minor component), KALPESH GRADE 3, size 5.5 cm, 12:00, upper outer quadrant and lower outer quadrant  -Ductal carcinoma in situ (DCIS), nuclear grade 2, cribriform and solid type(s), with focal necrosis  -DCIS is admixed with invasive carcinoma, and comprises a minor component (<5%) of the tumor volume  -Lobular carcinoma in situ (LCIS), predominantly classic type (admixed with invasive carcinoma and beyond invasive carcinoma) and focal pleomorphic type (size 2 mm, adjacent to invasive carcinoma)  -Margins are uninvolved by invasive carcinoma  -Invasive carcinoma is 2.5 mm from the nearest (anterior) margin, and is > 5 mm from the posterior margin  -Margins are uninvolved by DCIS and pleomorphic LCIS  -DCIS and pleomorphic LCIS are > 5 mm from the nearest (anterior) margin  -Benign nipple and skin   -Other findings: fibrocystic change (including microcysts with apocrine metaplasia) and columnar cell change  -Calcifications associated with DCIS, LCIS, and benign ducts and acini  -Prior core biopsy site changes (two biopsy sites identified)  -Invasive carcinoma is estrogen receptor positive (>70%, strong intensity) and progesterone receptor positive (>90%, strong intensity) by  immunohistochemistry and is HER2 non-amplified (group 5) by FISH (performed on prior core biopsies, see report [id number], specimens A and B)  -Estrogen receptor, progesterone receptor and HER2 immunohistochemistry results for this specimen will be reported in an addendum  -See comment and microscopic description  -See tumor synoptic below    The invasive carcinoma shows variable morphology. It is predominantly invasive lobular carcinoma, with a classical architectural pattern and without marked nuclear atypia. There are several foci that show an alveolar or nearly solid pattern, with pleomorphic nuclei and frequent mitotic figures (up to 15 mitotic figures in 10 high power fields; field diameter 0.5 mm). The two masses that were previously sampled by core biopsy (12:00 and 1:00) are part of one larger tumor that involves both the masses, involves tissue between the two masses and extends beyond the grossly identified two masses, to span 5.5 cm. The invasive carcinoma also shows several small microscopic foci with tubule formation. E-cadherin, performed on selected blocks with tubule formation, confirms ductal differentiation (strong membranous expression) in invasive carcinoma with tubule formation in block B6, but shows very diminished expression in block B12. In blocks B6 and B12, the invasive carcinoma with tubule formation is somewhat distinct from the invasive lobular carcinoma, arising in a background of DCIS. In block B47, invasive carcinoma with tubule formation also shows focal single file pattern, but is otherwise somewhat distinct from the main tumor mass of invasive lobular carcinoma. In block B29, invasive carcinoma shows tubules mixed with classical invasive lobular carcinoma.     ER. PgR and HER2 immunohistochemistry will be performed on the pleomorphic component of invasive carcinoma in the mastectomy specimen. The result will be reported separately by cytogenetics.          I spent 70 minutes with  the patient more than 50% of which was in counseling and coordination of care.

## 2022-04-05 ENCOUNTER — APPOINTMENT (OUTPATIENT)
Dept: LAB | Facility: CLINIC | Age: 67
End: 2022-04-05
Attending: INTERNAL MEDICINE
Payer: COMMERCIAL

## 2022-04-05 ENCOUNTER — ONCOLOGY VISIT (OUTPATIENT)
Dept: ONCOLOGY | Facility: CLINIC | Age: 67
End: 2022-04-05
Attending: INTERNAL MEDICINE
Payer: COMMERCIAL

## 2022-04-05 VITALS
WEIGHT: 232.9 LBS | TEMPERATURE: 97.7 F | DIASTOLIC BLOOD PRESSURE: 70 MMHG | HEART RATE: 68 BPM | BODY MASS INDEX: 41.26 KG/M2 | SYSTOLIC BLOOD PRESSURE: 114 MMHG | RESPIRATION RATE: 16 BRPM | OXYGEN SATURATION: 97 %

## 2022-04-05 DIAGNOSIS — Z98.890 S/P BREAST RECONSTRUCTION, LEFT: ICD-10-CM

## 2022-04-05 DIAGNOSIS — M89.8X5 PAIN OF LEFT FEMUR: ICD-10-CM

## 2022-04-05 DIAGNOSIS — Z17.0 MALIGNANT NEOPLASM OF UPPER-OUTER QUADRANT OF LEFT BREAST IN FEMALE, ESTROGEN RECEPTOR POSITIVE (H): ICD-10-CM

## 2022-04-05 DIAGNOSIS — M54.42 ACUTE BILATERAL LOW BACK PAIN WITH LEFT-SIDED SCIATICA: Primary | ICD-10-CM

## 2022-04-05 DIAGNOSIS — C50.412 MALIGNANT NEOPLASM OF UPPER-OUTER QUADRANT OF LEFT BREAST IN FEMALE, ESTROGEN RECEPTOR POSITIVE (H): ICD-10-CM

## 2022-04-05 LAB
ALBUMIN SERPL-MCNC: 3.3 G/DL (ref 3.4–5)
ALP SERPL-CCNC: 68 U/L (ref 40–150)
ALT SERPL W P-5'-P-CCNC: 50 U/L (ref 0–50)
ANION GAP SERPL CALCULATED.3IONS-SCNC: 6 MMOL/L (ref 3–14)
AST SERPL W P-5'-P-CCNC: 36 U/L (ref 0–45)
BASOPHILS # BLD AUTO: 0.1 10E3/UL (ref 0–0.2)
BASOPHILS NFR BLD AUTO: 1 %
BILIRUB SERPL-MCNC: 0.3 MG/DL (ref 0.2–1.3)
BUN SERPL-MCNC: 13 MG/DL (ref 7–30)
CALCIUM SERPL-MCNC: 9.4 MG/DL (ref 8.5–10.1)
CHLORIDE BLD-SCNC: 108 MMOL/L (ref 94–109)
CO2 SERPL-SCNC: 28 MMOL/L (ref 20–32)
CREAT SERPL-MCNC: 0.74 MG/DL (ref 0.52–1.04)
EOSINOPHIL # BLD AUTO: 0.5 10E3/UL (ref 0–0.7)
EOSINOPHIL NFR BLD AUTO: 6 %
ERYTHROCYTE [DISTWIDTH] IN BLOOD BY AUTOMATED COUNT: 13.4 % (ref 10–15)
GFR SERPL CREATININE-BSD FRML MDRD: 90 ML/MIN/1.73M2
GLUCOSE BLD-MCNC: 99 MG/DL (ref 70–99)
HCT VFR BLD AUTO: 39.3 % (ref 35–47)
HGB BLD-MCNC: 12.6 G/DL (ref 11.7–15.7)
IMM GRANULOCYTES # BLD: 0.1 10E3/UL
IMM GRANULOCYTES NFR BLD: 1 %
LYMPHOCYTES # BLD AUTO: 2.5 10E3/UL (ref 0.8–5.3)
LYMPHOCYTES NFR BLD AUTO: 30 %
MCH RBC QN AUTO: 29.9 PG (ref 26.5–33)
MCHC RBC AUTO-ENTMCNC: 32.1 G/DL (ref 31.5–36.5)
MCV RBC AUTO: 93 FL (ref 78–100)
MONOCYTES # BLD AUTO: 0.9 10E3/UL (ref 0–1.3)
MONOCYTES NFR BLD AUTO: 11 %
NEUTROPHILS # BLD AUTO: 4.1 10E3/UL (ref 1.6–8.3)
NEUTROPHILS NFR BLD AUTO: 51 %
NRBC # BLD AUTO: 0 10E3/UL
NRBC BLD AUTO-RTO: 0 /100
PLATELET # BLD AUTO: 260 10E3/UL (ref 150–450)
POTASSIUM BLD-SCNC: 4 MMOL/L (ref 3.4–5.3)
PROT SERPL-MCNC: 6.9 G/DL (ref 6.8–8.8)
RBC # BLD AUTO: 4.22 10E6/UL (ref 3.8–5.2)
SODIUM SERPL-SCNC: 142 MMOL/L (ref 133–144)
WBC # BLD AUTO: 8.1 10E3/UL (ref 4–11)

## 2022-04-05 PROCEDURE — 99215 OFFICE O/P EST HI 40 MIN: CPT | Performed by: INTERNAL MEDICINE

## 2022-04-05 PROCEDURE — 80053 COMPREHEN METABOLIC PANEL: CPT | Performed by: INTERNAL MEDICINE

## 2022-04-05 PROCEDURE — G0463 HOSPITAL OUTPT CLINIC VISIT: HCPCS

## 2022-04-05 PROCEDURE — 36415 COLL VENOUS BLD VENIPUNCTURE: CPT | Performed by: INTERNAL MEDICINE

## 2022-04-05 PROCEDURE — 85025 COMPLETE CBC W/AUTO DIFF WBC: CPT | Performed by: INTERNAL MEDICINE

## 2022-04-05 RX ORDER — LETROZOLE 2.5 MG/1
2.5 TABLET, FILM COATED ORAL DAILY
Qty: 90 TABLET | Refills: 3 | Status: SHIPPED | OUTPATIENT
Start: 2022-04-05 | End: 2023-05-09

## 2022-04-05 RX ORDER — ONDANSETRON 4 MG/1
4 TABLET, FILM COATED ORAL EVERY 8 HOURS PRN
Qty: 10 TABLET | Refills: 0 | Status: SHIPPED | OUTPATIENT
Start: 2022-04-05 | End: 2022-09-07

## 2022-04-05 ASSESSMENT — PAIN SCALES - GENERAL: PAINLEVEL: MODERATE PAIN (5)

## 2022-04-05 NOTE — LETTER
2022     RE: Ramona Ferrer  [address]  [place] [address]    Dear Colleague,    Thank you for referring your patient, Ramona Ferrer, to the [facility]. Please see a copy of my visit note below.    Dada Mendiola MD  [facility]  [address]  [place] [address]     RE:  Ramona Ferrer  MRN:  [id number]  :  1955     Dear Dr. Mendiola:     I would like to refer to you Sammie Ferrer, a 66-year-old woman with a recent diagnosis of a stage IIB, T3 N0 MX, invasive lobular carcinoma of the left breast.  She self-palpated a lump in the upper outer quadrant of the left breast.  She underwent evaluation with a diagnostic mammogram and ultrasound, which was BI-RADS 4, showing in the upper outer quadrant of the left breast irregularly shaped hypoechoic mass at the 12 o'clock position 4 cm from the nipple on the left.  This mass measured 2.3 x 2.2 x 1.5 cm and accounted for the patient's area of palpable concern.  Additionally, at the 1 o'clock position 2 cm from the nipple in the left breast, there is a second irregularly shaped hypoechoic mass with indistinct margins measuring 1.3 x 1.1 x 0.8 cm.  She also underwent a breast MRI which showed in the right breast mild background parenchymal enhancement and no suspicious areas of enhancement or lymphadenopathy.  In the left breast there was mild background parenchymal enhancement, and at the 12 o'clock position 4 cm from the nipple there was a heterogeneously enhancing irregular mass measuring 3.4 in AP dimension x 2.8 cm ML and 2.8 cm SI corresponding to the known biopsy-proven malignancy in the left breast.  In the left breast at the 1 o'clock position 2 cm from the nipple there was also a heterogeneously enhancing oval mass measuring 1.3 cm in maximal diameter.  This likely corresponds to the second biopsy-proven malignancy.  Together, the full extent of the disease spans a  total of 5.2 cm AP x 4.4 cm ML, BI-RADS 6.     The pathology showed in the left breast 12 o'clock position 4 cm from the nipple, ultrasound-guided needle biopsy invasive lobular carcinoma, Ullin grade 1/3 lobular carcinoma in situ, classic type, estrogen receptor positive in greater than 70% of the cells and progesterone receptor positive in greater than 90% of cells, and HER2 FISH was nonamplified.  In part B in the left breast at the 1 o'clock position 2 cm from the nipple, ultrasound-guided needle biopsy showed invasive lobular carcinoma, Cathy grade 1/3 lobular carcinoma in situ was present, classic subtype, estrogen receptors positive in greater than 70% of the cells, progesterone receptors positive in greater than 90% of cells, and HER2 was nonamplified, Ki-67 was also ordered but is not yet reported.  She now comes to our clinic for recommendations.     Sammie was seen in clinic today with her daughter, Ivy, and was also seen with a relative in [place] who attended by cell phone with the patient's permission.     Sammie reports that the breast mass had been there for approximately 1 month.  She has no pain, mild fatigue and denies depression or anxiety.     She has worked in the past as a personal care assistant and lives in the [facility].  Her daughter accompanied her to the visit.  Her sister who is in [place] also attended by [employer].      Sammie has had no weight loss.  Her diet has not changed.  No loss of energy.  She does not sleep during the day and can perform all of her household chores.  She has a self-palpated mass in the upper outer quadrant of the left breast adjacent to the nipple areolar complex.  She has not palpated any masses in the right breast.     REVIEW OF SYSTEMS:  She denies fever, headache, cough, chest pain, shortness of breath, hemoptysis, loss of appetite, nausea, vomiting, abdominal pain, constipation, diarrhea, bone pain, back pain, muscle or joint complaints,  numbness or tingling in the hands and feet, hearing loss or depression.  Remainder of a 14-point review of systems was negative.     PAST MEDICAL HISTORY:  There is no history of breast cancer in the past.  No history of breast cancer surgery.  She has no history of radiation therapy in the past or radiation exposure.  No history of prior tumor of any kind.  She denies heart problems, heart attack, breathing problems, blood clots, seizures, peptic ulcer disease, osteoporosis or bone fractures.  She does report a history of arthritis.     FAMILY HISTORY:  Positive for breast cancer in a paternal aunt who was diagnosed with breast cancer at an old age.     Her father has a history of stomach cancer.  There is no family history of pancreatic cancer, melanoma, lung cancer or ovarian cancer.     No history of male breast cancer.     ALLERGIES:  No history of drug allergies.  She denies allergies to seafood, iodine, or contrast dye.     PAST MENSTRUAL HISTORY:  She has been pregnant 3 times with 2 live births at age 29 and 31 and 1 miscarriage.  Age at first menstrual period was 14.  She did have a total abdominal hysterectomy and bilateral salpingo-oophorectomy performed in 2003 for endometriosis.  She has no history of hormone replacement therapy.  No history of oral contraceptives.     HABITS: She denies tobacco history.  She denies alcohol history and drinks alcohol only occasionally.     PAST MEDICAL HISTORY:  Past medical history is also remarkable for type 2 diabetes, and she was begun on metformin 2 years ago.  She also has a history of varicose veins and a history of a lipoma resected from her left leg.      Chronic Hepatitis B.  Hepatitis C negative.  HIV negative.       Prior COVID 6-14-21     GERM LINE GENETICS: INVITAE testing of 47 genes was negative.     INTERVAL HISTORY  Ramona returns to clinic today with her daughter.  She is postop and has a left drain in place after left mastectomy.  She says that the  pain around the implant is significant.  Also, ever since the surgery, she has had severe pain in the region of the left femur.  She never had this pain before and it is severe enough that she is taking Tylenol for it.  She is also taking opiate pain medicine for the post-mastectomy discomfort, oxycodone 5 mg p.r.n.  She denies fatigue, depression or anxiety, although she is very concerned about the left leg pain.  She did have a left leg ultrasound, which showed no DVT.    OTHER COMPLAINTS:  She complains of left arm numbness and left leg pain in the femur area.  There is no weakness or numbness or tingling in the left leg.     REVIEW OF SYSTEMS:  The remainder of a 10-point review of systems is negative.    Pathology is pending.  I explained to Ramona and her daughter that we do not have the pathology back yet, but we will call them with the results.     PHYSICAL EXAMINATION:    /70   Pulse 68   Temp 97.7  F (36.5  C) (Oral)   Resp 16   Wt 105.6 kg (232 lb 14.4 oz)   SpO2 97%   BMI 41.26 kg/m    GENERAL:  Ramona appeared to be in moderate discomfort.  There is no alopecia.  HEENT:  No lesions in the oropharynx.  LYMPH:  There is no palpable cervical, supraclavicular, subclavicular or axillary lymphadenopathy.  BREASTS:  Examination of the right breast is without masses.  Examination of the left breast reveals an implant in place. Well-healed incision without erythema or masses.  There is no erythema of the left breast. There is a drain in place.  There are no skin changes or areas that are suspicious for infection.  There is no palpable fluctuance.  In summary, no evidence of postoperative infection.  LUNGS:  Clear to percussion and auscultation.  HEART:  There is a regular rate and rhythm.  S1, S2.  ABDOMEN:  Soft, nontender, consistent with increased body mass index.  EXTREMITIES:  Without edema.  PSYCH: Mood was anxious.  Affect was appropriate.     LABORATORY DATA:     CBC, CMP normal.       ASSESSMENT AND PLAN:  1.  Sammie Ferrer is a 66-year-old woman with a recent diagnosis of a stage IIB, T3 N0 MX, invasive lobular carcinoma of the upper outer quadrant of the left breast which is multifocal grade 1, ER positive in greater than 70% of cells, WV positive in greater than 90% of cells and HER2 nonamplified.  She now comes to clinic with her daughter, Ivy, for recommendations regarding evaluation and treatment.  Her daughter wanted to serve as the Citizen of [place]  for this visit.  2.  MammaPrint came back as low risk.   3.  Sammie Ferrer underwent a left mastectomy.  We do not have the pathology back today and she and her daughter were disappointed about that.  I discussed that as soon as the pathology report is available I will share the results with them. I discussed that her breast cancer was MammaPrint low risk and that we would offer adjuvant hormonal therapy with letrozole.  She did want to start the adjuvant hormonal therapy today and I did send a prescription.  I discussed the risks and potential benefits of letrozole.  I discussed that hot flashes, vaginal dryness and also bone density are among the potential side effects.  All of her questions were answered.  Information sheet was given, and she is willing to start letrozole.  4. Pathology is pending.  I apologized for the delay.  5.  Discussion of radiation.  I discussed that I do not have the pathology report back and I do not know the full extent of the tumor.  I discussed that in some cases with large primary tumor that alone can be an indication for radiation.  I discussed that if there is an axillary sentinel lymph node that was found to be positive for tumor cells, then radiation to the axilla would be recommended.  I discussed that I strongly recommend evaluation by Radiation Oncology if Dr. Mendiola's review of the pathology leads him to consider such consultation.  6.  Palliative Care referral for pain in the left femur.   Two view films of the femur will be obtained as well as CT scan of the lumbosacral spine.  I discussed that we cannot do an MRI because she has an expander in place.  All of her questions and all of her daughter's questions were answered.  7.  Followup.   Follow up with Dr. Mendiola and Dr. Feliciano as scheduled.  Follow up with me May 3 with CBC, CMP.  XR of femur, 2 view.  CT Lumbar spine.     Thank you for allowing us to continue to participate in Sammie Ferrer's care.     Sincerely,     Kenneth Cisneros M.D.   Professor   Division of Hematology, Oncology, Transplant   Department of Medicine   [employer] [employer]   [phone]     ADDENDUM:  We will need to discuss the pathology in conference.  PATHOLOGY 4-6-22  A. Lymph node, LEFT axillary, excision:  -One benign lymph node (0/1)  -See microscopic description     B. LEFT breast, skin-sparing mastectomy:  -INVASIVE BREAST CARCINOMA, MIXED INVASIVE LOBULAR CARCINOMA (predominant component) and INVASIVE DUCTAL CARCINOMA (minor component), KALPESH GRADE 3, size 5.5 cm, 12:00, upper outer quadrant and lower outer quadrant  -Ductal carcinoma in situ (DCIS), nuclear grade 2, cribriform and solid type(s), with focal necrosis  -DCIS is admixed with invasive carcinoma, and comprises a minor component (<5%) of the tumor volume  -Lobular carcinoma in situ (LCIS), predominantly classic type (admixed with invasive carcinoma and beyond invasive carcinoma) and focal pleomorphic type (size 2 mm, adjacent to invasive carcinoma)  -Margins are uninvolved by invasive carcinoma  -Invasive carcinoma is 2.5 mm from the nearest (anterior) margin, and is > 5 mm from the posterior margin  -Margins are uninvolved by DCIS and pleomorphic LCIS  -DCIS and pleomorphic LCIS are > 5 mm from the nearest (anterior) margin  -Benign nipple and skin   -Other findings: fibrocystic change (including microcysts with apocrine metaplasia) and columnar cell change  -Calcifications  associated with DCIS, LCIS, and benign ducts and acini  -Prior core biopsy site changes (two biopsy sites identified)  -Invasive carcinoma is estrogen receptor positive (>70%, strong intensity) and progesterone receptor positive (>90%, strong intensity) by immunohistochemistry and is HER2 non-amplified (group 5) by FISH (performed on prior core biopsies, see report [id number], specimens A and B)  -Estrogen receptor, progesterone receptor and HER2 immunohistochemistry results for this specimen will be reported in an addendum  -See comment and microscopic description  -See tumor synoptic below    The invasive carcinoma shows variable morphology. It is predominantly invasive lobular carcinoma, with a classical architectural pattern and without marked nuclear atypia. There are several foci that show an alveolar or nearly solid pattern, with pleomorphic nuclei and frequent mitotic figures (up to 15 mitotic figures in 10 high power fields; field diameter 0.5 mm). The two masses that were previously sampled by core biopsy (12:00 and 1:00) are part of one larger tumor that involves both the masses, involves tissue between the two masses and extends beyond the grossly identified two masses, to span 5.5 cm. The invasive carcinoma also shows several small microscopic foci with tubule formation. E-cadherin, performed on selected blocks with tubule formation, confirms ductal differentiation (strong membranous expression) in invasive carcinoma with tubule formation in block B6, but shows very diminished expression in block B12. In blocks B6 and B12, the invasive carcinoma with tubule formation is somewhat distinct from the invasive lobular carcinoma, arising in a background of DCIS. In block B47, invasive carcinoma with tubule formation also shows focal single file pattern, but is otherwise somewhat distinct from the main tumor mass of invasive lobular carcinoma. In block B29, invasive carcinoma shows tubules mixed with classical  invasive lobular carcinoma.     ER. PgR and HER2 immunohistochemistry will be performed on the pleomorphic component of invasive carcinoma in the mastectomy specimen. The result will be reported separately by cytogenetics.        I spent 70 minutes with the patient more than 50% of which was in counseling and coordination of care.

## 2022-04-05 NOTE — NURSING NOTE
"Oncology Rooming Note    April 5, 2022 5:05 PM   Ramona Ferrer is a 66 year old female who presents for:    Chief Complaint   Patient presents with     Blood Draw     Labs drawn via  by RN in lab.  VS taken     Oncology Clinic Visit     Breast cancer     Initial Vitals: /70   Pulse 68   Temp 97.7  F (36.5  C) (Oral)   Resp 16   Wt 105.6 kg (232 lb 14.4 oz)   SpO2 97%   BMI 41.26 kg/m   Estimated body mass index is 41.26 kg/m  as calculated from the following:    Height as of 3/23/22: 1.6 m (5' 3\").    Weight as of this encounter: 105.6 kg (232 lb 14.4 oz). Body surface area is 2.17 meters squared.  Moderate Pain (5) Comment: Data Unavailable   No LMP recorded. Patient has had a hysterectomy.  Allergies reviewed: Yes  Medications reviewed: Yes    Medications: MEDICATION REFILLS NEEDED TODAY. Provider was NOT notified. oxyCODONE 5 mg, ZOFRAN 4 mg.  Pharmacy name entered into [employer]:    [id number] - [place], [place] - [address]  [id number] - [place], [place] - [address]    Clinical concerns: pt has left leg pain concern, meds refill.      Finn Bhatti CMA            "

## 2022-04-05 NOTE — NURSING NOTE
Chief Complaint   Patient presents with     Blood Draw     Labs drawn via  by RN in lab.  VS taken       Labs collected from venipuncture by RN. Vitals taken. Checked in for appointment(s).    Benita Joseph RN

## 2022-04-06 ENCOUNTER — TELEPHONE (OUTPATIENT)
Dept: ONCOLOGY | Facility: CLINIC | Age: 67
End: 2022-04-06
Payer: COMMERCIAL

## 2022-04-06 DIAGNOSIS — C50.412 MALIGNANT NEOPLASM OF UPPER-OUTER QUADRANT OF LEFT BREAST IN FEMALE, ESTROGEN RECEPTOR POSITIVE (H): Primary | ICD-10-CM

## 2022-04-06 DIAGNOSIS — Z17.0 MALIGNANT NEOPLASM OF UPPER-OUTER QUADRANT OF LEFT BREAST IN FEMALE, ESTROGEN RECEPTOR POSITIVE (H): Primary | ICD-10-CM

## 2022-04-06 RX ORDER — CHOLECALCIFEROL (VITAMIN D3) 50 MCG
1 TABLET ORAL DAILY
Qty: 90 TABLET | Refills: 3 | Status: SHIPPED | OUTPATIENT
Start: 2022-04-06 | End: 2023-07-23

## 2022-04-06 RX ORDER — CALCIUM CARBONATE 500(1250)
2 TABLET ORAL DAILY
Qty: 180 TABLET | Refills: 3 | Status: SHIPPED | OUTPATIENT
Start: 2022-04-06 | End: 2024-02-28

## 2022-04-06 NOTE — TELEPHONE ENCOUNTER
Spoke with Sammie over the phone who reports left leg pain from her knee to hip since her surgery 3/24 (b/l mastectomy with Dr. Mendiola). She reports the pain has been stable since surgery. The pain is worse when she lays down and is sharp/shooting. She had a left lower extremity ultrasound 3/30 that was negative for blood clot. She denies any concerns with her surgical site. We discussed I am not sure why she is having leg pain and that this is not typical after the mastectomy. I recommended she see her PCP for additional work up of her leg pain and she agreed.     Carmen Ledesma PA-C    Message forwarded to Dr. Mendiola for review.

## 2022-04-06 NOTE — TELEPHONE ENCOUNTER
"Mastectomy  F/u w/Dr. Cisneros yesterday. Described concerns, not many answers that he gave.    1) Pain med. Needs refill. Per Zelda/pt's daughter, Dr. Cisneros advised pt follow up with Dr. Mendiola's team because they prescribed last.  Zelda is wondering if pt should have a different pain medication besides oxycodone.    2) Pain Symptoms and Post Surgery Concerns:  After surgery, besides pain from mastectomy and lymph node dissection, pt experienced severe pain (burning) and numbness on left side of her body, specifically from her knee up to hip. Now she is feeling numbness and pain in LEFT arm and above knee in LEFT leg. Initially, pt had numbness on LEFT side of face and front of head--this pain is now gone.    Pain is constant--feels like burning inside and cutting through her body.  Rates pain as follows: while lying down 8-9/10; when up and doing nothing, 4-5/10. Recliner is ea.  Needs help getting into and out of bed.   So tired, taking narcotics because feels like she is going to faint from the pain.    Pt is still having surgical pain.  Having drainage from JAMES: Has gone from 40-50 ml TID to 20 ml TID, dark pink color    Zelda states that Dr. Campbell put in balloons during the surgery. These now feel lumpy and like they are moving which is different than right after surgery.    Upcoming apts:      Amelia: Had apt with Dr. Cisneros yesterday. Pt states Dr. Cisneros would order \"CT of spine to check for issues.\" (Not scheduled yet.) Per Dr. Cisneros's note: Palliative Care referral for pain in the left femur.  Two view films of the femur will be obtained as well as CT scan of the lumbosacral spine.  I discussed that we cannot do an MRI because she has an expander in place.    Would like to see  before the 4/11 apt if possible    Adrian apt this Friday.    Pathology not back yet.    3) Calcium and Vitamin D RX so insurance can pay for it. (Amelia)  Nurse told pt she should be on these. Would like provider to " write RX so that they can get insurance coverage for them.     Routed to MK Elizondo regarding leg/arm pain and pain medication refills.  Routed to Irene Todd and Dr. Cisneros for calcium and vitamin D RX's.

## 2022-04-07 ENCOUNTER — NURSE TRIAGE (OUTPATIENT)
Dept: ONCOLOGY | Facility: CLINIC | Age: 67
End: 2022-04-07
Payer: COMMERCIAL

## 2022-04-07 DIAGNOSIS — G89.18 POSTOPERATIVE PAIN: Primary | ICD-10-CM

## 2022-04-07 RX ORDER — OXYCODONE HYDROCHLORIDE 5 MG/1
5 TABLET ORAL EVERY 6 HOURS PRN
Qty: 12 TABLET | Refills: 0 | Status: SHIPPED | OUTPATIENT
Start: 2022-04-07 | End: 2022-04-11

## 2022-04-07 RX ORDER — METHOCARBAMOL 500 MG/1
500 TABLET, FILM COATED ORAL 4 TIMES DAILY PRN
Qty: 20 TABLET | Refills: 0 | Status: SHIPPED | OUTPATIENT
Start: 2022-04-07 | End: 2022-09-07

## 2022-04-07 NOTE — PROGRESS NOTES
"Plastic Surgery Outpatient Visit    ID: Ramona Ferrer is a 66 year old female with PMH left breast cancer s/p left mastectomy with prepectoral TE placement 3/23 with Dr. Feliciano.     S: Doing ok, complains of itching to L breast. Also has pain at night, taking oxycodone at night. JAMES 35 or more daily.     Mentions that she is having numbness/tingling/shooting pains to L thigh since surgery. Had U/S for DVT, was negative. Getting CT/XR in the future.     O:  Ht 1.6 m (5' 3\")   Wt 105.2 kg (232 lb)   BMI 41.10 kg/m     General: NAD  Chest: left breast TE intact. Mastectomy skin intact. Incision intact but with small areas oozing serous fluid. +periincisional macular rash. JAMES with serous fluid.     A/P:  -healing with reaction to tape  -tape removed, area washed. pack inverted part of incision with gauze to prevent moisture. Cover with gauze, secure with bra. Ok to use sports bra/soft bra without underwire.   -drain to remain until next week, possible first fill  -Rx for hydroxyzine and westcort  -pt requesting to see Dr. Feliciano next week    Vidhi Austin PA-C  Plastic and Reconstructive Surgery    15 minutes spent on the date of the encounter doing chart review, history and physical, dressing changes, documentation and further activity as noted above.    "

## 2022-04-07 NOTE — TELEPHONE ENCOUNTER
[facility] Triage    Incoming call: Daughter Zelda - daughter is following up on message sent yesterday in regards to pain and pain meds.  Please see note at 935 am.  She would like to discuss options again.      Paged Carmen Ledesma 1000 am to call Zelda huizar in regards to yesterdays message of pain meds/ pain [phone]    Routing to Carmen Ledesma and Kitty Cisneros

## 2022-04-07 NOTE — TELEPHONE ENCOUNTER
Spoke with with daughter. Prescriptions for oxycodone and robaxin were refill at a reduced dose to taper for pain at her surgical site. They will proceed with CT of the leg ordered by Dr. Cisneros.     Carmen Ledesma PA-C

## 2022-04-08 ENCOUNTER — OFFICE VISIT (OUTPATIENT)
Dept: PLASTIC SURGERY | Facility: CLINIC | Age: 67
End: 2022-04-08
Attending: PLASTIC SURGERY
Payer: COMMERCIAL

## 2022-04-08 VITALS
HEART RATE: 63 BPM | HEIGHT: 63 IN | OXYGEN SATURATION: 98 % | TEMPERATURE: 97.7 F | SYSTOLIC BLOOD PRESSURE: 138 MMHG | DIASTOLIC BLOOD PRESSURE: 79 MMHG | WEIGHT: 232 LBS | BODY MASS INDEX: 41.11 KG/M2

## 2022-04-08 DIAGNOSIS — R21 RASH: Primary | ICD-10-CM

## 2022-04-08 DIAGNOSIS — Z98.890 S/P BREAST RECONSTRUCTION, LEFT: ICD-10-CM

## 2022-04-08 PROCEDURE — 99024 POSTOP FOLLOW-UP VISIT: CPT | Performed by: PHYSICIAN ASSISTANT

## 2022-04-08 RX ORDER — HYDROXYZINE HYDROCHLORIDE 25 MG/1
25 TABLET, FILM COATED ORAL 3 TIMES DAILY PRN
Qty: 20 TABLET | Refills: 0 | Status: SHIPPED | OUTPATIENT
Start: 2022-04-08 | End: 2023-04-06

## 2022-04-08 RX ORDER — HYDROCORTISONE VALERATE CREAM 2 MG/G
CREAM TOPICAL 2 TIMES DAILY
Qty: 45 G | Refills: 1 | Status: SHIPPED | OUTPATIENT
Start: 2022-04-08 | End: 2023-04-06

## 2022-04-08 ASSESSMENT — PAIN SCALES - GENERAL: PAINLEVEL: MODERATE PAIN (5)

## 2022-04-08 NOTE — NURSING NOTE
"Chief Complaint   Patient presents with     STANLEY Cochran, is being seen today for a 2 week post-op DOS 3/23/22, is experiencing needling pain 5/10 left breast, left leg knee to thigh (numbness), and throughout body at times, as reported by patient.       Vitals:    04/08/22 1041   BP: 138/79   BP Location: Right arm   Patient Position: Chair   Cuff Size: Adult Large   Pulse: 63   Temp: 97.7  F (36.5  C)   TempSrc: Oral   SpO2: 98%   Weight: 105.2 kg (232 lb)   Height: 1.6 m (5' 3\")       Body mass index is 41.1 kg/m .      Jen Mckay LPN    "

## 2022-04-08 NOTE — LETTER
"4/8/2022       RE: Ramona Ferrer  [address]  [place] [address]     Dear Colleague,    Thank you for referring your patient, Ramona Ferrer, to the [facility] [place] [facility]. Please see a copy of my visit note below.    Plastic Surgery Outpatient Visit    ID: Ramona Ferrer is a 66 year old female with PMH left breast cancer s/p left mastectomy with prepectoral TE placement 3/23 with Dr. Feliciano.     S: Doing ok, complains of itching to L breast. Also has pain at night, taking oxycodone at night. JAMES 35 or more daily.     Mentions that she is having numbness/tingling/shooting pains to L thigh since surgery. Had U/S for DVT, was negative. Getting CT/XR in the future.     O:  Ht 1.6 m (5' 3\")   Wt 105.2 kg (232 lb)   BMI 41.10 kg/m     General: NAD  Chest: left breast TE intact. Mastectomy skin intact. Incision intact but with small areas oozing serous fluid. +periincisional macular rash. JAMES with serous fluid.     A/P:  -healing with reaction to tape  -tape removed, area washed. pack inverted part of incision with gauze to prevent moisture. Cover with gauze, secure with bra. Ok to use sports bra/soft bra without underwire.   -drain to remain until next week, possible first fill  -Rx for hydroxyzine and westcort  -pt requesting to see Dr. Feliciano next week    Vidhi Austin PA-C  Plastic and Reconstructive Surgery    15 minutes spent on the date of the encounter doing chart review, history and physical, dressing changes, documentation and further activity as noted above.  "

## 2022-04-11 ENCOUNTER — ONCOLOGY VISIT (OUTPATIENT)
Dept: ONCOLOGY | Facility: CLINIC | Age: 67
End: 2022-04-11
Attending: SURGERY
Payer: COMMERCIAL

## 2022-04-11 VITALS
SYSTOLIC BLOOD PRESSURE: 125 MMHG | BODY MASS INDEX: 41.3 KG/M2 | OXYGEN SATURATION: 96 % | DIASTOLIC BLOOD PRESSURE: 84 MMHG | TEMPERATURE: 97.4 F | WEIGHT: 233.1 LBS | HEART RATE: 73 BPM | HEIGHT: 63 IN

## 2022-04-11 DIAGNOSIS — G89.18 POSTOPERATIVE PAIN: ICD-10-CM

## 2022-04-11 PROCEDURE — G0463 HOSPITAL OUTPT CLINIC VISIT: HCPCS

## 2022-04-11 RX ORDER — OXYCODONE HYDROCHLORIDE 5 MG/1
5 TABLET ORAL EVERY 6 HOURS PRN
Qty: 10 TABLET | Refills: 0 | Status: SHIPPED | OUTPATIENT
Start: 2022-04-11 | End: 2022-07-20

## 2022-04-11 NOTE — PROGRESS NOTES
HISTORY OF PRESENT ILLNESS:  Ramona Ferrer is here for a postoperative visit after undergoing a left mastectomy and sentinel lymph node biopsy on 03/23 with immediate reconstruction by Dr. Feliciano.  She has done well since her surgery.  She is still complaining of pain.  She did see Dr. Cisneros after surgery.  He did not have her pathology reports back yet.    PHYSICAL EXAMINATION:  Her incision is healing well.  She has no skin necrosis.  No evidence of infection.  Her drain is still in.    IMPRESSION:  Postoperative check.    PLAN:  We will have her see Radiation Oncology.  I am going to send a note to Dr. Cisneros as well about the pathology because there is grade 3 disease present, and she did have a MammaPrint that was low risk before surgery but that was thought to be grade 1 invasive lobular breast cancer.  I am refilling 10 scripts for her oxycodone.

## 2022-04-11 NOTE — NURSING NOTE
"Oncology Rooming Note    April 11, 2022 3:46 PM   Ramona Ferrer is a 66 year old female who presents for:    Chief Complaint   Patient presents with     Oncology Clinic Visit     Breast cancer of upper outer quadrant of left breast,     Initial Vitals: /84 (BP Location: Right arm, Patient Position: Sitting, Cuff Size: Adult Large)   Pulse 73   Temp 97.4  F (36.3  C) (Oral)   Ht 1.6 m (5' 3\")   Wt 105.7 kg (233 lb 1.6 oz)   SpO2 96%   BMI 41.29 kg/m   Estimated body mass index is 41.29 kg/m  as calculated from the following:    Height as of this encounter: 1.6 m (5' 3\").    Weight as of this encounter: 105.7 kg (233 lb 1.6 oz). Body surface area is 2.17 meters squared.  Data Unavailable Comment: Data Unavailable   No LMP recorded. Patient has had a hysterectomy.  Allergies reviewed: Yes  Medications reviewed: Yes    Medications: Medication refills not needed today.  Pharmacy name entered into [facility]:    [facility][id number] - [place], [place] - [address]  [facility] # [id number] - [place], [place] - [address]    Clinical concerns: None.      Finn Bhatti CMA            "

## 2022-04-11 NOTE — LETTER
4/11/2022         RE: Ramona Ferrer  [address]  [place] [address]        Dear Colleague,    Thank you for referring your patient, Ramona Ferrer, to the [facility] BREAST [facility]. Please see a copy of my visit note below.    HISTORY OF PRESENT ILLNESS:  Ramona Ferrer is here for a postoperative visit after undergoing a left mastectomy and sentinel lymph node biopsy on 03/23 with immediate reconstruction by Dr. Feliciano.  She has done well since her surgery.  She is still complaining of pain.  She did see Dr. Cisneros after surgery.  He did not have her pathology reports back yet.    PHYSICAL EXAMINATION:  Her incision is healing well.  She has no skin necrosis.  No evidence of infection.  Her drain is still in.    IMPRESSION:  Postoperative check.    PLAN:  We will have her see Radiation Oncology.  I am going to send a note to Dr. Cisneros as well about the pathology because there is grade 3 disease present, and she did have a MammaPrint that was low risk before surgery but that was thought to be grade 1 invasive lobular breast cancer.  I am refilling 10 scripts for her oxycodone.        Again, thank you for allowing me to participate in the care of your patient.      Sincerely,    Dada Mendiola MD

## 2022-04-12 ENCOUNTER — OFFICE VISIT (OUTPATIENT)
Dept: PLASTIC SURGERY | Facility: CLINIC | Age: 67
End: 2022-04-12
Attending: PLASTIC SURGERY
Payer: COMMERCIAL

## 2022-04-12 ENCOUNTER — TELEPHONE (OUTPATIENT)
Dept: ONCOLOGY | Facility: CLINIC | Age: 67
End: 2022-04-12

## 2022-04-12 VITALS
OXYGEN SATURATION: 97 % | HEIGHT: 63 IN | SYSTOLIC BLOOD PRESSURE: 121 MMHG | BODY MASS INDEX: 41.32 KG/M2 | HEART RATE: 83 BPM | TEMPERATURE: 98 F | DIASTOLIC BLOOD PRESSURE: 68 MMHG | WEIGHT: 233.2 LBS

## 2022-04-12 DIAGNOSIS — Z98.890 S/P BREAST RECONSTRUCTION, LEFT: ICD-10-CM

## 2022-04-12 DIAGNOSIS — Z17.0 MALIGNANT NEOPLASM OF UPPER-OUTER QUADRANT OF LEFT BREAST IN FEMALE, ESTROGEN RECEPTOR POSITIVE (H): Primary | ICD-10-CM

## 2022-04-12 DIAGNOSIS — C50.412 MALIGNANT NEOPLASM OF UPPER-OUTER QUADRANT OF LEFT BREAST IN FEMALE, ESTROGEN RECEPTOR POSITIVE (H): Primary | ICD-10-CM

## 2022-04-12 PROCEDURE — 99024 POSTOP FOLLOW-UP VISIT: CPT | Performed by: PLASTIC SURGERY

## 2022-04-12 PROCEDURE — G0463 HOSPITAL OUTPT CLINIC VISIT: HCPCS

## 2022-04-12 RX ORDER — GABAPENTIN 300 MG/1
300 CAPSULE ORAL 3 TIMES DAILY
Qty: 90 CAPSULE | Refills: 0 | Status: SHIPPED | OUTPATIENT
Start: 2022-04-12 | End: 2022-05-11

## 2022-04-12 ASSESSMENT — PAIN SCALES - GENERAL: PAINLEVEL: SEVERE PAIN (6)

## 2022-04-12 NOTE — LETTER
4/12/2022     RE: Ramona Ferrer  [address]  [place] [address]    Dear Colleague,    Thank you for referring your patient, Ramona Ferrer, to the [facility] BREAST [facility]. Please see a copy of my visit note below.    PRESENTING COMPLAINT:  Postoperative visit status post left breast nipple non-sparing mastectomy for breast cancer and immediate reconstruction with expander done on 03/23/2022.    HISTORY OF PRESENTING COMPLAINT:  Ms. Ferrer is 66 years old, here for regular postoperative visit almost 3 weeks out from surgery.  Pain is well controlled in the chest.  However, she has some residual burning pain in both lateral thighs that she has had since surgery.  It is slowly improving.    PHYSICAL EXAMINATION:  Vital signs stable.  She is afebrile, in no obvious distress.  Left breast is healing in well.  No evidence of infection, seroma, hematoma.    ASSESSMENT AND PLAN:  Based on above findings, a diagnosis of left breast reconstruction with expander for breast cancer was made.  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  Reassured the patient that should improve in time.  We gave her 30-day course of gabapentin.      With regards to her chest, her JAMES drain was removed.  Plan is to aggressively moisturize the chest.  Start expansion starting another week or 2 as long as no radiation, chemotherapy as needed, which it seems like she does not need it as her pathology was favorable.  We can then plan the next stage of reconstruction, which will be an autologous reconstruction.      I will see her back in 1-2 weeks' time.  All questions were answered.  She was happy with the visit.  All exam and discussion done in presence of my nurse, Lorna Ludwig.    Again, thank you for allowing me to participate in the care of your patient.      Sincerely,      NOHEMI Feliciano MD

## 2022-04-12 NOTE — NURSING NOTE
"Oncology Rooming Note    April 12, 2022 11:30 AM   Ramona Ferrer is a 66 year old female who presents for:    Chief Complaint   Patient presents with     Oncology Clinic Visit     Breast cancer     Initial Vitals: /68 (BP Location: Right arm, Patient Position: Sitting, Cuff Size: Adult Large)   Pulse 83   Temp 98  F (36.7  C) (Oral)   Ht 1.6 m (5' 3\")   Wt 105.8 kg (233 lb 3.2 oz)   SpO2 97%   BMI 41.31 kg/m   Estimated body mass index is 41.31 kg/m  as calculated from the following:    Height as of this encounter: 1.6 m (5' 3\").    Weight as of this encounter: 105.8 kg (233 lb 3.2 oz). Body surface area is 2.17 meters squared.  Severe Pain (6) Comment: Data Unavailable   No LMP recorded. Patient has had a hysterectomy.   Allergies reviewed: Yes  Medications reviewed: Yes    Medications: Medication refills not needed today.  Pharmacy name entered into [facility]:    [facility] #[id number] - [place], [place] - [address]  [facility] # [id number] - [place], [place] - [address] [address]    Clinical concerns: None.      Finn Bhatti CMA            "

## 2022-04-12 NOTE — PROGRESS NOTES
PRESENTING COMPLAINT:  Postoperative visit status post left breast nipple non-sparing mastectomy for breast cancer and immediate reconstruction with expander done on 03/23/2022.    HISTORY OF PRESENTING COMPLAINT:  Ms. Ferrer is 66 years old, here for regular postoperative visit almost 3 weeks out from surgery.  Pain is well controlled in the chest.  However, she has some residual burning pain in both lateral thighs that she has had since surgery.  It is slowly improving.    PHYSICAL EXAMINATION:  Vital signs stable.  She is afebrile, in no obvious distress.  Left breast is healing in well.  No evidence of infection, seroma, hematoma.    ASSESSMENT AND PLAN:  Based on above findings, a diagnosis of left breast reconstruction with expander for breast cancer was made.  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  Reassured the patient that should improve in time.  We gave her 30-day course of gabapentin.      With regards to her chest, her JAMES drain was removed.  Plan is to aggressively moisturize the chest.  Start expansion starting another week or 2 as long as no radiation, chemotherapy as needed, which it seems like she does not need it as her pathology was favorable.  We can then plan the next stage of reconstruction, which will be an autologous reconstruction.      I will see her back in 1-2 weeks' time.  All questions were answered.  She was happy with the visit.  All exam and discussion done in presence of my nurse, Lorna Ludwig.

## 2022-04-13 ENCOUNTER — PATIENT OUTREACH (OUTPATIENT)
Dept: ONCOLOGY | Facility: CLINIC | Age: 67
End: 2022-04-13
Payer: COMMERCIAL

## 2022-04-13 NOTE — PROGRESS NOTES
Spoke to patient's daughter and aunt with questions from conversation with Dr Cisneros and the patient. Discussed Dr Cisneros's recommendations as documented from call with a Nigerian .  Answered all patient's questions and verbalized understanding. Irene Todd RN, BSN.

## 2022-04-13 NOTE — TELEPHONE ENCOUNTER
I called Sammie with the Bulgarian  this evening and explained that the good news is the margins were negative on the mastectomy.  I discussed that there is an unexpected complication with the pathology showing 2 areas one low-grade and one high-grade.  The MammaPrint appears to have sampled the low-grade area.  There is a 1.5 cm area of high-grade breast cancer.  I discussed that the question is whether to send an additional test on the high-grade part of the tumor which could tell us whether or not we would recommend adjuvant chemotherapy.  I apologize for this complication and explained that our pathologist was working very hard to sort out this issue of having to different kinds of breast cancer in the same mastectomy specimen.  I reinforced that the tissue that was removed in the mastectomy did contain breast cancer.  To clarify the margins were negative and showed no breast cancer at the margins which is the goal of the surgery.  I read the pathology report to the patient with the Bulgarian .  I explained that the mastectomy specimen did contain tumor and that the lymph node was benign and did not contain tumor.  I explained that the results of the pathology help us determine our recommendations for adjuvant therapy to reduce the risk of recurrence of the breast cancer.  I had Sammie repeat back to me her understanding and after some exchange we are on the same page.    I explained that we will discuss in our group whether sending an Oncotype test on the high-grade component of the tumor would be worthwhile.  I discussed that if the Oncotype were sent and came back as high risk we would recommend adjuvant chemotherapy.  This would be done before radiation.  I discussed that nonetheless she should keep her appointment with Dr. Garcia to discuss adjuvant radiation.  I discussed that regardless adjuvant hormonal therapy will be offered.  The main issue is whether to send an Oncotype on the  high-grade component and to make sure that she would want adjuvant chemotherapy if it came back with a chemotherapy recommendation. See below.  I again discussed these issues in detail and she repeated back to me an understanding of what we have discussed.  I also discussed that I would speak with her daughter.  I told her that we will discuss the situation in our breast cancer group and make a consensus and get back to her with an answer as soon as possible.  All of her questions were answered.    Kenneth Cisneros MD    Difference between core biopsy and mastectomy:  3-23-22   LEFT breast, skin-sparing mastectomy:  -INVASIVE BREAST CARCINOMA, MIXED INVASIVE LOBULAR CARCINOMA (predominant component) and INVASIVE DUCTAL CARCINOMA (minor component), [place] GRADE 3, size 5.5 cm, 12:00, upper outer quadrant and lower outer quadrant    1-3-22 Core biopsy  A.  Left breast, 12:00, 4.0 cm from nipple, ultrasound guided needle biopsy-  -Invasive lobular carcinoma, Gifford grade 1/3(Gifford score 5/9) (please see comment)  -Lobular carcinoma in situ, classic subtype  -Estrogen receptor is positive (> 70%) and progesterone receptor is positive (>90%)  -Her 2 by FISH is ordered and pending and will be reported separately

## 2022-04-14 ENCOUNTER — OFFICE VISIT (OUTPATIENT)
Dept: RADIATION ONCOLOGY | Facility: CLINIC | Age: 67
End: 2022-04-14
Attending: SURGERY
Payer: COMMERCIAL

## 2022-04-14 ENCOUNTER — PATIENT OUTREACH (OUTPATIENT)
Dept: ONCOLOGY | Facility: CLINIC | Age: 67
End: 2022-04-14

## 2022-04-14 VITALS
BODY MASS INDEX: 41.29 KG/M2 | WEIGHT: 233.1 LBS | DIASTOLIC BLOOD PRESSURE: 77 MMHG | OXYGEN SATURATION: 97 % | HEART RATE: 81 BPM | SYSTOLIC BLOOD PRESSURE: 142 MMHG

## 2022-04-14 DIAGNOSIS — G89.18 POSTOPERATIVE PAIN: ICD-10-CM

## 2022-04-14 PROCEDURE — G0463 HOSPITAL OUTPT CLINIC VISIT: HCPCS | Performed by: RADIOLOGY

## 2022-04-14 RX ORDER — CALCIUM CARBONATE 500(1250)
TABLET ORAL
COMMUNITY
Start: 2022-04-06 | End: 2023-04-21

## 2022-04-14 ASSESSMENT — ENCOUNTER SYMPTOMS
NAUSEA: 0
FEVER: 0
WEIGHT LOSS: 0
TINGLING: 1
DIAPHORESIS: 0
NECK PAIN: 0
SORE THROAT: 0
NERVOUS/ANXIOUS: 0
VOMITING: 0
DOUBLE VISION: 0
COUGH: 0
DIZZINESS: 0
SHORTNESS OF BREATH: 0
DYSURIA: 0
EYE PAIN: 0
CHILLS: 0
BACK PAIN: 0
FREQUENCY: 0
BLOOD IN STOOL: 0
FALLS: 0
INSOMNIA: 1
BRUISES/BLEEDS EASILY: 0
HEADACHES: 0
DIARRHEA: 0
HEMATURIA: 0
BLURRED VISION: 0
SEIZURES: 0
CONSTIPATION: 0
DEPRESSION: 0

## 2022-04-14 NOTE — PROGRESS NOTES
OncoType ordered as Dr Cisneros recommended on the high grade specimen [id number]. Irene Todd RN, BSN Breast Center Nurse Coordinator

## 2022-04-14 NOTE — PROGRESS NOTES
Department of Radiation Oncology                   [place] [address]  [address] [place]  [address]  Office:  [phone]  Fax:  [phone]   Radiation Oncology Clinic  [address] [place] [address]  Phone:  [phone]  Fax:  [phone]     RE: Ramona Ferrer : 1955   MRN: [id number] ZEB: 2022     OUTPATIENT VISIT NOTE       PROBLEM: Invasive lobular carcinoma of the left breast, status post mastectomy and attempted SLN biopsy, s/p immediate reconstruction      was seen for initial consultation in the Dept of Radiation Oncology on 2022 at the request of Dr. Cisneros.     HISTORY OF PRESENT ILLNESS: Ms. Ferrer is a 67 yo Peruvian speaking Amenian lady with a newly diagnosed left breast cancer. She self-palpated a lump in the upper outer aspect of her left breast shortly after her mom passed away in September. It persisted. Diagnostic mammogram on 2021 revealed focal asymmetries with architectural distortion in the left breast spanning 12:00-1:00, 2-4 cm from the nipple. On the ultrasound, the hypoechoic mass at 12:00 4 cm from the nipple measured 2.3 x 2.2 x 1.5 cm. Additionally, a second mass measuring 1.3 x 1.1 x 0.8 cm was seen at 1:00 2 cm from the nipple. Axillary nodes appeared normal. Ultrasound guided biopsies were done for both masses. Pathology was consistent with invasive lobular carcinoma, [place] grade 1, with associated LCIS, ER > 70%; VA > 90%, HER-2 nonamplified by FISH. Ki-67 30-40% on the first specimen and 10% on the second.     She was further evaluated a breast MRI on 2022, which confirmed both masses with the full extent of disease spanning 5.2 x 4.4 cm. There were no axillary adenopathy or internal mammary adenopathy. Germline genetic testing was negative for actionable mutations.      Ms. Ferrer established care with Dr. Cisneros and Dr. Mendiola. She declined participation in I-SPY-2 trial. Mammaprint came back low  risk and she opted for upfront mastectomy. She was interested in reconstruction and thus also met with Dr. Feliciano.     On 3/23/2022, Ms. Ferrer undersent left skin sparing mastectomy, axillary SLN biopsy with immediate reconstruction using pre-pectoral expander. Final pathology showed grade 3 invasive breast carcinoma, mixed lobular (predominent) and ductal histologies. 5.5 cm in greatest dimension, occupying upper outer and lower outer quadrants. LVSI and dermal lymphatic invasions were not identified. There was associated DCIS, intermediate nuclear grade with focal necrosis; as well as LCIS, classic type with focal pleomorphic type. Surgical margins were clear, with nearest invasive margin being 2.5 mm anterior. DCIS and pleomorphic LCIS were > 5 mm from the nearest anterior margin. The sentinel lymph node did not map. One lymph node that was neither blue or hot was removed, and was negative for malignancy. Final pathologic stage pT3Nx.     Post-op, Dr. Cisneros discussed the finding of variable morphology on the mastectomy specimen with areas of high grade disease. He recommended consideration of sending Oncotype Dx score for the more aggressive histology to help determine the utility of adjuvant chemotherapy. In the meantime, Ms. Ferrer is referred to us for consideration of adjuvant radiation therapy.     Ms. Ferrer is accompanied by her . They declined Burmese . They do speak and understand English well enough for this consultation. On interview, she states that she has pain over her reconstructed chest wall. She also has restricted range of motion in her left shoulder. She had her drain removed recently and will begin expansion next week. She is quite worried about chemotherapy as she doesn't think she will tolerate chemo well.     PAST MEDICAL HISTORY:   Past Medical History:   Diagnosis Date     Breast cancer (H)      Complication of anesthesia      DDD (degenerative disc disease),  lumbar      Endometriosis      Hypertension      Hypothyroidism           Seasonal allergies      Spinal stenosis, lumbar    DMII -- began metformin 2 years ago  Chronic Hepatitis B      Past Surgical History:   Procedure Laterality Date     COLONOSCOPY       COLONOSCOPY  2/20/2012    Procedure:COLONOSCOPY; COLONOSCOPY ; Surgeon:ARUN KITCHEN; Location: GI     COLONOSCOPY N/A 2/15/2019    Procedure: COMBINED COLONOSCOPY, SINGLE OR MULTIPLE BIOPSY/POLYPECTOMY BY BIOPSY;  Surgeon: Connor Sanderson MD;  Location:  GI     HC CYSTOURETHROSCOPY W/ URETEROSCOPY &/OR PYELOSCOPY; W/ LITHOTRIPSY       HC TRABECULOPLASTY BY LASER SURGERY      PI OU     HYSTERECTOMY, PAP NO LONGER INDICATED       LASER YAG IRIDOTOMY  8/8/2012    Procedure: LASER YAG IRIDOTOMY;  LEFT EYE YAG LASER PUPIL IRIDOTOMY ;  Surgeon: Dakotah Humphreys MD;  Location:  EC     LIGATN/STRIP LONG OR SHORT SAPHEN      x's2     MASTECTOMY PARTIAL WITH SENTINEL NODE Left 3/23/2022    Procedure: LEFT SKIN SPARING MASTECTOMY WITH SENTINEL LYMPH NODE BIOPSY;  Surgeon: Dada Mendiola MD;  Location: [facility] OR     PELVIS LAPAROSCOPY,DX       RECONSTRUCT BREAST, INSERT TISSUE EXPANDER BILATERAL, COMBINED Left 3/23/2022    Procedure: Left breast reconstruction with expander and SPY;  Surgeon: NOHEMI Feliciano MD;  Location: [facility] OR     STRABISMUS SURGERY       ZZC TOTAL ABDOM HYSTERECTOMY  2003    MARYA BSO for stage 4 endometriosis          CHEMOTHERAPY HISTORY: None. Oncotype Dx may be sent.    PAST RADIATION THERAPY HISTORY: None    MEDICATIONS:   Current Outpatient Medications   Medication Sig Dispense Refill     acetaminophen (TYLENOL) 325 MG tablet Take  by mouth every 4 hours as needed for pain. 100 tablet 0     Artificial Tear Solution (SOOTHE XP) SOLN Apply 1 drop to eye 3 times daily 15 mL 8     aspirin-acetaminophen-caffeine (EXCEDRIN MIGRAINE) 250-250-65 MG per tablet Take 1 tablet by mouth every 6 hours as needed for pain. 30 tablet 0     calcium carbonate  (OS-EMMA) 500 MG tablet Take 2 tablets (1,000 mg) by mouth daily 180 tablet 3     cetirizine (ZYRTEC) 10 MG tablet Take 1 tablet (10 mg) by mouth daily 90 tablet 1     fluticasone (FLONASE) 50 MCG/ACT nasal spray Spray 2 sprays into both nostrils daily. 1 Package 10     gabapentin (NEURONTIN) 300 MG capsule Take 1 capsule (300 mg) by mouth in the morning and 1 capsule (300 mg) at noon and 1 capsule (300 mg) in the evening. 90 capsule 0     hydrocortisone (WESTCORT) 0.2 % external cream Apply topically 2 times daily 45 g 1     hydrOXYzine (ATARAX) 25 MG tablet Take 1 tablet (25 mg) by mouth 3 times daily as needed for itching 20 tablet 0     letrozole (FEMARA) 2.5 MG tablet Take 1 tablet (2.5 mg) by mouth daily 90 tablet 3     levothyroxine (SYNTHROID/LEVOTHROID) 112 MCG tablet Take 1 tablet (112 mcg) by mouth daily 90 tablet 3     lisinopril (ZESTRIL) 10 MG tablet Take 1 tablet (10 mg) by mouth daily 90 tablet 1     metFORMIN (GLUCOPHAGE-XR) 500 MG 24 hr tablet Take 2 tablets (1,000 mg) by mouth daily (with dinner) 180 tablet 1     methocarbamol (ROBAXIN) 500 MG tablet Take 1 tablet (500 mg) by mouth 4 times daily as needed for muscle spasms 20 tablet 0     Multiple Vitamins-Minerals (MULTIVITAMIN ADULT PO)        olopatadine (PATANOL) 0.1 % ophthalmic solution Place 1 drop into both eyes 2 times daily 5 mL 12     Omega-3 Fatty Acids (FISH OIL) 500 MG CAPS Take by mouth daily        omeprazole (PRILOSEC) 40 MG DR capsule Take 1 capsule (40 mg) by mouth daily 90 capsule 0     ondansetron (ZOFRAN) 4 MG tablet Take 1 tablet (4 mg) by mouth every 8 hours as needed for nausea 10 tablet 0     ORDER FOR DME Provent nasal EPAP  Use over nostrils nightly.   30 each 0     oxyCODONE (ROXICODONE) 5 MG tablet Take 1 tablet (5 mg) by mouth every 6 hours as needed for pain 10 tablet 0     oxyCODONE (ROXICODONE) 5 MG tablet Take 1 tablet (5 mg) by mouth every 4 hours as needed for moderate to severe pain 12 tablet 0     prednisoLONE  acetate (PRED FORTE) 1 % ophthalmic suspension Use  One drop two times daily both eyes in the spring for 7-10 days 5 mL 0     VITAMIN D, CHOLECALCIFEROL, PO Take by mouth daily       vitamin D3 (CHOLECALCIFEROL) 50 mcg (2000 units) tablet Take 1 tablet (50 mcg) by mouth daily 90 tablet 3       ALLERGIES:  allergic to seasonal allergies.    SOCIAL HISTORY:    to  Robyn. Daughter Ivy  Sister lives in [place]  Worked in the past as a personal care assistant  Never smoker  Occasional alcohol.     FAMILY HISTORY:   family history includes Cancer in her father; Diabetes in her mother; Heart Disease in her father; Hypertension in her mother; Thyroid Disease in her mother.   Paternal aunt: breast cancer diagnosed at an old age  Father: stomach cancer    REVIEW OF SYMPTOMS:  A full 14-point review of systems was performed. See HPI for details    Menarche age 14  MARYA/BSO in  for endometriosis  No h/o HRT  No h/o OCP    PHYSICAL EXAMINATION:    BP (!) 142/77   Pulse 81   Wt 105.7 kg (233 lb 1.6 oz)   SpO2 97%   BMI 41.29 kg/m     Gen: appears well, NAD  HEENT: unremarkable  Neck: supple  Axilla: no palpable axillary adenopathy  Breasts: Right breast does not have palpable dominant mass; no suspicious skin changes or nipple discharge. Left breast is surgically absent with healing incision and no signs of infection. Expander in place.   Extremity: Decreased range of motion in left shoulder (barely able to abduct past horizontal).  CV: well perfused  Resp: breathing comfortably on room air    IMAGING:      ASSESSMENT AND PLAN: In summary, Ms. Ferrer is a 65 yo female with an invasive lobular carcinoma of the left breast, status post mastectomy, attempted SLN biopsy and immediate reconstruction. Her initial workup revealed 2 enhancing masses as well as non-mass enhancement with both lesions being grade 1 with low Mammaprint score. The final pathology showed this to be one contiguous area of  5.5 cm, hence T3 disease. There appeared to be variable morphology with predominantly invasive looular but also foci of ductal carcinoma. It was signed off as [place] grade 3. Other notable features include negative margin of at least 2.5 mm, and lack of LVSI.    Because of the upgrade from grade 1 to grade 3 in final pathology, Dr. Cisneros is contemplating sending Oncotype Dx for the more aggressive histology to help determine the benefit of adjuvant chemotherapy. I clarified the purpose of Oncotype Dx with Ms. Ferrer and her . Dr. Cisneros had extensive conversation with them, but they seem to be a bit overwhelmed and worried about the prospect of the chemotherapy.    We then discussed the indications of adjuvant radiation therapy following a mastectomy. I explained that the strongest indication is the involvement of lymph nodes. We occasionally also recommend radiation for node negative large tumors. Ms. Ferrer has a 5.5 cm tumor with clear margin and no LVSI. I explained that having a T3 alone is not a strong indication for adjuvant radiation therapy. The challenge, however, is the status of her nodes. Her SLN did not map and technically, her stage was pT3NX, rather than pT3N0. I reviewed her imaging studies. Her MRI was of good quality and did not reveal suspicious nodes in the axilla or internal mammary chain. One removed non-sentinel lymph node was benign. As such, she is likely truly node negative. Combined with lack of risk factors (negative LVSI, ER+/NV+/HER2-, - LVSI), I don't feel strongly about postmastectomy radiation therapy.    She has undergone immediate reconstruction with a tissue expander placed, and ultimately will proceed with autologous tissue reconstruction. She has left sided disease. In reviewing her previous chest CT done for other purposes, her radiation plan could be challenging due to the presence of tissue expander and her body habitus. Radiation can result in potential  cardiac toxicity and problem with reconstruction both from a surgical complication and cosmetic standpoint.     Overall, the risk benefit ratio does not favor treatment.  However, I will discuss her case at the next breast tumor conference to seek group's opinion regarding adjuvant radiation therapy.     Ms. Ferrer and her  voiced an understanding of the plan.     Thank you for allowing us to participate in this patient's care.  Please feel free to call with any questions or concerns.       Miriam Garcia M.D./Ph.D.  Radiation Oncologist   Department of Radiation Oncology  [facility]  Phone: [phone]       I reviewed patient's chart, internal/external medical records, imaging studies (including actual images), labs and pathology reports.  I interviewed and counseled the patient face to face.  I additionally discussed the case with patient's referring physicians and care team (Dr. Cisneros and Dr. Mendiola).      100 minutes were spent on the date of the encounter doing chart review, history and exam, documentation and further activities as noted above.       Miriam Garcia MD

## 2022-04-14 NOTE — LETTER
2022         RE: Ramona Ferrer  [address] [facility] [address]        Dear Colleague,    Thank you for referring your patient, Ramona Ferrer, to the [facility] RADIATION ONCOLOGY. Please see a copy of my visit note below.    Department of Radiation Oncology                   [place] [address]  [address] [place]  [address]  Office:  [phone]  Fax:  [phone]   Radiation Oncology Clinic  [address] [place] [address]  Phone:  [phone]  Fax:  [phone]     RE: Ramona Ferrer : 1955   MRN: [id number] ZEB: 2022     OUTPATIENT VISIT NOTE       PROBLEM: Invasive lobular carcinoma of the left breast, status post mastectomy and attempted SLN biopsy, s/p immediate reconstruction      was seen for initial consultation in the Dept of Radiation Oncology on 2022 at the request of Dr. Cisneros.     HISTORY OF PRESENT ILLNESS: Ms. Ferrer is a 67 yo Polish speaking Amenian lady with a newly diagnosed left breast cancer. She self-palpated a lump in the upper outer aspect of her left breast shortly after her mom passed away in September. It persisted. Diagnostic mammogram on 2021 revealed focal asymmetries with architectural distortion in the left breast spanning 12:00-1:00, 2-4 cm from the nipple. On the ultrasound, the hypoechoic mass at 12:00 4 cm from the nipple measured 2.3 x 2.2 x 1.5 cm. Additionally, a second mass measuring 1.3 x 1.1 x 0.8 cm was seen at 1:00 2 cm from the nipple. Axillary nodes appeared normal. Ultrasound guided biopsies were done for both masses. Pathology was consistent with invasive lobular carcinoma, Portal grade 1, with associated LCIS, ER > 70%; MD > 90%, HER-2 nonamplified by FISH. Ki-67 30-40% on the first specimen and 10% on the second.     She was further evaluated a breast MRI on 2022, which confirmed both masses with the full extent of disease spanning 5.2 x 4.4  cm. There were no axillary adenopathy or internal mammary adenopathy. Germline genetic testing was negative for actionable mutations.      Ms. Ferrer established care with Dr. Cisneros and Dr. Mendiola. She declined participation in I-SPY-2 trial. Mammaprint came back low risk and she opted for upfront mastectomy. She was interested in reconstruction and thus also met with Dr. Feliciano.     On 3/23/2022, Ms. Ferrer undersent left skin sparing mastectomy, axillary SLN biopsy with immediate reconstruction using pre-pectoral expander. Final pathology showed grade 3 invasive breast carcinoma, mixed lobular (predominent) and ductal histologies. 5.5 cm in greatest dimension, occupying upper outer and lower outer quadrants. LVSI and dermal lymphatic invasions were not identified. There was associated DCIS, intermediate nuclear grade with focal necrosis; as well as LCIS, classic type with focal pleomorphic type. Surgical margins were clear, with nearest invasive margin being 2.5 mm anterior. DCIS and pleomorphic LCIS were > 5 mm from the nearest anterior margin. The sentinel lymph node did not map. One lymph node that was neither blue or hot was removed, and was negative for malignancy. Final pathologic stage pT3Nx.     Post-op, Dr. Cisneros discussed the finding of variable morphology on the mastectomy specimen with areas of high grade disease. He recommended consideration of sending Oncotype Dx score for the more aggressive histology to help determine the utility of adjuvant chemotherapy. In the meantime, Ms. Ferrer is referred to us for consideration of adjuvant radiation therapy.     Ms. Ferrer is accompanied by her . They declined Italian . They do speak and understand English well enough for this consultation. On interview, she states that she has pain over her reconstructed chest wall. She also has restricted range of motion in her left shoulder. She had her drain removed recently and will begin  expansion next week. She is quite worried about chemotherapy as she doesn't think she will tolerate chemo well.     PAST MEDICAL HISTORY:   Past Medical History:   Diagnosis Date     Breast cancer (H)      Complication of anesthesia      DDD (degenerative disc disease), lumbar      Endometriosis      Hypertension      Hypothyroidism           Seasonal allergies      Spinal stenosis, lumbar    DMII -- began metformin 2 years ago  Chronic Hepatitis B      Past Surgical History:   Procedure Laterality Date     COLONOSCOPY       COLONOSCOPY  2/20/2012    Procedure:COLONOSCOPY; COLONOSCOPY ; Surgeon:ARUN KITCHEN; Location: GI     COLONOSCOPY N/A 2/15/2019    Procedure: COMBINED COLONOSCOPY, SINGLE OR MULTIPLE BIOPSY/POLYPECTOMY BY BIOPSY;  Surgeon: Connor Sanderson MD;  Location:  GI     HC CYSTOURETHROSCOPY W/ URETEROSCOPY &/OR PYELOSCOPY; W/ LITHOTRIPSY       HC TRABECULOPLASTY BY LASER SURGERY      PI OU     HYSTERECTOMY, PAP NO LONGER INDICATED       LASER YAG IRIDOTOMY  8/8/2012    Procedure: LASER YAG IRIDOTOMY;  LEFT EYE YAG LASER PUPIL IRIDOTOMY ;  Surgeon: Dakotah Humphreys MD;  Location:  EC     LIGATN/STRIP LONG OR SHORT SAPHEN      x's2     MASTECTOMY PARTIAL WITH SENTINEL NODE Left 3/23/2022    Procedure: LEFT SKIN SPARING MASTECTOMY WITH SENTINEL LYMPH NODE BIOPSY;  Surgeon: Dada Mendiola MD;  Location: [facility] OR     PELVIS LAPAROSCOPY,DX       RECONSTRUCT BREAST, INSERT TISSUE EXPANDER BILATERAL, COMBINED Left 3/23/2022    Procedure: Left breast reconstruction with expander and SPY;  Surgeon: NOHEMI Feliciano MD;  Location: [facility] OR     STRABISMUS SURGERY       Z TOTAL ABDOM HYSTERECTOMY  2003    MARYA BSO for stage 4 endometriosis          CHEMOTHERAPY HISTORY: None. Oncotype Dx may be sent.    PAST RADIATION THERAPY HISTORY: None    MEDICATIONS:   Current Outpatient Medications   Medication Sig Dispense Refill     acetaminophen (TYLENOL) 325 MG tablet Take  by mouth every 4 hours as needed for pain.  100 tablet 0     Artificial Tear Solution (SOOTHE XP) SOLN Apply 1 drop to eye 3 times daily 15 mL 8     aspirin-acetaminophen-caffeine (EXCEDRIN MIGRAINE) 250-250-65 MG per tablet Take 1 tablet by mouth every 6 hours as needed for pain. 30 tablet 0     calcium carbonate (OS-EMMA) 500 MG tablet Take 2 tablets (1,000 mg) by mouth daily 180 tablet 3     cetirizine (ZYRTEC) 10 MG tablet Take 1 tablet (10 mg) by mouth daily 90 tablet 1     fluticasone (FLONASE) 50 MCG/ACT nasal spray Spray 2 sprays into both nostrils daily. 1 Package 10     gabapentin (NEURONTIN) 300 MG capsule Take 1 capsule (300 mg) by mouth in the morning and 1 capsule (300 mg) at noon and 1 capsule (300 mg) in the evening. 90 capsule 0     hydrocortisone (WESTCORT) 0.2 % external cream Apply topically 2 times daily 45 g 1     hydrOXYzine (ATARAX) 25 MG tablet Take 1 tablet (25 mg) by mouth 3 times daily as needed for itching 20 tablet 0     letrozole (FEMARA) 2.5 MG tablet Take 1 tablet (2.5 mg) by mouth daily 90 tablet 3     levothyroxine (SYNTHROID/LEVOTHROID) 112 MCG tablet Take 1 tablet (112 mcg) by mouth daily 90 tablet 3     lisinopril (ZESTRIL) 10 MG tablet Take 1 tablet (10 mg) by mouth daily 90 tablet 1     metFORMIN (GLUCOPHAGE-XR) 500 MG 24 hr tablet Take 2 tablets (1,000 mg) by mouth daily (with dinner) 180 tablet 1     methocarbamol (ROBAXIN) 500 MG tablet Take 1 tablet (500 mg) by mouth 4 times daily as needed for muscle spasms 20 tablet 0     Multiple Vitamins-Minerals (MULTIVITAMIN ADULT PO)        olopatadine (PATANOL) 0.1 % ophthalmic solution Place 1 drop into both eyes 2 times daily 5 mL 12     Omega-3 Fatty Acids (FISH OIL) 500 MG CAPS Take by mouth daily        omeprazole (PRILOSEC) 40 MG DR capsule Take 1 capsule (40 mg) by mouth daily 90 capsule 0     ondansetron (ZOFRAN) 4 MG tablet Take 1 tablet (4 mg) by mouth every 8 hours as needed for nausea 10 tablet 0     ORDER FOR DME Provent nasal EPAP  Use over nostrils nightly.    30 each 0     oxyCODONE (ROXICODONE) 5 MG tablet Take 1 tablet (5 mg) by mouth every 6 hours as needed for pain 10 tablet 0     oxyCODONE (ROXICODONE) 5 MG tablet Take 1 tablet (5 mg) by mouth every 4 hours as needed for moderate to severe pain 12 tablet 0     prednisoLONE acetate (PRED FORTE) 1 % ophthalmic suspension Use  One drop two times daily both eyes in the spring for 7-10 days 5 mL 0     VITAMIN D, CHOLECALCIFEROL, PO Take by mouth daily       vitamin D3 (CHOLECALCIFEROL) 50 mcg (2000 units) tablet Take 1 tablet (50 mcg) by mouth daily 90 tablet 3       ALLERGIES:  allergic to seasonal allergies.    SOCIAL HISTORY:    to  Robyn. Daughter Ivy  Sister lives in [place]  Worked in the past as a personal care assistant  Never smoker  Occasional alcohol.     FAMILY HISTORY:   family history includes Cancer in her father; Diabetes in her mother; Heart Disease in her father; Hypertension in her mother; Thyroid Disease in her mother.   Paternal aunt: breast cancer diagnosed at an old age  Father: stomach cancer    REVIEW OF SYMPTOMS:  A full 14-point review of systems was performed. See HPI for details    Menarche age 14  MARYA/BSO in  for endometriosis  No h/o HRT  No h/o OCP    PHYSICAL EXAMINATION:    BP (!) 142/77   Pulse 81   Wt 105.7 kg (233 lb 1.6 oz)   SpO2 97%   BMI 41.29 kg/m     Gen: appears well, NAD  HEENT: unremarkable  Neck: supple  Axilla: no palpable axillary adenopathy  Breasts: Right breast does not have palpable dominant mass; no suspicious skin changes or nipple discharge. Left breast is surgically absent with healing incision and no signs of infection. Expander in place.   Extremity: Decreased range of motion in left shoulder (barely able to abduct past horizontal).  CV: well perfused  Resp: breathing comfortably on room air    IMAGING:      ASSESSMENT AND PLAN: In summary, Ms. Ferrer is a 65 yo female with an invasive lobular carcinoma of the left breast,  status post mastectomy, attempted SLN biopsy and immediate reconstruction. Her initial workup revealed 2 enhancing masses as well as non-mass enhancement with both lesions being grade 1 with low Mammaprint score. The final pathology showed this to be one contiguous area of 5.5 cm, hence T3 disease. There appeared to be variable morphology with predominantly invasive looular but also foci of ductal carcinoma. It was signed off as [place] grade 3. Other notable features include negative margin of at least 2.5 mm, and lack of LVSI.    Because of the upgrade from grade 1 to grade 3 in final pathology, Dr. Cisneros is contemplating sending Oncotype Dx for the more aggressive histology to help determine the benefit of adjuvant chemotherapy. I clarified the purpose of Oncotype Dx with Ms. Ferrer and her . Dr. Cisneros had extensive conversation with them, but they seem to be a bit overwhelmed and worried about the prospect of the chemotherapy.    We then discussed the indications of adjuvant radiation therapy following a mastectomy. I explained that the strongest indication is the involvement of lymph nodes. We occasionally also recommend radiation for node negative large tumors. Ms. Ferrer has a 5.5 cm tumor with clear margin and no LVSI. I explained that having a T3 alone is not a strong indication for adjuvant radiation therapy. The challenge, however, is the status of her nodes. Her SLN did not map and technically, her stage was pT3NX, rather than pT3N0. I reviewed her imaging studies. Her MRI was of good quality and did not reveal suspicious nodes in the axilla or internal mammary chain. One removed non-sentinel lymph node was benign. As such, she is likely truly node negative. Combined with lack of risk factors (negative LVSI, ER+/NH+/HER2-, - LVSI), I don't feel strongly about postmastectomy radiation therapy.    She has undergone immediate reconstruction with a tissue expander placed, and ultimately will  proceed with autologous tissue reconstruction. She has left sided disease. In reviewing her previous chest CT done for other purposes, her radiation plan could be challenging due to the presence of tissue expander and her body habitus. Radiation can result in potential cardiac toxicity and problem with reconstruction both from a surgical complication and cosmetic standpoint.     Overall, the risk benefit ratio does not favor treatment.  However, I will discuss her case at the next breast tumor conference to seek group's opinion regarding adjuvant radiation therapy.     Ms. Ferrer and her  voiced an understanding of the plan.     Thank you for allowing us to participate in this patient's care.  Please feel free to call with any questions or concerns.       Miriam Garcia M.D./Ph.D.  Radiation Oncologist   Department of Radiation Oncology  [facility]  Phone: [phone]       I reviewed patient's chart, internal/external medical records, imaging studies (including actual images), labs and pathology reports.  I interviewed and counseled the patient face to face.  I additionally discussed the case with patient's referring physicians and care team (Dr. Cisneros and Dr. Mendiola).      100 minutes were spent on the date of the encounter doing chart review, history and exam, documentation and further activities as noted above.       Miriam Garcia MD        HPI  INITIAL PATIENT ASSESSMENT    Diagnosis: Breast cancer, Lt partial mastectomy 3/23/22    Prior radiation therapy: None    Prior chemotherapy: None    Prior hormonal therapy:Yes: started letrozole 6 days ago    Pain Eval:  Current history of pain associated with this visit:   Intensity: 5/10  Current: sharp, burning and pins and needles  Location: Lt breast under the arm. Bilateral legs after surgery  Treatment: Will start some gabapentin for the leg pain. Also has oxycodone.    Psychosocial  Living arrangements:   Fall Risk:  independent   referral needs: Not needed    Advanced Directive: No  Implantable Cardiac Device? No    Onset of menarche: @ age 12  LMP: No LMP recorded. Patient has had a hysterectomy.  Onset of menopause: @ age 46-47  Abnormal vaginal bleeding/discharge: No  Are you pregnant? No  Reproductive note: 1 child    Nurse face-to-face time: Level 4:  15 min face to face time    Review of Systems   Constitutional: Positive for malaise/fatigue (little tired r/t medication). Negative for chills, diaphoresis, fever and weight loss.   HENT: Negative for ear pain, nosebleeds and sore throat.    Eyes: Negative for blurred vision, double vision and pain.   Respiratory: Negative for cough and shortness of breath.    Cardiovascular: Positive for leg swelling (Bilateral legs after surgery). Negative for chest pain.   Gastrointestinal: Negative for blood in stool, constipation, diarrhea, nausea and vomiting.   Genitourinary: Negative for dysuria, frequency, hematuria and urgency.   Musculoskeletal: Negative for back pain, falls, joint pain and neck pain.   Skin: Positive for rash (Lt breast, has hydrocortisone cream).   Neurological: Positive for tingling (Bilateral legs post surgery). Negative for dizziness, seizures and headaches.   Endo/Heme/Allergies: Does not bruise/bleed easily.   Psychiatric/Behavioral: Negative for depression. The patient has insomnia (R/T pain). The patient is not nervous/anxious.      Pt denied need for  for this appointment.        Again, thank you for allowing me to participate in the care of your patient.        Sincerely,        Miriam Garcia MD

## 2022-04-14 NOTE — PROGRESS NOTES
HPI  INITIAL PATIENT ASSESSMENT    Diagnosis: Breast cancer, Lt partial mastectomy 3/23/22    Prior radiation therapy: None    Prior chemotherapy: None    Prior hormonal therapy:Yes: started letrozole 6 days ago    Pain Eval:  Current history of pain associated with this visit:   Intensity: 5/10  Current: sharp, burning and pins and needles  Location: Lt breast under the arm. Bilateral legs after surgery  Treatment: Will start some gabapentin for the leg pain. Also has oxycodone.    Psychosocial  Living arrangements:   Fall Risk: independent   referral needs: Not needed    Advanced Directive: No  Implantable Cardiac Device? No    Onset of menarche: @ age 12  LMP: No LMP recorded. Patient has had a hysterectomy.  Onset of menopause: @ age 46-47  Abnormal vaginal bleeding/discharge: No  Are you pregnant? No  Reproductive note: 1 child    Nurse face-to-face time: Level 4:  15 min face to face time    Review of Systems   Constitutional: Positive for malaise/fatigue (little tired r/t medication). Negative for chills, diaphoresis, fever and weight loss.   HENT: Negative for ear pain, nosebleeds and sore throat.    Eyes: Negative for blurred vision, double vision and pain.   Respiratory: Negative for cough and shortness of breath.    Cardiovascular: Positive for leg swelling (Bilateral legs after surgery). Negative for chest pain.   Gastrointestinal: Negative for blood in stool, constipation, diarrhea, nausea and vomiting.   Genitourinary: Negative for dysuria, frequency, hematuria and urgency.   Musculoskeletal: Negative for back pain, falls, joint pain and neck pain.   Skin: Positive for rash (Lt breast, has hydrocortisone cream).   Neurological: Positive for tingling (Bilateral legs post surgery). Negative for dizziness, seizures and headaches.   Endo/Heme/Allergies: Does not bruise/bleed easily.   Psychiatric/Behavioral: Negative for depression. The patient has insomnia (R/T pain). The patient  is not nervous/anxious.      Pt denied need for  for this appointment.

## 2022-04-19 ENCOUNTER — PATIENT OUTREACH (OUTPATIENT)
Dept: ONCOLOGY | Facility: CLINIC | Age: 67
End: 2022-04-19
Payer: COMMERCIAL

## 2022-04-19 DIAGNOSIS — Z98.890 S/P BREAST RECONSTRUCTION, LEFT: Primary | ICD-10-CM

## 2022-04-19 RX ORDER — OXYCODONE HYDROCHLORIDE 5 MG/1
5 TABLET ORAL EVERY 6 HOURS PRN
Qty: 12 TABLET | Refills: 0 | Status: SHIPPED | OUTPATIENT
Start: 2022-04-19 | End: 2022-04-22

## 2022-04-19 NOTE — PROGRESS NOTES
RN CARE COORDINATION        Incoming Call:   Received call from Zelda re: her mother, Sammie. Sammie had mastectomy with breast expander on 3/23 with Dr. Mendiola and Dr. Feliciano. Zelda states her mother continues to suffer from left breast pain and BLLE nerve pain. She states her mother is in need of an Oxycodone refill and Zofran refill, since the Oxycodone makes her nauseated.     Zelda stated Dr. Feliciano declined to fill her expander last week during their visit d/t continued pain and rash on chest wall.     Sammie is otherwise doing well. Good PO intake. Ambulating and performing ADLs.     Message sent to Lorna Ludwig RNCC with Plastics re:refills request. Zelda requested that refills be sent to [facility] in [place].          Kitty Cisneros, ISSAN, RN  RN Care Coordinator  [facility]

## 2022-04-19 NOTE — TELEPHONE ENCOUNTER
Spoke with pt's daughter Zelda about ongoing pain concerns. Pt was given Rx got gabapentin last week at visit, this is being used but not making a huge difference in the BLE pain.     Pt continues to have breast pain and rash. She is not taking optimized OTC pain medications. Discussed adding this in, detailed dosing and how to keep track.    Pt is using about 1 oxycodone per day, mostly at night when pain is the worst    Offered and recommended an appointment with Dr Feliciano tomorrow, she declined at this point, they have too many appointments.    Did discuss that using narcotics this far from surgery is unusual and warrants a follow up exam.    Zelda asks that Dr Feliciano be consulted at this point, to advise if refill can be given of oxycodone until pt is seen for a visit with us next week.     Pt was last given a refill of oxycodone 4/11/22 for #10 pills by Dr Mendiola.

## 2022-04-20 LAB
SCANNED LAB RESULT: NORMAL
SCANNED LAB RESULT: NORMAL

## 2022-04-20 NOTE — PROGRESS NOTES
RECORDS STATUS - BREAST    RECORDS REQUESTED FROM: [facility]   DATE REQUESTED: 4/28/2022   NOTES DETAILS STATUS   OFFICE NOTE from referring provider     OFFICE NOTE from medical oncologist Complete 4/11/2022 Post Op Pain    OFFICE NOTE from surgeon Complete See Breast Biopsy in [facility]   OFFICE NOTE from radiation oncologist     DISCHARGE SUMMARY from hospital Complete 3/23/2022 Breast Reconstruction   DISCHARGE REPORT from the      OPERATIVE REPORT Complete See Breast Biopsy in [facility]   MEDICATION LIST Complete [facility]   CLINICAL TRIAL TREATMENTS TO DATE     LABS     REQUEST BLOCKS FOR ALL BREAST CANCER PTS     PATHOLOGY REPORTS  (Tissue diagnosis, Stage, ER/KS percentage positive and intensity of staining, HER2 IHC, FISH, and all biopsies from breast and any distant metastasis)                 Complete 3/23/2022   B. LEFT breast, skin-sparing mastectomy:  -Invasive carcinoma is estrogen receptor positive, progesterone receptor positive and HER2 negative by immunohistochemistry (performed on this specimen, see breast biomarker reporting template below)   GENONOMIC TESTING     TYPE:   (Next Generation Sequencing, including Foundation One testing, and Oncotype score)     IMAGING (NEED IMAGES & REPORT)     CT SCANS     MRI Complete MRI Breast 1/25/2022    MAMMO Complete 1/3/2022   ULTRASOUND Complete US Breast 1/3/2022   PET     BONE SCAN     BRAIN MRI

## 2022-04-26 ENCOUNTER — OFFICE VISIT (OUTPATIENT)
Dept: PLASTIC SURGERY | Facility: CLINIC | Age: 67
End: 2022-04-26
Attending: PLASTIC SURGERY
Payer: COMMERCIAL

## 2022-04-26 DIAGNOSIS — R21 RASH: ICD-10-CM

## 2022-04-26 DIAGNOSIS — Z98.890 S/P BREAST RECONSTRUCTION, LEFT: Primary | ICD-10-CM

## 2022-04-26 DIAGNOSIS — Z98.890 S/P BREAST RECONSTRUCTION, LEFT: ICD-10-CM

## 2022-04-26 NOTE — TELEPHONE ENCOUNTER
hydrOXYzine (ATARAX) 25 MG tablet      Last Written Prescription Date:  4-8-2022  Last Fill Quantity: 20,   # refills: 0  Last Office Visit : 4- TODAY  Future Office visit:  NONE    Routing refill request to provider for review/approval because:  Not on protocol.        Kathleen M Doege RN

## 2022-04-26 NOTE — LETTER
4/26/2022         RE: Ramona Ferrer  [address]  [place] [address]        Dear Colleague,    Thank you for referring your patient, Ramona Ferrer, to the [facility] BREAST [facility]. Please see a copy of my visit note below.    Pt. comes into clinic today at the request of Dr. Grover Feliciano.     This service provided today was under the supervising provider of the day Dr. Feliciano, who was available if needed.     Reason for visit: Breast expansion.     Under sterile conditions 350 ml air was removed from left expander and 500 ml of saline was placed in the left expander.     Total: ; Left: 500 mL of saline was injected in expander. She tolerated the procedure well.  Dr Feliciano saw pt for left breast incision that is inverted and area is moist. He updated that it was alright to expand today and provide pt with gauze to use in order to keep area dry. We will see her back next week.  Pt has appt with Dr Cisneros 5/10 to determine if chemo or radiation is needed. Pt will update us after appt so surgery can be scheduled if no further treatment is needed.   Pt continues to have bilateral leg muscle burning post op that keeps her up at night. Dr Feliciano said it was ok to increase dose as needed for pain and Neurology referral was placed to evaluate further.      Rell Dominguez, RN BSN      Again, thank you for allowing me to participate in the care of your patient.      Sincerely,    [facility] RN

## 2022-04-26 NOTE — PROGRESS NOTES
Has appt with new Endocrinologist in [place], referred by GYN    See my nurse to recheck BP in 1 mo on new medication    ==============================================  FOR HIGH BLOOD PRESSURE (HYPERTENSION)-------------    Take your medication every day     Try to stop these things that can elevate blood pressure: salt, caffeine, energy drinks, diet pills, sudafed, alcohol , taking NSAIDS daily (advil, alleve, ibuprofen, naproxen) and birth control    DECREASE ALCOHOL CONSUMPTION    DECREASE SALT (fast foods, frozen, canned, processed foods, ham, turkey, fried foods, chips, crackers, etc) & drink 8 glasses of water a day with minimal caffeine ( 1 cup a day)   ==============================================      FOR PRE-DIABETES, YOUR #1 MEDICATION IS EXERCISE --  ===============================================    Try to walk for a continuous 20 minutes every day - in your house or outside (huffing & puffing burns calories & strengthens your heart).     Be aware of everything you eat. Read labels.  Try writing it down so you can see where sugars & carbohydrates are creeping into your foods (drinks other than water, salad dressing, snacks)    Remember, all white bread, white flour (used in baking)  white pasta, white rice, white potatoes, chips, crackers, cookies, sweets, sodas (even Gatorade, Powerade,Koolaid) , sugary coffee & tea,  desserts ----TURN INTO SUGAR.   =============    Continue medications    Follow up with GYN    ==============================  RECOMMENDATIONS FOR FEMALES  ==============================  Your #1 MEDICINE is DAILY EXERCISE - 15-20 minutes of huffing & puffing EVERY DAY.     Prevent the #1 cause of death- cardiovascular disease (HEART ATTACK & STROKE) by checking for normal blood pressure, cholesterol, sugars, & by not smoking.     VACCINES: Yearly FLU shot, PNEUMONIA shot after 65,  SHINGLES shot after 50    Screening colonoscopy at AGE  50 & every 10 years to check for COLON CANCER,   Pt. comes into clinic today at the request of Dr. Grover Feliciano.     This service provided today was under the supervising provider of the day Dr. Feliciano, who was available if needed.     Reason for visit: Breast expansion.     Under sterile conditions 350 ml air was removed from left expander and 500 ml of saline was placed in the left expander.     Total: ; Left: 500 mL of saline was injected in expander. She tolerated the procedure well.  Dr Feliciano saw pt for left breast incision that is inverted and area is moist. He updated that it was alright to expand today and provide pt with gauze to use in order to keep area dry. We will see her back next week.  Pt has appt with Dr Cisneros 5/10 to determine if chemo or radiation is needed. Pt will update us after appt so surgery can be scheduled if no further treatment is needed.   Pt continues to have bilateral leg muscle burning post op that keeps her up at night. Dr Feliciano said it was ok to increase dose as needed for pain and Neurology referral was placed to evaluate further.      Rell Dominguez, RN BSN   one of the most common & preventable cancers (Or FIT kit yearly) Repeat in 3 years if POLYP found     I recommend  high fiber (5 fresh fruits or vegetables daily), low fat diet and aerobic  exercise (huffing/ puffing/ sweating for 20 min straight at least 4 days a week)    Follow up yearly with mammogram, fasting lipids, CMP, CBC prior.   ==============================================================

## 2022-04-27 ENCOUNTER — TELEPHONE (OUTPATIENT)
Dept: PALLIATIVE CARE | Facility: CLINIC | Age: 67
End: 2022-04-27
Payer: COMMERCIAL

## 2022-04-27 RX ORDER — HYDROXYZINE HYDROCHLORIDE 25 MG/1
25 TABLET, FILM COATED ORAL 3 TIMES DAILY PRN
Qty: 20 TABLET | Refills: 0 | OUTPATIENT
Start: 2022-04-27

## 2022-04-27 NOTE — TELEPHONE ENCOUNTER
Call placed to pt prior to scheduled visit with Dr. Faustin. Pt asked to have visit cancelled and will call back to schedule in the future if needed.    ISSA AraujoN, RN  Palliative Care Nurse Clinician    [phone] (Direct)  [phone] (Refills)  [phone] (Appointment Scheduling)

## 2022-04-28 ENCOUNTER — PRE VISIT (OUTPATIENT)
Dept: ONCOLOGY | Facility: CLINIC | Age: 67
End: 2022-04-28

## 2022-04-28 ENCOUNTER — PATIENT OUTREACH (OUTPATIENT)
Dept: PLASTIC SURGERY | Facility: CLINIC | Age: 67
End: 2022-04-28
Payer: COMMERCIAL

## 2022-04-28 DIAGNOSIS — Z98.890 S/P BREAST RECONSTRUCTION, LEFT: Primary | ICD-10-CM

## 2022-04-28 DIAGNOSIS — Z98.890 S/P BREAST RECONSTRUCTION, LEFT: ICD-10-CM

## 2022-04-28 DIAGNOSIS — C50.412 MALIGNANT NEOPLASM OF UPPER-OUTER QUADRANT OF LEFT BREAST IN FEMALE, ESTROGEN RECEPTOR POSITIVE (H): Primary | ICD-10-CM

## 2022-04-28 DIAGNOSIS — Z17.0 MALIGNANT NEOPLASM OF UPPER-OUTER QUADRANT OF LEFT BREAST IN FEMALE, ESTROGEN RECEPTOR POSITIVE (H): Primary | ICD-10-CM

## 2022-04-28 RX ORDER — TRAMADOL HYDROCHLORIDE 50 MG/1
50 TABLET ORAL EVERY 6 HOURS PRN
Qty: 15 TABLET | Refills: 0 | Status: SHIPPED | OUTPATIENT
Start: 2022-04-28 | End: 2022-05-05

## 2022-04-28 NOTE — TELEPHONE ENCOUNTER
Zelda informed that Dr Feliciano prescribed Tramadol, and no further pain meds. Referral entered for pain clinic, scheduling number provided.

## 2022-04-28 NOTE — TELEPHONE ENCOUNTER
Pt's daughter Zelda called, asking for refill of oxycodone for her mom.     Pt spoke with Rell TYLER RN yesterday about this.     Pt is waiting to see neurology, amended the referral to make it priority as pt is having uncontrolled pain in her legs after surgery.   Discussed with Zelda she should call Neurology to get something scheduled ASAP.    In the meantime, they are asking for a refill of oxycodone for pt to take once daily at bedtime as she has been. She is using the increased dose of gabapentin discussed at her nurse visit this week.    Did discuss addiction potential with Zelda and concerns with extended narcotic use.    Last refills of oxycodone were:    4/19/22 #12 Dr Feliciano  4/11/22 #10 Dr Marlena Taveras in [place] as entered if needed.

## 2022-04-30 LAB
PATH REPORT.ADDENDUM SPEC: NORMAL
PATH REPORT.ADDENDUM SPEC: NORMAL
PATH REPORT.COMMENTS IMP SPEC: NORMAL
PATH REPORT.FINAL DX SPEC: NORMAL
PATH REPORT.GROSS SPEC: NORMAL
PATH REPORT.MICROSCOPIC SPEC OTHER STN: NORMAL
PATH REPORT.RELEVANT HX SPEC: NORMAL
PATHOLOGY SYNOPTIC REPORT: NORMAL
PHOTO IMAGE: NORMAL

## 2022-05-03 LAB — SPECIMEN STATUS: NORMAL

## 2022-05-09 ENCOUNTER — HOSPITAL ENCOUNTER (OUTPATIENT)
Dept: CT IMAGING | Facility: CLINIC | Age: 67
Discharge: HOME | End: 2022-05-09
Attending: INTERNAL MEDICINE
Payer: COMMERCIAL

## 2022-05-09 ENCOUNTER — HOSPITAL ENCOUNTER (OUTPATIENT)
Dept: GENERAL RADIOLOGY | Facility: CLINIC | Age: 67
Discharge: HOME | End: 2022-05-09
Attending: INTERNAL MEDICINE
Payer: COMMERCIAL

## 2022-05-09 ENCOUNTER — LAB (OUTPATIENT)
Dept: INFUSION THERAPY | Facility: CLINIC | Age: 67
End: 2022-05-09
Attending: INTERNAL MEDICINE
Payer: COMMERCIAL

## 2022-05-09 DIAGNOSIS — M89.8X5 PAIN OF LEFT FEMUR: ICD-10-CM

## 2022-05-09 DIAGNOSIS — Z17.0 MALIGNANT NEOPLASM OF UPPER-OUTER QUADRANT OF LEFT BREAST IN FEMALE, ESTROGEN RECEPTOR POSITIVE (H): ICD-10-CM

## 2022-05-09 DIAGNOSIS — M54.42 ACUTE BILATERAL LOW BACK PAIN WITH LEFT-SIDED SCIATICA: ICD-10-CM

## 2022-05-09 DIAGNOSIS — C50.412 MALIGNANT NEOPLASM OF UPPER-OUTER QUADRANT OF LEFT BREAST IN FEMALE, ESTROGEN RECEPTOR POSITIVE (H): ICD-10-CM

## 2022-05-09 LAB
ALBUMIN SERPL-MCNC: 3.2 G/DL (ref 3.4–5)
ALP SERPL-CCNC: 68 U/L (ref 40–150)
ALT SERPL W P-5'-P-CCNC: 45 U/L (ref 0–50)
ANION GAP SERPL CALCULATED.3IONS-SCNC: 2 MMOL/L (ref 3–14)
AST SERPL W P-5'-P-CCNC: 27 U/L (ref 0–45)
BASOPHILS # BLD AUTO: 0.1 10E3/UL (ref 0–0.2)
BASOPHILS NFR BLD AUTO: 1 %
BILIRUB SERPL-MCNC: 0.4 MG/DL (ref 0.2–1.3)
BUN SERPL-MCNC: 11 MG/DL (ref 7–30)
CALCIUM SERPL-MCNC: 9.5 MG/DL (ref 8.5–10.1)
CHLORIDE BLD-SCNC: 107 MMOL/L (ref 94–109)
CO2 SERPL-SCNC: 28 MMOL/L (ref 20–32)
CREAT SERPL-MCNC: 0.73 MG/DL (ref 0.52–1.04)
EOSINOPHIL # BLD AUTO: 0.2 10E3/UL (ref 0–0.7)
EOSINOPHIL NFR BLD AUTO: 3 %
ERYTHROCYTE [DISTWIDTH] IN BLOOD BY AUTOMATED COUNT: 12.6 % (ref 10–15)
GFR SERPL CREATININE-BSD FRML MDRD: >90 ML/MIN/1.73M2
GLUCOSE BLD-MCNC: 118 MG/DL (ref 70–99)
HCT VFR BLD AUTO: 39.4 % (ref 35–47)
HGB BLD-MCNC: 12.4 G/DL (ref 11.7–15.7)
IMM GRANULOCYTES # BLD: 0 10E3/UL
IMM GRANULOCYTES NFR BLD: 0 %
LYMPHOCYTES # BLD AUTO: 2.2 10E3/UL (ref 0.8–5.3)
LYMPHOCYTES NFR BLD AUTO: 30 %
MCH RBC QN AUTO: 29.7 PG (ref 26.5–33)
MCHC RBC AUTO-ENTMCNC: 31.5 G/DL (ref 31.5–36.5)
MCV RBC AUTO: 94 FL (ref 78–100)
MONOCYTES # BLD AUTO: 0.9 10E3/UL (ref 0–1.3)
MONOCYTES NFR BLD AUTO: 12 %
NEUTROPHILS # BLD AUTO: 3.8 10E3/UL (ref 1.6–8.3)
NEUTROPHILS NFR BLD AUTO: 54 %
NRBC # BLD AUTO: 0 10E3/UL
NRBC BLD AUTO-RTO: 0 /100
PLATELET # BLD AUTO: 288 10E3/UL (ref 150–450)
POTASSIUM BLD-SCNC: 4 MMOL/L (ref 3.4–5.3)
PROT SERPL-MCNC: 7.2 G/DL (ref 6.8–8.8)
RBC # BLD AUTO: 4.18 10E6/UL (ref 3.8–5.2)
SODIUM SERPL-SCNC: 137 MMOL/L (ref 133–144)
WBC # BLD AUTO: 7.1 10E3/UL (ref 4–11)

## 2022-05-09 PROCEDURE — 72131 CT LUMBAR SPINE W/O DYE: CPT

## 2022-05-09 PROCEDURE — 73552 X-RAY EXAM OF FEMUR 2/>: CPT | Mod: LT

## 2022-05-09 PROCEDURE — 80053 COMPREHEN METABOLIC PANEL: CPT | Performed by: INTERNAL MEDICINE

## 2022-05-09 PROCEDURE — 82040 ASSAY OF SERUM ALBUMIN: CPT | Performed by: INTERNAL MEDICINE

## 2022-05-09 PROCEDURE — 85025 COMPLETE CBC W/AUTO DIFF WBC: CPT | Performed by: INTERNAL MEDICINE

## 2022-05-09 PROCEDURE — 36415 COLL VENOUS BLD VENIPUNCTURE: CPT

## 2022-05-09 NOTE — PROGRESS NOTES
Dada Mendiola MD  [facility]  [address] [place] [address], [facility] [address]  [place] [address]     RE:  Ramona Ferrer  MRN:  [id number]  :  1955     Dear Dr. Mendiola:     I would like to refer to you Sammie Ferrer, a 66-year-old woman with a recent diagnosis of a stage IIB, T3 N0 MX, invasive lobular carcinoma of the left breast.  She self-palpated a lump in the upper outer quadrant of the left breast.  She underwent evaluation with a diagnostic mammogram and ultrasound, which was BI-RADS 4, showing in the upper outer quadrant of the left breast irregularly shaped hypoechoic mass at the 12 o'clock position 4 cm from the nipple on the left.  This mass measured 2.3 x 2.2 x 1.5 cm and accounted for the patient's area of palpable concern.  Additionally, at the 1 o'clock position 2 cm from the nipple in the left breast, there is a second irregularly shaped hypoechoic mass with indistinct margins measuring 1.3 x 1.1 x 0.8 cm.  She also underwent a breast MRI which showed in the right breast mild background parenchymal enhancement and no suspicious areas of enhancement or lymphadenopathy.  In the left breast there was mild background parenchymal enhancement, and at the 12 o'clock position 4 cm from the nipple there was a heterogeneously enhancing irregular mass measuring 3.4 in AP dimension x 2.8 cm ML and 2.8 cm SI corresponding to the known biopsy-proven malignancy in the left breast.  In the left breast at the 1 o'clock position 2 cm from the nipple there was also a heterogeneously enhancing oval mass measuring 1.3 cm in maximal diameter.  This likely corresponds to the second biopsy-proven malignancy.  Together, the full extent of the disease spans a total of 5.2 cm AP x 4.4 cm ML, BI-RADS 6.     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  In part B in the left breast at the 1 o'clock position 2 cm from the nipple, ultrasound-guided needle biopsy showed invasive lobular carcinoma, [place] grade 1/3 lobular carcinoma in situ was present, classic subtype, estrogen receptors positive in greater than 70% of the cells, progesterone receptors positive in greater than 90% of cells, and HER2 was nonamplified, Ki-67 20-25%.  She now comes to our clinic for recommendations.     Sammie reports that the breast mass had been there for approximately 1 month before she was seen for evaluation..       She has worked in the past as a personal care assistant and lives in the [facility].       PAST MEDICAL HISTORY:  There is no history of breast cancer in the past.  No history of breast cancer surgery.  She has no history of radiation therapy in the past or radiation exposure.  No history of prior tumor of any kind.  She denies heart problems, heart attack, breathing problems, blood clots, seizures, peptic ulcer disease, osteoporosis or bone fractures.  She does report a history of arthritis.     FAMILY HISTORY:  Positive for breast cancer in a paternal aunt who was diagnosed with breast cancer at an old age.     Her father has a history of stomach cancer.  There is no family history of pancreatic cancer, melanoma, lung cancer or ovarian cancer.     No history of male breast cancer.     ALLERGIES:  No history of drug allergies.  She denies allergies to seafood, iodine, or contrast dye.     PAST MENSTRUAL HISTORY:  She has been pregnant 3 times with 2 live births at age 29 and 31 and 1 miscarriage.  Age at first menstrual period was 14.  She did have a total abdominal hysterectomy and bilateral salpingo-oophorectomy performed in 2003 for endometriosis.  She has no history of hormone replacement therapy.  No  history of oral contraceptives.     HABITS: She denies tobacco history.  She denies alcohol history and drinks alcohol only occasionally.     PAST MEDICAL HISTORY:  Past medical history is also remarkable for type 2 diabetes, and she was begun on metformin 2 years ago.  She also has a history of varicose veins and a history of a lipoma resected from her left leg.      Chronic Hepatitis B.  Hepatitis C negative.  HIV negative.       Prior COVID vaccination x 3.      GERM LINE GENETICS: INVITAE testing of 47 genes was negative.     TREATMENT HISTORY:  A.  MammaPrint showed low risk.  B.  Left mastectomy and axillary lymph node sampling.   A. Lymph node, LEFT axillary, excision:  -One benign lymph node (0/1)  B. LEFT breast, skin-sparing mastectomy:  -INVASIVE BREAST CARCINOMA, MIXED INVASIVE LOBULAR CARCINOMA (predominant component) and INVASIVE DUCTAL CARCINOMA (minor component), KALPESH GRADE 3, size 5.5 cm, 12:00, upper outer quadrant and lower outer quadrant  -Ductal carcinoma in situ (DCIS), nuclear grade 2, cribriform and solid type(s), with focal necrosis  -DCIS is admixed with invasive carcinoma, and comprises a minor component (<5%) of the tumor volume   -Lobular carcinoma in situ (LCIS), predominantly classic type (admixed with invasive carcinoma and beyond invasive carcinoma) and focal pleomorphic type (size 2 mm, adjacent to invasive carcinoma)  -Margins are uninvolved by invasive carcinoma  -Invasive carcinoma is 2.5 mm from the nearest (anterior) margin, and is > 5 mm from the posterior margin  -Margins are uninvolved by DCIS and pleomorphic LCIS  -DCIS and pleomorphic LCIS are > 5 mm from the nearest (anterior) margin  -Benign nipple and skin   -Other findings: fibrocystic change (including microcysts with apocrine metaplasia) and columnar cell change  -Calcifications associated with DCIS, LCIS, and benign ducts and acini  -Prior core biopsy site changes (two biopsy sites identified)  -Invasive  carcinoma is estrogen receptor positive (>70%, strong intensity) and progesterone receptor positive (>90%, strong intensity) by immunohistochemistry and is HER2 non-amplified (group 5) by FISH (performed on prior core biopsies, see report [id number], specimens A and B)  DCIS and LCIS present.  -Margins are uninvolved by invasive carcinoma or LCIS.   -Invasive carcinoma is estrogen receptor positive (>70%, strong intensity) and progesterone receptor positive (>90%, strong intensity) by immunohistochemistry and is HER2 non-amplified (group 5) by FISH (performed on prior core biopsies, see report [id number], specimens A and B)  Ki-67 immunohistochemistry (MIB-1, pharmDx, [employer]) from [facility] was performed on invasive carcinoma in the LEFT mastectomy specimen   They report Ki-67 proliferative index of 20-25% (block B5) and 20% (block B27).   C.  Staging:  pT3 Nx because lymph node is not the sentinel node.   C.  Letrozole begun 4-5-22.     INTERVAL HISTORY  Ramona returns to clinic today for discussion of the MammaPrint.  Sammie has pain in her low back and in both femurs left greater than right.  She said that there was a period where she had shooting pains in the left femur but this has resolved. She complains left leg pain in the femur area.  There is no weakness or numbness or tingling in the left leg.  She does have minor discomfort in the left breast.    She she denies fatigue, depression, anxiety.    She has been taking letrozole for 1 month and is tolerated it quite well.  She has had no joint discomfort or hot flashes.  She reports that she had a saline fill of her expander recently.  She is awaiting a recommendation from our tumor board as to whether radiation would be indicated or not.  She is not enthusiastic about radiation.  The only indication is large size of the primary tumor.  I discussed with her that we will get back to her after tumor conference.  I went over the pathology results with her and  "discussed that the lymph nodes were negative and that margins were clear.      REVIEW OF SYSTEMS:  The remainder of a 10-point review of systems is negative.     PHYSICAL EXAMINATION:    /81   Pulse 88   Temp 97.9  F (36.6  C)   Resp 16   Ht 1.6 m (5' 2.99\")   Wt 106 kg (233 lb 11.2 oz)   SpO2 97%   BMI 41.41 kg/m    GENERAL:  Ramona appeared to be in moderate discomfort.  There is no alopecia.  HEENT:  No lesions in the oropharynx.  LYMPH:  There is no palpable cervical, supraclavicular, subclavicular or axillary lymphadenopathy.  BREASTS:  Examination of the right breast is without masses.  Examination of the left breast reveals an implant in place. Well-healed incision without erythema or masses.  There is no erythema of the left breast. The wound is healing, with slightly delayed healing more laterally.  The wound is closed with no drainage or incisional erythema. There are no skin changes or areas that are suspicious for infection.  There is no palpable seroma.   LUNGS:  Clear to percussion and auscultation.  HEART:  There is a regular rate and rhythm.  S1, S2.  ABDOMEN:  Soft, nontender, consistent with increased body mass index.  EXTREMITIES:  Without edema.  PSYCH: Mood was anxious.  Affect was appropriate.     LABORATORY DATA:    CBC, CMP normal.      ASSESSMENT AND PLAN:  1.  Sammie Ferrer is a 66-year-old woman with a recent diagnosis of a stage IIB, T3 N0 MX, invasive lobular carcinoma of the upper outer quadrant of the left breast which is multifocal grade 1, ER positive in greater than 70% of cells, TX positive in greater than 90% of cells and HER2 nonamplified.  She now comes to clinic with her daughter, Ivy, for recommendations regarding evaluation and treatment.  Her daughter served as a [place]  by phone.  The [place]  on the iPAD was not involved but was there as a backup if Sammie needed it.   2.  MammaPrint before surgery came back as low risk as did the " Oncotype after surgery. The Oncotype of the pleomorphic lobular cancer showed a value of 15, which is less than the threshold for chemotherapy from TAILORx which is 26 for a post-menopausal woman.   3.  Sammie Ferrer underwent a left mastectomy and has an expander in place.  She started adjuvant hormonal therapy with letrozole a month ago and has tolerated it well.   4. Pathology showed -Invasive carcinoma is estrogen receptor positive, progesterone receptor positive and HER2 negative by immunohistochemistry.  Margins negative. pT3 Nx because the lymph node is not sentinel.    5.  Discussion of radiation. She discussed that she does not want radiation unless it is very strongly recommended. We will discuss this in clinic conference on Friday.  Her tumor is node negative but the lymph node was not sentinel and she has a large primary, greater than 5 cm in size.   6.  I discussed that the Ki-67 was joanne but because of node negativity I would not recommend the MonarchE approach, but would discuss this in clinic conference on Friday.  Discussion of MonarchE criteria would be one to three nodes and either tumor size > 5 cm, histologic grade 3, or central Ki-67 > 20%.  She does not have kari involvement but the lymph node was not sentinel and I discussed that we will discuss these issues in breast conference.   7.  Pain in the left femur.  She reports continued pain more on the left than the right. Two view films of the femur were obtained as well as CT scan of the lumbosacral spine.  These studies revealed no evidence for metastases and show DJD of the spine.  I discussed that we cannot do an MRI because she has an expander in place.  All of her questions and all of her daughter's questions were answered.  8.   XR of Femur, 2 view. CT Lumbar spine.  These radiographic studies show DJD of the lumbar spine.  No evidence of malignancy.    9.  Followup.   Discussion in breast cancer conference on May 13.  Follow up with  me June 30 [facility].  Follow up with Dr. Feliciano as scheduled.  Referral to Dr. Burns in next 2 weeks.      Thank you for allowing us to continue to participate in Sammie Ferrer's care.     Sincerely,     Kenneth Cisneros M.D.   Professor   Division of Hematology, Oncology, Transplant   Department of Medicine   [employer] [facility] [employer]   [phone]     ADDENDUM:  Discussed in breast cancer conference on 5-13-22.  There was discussion that radiation could be considered.  We do not have a sentinel node, but the one LN taken was negative.  She does not meet criteria for MonarchE on the basis of the one node we have but it was not sentinel.  MRI was suggesting of N0 status but does not establish this.  Sammie does not want abemaciclib even if it were recommended and there were mixed thoughts on this because of concern related to the high Ki67 even with low risk Oncotype and low risk MammaPrint.  I recommend no abemaciclib because there is potential toxicity and she does not meet criteria for MonarchE treatment including lack of node positivity and also based on patient preference.        I spent 40 minutes with the patient more than 50% of which was in counseling and coordination of care.

## 2022-05-09 NOTE — PROGRESS NOTES
Nursing Note:  Ramona Ferrer presents today for Labs + PIV for CT.    Patient seen by provider today: No   present during visit today: Not Applicable.    Note: N/A.    Intravenous Access:  Labs drawn without difficulty.  Peripheral IV placed.    Discharge Plan:   Patient was sent to CT scan appointment.    Alden Lan RN

## 2022-05-10 ENCOUNTER — ONCOLOGY VISIT (OUTPATIENT)
Dept: ONCOLOGY | Facility: CLINIC | Age: 67
End: 2022-05-10
Attending: INTERNAL MEDICINE
Payer: COMMERCIAL

## 2022-05-10 VITALS
BODY MASS INDEX: 41.41 KG/M2 | OXYGEN SATURATION: 97 % | DIASTOLIC BLOOD PRESSURE: 81 MMHG | SYSTOLIC BLOOD PRESSURE: 134 MMHG | TEMPERATURE: 97.9 F | HEIGHT: 63 IN | RESPIRATION RATE: 16 BRPM | WEIGHT: 233.7 LBS | HEART RATE: 88 BPM

## 2022-05-10 DIAGNOSIS — Z17.0 MALIGNANT NEOPLASM OF UPPER-OUTER QUADRANT OF LEFT BREAST IN FEMALE, ESTROGEN RECEPTOR POSITIVE (H): ICD-10-CM

## 2022-05-10 DIAGNOSIS — C50.412 MALIGNANT NEOPLASM OF UPPER-OUTER QUADRANT OF LEFT BREAST IN FEMALE, ESTROGEN RECEPTOR POSITIVE (H): ICD-10-CM

## 2022-05-10 DIAGNOSIS — C50.412 MALIGNANT NEOPLASM OF UPPER-OUTER QUADRANT OF LEFT BREAST IN FEMALE, ESTROGEN RECEPTOR POSITIVE (H): Primary | ICD-10-CM

## 2022-05-10 DIAGNOSIS — Z17.0 MALIGNANT NEOPLASM OF UPPER-OUTER QUADRANT OF LEFT BREAST IN FEMALE, ESTROGEN RECEPTOR POSITIVE (H): Primary | ICD-10-CM

## 2022-05-10 PROCEDURE — 99215 OFFICE O/P EST HI 40 MIN: CPT | Performed by: INTERNAL MEDICINE

## 2022-05-10 PROCEDURE — G0463 HOSPITAL OUTPT CLINIC VISIT: HCPCS

## 2022-05-10 ASSESSMENT — PAIN SCALES - GENERAL: PAINLEVEL: SEVERE PAIN (6)

## 2022-05-10 NOTE — NURSING NOTE
"Oncology Rooming Note    May 10, 2022 5:09 PM   Ramona Ferrer is a 66 year old female who presents for:    Chief Complaint   Patient presents with     Oncology Clinic Visit     P RETURN - BREAST CANCER     Initial Vitals: /81   Pulse 88   Temp 97.9  F (36.6  C)   Resp 16   Ht 1.6 m (5' 2.99\")   Wt 106 kg (233 lb 11.2 oz)   SpO2 97%   BMI 41.41 kg/m   Estimated body mass index is 41.41 kg/m  as calculated from the following:    Height as of this encounter: 1.6 m (5' 2.99\").    Weight as of this encounter: 106 kg (233 lb 11.2 oz). Body surface area is 2.17 meters squared.  Severe Pain (6) Comment: Data Unavailable   No LMP recorded. Patient has had a hysterectomy.  Allergies reviewed: Yes  Medications reviewed: Yes    Medications: Medication refills not needed today.  Pharmacy name entered into [employer]:    [id number] - [place], [place] - [address]  [facility] [id number] - [place], [place] - [address]    Clinical concerns: No new concerns. Amelia was notified.      Dave Oates, MANUEL            "

## 2022-05-10 NOTE — LETTER
5/10/2022     RE: Ramona Ferrer  [address]  [facility] [address]    Dear Colleague,    Thank you for referring your patient, Ramona Ferrer, to the [facility]. Please see a copy of my visit note below.    Dada Mendiola MD  [facility]  [address]  [place] [address]     RE:  Ramona Ferrer  MRN:  [id number]  :  1955     Dear Dr. Mendiola:     I would like to refer to you Sammie Ferrer, a 66-year-old woman with a recent diagnosis of a stage IIB, T3 N0 MX, invasive lobular carcinoma of the left breast.  She self-palpated a lump in the upper outer quadrant of the left breast.  She underwent evaluation with a diagnostic mammogram and ultrasound, which was BI-RADS 4, showing in the upper outer quadrant of the left breast irregularly shaped hypoechoic mass at the 12 o'clock position 4 cm from the nipple on the left.  This mass measured 2.3 x 2.2 x 1.5 cm and accounted for the patient's area of palpable concern.  Additionally, at the 1 o'clock position 2 cm from the nipple in the left breast, there is a second irregularly shaped hypoechoic mass with indistinct margins measuring 1.3 x 1.1 x 0.8 cm.  She also underwent a breast MRI which showed in the right breast mild background parenchymal enhancement and no suspicious areas of enhancement or lymphadenopathy.  In the left breast there was mild background parenchymal enhancement, and at the 12 o'clock position 4 cm from the nipple there was a heterogeneously enhancing irregular mass measuring 3.4 in AP dimension x 2.8 cm ML and 2.8 cm SI corresponding to the known biopsy-proven malignancy in the left breast.  In the left breast at the 1 o'clock position 2 cm from the nipple there was also a heterogeneously enhancing oval mass measuring 1.3 cm in maximal diameter.  This likely corresponds to the second biopsy-proven malignancy.  Together, the full extent of the disease spans  a total of 5.2 cm AP x 4.4 cm ML, BI-RADS 6.     The pathology showed in the left breast 12 o'clock position 4 cm from the nipple, ultrasound-guided needle biopsy invasive lobular carcinoma, [place] grade 1/3 lobular carcinoma in situ, classic type, estrogen receptor positive in greater than 70% of the cells and progesterone receptor positive in greater than 90% of cells, and HER2 FISH was nonamplified.  In part B in the left breast at the 1 o'clock position 2 cm from the nipple, ultrasound-guided needle biopsy showed invasive lobular carcinoma, [place] grade 1/3 lobular carcinoma in situ was present, classic subtype, estrogen receptors positive in greater than 70% of the cells, progesterone receptors positive in greater than 90% of cells, and HER2 was nonamplified, Ki-67 20-25%.  She now comes to our clinic for recommendations.     Sammie reports that the breast mass had been there for approximately 1 month before she was seen for evaluation..       She has worked in the past as a personal care assistant and lives in the [facility].       PAST MEDICAL HISTORY:  There is no history of breast cancer in the past.  No history of breast cancer surgery.  She has no history of radiation therapy in the past or radiation exposure.  No history of prior tumor of any kind.  She denies heart problems, heart attack, breathing problems, blood clots, seizures, peptic ulcer disease, osteoporosis or bone fractures.  She does report a history of arthritis.     FAMILY HISTORY:  Positive for breast cancer in a paternal aunt who was diagnosed with breast cancer at an old age.     Her father has a history of stomach cancer.  There is no family history of pancreatic cancer, melanoma, lung cancer or ovarian cancer.     No history of male breast cancer.     ALLERGIES:  No history of drug allergies.  She denies allergies to seafood, iodine, or contrast dye.     PAST MENSTRUAL HISTORY:  She has been pregnant 3 times with 2 live births  at age 29 and 31 and 1 miscarriage.  Age at first menstrual period was 14.  She did have a total abdominal hysterectomy and bilateral salpingo-oophorectomy performed in 2003 for endometriosis.  She has no history of hormone replacement therapy.  No history of oral contraceptives.     HABITS: She denies tobacco history.  She denies alcohol history and drinks alcohol only occasionally.     PAST MEDICAL HISTORY:  Past medical history is also remarkable for type 2 diabetes, and she was begun on metformin 2 years ago.  She also has a history of varicose veins and a history of a lipoma resected from her left leg.      Chronic Hepatitis B.  Hepatitis C negative.  HIV negative.       Prior COVID vaccination x 3.      GERM LINE GENETICS: INVITAE testing of 47 genes was negative.     TREATMENT HISTORY:  A.  MammaPrint showed low risk.  B.  Left mastectomy and axillary lymph node sampling.   A. Lymph node, LEFT axillary, excision:  -One benign lymph node (0/1)  B. LEFT breast, skin-sparing mastectomy:  -INVASIVE BREAST CARCINOMA, MIXED INVASIVE LOBULAR CARCINOMA (predominant component) and INVASIVE DUCTAL CARCINOMA (minor component), KALPESH GRADE 3, size 5.5 cm, 12:00, upper outer quadrant and lower outer quadrant  -Ductal carcinoma in situ (DCIS), nuclear grade 2, cribriform and solid type(s), with focal necrosis  -DCIS is admixed with invasive carcinoma, and comprises a minor component (<5%) of the tumor volume   -Lobular carcinoma in situ (LCIS), predominantly classic type (admixed with invasive carcinoma and beyond invasive carcinoma) and focal pleomorphic type (size 2 mm, adjacent to invasive carcinoma)  -Margins are uninvolved by invasive carcinoma  -Invasive carcinoma is 2.5 mm from the nearest (anterior) margin, and is > 5 mm from the posterior margin  -Margins are uninvolved by DCIS and pleomorphic LCIS  -DCIS and pleomorphic LCIS are > 5 mm from the nearest (anterior) margin  -Benign nipple and skin   -Other  findings: fibrocystic change (including microcysts with apocrine metaplasia) and columnar cell change  -Calcifications associated with DCIS, LCIS, and benign ducts and acini  -Prior core biopsy site changes (two biopsy sites identified)  -Invasive carcinoma is estrogen receptor positive (>70%, strong intensity) and progesterone receptor positive (>90%, strong intensity) by immunohistochemistry and is HER2 non-amplified (group 5) by FISH (performed on prior core biopsies, see report [id number], specimens A and B)  DCIS and LCIS present.  -Margins are uninvolved by invasive carcinoma or LCIS.   -Invasive carcinoma is estrogen receptor positive (>70%, strong intensity) and progesterone receptor positive (>90%, strong intensity) by immunohistochemistry and is HER2 non-amplified (group 5) by FISH (performed on prior core biopsies, see report [id number], specimens A and B)  Ki-67 immunohistochemistry (MIB-1, pharmDx, [employer]) from [facility] was performed on invasive carcinoma in the LEFT mastectomy specimen   They report Ki-67 proliferative index of 20-25% (block B5) and 20% (block B27).   C.  Staging:  pT3 Nx because lymph node is not the sentinel node.   C.  Letrozole begun 4-5-22.     INTERVAL HISTORY  Ramona returns to clinic today for discussion of the MammaPrint.  Sammie has pain in her low back and in both femurs left greater than right.  She said that there was a period where she had shooting pains in the left femur but this has resolved. She complains left leg pain in the femur area.  There is no weakness or numbness or tingling in the left leg.  She does have minor discomfort in the left breast.    She she denies fatigue, depression, anxiety.    She has been taking letrozole for 1 month and is tolerated it quite well.  She has had no joint discomfort or hot flashes.  She reports that she had a saline fill of her expander recently.  She is awaiting a recommendation from our tumor board as to whether radiation  "would be indicated or not.  She is not enthusiastic about radiation.  The only indication is large size of the primary tumor.  I discussed with her that we will get back to her after tumor conference.  I went over the pathology results with her and discussed that the lymph nodes were negative and that margins were clear.      REVIEW OF SYSTEMS:  The remainder of a 10-point review of systems is negative.     PHYSICAL EXAMINATION:    /81   Pulse 88   Temp 97.9  F (36.6  C)   Resp 16   Ht 1.6 m (5' 2.99\")   Wt 106 kg (233 lb 11.2 oz)   SpO2 97%   BMI 41.41 kg/m    GENERAL:  Ramona appeared to be in moderate discomfort.  There is no alopecia.  HEENT:  No lesions in the oropharynx.  LYMPH:  There is no palpable cervical, supraclavicular, subclavicular or axillary lymphadenopathy.  BREASTS:  Examination of the right breast is without masses.  Examination of the left breast reveals an implant in place. Well-healed incision without erythema or masses.  There is no erythema of the left breast. The wound is healing, with slightly delayed healing more laterally.  The wound is closed with no drainage or incisional erythema. There are no skin changes or areas that are suspicious for infection.  There is no palpable seroma.   LUNGS:  Clear to percussion and auscultation.  HEART:  There is a regular rate and rhythm.  S1, S2.  ABDOMEN:  Soft, nontender, consistent with increased body mass index.  EXTREMITIES:  Without edema.  PSYCH: Mood was anxious.  Affect was appropriate.     LABORATORY DATA:    CBC, CMP normal.      ASSESSMENT AND PLAN:  1.  Sammie Ferrer is a 66-year-old woman with a recent diagnosis of a stage IIB, T3 N0 MX, invasive lobular carcinoma of the upper outer quadrant of the left breast which is multifocal grade 1, ER positive in greater than 70% of cells, MS positive in greater than 90% of cells and HER2 nonamplified.  She now comes to clinic with her daughter, Ivy, for recommendations regarding " evaluation and treatment.  Her daughter served as a Filipino  by phone.  The Filipino  on the iPAD was not involved but was there as a backup if Sammie needed it.   2.  MammaPrint before surgery came back as low risk as did the Oncotype after surgery. The Oncotype of the pleomorphic lobular cancer showed a value of 15, which is less than the threshold for chemotherapy from TAILORx which is 26 for a post-menopausal woman.   3.  Sammie Ferrer underwent a left mastectomy and has an expander in place.  She started adjuvant hormonal therapy with letrozole a month ago and has tolerated it well.   4. Pathology showed -Invasive carcinoma is estrogen receptor positive, progesterone receptor positive and HER2 negative by immunohistochemistry.  Margins negative. pT3 Nx because the lymph node is not sentinel.    5.  Discussion of radiation. She discussed that she does not want radiation unless it is very strongly recommended. We will discuss this in clinic conference on Friday.  Her tumor is node negative but the lymph node was not sentinel and she has a large primary, greater than 5 cm in size.   6.  I discussed that the Ki-67 was joanne but because of node negativity I would not recommend the MonarchE approach, but would discuss this in clinic conference on Friday.  Discussion of MonarchE criteria would be one to three nodes and either tumor size > 5 cm, histologic grade 3, or central Ki-67 > 20%.  She does not have kari involvement but the lymph node was not sentinel and I discussed that we will discuss these issues in breast conference.   7.  Pain in the left femur.  She reports continued pain more on the left than the right. Two view films of the femur were obtained as well as CT scan of the lumbosacral spine.  These studies revealed no evidence for metastases and show DJD of the spine.  I discussed that we cannot do an MRI because she has an expander in place.  All of her questions and all of her  daughter's questions were answered.  8.   XR of Femur, 2 view. CT Lumbar spine.  These radiographic studies show DJD of the lumbar spine.  No evidence of malignancy.    9.  Followup.   Discussion in breast cancer conference on May 13.  Follow up with me June 30 CBC, CMP.  Follow up with Dr. Feliciano as scheduled.  Referral to Dr. Burns in next 2 weeks.      Thank you for allowing us to continue to participate in Sammie Ferrer's care.     Sincerely,     Kenneth Cisneros M.D.   Professor   Division of Hematology, Oncology, Transplant   Department of Medicine   [employer] [facility] [employer]   [phone]     ADDENDUM:  Discussed in breast cancer conference on 5-13-22.  There was discussion that radiation could be considered.  We do not have a sentinel node, but the one LN taken was negative.  She does not meet criteria for MonarchE on the basis of the one node we have but it was not sentinel.  MRI was suggesting of N0 status but does not establish this.  Sammie does not want abemaciclib even if it were recommended and there were mixed thoughts on this because of concern related to the high Ki67 even with low risk Oncotype and low risk MammaPrint.  I recommend no abemaciclib because there is potential toxicity and she does not meet criteria for MonarchE treatment including lack of node positivity and also based on patient preference.      I spent 40 minutes with the patient more than 50% of which was in counseling and coordination of care.    Again, thank you for allowing me to participate in the care of your patient.      Sincerely,    Kenneth Cisneros MD

## 2022-05-11 ENCOUNTER — PATIENT OUTREACH (OUTPATIENT)
Dept: PLASTIC SURGERY | Facility: CLINIC | Age: 67
End: 2022-05-11
Payer: COMMERCIAL

## 2022-05-11 RX ORDER — GABAPENTIN 300 MG/1
CAPSULE ORAL
Qty: 90 CAPSULE | Refills: 3 | Status: SHIPPED | OUTPATIENT
Start: 2022-05-11 | End: 2023-09-07

## 2022-05-11 NOTE — PATIENT INSTRUCTIONS
Spoke with pt today after receiving a Rx refill request for Gabapentin. Pt updated that she does have an appt with neurology or pain doctor (pt unsure) at the end of May in [place]. I let her know that Dr Feliciano is willing to write for more Gabapentin but would like to know that she will be seeing a pain doctor who will evaluate her leg pain. Pt is in agreement. She also said that Dr Cisneros is still reviewing her case to determine if she needs radiation. She will let us know once she hears a final answer.  Rell TYLER RN

## 2022-05-12 NOTE — ADDENDUM NOTE
Addendum  created 05/12/22 1430 by Darvin Ruelas MD    Clinical Note Signed, Diagnosis association updated, Intraprocedure Blocks edited, SmartForm saved

## 2022-05-13 ENCOUNTER — TUMOR CONFERENCE (OUTPATIENT)
Dept: ONCOLOGY | Facility: CLINIC | Age: 67
End: 2022-05-13
Payer: COMMERCIAL

## 2022-05-23 ENCOUNTER — TELEPHONE (OUTPATIENT)
Dept: RADIATION ONCOLOGY | Facility: CLINIC | Age: 67
End: 2022-05-23
Payer: COMMERCIAL

## 2022-05-23 ENCOUNTER — PATIENT OUTREACH (OUTPATIENT)
Dept: ONCOLOGY | Facility: CLINIC | Age: 67
End: 2022-05-23
Payer: COMMERCIAL

## 2022-05-23 NOTE — TELEPHONE ENCOUNTER
Attempted to call to relay discussion of tumor conference regarding adjuvant radiation therapy. Left voicemail to call back.     Miriam Garcia MD      ADDENDUM:  I was able to speak to Ms. Ferrer later today. I did relay that her case was discussed the conference. Due to the fact that her SLN did not map, some physicians did advocate for a course of adjuvant radiation therapy.     Ms. Ferrer was quite adamant that she does not want radiation therapy. She'd only do it if she absolutely needs it, meaning that she'd die without it. She is otherwise ready to move on with second stage reconstruction and endocrine therapy. She did express an understanding of the potential benefit and the controversy surrounding the management. Given this,  I think it is reasonable to forgo radiation therapy.    Miriam Garcia MD

## 2022-05-23 NOTE — PROGRESS NOTES
Patient and daughter with questions about treatment. Patient does not want radiation and will return call to Dr Cameron. The only way she would do chemotherapy is if she would not get Abemaciclib that per Dr Cisneros's recommendations would not prescribe as she does not meet criteria.  Answered all patient and dauther's questions and verbalized understanding. Irene Todd RN, BSN.

## 2022-05-24 ENCOUNTER — OFFICE VISIT (OUTPATIENT)
Dept: PLASTIC SURGERY | Facility: CLINIC | Age: 67
End: 2022-05-24
Attending: PLASTIC SURGERY
Payer: COMMERCIAL

## 2022-05-24 VITALS
BODY MASS INDEX: 40.74 KG/M2 | WEIGHT: 229.9 LBS | SYSTOLIC BLOOD PRESSURE: 133 MMHG | RESPIRATION RATE: 8 BRPM | TEMPERATURE: 98.3 F | HEART RATE: 81 BPM | OXYGEN SATURATION: 97 % | DIASTOLIC BLOOD PRESSURE: 83 MMHG

## 2022-05-24 DIAGNOSIS — Z98.890 S/P BREAST RECONSTRUCTION, LEFT: Primary | ICD-10-CM

## 2022-05-24 PROCEDURE — G0463 HOSPITAL OUTPT CLINIC VISIT: HCPCS

## 2022-05-24 PROCEDURE — 99024 POSTOP FOLLOW-UP VISIT: CPT | Performed by: PLASTIC SURGERY

## 2022-05-24 RX ORDER — CEFAZOLIN SODIUM 2 G/50ML
2 SOLUTION INTRAVENOUS SEE ADMIN INSTRUCTIONS
Status: CANCELLED | OUTPATIENT
Start: 2022-05-24

## 2022-05-24 RX ORDER — ENOXAPARIN SODIUM 100 MG/ML
40 INJECTION SUBCUTANEOUS
Status: CANCELLED | OUTPATIENT
Start: 2022-05-24

## 2022-05-24 RX ORDER — CEFAZOLIN SODIUM 2 G/50ML
2 SOLUTION INTRAVENOUS
Status: CANCELLED | OUTPATIENT
Start: 2022-05-24

## 2022-05-24 ASSESSMENT — PAIN SCALES - GENERAL: PAINLEVEL: SEVERE PAIN (6)

## 2022-05-24 NOTE — NURSING NOTE
"Oncology Rooming Note    May 24, 2022 10:49 AM   Ramona Ferrer is a 66 year old female who presents for:    Chief Complaint   Patient presents with     Oncology Clinic Visit     Patient with breast cancer here for provider visit      Initial Vitals: /83 (BP Location: Right arm, Patient Position: Sitting, Cuff Size: Adult Large)   Pulse 81   Temp 98.3  F (36.8  C) (Tympanic)   Resp 8   Wt 104.3 kg (229 lb 14.4 oz)   SpO2 97%   BMI 40.74 kg/m   Estimated body mass index is 40.74 kg/m  as calculated from the following:    Height as of 5/10/22: 1.6 m (5' 2.99\").    Weight as of this encounter: 104.3 kg (229 lb 14.4 oz). Body surface area is 2.15 meters squared.  Severe Pain (6) Comment: Data Unavailable   No LMP recorded. Patient has had a hysterectomy.  Allergies reviewed: Yes  Medications reviewed: Yes    Medications: Medication refills not needed today.  Pharmacy name entered into [email or web address]:    [id number] - [place], [place] - [address]  [facility] # [id number] - [place], [place] - [address]    Clinical concerns:   Aleshia Soares CMA              "

## 2022-05-24 NOTE — PROGRESS NOTES
POSTOPERATIVE VISIT    PRESENTING COMPLAINT:  Postoperative visit status post left breast nipple non-sparing mastectomy for breast cancer and immediate expander-based reconstruction done on 03/23/2022.    HISTORY OF PRESENTING COMPLAINT:  Ms. Ferrer is 66 years old, here for a regular postoperative visit.  Overall is stable.  She is now 2 months out from her surgery.  Fully healed.  Has been getting expansion.  Was complaining of some nonspecific pain in her chest.  Here to have it looked at.  She does not require adjuvant treatments and is ready for her next stage of reconstruction.  She still wants to be a little larger than she currently is.    PHYSICAL EXAMINATION:  Vital signs stable.  She is afebrile, in no obvious distress.  Left breast is healed.  No evidence of infection.  No evidence of seroma.    ASSESSMENT/PLAN:  Based on the above findings, a diagnosis of left breast reconstruction was made.  Plan now is to proceed with removal of some fluid from the left expander to help with her pain and then in another few weeks once the pain has resolved start the expansion again.  Plan now is to proceed with scheduling her left RUTH flap reconstruction.  I gave her an overview of what to expect from that.  She is still unsure whether she wants a symmetry enhancement right breast lift.  She will think about that.  All questions were answered.  All exam and discussion done in the presence of my nurse, Lorna Ludwig.

## 2022-05-24 NOTE — LETTER
5/24/2022         RE: Ramona Ferrer  [address]  [place] [address]        Dear Colleague,    Thank you for referring your patient, Ramona Ferrer, to the [facility]. Please see a copy of my visit note below.    POSTOPERATIVE VISIT    PRESENTING COMPLAINT:  Postoperative visit status post left breast nipple non-sparing mastectomy for breast cancer and immediate expander-based reconstruction done on 03/23/2022.    HISTORY OF PRESENTING COMPLAINT:  Ms. Ferrer is 66 years old, here for a regular postoperative visit.  Overall is stable.  She is now 2 months out from her surgery.  Fully healed.  Has been getting expansion.  Was complaining of some nonspecific pain in her chest.  Here to have it looked at.  She does not require adjuvant treatments and is ready for her next stage of reconstruction.  She still wants to be a little larger than she currently is.    PHYSICAL EXAMINATION:  Vital signs stable.  She is afebrile, in no obvious distress.  Left breast is healed.  No evidence of infection.  No evidence of seroma.    ASSESSMENT/PLAN:  Based on the above findings, a diagnosis of left breast reconstruction was made.  Plan now is to proceed with removal of some fluid from the left expander to help with her pain and then in another few weeks once the pain has resolved start the expansion again.  Plan now is to proceed with scheduling her left RUTH flap reconstruction.  I gave her an overview of what to expect from that.  She is still unsure whether she wants a symmetry enhancement right breast lift.  She will think about that.  All questions were answered.  All exam and discussion done in the presence of my nurse, Lorna Ludwig.      Sincerely,    NOHEMI Feliciano MD

## 2022-05-26 ENCOUNTER — PATIENT OUTREACH (OUTPATIENT)
Dept: PLASTIC SURGERY | Facility: CLINIC | Age: 67
End: 2022-05-26

## 2022-05-26 ENCOUNTER — OFFICE VISIT (OUTPATIENT)
Dept: OPTOMETRY | Facility: CLINIC | Age: 67
End: 2022-05-26
Payer: COMMERCIAL

## 2022-05-26 DIAGNOSIS — H10.10 SEASONAL ALLERGIC CONJUNCTIVITIS: Primary | ICD-10-CM

## 2022-05-26 PROCEDURE — 99213 OFFICE O/P EST LOW 20 MIN: CPT | Performed by: OPTOMETRIST

## 2022-05-26 RX ORDER — KETOROLAC TROMETHAMINE 5 MG/ML
1 SOLUTION OPHTHALMIC 4 TIMES DAILY
Qty: 5 ML | Refills: 6 | Status: SHIPPED | OUTPATIENT
Start: 2022-05-26 | End: 2023-08-17

## 2022-05-26 RX ORDER — PREDNISOLONE ACETATE 10 MG/ML
SUSPENSION/ DROPS OPHTHALMIC
Qty: 5 ML | Refills: 1 | Status: SHIPPED | OUTPATIENT
Start: 2022-05-26 | End: 2022-06-09

## 2022-05-26 ASSESSMENT — VISUAL ACUITY
OS_CC+: -1
OS_CC: 20/25
METHOD: SNELLEN - LINEAR
OD_CC: 20/40
CORRECTION_TYPE: GLASSES
OD_CC+: -2

## 2022-05-26 ASSESSMENT — TONOMETRY
OS_IOP_MMHG: 11
OD_IOP_MMHG: 12
IOP_METHOD: APPLANATION

## 2022-05-26 ASSESSMENT — EXTERNAL EXAM - LEFT EYE: OS_EXAM: NORMAL

## 2022-05-26 ASSESSMENT — EXTERNAL EXAM - RIGHT EYE: OD_EXAM: NORMAL

## 2022-05-26 NOTE — TELEPHONE ENCOUNTER
Pt's daughter Zelda called to review visit from this week. Pt is very anxious to have next stage of surgery, is miserable with expander in. Discussed that more surgery does not necessarily mean less pain, in fact will mean more pain. Zelda states she and Sammie are aware of this and prepared for increased pain with subsequent surgeries.    They are scheduled to see neurology in September, we have communicated with Neuro that pt should be seen sooner. Family also has communiucated this.     Zelda asking for pain medication to be prescribed for pt. Discussed pain clinic referral, she will call and follow up on that scheduling.     Meanwhile she is asking for Dr Feliciano to prescribe something for pain. Zelda is unable to say what has worked for pt's pain, she is mostly using tylenol, some NSAIDS and gabapentin at this time. Occasionally she has taken narcotics, which do provider her some ability to rest. Discussed that at this stage narcotics are not indicated. Zelda asks that a message go to Dr Feliciano asking for any other pain medication recommendations.

## 2022-05-26 NOTE — PROGRESS NOTES
Chief Complaint   Patient presents with     Eye Itching Both Eyes   Patient presents with itchy, painful eyes. She said it started getting worse than usual a couple weeks ago. She is using pataday and artificial tears and they are not longer making them better.     Do you wear contact lenses? No        Hazel De - Optometric Assistant      See Review Of Systems       Medical, surgical and family histories reviewed and updated 5/26/2022.         OBJECTIVE: See Ophthalmology exam    ASSESSMENT:    ICD-10-CM    1. Seasonal allergic conjunctivitis  H10.10 ketorolac (ACULAR) 0.5 % ophthalmic solution     prednisoLONE acetate (PRED FORTE) 1 % ophthalmic suspension      PLAN:  Will try adding acular four times daily if no improvement  Will add pf for 2week taper and stay on patanol two times daily long term.    Ana Ludwig OD

## 2022-05-26 NOTE — PATIENT INSTRUCTIONS
Try adding the acular, you could use this all spring.  if no improvement  then use steroid pred forte short term and stay on patanol two times daily

## 2022-05-26 NOTE — LETTER
5/26/2022         RE: Ramona Ferrer  [address]  [place] [address]        Dear Colleague,    Thank you for referring your patient, Ramona Ferrer, to the [facility]. Please see a copy of my visit note below.    Chief Complaint   Patient presents with     Eye Itching Both Eyes   Patient presents with itchy, painful eyes. She said it started getting worse than usual a couple weeks ago. She is using pataday and artificial tears and they are not longer making them better.     Do you wear contact lenses? No        Hazel De - Optometric Assistant      See Review Of Systems       Medical, surgical and family histories reviewed and updated 5/26/2022.         OBJECTIVE: See Ophthalmology exam    ASSESSMENT:    ICD-10-CM    1. Seasonal allergic conjunctivitis  H10.10 ketorolac (ACULAR) 0.5 % ophthalmic solution     prednisoLONE acetate (PRED FORTE) 1 % ophthalmic suspension      PLAN:  Will try adding acular four times daily if no improvement  Will add pf for 2week taper and stay on patanol two times daily long term.    Ana Ludwig OD       Again, thank you for allowing me to participate in the care of your patient.        Sincerely,        Ana Ludwig, OD

## 2022-05-31 NOTE — TELEPHONE ENCOUNTER
Spoke with Zelda, She will touch base with her mom to see if she wants to see Dr Feliciano again. Asked her to call about setting up pain clinic appointment as well as neurology. Staff messages indicate that neurology was trying to reach pt about her appointment timing. Zelda will be in touch with us after talking with her mom about further fills, or if she wants to see Dr Feliciano.     Discussed that Dr Feliciano is not recommending more narcotics at this stage so far from surgery, needs pain clinic and neurology eval as previously recommended.

## 2022-06-02 DIAGNOSIS — J30.9 ALLERGIC RHINITIS, UNSPECIFIED SEASONALITY, UNSPECIFIED TRIGGER: ICD-10-CM

## 2022-06-02 DIAGNOSIS — I10 ESSENTIAL HYPERTENSION: ICD-10-CM

## 2022-06-06 RX ORDER — LISINOPRIL 10 MG/1
TABLET ORAL
Qty: 90 TABLET | Refills: 1 | Status: SHIPPED | OUTPATIENT
Start: 2022-06-06 | End: 2023-04-06

## 2022-06-06 RX ORDER — CETIRIZINE HYDROCHLORIDE 10 MG/1
10 TABLET ORAL DAILY
Qty: 90 TABLET | Refills: 1 | Status: SHIPPED | OUTPATIENT
Start: 2022-06-06 | End: 2023-01-18

## 2022-06-06 NOTE — TELEPHONE ENCOUNTER
Pending Prescriptions:                       Disp   Refills    lisinopril (ZESTRIL) 10 MG tablet [Pharma*90 tab*0            Sig: TAKE ONE TABLET BY MOUTH ONE TIME DAILY    cetirizine (ZYRTEC) 10 MG tablet [Pharmac*90 tab*0            Sig: Take 1 tablet (10 mg) by mouth daily      Prescriptions approved per [facility] Refill Protocol.

## 2022-06-10 ENCOUNTER — PATIENT OUTREACH (OUTPATIENT)
Dept: PLASTIC SURGERY | Facility: CLINIC | Age: 67
End: 2022-06-10

## 2022-06-10 NOTE — TELEPHONE ENCOUNTER
Pt's daughter called and left message that pt is very anxious to get her next stage of surgery scheduled.     OK for case request and scheduling to take place?

## 2022-06-10 NOTE — TELEPHONE ENCOUNTER
Spoke with Zelda, again encouraged them to try to get into neurology sooner so that it falls before upcoming surgery. Encouraged them again to call insurance to see if they can go outside of [facility] to get into neuro sooner.     Advised they will hear from surgery scheduler, assisted to reschedule breast fill appointment as pt does want to be larger than she currently is.

## 2022-06-15 RX ORDER — ELECTROLYTES/DEXTROSE
SOLUTION, ORAL ORAL
Status: CANCELLED | OUTPATIENT
Start: 2022-06-15

## 2022-06-15 NOTE — TELEPHONE ENCOUNTER
Received fax from pharmacy asking for a refill on:    Multivitamin      Last Written Prescription Date:  Listed historical in chart  Last Fill Quantity: na,   # refills: na  Last Office Visit: 12-13-21  Future Office visit:    Next 5 appointments (look out 90 days)    Jun 28, 2022  9:30 AM  (Arrive by 9:15 AM)  Return Visit with Kenneth Cisneros MD  [facility] ([facility] ) [address] [place] [id number] [phone]           Routing refill request to provider for review/approval because:  Drug not active on patient's medication list  Medication is reported/historical    Please advise, thanks.  Routed to covering provider(s).

## 2022-06-23 ENCOUNTER — TELEPHONE (OUTPATIENT)
Dept: ONCOLOGY | Facility: CLINIC | Age: 67
End: 2022-06-23

## 2022-06-23 DIAGNOSIS — M47.16 OSTEOARTHRITIS OF SPINE WITH MYELOPATHY, LUMBOSACRAL REGION: Primary | ICD-10-CM

## 2022-06-23 NOTE — TELEPHONE ENCOUNTER
I called her daughter Zelda and explained that the beast conference recommendation is letrozole.  Radiation was recommended but patient did not want this.  Abemaciclib was not recommended.  Follow up with me Tuesday.     Kenneth Cisneros MD

## 2022-06-26 NOTE — PROGRESS NOTES
Dada Mendiola MD  [facility]  [address] [place] [address], [facility] [address]  [place] [address]     RE:  Ramona Ferrer  MRN:  [id number]  :  1955     Dear Dr. Mendiola:     I would like to refer to you Sammie Ferrer, a 66-year-old woman with a recent diagnosis of a stage IIB, T3 N0 MX, invasive lobular carcinoma of the left breast.  She self-palpated a lump in the upper outer quadrant of the left breast.  She underwent evaluation with a diagnostic mammogram and ultrasound, which was BI-RADS 4, showing in the upper outer quadrant of the left breast irregularly shaped hypoechoic mass at the 12 o'clock position 4 cm from the nipple on the left.  This mass measured 2.3 x 2.2 x 1.5 cm and accounted for the patient's area of palpable concern.  Additionally, at the 1 o'clock position 2 cm from the nipple in the left breast, there is a second irregularly shaped hypoechoic mass with indistinct margins measuring 1.3 x 1.1 x 0.8 cm.  She also underwent a breast MRI which showed in the right breast mild background parenchymal enhancement and no suspicious areas of enhancement or lymphadenopathy.  In the left breast there was mild background parenchymal enhancement, and at the 12 o'clock position 4 cm from the nipple there was a heterogeneously enhancing irregular mass measuring 3.4 in AP dimension x 2.8 cm ML and 2.8 cm SI corresponding to the known biopsy-proven malignancy in the left breast.  In the left breast at the 1 o'clock position 2 cm from the nipple there was also a heterogeneously enhancing oval mass measuring 1.3 cm in maximal diameter.  This likely corresponds to the second biopsy-proven malignancy.  Together, the full extent of the disease spans a total of 5.2 cm AP x 4.4 cm ML, BI-RADS 6.     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  In part B in the left breast at the 1 o'clock position 2 cm from the nipple, ultrasound-guided needle biopsy showed invasive lobular carcinoma, Granville grade 1/3 lobular carcinoma in situ was present, classic subtype, estrogen receptors positive in greater than 70% of the cells, progesterone receptors positive in greater than 90% of cells, and HER2 was nonamplified, Ki-67 20-25%.  She now comes to our clinic for recommendations.     Sammie reports that the breast mass had been there for approximately 1 month before she was seen for evaluation..       She has worked in the past as a personal care assistant and lives in the [facility].       PAST MEDICAL HISTORY:  There is no history of breast cancer in the past.  No history of breast cancer surgery.  She has no history of radiation therapy in the past or radiation exposure.  No history of prior tumor of any kind.  She denies heart problems, heart attack, breathing problems, blood clots, seizures, peptic ulcer disease, osteoporosis or bone fractures.  She does report a history of arthritis.     FAMILY HISTORY:  Positive for breast cancer in a paternal aunt who was diagnosed with breast cancer at an old age.     Her father has a history of stomach cancer.  There is no family history of pancreatic cancer, melanoma, lung cancer or ovarian cancer.     No history of male breast cancer.     ALLERGIES:  No history of drug allergies.  She denies allergies to seafood, iodine, or contrast dye.     PAST MENSTRUAL HISTORY:  She has been pregnant 3 times with 2 live births at age 29 and 31 and 1 miscarriage.  Age at first menstrual period was 14.  She did have a total abdominal hysterectomy and bilateral salpingo-oophorectomy performed in 2003 for endometriosis.  She has no history of hormone replacement therapy.  No  history of oral contraceptives.     HABITS: She denies tobacco history.  She denies alcohol history and drinks alcohol only occasionally.     PAST MEDICAL HISTORY:  Past medical history is also remarkable for type 2 diabetes, and she was begun on metformin 2 years ago.  She also has a history of varicose veins and a history of a lipoma resected from her left leg.      Chronic Hepatitis B.  Hepatitis C negative.  HIV negative.       Prior COVID vaccination x 3.      GERM LINE GENETICS: INVITAE testing of 47 genes was negative.      TREATMENT HISTORY:  A.  MammaPrint showed low risk.  B.  Left mastectomy and axillary lymph node sampling.   A. Lymph node, LEFT axillary, excision:  -One benign lymph node (0/1)  B. LEFT breast, skin-sparing mastectomy:  -INVASIVE BREAST CARCINOMA, MIXED INVASIVE LOBULAR CARCINOMA (predominant component) and INVASIVE DUCTAL CARCINOMA (minor component), KALPESH GRADE 3, size 5.5 cm, 12:00, upper outer quadrant and lower outer quadrant  -Ductal carcinoma in situ (DCIS), nuclear grade 2, cribriform and solid type(s), with focal necrosis  -DCIS is admixed with invasive carcinoma, and comprises a minor component (<5%) of the tumor volume   -Lobular carcinoma in situ (LCIS), predominantly classic type (admixed with invasive carcinoma and beyond invasive carcinoma) and focal pleomorphic type (size 2 mm, adjacent to invasive carcinoma)  -Margins are uninvolved by invasive carcinoma  -Invasive carcinoma is 2.5 mm from the nearest (anterior) margin, and is > 5 mm from the posterior margin  -Margins are uninvolved by DCIS and pleomorphic LCIS  -DCIS and pleomorphic LCIS are > 5 mm from the nearest (anterior) margin  -Benign nipple and skin   -Other findings: fibrocystic change (including microcysts with apocrine metaplasia) and columnar cell change  -Calcifications associated with DCIS, LCIS, and benign ducts and acini  -Prior core biopsy site changes (two biopsy sites identified)  -Invasive  carcinoma is estrogen receptor positive (>70%, strong intensity) and progesterone receptor positive (>90%, strong intensity) by immunohistochemistry and is HER2 non-amplified (group 5) by FISH (performed on prior core biopsies, see report [id number], specimens A and B)  DCIS and LCIS present.  -Margins are uninvolved by invasive carcinoma or LCIS.   -Invasive carcinoma is estrogen receptor positive (>70%, strong intensity) and progesterone receptor positive (>90%, strong intensity) by immunohistochemistry and is HER2 non-amplified (group 5) by FISH (performed on prior core biopsies, see report [id number], specimens A and B)  Ki-67 immunohistochemistry (MIB-1, pharmDx, [employer]) from [facility] was performed on invasive carcinoma in the LEFT mastectomy specimen   They report Ki-67 proliferative index of 20-25% (block B5) and 20% (block B27).   C.  Staging:  pT3 Nx because lymph node is not the sentinel node.    D.  She did not want radiation, and risk:benefit did not favor treatment.  E.  Letrozole begun 4-5-22.      INTERVAL HISTORY  Ramona Ferrer returns to clinic for routine followup.  She is having discomfort from the left expander in the upper outer quadrant and she will see Dr. Feliciano about that today.  She also has sciatica pain in the left leg and perhaps some in the right.  Pain on the left is worse than the pain on the right.  She has some fatigue.  No depression, no anxiety.  She is taking letrozole and tolerating it reasonably well, although she does have hot flashes.  I explained that the hot flashes are likely related to the letrozole.  She has had COVID vaccinations x2.    REVIEW OF SYSTEMS:  A 10-point review of systems is negative.    She is eating a healthful Mediterranean-style diet.  She is unable to exercise because of the pain in her legs but is motivated to do so once the leg pain is addressed.    She is taking vitamin D3 2000 International Units per day.  She does not take calcium.  Her  last DEXA scan was 2 years ago.     PHYSICAL EXAMINATION:    VITAL SIGNS:  /75   Pulse 68   Temp 98  F (36.7  C)   Resp 18   Wt 103.3 kg (227 lb 12.8 oz)   SpO2 98%   BMI 40.36 kg/m    GENERAL:  Ramona appeared generally well.  HEENT:  She has no alopecia.  Examination of the oropharynx revealed no lesions.  LYMPH:  No palpable cervical, supraclavicular, subclavicular or axillary lymphadenopathy.  BREASTS:  Examination of the right breast reveals no masses.  Examination of the left breast reveals saline expander in place.  There is a well-healed incision just above the inframammary fold, which is well healed without erythema or masses.  No masses in the left breast.  There is firmness and discomfort when I palpated the expander in the upper outer quadrant of the left breast.  LUNGS:  Clear to percussion and auscultation.  HEART:  Regular rate and rhythm.  S1, S2.  ABDOMEN:  Soft, nontender, consistent with increased body mass index.  EXTREMITIES:  Without edema.  PSYCH:  Mood was anxious and affect appropriate.    LABORATORY DATA:  CMP is remarkable for an ALT of 66.  CBC normal.      IMAGING:  She did have a CT of the lumbar spine showing degenerative joint disease.  An x-ray of the left femur showed no suspicious abnormalities.     ASSESSMENT AND PLAN:  1.  Sammie Ferrer is a 67-year-old woman with a recent diagnosis of a stage IIB, T3 N0 MX, invasive lobular carcinoma of the upper outer quadrant of the left breast which is multifocal grade 1, ER positive in greater than 70% of cells, MS positive in greater than 90% of cells and HER2 nonamplified.  She now comes to clinic with her daughter, Ivy, for recommendations regarding evaluation and treatment.  Her daughter served as a [employer]  by phone.  The [employer]  on the iPAD was not involved but was there as a backup if Sammie needed it.   2.  MammaPrint before surgery came back as low risk as did the Oncotype after surgery. The  Oncotype of the pleomorphic lobular cancer showed a value of 15, which is less than the threshold for chemotherapy from TAILORx which is 26 for a post-menopausal woman.   3.  Sammie Ferrer underwent a left mastectomy and has an expander in place.  She started adjuvant hormonal therapy with letrozole and has tolerated it well.   4. Pathology showed -Invasive carcinoma is estrogen receptor positive, progesterone receptor positive and HER2 negative by immunohistochemistry.  Margins negative. pT3 Nx because the lymph node is not sentinel.    5.  I saw her with her daughter, Ankita, this morning to assess how she is doing on letrozole.  She has tolerated the letrozole quite well, although she does have some hot flashes.  She does not need radiation.    6.  Expander pain.  No signs or symptns of infection.  Meeting with Dr. Feliciano immediately after this visit.   7.  Imaging:  Yearly mammography of right breast 12-22-22.  Plan is to perform a DEXA scan in followup.   8.  Pain in the left femur and LS spine.  She reports continued pain more on the left than the right. Two view films of the femur were obtained as well as CT scan of the lumbosacral spine.  These studies revealed no evidence for metastases and show DJD of the spine.  I discussed that we cannot do an MRI because she has an expander in place.  All of her questions and all of her daughter's questions were answered.  Meeting with Dr. Burns in 2 days.   9.   XR of Femur, 2 view. CT Lumbar spine.  These radiographic studies show DJD of the lumbar spine.  No evidence of malignancy.    10.  Followup.    Follow up with me September 22 with CBC, CMP. Dexa scan in next 1-2 weeks.   Follow up with me 12-22 with CBC, CMP and right mammogram.      Thank you for allowing us to continue to participate in Sammie Ferrer's care.     Sincerely,     Kenneth Cisneros M.D.   Professor   Division of Hematology, Oncology, Transplant   Department of Medicine   [facility]  [facility]   [phone]           I spent 40 minutes with the patient more than 50% of which was in counseling and coordination of care.

## 2022-06-27 DIAGNOSIS — G89.29 CHRONIC RIGHT-SIDED LOW BACK PAIN WITH RIGHT-SIDED SCIATICA: Primary | ICD-10-CM

## 2022-06-27 DIAGNOSIS — M54.41 CHRONIC RIGHT-SIDED LOW BACK PAIN WITH RIGHT-SIDED SCIATICA: Primary | ICD-10-CM

## 2022-06-28 ENCOUNTER — OFFICE VISIT (OUTPATIENT)
Dept: PLASTIC SURGERY | Facility: CLINIC | Age: 67
End: 2022-06-28
Attending: PLASTIC SURGERY
Payer: COMMERCIAL

## 2022-06-28 ENCOUNTER — ONCOLOGY VISIT (OUTPATIENT)
Dept: ONCOLOGY | Facility: CLINIC | Age: 67
End: 2022-06-28
Attending: INTERNAL MEDICINE
Payer: COMMERCIAL

## 2022-06-28 ENCOUNTER — LAB (OUTPATIENT)
Dept: LAB | Facility: CLINIC | Age: 67
End: 2022-06-28
Attending: INTERNAL MEDICINE
Payer: COMMERCIAL

## 2022-06-28 VITALS
RESPIRATION RATE: 18 BRPM | BODY MASS INDEX: 40.35 KG/M2 | WEIGHT: 227.74 LBS | TEMPERATURE: 98 F | DIASTOLIC BLOOD PRESSURE: 75 MMHG | SYSTOLIC BLOOD PRESSURE: 132 MMHG | HEART RATE: 68 BPM | OXYGEN SATURATION: 98 %

## 2022-06-28 VITALS
OXYGEN SATURATION: 98 % | SYSTOLIC BLOOD PRESSURE: 132 MMHG | WEIGHT: 227.8 LBS | RESPIRATION RATE: 18 BRPM | DIASTOLIC BLOOD PRESSURE: 75 MMHG | BODY MASS INDEX: 40.36 KG/M2 | TEMPERATURE: 98 F | HEART RATE: 68 BPM

## 2022-06-28 DIAGNOSIS — Z78.0 ASYMPTOMATIC POSTMENOPAUSAL STATUS: Primary | ICD-10-CM

## 2022-06-28 DIAGNOSIS — C50.412 MALIGNANT NEOPLASM OF UPPER-OUTER QUADRANT OF LEFT BREAST IN FEMALE, ESTROGEN RECEPTOR POSITIVE (H): ICD-10-CM

## 2022-06-28 DIAGNOSIS — Z17.0 MALIGNANT NEOPLASM OF UPPER-OUTER QUADRANT OF LEFT BREAST IN FEMALE, ESTROGEN RECEPTOR POSITIVE (H): ICD-10-CM

## 2022-06-28 DIAGNOSIS — Z98.890 S/P BREAST RECONSTRUCTION, LEFT: Primary | ICD-10-CM

## 2022-06-28 LAB
ALBUMIN SERPL-MCNC: 3.6 G/DL (ref 3.4–5)
ALP SERPL-CCNC: 68 U/L (ref 40–150)
ALT SERPL W P-5'-P-CCNC: 66 U/L (ref 0–50)
ANION GAP SERPL CALCULATED.3IONS-SCNC: 6 MMOL/L (ref 3–14)
AST SERPL W P-5'-P-CCNC: 32 U/L (ref 0–45)
BASOPHILS # BLD AUTO: 0.1 10E3/UL (ref 0–0.2)
BASOPHILS NFR BLD AUTO: 1 %
BILIRUB SERPL-MCNC: 0.4 MG/DL (ref 0.2–1.3)
BUN SERPL-MCNC: 13 MG/DL (ref 7–30)
CALCIUM SERPL-MCNC: 9.9 MG/DL (ref 8.5–10.1)
CHLORIDE BLD-SCNC: 107 MMOL/L (ref 94–109)
CO2 SERPL-SCNC: 27 MMOL/L (ref 20–32)
CREAT SERPL-MCNC: 0.74 MG/DL (ref 0.52–1.04)
EOSINOPHIL # BLD AUTO: 0.3 10E3/UL (ref 0–0.7)
EOSINOPHIL NFR BLD AUTO: 5 %
ERYTHROCYTE [DISTWIDTH] IN BLOOD BY AUTOMATED COUNT: 13 % (ref 10–15)
GFR SERPL CREATININE-BSD FRML MDRD: 89 ML/MIN/1.73M2
GLUCOSE BLD-MCNC: 108 MG/DL (ref 70–99)
HCT VFR BLD AUTO: 40.9 % (ref 35–47)
HGB BLD-MCNC: 13.2 G/DL (ref 11.7–15.7)
IMM GRANULOCYTES # BLD: 0 10E3/UL
IMM GRANULOCYTES NFR BLD: 0 %
LYMPHOCYTES # BLD AUTO: 2.5 10E3/UL (ref 0.8–5.3)
LYMPHOCYTES NFR BLD AUTO: 46 %
MCH RBC QN AUTO: 29.7 PG (ref 26.5–33)
MCHC RBC AUTO-ENTMCNC: 32.3 G/DL (ref 31.5–36.5)
MCV RBC AUTO: 92 FL (ref 78–100)
MONOCYTES # BLD AUTO: 0.6 10E3/UL (ref 0–1.3)
MONOCYTES NFR BLD AUTO: 11 %
NEUTROPHILS # BLD AUTO: 2 10E3/UL (ref 1.6–8.3)
NEUTROPHILS NFR BLD AUTO: 37 %
NRBC # BLD AUTO: 0 10E3/UL
NRBC BLD AUTO-RTO: 0 /100
PLATELET # BLD AUTO: 263 10E3/UL (ref 150–450)
POTASSIUM BLD-SCNC: 4 MMOL/L (ref 3.4–5.3)
PROT SERPL-MCNC: 7.5 G/DL (ref 6.8–8.8)
RBC # BLD AUTO: 4.45 10E6/UL (ref 3.8–5.2)
SODIUM SERPL-SCNC: 140 MMOL/L (ref 133–144)
WBC # BLD AUTO: 5.4 10E3/UL (ref 4–11)

## 2022-06-28 PROCEDURE — 99213 OFFICE O/P EST LOW 20 MIN: CPT | Performed by: PLASTIC SURGERY

## 2022-06-28 PROCEDURE — G0463 HOSPITAL OUTPT CLINIC VISIT: HCPCS

## 2022-06-28 PROCEDURE — 36415 COLL VENOUS BLD VENIPUNCTURE: CPT

## 2022-06-28 PROCEDURE — 85025 COMPLETE CBC W/AUTO DIFF WBC: CPT

## 2022-06-28 PROCEDURE — 80053 COMPREHEN METABOLIC PANEL: CPT

## 2022-06-28 PROCEDURE — 99215 OFFICE O/P EST HI 40 MIN: CPT | Performed by: INTERNAL MEDICINE

## 2022-06-28 ASSESSMENT — PAIN SCALES - GENERAL
PAINLEVEL: MODERATE PAIN (4)
PAINLEVEL: MODERATE PAIN (4)

## 2022-06-28 NOTE — LETTER
2022         RE: Ramona Ferrer  [address]  [place] [address]        Dear Colleague,    Thank you for referring your patient, Ramona Ferrer, to the [facility]. Please see a copy of my visit note below.    Dada Mendiola MD  [facility]  [address]  [place] [address]     RE:  Ramona Ferrer  MRN:  [id number]  :  1955     Dear Dr. Mendiola:     I would like to refer to you Sammie Ferrer, a 66-year-old woman with a recent diagnosis of a stage IIB, T3 N0 MX, invasive lobular carcinoma of the left breast.  She self-palpated a lump in the upper outer quadrant of the left breast.  She underwent evaluation with a diagnostic mammogram and ultrasound, which was BI-RADS 4, showing in the upper outer quadrant of the left breast irregularly shaped hypoechoic mass at the 12 o'clock position 4 cm from the nipple on the left.  This mass measured 2.3 x 2.2 x 1.5 cm and accounted for the patient's area of palpable concern.  Additionally, at the 1 o'clock position 2 cm from the nipple in the left breast, there is a second irregularly shaped hypoechoic mass with indistinct margins measuring 1.3 x 1.1 x 0.8 cm.  She also underwent a breast MRI which showed in the right breast mild background parenchymal enhancement and no suspicious areas of enhancement or lymphadenopathy.  In the left breast there was mild background parenchymal enhancement, and at the 12 o'clock position 4 cm from the nipple there was a heterogeneously enhancing irregular mass measuring 3.4 in AP dimension x 2.8 cm ML and 2.8 cm SI corresponding to the known biopsy-proven malignancy in the left breast.  In the left breast at the 1 o'clock position 2 cm from the nipple there was also a heterogeneously enhancing oval mass measuring 1.3 cm in maximal diameter.  This likely corresponds to the second biopsy-proven malignancy.  Together, the full extent of the  disease spans a total of 5.2 cm AP x 4.4 cm ML, BI-RADS 6.     The pathology showed in the left breast 12 o'clock position 4 cm from the nipple, ultrasound-guided needle biopsy invasive lobular carcinoma, [place] grade 1/3 lobular carcinoma in situ, classic type, estrogen receptor positive in greater than 70% of the cells and progesterone receptor positive in greater than 90% of cells, and HER2 FISH was nonamplified.  In part B in the left breast at the 1 o'clock position 2 cm from the nipple, ultrasound-guided needle biopsy showed invasive lobular carcinoma, [place] grade 1/3 lobular carcinoma in situ was present, classic subtype, estrogen receptors positive in greater than 70% of the cells, progesterone receptors positive in greater than 90% of cells, and HER2 was nonamplified, Ki-67 20-25%.  She now comes to our clinic for recommendations.     Sammie reports that the breast mass had been there for approximately 1 month before she was seen for evaluation..       She has worked in the past as a personal care assistant and lives in the [facility].       PAST MEDICAL HISTORY:  There is no history of breast cancer in the past.  No history of breast cancer surgery.  She has no history of radiation therapy in the past or radiation exposure.  No history of prior tumor of any kind.  She denies heart problems, heart attack, breathing problems, blood clots, seizures, peptic ulcer disease, osteoporosis or bone fractures.  She does report a history of arthritis.     FAMILY HISTORY:  Positive for breast cancer in a paternal aunt who was diagnosed with breast cancer at an old age.     Her father has a history of stomach cancer.  There is no family history of pancreatic cancer, melanoma, lung cancer or ovarian cancer.     No history of male breast cancer.     ALLERGIES:  No history of drug allergies.  She denies allergies to seafood, iodine, or contrast dye.     PAST MENSTRUAL HISTORY:  She has been pregnant 3 times with  2 live births at age 29 and 31 and 1 miscarriage.  Age at first menstrual period was 14.  She did have a total abdominal hysterectomy and bilateral salpingo-oophorectomy performed in 2003 for endometriosis.  She has no history of hormone replacement therapy.  No history of oral contraceptives.     HABITS: She denies tobacco history.  She denies alcohol history and drinks alcohol only occasionally.     PAST MEDICAL HISTORY:  Past medical history is also remarkable for type 2 diabetes, and she was begun on metformin 2 years ago.  She also has a history of varicose veins and a history of a lipoma resected from her left leg.      Chronic Hepatitis B.  Hepatitis C negative.  HIV negative.       Prior COVID vaccination x 3.      GERM LINE GENETICS: INVITAE testing of 47 genes was negative.      TREATMENT HISTORY:  A.  MammaPrint showed low risk.  B.  Left mastectomy and axillary lymph node sampling.   A. Lymph node, LEFT axillary, excision:  -One benign lymph node (0/1)  B. LEFT breast, skin-sparing mastectomy:  -INVASIVE BREAST CARCINOMA, MIXED INVASIVE LOBULAR CARCINOMA (predominant component) and INVASIVE DUCTAL CARCINOMA (minor component), KALPESH GRADE 3, size 5.5 cm, 12:00, upper outer quadrant and lower outer quadrant  -Ductal carcinoma in situ (DCIS), nuclear grade 2, cribriform and solid type(s), with focal necrosis  -DCIS is admixed with invasive carcinoma, and comprises a minor component (<5%) of the tumor volume   -Lobular carcinoma in situ (LCIS), predominantly classic type (admixed with invasive carcinoma and beyond invasive carcinoma) and focal pleomorphic type (size 2 mm, adjacent to invasive carcinoma)  -Margins are uninvolved by invasive carcinoma  -Invasive carcinoma is 2.5 mm from the nearest (anterior) margin, and is > 5 mm from the posterior margin  -Margins are uninvolved by DCIS and pleomorphic LCIS  -DCIS and pleomorphic LCIS are > 5 mm from the nearest (anterior) margin  -Benign nipple and  skin   -Other findings: fibrocystic change (including microcysts with apocrine metaplasia) and columnar cell change  -Calcifications associated with DCIS, LCIS, and benign ducts and acini  -Prior core biopsy site changes (two biopsy sites identified)  -Invasive carcinoma is estrogen receptor positive (>70%, strong intensity) and progesterone receptor positive (>90%, strong intensity) by immunohistochemistry and is HER2 non-amplified (group 5) by FISH (performed on prior core biopsies, see report [id number], specimens A and B)  DCIS and LCIS present.  -Margins are uninvolved by invasive carcinoma or LCIS.   -Invasive carcinoma is estrogen receptor positive (>70%, strong intensity) and progesterone receptor positive (>90%, strong intensity) by immunohistochemistry and is HER2 non-amplified (group 5) by FISH (performed on prior core biopsies, see report [id number], specimens A and B)  Ki-67 immunohistochemistry (MIB-1, pharmDx, [employer]) from [employer] was performed on invasive carcinoma in the LEFT mastectomy specimen   They report Ki-67 proliferative index of 20-25% (block B5) and 20% (block B27).   C.  Staging:  pT3 Nx because lymph node is not the sentinel node.    D.  She did not want radiation, and risk:benefit did not favor treatment.  E.  Letrozole begun 4-5-22.      INTERVAL HISTORY  Ramona Ferrer returns to clinic for routine followup.  She is having discomfort from the left expander in the upper outer quadrant and she will see Dr. Feliciano about that today.  She also has sciatica pain in the left leg and perhaps some in the right.  Pain on the left is worse than the pain on the right.  She has some fatigue.  No depression, no anxiety.  She is taking letrozole and tolerating it reasonably well, although she does have hot flashes.  I explained that the hot flashes are likely related to the letrozole.  She has had COVID vaccinations x2.    REVIEW OF SYSTEMS:  A 10-point review of systems is negative.    She  is eating a healthful Mediterranean-style diet.  She is unable to exercise because of the pain in her legs but is motivated to do so once the leg pain is addressed.    She is taking vitamin D3 2000 International Units per day.  She does not take calcium.  Her last DEXA scan was 2 years ago.     PHYSICAL EXAMINATION:    VITAL SIGNS:  /75   Pulse 68   Temp 98  F (36.7  C)   Resp 18   Wt 103.3 kg (227 lb 12.8 oz)   SpO2 98%   BMI 40.36 kg/m    GENERAL:  Ramona appeared generally well.  HEENT:  She has no alopecia.  Examination of the oropharynx revealed no lesions.  LYMPH:  No palpable cervical, supraclavicular, subclavicular or axillary lymphadenopathy.  BREASTS:  Examination of the right breast reveals no masses.  Examination of the left breast reveals saline expander in place.  There is a well-healed incision just above the inframammary fold, which is well healed without erythema or masses.  No masses in the left breast.  There is firmness and discomfort when I palpated the expander in the upper outer quadrant of the left breast.  LUNGS:  Clear to percussion and auscultation.  HEART:  Regular rate and rhythm.  S1, S2.  ABDOMEN:  Soft, nontender, consistent with increased body mass index.  EXTREMITIES:  Without edema.  PSYCH:  Mood was anxious and affect appropriate.    LABORATORY DATA:  CMP is remarkable for an ALT of 66.  CBC normal.      IMAGING:  She did have a CT of the lumbar spine showing degenerative joint disease.  An x-ray of the left femur showed no suspicious abnormalities.     ASSESSMENT AND PLAN:  1.  Sammie Ferrer is a 67-year-old woman with a recent diagnosis of a stage IIB, T3 N0 MX, invasive lobular carcinoma of the upper outer quadrant of the left breast which is multifocal grade 1, ER positive in greater than 70% of cells, CT positive in greater than 90% of cells and HER2 nonamplified.  She now comes to clinic with her daughter, Ivy, for recommendations regarding evaluation and  treatment.  Her daughter served as a Turkmen  by phone.  The Turkmen  on the iPAD was not involved but was there as a backup if Sammie needed it.   2.  MammaPrint before surgery came back as low risk as did the Oncotype after surgery. The Oncotype of the pleomorphic lobular cancer showed a value of 15, which is less than the threshold for chemotherapy from TAILORx which is 26 for a post-menopausal woman.   3.  Sammie Ferrer underwent a left mastectomy and has an expander in place.  She started adjuvant hormonal therapy with letrozole and has tolerated it well.   4. Pathology showed -Invasive carcinoma is estrogen receptor positive, progesterone receptor positive and HER2 negative by immunohistochemistry.  Margins negative. pT3 Nx because the lymph node is not sentinel.    5.  I saw her with her daughter, Ankita, this morning to assess how she is doing on letrozole.  She has tolerated the letrozole quite well, although she does have some hot flashes.  She does not need radiation.    6.  Expander pain.  No signs or symptns of infection.  Meeting with Dr. Feliciano immediately after this visit.   7.  Imaging:  Yearly mammography of right breast 12-22-22.  Plan is to perform a DEXA scan in followup.   8.  Pain in the left femur and LS spine.  She reports continued pain more on the left than the right. Two view films of the femur were obtained as well as CT scan of the lumbosacral spine.  These studies revealed no evidence for metastases and show DJD of the spine.  I discussed that we cannot do an MRI because she has an expander in place.  All of her questions and all of her daughter's questions were answered.  Meeting with Dr. Burns in 2 days.   9.   XR of Femur, 2 view. CT Lumbar spine.  These radiographic studies show DJD of the lumbar spine.  No evidence of malignancy.    10.  Followup.    Follow up with me September 22 with CBC, CMP. Dexa scan in next 1-2 weeks.   Follow up with me 12-22 with  CBC, CMP and right mammogram.      Thank you for allowing us to continue to participate in Sammie Ferrer's care.       I spent 40 minutes with the patient more than 50% of which was in counseling and coordination of care.          Again, thank you for allowing me to participate in the care of your patient.      Sincerely,    Kenneth Cisneros MD

## 2022-06-28 NOTE — PROGRESS NOTES
PRESENTING COMPLAINT:  Postoperative visit status post left breast cancer, underwent nipple-nonsparing mastectomy and immediate expander-based reconstruction on 03/23/2022.    HISTORY OF PRESENTING COMPLAINT:  Ms. Ferrer is 67 years old, here with her daughter, here to discuss next stages of reconstruction.  She continues to complain of left chest pain and neuropathy.  She is visiting with Neurology this week.  She last saw me in May and I placed orders for left breast RUTH flap reconstruction.  No change otherwise in her history and physical exam.    ASSESSMENT AND PLAN:  Based upon the above findings, a diagnosis of left breast reconstruction was made.  Plan now is to proceed with the left-side-only RUTH-based reconstruction, followed by touchup surgeries with symmetry enhancement procedures thereafter in a staged fashion.  I went over this plan with the patient and her daughter in detail.  Plan is to get a CTA, PAC Clinic visit and see me back in preop.  Overview of the surgery with diagrams as well as pictures were explained.  They understood everything.  This is our plan.  They were happy with the visit.  All exam and discussion done in the presence of my nurse, Lorna Ludwig.

## 2022-06-28 NOTE — NURSING NOTE
"Oncology Rooming Note    June 28, 2022 9:34 AM   Ramona Ferrer is a 67 year old female who presents for:    Chief Complaint   Patient presents with     Blood Draw     Labs obtained via  23 gauge butterfly needle, VS taken, checked into next appt     Oncology Clinic Visit     Breast cancer of upper-outer quadrant of left female breast      Initial Vitals: /75   Pulse 68   Temp 98  F (36.7  C)   Resp 18   Wt 103.3 kg (227 lb 12.8 oz)   SpO2 98%   BMI 40.36 kg/m   Estimated body mass index is 40.36 kg/m  as calculated from the following:    Height as of 5/10/22: 1.6 m (5' 2.99\").    Weight as of this encounter: 103.3 kg (227 lb 12.8 oz). Body surface area is 2.14 meters squared.  Moderate Pain (4) Comment: Data Unavailable   No LMP recorded. Patient has had a hysterectomy.  Allergies reviewed: Yes  Medications reviewed: Yes    Medications: MEDICATION REFILLS NEEDED TODAY. Provider was notified.  Pharmacy name entered into [facility]:    [id number] [place], [place] - [address]  [id number] - [place], [place] - [address]     Clinical concerns: Pt presents today for f/u.  Pt is requesting a Calcium  + Magnesium supplement.  She has a history of Kidney Stones and believes this would be beneficial to her.       Jocelin Urrutia, MANUEL  6/28/2022              "

## 2022-06-28 NOTE — NURSING NOTE
Chief Complaint   Patient presents with     Blood Draw     Labs obtained via  23 gauge butterfly needle, VS taken, checked into next appt

## 2022-06-28 NOTE — LETTER
6/28/2022         RE: Ramona Ferrer  [address]  [place] [address]        Dear Colleague,    Thank you for referring your patient, Ramona Ferrer, to the [facility] BREAST [facility]. Please see a copy of my visit note below.    PRESENTING COMPLAINT:  Postoperative visit status post left breast cancer, underwent nipple-nonsparing mastectomy and immediate expander-based reconstruction on 03/23/2022.    HISTORY OF PRESENTING COMPLAINT:  Ms. Ferrer is 67 years old, here with her daughter, here to discuss next stages of reconstruction.  She continues to complain of left chest pain and neuropathy.  She is visiting with Neurology this week.  She last saw me in May and I placed orders for left breast RUTH flap reconstruction.  No change otherwise in her history and physical exam.    ASSESSMENT AND PLAN:  Based upon the above findings, a diagnosis of left breast reconstruction was made.  Plan now is to proceed with the left-side-only RUTH-based reconstruction, followed by touchup surgeries with symmetry enhancement procedures thereafter in a staged fashion.  I went over this plan with the patient and her daughter in detail.  Plan is to get a CTA, PAC Clinic visit and see me back in preop.  Overview of the surgery with diagrams as well as pictures were explained.  They understood everything.  This is our plan.  They were happy with the visit.  All exam and discussion done in the presence of my nurse, Lorna Ludwig.          Again, thank you for allowing me to participate in the care of your patient.      Sincerely,    NOHEMI Feliciano MD

## 2022-06-30 ENCOUNTER — TELEPHONE (OUTPATIENT)
Dept: PLASTIC SURGERY | Facility: CLINIC | Age: 67
End: 2022-06-30

## 2022-06-30 ENCOUNTER — TELEPHONE (OUTPATIENT)
Dept: INTERNAL MEDICINE | Facility: CLINIC | Age: 67
End: 2022-06-30

## 2022-06-30 NOTE — TELEPHONE ENCOUNTER
FUTURE VISIT INFORMATION      SURGERY INFORMATION:    Date: 22    Location: uu or    Surgeon:  NOHEMI Feliciano MD    Anesthesia Type:  general with block    Procedure: Left breast reconstruction with free abdominal flap, Resensation, SPY    Consult: ov     RECORDS REQUESTED FROM:        Primary Care Provider: Eugene Michelle MD  - [facility]    Pertinent Medical History: kathy, hypertension     Most recent EKG+ Tracin21    Most recent Cardiac Stress Test: 21

## 2022-06-30 NOTE — TELEPHONE ENCOUNTER
Patient calling. She has had surgery and more to come. She is having left leg numbness from the knee up to hip. She also has pain in the same area. Patient would like to be seen asap. Ok to call and casper Ndiaye her daughter at [phone]

## 2022-07-01 NOTE — TELEPHONE ENCOUNTER
Left message for daughter or patient to call back for triage. Primary care provider has no appointments available asap. Will need to triage to determine if virtual visit is appropriate or if provider is able to work patient in to a virtual appointment but even virtual appointments are booked out to the week of 7/11/22.      Home

## 2022-07-01 NOTE — TELEPHONE ENCOUNTER
The earliest virtual visit I have is on 07/11/22 and I can see her in clinic at 5 pm virtual spot, but please advise pt to come in at 4;30 pm

## 2022-07-01 NOTE — TELEPHONE ENCOUNTER
RNCC: Lorna Ludwig ([phone]) and Rell Dominguez ([phone])    Surgery is [facility] with [facility]ician[facility] to be completed by: PAC    Pre-surgical appointments on 8/2:  CTA   Consult with Dr. Feliciano   PAC visit     COVID-19 test: 8/22 at [facility]    Post-op: 9/9 as an in-person visit with Vidhi Austin    Patient will receive surgery arrival and start time from PAC.    I spoke with the patient and was able to confirm the scheduled information. No further action needed at this time.    Surgery packet was sent via US mail

## 2022-07-06 ENCOUNTER — OFFICE VISIT (OUTPATIENT)
Dept: NEUROLOGY | Facility: CLINIC | Age: 67
End: 2022-07-06
Attending: PLASTIC SURGERY
Payer: MEDICARE

## 2022-07-06 VITALS
SYSTOLIC BLOOD PRESSURE: 129 MMHG | HEART RATE: 78 BPM | WEIGHT: 227 LBS | HEIGHT: 63 IN | OXYGEN SATURATION: 96 % | BODY MASS INDEX: 40.22 KG/M2 | DIASTOLIC BLOOD PRESSURE: 69 MMHG

## 2022-07-06 DIAGNOSIS — M79.605 BILATERAL LOWER EXTREMITY PAIN: ICD-10-CM

## 2022-07-06 DIAGNOSIS — G89.29 CHRONIC RIGHT-SIDED LOW BACK PAIN WITH RIGHT-SIDED SCIATICA: Primary | ICD-10-CM

## 2022-07-06 DIAGNOSIS — M54.41 CHRONIC RIGHT-SIDED LOW BACK PAIN WITH RIGHT-SIDED SCIATICA: Primary | ICD-10-CM

## 2022-07-06 DIAGNOSIS — R20.2 BILATERAL LEG PARESTHESIA: Primary | ICD-10-CM

## 2022-07-06 DIAGNOSIS — M79.604 BILATERAL LOWER EXTREMITY PAIN: ICD-10-CM

## 2022-07-06 PROCEDURE — 99205 OFFICE O/P NEW HI 60 MIN: CPT | Performed by: PSYCHIATRY & NEUROLOGY

## 2022-07-06 NOTE — TELEPHONE ENCOUNTER
Action July 6, 2022 1:24 PM MT   Action Taken CSS sent a req to [facility] for imgs [phone].      Action July 7, 2022 8:42 AM MT   [phone] and got xferred to medical records and tt rep alex will push over imgs.        DIAGNOSIS: MRI Lumbar spine results    APPOINTMENT DATE: 07/07/2022   NOTES STATUS DETAILS   OFFICE NOTE from other specialist [facility] 04/05/2022 - Kenneth Cisneros MD - [facility] Oncology  01/10/2019 - Jocelin Mancuso CNP  - [facility] Neuro  12/18/2018 - Sunday Jama PT - [facility]  07/18/2016 - Mica Brito DO - [facility]   07/31/2012 - Mingo Guevara MD - [facility]   04/06/2011 - Smiley Jj - [facility]   OPERATIVE REPORT Internal 12/10/2004 - Cystourethroscopy and Exploratory laparotomy, extensive lysis of adhesions,   total abdominal hysterectomy, bilateral salpingo-oophorectomy,   Cystoscopy    11/05/2004 - Exam under anesthesia.  Diagnostic laparoscopy.  Partial   lysis of adhesions.     MEDICATION LIST Care Everywhere/Internal    IMPLANT RECORD/STICKER Internal    LABS     CBC/DIFF Internal 06/28/2022   DEXA PACS 01/07/2019 - Hip/Pel/Spine   INJECTIONS DONE IN RADIOLOGY PACS 03/26/2019, 01/23/2019     MRI PACS 01/10/2019, 08/28/2008 - L Spine    [facility] Radiology:  07/31/2012 - L Spine     CT SCAN PACS 05/09/2022 - L Spine  05/26/2019, 02/02/2019, 07/16/2011 - Abd.Pel  11/10/2004, 10/21/2004 - Abdomen     XRAYS (IMAGES & REPORTS) PACS 11/29/2018, 01/23/2006 - Chest  07/22/2011, 12/13/2004 - Abdomen

## 2022-07-06 NOTE — LETTER
7/6/2022         RE: Ramona Ferrer  [address]  [place] [place] [id number]        Dear Colleague,    Thank you for referring your patient, Ramona Ferrer, to the [facility]. Please see a copy of my visit note below.    INITIAL NEUROLOGY CONSULTATION    DATE OF VISIT: 7/6/2022  CLINIC LOCATION: [facility]  MRN: [id number]  PATIENT NAME: Ramona Ferrer  YOB: 1955    REASON FOR VISIT:   Chief Complaint   Patient presents with     Consult     Thighs bilateral numbness and tingling , left is worse than right      HISTORY OF PRESENT ILLNESS:                                                    Ms. Ramona Ferrer is 67 year old right handed female patient with past medical history of hypertension, hypothyroidism, obesity, endometriosis and breast cancer, who was seen today for thigh paresthesias and pain after surgery.    Per patient's report and review of past medical records, she underwent left mastectomy in March 2022.  On the following day she developed excruciating left lateral thigh pain and paresthesias.  Approximately a week later she also developed similar pain and paresthesias in the right thigh, but it was much less intense.  Over time she got used to pain, but her symptoms continue without significant change.  Pain is significantly worse when she lies in bed.  No other aggravating or alleviating factors.  She tried opiates and gabapentin, but did not appreciate significant effect.  She reports history of chronic back pain, but it does not coincide with her current pain.  She also tried massage and chiropractor care without significant relief.  She is scheduled to see Dr. Burns tomorrow.    Laboratory evaluation from June 2022 includes normal CBC along with slightly elevated ALT (66) and glucose (180) on otherwise unremarkable CMP.    Lumbar spine CT from 5/9/2022 demonstrated multilevel degenerative changes with most  "pronounced spinal canal stenosis (moderate) at L2-3 and L3-4 levels along with severe bilateral L5-S1 neuroforaminal stenosis with compression of exiting bilateral L5 nerve roots.  Lumbar spine MRI from 1/10/2019 demonstrated multilevel degenerative changes, most advanced at L5-S1 level where there is severe bilateral neuroforaminal stenosis and minimal spinal canal narrowing.  Images were personally reviewed and independently interpreted.    No additional useful information is available in Care Everywhere, which was reviewed.  PAST MEDICAL/SURGICAL HISTORY:                                                    I personally reviewed patient's past medical and surgical history with the patient at today's visit.  MEDICATIONS:                                                    I personally reviewed patient's medications and allergies with the patient at today's visit.  ALLERGIES:                                                      Allergies   Allergen Reactions     Seasonal Allergies      EXAM:                                                    VITAL SIGNS:   BP (!) 145/72   Pulse 83   Ht 1.6 m (5' 3\")   Wt 103 kg (227 lb)   SpO2 100%   BMI 40.21 kg/m    Mini-Cog Assessment:  Mini Cog Assessment  Clock Draw Score: 2 Normal  3 Item Recall: 3 objects recalled  Mini Cog Total Score: 5  Administered by: : Tessa MILES    General: pt is in NAD, cooperative.  Skin: normal turgor, moist mucous membranes, no lesions/rashes noticed.  HEENT: ATNC, EOMI, PERRL, white sclera, normal conjunctiva, no nystagmus or ptosis. No carotid bruits bilaterally.  Respiratory: lung sounds clear to auscultation bilaterally, no crackles, wheezes, rhonchi. Symmetric lung excursion, no accessory respiratory muscle use.  Cardiovascular: normal S1/S2, no murmurs/rubs/gallops.   Abdomen: Not distended.  : deferred.    Neurological:  Mental: alert, follows commands, Mini Cog Total Score: 5/5 with 3/3 on memory recall, no aphasia or dysarthria. Fund of " knowledge is appropriate for age.  Cranial Nerves:  CN II: visual acuity - able to accurately count fingers with each eye. Visual fields intact, fundi: discs sharp, no papilledema and normal vessels bilaterally.  CN III, IV, VI: EOM intact, pupils equal and reactive  CN V: facial sensation nl  CN VII: face symmetric, no facial droop  CN VIII: hearing normal  CN IX: palate elevation symmetric, uvula at midline  CN XI SCM normal, shoulder shrug nl  CN XII: tongue midline  Motor: Strength: 5/5 in all major groups of all extremities. Normal tone. No abnormal movements. No pronator drift b/l.  Reflexes: Triceps, biceps, brachioradialis, patellar, and achilles reflexes normal and symmetric. No clonus noted. Toes are down-going b/l.   Sensory: light touch and pinprick are reduced over the left lateral thigh and left lateral foreleg, vibration intact. Romberg: negative.  Coordination: FNF and heel-shin tests intact b/l.   Gait:  Normal, able to tandem walk with mild difficulty.  DATA:   LABS/EEG/IMAGING/OTHER STUDIES: I reviewed pertinent medical records, as detailed in the history of present illness.  ASSESSMENT AND PLAN:      ASSESSMENT: Ramona Ferrer is a 67 year old female patient with listed above past medical history, who presents with bilateral, left worse than right, thigh pain and paresthesias following left mastectomy in March 2022.    We had a detailed discussion with the patient regarding her presenting complaints.  The neurological exam today demonstrates reduced sensation over the left lateral femoral cutaneous nerve.  She also has reduced sensation on the lateral surface of the left leg below the knee, but states that it is chronic after her leg surgery.  We reviewed recent lumbar spine CT images along with previous lumbar spine MRI images.  We also discussed differential diagnosis.  I reviewed with the patient that most likely her clinical presentation would be consistent with bilateral meralgia  "paresthetica, though additional differential includes bilateral L2-3 radiculopathy.  For further diagnostic clarification, I ordered EMG.  She tried gabapentin, but was not able to tolerate it.  Moreover, it did not help with the pain.  She is not interested in additional medication trials.  We discussed option of nerve block if EMG is negative.    Sammie to follow up with Primary Care provider regarding elevated blood pressure.     DIAGNOSES:    ICD-10-CM    1. Bilateral leg paresthesia  R20.2 Pain Management Referral   2. Bilateral lower extremity pain  M79.604 Pain Management Referral    M79.605      PLAN: At today's visit we thoroughly discussed various diagnostic possibilities for patient's symptoms (most likely bilateral meralgia paresthetica), necessary evaluation, and the plan, which includes:  Orders Placed This Encounter   Procedures     EMG     No new medications.     Additional recommendations after the work-up.    Next follow-up appointment is in the next 4 weeks or earlier if needed.    Total Time: 73 minutes spent on the date of the encounter doing chart review, history and exam, documentation and further activities per the note.    Yvan Sosa MD  [facility]  (Chart documentation was completed in part with Dragon voice-recognition software. Even though reviewed, some grammatical, spelling, and word errors may remain.)            Ramona Ferrer is a 67 year old female who presents for:  Chief Complaint   Patient presents with     Consult     Thighs bilateral numbness and tingling , left is worse than right         Initial Vitals:  BP (!) 145/72   Pulse 83   Ht 1.6 m (5' 3\")   Wt 103 kg (227 lb)   SpO2 100%   BMI 40.21 kg/m   Estimated body mass index is 40.21 kg/m  as calculated from the following:    Height as of this encounter: 1.6 m (5' 3\").    Weight as of this encounter: 103 kg (227 lb).. Body surface area is 2.14 meters squared. BP completed using cuff size: " large    Visit Facilitator Comments: 2nd set of vitals (taken on right arm, while seated, with adult large cuff)  BP- 129/69,  Pulse- 78,  96%     Vicky Fregoso      Again, thank you for allowing me to participate in the care of your patient.        Sincerely,        Yvan Sosa MD

## 2022-07-06 NOTE — PROGRESS NOTES
"Ramona Ferrer is a 67 year old female who presents for:  Chief Complaint   Patient presents with     Consult     Thighs bilateral numbness and tingling , left is worse than right         Initial Vitals:  BP (!) 145/72   Pulse 83   Ht 1.6 m (5' 3\")   Wt 103 kg (227 lb)   SpO2 100%   BMI 40.21 kg/m   Estimated body mass index is 40.21 kg/m  as calculated from the following:    Height as of this encounter: 1.6 m (5' 3\").    Weight as of this encounter: 103 kg (227 lb).. Body surface area is 2.14 meters squared. BP completed using cuff size: large    Visit Facilitator Comments: 2nd set of vitals (taken on right arm, while seated, with adult large cuff)  BP- 129/69,  Pulse- 78,  96%     Vicky Fregoso  "

## 2022-07-06 NOTE — PATIENT INSTRUCTIONS
AFTER VISIT SUMMARY (AVS):    At today's visit we thoroughly discussed various diagnostic possibilities for your symptoms (most likely bilateral meralgia paresthetica), necessary evaluation, and the plan, which includes:  Orders Placed This Encounter   Procedures    EMG     No new medications.     Additional recommendations after the work-up.    Next follow-up appointment is in the next 4 weeks or earlier if needed.    Please do not hesitate to call me with any questions or concerns.    Thanks.

## 2022-07-06 NOTE — PROGRESS NOTES
INITIAL NEUROLOGY CONSULTATION    DATE OF VISIT: 7/6/2022  CLINIC LOCATION: [facility]  MRN: [id number]  PATIENT NAME: Ramona Ferrer  YOB: 1955    REASON FOR VISIT:   Chief Complaint   Patient presents with     Consult     Thighs bilateral numbness and tingling , left is worse than right      HISTORY OF PRESENT ILLNESS:                                                    Ms. Ramona Ferrer is 67 year old right handed female patient with past medical history of hypertension, hypothyroidism, obesity, endometriosis and breast cancer, who was seen today for thigh paresthesias and pain after surgery.    Per patient's report and review of past medical records, she underwent left mastectomy in March 2022.  On the following day she developed excruciating left lateral thigh pain and paresthesias.  Approximately a week later she also developed similar pain and paresthesias in the right thigh, but it was much less intense.  Over time she got used to pain, but her symptoms continue without significant change.  Pain is significantly worse when she lies in bed.  No other aggravating or alleviating factors.  She tried opiates and gabapentin, but did not appreciate significant effect.  She reports history of chronic back pain, but it does not coincide with her current pain.  She also tried massage and chiropractor care without significant relief.  She is scheduled to see Dr. Burns tomorrow.    Laboratory evaluation from June 2022 includes normal CBC along with slightly elevated ALT (66) and glucose (180) on otherwise unremarkable CMP.    Lumbar spine CT from 5/9/2022 demonstrated multilevel degenerative changes with most pronounced spinal canal stenosis (moderate) at L2-3 and L3-4 levels along with severe bilateral L5-S1 neuroforaminal stenosis with compression of exiting bilateral L5 nerve roots.  Lumbar spine MRI from 1/10/2019 demonstrated multilevel degenerative changes, most advanced  "at L5-S1 level where there is severe bilateral neuroforaminal stenosis and minimal spinal canal narrowing.  Images were personally reviewed and independently interpreted.    No additional useful information is available in Care Everywhere, which was reviewed.  PAST MEDICAL/SURGICAL HISTORY:                                                    I personally reviewed patient's past medical and surgical history with the patient at today's visit.  MEDICATIONS:                                                    I personally reviewed patient's medications and allergies with the patient at today's visit.  ALLERGIES:                                                      Allergies   Allergen Reactions     Seasonal Allergies      EXAM:                                                    VITAL SIGNS:   BP (!) 145/72   Pulse 83   Ht 1.6 m (5' 3\")   Wt 103 kg (227 lb)   SpO2 100%   BMI 40.21 kg/m    Mini-Cog Assessment:  Mini Cog Assessment  Clock Draw Score: 2 Normal  3 Item Recall: 3 objects recalled  Mini Cog Total Score: 5  Administered by: : Tessa MILES    General: pt is in NAD, cooperative.  Skin: normal turgor, moist mucous membranes, no lesions/rashes noticed.  HEENT: ATNC, EOMI, PERRL, white sclera, normal conjunctiva, no nystagmus or ptosis. No carotid bruits bilaterally.  Respiratory: lung sounds clear to auscultation bilaterally, no crackles, wheezes, rhonchi. Symmetric lung excursion, no accessory respiratory muscle use.  Cardiovascular: normal S1/S2, no murmurs/rubs/gallops.   Abdomen: Not distended.  : deferred.    Neurological:  Mental: alert, follows commands, Mini Cog Total Score: 5/5 with 3/3 on memory recall, no aphasia or dysarthria. Fund of knowledge is appropriate for age.  Cranial Nerves:  CN II: visual acuity - able to accurately count fingers with each eye. Visual fields intact, fundi: discs sharp, no papilledema and normal vessels bilaterally.  CN III, IV, VI: EOM intact, pupils equal and reactive  CN V: " facial sensation nl  CN VII: face symmetric, no facial droop  CN VIII: hearing normal  CN IX: palate elevation symmetric, uvula at midline  CN XI SCM normal, shoulder shrug nl  CN XII: tongue midline  Motor: Strength: 5/5 in all major groups of all extremities. Normal tone. No abnormal movements. No pronator drift b/l.  Reflexes: Triceps, biceps, brachioradialis, patellar, and achilles reflexes normal and symmetric. No clonus noted. Toes are down-going b/l.   Sensory: light touch and pinprick are reduced over the left lateral thigh and left lateral foreleg, vibration intact. Romberg: negative.  Coordination: FNF and heel-shin tests intact b/l.   Gait:  Normal, able to tandem walk with mild difficulty.  DATA:   LABS/EEG/IMAGING/OTHER STUDIES: I reviewed pertinent medical records, as detailed in the history of present illness.  ASSESSMENT AND PLAN:      ASSESSMENT: Ramona Ferrer is a 67 year old female patient with listed above past medical history, who presents with bilateral, left worse than right, thigh pain and paresthesias following left mastectomy in March 2022.    We had a detailed discussion with the patient regarding her presenting complaints.  The neurological exam today demonstrates reduced sensation over the left lateral femoral cutaneous nerve.  She also has reduced sensation on the lateral surface of the left leg below the knee, but states that it is chronic after her leg surgery.  We reviewed recent lumbar spine CT images along with previous lumbar spine MRI images.  We also discussed differential diagnosis.  I reviewed with the patient that most likely her clinical presentation would be consistent with bilateral meralgia paresthetica, though additional differential includes bilateral L2-3 radiculopathy.  For further diagnostic clarification, I ordered EMG.  She tried gabapentin, but was not able to tolerate it.  Moreover, it did not help with the pain.  She is not interested in additional medication  trials.  We discussed option of nerve block if EMG is negative.    Sammie to follow up with Primary Care provider regarding elevated blood pressure.     DIAGNOSES:    ICD-10-CM    1. Bilateral leg paresthesia  R20.2 Pain Management Referral   2. Bilateral lower extremity pain  M79.604 Pain Management Referral    M79.605      PLAN: At today's visit we thoroughly discussed various diagnostic possibilities for patient's symptoms (most likely bilateral meralgia paresthetica), necessary evaluation, and the plan, which includes:  Orders Placed This Encounter   Procedures     EMG     No new medications.     Additional recommendations after the work-up.    Next follow-up appointment is in the next 4 weeks or earlier if needed.    Total Time: 73 minutes spent on the date of the encounter doing chart review, history and exam, documentation and further activities per the note.    Yvan Sosa MD  [facility]  (Chart documentation was completed in part with Dragon voice-recognition software. Even though reviewed, some grammatical, spelling, and word errors may remain.)

## 2022-07-07 ENCOUNTER — OFFICE VISIT (OUTPATIENT)
Dept: ORTHOPEDICS | Facility: CLINIC | Age: 67
End: 2022-07-07
Payer: MEDICARE

## 2022-07-07 ENCOUNTER — PRE VISIT (OUTPATIENT)
Dept: ORTHOPEDICS | Facility: CLINIC | Age: 67
End: 2022-07-07

## 2022-07-07 ENCOUNTER — ANCILLARY PROCEDURE (OUTPATIENT)
Dept: GENERAL RADIOLOGY | Facility: CLINIC | Age: 67
End: 2022-07-07
Attending: ORTHOPAEDIC SURGERY
Payer: MEDICARE

## 2022-07-07 VITALS — HEIGHT: 63 IN | BODY MASS INDEX: 40.22 KG/M2 | WEIGHT: 227 LBS

## 2022-07-07 DIAGNOSIS — Z17.0 MALIGNANT NEOPLASM OF UPPER-OUTER QUADRANT OF LEFT BREAST IN FEMALE, ESTROGEN RECEPTOR POSITIVE (H): ICD-10-CM

## 2022-07-07 DIAGNOSIS — M54.16 LUMBAR RADICULOPATHY: Primary | ICD-10-CM

## 2022-07-07 DIAGNOSIS — C50.412 MALIGNANT NEOPLASM OF UPPER-OUTER QUADRANT OF LEFT BREAST IN FEMALE, ESTROGEN RECEPTOR POSITIVE (H): ICD-10-CM

## 2022-07-07 DIAGNOSIS — G89.29 CHRONIC RIGHT-SIDED LOW BACK PAIN WITH RIGHT-SIDED SCIATICA: ICD-10-CM

## 2022-07-07 DIAGNOSIS — M54.41 CHRONIC RIGHT-SIDED LOW BACK PAIN WITH RIGHT-SIDED SCIATICA: ICD-10-CM

## 2022-07-07 DIAGNOSIS — M48.07 FORAMINAL STENOSIS OF LUMBOSACRAL REGION: ICD-10-CM

## 2022-07-07 PROCEDURE — 72082 X-RAY EXAM ENTIRE SPI 2/3 VW: CPT | Mod: GC | Performed by: RADIOLOGY

## 2022-07-07 PROCEDURE — 99204 OFFICE O/P NEW MOD 45 MIN: CPT | Mod: GC | Performed by: ORTHOPAEDIC SURGERY

## 2022-07-07 RX ORDER — HYDROCODONE BITARTRATE AND ACETAMINOPHEN 5; 325 MG/1; MG/1
1 TABLET ORAL 2 TIMES DAILY PRN
Qty: 24 TABLET | Refills: 0 | Status: SHIPPED | OUTPATIENT
Start: 2022-07-07 | End: 2022-07-17

## 2022-07-07 NOTE — LETTER
7/7/2022         RE: Ramona Ferrer  [address]  [place] [address]        Dear Colleague,    Thank you for referring your patient, Ramona Ferrer, to the [facility] [place]. Please see a copy of my visit note below.    In-Person Visit    REASON FOR CONSULTATION: Consult (Low back pain. Pain in left leg is very bad. Starting to have pain in right leg. All started after her mastectomy surgery. )     REFERRING PHYSICIAN: Referred Self  PCP:Eugene Michelle    History of Present Illness:  67 year old female with a history of breast cancer s/p left mastectomy in 3/2022 and osteopenia on DEXA scan in 2019, who presents for evaluation of bilateral lateral lower extremity pain, L>R. This first developed following her mastectomy 4 months ago. She states that the day after the operation, she had anterolateral lower extremity pain and burning, again left worse than right. Her pain is worse when laying in bed. The patient has tried tylenol and gabapentin in the past without improvement in her symptoms but oxycodone and hydrocodone have helped to take the edge off. She has tried physical therapy through her chiropractor but no injections specifically for this issue. No additional symptoms at this time.     Of note, the patient had both neck pain and lower back pain in the past that was previously treated with steroid injections as noted below. Symptoms improved following her second injection at the L5 nerve root and the patient has not required pain management since.     Back 30%, Leg 70%,  Left 80% > Right 20%  Worse: Laying still  Better: Nothing     Previous treatment:   Physical therapy through her chiropractor  Gabapentin but averse to restarting this due to side effect profile     Previous Injections:  S1 joint injection in 2019 with Dr. Cristian Reynolds   L5 nerve root injection in 2019 with Dr. May BLOUNT patient cannot get MRI until expander is removed  following breast reconstruction with RUTH flap with Dr. Feliciano on 8/25/22.     Oswestry (CHUY) Questionnaire    OSWESTRY DISABILITY INDEX 7/7/2022   Count 9   Sum 32   Oswestry Score (%) 71.11   Some recent data might be hidden        Visual Analog Pain Scale  Back Pain Scale 0-10: 7  Right leg pain: 7  Left leg pain: 8      ROS:  A 12-point review of systems was completed and is negative except for otherwise noted above in the history of present illness.    Med Hx:  Past Medical History:   Diagnosis Date     Breast cancer (H) 12/2021     Complication of anesthesia      DDD (degenerative disc disease), lumbar      Endometriosis      Hypertension      Hypothyroidism           Seasonal allergies      Spinal stenosis, lumbar        Surg Hx:  Past Surgical History:   Procedure Laterality Date     COLONOSCOPY       COLONOSCOPY  2/20/2012    Procedure:COLONOSCOPY; COLONOSCOPY ; Surgeon:ARUN KITCHEN; Location: GI     COLONOSCOPY N/A 2/15/2019    Procedure: COMBINED COLONOSCOPY, SINGLE OR MULTIPLE BIOPSY/POLYPECTOMY BY BIOPSY;  Surgeon: Connor Sanderson MD;  Location:  GI     HC CYSTOURETHROSCOPY W/ URETEROSCOPY &/OR PYELOSCOPY; W/ LITHOTRIPSY       HC TRABECULOPLASTY BY LASER SURGERY      PI OU     HYSTERECTOMY, PAP NO LONGER INDICATED       LASER YAG IRIDOTOMY  8/8/2012    Procedure: LASER YAG IRIDOTOMY;  LEFT EYE YAG LASER PUPIL IRIDOTOMY ;  Surgeon: Dakotah Humphreys MD;  Location:  EC     LIGATN/STRIP LONG OR SHORT SAPHEN      x's2     MASTECTOMY PARTIAL WITH SENTINEL NODE Left 3/23/2022    Procedure: LEFT SKIN SPARING MASTECTOMY WITH SENTINEL LYMPH NODE BIOPSY;  Surgeon: Dada Mendiola MD;  Location: [facility] OR     PELVIS LAPAROSCOPY,DX       RECONSTRUCT BREAST, INSERT TISSUE EXPANDER BILATERAL, COMBINED Left 3/23/2022    Procedure: Left breast reconstruction with expander and SPY;  Surgeon: NOHEMI Feliciano MD;  Location: [facility] OR     STRABISMUS SURGERY       ZZC TOTAL ABDOM HYSTERECTOMY  2003    MARYA BSO for stage  4 endometriosis       Allergies:  Allergies   Allergen Reactions     Seasonal Allergies        Meds:  Current Outpatient Medications   Medication     acetaminophen (TYLENOL) 325 MG tablet     Artificial Tear Solution (SOOTHE XP) SOLN     aspirin-acetaminophen-caffeine (EXCEDRIN MIGRAINE) 250-250-65 MG per tablet     calcium carbonate (OS-EMMA) 500 MG tablet     calcium carbonate 500 mg, elemental, (OSCAL) 500 MG tablet     cetirizine (ZYRTEC) 10 MG tablet     fluticasone (FLONASE) 50 MCG/ACT nasal spray     gabapentin (NEURONTIN) 300 MG capsule     hydrocortisone (WESTCORT) 0.2 % external cream     hydrOXYzine (ATARAX) 25 MG tablet     ketorolac (ACULAR) 0.5 % ophthalmic solution     letrozole (FEMARA) 2.5 MG tablet     levothyroxine (SYNTHROID/LEVOTHROID) 112 MCG tablet     lisinopril (ZESTRIL) 10 MG tablet     metFORMIN (GLUCOPHAGE-XR) 500 MG 24 hr tablet     methocarbamol (ROBAXIN) 500 MG tablet     olopatadine (PATANOL) 0.1 % ophthalmic solution     Omega-3 Fatty Acids (FISH OIL) 500 MG CAPS     omeprazole (PRILOSEC) 40 MG DR capsule     ondansetron (ZOFRAN) 4 MG tablet     ORDER FOR DME     oxyCODONE (ROXICODONE) 5 MG tablet     prednisoLONE acetate (PRED FORTE) 1 % ophthalmic suspension     vitamin D3 (CHOLECALCIFEROL) 50 mcg (2000 units) tablet     No current facility-administered medications for this visit.     Facility-Administered Medications Ordered in Other Visits   Medication     lidocaine 2 % (URO-JET) jelly 5 mL       Fam Hx:  Family History   Problem Relation Age of Onset     Diabetes Mother      Thyroid Disease Mother      Hypertension Mother      Cancer Father          of stomach CA     Heart Disease Father         MI at age 58     Stomach Cancer Maternal Grandfather      Stomach Cancer Paternal Grandmother      Breast Cancer Paternal Aunt      Glaucoma No family hx of      Macular Degeneration No family hx of        P/S Hx:  Social History     Tobacco Use     Smoking status: Never Smoker      "Smokeless tobacco: Never Used   Substance Use Topics     Alcohol use: Yes     Comment: social         Physical Exam:  Very pleasant, healthy appearing, alert, oriented x 3, cooperative.  Normal mood and affect.  Not in cardiorespiratory distress.  Ht 1.6 m (5' 3\")   Wt 103 kg (227 lb)   BMI 40.21 kg/m    Normal upright posture.    Normal gait without assistive device.    No gross spinal deformity  Localizes pain over left anterolateral thigh  Tenderness: (-) midline, (-) paraspinal, (-) R and L PSIS.    Neuro Exam:  Motor: 5/5 strength in hip flexion, knee extension, knee flexion, dorsi and plantarflexion, EHL bilaterally  Sensory: decreased sensation to light touch over the left lateral thigh, decreased sensation over left lateral lower leg that has been present x30 years     Lower Extremity:  Full painless passive knee and ankle motion.    Imaging:    Complete spine films:   Multilevel degenerative changes, most significant at L4-5, L5-S1.  Grade I anterolithesis of L3 over L4. Loss of lumbar lordosis.     CT Lumbar spine from 5/9  Large bone spur encroaching on left L5 nerve root. Large bridging bone spur at L4-5, L5-S1. Vacuum pocket between L4-5 and within the facet joints at L3--5.    Assessment:    - Foraminal stenosis of lumbosacral region   - Left anterolateral thigh pain, likely L5 radiculopathy vs less likely IT band syndrome  - Loss of lumbar lordosis   - Multilevel spondylosis of the lumbar spine with disc degeneration    Plan:  67 year old female with foraminal stenosis of the lumbosacral region, worst at left L5-S1.  Reviewed with patient that her symptoms could be coming from her back or from some other etiology.  As such recommend going forward with steroid injection because she had improvement in her back pain in the past and this would also serve as a diagnostic test to differentiate between a more central process versus peripheral such as IT band syndrome.  Given debilitating nature of this " pain, will provide patient with a one-time hydrocodone prescription until she can undergo transforaminal injection at L5-S1 level.     As patient has breast reconstruction surgery w/ Debra flap on August 25, discussed that we would have to wait until after her expander is removed before obtaining MRI should her symptoms persist after injection as the most recent MRI is 3 years old. Patient and daughter were agreeable with the plan.    - L5-S1 transforaminal steroid injection  - One time prescription for hydrocodone given to alleviate pain until patient is able to get steroid injection  - Plan to follow up prn, sooner if patient does not receive symptomatic relief following injection    Caprice Whitney MD  PGY-1  Orthopaedic Surgery    Attestation:  I (Dr. Michael Burns - Spine Surgeon) have personally evaluated patient with PGY-1 Devonte, and agree with findings and plan outlined in the note, which I also edited.  I discussed at length with the patient/family, explained the nature of spinal condition, and formulated workup and/or treatment plan together.  All questions were answered to the best of my ability and to patient's apparent satisfaction.    30 minutes spent on the date of the encounter doing chart review/review of outside records/review of test results/interpretation of tests/patient visit/documentation/discussion with other provider(s)/discussion with patient and family.    Michael Burns MD    Orthopaedic Spine Surgery  Dept Orthopaedic Surgery, [facility]  [phone] office, [phone] pager  [email or web address]

## 2022-07-07 NOTE — PROGRESS NOTES
In-Person Visit    REASON FOR CONSULTATION: Consult (Low back pain. Pain in left leg is very bad. Starting to have pain in right leg. All started after her mastectomy surgery. )     REFERRING PHYSICIAN: Referred Self  PCP:Eugene Michelle    History of Present Illness:  67 year old female with a history of breast cancer s/p left mastectomy in 3/2022 and osteopenia on DEXA scan in 2019, who presents for evaluation of bilateral lateral lower extremity pain, L>R. This first developed following her mastectomy 4 months ago. She states that the day after the operation, she had anterolateral lower extremity pain and burning, again left worse than right. Her pain is worse when laying in bed. The patient has tried tylenol and gabapentin in the past without improvement in her symptoms but oxycodone and hydrocodone have helped to take the edge off. She has tried physical therapy through her chiropractor but no injections specifically for this issue. No additional symptoms at this time.     Of note, the patient had both neck pain and lower back pain in the past that was previously treated with steroid injections as noted below. Symptoms improved following her second injection at the L5 nerve root and the patient has not required pain management since.     Back 30%, Leg 70%,  Left 80% > Right 20%  Worse: Laying still  Better: Nothing     Previous treatment:   Physical therapy through her chiropractor  Gabapentin but averse to restarting this due to side effect profile     Previous Injections:  S1 joint injection in 2019 with Dr. Cristian Reynolds   L5 nerve root injection in 2019 with Dr. May BLOUNT patient cannot get MRI until expander is removed following breast reconstruction with RUTH flap with Dr. Feliciano on 8/25/22.     Oswestry (CHUY) Questionnaire    OSWESTRY DISABILITY INDEX 7/7/2022   Count 9   Sum 32   Oswestry Score (%) 71.11   Some recent data might be hidden        Visual Analog Pain Scale  Back Pain Scale  0-10: 7  Right leg pain: 7  Left leg pain: 8      ROS:  A 12-point review of systems was completed and is negative except for otherwise noted above in the history of present illness.    Med Hx:  Past Medical History:   Diagnosis Date     Breast cancer (H) 12/2021     Complication of anesthesia      DDD (degenerative disc disease), lumbar      Endometriosis      Hypertension      Hypothyroidism           Seasonal allergies      Spinal stenosis, lumbar        Surg Hx:  Past Surgical History:   Procedure Laterality Date     COLONOSCOPY       COLONOSCOPY  2/20/2012    Procedure:COLONOSCOPY; COLONOSCOPY ; Surgeon:RAUN KITCHEN; Location: GI     COLONOSCOPY N/A 2/15/2019    Procedure: COMBINED COLONOSCOPY, SINGLE OR MULTIPLE BIOPSY/POLYPECTOMY BY BIOPSY;  Surgeon: Connor Sanderson MD;  Location:  GI     HC CYSTOURETHROSCOPY W/ URETEROSCOPY &/OR PYELOSCOPY; W/ LITHOTRIPSY       HC TRABECULOPLASTY BY LASER SURGERY      PI OU     HYSTERECTOMY, PAP NO LONGER INDICATED       LASER YAG IRIDOTOMY  8/8/2012    Procedure: LASER YAG IRIDOTOMY;  LEFT EYE YAG LASER PUPIL IRIDOTOMY ;  Surgeon: Dakotah Humhpreys MD;  Location:  [facility]     LIGATN/STRIP LONG OR SHORT SAPHEN      x's2     MASTECTOMY PARTIAL WITH SENTINEL NODE Left 3/23/2022    Procedure: LEFT SKIN SPARING MASTECTOMY WITH SENTINEL LYMPH NODE BIOPSY;  Surgeon: Dada Mendiola MD;  Location: [facility] OR     PELVIS LAPAROSCOPY,DX       RECONSTRUCT BREAST, INSERT TISSUE EXPANDER BILATERAL, COMBINED Left 3/23/2022    Procedure: Left breast reconstruction with expander and SPY;  Surgeon: NOHEMI Feliciano MD;  Location: [facility] OR     STRABISMUS SURGERY       [facility] TOTAL ABDOM HYSTERECTOMY  2003    MARYA BSO for stage 4 endometriosis       Allergies:  Allergies   Allergen Reactions     Seasonal Allergies        Meds:  Current Outpatient Medications   Medication     acetaminophen (TYLENOL) 325 MG tablet     Artificial Tear Solution (SOOTHE XP) SOLN     aspirin-acetaminophen-caffeine  "(EXCEDRIN MIGRAINE) 250-250-65 MG per tablet     calcium carbonate (OS-EMMA) 500 MG tablet     calcium carbonate 500 mg, elemental, (OSCAL) 500 MG tablet     cetirizine (ZYRTEC) 10 MG tablet     fluticasone (FLONASE) 50 MCG/ACT nasal spray     gabapentin (NEURONTIN) 300 MG capsule     hydrocortisone (WESTCORT) 0.2 % external cream     hydrOXYzine (ATARAX) 25 MG tablet     ketorolac (ACULAR) 0.5 % ophthalmic solution     letrozole (FEMARA) 2.5 MG tablet     levothyroxine (SYNTHROID/LEVOTHROID) 112 MCG tablet     lisinopril (ZESTRIL) 10 MG tablet     metFORMIN (GLUCOPHAGE-XR) 500 MG 24 hr tablet     methocarbamol (ROBAXIN) 500 MG tablet     olopatadine (PATANOL) 0.1 % ophthalmic solution     Omega-3 Fatty Acids (FISH OIL) 500 MG CAPS     omeprazole (PRILOSEC) 40 MG DR capsule     ondansetron (ZOFRAN) 4 MG tablet     ORDER FOR DME     oxyCODONE (ROXICODONE) 5 MG tablet     prednisoLONE acetate (PRED FORTE) 1 % ophthalmic suspension     vitamin D3 (CHOLECALCIFEROL) 50 mcg (2000 units) tablet     No current facility-administered medications for this visit.     Facility-Administered Medications Ordered in Other Visits   Medication     lidocaine 2 % (URO-JET) jelly 5 mL       Fam Hx:  Family History   Problem Relation Age of Onset     Diabetes Mother      Thyroid Disease Mother      Hypertension Mother      Cancer Father          of stomach CA     Heart Disease Father         MI at age 58     Stomach Cancer Maternal Grandfather      Stomach Cancer Paternal Grandmother      Breast Cancer Paternal Aunt      Glaucoma No family hx of      Macular Degeneration No family hx of        P/S Hx:  Social History     Tobacco Use     Smoking status: Never Smoker     Smokeless tobacco: Never Used   Substance Use Topics     Alcohol use: Yes     Comment: social         Physical Exam:  Very pleasant, healthy appearing, alert, oriented x 3, cooperative.  Normal mood and affect.  Not in cardiorespiratory distress.  Ht 1.6 m (5' 3\")   Wt " 103 kg (227 lb)   BMI 40.21 kg/m    Normal upright posture.    Normal gait without assistive device.    No gross spinal deformity  Localizes pain over left anterolateral thigh  Tenderness: (-) midline, (-) paraspinal, (-) R and L PSIS.    Neuro Exam:  Motor: 5/5 strength in hip flexion, knee extension, knee flexion, dorsi and plantarflexion, EHL bilaterally  Sensory: decreased sensation to light touch over the left lateral thigh, decreased sensation over left lateral lower leg that has been present x30 years     Lower Extremity:  Full painless passive knee and ankle motion.    Imaging:    Complete spine films:   Multilevel degenerative changes, most significant at L4-5, L5-S1.  Grade I anterolithesis of L3 over L4. Loss of lumbar lordosis.     CT Lumbar spine from 5/9  Large bone spur encroaching on left L5 nerve root. Large bridging bone spur at L4-5, L5-S1. Vacuum pocket between L4-5 and within the facet joints at L3--5.    Assessment:    - Foraminal stenosis of lumbosacral region   - Left anterolateral thigh pain, likely L5 radiculopathy vs less likely IT band syndrome  - Loss of lumbar lordosis   - Multilevel spondylosis of the lumbar spine with disc degeneration    Plan:  67 year old female with foraminal stenosis of the lumbosacral region, worst at left L5-S1.  Reviewed with patient that her symptoms could be coming from her back or from some other etiology.  As such recommend going forward with steroid injection because she had improvement in her back pain in the past and this would also serve as a diagnostic test to differentiate between a more central process versus peripheral such as IT band syndrome.  Given debilitating nature of this pain, will provide patient with a one-time hydrocodone prescription until she can undergo transforaminal injection at L5-S1 level.     As patient has breast reconstruction surgery w/ Debra flap on August 25, discussed that we would have to wait until after her expander is  removed before obtaining MRI should her symptoms persist after injection as the most recent MRI is 3 years old. Patient and daughter were agreeable with the plan.    - L5-S1 transforaminal steroid injection  - One time prescription for hydrocodone given to alleviate pain until patient is able to get steroid injection  - Plan to follow up prn, sooner if patient does not receive symptomatic relief following injection    Caprice Whitney MD  PGY-1  Orthopaedic Surgery    Attestation:  I (Dr. Michael Burns - Spine Surgeon) have personally evaluated patient with PGY-1 Devonte, and agree with findings and plan outlined in the note, which I also edited.  I discussed at length with the patient/family, explained the nature of spinal condition, and formulated workup and/or treatment plan together.  All questions were answered to the best of my ability and to patient's apparent satisfaction.    30 minutes spent on the date of the encounter doing chart review/review of outside records/review of test results/interpretation of tests/patient visit/documentation/discussion with other provider(s)/discussion with patient and family.    Michael Burns MD    Orthopaedic Spine Surgery  Dept Orthopaedic Surgery, [facility]  [phone] office, [phone] pager  [email or web address]

## 2022-07-12 DIAGNOSIS — C50.412 MALIGNANT NEOPLASM OF UPPER-OUTER QUADRANT OF LEFT BREAST IN FEMALE, ESTROGEN RECEPTOR POSITIVE (H): Primary | ICD-10-CM

## 2022-07-12 DIAGNOSIS — Z17.0 MALIGNANT NEOPLASM OF UPPER-OUTER QUADRANT OF LEFT BREAST IN FEMALE, ESTROGEN RECEPTOR POSITIVE (H): Primary | ICD-10-CM

## 2022-07-13 DIAGNOSIS — E03.9 HYPOTHYROIDISM, UNSPECIFIED TYPE: ICD-10-CM

## 2022-07-15 ENCOUNTER — TELEPHONE (OUTPATIENT)
Dept: INTERNAL MEDICINE | Facility: CLINIC | Age: 67
End: 2022-07-15

## 2022-07-15 RX ORDER — LEVOTHYROXINE SODIUM 112 UG/1
TABLET ORAL
Qty: 90 TABLET | Refills: 0 | Status: SHIPPED | OUTPATIENT
Start: 2022-07-15 | End: 2022-10-05

## 2022-07-15 NOTE — TELEPHONE ENCOUNTER
Fax received from [facility] stating that the patient is requesting and RX for a Multivitamin.

## 2022-07-15 NOTE — TELEPHONE ENCOUNTER
Medication is being filled for 1 time refill only due to:  Future appointment scheduled   Appointments in Next Year    Jul 20, 2022 11:30 AM  (Arrive by 11:10 AM)  Provider Visit with Eugene Michelle MD  [facility] ([facility] ) [phone]   Aug 02, 2022 10:40 AM  (Arrive by 10:25 AM)  CTA ABDOMEN PELVIS WITH CONTRAST with UCSCCT2  [facility] [place] ([facility] ) [phone]   Aug 02, 2022 11:45 AM  (Arrive by 11:30 AM)  Return Visit with NOHEMI Feliciano MD  [facility] Breast [facility] ) [phone]   Aug 02, 2022 12:15 PM  (Arrive by 12:00 PM)  PAC EVALUATION with Racquel Nayak PA-C  [facility] [place] ([facility] ) [phone]   Aug 22, 2022  3:00 PM  Pre-procedure Covid with RI COVID LAB  [facility] ([facility] ) [phone]   Sep 09, 2022 10:45 AM  (Arrive by 10:30 AM)  Post-Op with Vidhi Austin PA-C  [facility] [place] ([facility] ) [phone]   Sep 26, 2022  1:00 PM  Lab Peripheral with RH LAB DRAW 1  [facility] [place] ([facility] ) [phone]   Sep 26, 2022  2:00 PM  (Arrive by 1:45 PM)  DX HIP/PELVIS/SPINE with RIDX1  [facility] ([facility] ) [phone]   Sep 27, 2022 11:15 AM  (Arrive by 11:00 AM)  EMG with Manolo Carver MD  [facility] EMG [facility] ([facility] ) [phone]   Sep 29, 2022  9:10 AM  (Arrive by 8:55 AM)  Return Visit with Kenneth Cisneros MD  [facility] ([facility]  [facility] [place] ) [phone]   Sep 30, 2022 12:00 PM  (Arrive by 11:45 AM)  Return Visit with Yvan Sosa MD  [facility] Neurology [facility] ([facility] ) [phone]   Dec 27, 2022 10:00 AM  (Arrive by 9:45 AM)  Return Visit with Kenneth Cisneros MD  [facility] ([facility] [place] ) [phone]         Analisa Horn RN  [facility]

## 2022-07-17 ENCOUNTER — MYC REFILL (OUTPATIENT)
Dept: ORTHOPEDICS | Facility: CLINIC | Age: 67
End: 2022-07-17

## 2022-07-17 DIAGNOSIS — M48.07 FORAMINAL STENOSIS OF LUMBOSACRAL REGION: ICD-10-CM

## 2022-07-17 DIAGNOSIS — M54.16 LUMBAR RADICULOPATHY: ICD-10-CM

## 2022-07-18 RX ORDER — HYDROCODONE BITARTRATE AND ACETAMINOPHEN 5; 325 MG/1; MG/1
1 TABLET ORAL 2 TIMES DAILY PRN
Qty: 10 TABLET | Refills: 0 | Status: SHIPPED | OUTPATIENT
Start: 2022-07-18 | End: 2022-07-26

## 2022-07-18 NOTE — TELEPHONE ENCOUNTER
Reviewed, rx written for #24, patient dispensed #14. Will write for additional #10 tablets. This is last refill, written on prescription. Per Dr. Burns's note, one time fill. PDMP reviewed.

## 2022-07-18 NOTE — TELEPHONE ENCOUNTER
See encounter for refill of Norco filled today #10 tabs 7-18-22 by Michelle TERRAZAS because pharmacy only dispensed #14 not #24 as ordered.      See dictation of appt. 7-7-22  For plan.    I called pt to inform her refill sent to pharmacy.    Pt stated noone has called her yet to schedule the injection  ordered 7-7-22 & she is really miserable with pain.  See Spine referal order states pt wants a specific Radiologist at [facility]. I con firmed with pt that is correct.     I provided pt with rad scheduling ph# [phone] & I placed new order XR TFESI & pt will call to schedule.    She understands that after her Flap surgery 8-25-22 , if expander is removed,  Then she should call same Rad scheduling ph# & schedule MRI that is already  Ordered & then make RTN appt with  [phone].  Call back prn. Pt agreed.    W.O./Elisabeth Colin RN.

## 2022-07-20 ENCOUNTER — OFFICE VISIT (OUTPATIENT)
Dept: INTERNAL MEDICINE | Facility: CLINIC | Age: 67
End: 2022-07-20
Payer: MEDICARE

## 2022-07-20 VITALS
RESPIRATION RATE: 15 BRPM | OXYGEN SATURATION: 96 % | TEMPERATURE: 98.4 F | HEIGHT: 63 IN | HEART RATE: 89 BPM | DIASTOLIC BLOOD PRESSURE: 82 MMHG | SYSTOLIC BLOOD PRESSURE: 130 MMHG | BODY MASS INDEX: 39.78 KG/M2 | WEIGHT: 224.5 LBS

## 2022-07-20 DIAGNOSIS — E66.01 MORBID OBESITY (H): ICD-10-CM

## 2022-07-20 DIAGNOSIS — E03.9 HYPOTHYROIDISM, UNSPECIFIED TYPE: ICD-10-CM

## 2022-07-20 DIAGNOSIS — M48.061 FORAMINAL STENOSIS OF LUMBAR REGION: ICD-10-CM

## 2022-07-20 DIAGNOSIS — I10 ESSENTIAL HYPERTENSION: ICD-10-CM

## 2022-07-20 DIAGNOSIS — M54.16 LUMBAR RADICULOPATHY: Primary | ICD-10-CM

## 2022-07-20 DIAGNOSIS — R73.03 PREDIABETES: ICD-10-CM

## 2022-07-20 PROBLEM — D58.2 ELEVATED HEMOGLOBIN (H): Status: RESOLVED | Noted: 2021-12-22 | Resolved: 2022-07-20

## 2022-07-20 LAB — HBA1C MFR BLD: 6.3 % (ref 0–5.6)

## 2022-07-20 PROCEDURE — 36415 COLL VENOUS BLD VENIPUNCTURE: CPT | Performed by: INTERNAL MEDICINE

## 2022-07-20 PROCEDURE — 99214 OFFICE O/P EST MOD 30 MIN: CPT | Performed by: INTERNAL MEDICINE

## 2022-07-20 PROCEDURE — 83036 HEMOGLOBIN GLYCOSYLATED A1C: CPT | Performed by: INTERNAL MEDICINE

## 2022-07-20 RX ORDER — LIDOCAINE 50 MG/G
1 PATCH TOPICAL EVERY 24 HOURS
Qty: 10 PATCH | Refills: 0 | Status: SHIPPED | OUTPATIENT
Start: 2022-07-20 | End: 2023-04-06

## 2022-07-20 RX ORDER — METFORMIN HCL 500 MG
1000 TABLET, EXTENDED RELEASE 24 HR ORAL
Qty: 180 TABLET | Refills: 1 | Status: SHIPPED | OUTPATIENT
Start: 2022-07-20 | End: 2023-04-06

## 2022-07-20 NOTE — PROGRESS NOTES
"  Assessment & Plan       (I10) Essential hypertension  Plan: Blood pressure stable, continue lisinopril 10 mg daily as directed.explained clearly about the medication,insructions and side effects.  Advised to follow low salt diet and exercise       (R73.03) Prediabetes  Plan: Hemoglobin A1c-6.3 today, continue on refilled metFORMIN (GLUCOPHAGE XR) 500         MG 24 hr tablet as directed.explained clearly about the medication,insructions and side effects.  Continue to follow ADA diet regular exercise          (E03.9) Hypothyroidism, unspecified type  Plan: On levothyroxine 112 mcg daily, check TSH in 12/22      (M48.061) Foraminal stenosis of lumbar region  (M54.16) Lumbar radiculopathy    Plan:   still has pain, started on diclofenac (VOLTAREN) 1 % topical gel, and lidocaine (LIDODERM) 5 % patch as directed.advised the patient should be patch free for 12 hours daily explained clearly about the medication,insructions and side effects. Patient has appointment for steroid injection to back in 08/22, Follow-up with neurology/orthopedics/pain management       (E66.01) Morbid obesity (H)  Plan:  -Discussed in detail about Diet,calorie intake,and importance of regular exercise     Patient declined COVID-19 booster vaccine  Review of the result(s) of each unique test - A1c  Prescription drug management  40 minutes spent on the date of the encounter doing chart review, history and exam, documentation and further activities per the note      BMI:   Estimated body mass index is 39.77 kg/m  as calculated from the following:    Height as of this encounter: 1.6 m (5' 3\").    Weight as of this encounter: 101.8 kg (224 lb 8 oz).   Weight management plan: Discussed healthy diet and exercise guidelines        Return in about 5 months (around 12/12/2022).    Eugene Michelle MD  [facility] HospitalCHANEL Cochran is a 67 year old presenting for the following health issues:  Thyroid Problem, " Hypertension, and Diabetes      HPI     Diabetes Follow-up    How often are you checking your blood sugar? A few times a week  What time of day are you checking your blood sugars (select all that apply)?  Before meals  Have you had any blood sugars above 200?  No  Have you had any blood sugars below 70?  No    What symptoms do you notice when your blood sugar is low?  None    What concerns do you have today about your diabetes? None     Do you have any of these symptoms? (Select all that apply)  No numbness or tingling in feet.  No redness, sores or blisters on feet.  No complaints of excessive thirst.  No reports of blurry vision.  No significant changes to weight.       BP Readings from Last 2 Encounters:   07/06/22 129/69   06/28/22 132/75     Hemoglobin A1C POCT (%)   Date Value   06/04/2021 6.3 (H)   12/21/2020 5.9 (H)     Hemoglobin A1C (%)   Date Value   12/27/2021 6.3 (H)   12/13/2021 6.2 (H)     LDL Cholesterol Calculated (mg/dL)   Date Value   12/27/2021 59   12/13/2021 65   12/21/2020 71   12/07/2018 71        Hypertension Follow-up      Do you check your blood pressure regularly outside of the clinic? Yes     Are you following a low salt diet? Yes    Are your blood pressures ever more than 140 on the top number (systolic) OR more   than 90 on the bottom number (diastolic), for example 140/90? No    Hypothyroidism Follow-up      Since last visit, patient describes the following symptoms: Weight stable, no hair loss, no skin changes, no constipation, no loose stools       Patient has been following up with neurology/orthopedics/pain management for back pain and bilateral leg pain and has been scheduled for steroid injection next month, patient continues to have back pain and patient has been using her mom's Lidoderm patches and requesting prescription.      Past Medical History:   Diagnosis Date     Breast cancer (H) 12/2021     Complication of anesthesia      DDD (degenerative disc disease), lumbar       Endometriosis      Hypertension      Hypothyroidism           Seasonal allergies      Spinal stenosis, lumbar        Current Outpatient Medications   Medication Sig Dispense Refill     acetaminophen (TYLENOL) 325 MG tablet Take  by mouth every 4 hours as needed for pain. 100 tablet 0     Artificial Tear Solution (SOOTHE XP) SOLN Apply 1 drop to eye 3 times daily 15 mL 8     aspirin-acetaminophen-caffeine (EXCEDRIN MIGRAINE) 250-250-65 MG per tablet Take 1 tablet by mouth every 6 hours as needed for pain. 30 tablet 0     calcium carbonate (OS-EMMA) 500 MG tablet Take 2 tablets (1,000 mg) by mouth daily 180 tablet 3     calcium carbonate 500 mg, elemental, (OSCAL) 500 MG tablet        cetirizine (ZYRTEC) 10 MG tablet Take 1 tablet (10 mg) by mouth daily 90 tablet 1     diclofenac (VOLTAREN) 1 % topical gel Apply 2 g topically 3 times daily 100 g 0     fluticasone (FLONASE) 50 MCG/ACT nasal spray Spray 2 sprays into both nostrils daily. 1 Package 10     gabapentin (NEURONTIN) 300 MG capsule 1-3 capsules up to 3 times a day (Patient taking differently: 1-3 capsules up to 3 times a day    Through NOHEMI Bonds MD) 90 capsule 3     hydrocortisone (WESTCORT) 0.2 % external cream Apply topically 2 times daily 45 g 1     ketorolac (ACULAR) 0.5 % ophthalmic solution Place 1 drop into both eyes 4 times daily 5 mL 6     letrozole (FEMARA) 2.5 MG tablet Take 1 tablet (2.5 mg) by mouth daily (Patient taking differently: Take 2.5 mg by mouth daily Through oncologist) 90 tablet 3     levothyroxine (SYNTHROID/LEVOTHROID) 112 MCG tablet TAKE ONE TABLET BY MOUTH ONE TIME DAILY 90 tablet 0     lidocaine (LIDODERM) 5 % patch Place 1 patch onto the skin every 24 hours To prevent lidocaine toxicity, patient should be patch free for 12 hrs daily. 10 patch 0     lisinopril (ZESTRIL) 10 MG tablet TAKE ONE TABLET BY MOUTH ONE TIME DAILY 90 tablet 1     metFORMIN (GLUCOPHAGE XR) 500 MG 24 hr tablet Take 2 tablets (1,000 mg) by mouth  "daily (with dinner) 180 tablet 1     olopatadine (PATANOL) 0.1 % ophthalmic solution Place 1 drop into both eyes 2 times daily 5 mL 12     Omega-3 Fatty Acids (FISH OIL) 500 MG CAPS Take by mouth daily        omeprazole (PRILOSEC) 40 MG DR capsule Take 1 capsule (40 mg) by mouth daily 90 capsule 0     ORDER FOR DME Provent nasal EPAP  Use over nostrils nightly.   30 each 0     prednisoLONE acetate (PRED FORTE) 1 % ophthalmic suspension Use  One drop two times daily both eyes in the spring for 7-10 days 5 mL 0     vitamin D3 (CHOLECALCIFEROL) 50 mcg (2000 units) tablet Take 1 tablet (50 mcg) by mouth daily 90 tablet 3     HYDROcodone-acetaminophen (NORCO) 5-325 MG tablet Take 1 tablet by mouth 2 times daily as needed for severe pain (take as sparingly as possible, no further refills) (Patient not taking: Reported on 7/20/2022) 10 tablet 0     hydrOXYzine (ATARAX) 25 MG tablet Take 1 tablet (25 mg) by mouth 3 times daily as needed for itching (Patient not taking: Reported on 7/20/2022) 20 tablet 0     methocarbamol (ROBAXIN) 500 MG tablet Take 1 tablet (500 mg) by mouth 4 times daily as needed for muscle spasms (Patient not taking: Reported on 7/20/2022) 20 tablet 0     ondansetron (ZOFRAN) 4 MG tablet Take 1 tablet (4 mg) by mouth every 8 hours as needed for nausea (Patient not taking: Reported on 7/20/2022) 10 tablet 0         Review of Systems   CONSTITUTIONAL: NEGATIVE for fever, chills, change in weight  RESP: NEGATIVE for significant cough or SOB  CV: NEGATIVE for chest pain, palpitations or peripheral edema  MUSCULOSKELETAL: Low back pain and bilateral leg pains  ENDOCRINE: NEGATIVE for temperature intolerance, skin/hair changes  PSYCHIATRIC: NEGATIVE for changes in mood or affect      Objective    /82   Pulse 89   Temp 98.4  F (36.9  C) (Tympanic)   Resp 15   Ht 1.6 m (5' 3\")   Wt 101.8 kg (224 lb 8 oz)   SpO2 96%   BMI 39.77 kg/m    Body mass index is 39.77 kg/m .  Physical Exam   GENERAL:  , " alert and no distress  RESP: lungs clear to auscultation - no rales, rhonchi or wheezes  CV: regular rate and rhythm, normal S1 S2   MS:   Varicose veins bilateral lower extremities, no LE edema, no calf tenderness  PSYCH: mentation appears normal, affect normal/bright  Foot exam: normal DP and PT pulses, no trophic changes or ulcerative lesions, normal sensory exam and normal monofilament exam                 .  ..

## 2022-07-25 ENCOUNTER — TELEPHONE (OUTPATIENT)
Dept: MEDSURG UNIT | Facility: CLINIC | Age: 67
End: 2022-07-25

## 2022-07-26 ENCOUNTER — MYC REFILL (OUTPATIENT)
Dept: ORTHOPEDICS | Facility: CLINIC | Age: 67
End: 2022-07-26

## 2022-07-26 DIAGNOSIS — M48.07 FORAMINAL STENOSIS OF LUMBOSACRAL REGION: ICD-10-CM

## 2022-07-26 DIAGNOSIS — M54.16 LUMBAR RADICULOPATHY: ICD-10-CM

## 2022-07-26 RX ORDER — HYDROCODONE BITARTRATE AND ACETAMINOPHEN 5; 325 MG/1; MG/1
1 TABLET ORAL DAILY PRN
Qty: 7 TABLET | Refills: 0 | Status: SHIPPED | OUTPATIENT
Start: 2022-07-26 | End: 2022-08-02

## 2022-07-26 NOTE — TELEPHONE ENCOUNTER
Sent one last refill. NO FUTURE REFILLS. Dr. Burns recent clinic note only okayed patient for a one time Rx for norco.     VIVIAN FlorezC

## 2022-08-02 ENCOUNTER — OFFICE VISIT (OUTPATIENT)
Dept: SURGERY | Facility: CLINIC | Age: 67
End: 2022-08-02
Payer: MEDICARE

## 2022-08-02 ENCOUNTER — TELEPHONE (OUTPATIENT)
Dept: MEDSURG UNIT | Facility: CLINIC | Age: 67
End: 2022-08-02

## 2022-08-02 ENCOUNTER — OFFICE VISIT (OUTPATIENT)
Dept: PLASTIC SURGERY | Facility: CLINIC | Age: 67
End: 2022-08-02
Attending: PLASTIC SURGERY
Payer: MEDICARE

## 2022-08-02 ENCOUNTER — ANESTHESIA EVENT (OUTPATIENT)
Dept: SURGERY | Facility: CLINIC | Age: 67
End: 2022-08-02

## 2022-08-02 ENCOUNTER — ANCILLARY PROCEDURE (OUTPATIENT)
Dept: CT IMAGING | Facility: CLINIC | Age: 67
End: 2022-08-02
Attending: PLASTIC SURGERY
Payer: MEDICARE

## 2022-08-02 ENCOUNTER — PRE VISIT (OUTPATIENT)
Dept: SURGERY | Facility: CLINIC | Age: 67
End: 2022-08-02

## 2022-08-02 ENCOUNTER — LAB (OUTPATIENT)
Dept: LAB | Facility: CLINIC | Age: 67
End: 2022-08-02

## 2022-08-02 VITALS
SYSTOLIC BLOOD PRESSURE: 133 MMHG | OXYGEN SATURATION: 98 % | BODY MASS INDEX: 40.4 KG/M2 | TEMPERATURE: 98.1 F | WEIGHT: 228 LBS | DIASTOLIC BLOOD PRESSURE: 80 MMHG | RESPIRATION RATE: 14 BRPM | HEART RATE: 72 BPM | HEIGHT: 63 IN

## 2022-08-02 VITALS
DIASTOLIC BLOOD PRESSURE: 80 MMHG | TEMPERATURE: 98.1 F | RESPIRATION RATE: 14 BRPM | OXYGEN SATURATION: 98 % | HEIGHT: 63 IN | WEIGHT: 228 LBS | HEART RATE: 72 BPM | BODY MASS INDEX: 40.4 KG/M2 | SYSTOLIC BLOOD PRESSURE: 133 MMHG

## 2022-08-02 DIAGNOSIS — Z01.818 PREOP EXAMINATION: Primary | ICD-10-CM

## 2022-08-02 DIAGNOSIS — Z98.890 S/P BREAST RECONSTRUCTION, LEFT: ICD-10-CM

## 2022-08-02 DIAGNOSIS — Z98.890 S/P BREAST RECONSTRUCTION, LEFT: Primary | ICD-10-CM

## 2022-08-02 DIAGNOSIS — Z01.818 PREOP EXAMINATION: ICD-10-CM

## 2022-08-02 LAB
ABO/RH(D): NORMAL
ANION GAP SERPL CALCULATED.3IONS-SCNC: 6 MMOL/L (ref 3–14)
ANTIBODY SCREEN: NEGATIVE
BUN SERPL-MCNC: 12 MG/DL (ref 7–30)
CALCIUM SERPL-MCNC: 9.3 MG/DL (ref 8.5–10.1)
CHLORIDE BLD-SCNC: 105 MMOL/L (ref 94–109)
CO2 SERPL-SCNC: 30 MMOL/L (ref 20–32)
CREAT BLD-MCNC: 0.8 MG/DL (ref 0.5–1)
CREAT SERPL-MCNC: 0.71 MG/DL (ref 0.52–1.04)
ERYTHROCYTE [DISTWIDTH] IN BLOOD BY AUTOMATED COUNT: 13.2 % (ref 10–15)
GFR SERPL CREATININE-BSD FRML MDRD: >60 ML/MIN/1.73M2
GFR SERPL CREATININE-BSD FRML MDRD: >90 ML/MIN/1.73M2
GLUCOSE BLD-MCNC: 101 MG/DL (ref 70–99)
HCT VFR BLD AUTO: 42.3 % (ref 35–47)
HGB BLD-MCNC: 13.8 G/DL (ref 11.7–15.7)
MCH RBC QN AUTO: 29.7 PG (ref 26.5–33)
MCHC RBC AUTO-ENTMCNC: 32.6 G/DL (ref 31.5–36.5)
MCV RBC AUTO: 91 FL (ref 78–100)
PLATELET # BLD AUTO: 254 10E3/UL (ref 150–450)
POTASSIUM BLD-SCNC: 4 MMOL/L (ref 3.4–5.3)
RBC # BLD AUTO: 4.64 10E6/UL (ref 3.8–5.2)
SODIUM SERPL-SCNC: 141 MMOL/L (ref 133–144)
SPECIMEN EXPIRATION DATE: NORMAL
WBC # BLD AUTO: 6.3 10E3/UL (ref 4–11)

## 2022-08-02 PROCEDURE — 80048 BASIC METABOLIC PNL TOTAL CA: CPT | Performed by: PATHOLOGY

## 2022-08-02 PROCEDURE — 85027 COMPLETE CBC AUTOMATED: CPT | Performed by: PATHOLOGY

## 2022-08-02 PROCEDURE — 86850 RBC ANTIBODY SCREEN: CPT | Performed by: PHYSICIAN ASSISTANT

## 2022-08-02 PROCEDURE — 36415 COLL VENOUS BLD VENIPUNCTURE: CPT | Performed by: PATHOLOGY

## 2022-08-02 PROCEDURE — 99214 OFFICE O/P EST MOD 30 MIN: CPT | Performed by: PLASTIC SURGERY

## 2022-08-02 PROCEDURE — 74174 CTA ABD&PLVS W/CONTRAST: CPT | Performed by: RADIOLOGY

## 2022-08-02 PROCEDURE — 99204 OFFICE O/P NEW MOD 45 MIN: CPT | Performed by: PHYSICIAN ASSISTANT

## 2022-08-02 PROCEDURE — 82565 ASSAY OF CREATININE: CPT | Performed by: PATHOLOGY

## 2022-08-02 PROCEDURE — G0463 HOSPITAL OUTPT CLINIC VISIT: HCPCS

## 2022-08-02 RX ORDER — IOPAMIDOL 755 MG/ML
100 INJECTION, SOLUTION INTRAVASCULAR ONCE
Status: COMPLETED | OUTPATIENT
Start: 2022-08-02 | End: 2022-08-02

## 2022-08-02 RX ADMIN — IOPAMIDOL 100 ML: 755 INJECTION, SOLUTION INTRAVASCULAR at 10:48

## 2022-08-02 ASSESSMENT — PAIN SCALES - GENERAL
PAINLEVEL: NO PAIN (0)
PAINLEVEL: SEVERE PAIN (6)

## 2022-08-02 ASSESSMENT — LIFESTYLE VARIABLES: TOBACCO_USE: 0

## 2022-08-02 NOTE — H&P
Pre-Operative H & P     CC:  Preoperative exam to assess for increased cardiopulmonary risk while undergoing surgery and anesthesia.    Date of Encounter: 8/2/2022  Primary Care Physician:  Eugene Michelle     Reason for visit:   Encounter Diagnoses   Name Primary?     Preop examination Yes     S/P breast reconstruction, left        HPI  Ramona Ferrer is a 67 year old female who presents for pre-operative H & P in preparation for  Procedure Information     Date/Time: 8/25/22     Procedure: Left breast reconstruction with free abdominal flap, Resensation, SPY    Anesthesia type: general with block    Pre-op diagnosis: s/p left breast reconstruction    Location: [facility]    Providers: Dr. Feliciano        Sammie Ferrer is a 67 year old female with PMH significant for hypertension, LALO, hypothyroidism, obesity, GERD, prediabetes, and chronic low back pain.  She is status post left breast cancer and no nipple  Sparing mastectomy with immediate expander based reconstruction on 3/23/2022.  She was recently evaluated by Dr. Mendez and is now ready proceed with left-side-only RUTH-based reconstruction, followed by touchup surgeries with symmetry enhancement procedures thereafter in a staged fashion.     History is obtained from the patient and chart review    Hx of abnormal bleeding or anti-platelet use: denies    Menstrual history: No LMP recorded. Patient has had a hysterectomy.:      Past Medical History  Past Medical History:   Diagnosis Date     Breast cancer (H) 12/2021     Complication of anesthesia      DDD (degenerative disc disease), lumbar      Endometriosis      Hypertension      Hypothyroidism           Seasonal allergies      Spinal stenosis, lumbar        Past Surgical History  Past Surgical History:   Procedure Laterality Date     COLONOSCOPY       COLONOSCOPY  2/20/2012    Procedure:COLONOSCOPY; COLONOSCOPY ; Surgeon:ARUN KITCHEN;  Location: GI     COLONOSCOPY N/A 2/15/2019    Procedure: COMBINED COLONOSCOPY, SINGLE OR MULTIPLE BIOPSY/POLYPECTOMY BY BIOPSY;  Surgeon: Connor Sanderson MD;  Location:  GI     HC CYSTOURETHROSCOPY W/ URETEROSCOPY &/OR PYELOSCOPY; W/ LITHOTRIPSY       HC TRABECULOPLASTY BY LASER SURGERY      PI OU     HYSTERECTOMY, PAP NO LONGER INDICATED       LASER YAG IRIDOTOMY  8/8/2012    Procedure: LASER YAG IRIDOTOMY;  LEFT EYE YAG LASER PUPIL IRIDOTOMY ;  Surgeon: Dakotah Humphreys MD;  Location:  EC     LIGATN/STRIP LONG OR SHORT SAPHEN      x's2     MASTECTOMY PARTIAL WITH SENTINEL NODE Left 3/23/2022    Procedure: LEFT SKIN SPARING MASTECTOMY WITH SENTINEL LYMPH NODE BIOPSY;  Surgeon: Dada Mendiola MD;  Location: [facility] OR     PELVIS LAPAROSCOPY,DX       RECONSTRUCT BREAST, INSERT TISSUE EXPANDER BILATERAL, COMBINED Left 3/23/2022    Procedure: Left breast reconstruction with expander and SPY;  Surgeon: NOHEMI Feliciano MD;  Location: [facility] OR     STRABISMUS SURGERY       Z TOTAL ABDOM HYSTERECTOMY  2003    MARYA BSO for stage 4 endometriosis       Prior to Admission Medications  Current Outpatient Medications   Medication Sig Dispense Refill     acetaminophen (TYLENOL) 325 MG tablet Take  by mouth every 4 hours as needed for pain. 100 tablet 0     aspirin-acetaminophen-caffeine (EXCEDRIN MIGRAINE) 250-250-65 MG per tablet Take 1 tablet by mouth every 6 hours as needed for pain. 30 tablet 0     calcium carbonate (OS-EMMA) 500 MG tablet Take 2 tablets (1,000 mg) by mouth daily (Patient taking differently: Take 2 tablets by mouth every morning) 180 tablet 3     cetirizine (ZYRTEC) 10 MG tablet Take 1 tablet (10 mg) by mouth daily (Patient taking differently: Take 10 mg by mouth as needed) 90 tablet 1     fluticasone (FLONASE) 50 MCG/ACT nasal spray Spray 2 sprays into both nostrils daily. (Patient taking differently: Spray 2 sprays into both nostrils as needed) 1 Package 10     gabapentin (NEURONTIN) 300 MG capsule 1-3  capsules up to 3 times a day (Patient taking differently: Take 300 mg by mouth 3 times daily 1-3 capsules up to 3 times a day    Through NOHEMI Bonds MD) 90 capsule 3     hydrOXYzine (ATARAX) 25 MG tablet Take 1 tablet (25 mg) by mouth 3 times daily as needed for itching 20 tablet 0     letrozole (FEMARA) 2.5 MG tablet Take 1 tablet (2.5 mg) by mouth daily (Patient taking differently: Take 2.5 mg by mouth every morning Through oncologist) 90 tablet 3     levothyroxine (SYNTHROID/LEVOTHROID) 112 MCG tablet TAKE ONE TABLET BY MOUTH ONE TIME DAILY (Patient taking differently: Take 112 mcg by mouth every morning) 90 tablet 0     lidocaine (LIDODERM) 5 % patch Place 1 patch onto the skin every 24 hours To prevent lidocaine toxicity, patient should be patch free for 12 hrs daily. (Patient taking differently: Place 1 patch onto the skin as needed To prevent lidocaine toxicity, patient should be patch free for 12 hrs daily.) 10 patch 0     lisinopril (ZESTRIL) 10 MG tablet TAKE ONE TABLET BY MOUTH ONE TIME DAILY (Patient taking differently: Take 10 mg by mouth every morning) 90 tablet 1     metFORMIN (GLUCOPHAGE XR) 500 MG 24 hr tablet Take 2 tablets (1,000 mg) by mouth daily (with dinner) 180 tablet 1     methocarbamol (ROBAXIN) 500 MG tablet Take 1 tablet (500 mg) by mouth 4 times daily as needed for muscle spasms 20 tablet 0     ondansetron (ZOFRAN) 4 MG tablet Take 1 tablet (4 mg) by mouth every 8 hours as needed for nausea 10 tablet 0     vitamin D3 (CHOLECALCIFEROL) 50 mcg (2000 units) tablet Take 1 tablet (50 mcg) by mouth daily (Patient taking differently: Take 1 tablet by mouth every morning) 90 tablet 3     Artificial Tear Solution (SOOTHE XP) SOLN Apply 1 drop to eye 3 times daily 15 mL 8     calcium carbonate 500 mg, elemental, (OSCAL) 500 MG tablet        diclofenac (VOLTAREN) 1 % topical gel Apply 2 g topically 3 times daily 100 g 0     hydrocortisone (WESTCORT) 0.2 % external cream Apply  topically 2 times daily 45 g 1     ketorolac (ACULAR) 0.5 % ophthalmic solution Place 1 drop into both eyes 4 times daily 5 mL 6     olopatadine (PATANOL) 0.1 % ophthalmic solution Place 1 drop into both eyes 2 times daily 5 mL 12     Omega-3 Fatty Acids (FISH OIL) 500 MG CAPS Take by mouth daily  (Patient not taking: Reported on 2022)       ORDER FOR DME Provent nasal EPAP  Use over nostrils nightly.   30 each 0     prednisoLONE acetate (PRED FORTE) 1 % ophthalmic suspension Use  One drop two times daily both eyes in the spring for 7-10 days 5 mL 0       Allergies  Allergies   Allergen Reactions     Seasonal Allergies        Social History  Social History     Socioeconomic History     Marital status:      Spouse name: Not on file     Number of children: 1     Years of education: Not on file     Highest education level: Not on file   Occupational History     Not on file   Tobacco Use     Smoking status: Never Smoker     Smokeless tobacco: Never Used   Vaping Use     Vaping Use: Never used   Substance and Sexual Activity     Alcohol use: Yes     Comment: social     Drug use: No     Sexual activity: Yes     Partners: Male     Birth control/protection: None     Comment: MARYA LONDONO   Other Topics Concern     Parent/sibling w/ CABG, MI or angioplasty before 65F 55M? Not Asked   Social History Narrative     Not on file     Social Determinants of Health     Financial Resource Strain: Not on file   Food Insecurity: Not on file   Transportation Needs: Not on file   Physical Activity: Not on file   Stress: Not on file   Social Connections: Not on file   Intimate Partner Violence: Not on file   Housing Stability: Not on file       Family History  Family History   Problem Relation Age of Onset     Diabetes Mother      Thyroid Disease Mother      Hypertension Mother      Cancer Father          of stomach CA     Heart Disease Father         MI at age 58     Stomach Cancer Maternal Grandfather      Stomach Cancer  "Paternal Grandmother      Breast Cancer Paternal Aunt      Glaucoma No family hx of      Macular Degeneration No family hx of      Anesthesia Reaction No family hx of      Bleeding Disorder No family hx of      Clotting Disorder No family hx of        Review of Systems  The complete review of systems is negative other than noted in the HPI or here.     Anesthesia Evaluation   Pt has had prior anesthetic.     History of anesthetic complications   slow to wake in 1992.    ROS/MED HX  ENT/Pulmonary:     (+) sleep apnea, doesn't use CPAP, allergic rhinitis,  (-) tobacco use and asthma   Neurologic:  - neg neurologic ROS     Cardiovascular:     (+) hypertension-----Previous cardiac testing   Echo: Date: Results:    Stress Test: Date: 12/17/21 Results:  Interpretation Summary  This was a normal stress echocardiogram with no evidence of stress-induced  ischemia.  Fair exercise tolerance ( 07:24 monidfied Venkata protocol / 6.2 Mets)  ECG Reviewed: Date: Results:    Cath: Date: Results:      METS/Exercise Tolerance: >4 METS    Hematologic:  - neg hematologic  ROS  (-) history of blood clots and history of blood transfusion   Musculoskeletal: Comment: Chronic low back pain  Chronic right leg pain      GI/Hepatic:  - neg GI/hepatic ROS     Renal/Genitourinary:  - neg Renal ROS     Endo: Comment: Prediabetes, A1c 6.3% 7/20/22    (+) thyroid problem, hypothyroidism, Obesity,     Psychiatric/Substance Use:  - neg psychiatric ROS     Infectious Disease:       Malignancy:   (+) Malignancy, History of Breast.Breast CA status post Surgery.        Other:  - neg other ROS    (+) , H/O Chronic Pain,        /80   Pulse 72   Temp 98.1  F (36.7  C) (Oral)   Resp 14   Ht 1.6 m (5' 3\")   Wt 103.4 kg (228 lb)   SpO2 98%   Breastfeeding No   BMI 40.39 kg/m      Physical Exam   Constitutional: Awake, alert, cooperative, no apparent distress, and appears stated age.  Eyes: Pupils equal, round and reactive to light, extra ocular " muscles intact, sclera clear, conjunctiva normal.  HENT: Normocephalic, oral pharynx with moist mucus membranes, good dentition. No goiter appreciated.   Respiratory: Clear to auscultation bilaterally, no crackles or wheezing.  Cardiovascular: Regular rate and rhythm, normal S1 and S2, and no murmur noted.  Carotids +2, no bruits. No edema. Palpable pulses to radial and PT arteries.   GI: Not assessed  Lymph/Hematologic: No cervical lymphadenopathy and no supraclavicular lymphadenopathy.  Genitourinary:  deferred  Skin: Warm and dry.  Varicose veins noted on LLE  Musculoskeletal: Full ROM of neck. There is no redness, warmth, or swelling of the joints. Gross motor strength is normal.    Neurologic: Awake, alert, oriented to name, place and time. Cranial nerves II-XII are grossly intact.   Neuropsychiatric: Calm, cooperative. Normal affect.     Prior Labs/Diagnostic Studies   All labs and imaging personally reviewed     Component      Latest Ref Rng & Units 8/2/2022   WBC      4.0 - 11.0 10e3/uL 6.3   RBC Count      3.80 - 5.20 10e6/uL 4.64   Hemoglobin      11.7 - 15.7 g/dL 13.8   Hematocrit      35.0 - 47.0 % 42.3   MCV      78 - 100 fL 91   MCH      26.5 - 33.0 pg 29.7   MCHC      31.5 - 36.5 g/dL 32.6   RDW      10.0 - 15.0 % 13.2   Platelet Count      150 - 450 10e3/uL 254     Component      Latest Ref Rng & Units 8/2/2022   Sodium      133 - 144 mmol/L 141   Potassium      3.4 - 5.3 mmol/L 4.0   Chloride      94 - 109 mmol/L 105   Carbon Dioxide      20 - 32 mmol/L 30   Anion Gap      3 - 14 mmol/L 6   Urea Nitrogen      7 - 30 mg/dL 12   Creatinine      0.52 - 1.04 mg/dL 0.71   Calcium      8.5 - 10.1 mg/dL 9.3   Glucose      70 - 99 mg/dL 101 (H)   GFR Estimate      >60 mL/min/1.73m2 >90       EKG/ stress test - if available please see in ROS above       The patient's records and results personally reviewed by this provider.     Outside records reviewed from: Care Everywhere        Assessment      Ramona DE LEON  "Carissa is a 67 year old female seen as a PAC referral for risk assessment and optimization for anesthesia.    Plan/Recommendations  Pt will be optimized for the proposed procedure.  See below for details on the assessment, risk, and preoperative recommendations    NEUROLOGY  - No history of TIA, CVA or seizure  - Post Op delirium risk factors:  No risk identified    ENT  - No current airway concerns.  Will need to be reassessed day of surgery.  Mallampati: II  TM: > 3    CARDIAC  - Hypertension  Well controlled, hold lisinopril DOS  - METS (Metabolic Equivalents)  Patient performs 4 or more METS exercise without symptoms            Total Score: 0      RCRI-Very low risk: Class 1 0.4% complication rate            Total Score: 0        PULMONARY  - Obstructive Sleep Apnea, sleep study 2012 showed mild LALO.  AHI 6.4/hr.  Pt declined CPAP.  LALO without home CPAP use.  LALO Medium Risk            Total Score: 3    LALO: Hypertension    LALO: BMI over 35 kg/m2    LALO: Over 50 ys old      - Denies asthma or inhaler use  - Tobacco History      History   Smoking Status     Never Smoker   Smokeless Tobacco     Never Used       GI  - denies GERD  PONV High Risk  Total Score: 3           1 AN PONV: Pt is Female    1 AN PONV: Patient is not a current smoker    1 AN PONV: Intended Post Op Opioids        /RENAL  - Baseline Creatinine 0.71    ENDOCRINE    - BMI: Estimated body mass index is 40.39 kg/m  as calculated from the following:    Height as of this encounter: 1.6 m (5' 3\").    Weight as of this encounter: 103.4 kg (228 lb).  Class 3 Obesity (BMI > 40)  - prediabetes, A1c 6.3%, hold metformin DOS    HEME/ONC  VTE Low Risk 0.5%            Total Score: 2    VTE: Greater than 59 yrs old    VTE: BMI greater than 39      - No history of abnormal bleeding or antiplatelet use.     - left breast cancer s/p left mastectomy 3/23/22.  Continue letrozole. Pt will take ASA 81mg night before surgery per Dr. Feliciano.      MSK  - chronic " low back pain and left leg pain.  Can continue gabapentin        The patient is optimized for their procedure. AVS with information on surgery time/arrival time, meds and NPO status given by nursing staff. No further diagnostic testing indicated.            Racquel Nayak PA-C  Preoperative Assessment Center  [facility]  [facility]  Phone: [phone]  Fax: [phone]

## 2022-08-02 NOTE — PATIENT INSTRUCTIONS
Preparing for Your Surgery      Name:  Ramona Ferrer   MRN:  [id number]   :  1955   Today's Date:  2022       Arriving for surgery:  Surgery date:  22  Arrival time:  5:30 am    Restrictions due to COVID 19       Effective 22 [facility] is implementing the following visitor policy:     1 person may accompany the patient through the Pre-Op process.      That same person may wait in the Surgery Waiting room, provided there is enough room to social distance           Visitors must wear a mask.      Visitors must not be ill.        Inpatients are allowed 2 visitors per day for the duration of their stay.      Visiting hours are 8 am to 8 pm.    [employer] parking is available for anyone with mobility limitations or disabilities.  ([place]  24 hours/ 7 days a week; [facility]  7 am- 3:30 pm, Mon- Fri)    Please come to:     [facility] [address]  [address] [place] [address]    - ? parking is available in front of the hospital      -    Please proceed to [address] on the 3rd floor. [phone]?     - ?If you are in need of directions, wheelchair or escort please stop at the Information Desk in the lobby.  Inform the information person that you are here for surgery; a wheelchair and escort to [address] will be provided.?     What can I eat or drink?  -  You may eat and drink normally for up to 8 hours before your surgery. (Until 11:30 pm)  -  You may have clear liquids until 2 hours before surgery. (Until 5:30 am)    Examples of clear liquids:  Water  Clear broth  Juices (apple, white grape, white cranberry  and cider) without pulp  Noncarbonated, powder based beverages  (lemonade and Bernard-Aid)  Sodas (Sprite, 7-Up, ginger ale and seltzer)  Coffee or tea (without milk or cream)  Gatorade    -  No Alcohol for at least 24 hours before surgery     Which medicines can I take?    Hold Aspirin for 7 days before surgery.   Hold  Multivitamins for 7 days before surgery.  Hold Supplements for 7 days before surgery.  Hold Ibuprofen (Advil, Motrin) for 1 day before surgery--unless otherwise directed by surgeon.  Hold Naproxen (Aleve) for 4 days before surgery.    Hold all NSAIDS for 7 days before spine surgery. (Ibuprofen, Naproxen, Celebrex, Indocin, Diclofenac)    -  DO NOT take these medications the day of surgery:  Aspirin-acetaminophen-caffeine (Excedrin migraine) - see above (stop 7 days before surgery)  Calcium carbonate (Os-abby)  Diclofenac (Voltaren) topical gel  Ketorolac (Acular) eye drops  Lisinopril (Zestril)  Metformin (Glucophage)  Omega-3 Fish oil - see above  Vitamin D3    -  PLEASE TAKE these medications the day of surgery:  Acetaminophen (Tylenol) if needed  Artificial tears  Cetirizine (Zyrtec)  Fluticasone (Flonase)  Gabapentin (Neurontin)  Hydrocodone -acetaminophen (Norco)  Letrozole (Femara)  Levothyroxine (Synthroid)  Lidoderm  Olopatadine (Patanol) eye drops  Omeprazole (Prilosec)        How do I prepare myself?  - Please take 2 showers before surgery using Scrubcare or Hibiclens soap.    Use this soap only from the neck to your toes.     Leave the soap on your skin for one minute--then rinse thoroughly.      You may use your own shampoo and conditioner; no other hair products.   - Please remove all jewelry and body piercings.  - No lotions, deodorants or fragrance.  - No makeup or fingernail polish.   - Bring your ID and insurance card.    -If you have a Deep Brain Stimulator, Spinal Cord Stimulator or any neuro stimulator device---you must bring the remote control to the hospital       ALL PATIENTS GOING HOME THE SAME DAY OF SURGERY ARE REQUIRED TO HAVE A RESPONSIBLE ADULT TO DRIVE AND BE IN ATTENDANCE WITH THEM FOR 24 HOURS FOLLOWING SURGERY.      Questions or Concerns:    - For any questions regarding the day of surgery or your hospital stay, please contact the Pre Admission Nursing Office at [phone].       -  If you have health changes between today and your surgery please call your surgeon.       For questions after surgery please call your surgeons office.          -   Parking is available in the Patient Visitor Ramp on [facility].     -   When entering the hospital you will be asked COVID screening questions, you will then be directed to Registration.  Registration will direct you to the 3rd floor Surgery waiting room.     -   Please ask if you need an escort or a wheelchair to the Surgery Waiting Room.  [facility] number- [phone]

## 2022-08-02 NOTE — NURSING NOTE
Pre Op Teaching Flowsheet       Pre and Post op Patient Education  Relevant Diagnosis:  Breast cancer  Surgical procedure:  Unilateral RUTH (L)  Teaching Topic:  Pre and post op teaching  Person Involved in teaching: Yes    Motivation Level:  Asks Questions: Yes  Eager to Learn: Yes  Cooperative: Yes  Receptive (willing/able to accept information):  Yes    Patient demonstrates understanding of the following:  Date of surgery:  8/25/22  Location of surgery:  [address] [place] [address]  History and Physical and any other testing necessary prior to surgery: Yes  Required time line for completion of History and Physical and any pre-op testing: Yes    Patient demonstrates understanding of the following:  Pre-op bowel prep:  N/A  Pre-op showering/scrub information with PCMX Soap: Yes  Blood thinner medications discussed and when to stop (if applicable):  Per PCP at pre-op    Infection Prevention:   Patient demonstrates understanding of the following:  Surgical procedure site care taught: Yes  Signs and symptoms of infection taught: Yes    Post-op follow-up:  Discussed how to contact the hospital, nurse, and clinic scheduling staff if necessary. (See packet information)    Instructional materials used/given/mailed:  [facility] Packet, post op teaching sheet, Map, Soap, and with the arrival/location information to come closer to the surgery date.      Soap given, pt to go to PAC after this appt, pt informed. Pt has pre-op with PCP as well, OK to keep per Dr Feliciano. Pt's daughter was not at visit today, she may call me with any questions, has my contact info

## 2022-08-02 NOTE — PROGRESS NOTES
PRESENTING COMPLAINT:  Preoperative visit for upcoming left breast reconstruction with free RUTH flap scheduled for 08/25/2022.    HISTORY OF PRESENTING COMPLAINT:  Ms. Ferrer is 67 years old, here for preoperative visit.  No change otherwise in her history and physical exam.    She had her CT scan today and was seen in the PAC Clinic today.  She is also getting a primary care physician to preop her as well.    ASSESSMENT AND PLAN:  Based on the above findings, a diagnosis of left breast cancer with left-sided nipple non-sparing mastectomy expander-based reconstruction, now scheduled for a left-sided stage 2 free RUTH flap reconstruction.      Went over the planned procedure with the patient in detail.  All risks, benefits, and alternatives of the procedure including pain, infection, bleeding, scarring, asymmetry, seromas, hematomas, wound breakdown, wound dehiscence, loss of the flap, requirement of further surgeries, 80% chance of wound healing complication, abdominal wall weakness, bulges, hernias, DVT, PE, MI, CVA, pneumonia, renal failure and death were all explained.      Had a neeraj conversation about postoperative pain and pain management which were also explained.  All questions were answered in detail.  She understands that she will need another stage in which we will balance her as well as make the umbilicus.  She understands she will lose the umbilicus.  All questions were answered.  She was happy with the visit.  All exam and discussion done in presence of my nurse, Lorna Ludwig.    Total time spent with chart review, visit itself and post-visit paperwork was 30 minutes.

## 2022-08-02 NOTE — H&P (VIEW-ONLY)
Pre-Operative H & P     CC:  Preoperative exam to assess for increased cardiopulmonary risk while undergoing surgery and anesthesia.    Date of Encounter: 8/2/2022  Primary Care Physician:  Eugene Michelle     Reason for visit:   Encounter Diagnoses   Name Primary?     Preop examination Yes     S/P breast reconstruction, left        HPI  Ramona Ferrer is a 67 year old female who presents for pre-operative H & P in preparation for  Procedure Information     Date/Time: 8/25/22     Procedure: Left breast reconstruction with free abdominal flap, Resensation, SPY    Anesthesia type: general with block    Pre-op diagnosis: s/p left breast reconstruction    Location: [facility]    Providers: Dr. Feliciano        Sammie Ferrer is a 67 year old female with PMH significant for hypertension, LALO, hypothyroidism, obesity, GERD, prediabetes, and chronic low back pain.  She is status post left breast cancer and no nipple  Sparing mastectomy with immediate expander based reconstruction on 3/23/2022.  She was recently evaluated by Dr. Mendez and is now ready proceed with left-side-only RUTH-based reconstruction, followed by touchup surgeries with symmetry enhancement procedures thereafter in a staged fashion.     History is obtained from the patient and chart review    Hx of abnormal bleeding or anti-platelet use: denies    Menstrual history: No LMP recorded. Patient has had a hysterectomy.:      Past Medical History  Past Medical History:   Diagnosis Date     Breast cancer (H) 12/2021     Complication of anesthesia      DDD (degenerative disc disease), lumbar      Endometriosis      Hypertension      Hypothyroidism           Seasonal allergies      Spinal stenosis, lumbar        Past Surgical History  Past Surgical History:   Procedure Laterality Date     COLONOSCOPY       COLONOSCOPY  2/20/2012    Procedure:COLONOSCOPY; COLONOSCOPY ; Surgeon:ARUN KITCHEN;  Last night he had headache and sore throat  Had strep before  No fever  He has a stomach ache  No rash  He is drinking  He has a cough, no wheeze  He will not eat  No fever  He is drinking well  He sees GI for delayed gastric emptying  He has a gastric emptying test tomorrow  Mom wants him seen today due to the test   Gave apt  1020 am today in [address]  Location: GI     COLONOSCOPY N/A 2/15/2019    Procedure: COMBINED COLONOSCOPY, SINGLE OR MULTIPLE BIOPSY/POLYPECTOMY BY BIOPSY;  Surgeon: Connor Sanderson MD;  Location:  GI     HC CYSTOURETHROSCOPY W/ URETEROSCOPY &/OR PYELOSCOPY; W/ LITHOTRIPSY       HC TRABECULOPLASTY BY LASER SURGERY      PI OU     HYSTERECTOMY, PAP NO LONGER INDICATED       LASER YAG IRIDOTOMY  8/8/2012    Procedure: LASER YAG IRIDOTOMY;  LEFT EYE YAG LASER PUPIL IRIDOTOMY ;  Surgeon: Dakotah Humpherys MD;  Location:  EC     LIGATN/STRIP LONG OR SHORT SAPHEN      x's2     MASTECTOMY PARTIAL WITH SENTINEL NODE Left 3/23/2022    Procedure: LEFT SKIN SPARING MASTECTOMY WITH SENTINEL LYMPH NODE BIOPSY;  Surgeon: Dada Mendiola MD;  Location: [facility] OR     PELVIS LAPAROSCOPY,DX       RECONSTRUCT BREAST, INSERT TISSUE EXPANDER BILATERAL, COMBINED Left 3/23/2022    Procedure: Left breast reconstruction with expander and SPY;  Surgeon: NOHEMI Feliciano MD;  Location: [facility] OR     STRABISMUS SURGERY       Z TOTAL ABDOM HYSTERECTOMY  2003    MARYA BSO for stage 4 endometriosis       Prior to Admission Medications  Current Outpatient Medications   Medication Sig Dispense Refill     acetaminophen (TYLENOL) 325 MG tablet Take  by mouth every 4 hours as needed for pain. 100 tablet 0     aspirin-acetaminophen-caffeine (EXCEDRIN MIGRAINE) 250-250-65 MG per tablet Take 1 tablet by mouth every 6 hours as needed for pain. 30 tablet 0     calcium carbonate (OS-EMMA) 500 MG tablet Take 2 tablets (1,000 mg) by mouth daily (Patient taking differently: Take 2 tablets by mouth every morning) 180 tablet 3     cetirizine (ZYRTEC) 10 MG tablet Take 1 tablet (10 mg) by mouth daily (Patient taking differently: Take 10 mg by mouth as needed) 90 tablet 1     fluticasone (FLONASE) 50 MCG/ACT nasal spray Spray 2 sprays into both nostrils daily. (Patient taking differently: Spray 2 sprays into both nostrils as needed) 1 Package 10     gabapentin (NEURONTIN) 300 MG capsule 1-3  capsules up to 3 times a day (Patient taking differently: Take 300 mg by mouth 3 times daily 1-3 capsules up to 3 times a day    Through NOHEMI Bonds MD) 90 capsule 3     hydrOXYzine (ATARAX) 25 MG tablet Take 1 tablet (25 mg) by mouth 3 times daily as needed for itching 20 tablet 0     letrozole (FEMARA) 2.5 MG tablet Take 1 tablet (2.5 mg) by mouth daily (Patient taking differently: Take 2.5 mg by mouth every morning Through oncologist) 90 tablet 3     levothyroxine (SYNTHROID/LEVOTHROID) 112 MCG tablet TAKE ONE TABLET BY MOUTH ONE TIME DAILY (Patient taking differently: Take 112 mcg by mouth every morning) 90 tablet 0     lidocaine (LIDODERM) 5 % patch Place 1 patch onto the skin every 24 hours To prevent lidocaine toxicity, patient should be patch free for 12 hrs daily. (Patient taking differently: Place 1 patch onto the skin as needed To prevent lidocaine toxicity, patient should be patch free for 12 hrs daily.) 10 patch 0     lisinopril (ZESTRIL) 10 MG tablet TAKE ONE TABLET BY MOUTH ONE TIME DAILY (Patient taking differently: Take 10 mg by mouth every morning) 90 tablet 1     metFORMIN (GLUCOPHAGE XR) 500 MG 24 hr tablet Take 2 tablets (1,000 mg) by mouth daily (with dinner) 180 tablet 1     methocarbamol (ROBAXIN) 500 MG tablet Take 1 tablet (500 mg) by mouth 4 times daily as needed for muscle spasms 20 tablet 0     ondansetron (ZOFRAN) 4 MG tablet Take 1 tablet (4 mg) by mouth every 8 hours as needed for nausea 10 tablet 0     vitamin D3 (CHOLECALCIFEROL) 50 mcg (2000 units) tablet Take 1 tablet (50 mcg) by mouth daily (Patient taking differently: Take 1 tablet by mouth every morning) 90 tablet 3     Artificial Tear Solution (SOOTHE XP) SOLN Apply 1 drop to eye 3 times daily 15 mL 8     calcium carbonate 500 mg, elemental, (OSCAL) 500 MG tablet        diclofenac (VOLTAREN) 1 % topical gel Apply 2 g topically 3 times daily 100 g 0     hydrocortisone (WESTCORT) 0.2 % external cream Apply  topically 2 times daily 45 g 1     ketorolac (ACULAR) 0.5 % ophthalmic solution Place 1 drop into both eyes 4 times daily 5 mL 6     olopatadine (PATANOL) 0.1 % ophthalmic solution Place 1 drop into both eyes 2 times daily 5 mL 12     Omega-3 Fatty Acids (FISH OIL) 500 MG CAPS Take by mouth daily  (Patient not taking: Reported on 2022)       ORDER FOR DME Provent nasal EPAP  Use over nostrils nightly.   30 each 0     prednisoLONE acetate (PRED FORTE) 1 % ophthalmic suspension Use  One drop two times daily both eyes in the spring for 7-10 days 5 mL 0       Allergies  Allergies   Allergen Reactions     Seasonal Allergies        Social History  Social History     Socioeconomic History     Marital status:      Spouse name: Not on file     Number of children: 1     Years of education: Not on file     Highest education level: Not on file   Occupational History     Not on file   Tobacco Use     Smoking status: Never Smoker     Smokeless tobacco: Never Used   Vaping Use     Vaping Use: Never used   Substance and Sexual Activity     Alcohol use: Yes     Comment: social     Drug use: No     Sexual activity: Yes     Partners: Male     Birth control/protection: None     Comment: MARYA LONDONO   Other Topics Concern     Parent/sibling w/ CABG, MI or angioplasty before 65F 55M? Not Asked   Social History Narrative     Not on file     Social Determinants of Health     Financial Resource Strain: Not on file   Food Insecurity: Not on file   Transportation Needs: Not on file   Physical Activity: Not on file   Stress: Not on file   Social Connections: Not on file   Intimate Partner Violence: Not on file   Housing Stability: Not on file       Family History  Family History   Problem Relation Age of Onset     Diabetes Mother      Thyroid Disease Mother      Hypertension Mother      Cancer Father          of stomach CA     Heart Disease Father         MI at age 58     Stomach Cancer Maternal Grandfather      Stomach Cancer  "Paternal Grandmother      Breast Cancer Paternal Aunt      Glaucoma No family hx of      Macular Degeneration No family hx of      Anesthesia Reaction No family hx of      Bleeding Disorder No family hx of      Clotting Disorder No family hx of        Review of Systems  The complete review of systems is negative other than noted in the HPI or here.     Anesthesia Evaluation   Pt has had prior anesthetic.     History of anesthetic complications   slow to wake in 1992.    ROS/MED HX  ENT/Pulmonary:     (+) sleep apnea, doesn't use CPAP, allergic rhinitis,  (-) tobacco use and asthma   Neurologic:  - neg neurologic ROS     Cardiovascular:     (+) hypertension-----Previous cardiac testing   Echo: Date: Results:    Stress Test: Date: 12/17/21 Results:  Interpretation Summary  This was a normal stress echocardiogram with no evidence of stress-induced  ischemia.  Fair exercise tolerance ( 07:24 monidfied Venkata protocol / 6.2 Mets)  ECG Reviewed: Date: Results:    Cath: Date: Results:      METS/Exercise Tolerance: >4 METS    Hematologic:  - neg hematologic  ROS  (-) history of blood clots and history of blood transfusion   Musculoskeletal: Comment: Chronic low back pain  Chronic right leg pain      GI/Hepatic:  - neg GI/hepatic ROS     Renal/Genitourinary:  - neg Renal ROS     Endo: Comment: Prediabetes, A1c 6.3% 7/20/22    (+) thyroid problem, hypothyroidism, Obesity,     Psychiatric/Substance Use:  - neg psychiatric ROS     Infectious Disease:       Malignancy:   (+) Malignancy, History of Breast.Breast CA status post Surgery.        Other:  - neg other ROS    (+) , H/O Chronic Pain,        /80   Pulse 72   Temp 98.1  F (36.7  C) (Oral)   Resp 14   Ht 1.6 m (5' 3\")   Wt 103.4 kg (228 lb)   SpO2 98%   Breastfeeding No   BMI 40.39 kg/m      Physical Exam   Constitutional: Awake, alert, cooperative, no apparent distress, and appears stated age.  Eyes: Pupils equal, round and reactive to light, extra ocular " muscles intact, sclera clear, conjunctiva normal.  HENT: Normocephalic, oral pharynx with moist mucus membranes, good dentition. No goiter appreciated.   Respiratory: Clear to auscultation bilaterally, no crackles or wheezing.  Cardiovascular: Regular rate and rhythm, normal S1 and S2, and no murmur noted.  Carotids +2, no bruits. No edema. Palpable pulses to radial and PT arteries.   GI: Not assessed  Lymph/Hematologic: No cervical lymphadenopathy and no supraclavicular lymphadenopathy.  Genitourinary:  deferred  Skin: Warm and dry.  Varicose veins noted on LLE  Musculoskeletal: Full ROM of neck. There is no redness, warmth, or swelling of the joints. Gross motor strength is normal.    Neurologic: Awake, alert, oriented to name, place and time. Cranial nerves II-XII are grossly intact.   Neuropsychiatric: Calm, cooperative. Normal affect.     Prior Labs/Diagnostic Studies   All labs and imaging personally reviewed     Component      Latest Ref Rng & Units 8/2/2022   WBC      4.0 - 11.0 10e3/uL 6.3   RBC Count      3.80 - 5.20 10e6/uL 4.64   Hemoglobin      11.7 - 15.7 g/dL 13.8   Hematocrit      35.0 - 47.0 % 42.3   MCV      78 - 100 fL 91   MCH      26.5 - 33.0 pg 29.7   MCHC      31.5 - 36.5 g/dL 32.6   RDW      10.0 - 15.0 % 13.2   Platelet Count      150 - 450 10e3/uL 254     Component      Latest Ref Rng & Units 8/2/2022   Sodium      133 - 144 mmol/L 141   Potassium      3.4 - 5.3 mmol/L 4.0   Chloride      94 - 109 mmol/L 105   Carbon Dioxide      20 - 32 mmol/L 30   Anion Gap      3 - 14 mmol/L 6   Urea Nitrogen      7 - 30 mg/dL 12   Creatinine      0.52 - 1.04 mg/dL 0.71   Calcium      8.5 - 10.1 mg/dL 9.3   Glucose      70 - 99 mg/dL 101 (H)   GFR Estimate      >60 mL/min/1.73m2 >90       EKG/ stress test - if available please see in ROS above       The patient's records and results personally reviewed by this provider.     Outside records reviewed from: Care Everywhere        Assessment      Ramona DE LEON  "Carissa is a 67 year old female seen as a PAC referral for risk assessment and optimization for anesthesia.    Plan/Recommendations  Pt will be optimized for the proposed procedure.  See below for details on the assessment, risk, and preoperative recommendations    NEUROLOGY  - No history of TIA, CVA or seizure  - Post Op delirium risk factors:  No risk identified    ENT  - No current airway concerns.  Will need to be reassessed day of surgery.  Mallampati: II  TM: > 3    CARDIAC  - Hypertension  Well controlled, hold lisinopril DOS  - METS (Metabolic Equivalents)  Patient performs 4 or more METS exercise without symptoms            Total Score: 0      RCRI-Very low risk: Class 1 0.4% complication rate            Total Score: 0        PULMONARY  - Obstructive Sleep Apnea, sleep study 2012 showed mild LALO.  AHI 6.4/hr.  Pt declined CPAP.  LALO without home CPAP use.  LALO Medium Risk            Total Score: 3    LALO: Hypertension    LALO: BMI over 35 kg/m2    LALO: Over 50 ys old      - Denies asthma or inhaler use  - Tobacco History      History   Smoking Status     Never Smoker   Smokeless Tobacco     Never Used       GI  - denies GERD  PONV High Risk  Total Score: 3           1 AN PONV: Pt is Female    1 AN PONV: Patient is not a current smoker    1 AN PONV: Intended Post Op Opioids        /RENAL  - Baseline Creatinine 0.71    ENDOCRINE    - BMI: Estimated body mass index is 40.39 kg/m  as calculated from the following:    Height as of this encounter: 1.6 m (5' 3\").    Weight as of this encounter: 103.4 kg (228 lb).  Class 3 Obesity (BMI > 40)  - prediabetes, A1c 6.3%, hold metformin DOS    HEME/ONC  VTE Low Risk 0.5%            Total Score: 2    VTE: Greater than 59 yrs old    VTE: BMI greater than 39      - No history of abnormal bleeding or antiplatelet use.     - left breast cancer s/p left mastectomy 3/23/22.  Continue letrozole. Pt will take ASA 81mg night before surgery per Dr. Feliciano.      MSK  - chronic " low back pain and left leg pain.  Can continue gabapentin        The patient is optimized for their procedure. AVS with information on surgery time/arrival time, meds and NPO status given by nursing staff. No further diagnostic testing indicated.            Racquel Nayak PA-C  Preoperative Assessment Center  [facility]  Clinic and [facility] Center  Phone: [phone]  Fax: [phone]

## 2022-08-02 NOTE — NURSING NOTE
"Oncology Rooming Note    August 2, 2022 11:25 AM   Ramona Ferrer is a 67 year old female who presents for:    Chief Complaint   Patient presents with     Oncology Clinic Visit     [facility] RETURN - BREAST CANCER     Initial Vitals: /80   Pulse 72   Temp 98.1  F (36.7  C)   Resp 14   Ht 1.6 m (5' 2.99\")   Wt 103.4 kg (228 lb)   SpO2 98%   BMI 40.40 kg/m   Estimated body mass index is 40.4 kg/m  as calculated from the following:    Height as of this encounter: 1.6 m (5' 2.99\").    Weight as of this encounter: 103.4 kg (228 lb). Body surface area is 2.14 meters squared.  No Pain (0) Comment: Data Unavailable   No LMP recorded. Patient has had a hysterectomy.  Allergies reviewed: Yes  Medications reviewed: Yes    Medications: Medication refills not needed today.  Pharmacy name entered into [employer]:    [id number] - [place], [place] - [address]  [id number] [place], [place] - [address]    Clinical concerns: No new concerns. Jodie was notified.      Dave Oates LPN            "

## 2022-08-02 NOTE — LETTER
8/2/2022         RE: Ramona Ferrer  [address]  [place] [id number]        Dear Colleague,    Thank you for referring your patient, Ramona Ferrer, to the [facility] BRE[facility]. Please see a copy of my visit note below.    PRESENTING COMPLAINT:  Preoperative visit for upcoming left breast reconstruction with free RUTH flap scheduled for 08/25/2022.    HISTORY OF PRESENTING COMPLAINT:  Ms. Ferrer is 67 years old, here for preoperative visit.  No change otherwise in her history and physical exam.    She had her CT scan today and was seen in the PAC Clinic today.  She is also getting a primary care physician to preop her as well.    ASSESSMENT AND PLAN:  Based on the above findings, a diagnosis of left breast cancer with left-sided nipple non-sparing mastectomy expander-based reconstruction, now scheduled for a left-sided stage 2 free RUTH flap reconstruction.      Went over the planned procedure with the patient in detail.  All risks, benefits, and alternatives of the procedure including pain, infection, bleeding, scarring, asymmetry, seromas, hematomas, wound breakdown, wound dehiscence, loss of the flap, requirement of further surgeries, 80% chance of wound healing complication, abdominal wall weakness, bulges, hernias, DVT, PE, MI, CVA, pneumonia, renal failure and death were all explained.      Had a neeraj conversation about postoperative pain and pain management which were also explained.  All questions were answered in detail.  She understands that she will need another stage in which we will balance her as well as make the umbilicus.  She understands she will lose the umbilicus.  All questions were answered.  She was happy with the visit.  All exam and discussion done in presence of my nurse, Lorna Ludwig.    Total time spent with chart review, visit itself and post-visit paperwork was 30 minutes.          Again, thank you for allowing me to participate in the  care of your patient.      Sincerely,    NOHEMI Feliciano MD

## 2022-08-04 ENCOUNTER — HOSPITAL ENCOUNTER (OUTPATIENT)
Dept: GENERAL RADIOLOGY | Facility: CLINIC | Age: 67
Discharge: HOME | End: 2022-08-04
Attending: ORTHOPAEDIC SURGERY
Payer: MEDICARE

## 2022-08-04 ENCOUNTER — HOSPITAL ENCOUNTER (OUTPATIENT)
Facility: CLINIC | Age: 67
Discharge: HOME | End: 2022-08-04
Admitting: PHYSICIAN ASSISTANT
Payer: MEDICARE

## 2022-08-04 VITALS — DIASTOLIC BLOOD PRESSURE: 82 MMHG | SYSTOLIC BLOOD PRESSURE: 143 MMHG | OXYGEN SATURATION: 98 % | HEART RATE: 81 BPM

## 2022-08-04 VITALS
RESPIRATION RATE: 16 BRPM | OXYGEN SATURATION: 97 % | DIASTOLIC BLOOD PRESSURE: 76 MMHG | HEART RATE: 69 BPM | SYSTOLIC BLOOD PRESSURE: 140 MMHG

## 2022-08-04 DIAGNOSIS — M48.07 FORAMINAL STENOSIS OF LUMBOSACRAL REGION: ICD-10-CM

## 2022-08-04 DIAGNOSIS — M54.16 LUMBAR RADICULOPATHY: ICD-10-CM

## 2022-08-04 PROCEDURE — 64483 NJX AA&/STRD TFRM EPI L/S 1: CPT

## 2022-08-04 PROCEDURE — 999N000154 HC STATISTIC RADIOLOGY XRAY, US, CT, MAR, NM

## 2022-08-04 PROCEDURE — 250N000009 HC RX 250: Performed by: ORTHOPAEDIC SURGERY

## 2022-08-04 PROCEDURE — 250N000011 HC RX IP 250 OP 636: Performed by: ORTHOPAEDIC SURGERY

## 2022-08-04 PROCEDURE — 255N000002 HC RX 255 OP 636: Performed by: ORTHOPAEDIC SURGERY

## 2022-08-04 RX ORDER — DEXAMETHASONE SODIUM PHOSPHATE 10 MG/ML
20 INJECTION, SOLUTION INTRAMUSCULAR; INTRAVENOUS ONCE
Status: COMPLETED | OUTPATIENT
Start: 2022-08-04 | End: 2022-08-04

## 2022-08-04 RX ORDER — NICOTINE POLACRILEX 4 MG
15-30 LOZENGE BUCCAL
Status: DISCONTINUED | OUTPATIENT
Start: 2022-08-04 | End: 2022-08-05 | Stop reason: HOSPADM

## 2022-08-04 RX ORDER — DEXTROSE MONOHYDRATE 25 G/50ML
25-50 INJECTION, SOLUTION INTRAVENOUS
Status: DISCONTINUED | OUTPATIENT
Start: 2022-08-04 | End: 2022-08-05 | Stop reason: HOSPADM

## 2022-08-04 RX ORDER — LIDOCAINE 40 MG/G
CREAM TOPICAL
Status: CANCELLED | OUTPATIENT
Start: 2022-08-04

## 2022-08-04 RX ORDER — IOPAMIDOL 408 MG/ML
10 INJECTION, SOLUTION INTRATHECAL ONCE
Status: COMPLETED | OUTPATIENT
Start: 2022-08-04 | End: 2022-08-04

## 2022-08-04 RX ORDER — LIDOCAINE HYDROCHLORIDE 10 MG/ML
10 INJECTION, SOLUTION EPIDURAL; INFILTRATION; INTRACAUDAL; PERINEURAL ONCE
Status: COMPLETED | OUTPATIENT
Start: 2022-08-04 | End: 2022-08-04

## 2022-08-04 RX ORDER — LIDOCAINE HYDROCHLORIDE 10 MG/ML
20 INJECTION, SOLUTION EPIDURAL; INFILTRATION; INTRACAUDAL; PERINEURAL ONCE
Status: COMPLETED | OUTPATIENT
Start: 2022-08-04 | End: 2022-08-04

## 2022-08-04 RX ADMIN — LIDOCAINE HYDROCHLORIDE 3 ML: 10 INJECTION, SOLUTION EPIDURAL; INFILTRATION; INTRACAUDAL; PERINEURAL at 12:52

## 2022-08-04 RX ADMIN — LIDOCAINE HYDROCHLORIDE 3 ML: 10 INJECTION, SOLUTION EPIDURAL; INFILTRATION; INTRACAUDAL; PERINEURAL at 13:07

## 2022-08-04 RX ADMIN — DEXAMETHASONE SODIUM PHOSPHATE 20 MG: 10 INJECTION, SOLUTION INTRAMUSCULAR; INTRAVENOUS at 13:07

## 2022-08-04 RX ADMIN — IOPAMIDOL 3 ML: 408 INJECTION, SOLUTION INTRATHECAL at 12:55

## 2022-08-04 NOTE — PROCEDURES
[facility]    Procedure: Left L5-S1 TFESI    Date/Time: 8/4/2022 1:11 PM  Performed by: Agustin Moore PA-C  Authorized by: Agustin Moore PA-C       UNIVERSAL PROTOCOL   Site Marked: Yes  Prior Images Obtained and Reviewed:  Yes  Required items: Required blood products, implants, devices and special equipment available    Patient identity confirmed:  Verbally with patient  Patient was reevaluated immediately before administering moderate or deep sedation or anesthesia  Confirmation Checklist:  Patient's identity using two indicators, relevant allergies, procedure was appropriate and matched the consent or emergent situation and correct equipment/implants were available  Time out: Immediately prior to the procedure a time out was called    Universal Protocol: the Joint Commission Universal Protocol was followed    Preparation: Patient was prepped and draped in usual sterile fashion       ANESTHESIA    Local Anesthetic: Lidocaine 1% without epinephrine      SEDATION    Patient Sedated: No    See dictated procedure note for full details.    PROCEDURE  Describe Procedure: Perineural injection.    Touched the nerve per the patient.  Experienced radiculopathy down the leg.    Contrast distribution continued to stay more lateral vs spreading into the central epidural space.    Attempted needle repositioning numerous times  Again, patent fel the needle touch the L5 nerve.   Medication then injected.  Patient Tolerance:  Patient tolerated the procedure well with no immediate complications  Length of time physician/provider present for 1:1 monitoring during sedation: 0

## 2022-08-04 NOTE — DISCHARGE SUMMARY
Care Suites Discharge Nursing Note    Patient Information  Name: Ramona Ferrer  Age: 67 year old    Discharge Education:  Discharge instructions reviewed: Yes  Additional education/resources provided: no  Patient/patient representative verbalizes understanding: Yes  Patient discharging on new medications: No  Medication education completed: Yes    Discharge Plans:   Discharge location: home  Discharge ride contacted: N/A  Approximate discharge time: 1500    Discharge Criteria:  Discharge criteria met and vital signs stable: Yes    Patient Belongs:  Patient belongings returned to patient: Yes    Ken Ramey RN

## 2022-08-04 NOTE — DISCHARGE INSTRUCTIONS
Steroid Injection Discharge Instructions     After you go home:    You may resume your normal diet.    Care of Puncture Site:    If you have a bandaid on your puncture site, you may remove it the next morning  You may shower tomorrow  No bath tubs, whirlpools or swimming pool for at least 48 hours  Use ice packs as needed for discomfort     Activity:    Minimize your activity today. You may gradually resume your normal activity as tolerated  Avoid vigorous or strenuous activity until your symptoms improve or as directed by your doctor  Do NOT drive a vehicle for a few hours after the injection - or longer if you develop numbness in your arm or leg    Medicines:    You may resume all medications, including blood thinners  Resume your Warfarin/Coumadin at your regular dose today. Follow up with your provider to have your INR rechecked  Resume your Platelet Inhibitors and Aspirin tomorrow at your regular dose  For minor discomfort, you may take Acetaminophen (Tylenol) or Ibuprofen (Advil)    Pain:     You may experience increased or different pain over the next 24-48 hours  For the next 48 hrs - you may use ice packs for discomfort     Call your primary care doctor if:    You have severe pain that does not improve with pain medication  You have chills or a fever greater than 101 F (38 C)  The site is red, swollen, hot or tender  Increase in pain, weakness or numbness  New problems with your bowel or bladder  Any questions or concerns    What to watch for:    It can be normal to have some bruising or slight swelling at the puncture site.   After the procedure, you may have some new weakness or numbness down your arm/leg from the numbing medicine. This should resolve in a few hours.   You may feel some temporary relief from the numbing medicine, but that will wear off within a few hours.  Your symptoms may return to pre procedure level, or can even be worse for the first 1-2 days.  For many people, the steroid begins to  provide some relief within 2-3 days, but it can take up to 2 weeks to obtain the full results.  Some people will get lasting relief from a single injection. Others may require up to 3 injections to get results. If you have more than one steroid injection, they should be given 2 weeks apart.  If you have no improvement in your symptoms after two weeks, please contact the doctor who ordered this procedure to discuss the next steps.  Side effects of your steroid injection are mild and will go away in 2-3 days  Insomnia  Irritability  Flushed face  Water retention  Restlessness  Difficulty sleeping  Increased appetite  Increased blood sugar  If you are diabetic, monitor your blood sugar closely. Contact the provider who manages your diabetes to help you control your blood sugar if needed.    If you have questions or concerns call:                  [facility] Radiology Dept @ [phone]                                    between 8am-4:30pm Mon-Fri    If you have urgent questions outside of these normal business hours, please contact the [facility] on call doctor @ [phone]

## 2022-08-04 NOTE — PROGRESS NOTES
"Care Suites Post Procedure Note    Patient Information  Name: Ramona Ferrer  Age: 67 year old    Post Procedure  Time patient returned to Care Suites: 1320  Concerns/abnormal assessment: C/O nause and left leg numbness from hip to foot.  C/O lower back pain and anterior left thigh pain rated 4/10, states pain level was 6/10 prior to injection.  Also states \"I don't have a , my designated  couldn't come to help me.\"  States she drover herself to her appointment today.  If abnormal assessment, provider notified: Yes, Agustin TERRAZAS notified.  Agustin at bedside to assess patient.  States numbness expected.  States patient needs to stay for 1 hour post procedure and that her left leg numbness needs to be resolved prior to her discharging and driving herself home.  Patient verbalized understanding.    Plan/Other: Given gingerale and soda crackers to help alleviate nauasea.  Home when meets discharge criteria.    Tali Clark RN    1355  AVS/Discharge instructions given and reviewed.  All questions and concerns addressed at this time with verbal understanding received.     1435  Patient states nausea relieved.  States continues to have numbness at anterior thigh (which was baseline), left lower leg numbness improving but left foot is still numb.  Up and ambulated to bathroom, unsteady on her feet, walking with a limp and very cautious.  Agustin updated and states patient needs to wait until the numbness wears off before discharge.    1555  Hand off report given to Josue SOTO RN.    "

## 2022-08-15 ENCOUNTER — TELEPHONE (OUTPATIENT)
Dept: ORTHOPEDICS | Facility: CLINIC | Age: 67
End: 2022-08-15

## 2022-08-15 DIAGNOSIS — M54.16 LUMBAR RADICULOPATHY: Primary | ICD-10-CM

## 2022-08-15 DIAGNOSIS — M48.07 FORAMINAL STENOSIS OF LUMBOSACRAL REGION: ICD-10-CM

## 2022-08-15 NOTE — TELEPHONE ENCOUNTER
[facility] Call Center    Phone Message    May a detailed message be left on voicemail: yes     Reason for Call: Other: Please put in another order for another spine inj per  at [facility] will be able to do another  before her surg 08/25      Action Taken: Other: ortho -csc    Travel Screening: Not Applicable

## 2022-08-16 NOTE — TELEPHONE ENCOUNTER
See phone message from daughter who interprets for pt.      I called her back.  Pt had Left L5-S1 TFESI done 8-4-22 at  -see report states pain response from 7 to 4.  She states pt said about 25% pain relief so rating pain 7 now & miserable & wants repeat injection to see if it will help more before Breast reconst. Surgery next week 8-25-22.  She stated Radiologist who did injection said she could have injection repeated within 2-3 weeks to see if second injection will help more & Dtr states Rad scheduling assured her they would fit her in within 1 week.    See previous notes that Spine plan is if they remove Expander during breast surgery then she needs MRI ,which is already  Ordered , & then RTN appt with .  Dtr states will be a while before she can lie still enough to have an MRI done due to pain & cant lie on certain areas after breast surgery.  dtr knows to call & schedule MRI & appt.& has ph#s.   I advised she check with her breast surgeons clinic to be sure it is OK to have steroid injection within 1 week before surgery & she agreed.    I reviewed with Irene TERRAZAS who ordered OK to repeat Left L5-S1 TFESI. Ordered & dtr has ph# to schedule.    Call back prn.  Dtr agreed.  [email or web address] TERRAZAS//Elisabeth Colin RN.

## 2022-08-22 ENCOUNTER — LAB (OUTPATIENT)
Dept: LAB | Facility: CLINIC | Age: 67
End: 2022-08-22
Payer: MEDICARE

## 2022-08-22 DIAGNOSIS — Z20.822 ENCOUNTER FOR LABORATORY TESTING FOR COVID-19 VIRUS: ICD-10-CM

## 2022-08-22 PROCEDURE — U0005 INFEC AGEN DETEC AMPLI PROBE: HCPCS

## 2022-08-22 PROCEDURE — U0003 INFECTIOUS AGENT DETECTION BY NUCLEIC ACID (DNA OR RNA); SEVERE ACUTE RESPIRATORY SYNDROME CORONAVIRUS 2 (SARS-COV-2) (CORONAVIRUS DISEASE [COVID-19]), AMPLIFIED PROBE TECHNIQUE, MAKING USE OF HIGH THROUGHPUT TECHNOLOGIES AS DESCRIBED BY CMS-2020-01-R: HCPCS

## 2022-08-23 LAB — SARS-COV-2 RNA RESP QL NAA+PROBE: NEGATIVE

## 2022-08-24 ENCOUNTER — ANESTHESIA EVENT (OUTPATIENT)
Dept: SURGERY | Facility: CLINIC | Age: 67
DRG: 585 | End: 2022-08-24
Payer: MEDICARE

## 2022-08-25 ENCOUNTER — HOSPITAL ENCOUNTER (INPATIENT)
Facility: CLINIC | Age: 67
LOS: 3 days | Discharge: HOME | DRG: 585 | End: 2022-08-28
Attending: PLASTIC SURGERY | Admitting: PLASTIC SURGERY
Payer: MEDICARE

## 2022-08-25 ENCOUNTER — ANESTHESIA (OUTPATIENT)
Dept: SURGERY | Facility: CLINIC | Age: 67
DRG: 585 | End: 2022-08-25
Payer: MEDICARE

## 2022-08-25 DIAGNOSIS — Z98.890 S/P TRAM (TRANSVERSE RECTUS ABDOMINIS MUSCLE) FLAP BREAST RECONSTRUCTION: Primary | ICD-10-CM

## 2022-08-25 LAB
ANION GAP SERPL CALCULATED.3IONS-SCNC: 9 MMOL/L (ref 7–15)
BUN SERPL-MCNC: 8.8 MG/DL (ref 8–23)
CALCIUM SERPL-MCNC: 9.3 MG/DL (ref 8.8–10.2)
CHLORIDE SERPL-SCNC: 103 MMOL/L (ref 98–107)
CREAT SERPL-MCNC: 0.68 MG/DL (ref 0.51–0.95)
DEPRECATED HCO3 PLAS-SCNC: 26 MMOL/L (ref 22–29)
ERYTHROCYTE [DISTWIDTH] IN BLOOD BY AUTOMATED COUNT: 13.4 % (ref 10–15)
GFR SERPL CREATININE-BSD FRML MDRD: >90 ML/MIN/1.73M2
GLUCOSE BLDC GLUCOMTR-MCNC: 102 MG/DL (ref 70–99)
GLUCOSE SERPL-MCNC: 165 MG/DL (ref 70–99)
HCT VFR BLD AUTO: 33.2 % (ref 35–47)
HGB BLD-MCNC: 10.8 G/DL (ref 11.7–15.7)
MAGNESIUM SERPL-MCNC: 1.4 MG/DL (ref 1.7–2.3)
MCH RBC QN AUTO: 29.9 PG (ref 26.5–33)
MCHC RBC AUTO-ENTMCNC: 32.5 G/DL (ref 31.5–36.5)
MCV RBC AUTO: 92 FL (ref 78–100)
PLATELET # BLD AUTO: 205 10E3/UL (ref 150–450)
POTASSIUM SERPL-SCNC: 4.2 MMOL/L (ref 3.4–5.3)
RBC # BLD AUTO: 3.61 10E6/UL (ref 3.8–5.2)
SODIUM SERPL-SCNC: 138 MMOL/L (ref 136–145)
WBC # BLD AUTO: 10.1 10E3/UL (ref 4–11)

## 2022-08-25 PROCEDURE — 250N000013 HC RX MED GY IP 250 OP 250 PS 637: Performed by: ANESTHESIOLOGY

## 2022-08-25 PROCEDURE — 250N000011 HC RX IP 250 OP 636: Performed by: NURSE ANESTHETIST, CERTIFIED REGISTERED

## 2022-08-25 PROCEDURE — 250N000011 HC RX IP 250 OP 636: Performed by: STUDENT IN AN ORGANIZED HEALTH CARE EDUCATION/TRAINING PROGRAM

## 2022-08-25 PROCEDURE — 250N000013 HC RX MED GY IP 250 OP 250 PS 637: Performed by: NURSE ANESTHETIST, CERTIFIED REGISTERED

## 2022-08-25 PROCEDURE — 250N000025 HC SEVOFLURANE, PER MIN: Performed by: PLASTIC SURGERY

## 2022-08-25 PROCEDURE — 250N000013 HC RX MED GY IP 250 OP 250 PS 637: Performed by: STUDENT IN AN ORGANIZED HEALTH CARE EDUCATION/TRAINING PROGRAM

## 2022-08-25 PROCEDURE — 999N000141 HC STATISTIC PRE-PROCEDURE NURSING ASSESSMENT: Performed by: PLASTIC SURGERY

## 2022-08-25 PROCEDURE — 85027 COMPLETE CBC AUTOMATED: CPT | Performed by: STUDENT IN AN ORGANIZED HEALTH CARE EDUCATION/TRAINING PROGRAM

## 2022-08-25 PROCEDURE — 83735 ASSAY OF MAGNESIUM: CPT | Performed by: STUDENT IN AN ORGANIZED HEALTH CARE EDUCATION/TRAINING PROGRAM

## 2022-08-25 PROCEDURE — 0HPU0NZ REMOVAL OF TISSUE EXPANDER FROM LEFT BREAST, OPEN APPROACH: ICD-10-PCS | Performed by: PLASTIC SURGERY

## 2022-08-25 PROCEDURE — 250N000024 HC ISOFLURANE, PER MIN: Performed by: PLASTIC SURGERY

## 2022-08-25 PROCEDURE — 272N000001 HC OR GENERAL SUPPLY STERILE: Performed by: PLASTIC SURGERY

## 2022-08-25 PROCEDURE — 250N000011 HC RX IP 250 OP 636: Performed by: PLASTIC SURGERY

## 2022-08-25 PROCEDURE — 258N000003 HC RX IP 258 OP 636: Performed by: PLASTIC SURGERY

## 2022-08-25 PROCEDURE — 360N000078 HC SURGERY LEVEL 5, PER MIN: Performed by: PLASTIC SURGERY

## 2022-08-25 PROCEDURE — 120N000002 HC R&B MED SURG/OB UMMC

## 2022-08-25 PROCEDURE — 88305 TISSUE EXAM BY PATHOLOGIST: CPT | Mod: TC | Performed by: PLASTIC SURGERY

## 2022-08-25 PROCEDURE — 0HRU077 REPLACEMENT OF LEFT BREAST USING DEEP INFERIOR EPIGASTRIC ARTERY PERFORATOR FLAP, OPEN APPROACH: ICD-10-PCS | Performed by: PLASTIC SURGERY

## 2022-08-25 PROCEDURE — 0WUF0KZ SUPPLEMENT ABDOMINAL WALL WITH NONAUTOLOGOUS TISSUE SUBSTITUTE, OPEN APPROACH: ICD-10-PCS | Performed by: PLASTIC SURGERY

## 2022-08-25 PROCEDURE — 250N000009 HC RX 250: Performed by: NURSE ANESTHETIST, CERTIFIED REGISTERED

## 2022-08-25 PROCEDURE — 710N000010 HC RECOVERY PHASE 1, LEVEL 2, PER MIN: Performed by: PLASTIC SURGERY

## 2022-08-25 PROCEDURE — 250N000012 HC RX MED GY IP 250 OP 636 PS 637: Performed by: PLASTIC SURGERY

## 2022-08-25 PROCEDURE — 11970 RPLCMT TISS XPNDR PERM IMPLT: CPT | Mod: LT | Performed by: PLASTIC SURGERY

## 2022-08-25 PROCEDURE — C9290 INJ, BUPIVACAINE LIPOSOME: HCPCS | Performed by: STUDENT IN AN ORGANIZED HEALTH CARE EDUCATION/TRAINING PROGRAM

## 2022-08-25 PROCEDURE — 370N000017 HC ANESTHESIA TECHNICAL FEE, PER MIN: Performed by: PLASTIC SURGERY

## 2022-08-25 PROCEDURE — 15777 ACELLULAR DERM MATRIX IMPLT: CPT | Mod: GC | Performed by: PLASTIC SURGERY

## 2022-08-25 PROCEDURE — 258N000003 HC RX IP 258 OP 636: Performed by: NURSE ANESTHETIST, CERTIFIED REGISTERED

## 2022-08-25 PROCEDURE — 278N000051 HC OR IMPLANT GENERAL: Performed by: PLASTIC SURGERY

## 2022-08-25 PROCEDURE — 258N000003 HC RX IP 258 OP 636: Performed by: STUDENT IN AN ORGANIZED HEALTH CARE EDUCATION/TRAINING PROGRAM

## 2022-08-25 PROCEDURE — 4A1GXSH MONITORING OF SKIN AND BREAST VASCULAR PERFUSION USING INDOCYANINE GREEN DYE, EXTERNAL APPROACH: ICD-10-PCS | Performed by: PLASTIC SURGERY

## 2022-08-25 PROCEDURE — 19364 BRST RCNSTJ FREE FLAP: CPT | Mod: LT | Performed by: PLASTIC SURGERY

## 2022-08-25 PROCEDURE — 250N000011 HC RX IP 250 OP 636: Performed by: ANESTHESIOLOGY

## 2022-08-25 PROCEDURE — P9045 ALBUMIN (HUMAN), 5%, 250 ML: HCPCS | Performed by: NURSE ANESTHETIST, CERTIFIED REGISTERED

## 2022-08-25 PROCEDURE — 88300 SURGICAL PATH GROSS: CPT | Mod: TC | Performed by: PLASTIC SURGERY

## 2022-08-25 PROCEDURE — 80048 BASIC METABOLIC PNL TOTAL CA: CPT | Performed by: STUDENT IN AN ORGANIZED HEALTH CARE EDUCATION/TRAINING PROGRAM

## 2022-08-25 DEVICE — GRAFT STRATTICE 6X16CM FIRM 0616002 CHG PER SQ CM=96 UNITS: Type: IMPLANTABLE DEVICE | Site: ABDOMEN | Status: FUNCTIONAL

## 2022-08-25 DEVICE — IMP DEVICE ANASTOMOTIC 2.5MM COUPLER RED GEM2753: Type: IMPLANTABLE DEVICE | Site: BREAST | Status: FUNCTIONAL

## 2022-08-25 DEVICE — IMP PROBE DOPPLER FLOW STD CUFF DP-SDP001: Type: IMPLANTABLE DEVICE | Site: BREAST | Status: FUNCTIONAL

## 2022-08-25 RX ORDER — HYDROMORPHONE HCL IN WATER/PF 6 MG/30 ML
0.2 PATIENT CONTROLLED ANALGESIA SYRINGE INTRAVENOUS
Status: DISCONTINUED | OUTPATIENT
Start: 2022-08-25 | End: 2022-08-28 | Stop reason: HOSPADM

## 2022-08-25 RX ORDER — ONDANSETRON 4 MG/1
4 TABLET, ORALLY DISINTEGRATING ORAL EVERY 30 MIN PRN
Status: DISCONTINUED | OUTPATIENT
Start: 2022-08-25 | End: 2022-08-25 | Stop reason: HOSPADM

## 2022-08-25 RX ORDER — PROPOFOL 10 MG/ML
INJECTION, EMULSION INTRAVENOUS PRN
Status: DISCONTINUED | OUTPATIENT
Start: 2022-08-25 | End: 2022-08-25

## 2022-08-25 RX ORDER — PROCHLORPERAZINE MALEATE 5 MG
5 TABLET ORAL EVERY 6 HOURS PRN
Status: DISCONTINUED | OUTPATIENT
Start: 2022-08-25 | End: 2022-08-28 | Stop reason: HOSPADM

## 2022-08-25 RX ORDER — OXYCODONE HYDROCHLORIDE 5 MG/1
5 TABLET ORAL EVERY 4 HOURS PRN
Status: DISCONTINUED | OUTPATIENT
Start: 2022-08-25 | End: 2022-08-25 | Stop reason: HOSPADM

## 2022-08-25 RX ORDER — ENOXAPARIN SODIUM 100 MG/ML
40 INJECTION SUBCUTANEOUS
Status: COMPLETED | OUTPATIENT
Start: 2022-08-25 | End: 2022-08-25

## 2022-08-25 RX ORDER — NALOXONE HYDROCHLORIDE 0.4 MG/ML
0.2 INJECTION, SOLUTION INTRAMUSCULAR; INTRAVENOUS; SUBCUTANEOUS
Status: DISCONTINUED | OUTPATIENT
Start: 2022-08-25 | End: 2022-08-25 | Stop reason: HOSPADM

## 2022-08-25 RX ORDER — ACETAMINOPHEN 325 MG/1
650 TABLET ORAL EVERY 4 HOURS PRN
Status: DISCONTINUED | OUTPATIENT
Start: 2022-08-28 | End: 2022-08-28 | Stop reason: HOSPADM

## 2022-08-25 RX ORDER — OXYCODONE HYDROCHLORIDE 5 MG/1
5 TABLET ORAL EVERY 4 HOURS PRN
Status: DISCONTINUED | OUTPATIENT
Start: 2022-08-25 | End: 2022-08-28 | Stop reason: HOSPADM

## 2022-08-25 RX ORDER — ONDANSETRON 2 MG/ML
4 INJECTION INTRAMUSCULAR; INTRAVENOUS EVERY 6 HOURS PRN
Status: DISCONTINUED | OUTPATIENT
Start: 2022-08-25 | End: 2022-08-28 | Stop reason: HOSPADM

## 2022-08-25 RX ORDER — NALOXONE HYDROCHLORIDE 0.4 MG/ML
0.4 INJECTION, SOLUTION INTRAMUSCULAR; INTRAVENOUS; SUBCUTANEOUS
Status: DISCONTINUED | OUTPATIENT
Start: 2022-08-25 | End: 2022-08-25 | Stop reason: HOSPADM

## 2022-08-25 RX ORDER — BUPIVACAINE HYDROCHLORIDE 2.5 MG/ML
INJECTION, SOLUTION EPIDURAL; INFILTRATION; INTRACAUDAL
Status: COMPLETED | OUTPATIENT
Start: 2022-08-25 | End: 2022-08-25

## 2022-08-25 RX ORDER — CEFAZOLIN SODIUM/WATER 2 G/20 ML
2 SYRINGE (ML) INTRAVENOUS
Status: COMPLETED | OUTPATIENT
Start: 2022-08-25 | End: 2022-08-25

## 2022-08-25 RX ORDER — CEFAZOLIN SODIUM/WATER 2 G/20 ML
2 SYRINGE (ML) INTRAVENOUS SEE ADMIN INSTRUCTIONS
Status: DISCONTINUED | OUTPATIENT
Start: 2022-08-25 | End: 2022-08-25 | Stop reason: HOSPADM

## 2022-08-25 RX ORDER — HYDROXYZINE HYDROCHLORIDE 25 MG/1
25 TABLET, FILM COATED ORAL 3 TIMES DAILY PRN
Status: DISCONTINUED | OUTPATIENT
Start: 2022-08-25 | End: 2022-08-28 | Stop reason: HOSPADM

## 2022-08-25 RX ORDER — NALOXONE HYDROCHLORIDE 0.4 MG/ML
0.4 INJECTION, SOLUTION INTRAMUSCULAR; INTRAVENOUS; SUBCUTANEOUS
Status: DISCONTINUED | OUTPATIENT
Start: 2022-08-25 | End: 2022-08-28 | Stop reason: HOSPADM

## 2022-08-25 RX ORDER — NALOXONE HYDROCHLORIDE 0.4 MG/ML
0.2 INJECTION, SOLUTION INTRAMUSCULAR; INTRAVENOUS; SUBCUTANEOUS
Status: DISCONTINUED | OUTPATIENT
Start: 2022-08-25 | End: 2022-08-28 | Stop reason: HOSPADM

## 2022-08-25 RX ORDER — POLYETHYLENE GLYCOL 3350 17 G/17G
17 POWDER, FOR SOLUTION ORAL DAILY
Status: DISCONTINUED | OUTPATIENT
Start: 2022-08-26 | End: 2022-08-28 | Stop reason: HOSPADM

## 2022-08-25 RX ORDER — ONDANSETRON 2 MG/ML
4 INJECTION INTRAMUSCULAR; INTRAVENOUS EVERY 30 MIN PRN
Status: DISCONTINUED | OUTPATIENT
Start: 2022-08-25 | End: 2022-08-25 | Stop reason: HOSPADM

## 2022-08-25 RX ORDER — CALCIUM CHLORIDE 100 MG/ML
INJECTION INTRAVENOUS; INTRAVENTRICULAR PRN
Status: DISCONTINUED | OUTPATIENT
Start: 2022-08-25 | End: 2022-08-25

## 2022-08-25 RX ORDER — PAPAVERINE HYDROCHLORIDE 30 MG/ML
INJECTION INTRAMUSCULAR; INTRAVENOUS PRN
Status: DISCONTINUED | OUTPATIENT
Start: 2022-08-25 | End: 2022-08-25 | Stop reason: HOSPADM

## 2022-08-25 RX ORDER — GABAPENTIN 300 MG/1
900 CAPSULE ORAL ONCE
Status: COMPLETED | OUTPATIENT
Start: 2022-08-25 | End: 2022-08-25

## 2022-08-25 RX ORDER — FLUMAZENIL 0.1 MG/ML
0.2 INJECTION, SOLUTION INTRAVENOUS
Status: DISCONTINUED | OUTPATIENT
Start: 2022-08-25 | End: 2022-08-25 | Stop reason: HOSPADM

## 2022-08-25 RX ORDER — METHOCARBAMOL 500 MG/1
500 TABLET, FILM COATED ORAL 4 TIMES DAILY PRN
Status: DISCONTINUED | OUTPATIENT
Start: 2022-08-25 | End: 2022-08-28 | Stop reason: HOSPADM

## 2022-08-25 RX ORDER — DEXAMETHASONE SODIUM PHOSPHATE 4 MG/ML
INJECTION, SOLUTION INTRA-ARTICULAR; INTRALESIONAL; INTRAMUSCULAR; INTRAVENOUS; SOFT TISSUE PRN
Status: DISCONTINUED | OUTPATIENT
Start: 2022-08-25 | End: 2022-08-25

## 2022-08-25 RX ORDER — LIDOCAINE 40 MG/G
CREAM TOPICAL
Status: DISCONTINUED | OUTPATIENT
Start: 2022-08-25 | End: 2022-08-28 | Stop reason: HOSPADM

## 2022-08-25 RX ORDER — INDOCYANINE GREEN AND WATER 25 MG
KIT INJECTION PRN
Status: DISCONTINUED | OUTPATIENT
Start: 2022-08-25 | End: 2022-08-25

## 2022-08-25 RX ORDER — GABAPENTIN 300 MG/1
600 CAPSULE ORAL 3 TIMES DAILY
Status: DISCONTINUED | OUTPATIENT
Start: 2022-08-25 | End: 2022-08-26

## 2022-08-25 RX ORDER — GLYCOPYRROLATE 0.2 MG/ML
INJECTION, SOLUTION INTRAMUSCULAR; INTRAVENOUS PRN
Status: DISCONTINUED | OUTPATIENT
Start: 2022-08-25 | End: 2022-08-25

## 2022-08-25 RX ORDER — ASPIRIN 81 MG/1
81 TABLET, CHEWABLE ORAL DAILY
Status: DISCONTINUED | OUTPATIENT
Start: 2022-08-26 | End: 2022-08-28 | Stop reason: HOSPADM

## 2022-08-25 RX ORDER — LEVOTHYROXINE SODIUM 112 UG/1
112 TABLET ORAL EVERY MORNING
Status: DISCONTINUED | OUTPATIENT
Start: 2022-08-26 | End: 2022-08-28 | Stop reason: HOSPADM

## 2022-08-25 RX ORDER — BISACODYL 10 MG
10 SUPPOSITORY, RECTAL RECTAL DAILY PRN
Status: DISCONTINUED | OUTPATIENT
Start: 2022-08-25 | End: 2022-08-28 | Stop reason: HOSPADM

## 2022-08-25 RX ORDER — ENOXAPARIN SODIUM 100 MG/ML
40 INJECTION SUBCUTANEOUS EVERY 24 HOURS
Status: DISCONTINUED | OUTPATIENT
Start: 2022-08-26 | End: 2022-08-28 | Stop reason: HOSPADM

## 2022-08-25 RX ORDER — ACETAMINOPHEN 500 MG
1000 TABLET ORAL EVERY 8 HOURS
Status: DISCONTINUED | OUTPATIENT
Start: 2022-08-25 | End: 2022-08-28 | Stop reason: HOSPADM

## 2022-08-25 RX ORDER — ONDANSETRON 2 MG/ML
INJECTION INTRAMUSCULAR; INTRAVENOUS PRN
Status: DISCONTINUED | OUTPATIENT
Start: 2022-08-25 | End: 2022-08-25

## 2022-08-25 RX ORDER — DEXTROSE MONOHYDRATE, SODIUM CHLORIDE, AND POTASSIUM CHLORIDE 50; 1.49; 4.5 G/1000ML; G/1000ML; G/1000ML
INJECTION, SOLUTION INTRAVENOUS CONTINUOUS
Status: DISCONTINUED | OUTPATIENT
Start: 2022-08-25 | End: 2022-08-27 | Stop reason: CLARIF

## 2022-08-25 RX ORDER — FENTANYL CITRATE 50 UG/ML
25 INJECTION, SOLUTION INTRAMUSCULAR; INTRAVENOUS EVERY 5 MIN PRN
Status: DISCONTINUED | OUTPATIENT
Start: 2022-08-25 | End: 2022-08-25 | Stop reason: HOSPADM

## 2022-08-25 RX ORDER — SODIUM CHLORIDE, SODIUM LACTATE, POTASSIUM CHLORIDE, CALCIUM CHLORIDE 600; 310; 30; 20 MG/100ML; MG/100ML; MG/100ML; MG/100ML
INJECTION, SOLUTION INTRAVENOUS CONTINUOUS PRN
Status: DISCONTINUED | OUTPATIENT
Start: 2022-08-25 | End: 2022-08-25

## 2022-08-25 RX ORDER — FENTANYL CITRATE 50 UG/ML
INJECTION, SOLUTION INTRAMUSCULAR; INTRAVENOUS PRN
Status: DISCONTINUED | OUTPATIENT
Start: 2022-08-25 | End: 2022-08-25

## 2022-08-25 RX ORDER — LIDOCAINE 4 G/G
1-3 PATCH TOPICAL EVERY 24 HOURS
Status: DISCONTINUED | OUTPATIENT
Start: 2022-08-25 | End: 2022-08-28 | Stop reason: HOSPADM

## 2022-08-25 RX ORDER — SODIUM CHLORIDE, SODIUM LACTATE, POTASSIUM CHLORIDE, CALCIUM CHLORIDE 600; 310; 30; 20 MG/100ML; MG/100ML; MG/100ML; MG/100ML
INJECTION, SOLUTION INTRAVENOUS CONTINUOUS
Status: DISCONTINUED | OUTPATIENT
Start: 2022-08-25 | End: 2022-08-25 | Stop reason: HOSPADM

## 2022-08-25 RX ORDER — HYDROMORPHONE HYDROCHLORIDE 1 MG/ML
0.2 INJECTION, SOLUTION INTRAMUSCULAR; INTRAVENOUS; SUBCUTANEOUS EVERY 5 MIN PRN
Status: DISCONTINUED | OUTPATIENT
Start: 2022-08-25 | End: 2022-08-25 | Stop reason: HOSPADM

## 2022-08-25 RX ORDER — HYDROMORPHONE HCL IN WATER/PF 6 MG/30 ML
0.4 PATIENT CONTROLLED ANALGESIA SYRINGE INTRAVENOUS
Status: DISCONTINUED | OUTPATIENT
Start: 2022-08-25 | End: 2022-08-28 | Stop reason: HOSPADM

## 2022-08-25 RX ORDER — ONDANSETRON 4 MG/1
4 TABLET, ORALLY DISINTEGRATING ORAL EVERY 6 HOURS PRN
Status: DISCONTINUED | OUTPATIENT
Start: 2022-08-25 | End: 2022-08-28 | Stop reason: HOSPADM

## 2022-08-25 RX ORDER — LIDOCAINE HYDROCHLORIDE 20 MG/ML
INJECTION, SOLUTION INFILTRATION; PERINEURAL PRN
Status: DISCONTINUED | OUTPATIENT
Start: 2022-08-25 | End: 2022-08-25

## 2022-08-25 RX ORDER — OXYCODONE HYDROCHLORIDE 10 MG/1
10 TABLET ORAL EVERY 4 HOURS PRN
Status: DISCONTINUED | OUTPATIENT
Start: 2022-08-25 | End: 2022-08-28 | Stop reason: HOSPADM

## 2022-08-25 RX ORDER — FENTANYL CITRATE 50 UG/ML
25-50 INJECTION, SOLUTION INTRAMUSCULAR; INTRAVENOUS
Status: DISCONTINUED | OUTPATIENT
Start: 2022-08-25 | End: 2022-08-25 | Stop reason: HOSPADM

## 2022-08-25 RX ORDER — AMOXICILLIN 250 MG
1 CAPSULE ORAL 2 TIMES DAILY
Status: DISCONTINUED | OUTPATIENT
Start: 2022-08-25 | End: 2022-08-28 | Stop reason: HOSPADM

## 2022-08-25 RX ADMIN — FENTANYL CITRATE 50 MCG: 50 INJECTION, SOLUTION INTRAMUSCULAR; INTRAVENOUS at 08:58

## 2022-08-25 RX ADMIN — CALCIUM CHLORIDE 200 MG: 100 INJECTION INTRAVENOUS; INTRAVENTRICULAR at 13:39

## 2022-08-25 RX ADMIN — PROPOFOL 20 MG: 10 INJECTION, EMULSION INTRAVENOUS at 15:40

## 2022-08-25 RX ADMIN — Medication 20 MG: at 13:28

## 2022-08-25 RX ADMIN — Medication 50 MG: at 07:50

## 2022-08-25 RX ADMIN — Medication 20 MG: at 09:56

## 2022-08-25 RX ADMIN — SODIUM CHLORIDE, POTASSIUM CHLORIDE, SODIUM LACTATE AND CALCIUM CHLORIDE: 600; 310; 30; 20 INJECTION, SOLUTION INTRAVENOUS at 12:00

## 2022-08-25 RX ADMIN — PHENYLEPHRINE HYDROCHLORIDE 100 MCG: 10 INJECTION INTRAVENOUS at 08:02

## 2022-08-25 RX ADMIN — FENTANYL CITRATE 50 MCG: 50 INJECTION, SOLUTION INTRAMUSCULAR; INTRAVENOUS at 08:43

## 2022-08-25 RX ADMIN — HYDROMORPHONE HYDROCHLORIDE 0.5 MG: 1 INJECTION, SOLUTION INTRAMUSCULAR; INTRAVENOUS; SUBCUTANEOUS at 13:55

## 2022-08-25 RX ADMIN — Medication 20 MG: at 10:42

## 2022-08-25 RX ADMIN — HYDROMORPHONE HYDROCHLORIDE 0.2 MG: 0.2 INJECTION, SOLUTION INTRAMUSCULAR; INTRAVENOUS; SUBCUTANEOUS at 19:07

## 2022-08-25 RX ADMIN — Medication 20 MG: at 09:21

## 2022-08-25 RX ADMIN — CALCIUM CHLORIDE 200 MG: 100 INJECTION INTRAVENOUS; INTRAVENTRICULAR at 11:28

## 2022-08-25 RX ADMIN — SODIUM CHLORIDE, POTASSIUM CHLORIDE, SODIUM LACTATE AND CALCIUM CHLORIDE: 600; 310; 30; 20 INJECTION, SOLUTION INTRAVENOUS at 08:39

## 2022-08-25 RX ADMIN — PROCHLORPERAZINE EDISYLATE 5 MG: 5 INJECTION INTRAMUSCULAR; INTRAVENOUS at 20:48

## 2022-08-25 RX ADMIN — OXYCODONE HYDROCHLORIDE 5 MG: 5 TABLET ORAL at 17:15

## 2022-08-25 RX ADMIN — CALCIUM CHLORIDE 200 MG: 100 INJECTION INTRAVENOUS; INTRAVENTRICULAR at 11:44

## 2022-08-25 RX ADMIN — ONDANSETRON 4 MG: 2 INJECTION INTRAMUSCULAR; INTRAVENOUS at 14:45

## 2022-08-25 RX ADMIN — MIDAZOLAM HYDROCHLORIDE 1 MG: 1 INJECTION, SOLUTION INTRAMUSCULAR; INTRAVENOUS at 07:00

## 2022-08-25 RX ADMIN — DEXAMETHASONE SODIUM PHOSPHATE 4 MG: 4 INJECTION, SOLUTION INTRA-ARTICULAR; INTRALESIONAL; INTRAMUSCULAR; INTRAVENOUS; SOFT TISSUE at 07:55

## 2022-08-25 RX ADMIN — BUPIVACAINE HYDROCHLORIDE 10 ML: 2.5 INJECTION, SOLUTION EPIDURAL; INFILTRATION; INTRACAUDAL at 07:01

## 2022-08-25 RX ADMIN — FENTANYL CITRATE 25 MCG: 50 INJECTION, SOLUTION INTRAMUSCULAR; INTRAVENOUS at 17:15

## 2022-08-25 RX ADMIN — ENOXAPARIN SODIUM 40 MG: 40 INJECTION SUBCUTANEOUS at 07:27

## 2022-08-25 RX ADMIN — LIDOCAINE HYDROCHLORIDE 50 MG: 20 INJECTION, SOLUTION INFILTRATION; PERINEURAL at 07:49

## 2022-08-25 RX ADMIN — Medication 2 G: at 11:59

## 2022-08-25 RX ADMIN — MIDAZOLAM 2 MG: 1 INJECTION INTRAMUSCULAR; INTRAVENOUS at 07:40

## 2022-08-25 RX ADMIN — INDOCYANINE GREEN AND WATER 12.5 MG: KIT at 09:45

## 2022-08-25 RX ADMIN — FENTANYL CITRATE 50 MCG: 50 INJECTION, SOLUTION INTRAMUSCULAR; INTRAVENOUS at 09:21

## 2022-08-25 RX ADMIN — Medication 30 MG: at 08:27

## 2022-08-25 RX ADMIN — PROPOFOL 20 MG: 10 INJECTION, EMULSION INTRAVENOUS at 15:34

## 2022-08-25 RX ADMIN — Medication 2 G: at 07:55

## 2022-08-25 RX ADMIN — Medication 20 MG: at 11:31

## 2022-08-25 RX ADMIN — CALCIUM CHLORIDE 200 MG: 100 INJECTION INTRAVENOUS; INTRAVENTRICULAR at 10:02

## 2022-08-25 RX ADMIN — GABAPENTIN 900 MG: 300 CAPSULE ORAL at 17:10

## 2022-08-25 RX ADMIN — ALBUMIN (HUMAN): 12.5 SOLUTION INTRAVENOUS at 10:05

## 2022-08-25 RX ADMIN — CALCIUM CHLORIDE 100 MG: 100 INJECTION INTRAVENOUS; INTRAVENTRICULAR at 13:41

## 2022-08-25 RX ADMIN — PROPOFOL 150 MG: 10 INJECTION, EMULSION INTRAVENOUS at 07:49

## 2022-08-25 RX ADMIN — CALCIUM CHLORIDE 100 MG: 100 INJECTION INTRAVENOUS; INTRAVENTRICULAR at 10:17

## 2022-08-25 RX ADMIN — POTASSIUM CHLORIDE, DEXTROSE MONOHYDRATE AND SODIUM CHLORIDE: 150; 5; 450 INJECTION, SOLUTION INTRAVENOUS at 16:48

## 2022-08-25 RX ADMIN — Medication 20 MG: at 14:16

## 2022-08-25 RX ADMIN — SODIUM CHLORIDE, POTASSIUM CHLORIDE, SODIUM LACTATE AND CALCIUM CHLORIDE: 600; 310; 30; 20 INJECTION, SOLUTION INTRAVENOUS at 08:00

## 2022-08-25 RX ADMIN — FENTANYL CITRATE 100 MCG: 50 INJECTION, SOLUTION INTRAMUSCULAR; INTRAVENOUS at 07:49

## 2022-08-25 RX ADMIN — PROPOFOL 30 MG: 10 INJECTION, EMULSION INTRAVENOUS at 15:26

## 2022-08-25 RX ADMIN — HYDROMORPHONE HYDROCHLORIDE 0.5 MG: 1 INJECTION, SOLUTION INTRAMUSCULAR; INTRAVENOUS; SUBCUTANEOUS at 15:02

## 2022-08-25 RX ADMIN — BUPIVACAINE HYDROCHLORIDE 10 ML: 2.5 INJECTION, SOLUTION EPIDURAL; INFILTRATION; INTRACAUDAL; PERINEURAL at 07:01

## 2022-08-25 RX ADMIN — FENTANYL CITRATE 50 MCG: 50 INJECTION, SOLUTION INTRAMUSCULAR; INTRAVENOUS at 07:00

## 2022-08-25 RX ADMIN — Medication 20 MG: at 12:32

## 2022-08-25 RX ADMIN — LIDOCAINE PATCH 4% 3 PATCH: 40 PATCH TOPICAL at 20:34

## 2022-08-25 RX ADMIN — SUGAMMADEX 200 MG: 100 INJECTION, SOLUTION INTRAVENOUS at 15:53

## 2022-08-25 RX ADMIN — ALBUMIN (HUMAN): 12.5 SOLUTION INTRAVENOUS at 14:15

## 2022-08-25 RX ADMIN — PHENYLEPHRINE HYDROCHLORIDE 100 MCG: 10 INJECTION INTRAVENOUS at 08:17

## 2022-08-25 RX ADMIN — SODIUM CHLORIDE, POTASSIUM CHLORIDE, SODIUM LACTATE AND CALCIUM CHLORIDE: 600; 310; 30; 20 INJECTION, SOLUTION INTRAVENOUS at 15:00

## 2022-08-25 RX ADMIN — GLYCOPYRROLATE 0.2 MG: 0.2 INJECTION, SOLUTION INTRAMUSCULAR; INTRAVENOUS at 10:23

## 2022-08-25 RX ADMIN — BUPIVACAINE 10 ML: 13.3 INJECTION, SUSPENSION, LIPOSOMAL INFILTRATION at 07:01

## 2022-08-25 RX ADMIN — FENTANYL CITRATE 50 MCG: 50 INJECTION, SOLUTION INTRAMUSCULAR; INTRAVENOUS at 13:31

## 2022-08-25 RX ADMIN — ONDANSETRON 4 MG: 2 INJECTION INTRAMUSCULAR; INTRAVENOUS at 19:06

## 2022-08-25 RX ADMIN — SODIUM CHLORIDE, POTASSIUM CHLORIDE, SODIUM LACTATE AND CALCIUM CHLORIDE: 600; 310; 30; 20 INJECTION, SOLUTION INTRAVENOUS at 07:33

## 2022-08-25 RX ADMIN — ONDANSETRON 4 MG: 2 INJECTION INTRAMUSCULAR; INTRAVENOUS at 18:00

## 2022-08-25 ASSESSMENT — ACTIVITIES OF DAILY LIVING (ADL)
ADLS_ACUITY_SCORE: 20
ADLS_ACUITY_SCORE: 26
ADLS_ACUITY_SCORE: 20
ADLS_ACUITY_SCORE: 26
ADLS_ACUITY_SCORE: 20
ADLS_ACUITY_SCORE: 26
ADLS_ACUITY_SCORE: 20

## 2022-08-25 ASSESSMENT — LIFESTYLE VARIABLES: TOBACCO_USE: 0

## 2022-08-25 NOTE — INTERVAL H&P NOTE
"I have reviewed the surgical (or preoperative) H&P that is linked to this encounter, and examined the patient. There are no significant changes    Clinical Conditions Present on Arrival:  Clinically Significant Risk Factors Present on Admission                   # Obesity: Estimated body mass index is 39.52 kg/m  as calculated from the following:    Height as of this encounter: 1.6 m (5' 3\").    Weight as of this encounter: 101.2 kg (223 lb 1.7 oz).       "

## 2022-08-25 NOTE — ANESTHESIA PROCEDURE NOTES
Airway       Patient location during procedure: OR       Procedure Start/Stop Times: 8/25/2022 7:53 AM  Staff -        CRNA: Nuria Basilio APRN CRNA       Performed By: CRNA  Consent for Airway        Urgency: elective  Indications and Patient Condition       Indications for airway management: markell-procedural       Induction type:intravenous       Mask difficulty assessment: 2 - vent by mask + OA or adjuvant +/- NMBA    Final Airway Details       Final airway type: endotracheal airway       Successful airway: ETT - single  Endotracheal Airway Details        ETT size (mm): 7.0       Cuffed: yes       Successful intubation technique: video laryngoscopy       VL Blade Size: MAC 3       Grade View of Cords: 1       Adjucts: stylet       Position: Right       Measured from: lips       Secured at (cm): 21       Bite block used: None    Post intubation assessment        Placement verified by: capnometry, equal breath sounds and chest rise        Number of attempts at approach: 1       Secured with: pink tape       Ease of procedure: easy       Dentition: Intact and Unchanged    Medication(s) Administered   Medication Administration Time: 8/25/2022 7:53 AM

## 2022-08-25 NOTE — ANESTHESIA CARE TRANSFER NOTE
Patient: Ramona Ferrer    Procedure: Procedure(s):  Left breast reconstruction with free abdominal flap,  SPY       Diagnosis: S/P breast reconstruction, left [Z98.890]  Diagnosis Additional Information: No value filed.    Anesthesia Type:   No value filed.     Note:    Oropharynx: oropharynx clear of all foreign objects and spontaneously breathing  Level of Consciousness: awake  Oxygen Supplementation: face mask  Level of Supplemental Oxygen (L/min / FiO2): 6  Independent Airway: airway patency satisfactory and stable  Dentition: dentition unchanged  Vital Signs Stable: post-procedure vital signs reviewed and stable  Report to RN Given: handoff report given  Patient transferred to: PACU    Handoff Report: Identifed the Patient, Identified the Reponsible Provider, Reviewed the pertinent medical history, Discussed the surgical course, Reviewed Intra-OP anesthesia mangement and issues during anesthesia, Set expectations for post-procedure period and Allowed opportunity for questions and acknowledgement of understanding      Vitals:  Vitals Value Taken Time   /58 08/25/22 1601   Temp     Pulse 93 08/25/22 1603   Resp 13 08/25/22 1603   SpO2 98 % 08/25/22 1603   Vitals shown include unvalidated device data.    Electronically Signed By: EFRAIN Franklin CRNA  August 25, 2022  4:05 PM

## 2022-08-25 NOTE — ANESTHESIA PREPROCEDURE EVALUATION
Anesthesia Pre-Procedure Evaluation    Patient: Ramona Ferrer   MRN: [id number] : 1955        Procedure : Procedure(s):  Left breast reconstruction with free abdominal flap, Resensation, SPY          Past Medical History:   Diagnosis Date     Breast cancer (H) 2021     Complication of anesthesia      DDD (degenerative disc disease), lumbar      Endometriosis      Hypertension      Hypothyroidism           Seasonal allergies      Spinal stenosis, lumbar       Past Surgical History:   Procedure Laterality Date     COLONOSCOPY       COLONOSCOPY  2012    Procedure:COLONOSCOPY; COLONOSCOPY ; Surgeon:ARUN KITCHEN; Location: GI     COLONOSCOPY N/A 2/15/2019    Procedure: COMBINED COLONOSCOPY, SINGLE OR MULTIPLE BIOPSY/POLYPECTOMY BY BIOPSY;  Surgeon: Connor Sanderson MD;  Location:  GI     HC CYSTOURETHROSCOPY W/ URETEROSCOPY &/OR PYELOSCOPY; W/ LITHOTRIPSY       HC TRABECULOPLASTY BY LASER SURGERY      PI OU     HYSTERECTOMY, PAP NO LONGER INDICATED       LASER YAG IRIDOTOMY  2012    Procedure: LASER YAG IRIDOTOMY;  LEFT EYE YAG LASER PUPIL IRIDOTOMY ;  Surgeon: Dakotah Humphreys MD;  Location:  EC     LIGATN/STRIP LONG OR SHORT SAPHEN      x's2     MASTECTOMY PARTIAL WITH SENTINEL NODE Left 3/23/2022    Procedure: LEFT SKIN SPARING MASTECTOMY WITH SENTINEL LYMPH NODE BIOPSY;  Surgeon: Dada Mendiola MD;  Location: [facility] OR     PELVIS LAPAROSCOPY,DX       RECONSTRUCT BREAST, INSERT TISSUE EXPANDER BILATERAL, COMBINED Left 3/23/2022    Procedure: Left breast reconstruction with expander and SPY;  Surgeon: NOHEMI Feliciano MD;  Location: [facility] OR     STRABISMUS SURGERY       ZZC TOTAL ABDOM HYSTERECTOMY      MARYA BSO for stage 4 endometriosis      Allergies   Allergen Reactions     Seasonal Allergies       Social History     Tobacco Use     Smoking status: Never Smoker     Smokeless tobacco: Never Used   Substance Use Topics     Alcohol use: Yes     Comment: social      Wt Readings from Last 1  Encounters:   08/25/22 101.2 kg (223 lb 1.7 oz)        Anesthesia Evaluation   Pt has had prior anesthetic.     History of anesthetic complications   slow to wake in 1992.    ROS/MED HX  ENT/Pulmonary:     (+) sleep apnea, doesn't use CPAP, allergic rhinitis,  (-) tobacco use and asthma   Neurologic:  - neg neurologic ROS     Cardiovascular:     (+) hypertension-----Previous cardiac testing   Echo: Date: Results:    Stress Test: Date: 12/17/21 Results:  Interpretation Summary  This was a normal stress echocardiogram with no evidence of stress-induced  ischemia.  Fair exercise tolerance ( 07:24 monidfied Venkata protocol / 6.2 Mets)  ECG Reviewed: Date: Results:    Cath: Date: Results:      METS/Exercise Tolerance: >4 METS    Hematologic:  - neg hematologic  ROS  (-) history of blood clots and history of blood transfusion   Musculoskeletal: Comment: Chronic low back pain  Chronic right leg pain      GI/Hepatic:  - neg GI/hepatic ROS     Renal/Genitourinary:  - neg Renal ROS     Endo: Comment: Prediabetes, A1c 6.3% 7/20/22    (+) thyroid problem, hypothyroidism, Obesity,     Psychiatric/Substance Use:  - neg psychiatric ROS     Infectious Disease:       Malignancy:   (+) Malignancy, History of Breast.Breast CA status post Surgery.        Other:  - neg other ROS    (+) , H/O Chronic Pain,        Physical Exam    Airway  airway exam normal           Respiratory Devices and Support         Dental  no notable dental history         Cardiovascular   cardiovascular exam normal          Pulmonary   pulmonary exam normal                OUTSIDE LABS:  CBC:   Lab Results   Component Value Date    WBC 6.3 08/02/2022    WBC 5.4 06/28/2022    HGB 13.8 08/02/2022    HGB 13.2 06/28/2022    HCT 42.3 08/02/2022    HCT 40.9 06/28/2022     08/02/2022     06/28/2022     BMP:   Lab Results   Component Value Date     08/02/2022     06/28/2022    POTASSIUM 4.0 08/02/2022    POTASSIUM 4.0 06/28/2022    CHLORIDE 105  08/02/2022    CHLORIDE 107 06/28/2022    CO2 30 08/02/2022    CO2 27 06/28/2022    BUN 12 08/02/2022    BUN 13 06/28/2022    CR 0.71 08/02/2022    CR 0.8 08/02/2022     (H) 08/25/2022     (H) 08/02/2022     COAGS: No results found for: PTT, INR, FIBR  POC: No results found for: BGM, HCG, HCGS  HEPATIC:   Lab Results   Component Value Date    ALBUMIN 3.6 06/28/2022    PROTTOTAL 7.5 06/28/2022    ALT 66 (H) 06/28/2022    AST 32 06/28/2022    ALKPHOS 68 06/28/2022    BILITOTAL 0.4 06/28/2022     OTHER:   Lab Results   Component Value Date    LACT 0.9 11/29/2018    A1C 6.3 (H) 07/20/2022    EMMA 9.3 08/02/2022    MAG 2.0 11/29/2018    LIPASE 208 11/30/2018    TSH 0.74 12/13/2021    T4 0.75 (L) 12/21/2020       Anesthesia Plan    ASA Status:  3   NPO Status:  NPO Appropriate    Anesthesia Type: General.     - Airway: ETT   Induction: Intravenous.   Maintenance: Balanced.   Techniques and Equipment:     - Lines/Monitors: 2nd IV     Consents    Anesthesia Plan(s) and associated risks, benefits, and realistic alternatives discussed. Questions answered and patient/representative(s) expressed understanding.    - Discussed:     - Discussed with:  Patient      - Extended Intubation/Ventilatory Support Discussed: No.      - Patient is DNR/DNI Status: No    Use of blood products discussed: No .     Postoperative Care    Pain management: IV analgesics, Peripheral nerve block (Single Shot), Multi-modal analgesia.   PONV prophylaxis: Ondansetron (or other 5HT-3), Dexamethasone or Solumedrol     Comments:                Prabhjot Tellez MD

## 2022-08-25 NOTE — OP NOTE
Procedure Date: 8/25/2022     PREOPERATIVE DIAGNOSIS:  Status post left breast cancer requiring left nipple nonsparing mastectomy and immediate expander-based reconstruction now ready for stage II breast reconstruction.      POSTOPERATIVE DIAGNOSIS:   Status post left breast cancer requiring left nipple nonsparing mastectomy and immediate expander-based reconstruction now ready for stage II breast reconstruction.         PROCEDURES:   1. Left breast reconstruction using a right gustavo abdominal free (RUTH) deep inferior epigastric  based flap (this was a microsurgical case).  This was a perforated base flap in which one dominant  was dissected out of the muscle using microsurgical techniques which imparts a higher degree of complexity to the case adding at least an hour to the procedure.  The artery was anastomosed using 8-0 nylon suture in an interrupted fashion and the vein was anastomosed using a 2.5 mm venous .  The ischemia time was 43 minutes and the flap weighed 1011 g. 1lateral  flap.  2. Left breast expander removal.   3. Abdominal wall reconstruction with 16X5 cm Strattice, 100% used.  3.  Intraoperative use of chemical angiography using the SPY Elite System 5 mL of ICG dye were injected and interpretation of the angiogram was carried out at 30 and 60 seconds.  The indication for use was to identify if the one major dominant  was going to be enough to vascularize the entire flap and also to demarcate the boundaries where the flap would be debrided.     SURGEON:  Grover Feliciano MD      RESIDENTS: Jeovanny Brooke MD; and Mingo Cartwright MD.     ANESTHESIA:  General anesthesia with endotracheal intubation.      COMPLICATIONS:  Nil.      DRAINS:  A 15-[place] JAMES drain in the abdomen and chest wall sites.      BLOOD LOSS:  250 mL.      SPECIMENS:  Left chest/breast skin.      DESCRIPTION OF PROCEDURE:  After informed consent was obtained from the patient, the proper site  and procedure was ascertained with her and she was appropriately marked in the operating room. Preoperative Lovenox was given in the holding room.  The operating room was warmed up and was warm throughout the case.  Underbody Yann Hugger was placed.  The patient was placed in a supine position with the knees comfortably flexed, pillows underneath them and pneumoboots placed and running prior to induction of anesthesia.  General anesthesia was administered without any complications.  Verde catheter was inserted.  She was prepped and draped in the standard surgical fashion with appropriate padding.         Began by first making an incision through her original mastectomy incision.  Dissection carried out down through the subcutaneous tissues down through the capsule into the breast pocket. The underlying expander was removed.  Extensive capsulotomies were carried out.  Then the third rib was identified,  the cartilage was cleaned off of the perichondrium and using standard techniques, the cartilage was removed and under loupe magnification the pericardium was lifted up to identify the underlying internal mammary vessels.  Once we had visualized the vessels and deemed appropriate, we then turned attention to the abdomen.        The superior incision was made as was marked out preoperatively.  Dissection carried out through the subcutaneous tissues to the abdominal wall.  The subcutaneous tissues were then elevated to the chest wall.  The patient was flexed and then we made sure that the inferior markings of the flap, if utilized, then the abdomen could be closed primarily, which could re-flatten the patient again.  The inferior incision was made.  The superficial inferior epigastric vein on the right side was was dissected out.  The right gustavo-abdominal flap was then elevated from lateral to medial until we identified the main  that had been identified preoperatively on the CTA. The midline incision was made  and  The flap was elevated on 2 perforators. One the main lateral one, and other a medial smaller one. We then placed Acland clamps over the medial  that I had also dissected into the flap in case I needed it.The SPY angiogram was then carried out.  5 mL of ICG dye were injected and at 30 and 60 seconds, we identified the blood flow to the flap.  It lit up beautifully with the entire right gustavo-abdomen.     We dissected out the major lateral  from the rectus muscle using standard RUTH techniques by longitudinally following the muscle without sacrificing much of the muscle.  The  was followed using loupe magnification and bipolar cautery all the way to its feeding vessels and then ultimately to the deep inferior epigastric artery and vein, it was then followed towards the groin and then marked out for its orientation.  Once the flap was fully isolated, we then turned attention back to the chest wall.        The microscope was brought in.  The recipient vessels were then appropriately prepared under the microscope for microanastomosis.  Proximally act Acland clamps were placed distally.  Double clip ligation was carried out.  The vessels were clipped.  The adventitia removed and then heparinized saline was used to flush the vein which it flushed very well, and there was good flow through the artery.  At this point, we then double clipped the flap vessels and divided the flap, and then flushed out the flap with heparinized saline, weighed the flap and then brought it to the chest wall, placed in proper orientation and the micro anastomosis was carried out using 8-0 nylon suture in interrupted fashion for the artery and a 2.5 mm venous  for the vein.  Once the blood flow was reestablished, the flap had pinked up nicely. It had an excellent Doppler signal.  An implantable FatSkunk Doppler was place on the artery post-anastomosis. We then placed it in the chest cavity appropriately. The  mastectomy skin flaps inferiorly were of poor quality and a better aesthetic breast was formed by using the flap skin for the inferior pole. The breast was tailor tacked appropriately. Appropriate removal of mastectomy skin and de-epithelialization of the flap was performed. We placed a 15-Tajik Dhaval drain, secured it and then closed it using 2-0 Monocryl suture in a deep dermal layer and 3-0 Stratafix suture.  The abdominal incision was then closed.  A 15-Tajik JAMES drain was placed. The abdominal wall was reinforced with 16 x 5 cm piece of Strattice and then the fascia on the right side was closed using 0 PDS suture in a running manner.  The left gustavo-abdominal flap, which was left in place just in case we needed it, was then removed completely. The umbilicus was removed and the base closed with 0-PDS suture. The abdominal incision was closed with 0 PDS suture in an interrupted fashion in the Tyree's layer and then the skin closed with 2-0 Monocryl suture in a deep dermal layer and 3-0 Stratafix suture in the skin.  Surgical tape and glue was placed over all the incisions at the end of the case. The patient had good signals in the Doppler point.  It had good signals.  Abdominal binder was placed.  The patient was then extubated and sent to recovery room in a stable condition.         NOHEMI PHAM MD

## 2022-08-25 NOTE — ANESTHESIA PROCEDURE NOTES
TAP Procedure Note    Pre-Procedure   Staff -        Anesthesiologist:  Darvin Singh MD       Resident/Fellow: Venkata Lara DO       Performed By: fellow       Location: pre-op       Procedure Start/Stop Times: 8/25/2022 7:01 AM and 8/25/2022 7:14 AM       Pre-Anesthestic Checklist: patient identified, IV checked, site marked, risks and benefits discussed, informed consent, monitors and equipment checked, pre-op evaluation, at physician/surgeon's request and post-op pain management  Timeout:       Correct Patient: Yes        Correct Procedure: Yes        Correct Site: Yes        Correct Position: Yes        Correct Laterality: Yes        Site Marked: Yes  Procedure Documentation  Procedure: TAP       Diagnosis: POST OP PAIN       Laterality: right       Patient Position: supine       Patient Prep/Sterile Barriers: sterile gloves, mask       Skin prep: Chloraprep       Needle Type: insulated       Needle Gauge: 21.        Needle Length (millimeters): 100        Ultrasound guided       1. Ultrasound was used to identify targeted nerve, plexus, vascular marker, or fascial plane and place a needle adjacent to it in real-time.       2. Ultrasound was used to visualize the spread of anesthetic in close proximity to the above referenced structure.       3. A permanent image is entered into the patient's record.    Assessment/Narrative         The placement was negative for: blood aspirated, painful injection and site bleeding       Paresthesias: No.       Bolus given via needle. no blood aspirated via catheter.        Secured via.        Insertion/Infusion Method: Single Shot       Complications: none    Medication(s) Administered   Bupivacaine 0.25% PF (Infiltration) - Infiltration   10 mL - 8/25/2022 7:01:00 AM  Bupivacaine liposome (Exparel) 1.3% LA inj susp (Infiltration) - Infiltration   5 mL - 8/25/2022 7:01:00 AM  Medication Administration Time: 8/25/2022 7:01 AM     Comments:  Right TAP Block

## 2022-08-25 NOTE — ANESTHESIA PROCEDURE NOTES
Pectoralis Procedure Note    Pre-Procedure   Staff -        Anesthesiologist:  Darvin Singh MD       Resident/Fellow: Venkata Lara DO       Performed By: fellow       Location: pre-op       Procedure Start/Stop Times: 8/25/2022 7:01 AM and 8/25/2022 7:14 AM       Pre-Anesthestic Checklist: patient identified, IV checked, site marked, risks and benefits discussed, informed consent, monitors and equipment checked, pre-op evaluation, at physician/surgeon's request and post-op pain management  Timeout:       Correct Patient: Yes        Correct Procedure: Yes        Correct Site: Yes        Correct Position: Yes        Correct Laterality: Yes        Site Marked: Yes  Procedure Documentation  Procedure: Pectoralis       Diagnosis: POST OP PAIN       Laterality: left       Patient Position: sitting       Patient Prep/Sterile Barriers: sterile gloves, mask       Skin prep: Chloraprep             Pectoralis II and Pectoralis I       Needle Type: insulated       Needle Gauge: 21.        Needle Length (millimeters): 100        Ultrasound guided       1. Ultrasound was used to identify targeted nerve, plexus, vascular marker, or fascial plane and place a needle adjacent to it in real-time.       2. Ultrasound was used to visualize the spread of anesthetic in close proximity to the above referenced structure.       3. A permanent image is entered into the patient's record.    Assessment/Narrative         The placement was negative for: blood aspirated, painful injection and site bleeding       Paresthesias: No.       Bolus given via needle..        Secured via.        Insertion/Infusion Method: Single Shot       Complications: none    Medication(s) Administered   Bupivacaine 0.25% PF (Infiltration) - Infiltration   10 mL - 8/25/2022 7:01:00 AM  Bupivacaine liposome (Exparel) 1.3% LA inj susp (Infiltration) - Infiltration   10 mL - 8/25/2022 7:01:00 AM  Medication Administration Time: 8/25/2022 7:01 AM     Comments:  Left  PECS I & PECS II

## 2022-08-25 NOTE — ANESTHESIA PROCEDURE NOTES
Rectus Sheath Procedure Note    Pre-Procedure   Staff -        Anesthesiologist:  Darvin Singh MD       Resident/Fellow: Venkata Lara DO       Performed By: fellow       Location: pre-op       Procedure Start/Stop Times: 8/25/2022 7:01 AM and 8/25/2022 7:14 AM       Pre-Anesthestic Checklist: patient identified, IV checked, site marked, risks and benefits discussed, informed consent, monitors and equipment checked, pre-op evaluation, at physician/surgeon's request and post-op pain management  Timeout:       Correct Patient: Yes        Correct Procedure: Yes        Correct Site: Yes        Correct Position: Yes        Correct Laterality: Yes        Site Marked: Yes  Procedure Documentation  Procedure: Rectus Sheath       Diagnosis: POST OP PAIN       Laterality: right       Patient Position: supine       Patient Prep/Sterile Barriers: sterile gloves, mask       Skin prep: Chloraprep       Needle Type: insulated       Needle Gauge: 21.        Needle Length (millimeters): 100        Ultrasound guided       1. Ultrasound was used to identify targeted nerve, plexus, vascular marker, or fascial plane and place a needle adjacent to it in real-time.       2. Ultrasound was used to visualize the spread of anesthetic in close proximity to the above referenced structure.       3. A permanent image is entered into the patient's record.    Assessment/Narrative         The placement was negative for: blood aspirated, painful injection and site bleeding       Paresthesias: No.       Bolus given via needle..        Secured via.        Insertion/Infusion Method: Single Shot    Medication(s) Administered   Bupivacaine 0.25% PF (Infiltration) - Infiltration   10 mL - 8/25/2022 7:01:00 AM  Bupivacaine liposome (Exparel) 1.3% LA inj susp (Infiltration) - Infiltration   5 mL - 8/25/2022 7:01:00 AM  Medication Administration Time: 8/25/2022 7:01 AM     Comments:  Right Rectus Sheath Block

## 2022-08-25 NOTE — BRIEF OP NOTE
[facility]    Brief Operative Note    Pre-operative diagnosis: S/P breast reconstruction, left [Z98.890]  Post-operative diagnosis Same as pre-operative diagnosis    Procedure: Procedure(s):  Left breast reconstruction with free abdominal flap,  SPY  Surgeon: Surgeon(s) and Role:     * NOHEMI Feliciano MD - Primary     * Jeovanny Brooke MD - Resident - Assisting     * Mingo Cartwright MD - Fellow - Assisting  Anesthesia: General with Block   Estimated Blood Loss: 250cc    Drains: [place] x 2  Specimens:   ID Type Source Tests Collected by Time Destination   1 : Left Breast Expander Implant Breast, Left SURGICAL PATHOLOGY EXAM NOHEMI Feliciano MD 8/25/2022 10:11 AM    2 : Left Breast Tissue - Hx Breast Cancer Tissue Breast, Left SURGICAL PATHOLOGY EXAM NOHEMI Feliciano MD 8/25/2022  2:02 PM      Findings:   None. See operative report. Flap weight 1011g.    Complications: None.  Implants:   Implant Name Type Inv. Item Serial No.  Lot No. LRB No. Used Action   IMP PROBE DOPPLER FLOW STD CUFF [id number] - [id number] Other IMP PROBE DOPPLER FLOW STD CUFF [id number]  [facility] [employer] [id number] Left 1 Implanted   NATRELLE TISSUE EXPANDER WITH SUTURE TABS 800CC   [id number] [employer]  Left 1 Explanted   GRAFT STRATTICE 6X16CM FIRM [id number] CHG PER SQ CM=96 UNITS - [id number] Bone/Tissue/Biologic GRAFT STRATTICE 6X16CM FIRM [id number] CHG PER SQ CM=96 UNITS  [employer] [id number] Left 1 Implanted   IMP DEVICE ANASTOMOTIC 2.5MM  RED [id number] - [id number] Other IMP DEVICE ANASTOMOTIC 2.5MM  RED [id number]  [employer] [id number] Left 1 Implanted

## 2022-08-25 NOTE — ANESTHESIA POSTPROCEDURE EVALUATION
Patient: Ramona Ferrer    Procedure: Procedure(s):  Left breast reconstruction with free abdominal flap,  SPY       Anesthesia Type:  No value filed.    Note:  Disposition: Inpatient   Postop Pain Control: Uneventful            Sign Out: Well controlled pain   PONV: No   Neuro/Psych: Uneventful            Sign Out: Acceptable/Baseline neuro status   Airway/Respiratory: Uneventful            Sign Out: Acceptable/Baseline resp. status   CV/Hemodynamics: Uneventful            Sign Out: Acceptable CV status; No obvious hypovolemia; No obvious fluid overload   Other NRE: NONE   DID A NON-ROUTINE EVENT OCCUR? No           Last vitals:  Vitals Value Taken Time   /60 08/25/22 1745   Temp 36.8  C (98.3  F) 08/25/22 1745   Pulse 82 08/25/22 1755   Resp 17 08/25/22 1745   SpO2 95 % 08/25/22 1755   Vitals shown include unvalidated device data.    Electronically Signed By: SANDRITA AVINA MD  August 25, 2022  5:57 PM

## 2022-08-26 ENCOUNTER — APPOINTMENT (OUTPATIENT)
Dept: OCCUPATIONAL THERAPY | Facility: CLINIC | Age: 67
DRG: 585 | End: 2022-08-26
Attending: PLASTIC SURGERY
Payer: MEDICARE

## 2022-08-26 LAB
ANION GAP SERPL CALCULATED.3IONS-SCNC: 7 MMOL/L (ref 7–15)
BUN SERPL-MCNC: 8.1 MG/DL (ref 8–23)
CALCIUM SERPL-MCNC: 8.7 MG/DL (ref 8.8–10.2)
CHLORIDE SERPL-SCNC: 104 MMOL/L (ref 98–107)
CREAT SERPL-MCNC: 0.76 MG/DL (ref 0.51–0.95)
DEPRECATED HCO3 PLAS-SCNC: 26 MMOL/L (ref 22–29)
ERYTHROCYTE [DISTWIDTH] IN BLOOD BY AUTOMATED COUNT: 13.7 % (ref 10–15)
GFR SERPL CREATININE-BSD FRML MDRD: 86 ML/MIN/1.73M2
GLUCOSE SERPL-MCNC: 129 MG/DL (ref 70–99)
HCT VFR BLD AUTO: 30.5 % (ref 35–47)
HGB BLD-MCNC: 9.7 G/DL (ref 11.7–15.7)
MAGNESIUM SERPL-MCNC: 1.6 MG/DL (ref 1.7–2.3)
MCH RBC QN AUTO: 29.7 PG (ref 26.5–33)
MCHC RBC AUTO-ENTMCNC: 31.8 G/DL (ref 31.5–36.5)
MCV RBC AUTO: 93 FL (ref 78–100)
PLATELET # BLD AUTO: 224 10E3/UL (ref 150–450)
POTASSIUM SERPL-SCNC: 3.7 MMOL/L (ref 3.4–5.3)
RBC # BLD AUTO: 3.27 10E6/UL (ref 3.8–5.2)
SODIUM SERPL-SCNC: 137 MMOL/L (ref 136–145)
WBC # BLD AUTO: 8.9 10E3/UL (ref 4–11)

## 2022-08-26 PROCEDURE — 85027 COMPLETE CBC AUTOMATED: CPT | Performed by: STUDENT IN AN ORGANIZED HEALTH CARE EDUCATION/TRAINING PROGRAM

## 2022-08-26 PROCEDURE — 258N000003 HC RX IP 258 OP 636: Performed by: STUDENT IN AN ORGANIZED HEALTH CARE EDUCATION/TRAINING PROGRAM

## 2022-08-26 PROCEDURE — 36415 COLL VENOUS BLD VENIPUNCTURE: CPT | Performed by: STUDENT IN AN ORGANIZED HEALTH CARE EDUCATION/TRAINING PROGRAM

## 2022-08-26 PROCEDURE — 250N000013 HC RX MED GY IP 250 OP 250 PS 637: Performed by: STUDENT IN AN ORGANIZED HEALTH CARE EDUCATION/TRAINING PROGRAM

## 2022-08-26 PROCEDURE — 120N000002 HC R&B MED SURG/OB UMMC

## 2022-08-26 PROCEDURE — 83735 ASSAY OF MAGNESIUM: CPT | Performed by: STUDENT IN AN ORGANIZED HEALTH CARE EDUCATION/TRAINING PROGRAM

## 2022-08-26 PROCEDURE — 97165 OT EVAL LOW COMPLEX 30 MIN: CPT | Mod: GO | Performed by: OCCUPATIONAL THERAPIST

## 2022-08-26 PROCEDURE — 250N000011 HC RX IP 250 OP 636: Performed by: STUDENT IN AN ORGANIZED HEALTH CARE EDUCATION/TRAINING PROGRAM

## 2022-08-26 PROCEDURE — 80048 BASIC METABOLIC PNL TOTAL CA: CPT | Performed by: STUDENT IN AN ORGANIZED HEALTH CARE EDUCATION/TRAINING PROGRAM

## 2022-08-26 PROCEDURE — 258N000003 HC RX IP 258 OP 636: Performed by: PLASTIC SURGERY

## 2022-08-26 PROCEDURE — 97535 SELF CARE MNGMENT TRAINING: CPT | Mod: GO | Performed by: OCCUPATIONAL THERAPIST

## 2022-08-26 PROCEDURE — 97530 THERAPEUTIC ACTIVITIES: CPT | Mod: GO | Performed by: OCCUPATIONAL THERAPIST

## 2022-08-26 RX ORDER — GABAPENTIN 300 MG/1
900 CAPSULE ORAL 3 TIMES DAILY
Status: DISCONTINUED | OUTPATIENT
Start: 2022-08-26 | End: 2022-08-28 | Stop reason: HOSPADM

## 2022-08-26 RX ADMIN — ENOXAPARIN SODIUM 40 MG: 40 INJECTION SUBCUTANEOUS at 14:34

## 2022-08-26 RX ADMIN — POTASSIUM CHLORIDE, DEXTROSE MONOHYDRATE AND SODIUM CHLORIDE: 150; 5; 450 INJECTION, SOLUTION INTRAVENOUS at 12:17

## 2022-08-26 RX ADMIN — LIDOCAINE PATCH 4% 2 PATCH: 40 PATCH TOPICAL at 19:39

## 2022-08-26 RX ADMIN — OXYCODONE HYDROCHLORIDE 10 MG: 10 TABLET ORAL at 16:49

## 2022-08-26 RX ADMIN — METHOCARBAMOL 500 MG: 500 TABLET ORAL at 20:42

## 2022-08-26 RX ADMIN — METHOCARBAMOL 500 MG: 500 TABLET ORAL at 00:09

## 2022-08-26 RX ADMIN — ACETAMINOPHEN 1000 MG: 500 TABLET ORAL at 03:44

## 2022-08-26 RX ADMIN — HYDROMORPHONE HYDROCHLORIDE 0.4 MG: 0.2 INJECTION, SOLUTION INTRAMUSCULAR; INTRAVENOUS; SUBCUTANEOUS at 00:09

## 2022-08-26 RX ADMIN — OXYCODONE HYDROCHLORIDE 10 MG: 10 TABLET ORAL at 08:36

## 2022-08-26 RX ADMIN — SENNOSIDES AND DOCUSATE SODIUM 1 TABLET: 8.6; 5 TABLET ORAL at 19:39

## 2022-08-26 RX ADMIN — DICLOFENAC SODIUM 4 G: 10 GEL TOPICAL at 14:34

## 2022-08-26 RX ADMIN — ONDANSETRON 4 MG: 2 INJECTION INTRAMUSCULAR; INTRAVENOUS at 10:05

## 2022-08-26 RX ADMIN — POLYETHYLENE GLYCOL 3350 17 G: 17 POWDER, FOR SOLUTION ORAL at 08:36

## 2022-08-26 RX ADMIN — POTASSIUM CHLORIDE, DEXTROSE MONOHYDRATE AND SODIUM CHLORIDE: 150; 5; 450 INJECTION, SOLUTION INTRAVENOUS at 21:52

## 2022-08-26 RX ADMIN — OXYCODONE HYDROCHLORIDE 10 MG: 10 TABLET ORAL at 12:47

## 2022-08-26 RX ADMIN — ONDANSETRON 4 MG: 4 TABLET, ORALLY DISINTEGRATING ORAL at 16:50

## 2022-08-26 RX ADMIN — ACETAMINOPHEN 1000 MG: 500 TABLET ORAL at 19:39

## 2022-08-26 RX ADMIN — METHOCARBAMOL 500 MG: 500 TABLET ORAL at 14:34

## 2022-08-26 RX ADMIN — ASPIRIN 81 MG: 81 TABLET, CHEWABLE ORAL at 08:36

## 2022-08-26 RX ADMIN — DICLOFENAC SODIUM 4 G: 10 GEL TOPICAL at 19:40

## 2022-08-26 RX ADMIN — METHOCARBAMOL 500 MG: 500 TABLET ORAL at 08:36

## 2022-08-26 RX ADMIN — GABAPENTIN 900 MG: 300 CAPSULE ORAL at 08:36

## 2022-08-26 RX ADMIN — DICLOFENAC SODIUM 4 G: 10 GEL TOPICAL at 08:37

## 2022-08-26 RX ADMIN — SENNOSIDES AND DOCUSATE SODIUM 1 TABLET: 8.6; 5 TABLET ORAL at 08:36

## 2022-08-26 RX ADMIN — GABAPENTIN 900 MG: 300 CAPSULE ORAL at 19:39

## 2022-08-26 RX ADMIN — HYDROMORPHONE HYDROCHLORIDE 0.4 MG: 0.2 INJECTION, SOLUTION INTRAMUSCULAR; INTRAVENOUS; SUBCUTANEOUS at 07:22

## 2022-08-26 RX ADMIN — SODIUM CHLORIDE, POTASSIUM CHLORIDE, SODIUM LACTATE AND CALCIUM CHLORIDE 500 ML: 600; 310; 30; 20 INJECTION, SOLUTION INTRAVENOUS at 08:48

## 2022-08-26 RX ADMIN — GABAPENTIN 900 MG: 300 CAPSULE ORAL at 14:34

## 2022-08-26 RX ADMIN — ACETAMINOPHEN 1000 MG: 500 TABLET ORAL at 10:35

## 2022-08-26 RX ADMIN — LEVOTHYROXINE SODIUM 112 MCG: 0.11 TABLET ORAL at 08:46

## 2022-08-26 RX ADMIN — POTASSIUM CHLORIDE, DEXTROSE MONOHYDRATE AND SODIUM CHLORIDE: 150; 5; 450 INJECTION, SOLUTION INTRAVENOUS at 03:44

## 2022-08-26 RX ADMIN — OXYCODONE HYDROCHLORIDE 10 MG: 10 TABLET ORAL at 03:44

## 2022-08-26 ASSESSMENT — ACTIVITIES OF DAILY LIVING (ADL)
ADLS_ACUITY_SCORE: 26
PREVIOUS_RESPONSIBILITIES: MEAL PREP;HOUSEKEEPING;LAUNDRY;SHOPPING;MEDICATION MANAGEMENT;FINANCES;DRIVING
ADLS_ACUITY_SCORE: 26
ADLS_ACUITY_SCORE: 26
ADLS_ACUITY_SCORE: 28
ADLS_ACUITY_SCORE: 28
ADLS_ACUITY_SCORE: 26
ADLS_ACUITY_SCORE: 28
ADLS_ACUITY_SCORE: 26
ADLS_ACUITY_SCORE: 28
ADLS_ACUITY_SCORE: 28

## 2022-08-26 NOTE — PROVIDER NOTIFICATION
Notified Mingo Cartwright, Fellow at 4 PM regarding pt not wanting greco removed; she states that she was told it could come out tomorrow morning.      Spoke with: Mingo Mccord    Orders were obtained.    Comments: Provider wants greco to be discontinued as pt looks stable & has good urine output

## 2022-08-26 NOTE — PROGRESS NOTES
"No acute events overnights. Denies n/v/f/c. Pain worst in belly, less so in breast. Feels very hot with juan c hugger.     /46 (BP Location: Right arm)   Pulse 94   Temp 98  F (36.7  C) (Temporal)   Resp 20   Ht 1.6 m (5' 3\")   Wt 101.2 kg (223 lb 1.7 oz)   SpO2 95%   BMI 39.52 kg/m      NAD  RRR  Normal wob  l breast with reconstruction, incisions c/d/i  Flap pink, warm, healthy, strong doppler, cap refill 1.5 seconds  Soft, nontender, nondistended, incision c/d/i  WWP    Drains ss    Db 45 // 45  Da 80 // 35    67F s/p L [place] on 8/25/22, recovering appropriately.   - ok to be out of juan c hugger, keep available in room  - keep room at 75 degrees  - up to chair today  - angus out  - cld, adat    Discussed with Dr. Jodie Brooke MD, [facility]  Plastic Surgery PGY-4  Pager: [phone]    "

## 2022-08-26 NOTE — PLAN OF CARE
POD 1 left breast reconstruction with free abdominal flap. Continuous arterial doppler on. Flap and doppler checks Q1H. Flap pink and warm with good doppler. Up assist x2 with gait belt. A&Ox4. Hypotensive and low urine output this a.m., 500 LR bolus given. AOVSS on 1L O2. Pain managed with scheduled meds and PRN Robaxin/Oxycodone/IV Dilaudid. Nausea when up in chair this a.m. IV Zofran given with relief. Tolerating clears. Greco in place with adequate urine output. Have not yet taken out greco due to light headed and dizziness when standing at bedside, provider Mingo Cartwright informed. No gas/BM. Transverse abdominal incision with ABD and abdominal binder. JAMES x2 with bloody/red output, stripped Q4H. Lovenox teaching started, patient willing to do injections herself.

## 2022-08-26 NOTE — PLAN OF CARE
POD1. Capno on. AVSS on 3L NC. Pain managed w sched tylenol, iv dilaudid, robaxin and oxycodone. NPO. Transverse abd incision cdi, erendira w abd binder on in bed. Jpx2 w serosanguinous output. Left chest flap checks q 1 hr. Flap pale pink, warm, with +cap refill and turgor. Bedrest. Verde patent w good output. No bm or gas this shift. PIVx2. PIV w D51/2NS+K infusing at 100 ml/hr. IS encourage. SCDS on in bed. Continue w POC

## 2022-08-26 NOTE — PROVIDER NOTIFICATION
"Provider notified: Mingo Cartwright    0750: \"Pt with low urine output, about 25 mL/hr and BP of 102/38, HR 87. Pls advise  Ashleigh MUNROE [id number]\"    Response: Verbal order from Jodie for 500 LR bolus and to encourage PO intake.   "

## 2022-08-26 NOTE — PROGRESS NOTES
Admitted/transferred from: PACU  2 RN full   skin assessment completed by Reena Levy RN and Jasmin GARCIA  Skin assessment finding: skin intact, no problems   Interventions/actions: Education on pressure ulcer prevention reviewed with pt.     Will continue to monitor.

## 2022-08-27 ENCOUNTER — APPOINTMENT (OUTPATIENT)
Dept: OCCUPATIONAL THERAPY | Facility: CLINIC | Age: 67
DRG: 585 | End: 2022-08-27
Attending: PLASTIC SURGERY
Payer: MEDICARE

## 2022-08-27 PROCEDURE — 250N000011 HC RX IP 250 OP 636: Performed by: STUDENT IN AN ORGANIZED HEALTH CARE EDUCATION/TRAINING PROGRAM

## 2022-08-27 PROCEDURE — 97530 THERAPEUTIC ACTIVITIES: CPT | Mod: GO | Performed by: OCCUPATIONAL THERAPIST

## 2022-08-27 PROCEDURE — 97535 SELF CARE MNGMENT TRAINING: CPT | Mod: GO | Performed by: OCCUPATIONAL THERAPIST

## 2022-08-27 PROCEDURE — 258N000003 HC RX IP 258 OP 636: Performed by: STUDENT IN AN ORGANIZED HEALTH CARE EDUCATION/TRAINING PROGRAM

## 2022-08-27 PROCEDURE — 250N000013 HC RX MED GY IP 250 OP 250 PS 637: Performed by: STUDENT IN AN ORGANIZED HEALTH CARE EDUCATION/TRAINING PROGRAM

## 2022-08-27 PROCEDURE — 120N000002 HC R&B MED SURG/OB UMMC

## 2022-08-27 RX ADMIN — ONDANSETRON 4 MG: 4 TABLET, ORALLY DISINTEGRATING ORAL at 00:21

## 2022-08-27 RX ADMIN — ASPIRIN 81 MG: 81 TABLET, CHEWABLE ORAL at 08:40

## 2022-08-27 RX ADMIN — DICLOFENAC SODIUM 4 G: 10 GEL TOPICAL at 08:41

## 2022-08-27 RX ADMIN — HYDROXYZINE HYDROCHLORIDE 25 MG: 25 TABLET, FILM COATED ORAL at 08:59

## 2022-08-27 RX ADMIN — METHOCARBAMOL 500 MG: 500 TABLET ORAL at 21:00

## 2022-08-27 RX ADMIN — LEVOTHYROXINE SODIUM 112 MCG: 0.11 TABLET ORAL at 08:40

## 2022-08-27 RX ADMIN — GABAPENTIN 900 MG: 300 CAPSULE ORAL at 19:39

## 2022-08-27 RX ADMIN — OXYCODONE HYDROCHLORIDE 5 MG: 5 TABLET ORAL at 18:11

## 2022-08-27 RX ADMIN — DICLOFENAC SODIUM 4 G: 10 GEL TOPICAL at 14:06

## 2022-08-27 RX ADMIN — POLYETHYLENE GLYCOL 3350 17 G: 17 POWDER, FOR SOLUTION ORAL at 08:40

## 2022-08-27 RX ADMIN — METHOCARBAMOL 500 MG: 500 TABLET ORAL at 14:06

## 2022-08-27 RX ADMIN — SODIUM CHLORIDE, POTASSIUM CHLORIDE, SODIUM LACTATE AND CALCIUM CHLORIDE 500 ML: 600; 310; 30; 20 INJECTION, SOLUTION INTRAVENOUS at 16:52

## 2022-08-27 RX ADMIN — DICLOFENAC SODIUM 4 G: 10 GEL TOPICAL at 19:39

## 2022-08-27 RX ADMIN — ACETAMINOPHEN 1000 MG: 500 TABLET ORAL at 03:41

## 2022-08-27 RX ADMIN — ACETAMINOPHEN 1000 MG: 500 TABLET ORAL at 19:39

## 2022-08-27 RX ADMIN — OXYCODONE HYDROCHLORIDE 5 MG: 5 TABLET ORAL at 08:40

## 2022-08-27 RX ADMIN — ENOXAPARIN SODIUM 40 MG: 40 INJECTION SUBCUTANEOUS at 13:56

## 2022-08-27 RX ADMIN — GABAPENTIN 900 MG: 300 CAPSULE ORAL at 14:04

## 2022-08-27 RX ADMIN — OXYCODONE HYDROCHLORIDE 5 MG: 5 TABLET ORAL at 14:05

## 2022-08-27 RX ADMIN — HYDROXYZINE HYDROCHLORIDE 25 MG: 25 TABLET, FILM COATED ORAL at 15:51

## 2022-08-27 RX ADMIN — ACETAMINOPHEN 1000 MG: 500 TABLET ORAL at 12:13

## 2022-08-27 RX ADMIN — GABAPENTIN 900 MG: 300 CAPSULE ORAL at 08:40

## 2022-08-27 RX ADMIN — ONDANSETRON 4 MG: 4 TABLET, ORALLY DISINTEGRATING ORAL at 18:11

## 2022-08-27 RX ADMIN — METHOCARBAMOL 500 MG: 500 TABLET ORAL at 08:40

## 2022-08-27 RX ADMIN — POTASSIUM CHLORIDE, DEXTROSE MONOHYDRATE AND SODIUM CHLORIDE: 150; 5; 450 INJECTION, SOLUTION INTRAVENOUS at 06:33

## 2022-08-27 RX ADMIN — OXYCODONE HYDROCHLORIDE 5 MG: 5 TABLET ORAL at 00:21

## 2022-08-27 RX ADMIN — SENNOSIDES AND DOCUSATE SODIUM 1 TABLET: 8.6; 5 TABLET ORAL at 19:39

## 2022-08-27 RX ADMIN — LIDOCAINE PATCH 4% 2 PATCH: 40 PATCH TOPICAL at 19:39

## 2022-08-27 ASSESSMENT — ACTIVITIES OF DAILY LIVING (ADL)
ADLS_ACUITY_SCORE: 28
ADLS_ACUITY_SCORE: 31
ADLS_ACUITY_SCORE: 25
ADLS_ACUITY_SCORE: 28
ADLS_ACUITY_SCORE: 28
ADLS_ACUITY_SCORE: 25
ADLS_ACUITY_SCORE: 28
ADLS_ACUITY_SCORE: 28
ADLS_ACUITY_SCORE: 31
ADLS_ACUITY_SCORE: 25
ADLS_ACUITY_SCORE: 31
ADLS_ACUITY_SCORE: 28

## 2022-08-27 NOTE — PLAN OF CARE
Slightly hypotensive, OVSS on RA. Patient reports high levels of pain, but hesitant to take meds. Encouraged to take all the meds around the clock when available, some improvement noted after Oxycodone, Robaxin, Tylenol, Voltaren gel. Tolerating regular diet. Passing flatus, no BM yet. Voiding with good output. Up w/ 1+GB+W. Lovenox teaching provided, pt was able to administer. JAMES teaching reinforced. Incisions c/d/I. Flap pink, warm and soft with strong doppler signals. PIV saline locked. Per MD, possible discharge to home tomorrow. Continue w/ POC.

## 2022-08-27 NOTE — PLAN OF CARE
Pt hypotensive and on 1/2 LPM, but otherwise vss. A&Ox4. Pain tolerated with Tylenol, Gabapentin, Robaxin, & Oxycodone. Up with x2 assist, gait belt, and walker- pt reported dizziness whenever changing positions but was able to get up to bedside commode. Verde removed at ~1745 & voided 275 mL at 2245- bladder scan was 39. Not passing gas. Pt had intermittent nausea that was relieved with PO Zofran. Flap checks q1h- flap site is warm, soft, pale pink, immediate blood return, with + doppler. Transverse abdominal incision is clean, dry, intact with ABD pads and abdominal binder in place. JAMES x2 to bulb suction. R PIV infusing with D5 + 1/2 NS + 20 KCl @ 100 mL/hr, L PIV is saline locked. Will continue plan of care.

## 2022-08-27 NOTE — PROGRESS NOTES
"Plastic Surgery Progress Note    S:No acute events overnights. Denies n/v/f/c. Adequate UO. NO BM. Still has pain over abdomen. Up to chair, ambulating to commode     O:  BP 99/48 (BP Location: Right arm)   Pulse 84   Temp 97.6  F (36.4  C) (Temporal)   Resp 18   Ht 1.6 m (5' 3\")   Wt 101.2 kg (223 lb 1.7 oz)   SpO2 96%   BMI 39.52 kg/m      NAD  RRR  Normal wob on RA  L breast with reconstruction, incisions c/d/i  Flap pink, warm, healthy, strong doppler, cap refill 1.5 seconds, mild bruising on lateral aspect but no hematomas.   Abdomen: Soft, appropriately tender, mild distension, incision c/d/i, JAMES s/s   WWP    Drains ss    Db 115 // 20  Da 260// 40      Plan:   67F s/p L [place] on 8/25/22, recovering appropriately.     - Q2h flap checks from today ( cook and hand held doppler)  - Regular diet  - Ambulate  - PT/OT  - keep room at 75 degrees  - ASA, Lovenox  - Will need to be taught how to administer Lovenox prior to discharge  - Plastics team will remove cook doppler prior to discharge  - PRN bowel regimen.     Discussed with Dr. Jodie Brice, [facility], [place]  Plastic Surgery, PGY-3  "

## 2022-08-27 NOTE — PROGRESS NOTES
"   08/26/22 1400   Quick Adds   Quick Adds Student Supervision-Eval Only   Type of Visit Initial Occupational Therapy Evaluation   Student Supervision-Eval Only    Student Supervision On-site supervision provided   Living Environment   People in Home spouse   Current Living Arrangements house   Home Accessibility stairs to enter home   Number of Stairs, Main Entrance 2;other (see comments)  (or 4 LAKESHA through garage w/ railing)   Stair Railings, Main Entrance none   Transportation Anticipated family or friend will provide;car, drives self   Living Environment Comments Pt lives w/  in 3 story home w/ 2 LAKESHA thru main entrance or 4 LAKESHA thru garage w/ railings. All needs can be met on main floor once in house. Daughter lives close by and can assist. Pt has walk in shower w/ shower bench.   Self-Care   Usual Activity Tolerance good   Current Activity Tolerance fair   Regular Exercise Yes   Activity/Exercise Type walking   Equipment Currently Used at Home shower chair   Fall history within last six months no   Activity/Exercise/Self-Care Comment Pt IND w/ ADLs and mobility at baseline.   Instrumental Activities of Daily Living (IADL)   Previous Responsibilities meal prep;housekeeping;laundry;shopping;medication management;finances;driving   IADL Comments Pt IND w/ IADLs at baseline. Enjoys going for walks in the park w/ friends and spending time with grandkids.   General Information   Onset of Illness/Injury or Date of Surgery 08/25/22   Referring Physician Mingo Cartwright MD   Additional Occupational Profile Info/Pertinent History of Current Problem Per chart: \"Sammie Ferrer is a 67 year old female with PMH significant for hypertension, LALO, hypothyroidism, obesity, GERD, prediabetes, and chronic low back pain.  She is status post left breast cancer and no nipple  Sparing mastectomy with immediate expander based reconstruction on 3/23/2022.\" \"Status post left breast cancer requiring left nipple nonsparing mastectomy " "and immediate expander-based reconstruction now ready for stage II breast reconstruction. \"   Existing Precautions/Restrictions abdominal;other (see comments)  (L arm ROM no more than 90 degrees due to breast surgery)   Left Upper Extremity (Weight-bearing Status) partial weight-bearing (PWB)  (10# limit per abd precautions)   Right Upper Extremity (Weight-bearing Status) partial weight-bearing (PWB)  (10# limit per abd precautions)   Left Lower Extremity (Weight-bearing Status) full weight-bearing (FWB)   Right Lower Extremity (Weight-bearing Status) full weight-bearing (FWB)   General Observations and Info Activity orders: \"ambulate with assist\"   Cognitive Status Examination   Orientation Status orientation to person, place and time   Affect/Mental Status (Cognitive) WNL   Follows Commands WNL   Range of Motion Comprehensive   General Range of Motion other (see comments)  (R arm WNL. L arm limited to 90 degrees per post surgical precautions)   Strength Comprehensive (MMT)   Comment, General Manual Muscle Testing (MMT) Assessment MMT not performed per abdominal precautions   Bed Mobility   Bed Mobility rolling left;supine-sit   Rolling Left [place] (Bed Mobility) minimum assist (75% patient effort)   Supine-Sit [place] (Bed Mobility) minimum assist (75% patient effort)   Transfers   Transfer Comments Mod A per clinical judgment   Balance   Balance Assessment sitting balance: static   Balance Comments good sitting balance unsupported EOB   Activities of Daily Living   BADL Assessment/Intervention bathing;upper body dressing;lower body dressing;grooming;toileting   Bathing Assessment/Intervention   Position (Bathing) unsupported sitting   [place] Level (Bathing) moderate assist (50% patient effort)   Upper Body Dressing Assessment/Training   Position (Upper Body Dressing) unsupported sitting   [place] Level (Upper Body Dressing) moderate assist (50% patient effort)   Lower Body Dressing " Assessment/Training   Position (Lower Body Dressing) unsupported sitting   [place] Level (Lower Body Dressing) maximum assist (25% patient effort)   Grooming Assessment/Training   Position (Grooming) unsupported sitting   [place] Level (Grooming) set up;minimum assist (75% patient effort)   Toileting   [place] Level (Toileting) moderate assist (50% patient effort)   Clinical Impression   Criteria for Skilled Therapeutic Interventions Met (OT) Yes, treatment indicated   OT Diagnosis Impaired I/ADL performance   OT Problem List-Impairments impacting ADL problems related to;activity tolerance impaired;post-surgical precautions;pain;strength;mobility   Assessment of Occupational Performance 5 or more Performance Deficits   Identified Performance Deficits Bathing, toileting, dressing, meal prep, functional mobility   Planned Therapy Interventions (OT) ADL retraining;IADL retraining;bed mobility training;home program guidelines;progressive activity/exercise;transfer training   Clinical Decision Making Complexity (OT) moderate complexity   Anticipated Equipment Needs Upon Discharge (OT) other (see comments)  (TBD)   Risk & Benefits of therapy have been explained evaluation/treatment results reviewed;care plan/treatment goals reviewed;risks/benefits reviewed;current/potential barriers reviewed;participants voiced agreement with care plan;participants included;patient   Clinical Impression Comments Pt presents below baseline for I/ADLs, limited post-surgically by pain, dizziness, low act netta, and precautions. Pt will benefit from IP OT to progress safety and IND w/ I/ADLs   OT Discharge Planning   OT Discharge Recommendation (DC Rec) home with assist;home with home care occupational therapy   OT Rationale for DC Rec Today pt significantly limited by OH and pain. Pt below functional ADL baseline but anticipate will progress well with IP OT once pain and BP are more controlled. Pt w/ good support from  at  home and daughter close by.   OT Brief overview of current status Ax1 bed mobility and STS. Ax2 transfers   Total Evaluation Time (Minutes)   Total Evaluation Time (Minutes) 10   OT Goals   Therapy Frequency (OT) 6 times/wk   OT Predicted Duration/Target Date for Goal Attainment 09/09/22   OT Goals Upper Body Dressing;Hygiene/Grooming;Upper Body Bathing;Lower Body Dressing;Toilet Transfer/Toileting;OT Goal 1   OT: Hygiene/Grooming independent   OT: Upper Body Dressing Modified independent   OT: Lower Body Dressing Modified independent   OT: Upper Body Bathing Independent   OT: Toilet Transfer/Toileting Independent   OT: Goal 1 Pt will IND verbalize post surgical precautions in relation to I/ADL performance.

## 2022-08-27 NOTE — PLAN OF CARE
Goal Outcome Evaluation: ongoing, progressing    8/25 s/p Left breast reconstruction with free abdominal flap    AVSS.  94% 1/2L overnight.  Pain somewhat managed with PRN oxycodone (given with zofran) and scheduled tylenol.  Up to BSComm with assist 2, void large volume x1.  L breast with flap checks q1h.  Flap site is warm, soft, pale pink, immediate blood return, with + doppler.  Some bruising on lateral breast.  Continuous arterial doppler on.  Transverse incision c/d/intact.  ABDs and Abd binder in place.  PIV infusing@100/hr.  JPx2.  + gas.  Some nausea.  Tolerating clears.    Cont with POC.

## 2022-08-28 ENCOUNTER — APPOINTMENT (OUTPATIENT)
Dept: OCCUPATIONAL THERAPY | Facility: CLINIC | Age: 67
DRG: 585 | End: 2022-08-28
Attending: PLASTIC SURGERY
Payer: MEDICARE

## 2022-08-28 ENCOUNTER — APPOINTMENT (OUTPATIENT)
Dept: PHYSICAL THERAPY | Facility: CLINIC | Age: 67
DRG: 585 | End: 2022-08-28
Attending: PLASTIC SURGERY
Payer: MEDICARE

## 2022-08-28 VITALS
DIASTOLIC BLOOD PRESSURE: 46 MMHG | WEIGHT: 223.11 LBS | OXYGEN SATURATION: 97 % | BODY MASS INDEX: 39.53 KG/M2 | HEART RATE: 74 BPM | SYSTOLIC BLOOD PRESSURE: 115 MMHG | TEMPERATURE: 98 F | HEIGHT: 63 IN | RESPIRATION RATE: 18 BRPM

## 2022-08-28 DIAGNOSIS — G89.18 POSTOPERATIVE PAIN: ICD-10-CM

## 2022-08-28 LAB — PLATELET # BLD AUTO: 209 10E3/UL (ref 150–450)

## 2022-08-28 PROCEDURE — 36415 COLL VENOUS BLD VENIPUNCTURE: CPT | Performed by: STUDENT IN AN ORGANIZED HEALTH CARE EDUCATION/TRAINING PROGRAM

## 2022-08-28 PROCEDURE — 250N000011 HC RX IP 250 OP 636: Performed by: STUDENT IN AN ORGANIZED HEALTH CARE EDUCATION/TRAINING PROGRAM

## 2022-08-28 PROCEDURE — 250N000013 HC RX MED GY IP 250 OP 250 PS 637: Performed by: STUDENT IN AN ORGANIZED HEALTH CARE EDUCATION/TRAINING PROGRAM

## 2022-08-28 PROCEDURE — 85049 AUTOMATED PLATELET COUNT: CPT | Performed by: STUDENT IN AN ORGANIZED HEALTH CARE EDUCATION/TRAINING PROGRAM

## 2022-08-28 PROCEDURE — 97116 GAIT TRAINING THERAPY: CPT | Mod: GP

## 2022-08-28 PROCEDURE — 97530 THERAPEUTIC ACTIVITIES: CPT | Mod: GO | Performed by: OCCUPATIONAL THERAPIST

## 2022-08-28 PROCEDURE — 97530 THERAPEUTIC ACTIVITIES: CPT | Mod: GP

## 2022-08-28 PROCEDURE — 97535 SELF CARE MNGMENT TRAINING: CPT | Mod: GO | Performed by: OCCUPATIONAL THERAPIST

## 2022-08-28 PROCEDURE — 97161 PT EVAL LOW COMPLEX 20 MIN: CPT | Mod: GP

## 2022-08-28 RX ORDER — OXYCODONE HYDROCHLORIDE 5 MG/1
5 TABLET ORAL EVERY 4 HOURS PRN
Qty: 15 TABLET | Refills: 0 | Status: CANCELLED | OUTPATIENT
Start: 2022-08-28

## 2022-08-28 RX ORDER — ASPIRIN 81 MG/1
81 TABLET, CHEWABLE ORAL DAILY
Qty: 31 TABLET | Refills: 0 | Status: SHIPPED | OUTPATIENT
Start: 2022-08-29 | End: 2022-09-29

## 2022-08-28 RX ORDER — ENOXAPARIN SODIUM 100 MG/ML
40 INJECTION SUBCUTANEOUS EVERY 24 HOURS
Qty: 4.4 ML | Refills: 0 | Status: SHIPPED | OUTPATIENT
Start: 2022-08-28 | End: 2022-09-08

## 2022-08-28 RX ORDER — OXYCODONE HYDROCHLORIDE 5 MG/1
5 TABLET ORAL EVERY 6 HOURS PRN
Qty: 15 TABLET | Refills: 0 | Status: SHIPPED | OUTPATIENT
Start: 2022-08-28 | End: 2022-12-31

## 2022-08-28 RX ORDER — POLYETHYLENE GLYCOL 3350 17 G/17G
17 POWDER, FOR SOLUTION ORAL DAILY
Qty: 510 G | Refills: 0 | Status: SHIPPED | OUTPATIENT
Start: 2022-08-28 | End: 2023-04-06

## 2022-08-28 RX ORDER — AMOXICILLIN 250 MG
1 CAPSULE ORAL 2 TIMES DAILY
Qty: 35 TABLET | Refills: 0 | Status: SHIPPED | OUTPATIENT
Start: 2022-08-28 | End: 2023-04-06

## 2022-08-28 RX ORDER — OXYCODONE HYDROCHLORIDE 5 MG/1
5 TABLET ORAL EVERY 6 HOURS PRN
Qty: 12 TABLET | Refills: 0 | Status: SHIPPED | OUTPATIENT
Start: 2022-08-28 | End: 2022-08-28

## 2022-08-28 RX ORDER — ONDANSETRON 4 MG/1
4 TABLET, ORALLY DISINTEGRATING ORAL EVERY 6 HOURS PRN
Qty: 6 TABLET | Refills: 0 | Status: SHIPPED | OUTPATIENT
Start: 2022-08-28 | End: 2022-09-07

## 2022-08-28 RX ADMIN — GABAPENTIN 900 MG: 300 CAPSULE ORAL at 10:55

## 2022-08-28 RX ADMIN — POLYETHYLENE GLYCOL 3350 17 G: 17 POWDER, FOR SOLUTION ORAL at 09:06

## 2022-08-28 RX ADMIN — OXYCODONE HYDROCHLORIDE 5 MG: 5 TABLET ORAL at 08:53

## 2022-08-28 RX ADMIN — LEVOTHYROXINE SODIUM 112 MCG: 0.11 TABLET ORAL at 08:55

## 2022-08-28 RX ADMIN — DICLOFENAC SODIUM 4 G: 10 GEL TOPICAL at 09:08

## 2022-08-28 RX ADMIN — ACETAMINOPHEN 1000 MG: 500 TABLET ORAL at 10:57

## 2022-08-28 RX ADMIN — ENOXAPARIN SODIUM 40 MG: 40 INJECTION SUBCUTANEOUS at 12:44

## 2022-08-28 RX ADMIN — METHOCARBAMOL 500 MG: 500 TABLET ORAL at 08:53

## 2022-08-28 RX ADMIN — SENNOSIDES AND DOCUSATE SODIUM 1 TABLET: 8.6; 5 TABLET ORAL at 08:55

## 2022-08-28 RX ADMIN — ACETAMINOPHEN 1000 MG: 500 TABLET ORAL at 03:25

## 2022-08-28 RX ADMIN — ASPIRIN 81 MG: 81 TABLET, CHEWABLE ORAL at 08:55

## 2022-08-28 ASSESSMENT — ACTIVITIES OF DAILY LIVING (ADL)
ADLS_ACUITY_SCORE: 31

## 2022-08-28 NOTE — PLAN OF CARE
Physical Therapy Discharge Summary    Reason for therapy discharge:    Discharged to home.    Progress towards therapy goal(s). See goals on Care Plan in [facility] electronic health record for goal details.  Goals partially met.  Barriers to achieving goals:   discharge from facility.    Therapy recommendation(s):    Continued therapy is recommended.  Rationale/Recommendations:  ongoing mobility deficits. However, pt is declining participation in therapy upon discharge.

## 2022-08-28 NOTE — PLAN OF CARE
Pt hypotensive and on 1/2 LPM, but otherwise vss. A&Ox4. Up with x2 assist, gait belt, and walker. Pain tolerated with Tylenol, Gabapentin, Robaxin, & Oxycodone. Voiding spontaneously, passing gas, but no BM this shift. Tolerating regular diet well. Flap checks q2h- flap site is warm, soft, pale pink, immediate blood return, with + doppler. Transverse abdominal incision is clean, dry, intact with ABD pads and abdominal binder in place. JAMES x2 to bulb suction. PIV x2 are saline locked. Will continue plan of care.

## 2022-08-28 NOTE — DISCHARGE SUMMARY
[facility] Discharge Summary    Ramona Ferrer MRN: [id number]   YOB: 1955 Age: 67 year old     Date of Admission:  8/25/2022  Date of Discharge::  8/28/2022  Admitting Physician:  NOHEMI Feliciano MD  Discharge Physician:  NOHEMI Feliciano MD  Primary Care Physician:         Eugene Michelle          Procedures:   8/25/22    1. Left breast reconstruction using a right gustavo abdominal free (RUTH) deep inferior epigastric  based flap (this was a microsurgical case).  This was a perforated base flap in which one dominant  was dissected out of the muscle using microsurgical techniques which imparts a higher degree of complexity to the case adding at least an hour to the procedure.  The artery was anastomosed using 8-0 nylon suture in an interrupted fashion and the vein was anastomosed using a 2.5 mm venous .  The ischemia time was 43 minutes and the flap weighed 1011 g. 1lateral  flap.  2. Left breast expander removal.   3. Abdominal wall reconstruction with 16X5 cm Strattice, 100% used.  3.  Intraoperative use of chemical angiography using the SPY Elite System 5 mL of ICG dye were injected and interpretation of the angiogram was carried out at 30 and 60 seconds.  The indication for use was to identify if the one major dominant  was going to be enough to vascularize the entire flap and also to demarcate the boundaries where the flap would be debrided.          Consultations:   OCCUPATIONAL THERAPY ADULT IP CONSULT  PHYSICAL THERAPY ADULT IP CONSULT          HPI (from H&P):   Sammie Ferrer is a 67 year old female with PMH significant for hypertension, LALO, hypothyroidism, obesity, GERD, prediabetes, and chronic low back pain.  She is status post left breast cancer and no nipple sparing mastectomy with immediate expander based reconstruction on 3/23/2022.  She was recently evaluated by Dr. Feliciano and is now ready proceed with  left-side-only RUTH-based reconstruction, followed by touchup surgeries with symmetry enhancement procedures thereafter in a staged fashion.            Hospital Course:   The patient underwent listed procedure(s) above, which she tolerated without complications.  Overall unremarkable hospitalization.  At the time of discharge, she was tolerating PO intake, ambulating, voiding spontaneously without difficulty, and pain was controlled with oral pain medications. The patient was discharged home in stable and improved condition.         Medications Prior to Admission:     Medications Prior to Admission   Medication Sig Dispense Refill Last Dose     acetaminophen (TYLENOL) 325 MG tablet Take  by mouth every 4 hours as needed for pain. 100 tablet 0 8/24/2022 at Unknown time     Artificial Tear Solution (SOOTHE XP) SOLN Apply 1 drop to eye 3 times daily 15 mL 8 8/24/2022 at Unknown time     aspirin-acetaminophen-caffeine (EXCEDRIN MIGRAINE) 250-250-65 MG per tablet Take 1 tablet by mouth every 6 hours as needed for pain. 30 tablet 0 8/24/2022 at Unknown time     calcium carbonate (OS-EMMA) 500 MG tablet Take 2 tablets (1,000 mg) by mouth daily (Patient taking differently: Take 2 tablets by mouth every morning) 180 tablet 3 8/24/2022 at Unknown time     calcium carbonate 500 mg, elemental, (OSCAL) 500 MG tablet    8/24/2022 at Unknown time     cetirizine (ZYRTEC) 10 MG tablet Take 1 tablet (10 mg) by mouth daily (Patient taking differently: Take 10 mg by mouth as needed) 90 tablet 1 8/24/2022 at Unknown time     diclofenac (VOLTAREN) 1 % topical gel Apply 2 g topically 3 times daily 100 g 0 8/24/2022 at Unknown time     fluticasone (FLONASE) 50 MCG/ACT nasal spray Spray 2 sprays into both nostrils daily. (Patient taking differently: Spray 2 sprays into both nostrils as needed) 1 Package 10 8/24/2022 at Unknown time     gabapentin (NEURONTIN) 300 MG capsule 1-3 capsules up to 3 times a day (Patient taking differently: Take  300 mg by mouth 3 times daily 1-3 capsules up to 3 times a day    Through NOHEMI Bonds MD) 90 capsule 3 8/24/2022 at Unknown time     hydrocortisone (WESTCORT) 0.2 % external cream Apply topically 2 times daily 45 g 1 8/24/2022 at Unknown time     hydrOXYzine (ATARAX) 25 MG tablet Take 1 tablet (25 mg) by mouth 3 times daily as needed for itching 20 tablet 0 8/24/2022 at Unknown time     ketorolac (ACULAR) 0.5 % ophthalmic solution Place 1 drop into both eyes 4 times daily 5 mL 6 8/24/2022 at Unknown time     letrozole (FEMARA) 2.5 MG tablet Take 1 tablet (2.5 mg) by mouth daily (Patient taking differently: Take 2.5 mg by mouth every morning Through oncologist) 90 tablet 3 8/24/2022 at Unknown time     levothyroxine (SYNTHROID/LEVOTHROID) 112 MCG tablet TAKE ONE TABLET BY MOUTH ONE TIME DAILY (Patient taking differently: Take 112 mcg by mouth every morning) 90 tablet 0 8/24/2022 at Unknown time     lidocaine (LIDODERM) 5 % patch Place 1 patch onto the skin every 24 hours To prevent lidocaine toxicity, patient should be patch free for 12 hrs daily. (Patient taking differently: Place 1 patch onto the skin as needed To prevent lidocaine toxicity, patient should be patch free for 12 hrs daily.) 10 patch 0 8/24/2022 at Unknown time     lisinopril (ZESTRIL) 10 MG tablet TAKE ONE TABLET BY MOUTH ONE TIME DAILY (Patient taking differently: Take 10 mg by mouth every morning) 90 tablet 1 8/24/2022 at Unknown time     metFORMIN (GLUCOPHAGE XR) 500 MG 24 hr tablet Take 2 tablets (1,000 mg) by mouth daily (with dinner) 180 tablet 1 8/24/2022 at Unknown time     methocarbamol (ROBAXIN) 500 MG tablet Take 1 tablet (500 mg) by mouth 4 times daily as needed for muscle spasms 20 tablet 0 8/24/2022 at Unknown time     olopatadine (PATANOL) 0.1 % ophthalmic solution Place 1 drop into both eyes 2 times daily 5 mL 12 8/24/2022 at Unknown time     Omega-3 Fatty Acids (FISH OIL) 500 MG CAPS Take by mouth daily   8/24/2022 at  Unknown time     ondansetron (ZOFRAN) 4 MG tablet Take 1 tablet (4 mg) by mouth every 8 hours as needed for nausea 10 tablet 0 8/24/2022 at Unknown time     ORDER FOR DME Provent nasal EPAP  Use over nostrils nightly.   30 each 0 8/24/2022 at Unknown time     prednisoLONE acetate (PRED FORTE) 1 % ophthalmic suspension Use  One drop two times daily both eyes in the spring for 7-10 days 5 mL 0 8/24/2022 at Unknown time     vitamin D3 (CHOLECALCIFEROL) 50 mcg (2000 units) tablet Take 1 tablet (50 mcg) by mouth daily (Patient taking differently: Take 1 tablet by mouth every morning) 90 tablet 3 8/24/2022 at Unknown time            Discharge Medications:     Current Discharge Medication List      START taking these medications    Details   aspirin (ASA) 81 MG chewable tablet Take 1 tablet (81 mg) by mouth daily for 31 days  Qty: 31 tablet, Refills: 0    Associated Diagnoses: S/P TRAM (transverse rectus abdominis muscle) flap breast reconstruction      enoxaparin ANTICOAGULANT (LOVENOX) 40 MG/0.4ML syringe Inject 0.4 mLs (40 mg) Subcutaneous every 24 hours for 11 days  Qty: 4.4 mL, Refills: 0    Associated Diagnoses: S/P TRAM (transverse rectus abdominis muscle) flap breast reconstruction      magnesium hydroxide (MILK OF MAGNESIA) 400 MG/5ML suspension Take 30 mLs by mouth daily as needed for constipation (Use if preventive measures (senna-docusate, docusate, and polyethylene glycol) are not effective.)  Qty: 354 mL, Refills: 0    Associated Diagnoses: S/P TRAM (transverse rectus abdominis muscle) flap breast reconstruction      ondansetron (ZOFRAN ODT) 4 MG ODT tab Take 1 tablet (4 mg) by mouth every 6 hours as needed for nausea or vomiting  Qty: 6 tablet, Refills: 0    Associated Diagnoses: S/P TRAM (transverse rectus abdominis muscle) flap breast reconstruction      polyethylene glycol (MIRALAX) 17 GM/Dose powder Take 17 g by mouth daily  Qty: 510 g, Refills: 0    Associated Diagnoses: S/P TRAM (transverse rectus  abdominis muscle) flap breast reconstruction      senna-docusate (SENOKOT-S/PERICOLACE) 8.6-50 MG tablet Take 1 tablet by mouth 2 times daily  Qty: 35 tablet, Refills: 0    Associated Diagnoses: S/P TRAM (transverse rectus abdominis muscle) flap breast reconstruction      oxyCODONE (ROXICODONE) 5 MG tablet Take 1 tablet (5 mg) by mouth every 4 hours as needed  Qty: 15 tablet, Refills: 0    Associated Diagnoses: S/P TRAM (transverse rectus abdominis muscle) flap breast reconstruction         CONTINUE these medications which have NOT CHANGED    Details   acetaminophen (TYLENOL) 325 MG tablet Take  by mouth every 4 hours as needed for pain.  Qty: 100 tablet, Refills: 0      Artificial Tear Solution (SOOTHE XP) SOLN Apply 1 drop to eye 3 times daily  Qty: 15 mL, Refills: 8    Associated Diagnoses: Dry eye      aspirin-acetaminophen-caffeine (EXCEDRIN MIGRAINE) 250-250-65 MG per tablet Take 1 tablet by mouth every 6 hours as needed for pain.  Qty: 30 tablet, Refills: 0      calcium carbonate (OS-EMMA) 500 MG tablet Take 2 tablets (1,000 mg) by mouth daily  Qty: 180 tablet, Refills: 3    Associated Diagnoses: Malignant neoplasm of upper-outer quadrant of left breast in female, estrogen receptor positive (H)      calcium carbonate 500 mg, elemental, (OSCAL) 500 MG tablet       cetirizine (ZYRTEC) 10 MG tablet Take 1 tablet (10 mg) by mouth daily  Qty: 90 tablet, Refills: 1    Associated Diagnoses: Allergic rhinitis, unspecified seasonality, unspecified trigger      diclofenac (VOLTAREN) 1 % topical gel Apply 2 g topically 3 times daily  Qty: 100 g, Refills: 0    Associated Diagnoses: Foraminal stenosis of lumbar region      fluticasone (FLONASE) 50 MCG/ACT nasal spray Spray 2 sprays into both nostrils daily.  Qty: 1 Package, Refills: 10    Associated Diagnoses: ETD (eustachian tube dysfunction)      gabapentin (NEURONTIN) 300 MG capsule 1-3 capsules up to 3 times a day  Qty: 90 capsule, Refills: 3    Associated Diagnoses:  Malignant neoplasm of upper-outer quadrant of left breast in female, estrogen receptor positive (H)      hydrocortisone (WESTCORT) 0.2 % external cream Apply topically 2 times daily  Qty: 45 g, Refills: 1    Associated Diagnoses: Rash; S/P breast reconstruction, left      hydrOXYzine (ATARAX) 25 MG tablet Take 1 tablet (25 mg) by mouth 3 times daily as needed for itching  Qty: 20 tablet, Refills: 0    Associated Diagnoses: Rash; S/P breast reconstruction, left      ketorolac (ACULAR) 0.5 % ophthalmic solution Place 1 drop into both eyes 4 times daily  Qty: 5 mL, Refills: 6    Associated Diagnoses: Seasonal allergic conjunctivitis      letrozole (FEMARA) 2.5 MG tablet Take 1 tablet (2.5 mg) by mouth daily  Qty: 90 tablet, Refills: 3    Associated Diagnoses: Malignant neoplasm of upper-outer quadrant of left breast in female, estrogen receptor positive (H)      levothyroxine (SYNTHROID/LEVOTHROID) 112 MCG tablet TAKE ONE TABLET BY MOUTH ONE TIME DAILY  Qty: 90 tablet, Refills: 0    Associated Diagnoses: Hypothyroidism, unspecified type      lidocaine (LIDODERM) 5 % patch Place 1 patch onto the skin every 24 hours To prevent lidocaine toxicity, patient should be patch free for 12 hrs daily.  Qty: 10 patch, Refills: 0    Associated Diagnoses: Foraminal stenosis of lumbar region      lisinopril (ZESTRIL) 10 MG tablet TAKE ONE TABLET BY MOUTH ONE TIME DAILY  Qty: 90 tablet, Refills: 1    Associated Diagnoses: Essential hypertension      metFORMIN (GLUCOPHAGE XR) 500 MG 24 hr tablet Take 2 tablets (1,000 mg) by mouth daily (with dinner)  Qty: 180 tablet, Refills: 1    Associated Diagnoses: Prediabetes      methocarbamol (ROBAXIN) 500 MG tablet Take 1 tablet (500 mg) by mouth 4 times daily as needed for muscle spasms  Qty: 20 tablet, Refills: 0    Associated Diagnoses: Postoperative pain      olopatadine (PATANOL) 0.1 % ophthalmic solution Place 1 drop into both eyes 2 times daily  Qty: 5 mL, Refills: 12    Comments: Okay  to substitute optivar, or azelastine two times daily each eye  Associated Diagnoses: Allergic conjunctivitis, bilateral      Omega-3 Fatty Acids (FISH OIL) 500 MG CAPS Take by mouth daily      ondansetron (ZOFRAN) 4 MG tablet Take 1 tablet (4 mg) by mouth every 8 hours as needed for nausea  Qty: 10 tablet, Refills: 0    Associated Diagnoses: S/P breast reconstruction, left      ORDER FOR DME Provent nasal EPAP  Use over nostrils nightly.    Qty: 30 each, Refills: 0    Associated Diagnoses: LALO (obstructive sleep apnea)      prednisoLONE acetate (PRED FORTE) 1 % ophthalmic suspension Use  One drop two times daily both eyes in the spring for 7-10 days  Qty: 5 mL, Refills: 0    Associated Diagnoses: Allergic conjunctivitis, bilateral      vitamin D3 (CHOLECALCIFEROL) 50 mcg (2000 units) tablet Take 1 tablet (50 mcg) by mouth daily  Qty: 90 tablet, Refills: 3    Associated Diagnoses: Malignant neoplasm of upper-outer quadrant of left breast in female, estrogen receptor positive (H)                  Day of Discharge Physical Exam:   Temp:  [96.4  F (35.8  C)-99.9  F (37.7  C)] 99.3  F (37.4  C)  Pulse:  [80-94] 84  Resp:  [18-20] 18  BP: (102-123)/(30-57) 108/40  SpO2:  [89 %-97 %] 94 %  General: A&O, NAD, lying comfortably in bed  Chest: NLB on RA  L breast with reconstruction, incisions c/d/i  Flap pink, warm, healthy, strong doppler, cap refill 1.5 seconds, mild bruising on lateral aspect but no hematomas.   Abdomen: Soft, appropriately tender, mild distension, incision c/d/i, JAMES s/s   Extremities: WWP  Neuro: Moving all extremities spontaneously without apparent deficit\         Discharge Instructions and Follow-Up:        Reason for your hospital stay    Breast reconstruction with abdominal based free flaps     Activity    Your activity upon discharge: no heavy lifting for 6 weeks. Wear abdominal binder. No bra/pressure on flap.     When to contact your care team    Call the plastic surgery service if you have  concerns for infection from fevers >100.4F or spreading redness to skin. Call if you have concerns about the flap, increased swelling, turning colors (purple or bright white).     Wound care and dressings    Instructions to care for your wound at home: as directed below.     Tubes and drains    You are going home with the following tubes or drains: JAMES drains. Strip, empty and record output twice a day. Bring drain record to your follow up appointment.     Discharge Instructions    BREAST RECONSTRUCTION POST-OPERATIVE INSTRUCTIONS    Instructions   *Have someone drive you home after surgery and help you at home for 1-2 days.   *Get plenty of rest.   *Follow balanced diet.   *Decreased activity may promote constipation, so you may want to add more raw fruit to your diet, and be sure to increase fluid intake.   *Take pain medication as prescribed.   *Do not drink alcohol when taking pain medications.   *Even when not taking pain medications, no alcohol for 3 weeks as it causes fluid retention.   *If you are taking vitamins with iron, resume these as tolerated.   *Do not smoke. Smoking delays healing and increases the risk of complications.    Medications     *Take baby aspirin once daily for 30 days   *Complete the entire course of lovenox injections, generally 10-14 days   *Do not take Excedrin or any other product having Aspirin while taking baby aspirin   *Your blood pressures int  hospital were lower than your baseline. Please check you blood pressure in the morning and evening. Do not take Lisinopril or any other anti-hypertensive medication with your SBP< 105 or DBP < 60   * Please hold your Letrozole tablet for 1 week after surgery due to increased risk of blood clots with this medication.     Activities   *Start walking as soon as possible, this helps to reduce swelling and lowers the chance of blood clots.   *Do not lift more than a gallon of milk (5 lbs) for 3 weeks   *You may use your arms for activities  of daily living for the first three weeks, but do not push over your head until 3 weeks post-op.   *Do not drive until you are no longer taking any pain medications (narcotics).   *Unless stated on this form, discuss your time off work with your surgeon.   *No driving for 4 weeks. When abdominal area will allow for sudden braking, you may resume driving.   *No heavy lifting for 6 to 8 weeks.    Incision Care    *You may shower 72 hours after surgery with drains in place.    *No tub soaking, bathing,or swimming for 6 to 8 weeks.    *Avoid exposing scars to sun for at least 12 months.    *Always use a strong sunblock, if sun exposure is unavoidable (SPF 50 or    greater).    *Keep surgical tape on until it peels off.    *Keep incisions clean and inspect daily for signs of infection.    *Do not wear a bra.    *Sleep with pillows under knee for 2 weeks (some women choose to sleep in a recliner or lounge chair).    Drain Care   * Please strip your JAMES drains as shown to you in the hospital. Please record your outputs and bring this record to your clinic appointment    What to Expect    *Maximum discomfort will occur the first few days following surgery; you may experience incision discomfort and generalized discomfort in your breasts and abdomen.    *Oozing can be expected.     Appearance    *The abdomen will be tight and much flatter in appearance.    *The majority of swelling will subside in 3-4 weeks, but some swelling may persist for up to 3 months.    *You will walk bent over and will slowly rise over the first 1-2 weeks.    Follow-Up Care    *1st post-operative visit will be scheduled for 7 to 10 days after surgery. Some drains may be taken out during this visit.    *2nd post-operative visit will be for removal of remaining drains.    *3rd post-operative visit will be scheduled somewhere between 4-6 weeks from the initial surgery date.    When to Call:    *If you have increased swelling or bruising.    *If swelling  and redness persist after a few days.    *If you have increased redness along the incision.    *If you have severe or increased pain not relieved by medication.    *If you have any side effects to medications; such as, rash, nausea, headache, vomiting.    *If you have an oral temperature over 100.4 degrees.    *If you have any yellowish or greenish drainage from the incisions or notice a foul odor.    *If you have bleeding from the incisions that is difficult to control with light pressure.    *If you have loss of feeling or motion.    For Medical Questions, Please Call:    *[phone], Monday - Friday, 8 a.m. - 4:30 p.m.    *After hours and on weekends, call Hospital Paging at [phone], and ask for the Plastic Surgeon on call.     Monitor and record    Your JAMES drain output     Adult [facility] Follow-up and recommended labs and tests    Follow up with Vidhi Austin, at (location with clinic name or city) [facility], within 2 weeks  to evaluate after surgery. No follow up labs or test are needed.    Appointments on [place] and/or [facility] (with [facility] or [facility] provider or service). Call [phone] if you haven't heard regarding these appointments within 7 days of discharge.     Walker Order    I, the undersigned, certify that the above prescribed supplies are medically necessary for this patient and is both reasonable and necessary in reference to accepted standards of medical and necessary in reference to accepted standards of medical practice in the treatment of this patient's condition and is not prescribed as a convenience.      Diet    Follow this diet upon discharge: Regular       Condition at discharge: Stable

## 2022-08-28 NOTE — PLAN OF CARE
Discharge  D: Orders for discharge and outpatient medications written.   I: Home medications and return to clinic schedule reviewed with patient. Lovenox teaching provided to daughter and patient. Patient was able to demonstrate administration. JAMES teaching reinforced. Discharge instructions and parameters for calling Health Care Provider reviewed with teach back. Patient left at 1PM accompanied by [facility]/staff via wheelchair.   A: Patient verbalized understanding and was ready for discharge.   P: Patient instructed to  medications in Pharmacy. Follow up as scheduled.

## 2022-08-28 NOTE — PROGRESS NOTES
08/28/22 0931   Quick Adds   Type of Visit Initial PT Evaluation   Living Environment   People in Home spouse   Current Living Arrangements house   Home Accessibility stairs to enter home   Number of Stairs, Main Entrance 2;other (see comments)  (or 4 LAKESHA through garage w/ railing)   Stair Railings, Main Entrance none   Transportation Anticipated family or friend will provide;car, drives self   Living Environment Comments Pt lives w/  in 3 story home w/ 2 LAKESHA thru main entrance or 4 LAKESHA thru garage w/ railings. All needs can be met on main floor once in house. Daughter lives close by and can assist. Pt has walk in shower w/ shower bench.   Self-Care   Usual Activity Tolerance good   Current Activity Tolerance fair   Regular Exercise Yes   Activity/Exercise Type walking   Equipment Currently Used at Home shower chair   Fall history within last six months no   Activity/Exercise/Self-Care Comment Pt IND w/ ADLs and mobility at baseline.   General Information   Onset of Illness/Injury or Date of Surgery 08/25/22   Referring Physician Luis Brice MD   Patient/Family Therapy Goals Statement (PT) Return Home   Pertinent History of Current Problem (include personal factors and/or comorbidities that impact the POC) PT s/p L [place] on 8/25/22   Existing Precautions/Restrictions   (No lifting L UE greater than 90 degrees)   General Observations Activity: Ambulate with assist   Cognition   Affect/Mental Status (Cognition) WFL   Cognitive Status Comments Followed all commands appropriatel   Pain Assessment   Patient Currently in Pain Yes, see Vital Sign flowsheet   Integumentary/Edema   Integumentary/Edema Comments abdominal and left chest incisions   Posture    Posture Protracted shoulders;Forward head position   Range of Motion (ROM)   ROM Comment UEs not assessed, LEs WFL   Strength Comprehensive (MMT)   Comment, General Manual Muscle Testing (MMT) Assessment MMT not performed per abdominal precautions   Bed  Mobility   Bed Mobility supine-sit   Supine-Sit [place] (Bed Mobility) moderate assist (50% patient effort)   Bed Mobility Limitations decreased ability to use arms for pushing/pulling   Comment, (Bed Mobility) Pt requested use of bed rail, however enocuraged to trial without due to home set up. Pt required step by step cues for log roll, once rolled, pt unable to elevate trunk with use of R UE.   Transfers   Transfers sit-stand transfer   Sit-Stand Transfer   Sit-Stand [place] (Transfers) supervision   Assistive Device (Sit-Stand Transfers) walker, front-wheeled   Comment, (Sit-Stand Transfer) Slightly slow speed, initially pulled with both hands on walker   Gait/Stairs (Locomotion)   [place] Level (Gait) supervision   Assistive Device (Gait) walker, front-wheeled   Distance in Feet (Required for LE Total Joints) 10   Negotiation (Stairs) stairs independence   [place] Level (Stairs) contact guard   Comment, (Gait/Stairs) Gait: short step length, intermittent bilateral trunk sway, appropriate management of walker. Stairs: Slow speed, heavy relaince on railing, no overt loss of balance, fair tolerance   Balance   Balance Comments IND sitting balance, requires B UE support on walker for balane   Sensory Examination   Sensory Perception patient reports no sensory changes   Clinical Impression   Criteria for Skilled Therapeutic Intervention Yes, treatment indicated   PT Diagnosis (PT) Impaired mobility   Influenced by the following impairments balance, activity tolerance, pain   Functional limitations due to impairments bed mobility, gait, stairs   Clinical Presentation (PT Evaluation Complexity) Stable/Uncomplicated   Clinical Presentation Rationale clinical judgement   Clinical Decision Making (Complexity) low complexity   Planned Therapy Interventions (PT) bed mobility training;gait training;transfer training   Anticipated Equipment Needs at Discharge (PT) walker, rolling   Risk & Benefits of  therapy have been explained evaluation/treatment results reviewed;care plan/treatment goals reviewed;risks/benefits reviewed;current/potential barriers reviewed;participants voiced agreement with care plan;participants included;patient   PT Discharge Planning   PT Discharge Recommendation (DC Rec) home with assist   PT Rationale for DC Rec Pt mobilizing with up to CGA which her family is able to provide. Although pt would benefit from ongoing HH therapy upon discharge to progress to her PLOF, pt declines participation.   PT Brief overview of current status Nursing: WAlk in vital 3x/day, SBA   Plan of Care Review   Plan of Care Reviewed With patient   Total Evaluation Time   Total Evaluation Time (Minutes) 10   Physical Therapy Goals   PT Frequency 3x/week   PT Predicted Duration/Target Date for Goal Attainment 09/09/22   PT Goals Stairs;Bed Mobility;Aerobic Activity   PT: Bed Mobility Independent   PT: Stairs Modified independent   PT: Perform aerobic activity with stable cardiovascular response continuous activity;15 minutes

## 2022-08-28 NOTE — CARE PLAN
Occupational Therapy Discharge Summary    Reason for therapy discharge:    Discharged to home.    Progress towards therapy goal(s). See goals on Care Plan in [facility] electronic health record for goal details.  Goals met    Therapy recommendation(s):    No further therapy is recommended. Pt's ADL/IADL performance is limited by pain and post-operative precautions. Anticipate pt will progress well w/ assist from family.

## 2022-08-28 NOTE — PLAN OF CARE
Goal Outcome Evaluation: ongoing, progressing     8/25 s/p Left breast reconstruction with free abdominal flap     AVSS.  94% 1/2L overnight.  Pain controlled with scheduled tylenol - declined oxycodone overnight.  Up to BR with assist 1, GB, walker.  Voiding spont.  L breast with flap checks q2h.  Flap site is warm, soft, pale pink, immediate blood return, with + doppler.  Some bruising on lateral breast.    Transverse incision c/d/intact.  ABDs and Abd binder in place.  PIV SL.  JPx2.   Denies nausea, tolerating regular diet.   Cont with POC.

## 2022-08-29 ENCOUNTER — PATIENT OUTREACH (OUTPATIENT)
Dept: CARE COORDINATION | Facility: CLINIC | Age: 67
End: 2022-08-29

## 2022-08-29 ENCOUNTER — PATIENT OUTREACH (OUTPATIENT)
Dept: ONCOLOGY | Facility: CLINIC | Age: 67
End: 2022-08-29

## 2022-08-29 LAB
PATH REPORT.COMMENTS IMP SPEC: NORMAL
PATH REPORT.COMMENTS IMP SPEC: NORMAL
PATH REPORT.FINAL DX SPEC: NORMAL
PATH REPORT.GROSS SPEC: NORMAL
PATH REPORT.MICROSCOPIC SPEC OTHER STN: NORMAL
PATH REPORT.RELEVANT HX SPEC: NORMAL
PHOTO IMAGE: NORMAL

## 2022-08-29 PROCEDURE — 88305 TISSUE EXAM BY PATHOLOGIST: CPT | Mod: 26 | Performed by: PATHOLOGY

## 2022-08-29 PROCEDURE — 88300 SURGICAL PATH GROSS: CPT | Mod: 26 | Performed by: PATHOLOGY

## 2022-08-29 RX ORDER — METHOCARBAMOL 500 MG/1
500 TABLET, FILM COATED ORAL 4 TIMES DAILY PRN
Qty: 20 TABLET | Refills: 0 | OUTPATIENT
Start: 2022-08-29

## 2022-08-29 NOTE — PROGRESS NOTES
Patient discharged on 8/28/22, please follow up per TCM workflow.    aYmil Barbour on 8/29/2022 at 7:46 AM

## 2022-08-29 NOTE — PROGRESS NOTES
[facility] Contact    Background: Care Coordination referral placed from [facility] [place] discharge report for reason of patient meeting criteria for a TCM outreach call by [facility] team.    Assessment: Upon chart review, CCRC Team member will cancel/close the referral for TCM outreach due to reason below:    Patient has been contacted by a clinic RN or provider within [facility] for reason of discussing hospital follow up plan of care and answering questions patient may have related to discharge instructions.     Plan: Care Coordination referral for TCM outreach canceled to minimize duplicative efforts.    Caprice Wilkinson RN  [facility], [facility]    *Connected Care Resource Team does NOT follow patient ongoing. Referrals are identified based on internal discharge reports and the outreach is to ensure patient has an understanding of their discharge instructions.

## 2022-08-30 ENCOUNTER — PATIENT OUTREACH (OUTPATIENT)
Dept: PLASTIC SURGERY | Facility: CLINIC | Age: 67
End: 2022-08-30

## 2022-08-30 ENCOUNTER — NURSE TRIAGE (OUTPATIENT)
Dept: NURSING | Facility: CLINIC | Age: 67
End: 2022-08-30

## 2022-08-30 DIAGNOSIS — Z98.890 S/P BREAST RECONSTRUCTION, LEFT: Primary | ICD-10-CM

## 2022-08-30 RX ORDER — OXYCODONE HYDROCHLORIDE 5 MG/1
5 TABLET ORAL EVERY 6 HOURS PRN
Qty: 25 TABLET | Refills: 0 | Status: SHIPPED | OUTPATIENT
Start: 2022-08-30 | End: 2022-09-06

## 2022-08-30 NOTE — PROGRESS NOTES
Spoke to pt's  to update that Dr Feliciano has filled prescription for Oxycodone. I advised that pt start using Ibuprofen in addition to Tylenol every 4-6 hours and using Oxycodone as needed but start to wean this med. I advised if pt continues to have chills or if fever gets higher than 99, to call back and we will have pt seen earlier than 9/9 appointment. Robyn is in agreement and will update pt with this information.  Rell TYLER RN

## 2022-08-30 NOTE — TELEPHONE ENCOUNTER
Call from daughter Zelda, see triage note  Best number to reach Sammie Carlson's  [phone] or daughter Zelda [phone] this afternoon.  Nurse Triage SBAR    Is this a 2nd Level Triage? YES, LICENSED PRACTITIONER REVIEW IS REQUIRED    Situation:   Sammie experiencing 7-10/10 abdominal flap pain, left breast pain, reported less than abdominal flap.  No number on scale provided.  Taking oxycodone every 6 hours along with zofran tab, not sub-lingual.    Oxycodone causes nausea, which is why taking zofran at same time.  Has 1 tab of oxycodone left, needs refilled today ASAP along with Zofran today.  Was to have been taking muscle relaxer after surgery, was not sent to the pharmacy.  Requesting this.  Has been taking muscle relaxer from previous surgery  Uses [facility] [place] [address]    Background:   8/25/22   Left breast reconstruction with free abdominal flap,  SPY (Left Abdomen)      Assessment:   follow up per plastic surgery team miguel [facility]    Protocol Recommended Disposition:   Discuss With PCP And Callback By Nurse Within 1 Hour        Reason for Disposition    SEVERE post-op pain (e.g., excruciating, pain scale 8-10) that is not controlled with pain medications    Additional Information    Negative: Sounds like a life-threatening emergency to the triager    Negative: Chest pain    Negative: Difficulty breathing    Negative: Acting confused (e.g., disoriented, slurred speech) or excessively sleepy    Negative: Surgical incision symptoms and questions    Negative: Pain or burning with passing urine (urination) and male    Negative: Pain or burning with passing urine (urination) and female    Negative: Constipation    Negative: New or worsening leg (calf, thigh) pain    Negative: New or worsening leg swelling    Negative: Dizziness is severe, or persists > 24 hours after surgery    Negative: Symptoms arising from use of a urinary catheter (Verde or Coude)    Negative: Cast problems or questions     "Negative: Medication question    Negative: Bright red, wide-spread, sunburn-like rash    Negative: SEVERE headache and after spinal (epidural) anesthesia    Negative: Vomiting and persists > 4 hours    Negative: Vomiting and abdomen looks much more swollen than usual    Negative: Drinking very little and dehydration suspected (e.g., no urine > 12 hours, very dry mouth, very lightheaded)    Negative: Patient sounds very sick or weak to the triager    Negative: Sounds like a serious complication to the triager    Negative: Fever > 100.4 F (38.0 C)    Negative: Caller has URGENT question and triager unable to answer question    Answer Assessment - Initial Assessment Questions  1. SYMPTOM: \"What's the main symptom you're concerned about?\" (e.g., pain, fever, vomiting)      Abdominal flap pain 7-10/10, left breast incision pain, no number, but less than abdominal flap pain  2. ONSET: \"When did pain  start?\"      Since surgery  3. SURGERY: \"What surgery did you have?\"      Left breast reconstruction with free abdominal flap,  SPY (Left Abdomen)  4. DATE of SURGERY: \"When was the surgery?\"       8/25/22  5. ANESTHESIA: \" What type of anesthesia did you have?\" (e.g., general, spinal, epidural, local)      general  6. PAIN: \"Is there any pain?\" If Yes, ask: \"How bad is it?\"  (Scale 1-10; or mild, moderate, severe)      Yes abdominal flap pain 7-10/10, left breast pain, less than abdominal flap pain  7. FEVER: \"Do you have a fever?\" If Yes, ask: \"What is your temperature, how was it measured, and when did it start?\"      denies  8. VOMITING: \"Is there any vomiting?\" If Yes, ask: \"How many times?\"      Has not vomited because has been taking Zofran with each dose of oxycodone, which has been every 6 hours  9. BLEEDING: \"Is there any bleeding?\" If Yes, ask: \"How much?\" and \"Where?\"      none  10. OTHER SYMPTOMS: \"Do you have any other symptoms?\" (e.g., drainage from wound, painful urination, constipation)        Able to drink " and keep fluids down.  Last urinated about 11 am.  Bowels working well with use of stool softener and prune juice.    Protocols used: POST-OP SYMPTOMS AND [id number]

## 2022-08-30 NOTE — PATIENT INSTRUCTIONS
Spoke with pt and her , Robyn today after she called into nurse triage line complaining of 7/10 pain in her abdomen and 6/10 pain in her breast area. Pt discharged from hospital on 8/28 post RUTH on 8/25. She is taking Tylenol 1000 mg every 6 hours in addition to Oxycodone every 6 hours along with Zofran every 6 hours. Pt has slight fever of 99.2 with some chills. Her pain is about the same since her surgery and is not worse. She was not given a muscle relaxer at discharge but would like to have that in addition to more Oxycodone and Zofran. I have sent message to Dr Feliciano today with pt's request and update. Rell TYLER RN

## 2022-08-31 DIAGNOSIS — G89.18 POSTOPERATIVE PAIN: ICD-10-CM

## 2022-08-31 RX ORDER — METHOCARBAMOL 500 MG/1
500 TABLET, FILM COATED ORAL 4 TIMES DAILY PRN
Qty: 20 TABLET | Refills: 0 | OUTPATIENT
Start: 2022-08-31

## 2022-09-01 NOTE — TELEPHONE ENCOUNTER
The original prescriber refused the medication on 8/28/22 stating patient no longer under provider care. See refill from Oncology - KIAN Ledesma    Does patient need appointment?  Please advise, thanks.

## 2022-09-02 NOTE — TELEPHONE ENCOUNTER
"Call to patient. States she is having pain at the surgery site. States she reached out to the plastic surgeon and they prescribed oxycodone. Patient states she needs a muscle relaxant. Advised of MD message below. Advised next available virtual visit in our clinic wouldn't be until 9/7/22 and this would be with another provider. Spouse wanted writer to assure them that if they schedule this appointment she will be able to get the medication. Writer advised that I can not guarantee another provider will prescribe this medication as that is up to the provider. Spouse states \"but she has had it before\". Writer explained this was previously prescribed by oncology. Writer suggested they reach out to that clinic in addition to scheduling a virtual visit next week to discuss. Spouse stated \"If that is the case then that's all\" and politely ended the call.    "

## 2022-09-06 DIAGNOSIS — Z98.890 S/P BREAST RECONSTRUCTION, LEFT: ICD-10-CM

## 2022-09-06 DIAGNOSIS — G89.18 POSTOPERATIVE PAIN: ICD-10-CM

## 2022-09-06 NOTE — TELEPHONE ENCOUNTER
Medication: Ondansetron 4 MG tablet  Last prescribing provider: Dr. Cisneros on 4/5/22    Last clinic visit date: 6/28/22    Any missed appointments or no-shows since last clinic visit?: No    Recommendations for requested medication (if none, N/A): NA    Next clinic visit date: 9/29/22    Any other pertinent information (if none, N/A): NA    Pended and Routed to Provider.

## 2022-09-06 NOTE — TELEPHONE ENCOUNTER
ondansetron (ZOFRAN ODT) 4 MG ODT tab 6 tablet 0 8/28/2022         Last Written Prescription Date:  8-  Last Fill Quantity: 6,   # refills: 0  Last Office Visit : 8-2-2022  Future Office visit:  NONE    Routing refill request to provider for review/approval because:  CONTROLLED MEDICATION  HAS ALSO REQUESTED THIS FROM DR POTTER Kathleen M Doege RN

## 2022-09-07 ENCOUNTER — PATIENT OUTREACH (OUTPATIENT)
Dept: PLASTIC SURGERY | Facility: CLINIC | Age: 67
End: 2022-09-07

## 2022-09-07 DIAGNOSIS — Z98.890 S/P TRAM (TRANSVERSE RECTUS ABDOMINIS MUSCLE) FLAP BREAST RECONSTRUCTION: ICD-10-CM

## 2022-09-07 DIAGNOSIS — G89.18 POSTOPERATIVE PAIN: ICD-10-CM

## 2022-09-07 RX ORDER — ONDANSETRON 4 MG/1
TABLET, FILM COATED ORAL
Qty: 10 TABLET | Refills: 0 | Status: SHIPPED | OUTPATIENT
Start: 2022-09-07 | End: 2023-04-06

## 2022-09-07 RX ORDER — METHOCARBAMOL 500 MG/1
500 TABLET, FILM COATED ORAL 4 TIMES DAILY PRN
Qty: 20 TABLET | Refills: 0 | Status: SHIPPED | OUTPATIENT
Start: 2022-09-07 | End: 2023-04-06

## 2022-09-07 RX ORDER — OXYCODONE HYDROCHLORIDE 5 MG/1
5 TABLET ORAL EVERY 6 HOURS PRN
Qty: 25 TABLET | Refills: 0 | OUTPATIENT
Start: 2022-09-07 | End: 2022-09-14

## 2022-09-07 RX ORDER — ONDANSETRON 4 MG/1
4 TABLET, ORALLY DISINTEGRATING ORAL EVERY 6 HOURS PRN
Qty: 6 TABLET | Refills: 0 | Status: SHIPPED | OUTPATIENT
Start: 2022-09-07 | End: 2023-04-06

## 2022-09-07 RX ORDER — TRAMADOL HYDROCHLORIDE 50 MG/1
50 TABLET ORAL EVERY 6 HOURS PRN
Qty: 10 TABLET | Refills: 0 | Status: SHIPPED | OUTPATIENT
Start: 2022-09-07 | End: 2022-09-10

## 2022-09-07 RX ORDER — METHOCARBAMOL 500 MG/1
500 TABLET, FILM COATED ORAL 4 TIMES DAILY PRN
Qty: 20 TABLET | Refills: 0 | OUTPATIENT
Start: 2022-09-07

## 2022-09-07 NOTE — TELEPHONE ENCOUNTER
Spoke with pt, she is having issues with post op pain (DOS 8/25/22 RUTH). She is taking tylenol 1,000mg every 4 hours and 400mg ibuprofen every 6. Her pain at the time of the call is 5/10, states it gets much worse in the evening and first thing in the morning. She had been taking BID oxycodone, is now out of this. Denies any symptoms of infection such as fevers, pus drainage, spreading redness, warmth to the touch, body aches, chills or just generally feeling sick.    She is asking for muscle relaxants and pain med refill. Per discussion with Dr Feliciano, OK for tramadol and robaxin. Pended for provider review/sign.

## 2022-09-08 NOTE — PROGRESS NOTES
Plastic Surgery Outpatient Visit    ID: Ramona Ferrer is a 67 year old female with PMH L breast cancer s/p let mastectomy with RUTH recon 8/25/22 with DR. Feliciano. Has required oxy refill x1, tramadol x1 and robaxin x1 for pain.     S: Complains of itching. Using flucinar (topical steroid) from [facility]. Taking 2 tramadol a day as well as tylenol and ibuprofen. Using topical volteran for pain, requesting refill. Has held all her meds since surgery. Finished lovenox. Taking baby ASA. Breast drain 20cc daily, abdomen 110cc daily.     O:  /69 (BP Location: Right arm, Patient Position: Sitting, Cuff Size: Adult Regular)   Pulse 86   Temp 97.8  F (36.6  C)   SpO2 96%    General: NAD  Chest: L breast flap warm, soft, viable. Incisions c/d/i. Macular rash around incisions in line with tape. Ujogo doppler wire in place.   Abdomen: incision c/d/i with mild rash around incision. Drain serosanginous.     A/P:  -overall healing well, tape removed  -ok to continue topical steroid from [facility] or OTC hydrocortisone. Rx for diclofenac and hydroxyzine sent.  -L breast drain removed. Abdomen drain to remain until meeting criteria.  -cook doppler cut at skin  -discussed pain control- 1000mg tylenol q8 and 800mg ibuprofen q8 with tramadol for breakthrough. Trying to wean tramadol. Will call Monday if she needs more pain meds but hopefully can be off of them by then.   -RTC 2 weeks    Vidhi Austin PA-C  Plastic and Reconstructive Surgery    20 minutes spent on the date of the encounter doing chart review, history and physical, dressing changes, documentation and further activity as noted above.

## 2022-09-09 ENCOUNTER — OFFICE VISIT (OUTPATIENT)
Dept: PLASTIC SURGERY | Facility: CLINIC | Age: 67
End: 2022-09-09
Payer: MEDICARE

## 2022-09-09 VITALS
DIASTOLIC BLOOD PRESSURE: 69 MMHG | TEMPERATURE: 97.8 F | HEART RATE: 86 BPM | OXYGEN SATURATION: 96 % | SYSTOLIC BLOOD PRESSURE: 131 MMHG

## 2022-09-09 DIAGNOSIS — M48.061 FORAMINAL STENOSIS OF LUMBAR REGION: ICD-10-CM

## 2022-09-09 DIAGNOSIS — Z98.890 S/P FLAP GRAFT: Primary | ICD-10-CM

## 2022-09-09 PROCEDURE — 99024 POSTOP FOLLOW-UP VISIT: CPT | Performed by: PHYSICIAN ASSISTANT

## 2022-09-09 RX ORDER — HYDROXYZINE HYDROCHLORIDE 25 MG/1
25 TABLET, FILM COATED ORAL 3 TIMES DAILY PRN
Qty: 20 TABLET | Refills: 1 | Status: SHIPPED | OUTPATIENT
Start: 2022-09-09 | End: 2023-04-06

## 2022-09-09 ASSESSMENT — PAIN SCALES - GENERAL: PAINLEVEL: MODERATE PAIN (5)

## 2022-09-09 NOTE — PROGRESS NOTES
Plastic Surgery Outpatient Visit    ID: Ramona Ferrer is a 67 year old female with PMH L breast cancer s/p let mastectomy with RUTH recon 8/25/22 with DR. Feliciano. Has required oxy refill x1, tramadol x1 and robaxin x1 for pain.     S: itching. Using flucinar topical from [facility]. Taking 2 tramadol a day.     O:  /69 (BP Location: Right arm, Patient Position: Sitting, Cuff Size: Adult Regular)   Pulse 86   Temp 97.8  F (36.6  C)   SpO2 96%    General: NAD,***  ***    A/P:  ***    Vidhi Austin PA-C  Plastic and Reconstructive Surgery    *** minutes spent on the date of the encounter doing chart review, history and physical, dressing changes, documentation and further activity as noted above.

## 2022-09-09 NOTE — NURSING NOTE
Chief Complaint   Patient presents with     Post-op Visit     2 week post op       Vitals:    09/09/22 1054   BP: 131/69   BP Location: Right arm   Patient Position: Sitting   Cuff Size: Adult Regular   Pulse: 86   Temp: 97.8  F (36.6  C)   SpO2: 96%       There is no height or weight on file to calculate BMI.    Noe Benz EMT-P

## 2022-09-19 ENCOUNTER — OFFICE VISIT (OUTPATIENT)
Dept: PLASTIC SURGERY | Facility: CLINIC | Age: 67
End: 2022-09-19
Payer: MEDICARE

## 2022-09-19 VITALS
DIASTOLIC BLOOD PRESSURE: 81 MMHG | OXYGEN SATURATION: 98 % | TEMPERATURE: 98 F | HEART RATE: 79 BPM | SYSTOLIC BLOOD PRESSURE: 121 MMHG

## 2022-09-19 DIAGNOSIS — Z98.890 S/P BREAST RECONSTRUCTION, LEFT: Primary | ICD-10-CM

## 2022-09-19 PROCEDURE — 99024 POSTOP FOLLOW-UP VISIT: CPT | Performed by: PHYSICIAN ASSISTANT

## 2022-09-19 RX ORDER — TRAMADOL HYDROCHLORIDE 50 MG/1
50 TABLET ORAL EVERY 6 HOURS PRN
Qty: 10 TABLET | Refills: 0 | Status: SHIPPED | OUTPATIENT
Start: 2022-09-19 | End: 2022-09-22

## 2022-09-19 RX ORDER — CYCLOBENZAPRINE HCL 5 MG
5 TABLET ORAL 3 TIMES DAILY PRN
Qty: 30 TABLET | Refills: 0 | Status: SHIPPED | OUTPATIENT
Start: 2022-09-19 | End: 2022-11-09

## 2022-09-19 ASSESSMENT — PAIN SCALES - GENERAL: PAINLEVEL: SEVERE PAIN (6)

## 2022-09-19 NOTE — PROGRESS NOTES
"Plastic and Reconstructive Surgery Noted  9/19/2022  Surgeon: Dr. Feliciano     S: 67 year old female s/p bilateral RUTH on 8/25/2022. She states she is having pain to her back, and breast. She has no fevers or chills. No systemic symptoms. She describes the pain as \"pins and needles\". The pain localizes to the incision on her left breast and her back. She has a known history of lumbar radiculopathy and chronic back pain. She has no drainage, redness, warmth or swelling. She has a small opening in her mammary fold to her left breast without any redness, warmth or drainage. She is worried that she has an infection. She has also had a decrease in the amount of output from her abdominal drain and would like that removed. The output has been <30mL for at least 3 days.     O:   /81   Pulse 79   Temp 98  F (36.7  C) (Oral)   SpO2 98%   Gen: well appearing, NAD. Breathing comfortably on room air.   Chest: left breat incision healing. small area of scabbing without skin breakdown to the medial aspect of left breast incision. Area of wound dehiscence around 1cm to IMF of left breast without drainage, no tunneling with depth less than Q-tip depth on probing but fatty tissue is visible. No redness or warmth, no palpable fluid collections, no purulent drainage or serous drainage from left breast.   Abd: incision healing well without dehiscence. Small area of scabbing to medial aspect without breakdown. Drain output serous and drain removed without complication.     Assessment:   67 year old female s/p left RUTH breast reconstruction on 8/25/2022    Plan:   Apply gauze to left IMF as needed  Continue to moisturize incision, can consider hydrocortisone cream to superior aspect where skin is slightly dry/scaling, appears to be resolving reaction likely to skin tape/glue  If purulent drainage, redness, warmth, swelling, fevers, chills or malaise, or new or worsening symptoms. Return to clinic.   Patient understands.   We " will do 1 refill on tramadol for 10 pills  We will send muscle relaxer to use as needed for pins/needles pain/back pain. This will not come with refill and patient can consult PCP if needing anymore refills on pain medications or muscle relaxer.   Patient understands and agrees.   We also modified follow up on 9/27 due to conflicting appointment times and have moved her appointment from 11am to 10:15 to accommodate her 11:15 neurology appointment on same day at [facility].     Iqra Jesus PA-C on 9/19/2022 at 1:26 PM

## 2022-09-19 NOTE — LETTER
"9/19/2022       RE: Ramona Ferrer  [address] [address]     Dear Colleague,    Thank you for referring your patient, Ramona Ferrer, to the [facility]LAST[facility] REC[facility]ERY [facility] [place] [facility]. Please see a copy of my visit note below.    Plastic and Reconstructive Surgery Noted  9/19/2022  Surgeon: Dr. Feliciano     S: 67 year old female s/p bilateral RUTH on 8/25/2022. She states she is having pain to her back, and breast. She has no fevers or chills. No systemic symptoms. She describes the pain as \"pins and needles\". The pain localizes to the incision on her left breast and her back. She has a known history of lumbar radiculopathy and chronic back pain. She has no drainage, redness, warmth or swelling. She has a small opening in her mammary fold to her left breast without any redness, warmth or drainage. She is worried that she has an infection. She has also had a decrease in the amount of output from her abdominal drain and would like that removed. The output has been <30mL for at least 3 days.     O:   /81   Pulse 79   Temp 98  F (36.7  C) (Oral)   SpO2 98%   Gen: well appearing, NAD. Breathing comfortably on room air.   Chest: left breat incision healing. small area of scabbing without skin breakdown to the medial aspect of left breast incision. Area of wound dehiscence around 1cm to IMF of left breast without drainage, no tunneling with depth less than Q-tip depth on probing but fatty tissue is visible. No redness or warmth, no palpable fluid collections, no purulent drainage or serous drainage from left breast.   Abd: incision healing well without dehiscence. Small area of scabbing to medial aspect without breakdown. Drain output serous and drain removed without complication.     Assessment:   67 year old female s/p left RUTH breast reconstruction on 8/25/2022    Plan:   Apply gauze to left IMF " as needed  Continue to moisturize incision, can consider hydrocortisone cream to superior aspect where skin is slightly dry/scaling, appears to be resolving reaction likely to skin tape/glue  If purulent drainage, redness, warmth, swelling, fevers, chills or malaise, or new or worsening symptoms. Return to clinic.   Patient understands.   We will do 1 refill on tramadol for 10 pills  We will send muscle relaxer to use as needed for pins/needles pain/back pain. This will not come with refill and patient can consult PCP if needing anymore refills on pain medications or muscle relaxer.   Patient understands and agrees.   We also modified follow up on 9/27 due to conflicting appointment times and have moved her appointment from 11am to 10:15 to accommodate her 11:15 neurology appointment on same day at [facility].       Iqra Jesus PA-C on 9/19/2022 at 1:26 PM

## 2022-09-22 ENCOUNTER — MYC MEDICAL ADVICE (OUTPATIENT)
Dept: PLASTIC SURGERY | Facility: CLINIC | Age: 67
End: 2022-09-22

## 2022-09-23 ENCOUNTER — OFFICE VISIT (OUTPATIENT)
Dept: SURGERY | Facility: CLINIC | Age: 67
End: 2022-09-23
Payer: MEDICARE

## 2022-09-23 DIAGNOSIS — Z98.890 S/P BREAST RECONSTRUCTION, LEFT: Primary | ICD-10-CM

## 2022-09-23 PROCEDURE — 99024 POSTOP FOLLOW-UP VISIT: CPT | Performed by: PLASTIC SURGERY

## 2022-09-23 ASSESSMENT — PAIN SCALES - GENERAL: PAINLEVEL: MODERATE PAIN (5)

## 2022-09-23 NOTE — TELEPHONE ENCOUNTER
Per   pt to be added to his schedule today. Pt called and scheduled. [employer] message sent to pt with appt details..Lorna Daley RN

## 2022-09-23 NOTE — PROGRESS NOTES
PRESENTING COMPLAINT:  Postoperative visit status post left breast reconstruction with free RUTH flap done on 08/25/2022.    HISTORY OF PRESENTING COMPLAINT:  Ms. Ferrer is 67 years old.  She is a month out from her surgery.  Got a phone call that she was having some issues with redness and drainage from her medial aspect of her incisions.  I had her come in to see me in clinic today.  The patient states that she was healing well until about 4-5 days ago, when she started noticing some swelling and redness of the incision.  Not on antibiotics.  Has had low-grade fevers.    PHYSICAL EXAMINATION:  Vital signs are stable.  She is afebrile, in no obvious distress.  All of the flap is viable.  Most of the incisions are healing in well except medially there is some swelling and some drainage of some oily substance consistent with some fat necrosis.  No evidence for infection at this time.  Abdomen is healed.    ASSESSMENT AND PLAN:  Based upon the above findings, a diagnosis of left breast reconstruction was made.  I think this area has some fat necrosis.  I did open the area to make sure there was no purulence or infection, which there was none.  The tracking is very minimal.  I have asked the patient to pack that area with some gauze 2-3 times a day.  She can shower on a daily basis.  No indication for infection, so no antibiotics were started.  Plan is to see her back next week.  All her questions were answered.  All exam and discussion done in presence of my nurse, Kasandra iKrk, and my resident, Laith Ortiz.

## 2022-09-23 NOTE — NURSING NOTE
Ramona Ferrer's chief complaint for this visit includes:  Chief Complaint   Patient presents with     Wound Check     S/p breast reconstruction. Swollen, slightly red, prior yellow, green foul smelling discharge.      PCP: Eugene Michelle    Referring Provider:  No referring provider defined for this encounter.    There were no vitals taken for this visit.  Moderate Pain (5)        Allergies   Allergen Reactions     Seasonal Allergies          Do you need any medication refills at today's visit?  Raisa Wyatt LPN

## 2022-09-23 NOTE — LETTER
9/23/2022         RE: Ramona Ferrer  [address]  [place] [address]        Dear Colleague,    Thank you for referring your patient, Ramona Ferrer, to the [facility]. Please see a copy of my visit note below.    PRESENTING COMPLAINT:  Postoperative visit status post left breast reconstruction with free RUTH flap done on 08/25/2022.    HISTORY OF PRESENTING COMPLAINT:  Ms. Ferrer is 67 years old.  She is a month out from her surgery.  Got a phone call that she was having some issues with redness and drainage from her medial aspect of her incisions.  I had her come in to see me in clinic today.  The patient states that she was healing well until about 4-5 days ago, when she started noticing some swelling and redness of the incision.  Not on antibiotics.  Has had low-grade fevers.    PHYSICAL EXAMINATION:  Vital signs are stable.  She is afebrile, in no obvious distress.  All of the flap is viable.  Most of the incisions are healing in well except medially there is some swelling and some drainage of some oily substance consistent with some fat necrosis.  No evidence for infection at this time.  Abdomen is healed.    ASSESSMENT AND PLAN:  Based upon the above findings, a diagnosis of left breast reconstruction was made.  I think this area has some fat necrosis.  I did open the area to make sure there was no purulence or infection, which there was none.  The tracking is very minimal.  I have asked the patient to pack that area with some gauze 2-3 times a day.  She can shower on a daily basis.  No indication for infection, so no antibiotics were started.  Plan is to see her back next week.  All her questions were answered.  All exam and discussion done in presence of my nurse, Kasandra Kirk, and my resident, Laith Ortiz.          Again, thank you for allowing me to participate in the care of your patient.        Sincerely,        NOHEMI Feliciano MD

## 2022-09-26 ENCOUNTER — LAB (OUTPATIENT)
Dept: INFUSION THERAPY | Facility: CLINIC | Age: 67
End: 2022-09-26
Attending: INTERNAL MEDICINE
Payer: MEDICARE

## 2022-09-26 ENCOUNTER — ANCILLARY PROCEDURE (OUTPATIENT)
Dept: BONE DENSITY | Facility: CLINIC | Age: 67
End: 2022-09-26
Attending: INTERNAL MEDICINE
Payer: MEDICARE

## 2022-09-26 DIAGNOSIS — C50.412 MALIGNANT NEOPLASM OF UPPER-OUTER QUADRANT OF LEFT BREAST IN FEMALE, ESTROGEN RECEPTOR POSITIVE (H): ICD-10-CM

## 2022-09-26 DIAGNOSIS — Z17.0 MALIGNANT NEOPLASM OF UPPER-OUTER QUADRANT OF LEFT BREAST IN FEMALE, ESTROGEN RECEPTOR POSITIVE (H): ICD-10-CM

## 2022-09-26 DIAGNOSIS — Z78.0 ASYMPTOMATIC POSTMENOPAUSAL STATUS: ICD-10-CM

## 2022-09-26 LAB
ALBUMIN SERPL BCG-MCNC: 3.8 G/DL (ref 3.5–5.2)
ALP SERPL-CCNC: 79 U/L (ref 35–104)
ALT SERPL W P-5'-P-CCNC: 39 U/L (ref 10–35)
ANION GAP SERPL CALCULATED.3IONS-SCNC: 10 MMOL/L (ref 7–15)
AST SERPL W P-5'-P-CCNC: 33 U/L (ref 10–35)
BASOPHILS # BLD AUTO: 0 10E3/UL (ref 0–0.2)
BASOPHILS NFR BLD AUTO: 1 %
BILIRUB SERPL-MCNC: 0.3 MG/DL
BUN SERPL-MCNC: 9 MG/DL (ref 8–23)
CALCIUM SERPL-MCNC: 9.8 MG/DL (ref 8.8–10.2)
CHLORIDE SERPL-SCNC: 102 MMOL/L (ref 98–107)
CREAT SERPL-MCNC: 0.63 MG/DL (ref 0.51–0.95)
DEPRECATED HCO3 PLAS-SCNC: 26 MMOL/L (ref 22–29)
EOSINOPHIL # BLD AUTO: 0.5 10E3/UL (ref 0–0.7)
EOSINOPHIL NFR BLD AUTO: 7 %
ERYTHROCYTE [DISTWIDTH] IN BLOOD BY AUTOMATED COUNT: 13.2 % (ref 10–15)
GFR SERPL CREATININE-BSD FRML MDRD: >90 ML/MIN/1.73M2
GLUCOSE SERPL-MCNC: 108 MG/DL (ref 70–99)
HCT VFR BLD AUTO: 37.3 % (ref 35–47)
HGB BLD-MCNC: 11.5 G/DL (ref 11.7–15.7)
IMM GRANULOCYTES # BLD: 0 10E3/UL
IMM GRANULOCYTES NFR BLD: 0 %
LYMPHOCYTES # BLD AUTO: 1.9 10E3/UL (ref 0.8–5.3)
LYMPHOCYTES NFR BLD AUTO: 28 %
MCH RBC QN AUTO: 28.8 PG (ref 26.5–33)
MCHC RBC AUTO-ENTMCNC: 30.8 G/DL (ref 31.5–36.5)
MCV RBC AUTO: 93 FL (ref 78–100)
MONOCYTES # BLD AUTO: 0.7 10E3/UL (ref 0–1.3)
MONOCYTES NFR BLD AUTO: 11 %
NEUTROPHILS # BLD AUTO: 3.6 10E3/UL (ref 1.6–8.3)
NEUTROPHILS NFR BLD AUTO: 53 %
NRBC # BLD AUTO: 0 10E3/UL
NRBC BLD AUTO-RTO: 0 /100
PLATELET # BLD AUTO: 303 10E3/UL (ref 150–450)
POTASSIUM SERPL-SCNC: 4.2 MMOL/L (ref 3.4–5.3)
PROT SERPL-MCNC: 7.4 G/DL (ref 6.4–8.3)
RBC # BLD AUTO: 4 10E6/UL (ref 3.8–5.2)
SODIUM SERPL-SCNC: 138 MMOL/L (ref 136–145)
WBC # BLD AUTO: 6.7 10E3/UL (ref 4–11)

## 2022-09-26 PROCEDURE — 85025 COMPLETE CBC W/AUTO DIFF WBC: CPT | Performed by: INTERNAL MEDICINE

## 2022-09-26 PROCEDURE — 80053 COMPREHEN METABOLIC PANEL: CPT | Performed by: INTERNAL MEDICINE

## 2022-09-26 PROCEDURE — 77080 DXA BONE DENSITY AXIAL: CPT | Performed by: INTERNAL MEDICINE

## 2022-09-26 PROCEDURE — 36415 COLL VENOUS BLD VENIPUNCTURE: CPT

## 2022-09-26 PROCEDURE — 82040 ASSAY OF SERUM ALBUMIN: CPT | Performed by: INTERNAL MEDICINE

## 2022-09-26 NOTE — PROGRESS NOTES
Nursing Note:  Ramona Ferrer presents today for labs.    Patient seen by provider today: No   present during visit today: Not Applicable.    Note: N/A.    Intravenous Access:  Lab draw site RAC, Needle type butterfly, Gauge 23.  Labs drawn without difficulty.    Discharge Plan:   Patient was discharged to next appointment.    NUHA MEANS RN

## 2022-09-27 ENCOUNTER — OFFICE VISIT (OUTPATIENT)
Dept: PLASTIC SURGERY | Facility: CLINIC | Age: 67
End: 2022-09-27
Attending: PLASTIC SURGERY
Payer: MEDICARE

## 2022-09-27 ENCOUNTER — OFFICE VISIT (OUTPATIENT)
Dept: NEUROLOGY | Facility: CLINIC | Age: 67
End: 2022-09-27
Attending: PSYCHIATRY & NEUROLOGY
Payer: MEDICARE

## 2022-09-27 VITALS
DIASTOLIC BLOOD PRESSURE: 72 MMHG | WEIGHT: 220.4 LBS | HEART RATE: 84 BPM | SYSTOLIC BLOOD PRESSURE: 122 MMHG | OXYGEN SATURATION: 98 % | BODY MASS INDEX: 39.04 KG/M2

## 2022-09-27 DIAGNOSIS — Z98.890 S/P BREAST RECONSTRUCTION, LEFT: Primary | ICD-10-CM

## 2022-09-27 DIAGNOSIS — R20.2 BILATERAL LEG PARESTHESIA: ICD-10-CM

## 2022-09-27 DIAGNOSIS — M79.605 BILATERAL LOWER EXTREMITY PAIN: ICD-10-CM

## 2022-09-27 DIAGNOSIS — M79.604 BILATERAL LOWER EXTREMITY PAIN: ICD-10-CM

## 2022-09-27 PROCEDURE — 95885 MUSC TST DONE W/NERV TST LIM: CPT | Mod: 53 | Performed by: PSYCHIATRY & NEUROLOGY

## 2022-09-27 PROCEDURE — G0463 HOSPITAL OUTPT CLINIC VISIT: HCPCS

## 2022-09-27 PROCEDURE — 99024 POSTOP FOLLOW-UP VISIT: CPT | Performed by: PLASTIC SURGERY

## 2022-09-27 PROCEDURE — 95909 NRV CNDJ TST 5-6 STUDIES: CPT | Performed by: PSYCHIATRY & NEUROLOGY

## 2022-09-27 RX ORDER — CEFAZOLIN SODIUM 2 G/50ML
2 SOLUTION INTRAVENOUS
Status: CANCELLED | OUTPATIENT
Start: 2022-09-27

## 2022-09-27 RX ORDER — CEFAZOLIN SODIUM 2 G/50ML
2 SOLUTION INTRAVENOUS SEE ADMIN INSTRUCTIONS
Status: CANCELLED | OUTPATIENT
Start: 2022-09-27

## 2022-09-27 ASSESSMENT — PAIN SCALES - GENERAL: PAINLEVEL: MODERATE PAIN (5)

## 2022-09-27 NOTE — LETTER
9/27/2022         RE: Ramona Ferrer  [address]  [place] [address]        Dear Colleague,    Thank you for referring your patient, Ramona Ferrer, to the [facility] BREAST [facility]. Please see a copy of my visit note below.    POSTOPERATIVE VISIT NOTE    PRESENTING COMPLAINT:  Postoperative visit status post left breast reconstruction with free RUTH flap done 08/25/2022 for breast cancer.    HISTORY OF PRESENTING COMPLAINT:  Ms. Ferrer is 67 years old.  She is about a month out from surgery.  Here for regular postoperative visit.  Overall, doing better.    PHYSICAL EXAMINATION:    VITAL SIGNS:  Stable.  She is afebrile, in no obvious distress.    CHEST:  Her left breast is healing in well.  The medial aspect that was drained last week is now drier.  No evidence for infection.    ABDOMEN:  Healed in.    ASSESSMENT AND PLAN:  Based on the above findings, a diagnosis of left breast reconstruction with free RUTH flap for breast cancer was made.  Plan now is to proceed with the next stage of reconstruction in the coming months.  She will require a left breast revision, nipple reconstruction, right breast reduction and abdominal scar revision with umbilicoplasty.  Went over the planned procedure with the patient in detail.  She understood it and wants to proceed.  She understands the risks of pain, infection, bleeding, scarring, asymmetry, seromas, hematomas, wound breakdown, wound dehiscence, loss of the nipple of the right breast, requirement of further surgeries, asymmetries, continued pain issues given her history of pain issues, skin necrosis, fat necrosis, nipple necrosis, T-junction site necrosis, requirement of free nipple graft, DVT, PE, MI, CVA, pneumonia, renal failure and death.  She understood everything.  With regards to her wound on her left breast, she will continue to pack it 2-3 times a day, continue showering and let it heal in secondarily.  All questions were  answered.  She was happy with the visit.  All exam and discussion done in the presence of my nurse, Lorna Ludwig.        Again, thank you for allowing me to participate in the care of your patient.      Sincerely,    NOHEMI Feliciano MD

## 2022-09-27 NOTE — LETTER
2022       RE: Ramona Ferrer  [address] [address]     Dear Colleague,    Thank you for referring your patient, Ramona Ferrer, to the [facility] CLINIC [place] [facility]. Please see a copy of my visit note below.                        [facility]  Electrodiagnostic Laboratory                 Department of Neurology                                                                                                         Test Date:  2022    Patient: Sammie Ferrer : 1955 Physician: Manolo Carver MD   Sex: Female AGE: 67 year Ref Phys: Yvan Sosa MD   ID#: [id number]   Technician: Woody Temple     Clinical Information:    67 year old woman who reports bilateral pain and burning sensation at her lateral and anterior thighs following abdominal surgery done to obtain transversus abdominis muscle flaps (grafts) for breast reconstruction surgery after breast cancer. Query bilateral lateral femoral cutaneous neuropathies vs lumbar radiculopathies at L2-3.     Techniques:    Motor and sensory conduction studies were done with surface recording electrodes. EMG was done with a concentric needle electrode.     Results:    Bilateral fibular (EDB) and tibial (AH) motor NCSs were normal. Bilateral sural antidromic sensory NCSs were normal. Lateral femoral cutaneous nerve conduction studies are not done in overweight people in our lab due to technical difficulties. Needle EMG of the right vastus lateralis was normal. Needle EMG of the right adductor longus showed no abnormal spontaneous activity. Due to pain, procedure was aborted and voluntary MUP activity in this muscle could not be analyzed.    Interpretation:    Non-diagnostic study due to early termination of needle examination (patient in severe pain). There is no evidence of large fiber polyneuropathy from the study, but this was  not the referral question. Neither L2-3 radiculopathies nor lateral femoral cutaneous neuropathies can be ruled out with this limited data. Clinical correlation is recommended.       ___________________________  Manolo Carver MD        Nerve Conduction Studies  Motor Sites      Latency Amplitude Neg. Amp Diff Segment Distance Velocity Neg. Dur Neg Area Diff Temperature Comment   Site (ms) Norm (mV) Norm %  cm m/s Norm ms %  C    Left Fibular (EDB) Motor   Ankle 4.4  < 6.0 4.7  > 2.0  Ankle-EDB 8   5.2  31.4    Bel Fib Head 10.5 - 4.4 - -6.4 Bel Fib Head-Ankle 26.7 44  > 38 5.6 -4.8 31.2    Pop Fossa 11.9 - 4.4 - 0 Pop Fossa-Bel Fib Head 8 57  > 38 5.5 -1.68 31.2    Right Fibular (EDB) Motor   Ankle 3.7  < 6.0 3.1  > 2.0  Ankle-EDB 8   4.7  32.6    Bel Fib Head 10.2 - 2.7 - -12.9 Bel Fib Head-Ankle 29.7 46  > 38 5.1 -2.5 32.6    Pop Fossa 11.7 - 2.4 - -11.1 Pop Fossa-Bel Fib Head 7.2 48  > 38 5.2 -7.7 32.6    Left Tibial (AHB) Motor   Ankle 3.9  < 6.5 6.6  > 4.4  Ankle-AHB 8   4.6  32.4    Knee 11.9 - 4.7 - -28.8 Knee-Ankle 35 44  > 38 6.7 8.4 32.2    Right Tibial (AHB) Motor   Ankle 4.3  < 6.5 7.3  > 4.4  Ankle-AHB 8   4.9  32.4    Knee 12.4 - 6.3 - -13.7 Knee-Ankle 38.2 47  > 38 5.9 -1.09 32.3      Sensory Sites      Onset Lat Peak Lat Amp (O-P) Amp (P-P) Segment Distance Velocity Temperature Comment   Site ms ms  V Norm  V  cm m/s Norm  C    Left Sural Sensory   Calf-Lat Mall 3.3 4.0 6  > 5 7 Calf-Lat Mall 14 42  > 38 32.5    Right Sural Sensory   Calf-Lat Mall 3.2 4.1 8  > 5 8 Calf-Lat Mall 14 44  > 38 32.4        Electromyography     Side Muscle Ins Act Fibs/PSW Fasc HF Amp Dur Poly Recrt Int Pat   Right Vastus Lat Nml None Nml 0 Nml Nml 0 Nml Nml   Right Add Longus Nml None Nml 0     Aborted due to pain         NCS Waveforms:    Motor                Sensory           Ultrasound Images:            Sincerely,    Manolo Carver MD

## 2022-09-27 NOTE — PROGRESS NOTES
POSTOPERATIVE VISIT NOTE    PRESENTING COMPLAINT:  Postoperative visit status post left breast reconstruction with free RUTH flap done 08/25/2022 for breast cancer.    HISTORY OF PRESENTING COMPLAINT:  Ms. Ferrer is 67 years old.  She is about a month out from surgery.  Here for regular postoperative visit.  Overall, doing better.    PHYSICAL EXAMINATION:    VITAL SIGNS:  Stable.  She is afebrile, in no obvious distress.    CHEST:  Her left breast is healing in well.  The medial aspect that was drained last week is now drier.  No evidence for infection.    ABDOMEN:  Healed in.    ASSESSMENT AND PLAN:  Based on the above findings, a diagnosis of left breast reconstruction with free RUTH flap for breast cancer was made.  Plan now is to proceed with the next stage of reconstruction in the coming months.  She will require a left breast revision, nipple reconstruction, right breast reduction and abdominal scar revision with umbilicoplasty.  Went over the planned procedure with the patient in detail.  She understood it and wants to proceed.  She understands the risks of pain, infection, bleeding, scarring, asymmetry, seromas, hematomas, wound breakdown, wound dehiscence, loss of the nipple of the right breast, requirement of further surgeries, asymmetries, continued pain issues given her history of pain issues, skin necrosis, fat necrosis, nipple necrosis, T-junction site necrosis, requirement of free nipple graft, DVT, PE, MI, CVA, pneumonia, renal failure and death.  She understood everything.  With regards to her wound on her left breast, she will continue to pack it 2-3 times a day, continue showering and let it heal in secondarily.  All questions were answered.  She was happy with the visit.  All exam and discussion done in the presence of my nurse, Lorna Ludwig.

## 2022-09-29 ENCOUNTER — ONCOLOGY VISIT (OUTPATIENT)
Dept: ONCOLOGY | Facility: CLINIC | Age: 67
End: 2022-09-29
Attending: INTERNAL MEDICINE
Payer: MEDICARE

## 2022-09-29 ENCOUNTER — TELEPHONE (OUTPATIENT)
Dept: GASTROENTEROLOGY | Facility: CLINIC | Age: 67
End: 2022-09-29

## 2022-09-29 VITALS
DIASTOLIC BLOOD PRESSURE: 81 MMHG | HEART RATE: 78 BPM | TEMPERATURE: 98.8 F | BODY MASS INDEX: 39.29 KG/M2 | OXYGEN SATURATION: 99 % | WEIGHT: 221.8 LBS | SYSTOLIC BLOOD PRESSURE: 125 MMHG

## 2022-09-29 DIAGNOSIS — Z17.0 MALIGNANT NEOPLASM OF UPPER-OUTER QUADRANT OF LEFT BREAST IN FEMALE, ESTROGEN RECEPTOR POSITIVE (H): Primary | ICD-10-CM

## 2022-09-29 DIAGNOSIS — Z01.812 PRE-PROCEDURE LAB EXAM: Primary | ICD-10-CM

## 2022-09-29 DIAGNOSIS — C50.412 MALIGNANT NEOPLASM OF UPPER-OUTER QUADRANT OF LEFT BREAST IN FEMALE, ESTROGEN RECEPTOR POSITIVE (H): Primary | ICD-10-CM

## 2022-09-29 PROCEDURE — G0463 HOSPITAL OUTPT CLINIC VISIT: HCPCS

## 2022-09-29 PROCEDURE — 99215 OFFICE O/P EST HI 40 MIN: CPT | Performed by: INTERNAL MEDICINE

## 2022-09-29 ASSESSMENT — PAIN SCALES - GENERAL: PAINLEVEL: MODERATE PAIN (5)

## 2022-09-29 NOTE — TELEPHONE ENCOUNTER
Screening Questions    BlueKIND OF PREP RedLOCATION [review exclusion criteria] GreenSEDATION TYPE        Y Are you active on mychart?   Kenneth Cisneros MD  Ordering/Referring Provider?    [facility]/MEDICARE  What type of coverage do you have?  N Have you had a positive covid test in the last 90 days?     1. BMI  [BMI 40+ - review exclusion criteria]  39.00    2. Are you able to give consent for your medical care? [IF NO,RN REVIEW]  SELF           3. Are you taking any prescription pain medications on a routine schedule?    N       3a. N EXTENDED PREP What kind of prescription?   4. Do you have any chemical dependencies such as alcohol, street drugs, or methadone?  N     5. Do you have any history of post-traumatic stress syndrome, severe anxiety or history of psychosis?    N    **If yes 3- 5 , please schedule with MAC sedation.**    BMI OVER 40 NEED PAC EVALUATION FOR [facility]          IF YES TO ANY 6 - 10 - HOSPITAL SETTING ONLY.     6.   Do you need assistance transferring?    N   7.   Have you had a heart or lung transplant?    N  8.   Are you currently on dialysis?   N  9.  Do you use daily home oxygen?   N  10. Do you take nitroglycerin?   N  10a. N If yes, how often?      11. [FEMALES]   Are you currently pregnant?    11a.  If yes, how many weeks? [ Greater than 12 weeks, OR NEEDED]    12. Do you have Pulmonary Hypertension? *NEED PAC APPT AT [facility]*   N    13. [review exclusion criteria]  Do you have any implantable devices in your body (pacemaker, defib, LVAD)?  N    14. In the past 6 months, have you had any heart related issues including cardiomyopathy or heart attack?   N    14a. N If yes, did it require cardiac stenting if so when?     15. Have you had a stroke or Transient ischemic attack (TIA - aka  mini stroke ) within 6 months?    N    16. Do you have mod to severe Obstructive Sleep Apnea?  [Hospital only - Ok at [place]]  N    17. Do you have SEVERE AND UNCONTROLLED asthma?   N  *NEED PAC APPT  "AT [facility]*     18. Are you currently taking any blood thinners?  N     18a. If yes, inform patient to \"follow up w/ ordering provider for bridging instructions.\"    19. Do you take the medication Phentermine?  N    19a. If yes, \"Hold for 7 days before procedure.  Please consult your prescribing provider if you have questions about holding this medication.\"     20.  Do you have chronic kidney disease?  N      21.  Do you have a diagnosis of diabetes?   Y - PREDIABETIC     22.  On a regular basis do you go 3-5 days between bowel movements?   N    23. Preferred LOCAL Pharmacy for Pre Prescription    [ LIST ONLY ONE PHARMACY]        [id number] - [place], [place] - [address]          - CLOSING REMINDERS -    Informed patient they will need an adult    Cannot take any type of public or medical transportation alone  1. Conscious Sedation- Needs  for 6 hours after the procedure       MAC/General-Needs  for 24 hours after procedure    Pre-Procedure Covid test to be completed [[facility] PCR Testing Required]    Confirmed Nurse will call to complete assessment       - SCHEDULING DETAILS -     [place]   Surgeon    12/7   Date of Procedure  Type of Procedure Scheduled  Upper and Lower Endoscopy [EGD and Colonoscopy]     [facility]  [facility]If you answer yes to questions #8, #20, #21     MAC  Sedation Type     N PAC / Pre-op Required         Additional comments:  N                "

## 2022-09-29 NOTE — NURSING NOTE
"Oncology Rooming Note    September 29, 2022 9:31 AM   Ramona Ferrer is a 67 year old female who presents for:    Chief Complaint   Patient presents with     Oncology Clinic Visit     Breast cancer of upper-outer quadrant of left female breast      Initial Vitals: /81 (BP Location: Right arm, Patient Position: Sitting, Cuff Size: Adult Regular)   Pulse 78   Temp 98.8  F (37.1  C) (Oral)   Wt 100.6 kg (221 lb 12.8 oz)   SpO2 99%   BMI 39.29 kg/m   Estimated body mass index is 39.29 kg/m  as calculated from the following:    Height as of 8/25/22: 1.6 m (5' 3\").    Weight as of this encounter: 100.6 kg (221 lb 12.8 oz). Body surface area is 2.11 meters squared.  Moderate Pain (5) Comment: Data Unavailable   No LMP recorded. Patient has had a hysterectomy.  Allergies reviewed: Yes  Medications reviewed: Yes    Medications: MEDICATION REFILLS NEEDED TODAY. Provider was notified.  Pharmacy name entered into [facility]:    [id number] - [place], [place] - [address]  [facility] # [id number] - [place], [place] - [address]    Clinical concerns: needs refills for gabapentin, calcium carbonate, vitamin D3, and letrozole.       Tommie Soto            "

## 2022-09-29 NOTE — PROGRESS NOTES
Dada Mendiola MD  [facility]  [address] [place] [address], [facility] [address]  [place] [address]     RE:  Ramona Ferrer  MRN:  [id number]  :  1955     Dear Dr. Mendiola:     I would like to refer to you Sammie Ferrer, a 67-year-old woman with a recent diagnosis of a stage IIB, T3 N0 MX, invasive lobular carcinoma of the left breast.  She self-palpated a lump in the upper outer quadrant of the left breast.  She underwent evaluation with a diagnostic mammogram and ultrasound, which was BI-RADS 4, showing in the upper outer quadrant of the left breast irregularly shaped hypoechoic mass at the 12 o'clock position 4 cm from the nipple on the left.  This mass measured 2.3 x 2.2 x 1.5 cm and accounted for the patient's area of palpable concern.  Additionally, at the 1 o'clock position 2 cm from the nipple in the left breast, there is a second irregularly shaped hypoechoic mass with indistinct margins measuring 1.3 x 1.1 x 0.8 cm.  She also underwent a breast MRI which showed in the right breast mild background parenchymal enhancement and no suspicious areas of enhancement or lymphadenopathy.  In the left breast there was mild background parenchymal enhancement, and at the 12 o'clock position 4 cm from the nipple there was a heterogeneously enhancing irregular mass measuring 3.4 in AP dimension x 2.8 cm ML and 2.8 cm SI corresponding to the known biopsy-proven malignancy in the left breast.  In the left breast at the 1 o'clock position 2 cm from the nipple there was also a heterogeneously enhancing oval mass measuring 1.3 cm in maximal diameter.  This likely corresponds to the second biopsy-proven malignancy.  Together, the full extent of the disease spans a total of 5.2 cm AP x 4.4 cm ML, BI-RADS 6.     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  In part B in the left breast at the 1 o'clock position 2 cm from the nipple, ultrasound-guided needle biopsy showed invasive lobular carcinoma, [place] grade 1/3 lobular carcinoma in situ was present, classic subtype, estrogen receptors positive in greater than 70% of the cells, progesterone receptors positive in greater than 90% of cells, and HER2 was nonamplified, Ki-67 20-25%.  She now comes to our clinic for recommendations.     Sammie reports that the breast mass had been there for approximately 1 month before she was seen for evaluation..       She has worked in the past as a personal care assistant and lives in the [facility].       PAST MEDICAL HISTORY:  There is no history of breast cancer in the past.  No history of breast cancer surgery.  She has no history of radiation therapy in the past or radiation exposure.  No history of prior tumor of any kind.  She denies heart problems, heart attack, breathing problems, blood clots, seizures, peptic ulcer disease, osteoporosis or bone fractures.  She does report a history of arthritis.     FAMILY HISTORY:  Positive for breast cancer in a paternal aunt who was diagnosed with breast cancer at an old age.     Her father has a history of stomach cancer.  There is no family history of pancreatic cancer, melanoma, lung cancer or ovarian cancer.     No history of male breast cancer.     ALLERGIES:  No history of drug allergies.  She denies allergies to seafood, iodine, or contrast dye.     PAST MENSTRUAL HISTORY:  She has been pregnant 3 times with 2 live births at age 29 and 31 and 1 miscarriage.  Age at first menstrual period was 14.  She did have a total abdominal hysterectomy and bilateral salpingo-oophorectomy performed in 2003 for endometriosis.  She has no history of hormone replacement therapy.  No  history of oral contraceptives.     HABITS: She denies tobacco history.  She denies alcohol history and drinks alcohol only occasionally.     PAST MEDICAL HISTORY:  Past medical history is also remarkable for type 2 diabetes, and she was begun on metformin 2 years ago.  She also has a history of varicose veins and a history of a lipoma resected from her left leg.      Chronic Hepatitis B.  Hepatitis C negative.  HIV negative.       Prior COVID vaccination x 3.      GERM LINE GENETICS: INVITAE testing of 47 genes was negative.      TREATMENT HISTORY:  A.  MammaPrint showed low risk.  B.  Left mastectomy and axillary lymph node sampling.   A. Lymph node, LEFT axillary, excision:  -One benign lymph node (0/1)  B. LEFT breast, skin-sparing mastectomy:  -INVASIVE BREAST CARCINOMA, MIXED INVASIVE LOBULAR CARCINOMA (predominant component) and INVASIVE DUCTAL CARCINOMA (minor component), KALPESH GRADE 3, size 5.5 cm, 12:00, upper outer quadrant and lower outer quadrant  -Ductal carcinoma in situ (DCIS), nuclear grade 2, cribriform and solid type(s), with focal necrosis  -DCIS is admixed with invasive carcinoma, and comprises a minor component (<5%) of the tumor volume   -Lobular carcinoma in situ (LCIS), predominantly classic type (admixed with invasive carcinoma and beyond invasive carcinoma) and focal pleomorphic type (size 2 mm, adjacent to invasive carcinoma)  -Margins are uninvolved by invasive carcinoma  -Invasive carcinoma is 2.5 mm from the nearest (anterior) margin, and is > 5 mm from the posterior margin  -Margins are uninvolved by DCIS and pleomorphic LCIS  -DCIS and pleomorphic LCIS are > 5 mm from the nearest (anterior) margin  -Benign nipple and skin   -Other findings: fibrocystic change (including microcysts with apocrine metaplasia) and columnar cell change  -Calcifications associated with DCIS, LCIS, and benign ducts and acini  -Prior core biopsy site changes (two biopsy sites identified)  -Invasive  carcinoma is estrogen receptor positive (>70%, strong intensity) and progesterone receptor positive (>90%, strong intensity) by immunohistochemistry and is HER2 non-amplified (group 5) by FISH (performed on prior core biopsies, see report [id number], specimens A and B)  DCIS and LCIS present.  -Margins are uninvolved by invasive carcinoma or LCIS.   -Invasive carcinoma is estrogen receptor positive (>70%, strong intensity) and progesterone receptor positive (>90%, strong intensity) by immunohistochemistry and is HER2 non-amplified (group 5) by FISH (performed on prior core biopsies, see report [id number], specimens A and B)  Ki-67 immunohistochemistry (MIB-1, pharmDx, [employer]) from [facility] was performed on invasive carcinoma in the LEFT mastectomy specimen   They report Ki-67 proliferative index of 20-25% (block B5) and 20% (block B27).   C.  Staging:  pT3 Nx because lymph node is not the sentinel node.    D.  She did not want radiation, and risk:benefit did not favor treatment.  E.  Letrozole begun 4-5-22.      INTERVAL HISTORY  Ramona Ferrer returns to clinic with her  Robyn for routine followup. She is having discomfort with her left axilla and b/l breasts; she also continues to have pain w/ her b/l legs. She has an area of separation of the medial aspect of the TRAM flap incision without evidence of infection with no fevers or drainage.  She is being seen by Dr. Feliciano and his nurse weekly.  She is not currently on antibiotics.     She notes that her joint pain has actually been slightly improving since her last visit. She has not been exercising much due to the pain. She has been tolerating letrozole well overall; she does have hot flashes about once a day, which has not been bothersome to her. Eating and drinking fine, weight stable. She continues to have dryness with her mouth, but is staying hydrated. Denies fever, chills, fatigue, SOB, abdominal pain, nausea, vomiting, swelling. She lives at  home with her  and sees her kids and grandkids often. Dexa scan 9/26/22 with left and right femoral neck T-score -1.8.      REVIEW OF SYSTEMS:  A 10-point review of systems is negative.     She is eating a healthful Mediterranean-style diet.  She is unable to exercise because of the pain in her legs but is motivated to do so once the leg pain is addressed.     She is taking vitamin D3 2000 International Units per day.  She does not take calcium.       PHYSICAL EXAMINATION:    VITAL SIGNS:  /81 (BP Location: Right arm, Patient Position: Sitting, Cuff Size: Adult Regular)   Pulse 78   Temp 98.8  F (37.1  C) (Oral)   Wt 100.6 kg (221 lb 12.8 oz)   SpO2 99%   BMI 39.29 kg/m    GENERAL:  Sammie appeared generally well.  HEENT:  She has no alopecia.  Examination of the oropharynx revealed no lesions.  LYMPH:  No palpable cervical, supraclavicular, subclavicular or axillary lymphadenopathy.  BREASTS:  Examination of the right breast reveals no masses.  Left breast has a TRAM flap.  The  left breast wound has some dehiscence measuring about 3 cm with gauze in place at 9'clock. No pus, erythema or odor.  Healing incisional wound just above left inframammary fold, without erythema or masses.  No masses in the left breast. Firmness and discomfort om the upper outer quadrant of the left breast.  She replaced the gauze in clinic. There were 2 ~2 cm areas of mild erythema of the skin along the inframammary fold without pus or drainage.   LUNGS:  Clear to percussion and auscultation.  HEART:  Regular rate and rhythm.  S1, S2.  ABDOMEN:  Soft, nontender, consistent with increased body mass index.  EXTREMITIES:  Without edema.  PSYCH:  Mood was anxious and affect appropriate.     LABORATORY DATA:  CMP overall WNL.  CBC notable for hgb 11.5        IMAGING:  She did have a CT of the lumbar spine showing degenerative joint disease.  An x-ray of the left femur showed no suspicious abnormalities.      ASSESSMENT AND  PLAN:  1.  Sammie Ferrer is a 67-year-old woman with a recent diagnosis of a stage IIB, T3 N0 MX, invasive lobular carcinoma of the upper outer quadrant of the left breast which is multifocal grade 1, ER positive in greater than 70% of cells, [place] positive in greater than 90% of cells and HER2 nonamplified.  She now comes to clinic with her , for recommendations regarding evaluation and treatment.    2.  MammaPrint before surgery came back as low risk as did the Oncotype after surgery. The Oncotype of the pleomorphic lobular cancer showed a value of 15, which is less than the threshold for chemotherapy from TAILORx which is 26 for a post-menopausal woman.   3.  Sammie Ferrer underwent a left mastectomy and has a TRAM flap in place.  She started adjuvant hormonal therapy with letrozole and has tolerated it well.   4. Pathology showed -Invasive carcinoma is estrogen receptor positive, progesterone receptor positive and HER2 negative by immunohistochemistry.  Margins negative. pT3 Nx because the lymph node is not sentinel.    5.  She has tolerated the letrozole quite well, although she does have some hot flashes.  She does not need radiation.    6.  TRAM flap econstruction.  Mild dehiscence of the medial aspect of the incision with left breast No signs or symptns of infection.  Met with Dr. Feliciano (plastic surgery) 9/27. Sent Dr. Feliciano an update today about status of left breast wound. Pt has been stuffing and replacing dressing in wound. Discussed plan with Dr. Feliciano who will see her next week. Diabetes likely a factor in wound healing problems.   7.  Anemia.  We asked her to stop ASA which had been prescribed for DVT prophylaxis.  She has a history of gastritis 10 years ago, not currently.  History of multiple colonic polyps in 2019.  We will performed upper and lower endoscopy.  She does not want this done for 2 months because of her recovery from the reconstruction. I explained the reasons for the upper  and lower endoscopy and she agreed.    7.  Imaging:  Yearly mammography of right breast 12-22-22.  DEXA scan repeated 9/26/22  8. Anemia: Hgb 11.5 (9/29/22) baseline typically mid 12-13s for most of year. Recommend repeat colonoscopy and upper endoscopy. Discuss with Dr. Feliciano if she can discontinue ASA   9.  Pain in the left femur and LS spine.  She reports continued pain more on the left than the right. Two view films of the femur were obtained as well as CT scan of the lumbosacral spine.  These studies revealed no evidence for metastases and show DJD of the spine.  I discussed that we cannot do an MRI because she has an expander in place. Pt is not interested in pain management consultation with palliative care   10.   XR of Femur, 2 view. CT Lumbar spine.  These radiographic studies show DJD of the lumbar spine.  No evidence of malignancy.    10.  Followup.     Follow up with me 12-22-22 with CBC, CMP and right mammogram. Colonoscopy and upper endoscopy in early December.      Thank you for allowing us to continue to participate in Sammie Ferrer's care.     Sincerely,     Kenneth Cisneros M.D.   Professor   Division of Hematology, Oncology, Transplant   Department of Medicine   [employer]   [phone]          I spent 40 minutes with the patient more than 50% of which was in counseling and coordination of care.

## 2022-09-29 NOTE — LETTER
2022         RE: Ramona Ferrer  [address]  [facility] [address]        Dear Colleague,    Thank you for referring your patient, Ramona Ferrer, to the [facility]. Please see a copy of my visit note below.    Dada Mendiola MD  [facility]  [address], [facility] [facility] [address]  [place] [address]     RE:  Ramona Ferrer  MRN:  [id number]  :  1955     Dear Dr. Mendiola:     I would like to refer to you Sammie Ferrer, a 67-year-old woman with a recent diagnosis of a stage IIB, T3 N0 MX, invasive lobular carcinoma of the left breast.  She self-palpated a lump in the upper outer quadrant of the left breast.  She underwent evaluation with a diagnostic mammogram and ultrasound, which was BI-RADS 4, showing in the upper outer quadrant of the left breast irregularly shaped hypoechoic mass at the 12 o'clock position 4 cm from the nipple on the left.  This mass measured 2.3 x 2.2 x 1.5 cm and accounted for the patient's area of palpable concern.  Additionally, at the 1 o'clock position 2 cm from the nipple in the left breast, there is a second irregularly shaped hypoechoic mass with indistinct margins measuring 1.3 x 1.1 x 0.8 cm.  She also underwent a breast MRI which showed in the right breast mild background parenchymal enhancement and no suspicious areas of enhancement or lymphadenopathy.  In the left breast there was mild background parenchymal enhancement, and at the 12 o'clock position 4 cm from the nipple there was a heterogeneously enhancing irregular mass measuring 3.4 in AP dimension x 2.8 cm ML and 2.8 cm SI corresponding to the known biopsy-proven malignancy in the left breast.  In the left breast at the 1 o'clock position 2 cm from the nipple there was also a heterogeneously enhancing oval mass measuring 1.3 cm in maximal diameter.  This likely corresponds to the second biopsy-proven malignancy.  Together, the full extent of the  disease spans a total of 5.2 cm AP x 4.4 cm ML, BI-RADS 6.     The pathology showed in the left breast 12 o'clock position 4 cm from the nipple, ultrasound-guided needle biopsy invasive lobular carcinoma, Delmont grade 1/3 lobular carcinoma in situ, classic type, estrogen receptor positive in greater than 70% of the cells and progesterone receptor positive in greater than 90% of cells, and HER2 FISH was nonamplified.  In part B in the left breast at the 1 o'clock position 2 cm from the nipple, ultrasound-guided needle biopsy showed invasive lobular carcinoma, Delmont grade 1/3 lobular carcinoma in situ was present, classic subtype, estrogen receptors positive in greater than 70% of the cells, progesterone receptors positive in greater than 90% of cells, and HER2 was nonamplified, Ki-67 20-25%.  She now comes to our clinic for recommendations.     Sammie reports that the breast mass had been there for approximately 1 month before she was seen for evaluation..       She has worked in the past as a personal care assistant and lives in the [facility].       PAST MEDICAL HISTORY:  There is no history of breast cancer in the past.  No history of breast cancer surgery.  She has no history of radiation therapy in the past or radiation exposure.  No history of prior tumor of any kind.  She denies heart problems, heart attack, breathing problems, blood clots, seizures, peptic ulcer disease, osteoporosis or bone fractures.  She does report a history of arthritis.     FAMILY HISTORY:  Positive for breast cancer in a paternal aunt who was diagnosed with breast cancer at an old age.     Her father has a history of stomach cancer.  There is no family history of pancreatic cancer, melanoma, lung cancer or ovarian cancer.     No history of male breast cancer.     ALLERGIES:  No history of drug allergies.  She denies allergies to seafood, iodine, or contrast dye.     PAST MENSTRUAL HISTORY:  She has been pregnant 3 times with  2 live births at age 29 and 31 and 1 miscarriage.  Age at first menstrual period was 14.  She did have a total abdominal hysterectomy and bilateral salpingo-oophorectomy performed in 2003 for endometriosis.  She has no history of hormone replacement therapy.  No history of oral contraceptives.     HABITS: She denies tobacco history.  She denies alcohol history and drinks alcohol only occasionally.     PAST MEDICAL HISTORY:  Past medical history is also remarkable for type 2 diabetes, and she was begun on metformin 2 years ago.  She also has a history of varicose veins and a history of a lipoma resected from her left leg.      Chronic Hepatitis B.  Hepatitis C negative.  HIV negative.       Prior COVID vaccination x 3.      GERM LINE GENETICS: INVITAE testing of 47 genes was negative.      TREATMENT HISTORY:  A.  MammaPrint showed low risk.  B.  Left mastectomy and axillary lymph node sampling.   A. Lymph node, LEFT axillary, excision:  -One benign lymph node (0/1)  B. LEFT breast, skin-sparing mastectomy:  -INVASIVE BREAST CARCINOMA, MIXED INVASIVE LOBULAR CARCINOMA (predominant component) and INVASIVE DUCTAL CARCINOMA (minor component), KALPESH GRADE 3, size 5.5 cm, 12:00, upper outer quadrant and lower outer quadrant  -Ductal carcinoma in situ (DCIS), nuclear grade 2, cribriform and solid type(s), with focal necrosis  -DCIS is admixed with invasive carcinoma, and comprises a minor component (<5%) of the tumor volume   -Lobular carcinoma in situ (LCIS), predominantly classic type (admixed with invasive carcinoma and beyond invasive carcinoma) and focal pleomorphic type (size 2 mm, adjacent to invasive carcinoma)  -Margins are uninvolved by invasive carcinoma  -Invasive carcinoma is 2.5 mm from the nearest (anterior) margin, and is > 5 mm from the posterior margin  -Margins are uninvolved by DCIS and pleomorphic LCIS  -DCIS and pleomorphic LCIS are > 5 mm from the nearest (anterior) margin  -Benign nipple and  skin   -Other findings: fibrocystic change (including microcysts with apocrine metaplasia) and columnar cell change  -Calcifications associated with DCIS, LCIS, and benign ducts and acini  -Prior core biopsy site changes (two biopsy sites identified)  -Invasive carcinoma is estrogen receptor positive (>70%, strong intensity) and progesterone receptor positive (>90%, strong intensity) by immunohistochemistry and is HER2 non-amplified (group 5) by FISH (performed on prior core biopsies, see report [id number], specimens A and B)  DCIS and LCIS present.  -Margins are uninvolved by invasive carcinoma or LCIS.   -Invasive carcinoma is estrogen receptor positive (>70%, strong intensity) and progesterone receptor positive (>90%, strong intensity) by immunohistochemistry and is HER2 non-amplified (group 5) by FISH (performed on prior core biopsies, see report [id number], specimens A and B)  Ki-67 immunohistochemistry (MIB-1, pharmDx, [employer]) from [employer] was performed on invasive carcinoma in the LEFT mastectomy specimen   They report Ki-67 proliferative index of 20-25% (block B5) and 20% (block B27).   C.  Staging:  pT3 Nx because lymph node is not the sentinel node.    D.  She did not want radiation, and risk:benefit did not favor treatment.  E.  Letrozole begun 4-5-22.      INTERVAL HISTORY  Ramona Ferrer returns to clinic with her  Robyn for routine followup. She is having discomfort with her left axilla and b/l breasts; she also continues to have pain w/ her b/l legs. She has an area of separation of the medial aspect of the TRAM flap incision without evidence of infection with no fevers or drainage.  She is being seen by Dr. Feliciano and his nurse weekly.  She is not currently on antibiotics.     She notes that her joint pain has actually been slightly improving since her last visit. She has not been exercising much due to the pain. She has been tolerating letrozole well overall; she does have hot flashes  about once a day, which has not been bothersome to her. Eating and drinking fine, weight stable. She continues to have dryness with her mouth, but is staying hydrated. Denies fever, chills, fatigue, SOB, abdominal pain, nausea, vomiting, swelling. She lives at home with her  and sees her kids and grandkids often. Dexa scan 9/26/22 with left and right femoral neck T-score -1.8.      REVIEW OF SYSTEMS:  A 10-point review of systems is negative.     She is eating a healthful Mediterranean-style diet.  She is unable to exercise because of the pain in her legs but is motivated to do so once the leg pain is addressed.     She is taking vitamin D3 2000 International Units per day.  She does not take calcium.       PHYSICAL EXAMINATION:    VITAL SIGNS:  /81 (BP Location: Right arm, Patient Position: Sitting, Cuff Size: Adult Regular)   Pulse 78   Temp 98.8  F (37.1  C) (Oral)   Wt 100.6 kg (221 lb 12.8 oz)   SpO2 99%   BMI 39.29 kg/m    GENERAL:  Sammie appeared generally well.  HEENT:  She has no alopecia.  Examination of the oropharynx revealed no lesions.  LYMPH:  No palpable cervical, supraclavicular, subclavicular or axillary lymphadenopathy.  BREASTS:  Examination of the right breast reveals no masses.  Left breast has a TRAM flap.  The  left breast wound has some dehiscence measuring about 3 cm with gauze in place at 9'clock. No pus, erythema or odor.  Healing incisional wound just above left inframammary fold, without erythema or masses.  No masses in the left breast. Firmness and discomfort om the upper outer quadrant of the left breast.  She replaced the gauze in clinic. There were 2 ~2 cm areas of mild erythema of the skin along the inframammary fold without pus or drainage.   LUNGS:  Clear to percussion and auscultation.  HEART:  Regular rate and rhythm.  S1, S2.  ABDOMEN:  Soft, nontender, consistent with increased body mass index.  EXTREMITIES:  Without edema.  PSYCH:  Mood was anxious and  affect appropriate.     LABORATORY DATA:  CMP overall WNL.  CBC notable for hgb 11.5        IMAGING:  She did have a CT of the lumbar spine showing degenerative joint disease.  An x-ray of the left femur showed no suspicious abnormalities.      ASSESSMENT AND PLAN:  1.  Sammie Ferrer is a 67-year-old woman with a recent diagnosis of a stage IIB, T3 N0 MX, invasive lobular carcinoma of the upper outer quadrant of the left breast which is multifocal grade 1, ER positive in greater than 70% of cells, OR positive in greater than 90% of cells and HER2 nonamplified.  She now comes to clinic with her , for recommendations regarding evaluation and treatment.    2.  MammaPrint before surgery came back as low risk as did the Oncotype after surgery. The Oncotype of the pleomorphic lobular cancer showed a value of 15, which is less than the threshold for chemotherapy from TAILORx which is 26 for a post-menopausal woman.   3.  Sammie Ferrer underwent a left mastectomy and has a TRAM flap in place.  She started adjuvant hormonal therapy with letrozole and has tolerated it well.   4. Pathology showed -Invasive carcinoma is estrogen receptor positive, progesterone receptor positive and HER2 negative by immunohistochemistry.  Margins negative. pT3 Nx because the lymph node is not sentinel.    5.  She has tolerated the letrozole quite well, although she does have some hot flashes.  She does not need radiation.    6.  TRAM flap econstruction.  Mild dehiscence of the medial aspect of the incision with left breast No signs or symptns of infection.  Met with Dr. Feliciano (plastic surgery) 9/27. Sent Dr. Feliciano an update today about status of left breast wound. Pt has been stuffing and replacing dressing in wound. Discussed plan with Dr. Feliciano who will see her next week. Diabetes likely a factor in wound healing problems.   7.  Anemia.  We asked her to stop ASA which had been prescribed for DVT prophylaxis.  She has a history  of gastritis 10 years ago, not currently.  History of multiple colonic polyps in 2019.  We will performed upper and lower endoscopy.  She does not want this done for 2 months because of her recovery from the reconstruction. I explained the reasons for the upper and lower endoscopy and she agreed.    7.  Imaging:  Yearly mammography of right breast 12-22-22.  DEXA scan repeated 9/26/22  8. Anemia: Hgb 11.5 (9/29/22) baseline typically mid 12-13s for most of year. Recommend repeat colonoscopy and upper endoscopy. Discuss with Dr. Feliciano if she can discontinue ASA   9.  Pain in the left femur and LS spine.  She reports continued pain more on the left than the right. Two view films of the femur were obtained as well as CT scan of the lumbosacral spine.  These studies revealed no evidence for metastases and show DJD of the spine.  I discussed that we cannot do an MRI because she has an expander in place. Pt is not interested in pain management consultation with palliative care   10.   XR of Femur, 2 view. CT Lumbar spine.  These radiographic studies show DJD of the lumbar spine.  No evidence of malignancy.    10.  Followup.     Follow up with me 12-22-22 with CBC, CMP and right mammogram. Colonoscopy and upper endoscopy in early December.      Thank you for allowing us to continue to participate in Sammie Ferrer's care.     Sincerely,     Kenneth Cisneros M.D.   Professor   Division of Hematology, Oncology, Transplant   Department of Medicine   [employer]   [phone]          I spent 40 minutes with the patient more than 50% of which was in counseling and coordination of care.

## 2022-10-11 ENCOUNTER — OFFICE VISIT (OUTPATIENT)
Dept: PLASTIC SURGERY | Facility: CLINIC | Age: 67
End: 2022-10-11
Attending: PLASTIC SURGERY
Payer: MEDICARE

## 2022-10-11 VITALS
DIASTOLIC BLOOD PRESSURE: 82 MMHG | BODY MASS INDEX: 39.54 KG/M2 | TEMPERATURE: 98.1 F | HEART RATE: 85 BPM | OXYGEN SATURATION: 98 % | WEIGHT: 223.2 LBS | SYSTOLIC BLOOD PRESSURE: 134 MMHG | RESPIRATION RATE: 16 BRPM

## 2022-10-11 DIAGNOSIS — Z98.890 S/P BREAST RECONSTRUCTION, LEFT: Primary | ICD-10-CM

## 2022-10-11 PROCEDURE — 99024 POSTOP FOLLOW-UP VISIT: CPT | Performed by: PLASTIC SURGERY

## 2022-10-11 PROCEDURE — G0463 HOSPITAL OUTPT CLINIC VISIT: HCPCS

## 2022-10-11 RX ORDER — TRAMADOL HYDROCHLORIDE 50 MG/1
50 TABLET ORAL EVERY 6 HOURS PRN
Qty: 15 TABLET | Refills: 0 | Status: SHIPPED | OUTPATIENT
Start: 2022-10-11 | End: 2022-10-18

## 2022-10-11 ASSESSMENT — PAIN SCALES - GENERAL: PAINLEVEL: MODERATE PAIN (5)

## 2022-10-11 NOTE — NURSING NOTE
"Oncology Rooming Note    October 11, 2022 11:14 AM   Ramona Ferrer is a 67 year old female who presents for:    Chief Complaint   Patient presents with     Oncology Clinic Visit     Breast cancer of upper-outer quadrant of left female breast      Initial Vitals: /82 (BP Location: Right arm, Patient Position: Sitting, Cuff Size: Adult Large)   Pulse 85   Temp 98.1  F (36.7  C) (Oral)   Resp 16   Wt 101.2 kg (223 lb 3.2 oz)   SpO2 98%   BMI 39.54 kg/m   Estimated body mass index is 39.54 kg/m  as calculated from the following:    Height as of 8/25/22: 1.6 m (5' 3\").    Weight as of this encounter: 101.2 kg (223 lb 3.2 oz). Body surface area is 2.12 meters squared.  Moderate Pain (5) Comment: Data Unavailable   No LMP recorded. Patient has had a hysterectomy.  Allergies reviewed: Yes  Medications reviewed: Yes    Medications: Medication refills not needed today.  Pharmacy name entered into [facility]:    [id number] - [place], [place] - [address]  [id number] - [place], [place] - [address]    Clinical concerns: None      Yamil Barbour            "

## 2022-10-11 NOTE — LETTER
10/11/2022         RE: Ramona Ferrer  [address]  [place] [address]        Dear Colleague,    Thank you for referring your patient, Ramona Ferrer, to the [facility]. Please see a copy of my visit note below.    PRESENTING COMPLAINT:  Postoperative visit status post left breast reconstruction with free RUTH flaps for breast cancer done 08/25/2022.    HISTORY OF PRESENTING COMPLAINT:  Ms. Ferrer is 67 years old.  She is about 6 weeks out from surgery, overall doing well, here with her daughter.  All discussions and exam done in presence of my nurse, Lorna Ludwig.      She is doing b.i.d. packing to the medial aspect of the left breast, still complains of pain, generalized.    PHYSICAL EXAMINATION:  Vital signs stable.  She is afebrile, in no obvious distress.  Her abdomen is healed.  Her breast is basically healed except for a small 2 x 2 cm opening that she is packing.  No evidence of infection.    ASSESSMENT AND PLAN:  Based on above findings, left breast reconstruction for breast cancer was made.  Overall, doing well except that she continues to complain of pain.  I have advised that she follow up with Pain Medicine.  We will give her another prescription of tramadol.  I reassured that  everything is healing in well.  Orders for her next stage were already in place.  She needs to schedule based on her desires.  All questions were answered.  She was happy with the visit.  I will see her back in 6 weeks.        Again, thank you for allowing me to participate in the care of your patient.      Sincerely,    NOHEMI Feliciano MD

## 2022-10-11 NOTE — PROGRESS NOTES
PRESENTING COMPLAINT:  Postoperative visit status post left breast reconstruction with free RUTH flaps for breast cancer done 08/25/2022.    HISTORY OF PRESENTING COMPLAINT:  Ms. Ferrer is 67 years old.  She is about 6 weeks out from surgery, overall doing well, here with her daughter.  All discussions and exam done in presence of my nurse, Lorna Ludwig.      She is doing b.i.d. packing to the medial aspect of the left breast, still complains of pain, generalized.    PHYSICAL EXAMINATION:  Vital signs stable.  She is afebrile, in no obvious distress.  Her abdomen is healed.  Her breast is basically healed except for a small 2 x 2 cm opening that she is packing.  No evidence of infection.    ASSESSMENT AND PLAN:  Based on above findings, left breast reconstruction for breast cancer was made.  Overall, doing well except that she continues to complain of pain.  I have advised that she follow up with Pain Medicine.  We will give her another prescription of tramadol.  I reassured that  everything is healing in well.  Orders for her next stage were already in place.  She needs to schedule based on her desires.  All questions were answered.  She was happy with the visit.  I will see her back in 6 weeks.

## 2022-10-23 DIAGNOSIS — G89.18 POSTOPERATIVE PAIN: ICD-10-CM

## 2022-10-24 ENCOUNTER — TELEPHONE (OUTPATIENT)
Dept: INTERNAL MEDICINE | Facility: CLINIC | Age: 67
End: 2022-10-24

## 2022-10-24 DIAGNOSIS — R39.9 UTI SYMPTOMS: Primary | ICD-10-CM

## 2022-10-24 RX ORDER — CIPROFLOXACIN 500 MG/1
500 TABLET, FILM COATED ORAL 2 TIMES DAILY
Qty: 10 TABLET | Refills: 0 | Status: SHIPPED | OUTPATIENT
Start: 2022-10-24 | End: 2022-12-07

## 2022-10-24 NOTE — TELEPHONE ENCOUNTER
I have faxed antibiotic Cipro 1 tablet twice daily for 5 days, please advise follow-up in clinic and urine analysis if symptoms do not get better or if symptoms worsen

## 2022-10-24 NOTE — TELEPHONE ENCOUNTER
S-(situation): Patient's daughter Zelda calls, on consent to communicate.     B-(background): Patient with symptoms of UTI. She has Amoxicillin at home and was thinking about taking this, but Zelda told her they should call the doctor first. Patient hesitant to come in for appointment due to frequent visits for cancer and breast reconstruction. However, they would bring her in to see Dr. Michelle if she can work her in for appointment.     Zelda states that patient has been struggling with pain following breast reconstruction surgery for cancer and also having to pack open wound on breast. She has abdominal pain from where they took tissue for the reconstruction.    A-(assessment): Zelda states patient has been having lower abdominal discomfort for a few days along with pressure and pain with urination. Per patient symptoms are similar to previous UTIs. Patient resting right now, but Zelda states she is feeling miserable.      R-(recommendations): Routed to provider to review if patient can be worked in for appointment today, if not, Zelda asks if UA order can be placed to check for UTI.     Analisa Horn RN  [facility]

## 2022-10-26 ENCOUNTER — PATIENT OUTREACH (OUTPATIENT)
Dept: PLASTIC SURGERY | Facility: CLINIC | Age: 67
End: 2022-10-26

## 2022-10-26 NOTE — TELEPHONE ENCOUNTER
Left message for patient to return call to [phone].    Refill request for Robaxin, need to know status of pain clinic appt, plans for future management.

## 2022-10-26 NOTE — TELEPHONE ENCOUNTER
Methocarbamol Oral Tablet 500 MG  Last Written Prescription Date:  9/7/2022  Last Fill Quantity: 20,   # refills: 0  Last Office Visit :  10/11/2022  Future Office visit:  None    Routing refill request to provider for review/approval because:  Drug not on the G, P or [facility] refill protocol or controlled substance      Rut Cullen RN  Central Triage Red Flags/Med Refills

## 2022-11-02 RX ORDER — METHOCARBAMOL 500 MG/1
500 TABLET, FILM COATED ORAL 4 TIMES DAILY PRN
OUTPATIENT
Start: 2022-11-02

## 2022-11-03 ENCOUNTER — TELEPHONE (OUTPATIENT)
Dept: PLASTIC SURGERY | Facility: CLINIC | Age: 67
End: 2022-11-03

## 2022-11-03 NOTE — TELEPHONE ENCOUNTER
Called and LVM for patient to schedule surgery with Dr. Feliciano. Provided direct call back [phone].

## 2022-11-06 DIAGNOSIS — Z98.890 S/P BREAST RECONSTRUCTION, LEFT: ICD-10-CM

## 2022-11-07 NOTE — TELEPHONE ENCOUNTER
Sent [employer] message to patient. No response from phone call.   DISPLAY PLAN FREE TEXT DISPLAY PLAN FREE TEXT DISPLAY PLAN FREE TEXT DISPLAY PLAN FREE TEXT DISPLAY PLAN FREE TEXT

## 2022-11-08 ENCOUNTER — OFFICE VISIT (OUTPATIENT)
Dept: PLASTIC SURGERY | Facility: CLINIC | Age: 67
End: 2022-11-08
Attending: PLASTIC SURGERY
Payer: MEDICARE

## 2022-11-08 VITALS
DIASTOLIC BLOOD PRESSURE: 76 MMHG | RESPIRATION RATE: 16 BRPM | OXYGEN SATURATION: 100 % | TEMPERATURE: 97.8 F | SYSTOLIC BLOOD PRESSURE: 126 MMHG | BODY MASS INDEX: 39.22 KG/M2 | HEART RATE: 75 BPM | WEIGHT: 221.4 LBS

## 2022-11-08 DIAGNOSIS — Z98.890 S/P BREAST RECONSTRUCTION, LEFT: Primary | ICD-10-CM

## 2022-11-08 PROCEDURE — 99024 POSTOP FOLLOW-UP VISIT: CPT | Performed by: PLASTIC SURGERY

## 2022-11-08 PROCEDURE — G0463 HOSPITAL OUTPT CLINIC VISIT: HCPCS

## 2022-11-08 ASSESSMENT — PAIN SCALES - GENERAL: PAINLEVEL: MILD PAIN (2)

## 2022-11-08 NOTE — PROGRESS NOTES
PRESENTING COMPLAINT:  Postoperative visit status post left breast reconstruction with left free RUTH flap for breast cancer done 08/25/2022.    HISTORY OF PRESENTING COMPLAINT:  Ms. Ferrer is 67 years old.  She is about 3 months out from surgery.  Her left breast is now almost completely healed.  Her pain is much improved and she has no major issues.    PHYSICAL EXAMINATION:  Vital signs stable.  She is afebrile, in no obvious distress.  Abdomen is healed.  Breast is healed.    ASSESSMENT AND PLAN:  Based on the above findings, a diagnosis of left breast reconstruction for breast cancer was made.  The patient now is contemplating third stage reconstruction.  She is not emotionally ready for that.  I think we will see her back in about 3 months and then plan accordingly.  She understood the plan and agreed.  All her questions were answered.  She was happy with his visit.  All exam and discussion done in presence of my nurse, Lorna Ludwig.

## 2022-11-08 NOTE — LETTER
11/8/2022         RE: Ramona Ferrer  [address]  [place] [address]        Dear Colleague,    Thank you for referring your patient, Ramona Ferrer, to the [facility]AST [facility]. Please see a copy of my visit note below.    PRESENTING COMPLAINT:  Postoperative visit status post left breast reconstruction with left free RUTH flap for breast cancer done 08/25/2022.    HISTORY OF PRESENTING COMPLAINT:  Ms. Ferrer is 67 years old.  She is about 3 months out from surgery.  Her left breast is now almost completely healed.  Her pain is much improved and she has no major issues.    PHYSICAL EXAMINATION:  Vital signs stable.  She is afebrile, in no obvious distress.  Abdomen is healed.  Breast is healed.    ASSESSMENT AND PLAN:  Based on the above findings, a diagnosis of left breast reconstruction for breast cancer was made.  The patient now is contemplating third stage reconstruction.  She is not emotionally ready for that.  I think we will see her back in about 3 months and then plan accordingly.  She understood the plan and agreed.  All her questions were answered.  She was happy with his visit.  All exam and discussion done in presence of my nurse, Lorna Ludwig.          Again, thank you for allowing me to participate in the care of your patient.      Sincerely,    NOHEMI Feliciano MD

## 2022-11-08 NOTE — NURSING NOTE
"Oncology Rooming Note    November 8, 2022 11:03 AM   Ramona Ferrer is a 67 year old female who presents for:    Chief Complaint   Patient presents with     Oncology Clinic Visit     Breast Cancer (R)     Initial Vitals: /76 (BP Location: Left arm, Patient Position: Sitting, Cuff Size: Adult Large)   Pulse 75   Temp 97.8  F (36.6  C) (Oral)   Resp 16   Wt 100.4 kg (221 lb 6.4 oz)   SpO2 100%   BMI 39.22 kg/m   Estimated body mass index is 39.22 kg/m  as calculated from the following:    Height as of 8/25/22: 1.6 m (5' 3\").    Weight as of this encounter: 100.4 kg (221 lb 6.4 oz). Body surface area is 2.11 meters squared.  Mild Pain (2) Comment: Data Unavailable   No LMP recorded. Patient has had a hysterectomy.  Allergies reviewed: Yes  Medications reviewed: Yes    Medications: Medication refills not needed today.  Pharmacy name entered into [facility]:    [id number] - [place], [place] - [address]  [id number] - [place], [place] - [address] [address]    Clinical concerns: Pt presents today postoperatively.       Jocelin Urrutia LPN  11/8/2022              "

## 2022-11-09 RX ORDER — CYCLOBENZAPRINE HCL 5 MG
5 TABLET ORAL 3 TIMES DAILY PRN
Qty: 30 TABLET | Refills: 0 | Status: SHIPPED | OUTPATIENT
Start: 2022-11-09 | End: 2023-04-06

## 2022-11-09 NOTE — TELEPHONE ENCOUNTER
cyclobenzaprine (FLEXERIL) 5 MG tablet      Last Written Prescription Date:  9-19-22  Last Fill Quantity: 30,   # refills: 0  Last Office Visit : 10-11-22  Future Office visit:  11-17-22    Routing refill request to provider for review/approval because:  Drug not on the [facility], P or [facility] refill protocol or controlled substance

## 2022-11-09 NOTE — TELEPHONE ENCOUNTER
Spoke with patient and she does not wish to schedule surgery at this time. She prefers to have an appointment with Dr. Feliciano and then will decide if she wants to move forward or not. Will route to clinic as FYI.

## 2022-11-10 ENCOUNTER — PATIENT OUTREACH (OUTPATIENT)
Dept: GERIATRIC MEDICINE | Facility: CLINIC | Age: 67
End: 2022-11-10

## 2022-11-10 NOTE — PROGRESS NOTES
[facility] Care Coordination Contact    Member became effective with  Hua on 11/01/2022 with [employer] MSC+.  Previous Health Plan: MA/Fee For Service  Previous Care System: [facility]  Previous care coordinators name and number: n/a no MMIS  Waiver Type: N/A  Last MMIS Entry: Date n/a and Type n/a  MMIS visit date (and type) if different from above: n/a  Services Listed in MMIS:   UTF received: No UTF to request     Iqra Avila  Care Management Specialist  [facility]  [phone]

## 2022-11-10 NOTE — LETTER
November 10, 2022    RAMONA BEY  [address]  [place] [address]    Dear  Ramona,    Welcome to [facility] s MSC+ health program. My name is Stella Cunha RN. I am your MSC+ care coordinator. You are eligible for Care Coordination through [facility] MSC+ pla.    As your care coordinator, we ll:    Meet to go over your care coordination benefits    Talk about your physical and mental health care needs     Review your preventative care needs    Create a plan that meets your needs with the services you choose    What happens next?  I ll call you soon to introduce myself and tell you more about my role. We ll then plan time to go over your health and safety needs. Our goal is to keep you as healthy and independent as possible.    Soon, you will receive a new MSC+ member identification (ID) card from [facility]. When you receive it, please use this card along with your [place] [facility] card and Prescription Drug Coverage Program card. When you receive, it please use this card where you get your health services. If you have Medicare, you will need to show your Medicare card when you get health services.    The [facility] care coordination program is voluntary and offered to you at no cost. If you wish to stop being in the care coordination program or have questions, call me at [phone]. If you reach my voicemail, leave a message and your phone number. TTY users, call the [place] [employer] at 353 or 1-604.189.6874 (iaqpef-mk-rccppo relay service).    Sincerely,        Stella Cunha RN    E-Mail:  [email or web address]  Phone: [phone]      [facility]    [id number] accepted   [id number]       [id number] (07/2022)

## 2022-11-15 ENCOUNTER — PATIENT OUTREACH (OUTPATIENT)
Dept: GERIATRIC MEDICINE | Facility: CLINIC | Age: 67
End: 2022-11-15

## 2022-11-15 NOTE — PROGRESS NOTES
[facility] Care Coordination Contact    Called member to schedule annual HRA home visit. HRA has been scheduled for Friday November 18th at 1:00pm.     Stella Cunha RN  [facility]  [phone]

## 2022-11-18 ENCOUNTER — PATIENT OUTREACH (OUTPATIENT)
Dept: GERIATRIC MEDICINE | Facility: CLINIC | Age: 67
End: 2022-11-18

## 2022-11-18 ASSESSMENT — ACTIVITIES OF DAILY LIVING (ADL): DEPENDENT_IADLS:: INDEPENDENT

## 2022-11-18 NOTE — PROGRESS NOTES
[facility] Care Coordination Contact    [facility] Initial Assessment     Home visit for Initial Health Risk Assessment with Ramona DE LEON Carissa completed on November 18, 2022    Type of residence:: Town home (With stairs)  Current living arrangement:: I live in a private home with spouse     Assessment completed with:: Patient, Spouse or significant other    Current Care Plan  Member currently receiving the following home care services:   None  Member currently receiving the following community resources: None    Medication Review  Medication reconciliation completed in Epic: Yes  Medication set-up & administration: Independent and sets up on own every two weeks.  Self-administers medications.  Medication Risk Assessment Medication (1 or more, place referral to MTM): N/A: No risk factors identified  MTM Referral Placed: No: MTM referral offered but member declined.    Mental/Behavioral Health   Depression Screening:   PHQ-2 Total Score (Adult) - Positive if 3 or more points; Administer PHQ-9 if positive: 1       Mental health DX:: No        No current MH services-will place referral for NA    Falls Assessment:   Fallen 2 or more times in the past year?: No   Any fall with injury in the past year?: No    ADL/IADL Dependencies:   Dependent ADLs:: Independent  Dependent IADLs:: Independent    [facility] Health Plan sponsored benefits: Shared information re: Silver Sneakers/gym memberships, ASA, Calcium +D.    PCA Assessment completed at visit: Not Applicable     Elderly Waiver Eligibility: No-does not meet criteria    Care Plan & Recommendations: Member would like to continue to live independently in her home with her spouse. Denies need for any services at this time. Reports decreased endurance and fatigue since surgery in August  and interested in membership at [facility] covered by insurance. Information provided.  Discussed ACP/health care directive. Member reports she is not interested at this time but agreeable  to learn more about it and care coordinator to send information.     See [facility] for detailed assessment information.    Follow-Up Plan: Member informed of future contact, plan to f/u with member with a 6 month telephone assessment.  Contact information shared with member and family, encouraged member to call with any questions or concerns at any time.    [facility] care continuum providers: Please see Snapshot and Care Management Flowsheets for Specific details of care plan.    This CC note routed to PCP.     Stella Cunha RN  [facility]  [phone]

## 2022-11-18 NOTE — Clinical Note
I am the [facility] Care Coordinator and I am just notifying you that I saw Sammie for an initial Health Risk assessment on 11/18/22. Thank you. 13-Jun-2019 16:39

## 2022-11-20 ENCOUNTER — HEALTH MAINTENANCE LETTER (OUTPATIENT)
Age: 67
End: 2022-11-20

## 2022-11-22 ENCOUNTER — PATIENT OUTREACH (OUTPATIENT)
Dept: GERIATRIC MEDICINE | Facility: CLINIC | Age: 67
End: 2022-11-22

## 2022-11-22 NOTE — PROGRESS NOTES
[facility] Care Coordination Contact    Received after visit chart from care coordinator.  Completed following tasks: Mailed copy of care plan to client, Mailed copy of POC signature sheet for member to sign and return in SASE , Sent the following resources/forms: Short and Long HCD forms with correlating info flyers and [employer] flyer  and Mailed [facility] Safe Medication Disposal      Iqra Avila  Care Management Specialist  [facility]  [phone]

## 2022-11-22 NOTE — LETTER
November 22, 2022    RAMONA BEY  [address]  [place] [address]        Dear Ramona:    At [facility], we re dedicated to improving your health and wellness. Enclosed is the Care Plan developed with you on 11/18/2022. Please review the Care Plan carefully.    As a reminder, during your visit we talked about:    Ways to manage your physical and mental health    Using health care to maintain and improve your health     Your preventive care needs     Remember to contact your care coordinator if you:    Are hospitalized, or plan to be hospitalized     Have a fall      Have a change in your physical or mental health    Need help finding support or services    If you have questions, or don t agree with your Care Plan, call me at [phone]. You can also call me if your needs change. TTY users, call the [place] [employer] at (532) or 1-313.331.2660 (kudtyj-ec-wnxjxj relay service).    Sincerely,          Stella Cunha RN    E-Mail:  [email or web address]  Phone: [phone]      [facility]    [id number] accepted    [id number] (07/2022)

## 2022-11-22 NOTE — LETTER
[facility] Advance Care Planning       Ramona Ferrer  [address]  [place] [address]      Dear Sammie    You shared with me your interest in receiving information on Advance Care Planning and Health Care Directives. Discussing and making decisions about this part of our health is very important.  A Health Care Directive is a written document that outlines your goals, values, beliefs and choices for health care and medical treatment in the event you are unable to speak for yourself.     We greatly value the opportunity to assist you in documenting your choices and to honor your   wishes. We ve enclosed forms to help you get started thinking about your values and goals. We have several options for additional resources:       Health Care Directives and Advance Care Planning resources can be viewed and printed   for free at our web site:  [email or web address].       Free group classes on Advance Care Planning and completing a Health Care Directive are available at multiple locations and times. These classes are led by trained staff who will provide information and guide you through a Health Care Directive.  They can also review, notarize and add your Health Care Directive to your medical record. [place] for a class at [email or web address] or by calling [employer] at [phone] or toll free [phone].      COPIES of completed Health Care Directives can be brought or mailed to any of our   locations, including the address listed below. You can also email a copy to [email or web address] .      Email or call me at the contact information listed below for questions, assistance, or to   make an appointment to discuss creating a Health Care Directive. You can also contact   our [facility] Department for questions or assistance.       Sincerely,     Stella Cunha RN

## 2022-11-29 ENCOUNTER — TELEPHONE (OUTPATIENT)
Dept: GASTROENTEROLOGY | Facility: CLINIC | Age: 67
End: 2022-11-29

## 2022-11-29 DIAGNOSIS — Z86.0100 HISTORY OF COLONIC POLYPS: Primary | ICD-10-CM

## 2022-11-29 RX ORDER — BISACODYL 5 MG
TABLET, DELAYED RELEASE (ENTERIC COATED) ORAL
Qty: 4 TABLET | Refills: 0 | Status: SHIPPED | OUTPATIENT
Start: 2022-11-29 | End: 2023-04-06

## 2022-11-29 NOTE — TELEPHONE ENCOUNTER
Attempted to contact patient regarding upcoming colonoscopy/EGD procedure on 12/7/22 for pre assessment questions. No answer.     Left message to return call to [phone] #4    Discuss Covid policy. Let pt know we can cancel scheduled Covid test on 12/5/22    Pre op exam scheduled: N/A    Arrival time: 1315    [facility]; [address] [place] [address], [address] Floor, [place] [address]    Sedation type: MAC    Anticoagulants: No    Electronic implanted devices? Not indicated    Diabetic? Yes - Patient to hold oral diabetic medications day of procedure    Indication for procedure: Hx gastritis, hx colon polyps, anemia    Bowel prep recommendation: Standard Golytely     Prep instructions sent via [employer]. Bowel prep script sent to    [id number] [place], [place] - [address]      JAMEEL GARZA RN

## 2022-12-01 NOTE — TELEPHONE ENCOUNTER
Pre assessment questions completed for upcoming colonoscopy/EGD procedure scheduled on 12.7.22    COVID policy reviewed.     Reviewed procedural arrival time 1315 and facility location [facility]; [address] [place] [address], [address] Floor, [place] [address]    Designated  policy reviewed. Instructed to have someone stay 24 hours post procedure.     Anticoagulation/blood thinners? No    Electronic implanted devices? No    Diabetic? Yes - Patient to hold oral diabetic medications day of procedure     Reviewed procedure prep instructions.     Patient verbalized understanding and had no questions or concerns at this time.    Sil Schwartz RN

## 2022-12-06 ENCOUNTER — ANESTHESIA EVENT (OUTPATIENT)
Dept: SURGERY | Facility: AMBULATORY SURGERY CENTER | Age: 67
End: 2022-12-06
Payer: MEDICARE

## 2022-12-07 ENCOUNTER — HOSPITAL ENCOUNTER (OUTPATIENT)
Facility: AMBULATORY SURGERY CENTER | Age: 67
Discharge: HOME | End: 2022-12-07
Attending: INTERNAL MEDICINE
Payer: MEDICARE

## 2022-12-07 ENCOUNTER — ANESTHESIA (OUTPATIENT)
Dept: SURGERY | Facility: AMBULATORY SURGERY CENTER | Age: 67
End: 2022-12-07
Payer: MEDICARE

## 2022-12-07 VITALS — HEART RATE: 91 BPM

## 2022-12-07 VITALS
HEIGHT: 63 IN | WEIGHT: 217 LBS | SYSTOLIC BLOOD PRESSURE: 119 MMHG | BODY MASS INDEX: 38.45 KG/M2 | DIASTOLIC BLOOD PRESSURE: 67 MMHG | TEMPERATURE: 97.5 F | RESPIRATION RATE: 12 BRPM | HEART RATE: 73 BPM | OXYGEN SATURATION: 96 %

## 2022-12-07 DIAGNOSIS — D36.9 TUBULAR ADENOMA: ICD-10-CM

## 2022-12-07 DIAGNOSIS — R73.03 PREDIABETES: ICD-10-CM

## 2022-12-07 DIAGNOSIS — K29.70 GASTRITIS, PRESENCE OF BLEEDING UNSPECIFIED, UNSPECIFIED CHRONICITY, UNSPECIFIED GASTRITIS TYPE: Primary | ICD-10-CM

## 2022-12-07 LAB
COLONOSCOPY: NORMAL
UPPER GI ENDOSCOPY: NORMAL

## 2022-12-07 PROCEDURE — 88305 TISSUE EXAM BY PATHOLOGIST: CPT | Mod: TC | Performed by: INTERNAL MEDICINE

## 2022-12-07 PROCEDURE — 43239 EGD BIOPSY SINGLE/MULTIPLE: CPT

## 2022-12-07 PROCEDURE — G0105 COLORECTAL SCRN; HI RISK IND: HCPCS

## 2022-12-07 RX ORDER — PROCHLORPERAZINE MALEATE 5 MG
5 TABLET ORAL EVERY 6 HOURS PRN
Status: CANCELLED | OUTPATIENT
Start: 2022-12-07

## 2022-12-07 RX ORDER — FLUMAZENIL 0.1 MG/ML
0.2 INJECTION, SOLUTION INTRAVENOUS
Status: CANCELLED | OUTPATIENT
Start: 2022-12-07 | End: 2022-12-08

## 2022-12-07 RX ORDER — NALOXONE HYDROCHLORIDE 0.4 MG/ML
0.4 INJECTION, SOLUTION INTRAMUSCULAR; INTRAVENOUS; SUBCUTANEOUS
Status: CANCELLED | OUTPATIENT
Start: 2022-12-07

## 2022-12-07 RX ORDER — LIDOCAINE 40 MG/G
CREAM TOPICAL
Status: DISCONTINUED | OUTPATIENT
Start: 2022-12-07 | End: 2022-12-08 | Stop reason: HOSPADM

## 2022-12-07 RX ORDER — NALOXONE HYDROCHLORIDE 0.4 MG/ML
0.2 INJECTION, SOLUTION INTRAMUSCULAR; INTRAVENOUS; SUBCUTANEOUS
Status: CANCELLED | OUTPATIENT
Start: 2022-12-07

## 2022-12-07 RX ORDER — GLYCOPYRROLATE 0.2 MG/ML
INJECTION, SOLUTION INTRAMUSCULAR; INTRAVENOUS PRN
Status: DISCONTINUED | OUTPATIENT
Start: 2022-12-07 | End: 2022-12-07

## 2022-12-07 RX ORDER — LIDOCAINE HYDROCHLORIDE 20 MG/ML
INJECTION, SOLUTION INFILTRATION; PERINEURAL PRN
Status: DISCONTINUED | OUTPATIENT
Start: 2022-12-07 | End: 2022-12-07

## 2022-12-07 RX ORDER — ONDANSETRON 4 MG/1
4 TABLET, ORALLY DISINTEGRATING ORAL EVERY 6 HOURS PRN
Status: CANCELLED | OUTPATIENT
Start: 2022-12-07

## 2022-12-07 RX ORDER — PROPOFOL 10 MG/ML
INJECTION, EMULSION INTRAVENOUS CONTINUOUS PRN
Status: DISCONTINUED | OUTPATIENT
Start: 2022-12-07 | End: 2022-12-07

## 2022-12-07 RX ORDER — SODIUM CHLORIDE, SODIUM LACTATE, POTASSIUM CHLORIDE, CALCIUM CHLORIDE 600; 310; 30; 20 MG/100ML; MG/100ML; MG/100ML; MG/100ML
INJECTION, SOLUTION INTRAVENOUS CONTINUOUS PRN
Status: DISCONTINUED | OUTPATIENT
Start: 2022-12-07 | End: 2022-12-07

## 2022-12-07 RX ORDER — ONDANSETRON 2 MG/ML
4 INJECTION INTRAMUSCULAR; INTRAVENOUS EVERY 6 HOURS PRN
Status: CANCELLED | OUTPATIENT
Start: 2022-12-07

## 2022-12-07 RX ORDER — ONDANSETRON 2 MG/ML
4 INJECTION INTRAMUSCULAR; INTRAVENOUS
Status: COMPLETED | OUTPATIENT
Start: 2022-12-07 | End: 2022-12-07

## 2022-12-07 RX ORDER — PROPOFOL 10 MG/ML
INJECTION, EMULSION INTRAVENOUS PRN
Status: DISCONTINUED | OUTPATIENT
Start: 2022-12-07 | End: 2022-12-07

## 2022-12-07 RX ADMIN — LIDOCAINE HYDROCHLORIDE 40 MG: 20 INJECTION, SOLUTION INFILTRATION; PERINEURAL at 14:33

## 2022-12-07 RX ADMIN — PROPOFOL 50 MG: 10 INJECTION, EMULSION INTRAVENOUS at 14:33

## 2022-12-07 RX ADMIN — GLYCOPYRROLATE 0.2 MG: 0.2 INJECTION, SOLUTION INTRAMUSCULAR; INTRAVENOUS at 14:31

## 2022-12-07 RX ADMIN — SODIUM CHLORIDE, SODIUM LACTATE, POTASSIUM CHLORIDE, CALCIUM CHLORIDE: 600; 310; 30; 20 INJECTION, SOLUTION INTRAVENOUS at 14:27

## 2022-12-07 RX ADMIN — PROPOFOL 150 MCG/KG/MIN: 10 INJECTION, EMULSION INTRAVENOUS at 14:31

## 2022-12-07 RX ADMIN — ONDANSETRON 4 MG: 2 INJECTION INTRAMUSCULAR; INTRAVENOUS at 14:35

## 2022-12-07 ASSESSMENT — COPD QUESTIONNAIRES: COPD: 0

## 2022-12-07 ASSESSMENT — LIFESTYLE VARIABLES: TOBACCO_USE: 0

## 2022-12-07 NOTE — ANESTHESIA POSTPROCEDURE EVALUATION
Patient: Ramona Ferrer    Procedure: Procedure(s):  ESOPHAGOGASTRODUODENOSCOPY, WITH BIOPSY  COLONOSCOPY       Anesthesia Type:  MAC    Note:  Disposition: Outpatient   Postop Pain Control: Uneventful            Sign Out: Well controlled pain   PONV: No   Neuro/Psych: Uneventful            Sign Out: Acceptable/Baseline neuro status   Airway/Respiratory: Uneventful            Sign Out: Acceptable/Baseline resp. status   CV/Hemodynamics: Uneventful            Sign Out: Acceptable CV status; No obvious hypovolemia; No obvious fluid overload   Other NRE:    DID A NON-ROUTINE EVENT OCCUR?            Last vitals:  Vitals Value Taken Time   /67 12/07/22 1515   Temp 36.4  C (97.5  F) 12/07/22 1458   Pulse 73 12/07/22 1515   Resp 12 12/07/22 1515   SpO2 96 % 12/07/22 1515       Electronically Signed By: Elisabeth Woods MD  December 7, 2022  4:11 PM

## 2022-12-07 NOTE — ANESTHESIA CARE TRANSFER NOTE
Patient: Ramona Ferrer    Procedure: Procedure(s):  ESOPHAGOGASTRODUODENOSCOPY, WITH BIOPSY  COLONOSCOPY       Diagnosis: Malignant neoplasm of upper-outer quadrant of left breast in female, estrogen receptor negative (H) [C50.412, Z17.1]  Diagnosis Additional Information: No value filed.    Anesthesia Type:   MAC     Note:    Oropharynx: oropharynx clear of all foreign objects  Level of Consciousness: awake  Oxygen Supplementation: room air    Independent Airway: airway patency satisfactory and stable  Dentition: dentition unchanged  Vital Signs Stable: post-procedure vital signs reviewed and stable  Report to RN Given: handoff report given  Patient transferred to: Phase II    Handoff Report: Identifed the Patient, Identified the Reponsible Provider, Reviewed the pertinent medical history, Discussed the surgical course, Reviewed Intra-OP anesthesia mangement and issues during anesthesia, Set expectations for post-procedure period and Allowed opportunity for questions and acknowledgement of understanding      Vitals:  Vitals Value Taken Time   BP     Temp 36.4  C (97.5  F) 12/07/22 1458   Pulse 99 12/07/22 1458   Resp 16 12/07/22 1458   SpO2 92 % 12/07/22 1458       Electronically Signed By: EFRAIN Samaniego CRNA  December 7, 2022  3:00 PM

## 2022-12-07 NOTE — ANESTHESIA PREPROCEDURE EVALUATION
Anesthesia Pre-Procedure Evaluation    Patient: Ramona Frerer   MRN: [id number] : 1955        Procedure : Procedure(s):  ESOPHAGOGASTRODUODENOSCOPY (EGD)  COLONOSCOPY          Past Medical History:   Diagnosis Date     Breast cancer (H) 2021     Complication of anesthesia      DDD (degenerative disc disease), lumbar      Endometriosis      Hypertension      Hypothyroidism           Seasonal allergies      Spinal stenosis, lumbar       Past Surgical History:   Procedure Laterality Date     COLONOSCOPY       COLONOSCOPY  2012    Procedure:COLONOSCOPY; COLONOSCOPY ; Surgeon:ARUN KITCHEN; Location: GI     COLONOSCOPY N/A 2/15/2019    Procedure: COMBINED COLONOSCOPY, SINGLE OR MULTIPLE BIOPSY/POLYPECTOMY BY BIOPSY;  Surgeon: Connor Sanderson MD;  Location:  GI     GRAFT FREE VASCULARIZED TRANSVERSE RECTUS ABDOMINIS MYOCUTANEOUS Left 2022    Procedure: Left breast reconstruction with free abdominal flap,  SPY;  Surgeon: NOHEMI Feliciano MD;  Location: [facility] OR      CYSTOURETHROSCOPY W/ URETEROSCOPY &/OR PYELOSCOPY; W/ LITHOTRIPSY       HC TRABECULOPLASTY BY LASER SURGERY      PI OU     HYSTERECTOMY, PAP NO LONGER INDICATED       LASER YAG IRIDOTOMY  2012    Procedure: LASER YAG IRIDOTOMY;  LEFT EYE YAG LASER PUPIL IRIDOTOMY ;  Surgeon: Dakotah Humphreys MD;  Location:  [facility]     LIGATN/STRIP LONG OR SHORT SAPHEN      x's2     MASTECTOMY PARTIAL WITH SENTINEL NODE Left 3/23/2022    Procedure: LEFT SKIN SPARING MASTECTOMY WITH SENTINEL LYMPH NODE BIOPSY;  Surgeon: Dada Mendiola MD;  Location: [facility] OR     PELVIS LAPAROSCOPY,DX       RECONSTRUCT BREAST, INSERT TISSUE EXPANDER BILATERAL, COMBINED Left 3/23/2022    Procedure: Left breast reconstruction with expander and SPY;  Surgeon: NOHEMI Feliciano MD;  Location: [facility] OR     STRABISMUS SURGERY       ZZC TOTAL ABDOM HYSTERECTOMY      MARYA BSO for stage 4 endometriosis      Allergies   Allergen Reactions     Seasonal Allergies        Social History     Tobacco Use     Smoking status: Never     Smokeless tobacco: Never   Substance Use Topics     Alcohol use: Not Currently      Wt Readings from Last 1 Encounters:   11/08/22 100.4 kg (221 lb 6.4 oz)        Anesthesia Evaluation   Pt has had prior anesthetic. Type: General and MAC.    History of anesthetic complications  - PONV.  slow to wake in 1992.    ROS/MED HX  ENT/Pulmonary:     (+) sleep apnea, doesn't use CPAP, allergic rhinitis,  (-) tobacco use, asthma, COPD and recent URI   Neurologic:       Cardiovascular:     (+) hypertension-range: 120/80/ ----Previous cardiac testing   Echo: Date: Results:    Stress Test: Date: 12/17/21 Results:  Interpretation Summary  This was a normal stress echocardiogram with no evidence of stress-induced  ischemia.  Fair exercise tolerance ( 07:24 monidfied Venkata protocol / 6.2 Mets)  ECG Reviewed: Date: Results:    Cath: Date: Results:      METS/Exercise Tolerance: >4 METS    Hematologic:       Musculoskeletal: Comment: Chronic low back pain  Chronic right leg pain      GI/Hepatic:    (-) GERD   Renal/Genitourinary:       Endo: Comment: Prediabetes, A1c 6.3% 7/20/22    (+) type II DM, Not using insulin, thyroid problem, hypothyroidism, Obesity,     Psychiatric/Substance Use:       Infectious Disease:       Malignancy:   (+) Malignancy, History of Breast.Breast CA status post Surgery.        Other:            Physical Exam    Airway        Mallampati: II   TM distance: > 3 FB   Neck ROM: full   Mouth opening: > 3 cm    Respiratory Devices and Support         Dental         B=Bridge, C=Chipped, L=Loose, M=Missing    Cardiovascular          Rhythm and rate: regular and normal     Pulmonary           breath sounds clear to auscultation           OUTSIDE LABS:  CBC:   Lab Results   Component Value Date    WBC 6.7 09/26/2022    WBC 8.9 08/26/2022    HGB 11.5 (L) 09/26/2022    HGB 9.7 (L) 08/26/2022    HCT 37.3 09/26/2022    HCT 30.5 (L) 08/26/2022      09/26/2022     08/28/2022     BMP:   Lab Results   Component Value Date     09/26/2022     08/26/2022    POTASSIUM 4.2 09/26/2022    POTASSIUM 3.7 08/26/2022    CHLORIDE 102 09/26/2022    CHLORIDE 104 08/26/2022    CO2 26 09/26/2022    CO2 26 08/26/2022    BUN 9.0 09/26/2022    BUN 8.1 08/26/2022    CR 0.63 09/26/2022    CR 0.76 08/26/2022     (H) 09/26/2022     (H) 08/26/2022     COAGS: No results found for: PTT, INR, FIBR  POC: No results found for: BGM, HCG, HCGS  HEPATIC:   Lab Results   Component Value Date    ALBUMIN 3.8 09/26/2022    PROTTOTAL 7.4 09/26/2022    ALT 39 (H) 09/26/2022    AST 33 09/26/2022    ALKPHOS 79 09/26/2022    BILITOTAL 0.3 09/26/2022     OTHER:   Lab Results   Component Value Date    LACT 0.9 11/29/2018    A1C 6.3 (H) 07/20/2022    EMMA 9.8 09/26/2022    MAG 1.6 (L) 08/26/2022    LIPASE 208 11/30/2018    TSH 0.74 12/13/2021    T4 0.75 (L) 12/21/2020       Anesthesia Plan    ASA Status:  3   NPO Status:  NPO Appropriate    Anesthesia Type: MAC.     - Reason for MAC: straight local not clinically adequate              Consents    Anesthesia Plan(s) and associated risks, benefits, and realistic alternatives discussed. Questions answered and patient/representative(s) expressed understanding.    - Discussed:     - Discussed with:  Patient         Postoperative Care            Comments:    Other Comments: Discussed plan for MAC, including possibility of awareness, risk of aspiration pneumonia, risk of hypoxia/low oxygen/airway obstruction requiring additional airway support/devices. Discussed alternatives. Discussed backup plan of general anesthesia and intubation. Ensured understanding, invited questions and all questions were answered.       H&P reviewed: Unable to attach H&P to encounter due to EHR limitations. H&P Update: appropriate H&P reviewed, patient examined. No interval changes since H&P (within 30 days).         Elisabeth Woods MD

## 2022-12-07 NOTE — H&P
Ramona Ferrer  [id number]  female  67 year old      Reason for procedure/surgery: EGD and colonsocopy    Patient Active Problem List   Diagnosis     Varicose veins of legs     Low back pain     Hypothyroidism     DDD (degenerative disc disease), lumbar     Spinal stenosis, lumbar     LALO (obstructive sleep apnea)     Obesity (BMI 35.0-39.9) with comorbidity (H)     Essential hypertension     Anatomical narrow angle - Both Eyes     Exotropia of right eye     Allergic conjunctivitis of both eyes     Stress incontinence     Gastritis, presence of bleeding unspecified, unspecified chronicity, unspecified gastritis type     Prediabetes     Dry eye     Allergic conjunctivitis, bilateral     Meibomianitis, unspecified laterality     Breast cancer of upper-outer quadrant of left female breast (H)     S/P breast reconstruction, left     Lumbar radiculopathy     Foraminal stenosis of lumbar region     S/P TRAM (transverse rectus abdominis muscle) flap breast reconstruction       Past Surgical History:    Past Surgical History:   Procedure Laterality Date     COLONOSCOPY       COLONOSCOPY  2/20/2012    Procedure:COLONOSCOPY; COLONOSCOPY ; Surgeon:ARUN KITCHEN; Location: GI     COLONOSCOPY N/A 2/15/2019    Procedure: COMBINED COLONOSCOPY, SINGLE OR MULTIPLE BIOPSY/POLYPECTOMY BY BIOPSY;  Surgeon: Connor Sanderson MD;  Location:  GI     GRAFT FREE VASCULARIZED TRANSVERSE RECTUS ABDOMINIS MYOCUTANEOUS Left 8/25/2022    Procedure: Left breast reconstruction with free abdominal flap,  SPY;  Surgeon: NOHEMI Feliciano MD;  Location: [facility] OR     HC CYSTOURETHROSCOPY W/ URETEROSCOPY &/OR PYELOSCOPY; W/ LITHOTRIPSY       HC TRABECULOPLASTY BY LASER SURGERY      PI OU     HYSTERECTOMY, PAP NO LONGER INDICATED       LASER YAG IRIDOTOMY  8/8/2012    Procedure: LASER YAG IRIDOTOMY;  LEFT EYE YAG LASER PUPIL IRIDOTOMY ;  Surgeon: Dakotah Humphreys MD;  Location:  [facility]     LIGATN/STRIP LONG OR SHORT SAPHEN      x's2     MASTECTOMY  PARTIAL WITH SENTINEL NODE Left 3/23/2022    Procedure: LEFT SKIN SPARING MASTECTOMY WITH SENTINEL LYMPH NODE BIOPSY;  Surgeon: Dada Mendiola MD;  Location: [facility] OR     PELVIS LAPAROSCOPY,DX       RECONSTRUCT BREAST, INSERT TISSUE EXPANDER BILATERAL, COMBINED Left 3/23/2022    Procedure: Left breast reconstruction with expander and SPY;  Surgeon: NOHEMI Feliciano MD;  Location: [facility] OR     STRABISMUS SURGERY       [facility] TOTAL ABDOM HYSTERECTOMY      MARYA BSO for stage 4 endometriosis       Past Medical History:   Past Medical History:   Diagnosis Date     Breast cancer (H) 2021     Complication of anesthesia      DDD (degenerative disc disease), lumbar      Endometriosis      Hypertension      Hypothyroidism           PONV (postoperative nausea and vomiting)      Seasonal allergies      Spinal stenosis, lumbar        Social History:   Social History     Tobacco Use     Smoking status: Never     Smokeless tobacco: Never   Substance Use Topics     Alcohol use: Not Currently       Family History:   Family History   Problem Relation Age of Onset     Diabetes Mother      Thyroid Disease Mother      Hypertension Mother      Cancer Father          of stomach CA     Heart Disease Father         MI at age 58     Stomach Cancer Maternal Grandfather      Stomach Cancer Paternal Grandmother      Breast Cancer Paternal Aunt      Glaucoma No family hx of      Macular Degeneration No family hx of      Anesthesia Reaction No family hx of      Bleeding Disorder No family hx of      Clotting Disorder No family hx of        Allergies:   Allergies   Allergen Reactions     Seasonal Allergies        Active Medications:   Current Outpatient Medications   Medication Sig Dispense Refill     acetaminophen (TYLENOL) 325 MG tablet Take  by mouth every 4 hours as needed for pain. 100 tablet 0     Artificial Tear Solution (SOOTHE XP) SOLN Apply 1 drop to eye 3 times daily 15 mL 8     aspirin-acetaminophen-caffeine (EXCEDRIN MIGRAINE)  250-250-65 MG per tablet Take 1 tablet by mouth every 6 hours as needed for pain. (Patient not taking: Reported on 11/18/2022) 30 tablet 0     bisacodyl (DULCOLAX) 5 MG EC tablet Take 2 tablets at 3 pm the day before your procedure. If your procedure is before 11 am, take 2 additional tablets at 11 pm. If your procedure is after 11 am, take 2 additional tablets at 6 am. For additional instructions refer to your colonoscopy prep instructions. 4 tablet 0     calcium carbonate (OS-EMMA) 500 MG tablet Take 2 tablets (1,000 mg) by mouth daily (Patient taking differently: Take 2 tablets by mouth every morning) 180 tablet 3     calcium carbonate 500 mg, elemental, (OSCAL) 500 MG tablet        cetirizine (ZYRTEC) 10 MG tablet Take 1 tablet (10 mg) by mouth daily (Patient taking differently: Take 10 mg by mouth as needed) 90 tablet 1     cyclobenzaprine (FLEXERIL) 5 MG tablet Take 1 tablet (5 mg) by mouth 3 times daily as needed for muscle spasms 30 tablet 0     diclofenac (VOLTAREN) 1 % topical gel Apply 2 g topically 3 times daily 100 g 1     fluticasone (FLONASE) 50 MCG/ACT nasal spray Spray 2 sprays into both nostrils daily. (Patient taking differently: Spray 2 sprays into both nostrils as needed) 1 Package 10     gabapentin (NEURONTIN) 300 MG capsule 1-3 capsules up to 3 times a day (Patient not taking: Reported on 11/18/2022) 90 capsule 3     hydrocortisone (WESTCORT) 0.2 % external cream Apply topically 2 times daily 45 g 1     hydrOXYzine (ATARAX) 25 MG tablet Take 1 tablet (25 mg) by mouth 3 times daily as needed for itching 20 tablet 1     hydrOXYzine (ATARAX) 25 MG tablet Take 1 tablet (25 mg) by mouth 3 times daily as needed for itching 20 tablet 0     ketorolac (ACULAR) 0.5 % ophthalmic solution Place 1 drop into both eyes 4 times daily 5 mL 6     letrozole (FEMARA) 2.5 MG tablet Take 1 tablet (2.5 mg) by mouth daily (Patient taking differently: Take 2.5 mg by mouth every morning Through oncologist) 90 tablet 3      levothyroxine (SYNTHROID/LEVOTHROID) 112 MCG tablet TAKE ONE TABLET BY MOUTH ONE TIME DAILY 90 tablet 0     lidocaine (LIDODERM) 5 % patch Place 1 patch onto the skin every 24 hours To prevent lidocaine toxicity, patient should be patch free for 12 hrs daily. (Patient taking differently: Place 1 patch onto the skin as needed To prevent lidocaine toxicity, patient should be patch free for 12 hrs daily.) 10 patch 0     lisinopril (ZESTRIL) 10 MG tablet TAKE ONE TABLET BY MOUTH ONE TIME DAILY (Patient taking differently: Take 10 mg by mouth every morning) 90 tablet 1     magnesium hydroxide (MILK OF MAGNESIA) 400 MG/5ML suspension Take 30 mLs by mouth daily as needed for constipation (Use if preventive measures (senna-docusate, docusate, and polyethylene glycol) are not effective.) (Patient not taking: Reported on 11/18/2022) 354 mL 0     metFORMIN (GLUCOPHAGE XR) 500 MG 24 hr tablet Take 2 tablets (1,000 mg) by mouth daily (with dinner) 180 tablet 1     methocarbamol (ROBAXIN) 500 MG tablet Take 1 tablet (500 mg) by mouth 4 times daily as needed for muscle spasms (Patient not taking: Reported on 11/18/2022) 20 tablet 0     olopatadine (PATANOL) 0.1 % ophthalmic solution Place 1 drop into both eyes 2 times daily 5 mL 12     Omega-3 Fatty Acids (FISH OIL) 500 MG CAPS Take by mouth daily       ondansetron (ZOFRAN ODT) 4 MG ODT tab Take 1 tablet (4 mg) by mouth every 6 hours as needed for nausea or vomiting (Patient not taking: Reported on 11/18/2022) 6 tablet 0     ondansetron (ZOFRAN) 4 MG tablet TAKE ONE TABLET BY MOUTH EVERY EIGHT HOURS AS NEEDED for nausea (Patient not taking: Reported on 11/18/2022) 10 tablet 0     ORDER FOR DME Provent nasal EPAP  Use over nostrils nightly.   30 each 0     oxyCODONE (ROXICODONE) 5 MG tablet Take 1 tablet (5 mg) by mouth every 6 hours as needed for pain (Patient not taking: Reported on 11/18/2022) 15 tablet 0     polyethylene glycol (GOLYTELY) 236 g suspension The night before  "the exam at 6 pm drink an 8-ounce glass every 15 minutes until the jug is half empty. If you arrive before 11 AM: Drink the other half of the Golytely jug at 11 PM night before procedure. If you arrive after 11 AM: Drink the other half of the Golytely jug at 6 AM day of procedure. For additional instructions refer to your colonoscopy prep instructions. 4000 mL 0     polyethylene glycol (MIRALAX) 17 GM/Dose powder Take 17 g by mouth daily (Patient not taking: Reported on 11/18/2022) 510 g 0     prednisoLONE acetate (PRED FORTE) 1 % ophthalmic suspension Use  One drop two times daily both eyes in the spring for 7-10 days (Patient not taking: Reported on 11/18/2022) 5 mL 0     senna-docusate (SENOKOT-S/PERICOLACE) 8.6-50 MG tablet Take 1 tablet by mouth 2 times daily (Patient not taking: Reported on 11/18/2022) 35 tablet 0     vitamin D3 (CHOLECALCIFEROL) 50 mcg (2000 units) tablet Take 1 tablet (50 mcg) by mouth daily (Patient taking differently: Take 1 tablet by mouth every morning) 90 tablet 3       Systemic Review:   CONSTITUTIONAL: NEGATIVE for fever, chills, change in weight  ENT/MOUTH: NEGATIVE for ear, mouth and throat problems  RESP: NEGATIVE for significant cough or SOB  CV: NEGATIVE for chest pain, palpitations or peripheral edema    Physical Examination:   Vital Signs: /83   Pulse 80   Temp 98  F (36.7  C) (Temporal)   Resp 16   Ht 1.6 m (5' 3\")   Wt 98.4 kg (217 lb)   SpO2 98%   BMI 38.44 kg/m    GENERAL: healthy, alert and no distress  RESP: Normal respiratory excursion   CV: regular rates and rhythm and no peripheral edema  ABDOMEN: soft, nontender and bowel sounds normal    Plan: Appropriate to proceed as scheduled.      Thomas M. Leventhal, MD  12/7/2022    PCP:  Eugene Michelle"

## 2022-12-08 PROCEDURE — 88305 TISSUE EXAM BY PATHOLOGIST: CPT | Mod: 26 | Performed by: PATHOLOGY

## 2022-12-09 ENCOUNTER — NURSE TRIAGE (OUTPATIENT)
Dept: NURSING | Facility: CLINIC | Age: 67
End: 2022-12-09

## 2022-12-09 NOTE — TELEPHONE ENCOUNTER
"Nurse Triage SBAR    Is this a 2nd Level Triage? YES, LICENSED PRACTITIONER REVIEW IS REQUIRED    Situation: -Pain \"on stomach, sore throat and voice change\" since 12/7/22 procedure Upper GI endoscopy and Colonoscopy   -Today ate oatmeal, banana , cup of milk-Hurts to eat  No BM since surgery\" I am not eating\"  No rectal bleeding and no emesis    Background: Status post endoscopy/colonoscopy 12/7/22. States she wants call back from physician regarding how long to expect gastric pain.   No vomiting, no rectally bleeding. Has had no BM since procedure.  Per colonoscopy report: large amount of liquid stool was found in the entire colon   Eating lightly.  Upset as\" no one told me how long to expect pain\"  Note sore throat is WNL after procedure, important to keep hydrated, continue to eat small amounts.  Providers:             THOMAS MICHAEL LEVENTHAL, MD, Spring Diaz RN, Nel Ludwig   Referring MD:          EUGENE STARK MD, Kenneth Cisneros MD   Assessment:  Patient prefers to speak with physician rather than triage staff.    Protocol Recommended Disposition/  Recommendation:   Reviewed that if her stomach pain was increasing she should be seen at ED or UC and she categorically refuses this plan .    Routed to provider team Eugene Stark MD  [facility]  Leventhal/ KELVIN  Our latest guidance via Red Flag supervisor  has to refer back to endoscopy team x 7 days then after this, back to ordering provider.  Does the patient meet one of the following criteria for ADS visit consideration? No  "

## 2022-12-13 NOTE — TELEPHONE ENCOUNTER
Called patient to review symptoms    Noted abdominal pain, nausea, emesis over the past 3 days, but dramatic improvement in symptoms this AM.  Hasn't eaten, but has more of an appetite and is tolerating liquids this AM.  Discussed that this is very atypical with the procedures she had done, and that the procedure duration/complexity was not high.  Pathology visualized would not explain acute symptoms either.    RECS:  - Discussed continued PO fluid intake; diet as tolerated  - If recurrence of symptoms she will contact us or her primary service.    Thomas M. Leventhal, M.D.   of Medicine  Advanced/Transplant Hepatology & Critical Care Medicine  [facility]

## 2022-12-20 ENCOUNTER — MYC MEDICAL ADVICE (OUTPATIENT)
Dept: PLASTIC SURGERY | Facility: CLINIC | Age: 67
End: 2022-12-20

## 2022-12-26 NOTE — PROGRESS NOTES
Dada Mendiola MD  [facility]  [address] [place] [address], [facility] [address]  [place] [address]     RE:  Ramona Ferrer  MRN:  [id number]  :  1955     Dear Dr. Mendiola:     I would like to refer to you Sammie Ferrer, a 67-year-old woman with a recent diagnosis of a stage IIB, T3 N0 MX, invasive lobular carcinoma of the left breast.  She self-palpated a lump in the upper outer quadrant of the left breast.  She underwent evaluation with a diagnostic mammogram and ultrasound, which was BI-RADS 4, showing in the upper outer quadrant of the left breast irregularly shaped hypoechoic mass at the 12 o'clock position 4 cm from the nipple on the left.  This mass measured 2.3 x 2.2 x 1.5 cm and accounted for the patient's area of palpable concern.  Additionally, at the 1 o'clock position 2 cm from the nipple in the left breast, there is a second irregularly shaped hypoechoic mass with indistinct margins measuring 1.3 x 1.1 x 0.8 cm.  She also underwent a breast MRI which showed in the right breast mild background parenchymal enhancement and no suspicious areas of enhancement or lymphadenopathy.  In the left breast there was mild background parenchymal enhancement, and at the 12 o'clock position 4 cm from the nipple there was a heterogeneously enhancing irregular mass measuring 3.4 in AP dimension x 2.8 cm ML and 2.8 cm SI corresponding to the known biopsy-proven malignancy in the left breast.  In the left breast at the 1 o'clock position 2 cm from the nipple there was also a heterogeneously enhancing oval mass measuring 1.3 cm in maximal diameter.  This likely corresponds to the second biopsy-proven malignancy.  Together, the full extent of the disease spans a total of 5.2 cm AP x 4.4 cm ML, BI-RADS 6.     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  In part B in the left breast at the 1 o'clock position 2 cm from the nipple, ultrasound-guided needle biopsy showed invasive lobular carcinoma, [place] grade 1/3 lobular carcinoma in situ was present, classic subtype, estrogen receptors positive in greater than 70% of the cells, progesterone receptors positive in greater than 90% of cells, and HER2 was nonamplified, Ki-67 20-25%.  She now comes to our clinic for recommendations.     Sammie reports that the breast mass had been there for approximately 1 month before she was seen for evaluation..       She has worked in the past as a personal care assistant and lives in the [facility].       PAST MEDICAL HISTORY:  There is no history of breast cancer in the past.  No history of breast cancer surgery.  She has no history of radiation therapy in the past or radiation exposure.  No history of prior tumor of any kind.  She denies heart problems, heart attack, breathing problems, blood clots, seizures, peptic ulcer disease, osteoporosis or bone fractures.  She does report a history of arthritis.     FAMILY HISTORY:  Positive for breast cancer in a paternal aunt who was diagnosed with breast cancer at an old age.     Her father has a history of stomach cancer.  There is no family history of pancreatic cancer, melanoma, lung cancer or ovarian cancer.     No history of male breast cancer.     ALLERGIES:  No history of drug allergies.  She denies allergies to seafood, iodine, or contrast dye.     PAST MENSTRUAL HISTORY:  She has been pregnant 3 times with 2 live births at age 29 and 31 and 1 miscarriage.  Age at first menstrual period was 14.  She did have a total abdominal hysterectomy and bilateral salpingo-oophorectomy performed in 2003 for endometriosis.  She has no history of hormone replacement therapy.  No  history of oral contraceptives.     HABITS: She denies tobacco history.  She denies alcohol history and drinks alcohol only occasionally.     PAST MEDICAL HISTORY:  Past medical history is also remarkable for type 2 diabetes, and she was begun on metformin 2 years ago.  She also has a history of varicose veins and a history of a lipoma resected from her left leg.      Chronic Hepatitis B.  Hepatitis C negative.  HIV negative.       Prior COVID vaccination x 3.      GERM LINE GENETICS: INVITAE testing of 47 genes was negative.      TREATMENT HISTORY:  A.  MammaPrint showed low risk.  B.  Left mastectomy and axillary lymph node sampling.   A. Lymph node, LEFT axillary, excision:  -One benign lymph node (0/1)  B. LEFT breast, skin-sparing mastectomy:  -INVASIVE BREAST CARCINOMA, MIXED INVASIVE LOBULAR CARCINOMA (predominant component) and INVASIVE DUCTAL CARCINOMA (minor component), KALPESH GRADE 3, size 5.5 cm, 12:00, upper outer quadrant and lower outer quadrant  -Ductal carcinoma in situ (DCIS), nuclear grade 2, cribriform and solid type(s), with focal necrosis  -DCIS is admixed with invasive carcinoma, and comprises a minor component (<5%) of the tumor volume   -Lobular carcinoma in situ (LCIS), predominantly classic type (admixed with invasive carcinoma and beyond invasive carcinoma) and focal pleomorphic type (size 2 mm, adjacent to invasive carcinoma)  -Margins are uninvolved by invasive carcinoma  -Invasive carcinoma is 2.5 mm from the nearest (anterior) margin, and is > 5 mm from the posterior margin  -Margins are uninvolved by DCIS and pleomorphic LCIS  -DCIS and pleomorphic LCIS are > 5 mm from the nearest (anterior) margin  -Benign nipple and skin   -Other findings: fibrocystic change (including microcysts with apocrine metaplasia) and columnar cell change  -Calcifications associated with DCIS, LCIS, and benign ducts and acini  -Prior core biopsy site changes (two biopsy sites identified)  -Invasive  carcinoma is estrogen receptor positive (>70%, strong intensity) and progesterone receptor positive (>90%, strong intensity) by immunohistochemistry and is HER2 non-amplified (group 5) by FISH (performed on prior core biopsies, see report [id number], specimens A and B)  DCIS and LCIS present.  -Margins are uninvolved by invasive carcinoma or LCIS.   -Invasive carcinoma is estrogen receptor positive (>70%, strong intensity) and progesterone receptor positive (>90%, strong intensity) by immunohistochemistry and is HER2 non-amplified (group 5) by FISH (performed on prior core biopsies, see report [id number], specimens A and B)  Ki-67 immunohistochemistry (MIB-1, pharmDx, [employer]) from [employer] was performed on invasive carcinoma in the LEFT mastectomy specimen   They report Ki-67 proliferative index of 20-25% (block B5) and 20% (block B27).   C.  Staging:  pT3 Nx because lymph node is not the sentinel node.    D.  She did not want radiation, and risk:benefit did not favor treatment.  E.  Letrozole begun 4-5-22.   F.  Left TRAM flap reconstruction healed as of 12-27-22 but she has some concerns.   G.  Discussion of adjuvant Zometa 12-27-22.  She has some dental issues.  Orders placed.     IMAGING SUMMARY:  A.  1-10-19  MRI LS SPINE IMPRESSION: Degenerative changes of the lumbar spine as described.  B.  3-2-22. MRI of abdomen  Diffuse fatty infiltration of the liver and probable hepatomegaly. No suspicious liver lesions are identified.     INTERVAL HISTORY  She was seen with her daughter.  She has some back pain and concerns about her left breast reconstruction.  She has swelling and some mild pain above the TRAM flap reconstruction. Itching on right flank.    No depression or anxiety.  She has some insomnia. Some hot flashes and joint stiffness.  She has thinning hair and this is very distressing for her.    Ongoing LS spine degenerative disease.      REVIEW OF SYSTEMS:  A 10-point review of systems is  "negative.     She is eating a healthful Mediterranean-style diet.  She is not exercising much.      She is taking vitamin D3 2000 International Units per day.  She does not take calcium.       PHYSICAL EXAMINATION:    VITAL SIGNS:  /74 (BP Location: Right arm, Patient Position: Sitting, Cuff Size: Adult Large)   Pulse 71   Temp 97  F (36.1  C) (Oral)   Resp 18   Ht 1.6 m (5' 2.99\")   Wt 98.9 kg (218 lb)   SpO2 97%   BMI 38.63 kg/m    GENERAL:  Sammie appeared generally well.  HEENT:  She has some alopecia.  Examination of the oropharynx revealed no lesions.  LYMPH:  No palpable cervical, supraclavicular, subclavicular or axillary lymphadenopathy.  BREASTS:  Examination of the right breast reveals no masses.  Left breast has a TRAM flap. There was an area of vesiculation of the incision medially measuring about 1 cm in size.  There were no suspicious findings and no masses or erythema in the incisions of the TRAM flap.  There was some fullness of the tissue above the incision with no masses in the left breast reconstruction.   LUNGS:  Clear to percussion and auscultation.  HEART:  Regular rate and rhythm.  S1, S2.  ABDOMEN:  Soft, nontender, consistent with increased body mass index.  EXTREMITIES:  Without edema.  PSYCH:  Mood was anxious and affect appropriate.  SKIN:  No rashes on right flank where she complains of some itching,      LABORATORY DATA:  CMP showed a calcium of 10.3.  Glucose 107.  CBC normal.      IMAGING:   Mammogram of right breast and US of left breast reconstruction.     Findings:     Mammogram:  Possible focal asymmetry within the right breast, central core, mid  depth. There is no obvious MRI correlate for this area on MRI  1/25/2022. Favor that this area has been stable dating back to  6/17/2015.     Ultrasound:  Targeted, real-time ultrasound evaluation was performed by the  technologist and radiologist.  In the region of concern within the right breast, 12:00 position " to  central core there are no suspicious sonographic findings. No  suspicious cystic or solid mass, architectural distortion or other  abnormality.     In the left breast in the region of the patient's focal pain there is  a raised area of scarring which has the appearance of a keloid. This  has a few small foci of cystic spaces within it. There are other  postoperative changes within the left breast without suspicious  findings.                                                                      IMPRESSION: BI-RADS CATEGORY: 2 - Benign.         RECOMMENDED FOLLOW-UP: Annual Mammography.  Recommend clinical evaluation for scar/possible keloid within the left  breast at the 9:00 position.     The finding and recommendation were discussed with the patient at the  time of evaluation.     I have personally reviewed the examination and initial interpretation  and I agree with the findings.     GETACHEW APODACA MD      ASSESSMENT AND PLAN:  1.  Sammie Ferrer is a 67-year-old woman with a recent diagnosis of a stage IIB, T3 N0 MX, invasive lobular carcinoma of the upper outer quadrant of the left breast which is multifocal grade 1, ER positive in greater than 70% of cells, [place] positive in greater than 90% of cells and HER2 nonamplified.  She now comes to clinic with her , for recommendations regarding evaluation and treatment.    2.  MammaPrint before surgery came back as low risk as did the Oncotype after surgery. The Oncotype of the pleomorphic lobular cancer showed a value of 15, which is less than the threshold for chemotherapy from [employer] which is 26 for a post-menopausal woman.   3.  Sammie Ferrer underwent a left mastectomy and has a TRAM flap in place.  She started adjuvant hormonal therapy with letrozole and has tolerated it well.   4. Pathology showed -Invasive carcinoma is estrogen receptor positive, progesterone receptor positive and HER2 negative by immunohistochemistry.  Margins negative. pT3 Nx  because the lymph node is not sentinel.    5.  She has tolerated the letrozole quite well, although she does have some hot flashes.  She does not need radiation.    6.  TRAM flap econstruction.  Healed.  I spoke with Dr. Feliciano in clinic and they can have Sammie come back to clinic to address her concerns regarding the fluid collection in the incision of the left TRAM flap reconstruction.  7.  Imaging:  Yearly mammography of right breast.  Yearly mammography of right breast 12-27-22, BI-RADS CATEGORY: 2 - Benign   8.  We had a discussion of adjuvant Zometa. Discussed on the most recent recommendations from [employer] on Zometa as an adjuvant treatment for breast cancer in post-menopausal setting. Discussed benefit of reducing recurrence risk and modest benefit on overall survival as well as side-effects profile, most importantly, flu-like symptoms after infusion. This will be an every 6 months injection for 3 years. She will obtain dental clearance prior to starting the medication.   She also has osteopenia.   9.  Anemia resolved.  Likely related to her prior surgery.   10.  Dermatology referral for alopecia related to letrozole.   11.  Followup.    Ultrasound left breast today. Follow up with Maritza Che 1-18 with CBC, CMP, Zometa. Follow up with Dr. Feliciano. Dermatology consult with Dr. Juarez for alopecia.    Thank you for allowing us to participate in this patient's care.  The patient was seen and evaluated by me.  I discussed the patient with the fellow and agree with the findings and plan in the note, which was edited by me.    Sincerely,     Kenneth Cisneros MD  Professor  [facility]  [phone]      Patient is seen and discussed with , please see the attestation for further details.    Harry Quintero MD  Hematology/Medical Oncology (PGY-4)  P: [phone]        I spent 45 minutes with the patient more than 50% of which was in counseling and coordination of care.

## 2022-12-27 ENCOUNTER — PATIENT OUTREACH (OUTPATIENT)
Dept: ONCOLOGY | Facility: CLINIC | Age: 67
End: 2022-12-27

## 2022-12-27 ENCOUNTER — APPOINTMENT (OUTPATIENT)
Dept: LAB | Facility: CLINIC | Age: 67
End: 2022-12-27
Attending: INTERNAL MEDICINE
Payer: MEDICARE

## 2022-12-27 ENCOUNTER — ANCILLARY PROCEDURE (OUTPATIENT)
Dept: MAMMOGRAPHY | Facility: CLINIC | Age: 67
End: 2022-12-27
Payer: MEDICARE

## 2022-12-27 ENCOUNTER — ANCILLARY PROCEDURE (OUTPATIENT)
Dept: MAMMOGRAPHY | Facility: CLINIC | Age: 67
End: 2022-12-27
Attending: INTERNAL MEDICINE
Payer: MEDICARE

## 2022-12-27 ENCOUNTER — ONCOLOGY VISIT (OUTPATIENT)
Dept: ONCOLOGY | Facility: CLINIC | Age: 67
End: 2022-12-27
Attending: INTERNAL MEDICINE
Payer: MEDICARE

## 2022-12-27 VITALS
SYSTOLIC BLOOD PRESSURE: 127 MMHG | HEIGHT: 63 IN | WEIGHT: 218 LBS | DIASTOLIC BLOOD PRESSURE: 74 MMHG | TEMPERATURE: 97 F | RESPIRATION RATE: 18 BRPM | OXYGEN SATURATION: 97 % | HEART RATE: 71 BPM | BODY MASS INDEX: 38.62 KG/M2

## 2022-12-27 DIAGNOSIS — L65.9 ALOPECIA: ICD-10-CM

## 2022-12-27 DIAGNOSIS — C50.412 MALIGNANT NEOPLASM OF UPPER-OUTER QUADRANT OF LEFT BREAST IN FEMALE, ESTROGEN RECEPTOR POSITIVE (H): ICD-10-CM

## 2022-12-27 DIAGNOSIS — Z17.0 MALIGNANT NEOPLASM OF UPPER-OUTER QUADRANT OF LEFT BREAST IN FEMALE, ESTROGEN RECEPTOR POSITIVE (H): ICD-10-CM

## 2022-12-27 DIAGNOSIS — C50.412 MALIGNANT NEOPLASM OF UPPER-OUTER QUADRANT OF LEFT BREAST IN FEMALE, ESTROGEN RECEPTOR POSITIVE (H): Primary | ICD-10-CM

## 2022-12-27 DIAGNOSIS — Z17.0 MALIGNANT NEOPLASM OF UPPER-OUTER QUADRANT OF LEFT BREAST IN FEMALE, ESTROGEN RECEPTOR POSITIVE (H): Primary | ICD-10-CM

## 2022-12-27 LAB
ALBUMIN SERPL BCG-MCNC: 4.1 G/DL (ref 3.5–5.2)
ALP SERPL-CCNC: 78 U/L (ref 35–104)
ALT SERPL W P-5'-P-CCNC: 34 U/L (ref 10–35)
ANION GAP SERPL CALCULATED.3IONS-SCNC: 9 MMOL/L (ref 7–15)
AST SERPL W P-5'-P-CCNC: 25 U/L (ref 10–35)
BASOPHILS # BLD AUTO: 0.1 10E3/UL (ref 0–0.2)
BASOPHILS NFR BLD AUTO: 1 %
BILIRUB SERPL-MCNC: 0.4 MG/DL
BUN SERPL-MCNC: 12.7 MG/DL (ref 8–23)
CALCIUM SERPL-MCNC: 10.3 MG/DL (ref 8.8–10.2)
CHLORIDE SERPL-SCNC: 103 MMOL/L (ref 98–107)
CREAT SERPL-MCNC: 0.81 MG/DL (ref 0.51–0.95)
DEPRECATED HCO3 PLAS-SCNC: 26 MMOL/L (ref 22–29)
EOSINOPHIL # BLD AUTO: 0.3 10E3/UL (ref 0–0.7)
EOSINOPHIL NFR BLD AUTO: 4 %
ERYTHROCYTE [DISTWIDTH] IN BLOOD BY AUTOMATED COUNT: 15 % (ref 10–15)
GFR SERPL CREATININE-BSD FRML MDRD: 80 ML/MIN/1.73M2
GLUCOSE SERPL-MCNC: 107 MG/DL (ref 70–99)
HCT VFR BLD AUTO: 41.9 % (ref 35–47)
HGB BLD-MCNC: 13.3 G/DL (ref 11.7–15.7)
IMM GRANULOCYTES # BLD: 0 10E3/UL
IMM GRANULOCYTES NFR BLD: 0 %
LYMPHOCYTES # BLD AUTO: 2.7 10E3/UL (ref 0.8–5.3)
LYMPHOCYTES NFR BLD AUTO: 37 %
MCH RBC QN AUTO: 27.4 PG (ref 26.5–33)
MCHC RBC AUTO-ENTMCNC: 31.7 G/DL (ref 31.5–36.5)
MCV RBC AUTO: 86 FL (ref 78–100)
MONOCYTES # BLD AUTO: 0.7 10E3/UL (ref 0–1.3)
MONOCYTES NFR BLD AUTO: 10 %
NEUTROPHILS # BLD AUTO: 3.6 10E3/UL (ref 1.6–8.3)
NEUTROPHILS NFR BLD AUTO: 48 %
NRBC # BLD AUTO: 0 10E3/UL
NRBC BLD AUTO-RTO: 0 /100
PLATELET # BLD AUTO: 280 10E3/UL (ref 150–450)
POTASSIUM SERPL-SCNC: 4.3 MMOL/L (ref 3.4–5.3)
PROT SERPL-MCNC: 7.2 G/DL (ref 6.4–8.3)
RBC # BLD AUTO: 4.86 10E6/UL (ref 3.8–5.2)
SODIUM SERPL-SCNC: 138 MMOL/L (ref 136–145)
WBC # BLD AUTO: 7.4 10E3/UL (ref 4–11)

## 2022-12-27 PROCEDURE — G0463 HOSPITAL OUTPT CLINIC VISIT: HCPCS

## 2022-12-27 PROCEDURE — 36415 COLL VENOUS BLD VENIPUNCTURE: CPT | Performed by: INTERNAL MEDICINE

## 2022-12-27 PROCEDURE — G0463 HOSPITAL OUTPT CLINIC VISIT: HCPCS | Performed by: INTERNAL MEDICINE

## 2022-12-27 PROCEDURE — G0279 TOMOSYNTHESIS, MAMMO: HCPCS | Mod: RT | Performed by: RADIOLOGY

## 2022-12-27 PROCEDURE — 77065 DX MAMMO INCL CAD UNI: CPT | Mod: RT | Performed by: RADIOLOGY

## 2022-12-27 PROCEDURE — 80053 COMPREHEN METABOLIC PANEL: CPT | Performed by: INTERNAL MEDICINE

## 2022-12-27 PROCEDURE — 76642 ULTRASOUND BREAST LIMITED: CPT | Mod: 50 | Performed by: RADIOLOGY

## 2022-12-27 PROCEDURE — 99215 OFFICE O/P EST HI 40 MIN: CPT | Performed by: INTERNAL MEDICINE

## 2022-12-27 PROCEDURE — 85025 COMPLETE CBC W/AUTO DIFF WBC: CPT | Performed by: INTERNAL MEDICINE

## 2022-12-27 RX ORDER — HEPARIN SODIUM (PORCINE) LOCK FLUSH IV SOLN 100 UNIT/ML 100 UNIT/ML
5 SOLUTION INTRAVENOUS
Status: CANCELLED | OUTPATIENT
Start: 2023-01-17

## 2022-12-27 RX ORDER — ZOLEDRONIC ACID 0.04 MG/ML
4 INJECTION, SOLUTION INTRAVENOUS ONCE
Status: CANCELLED | OUTPATIENT
Start: 2023-01-17 | End: 2023-01-17

## 2022-12-27 RX ORDER — HEPARIN SODIUM,PORCINE 10 UNIT/ML
5 VIAL (ML) INTRAVENOUS
Status: CANCELLED | OUTPATIENT
Start: 2023-01-17

## 2022-12-27 ASSESSMENT — PAIN SCALES - GENERAL: PAINLEVEL: NO PAIN (0)

## 2022-12-27 NOTE — LETTER
2022         RE: Ramona Ferrer  [address]  [place] [address]        Dear Colleague,    Thank you for referring your patient, Ramona Ferrer, to the [facility]. Please see a copy of my visit note below.    Dada Mendiola MD  [facility]  [address]  [place] [address]     RE:  Ramona Ferrer  MRN:  [id number]  :  1955     Dear Dr. Mendiola:     I would like to refer to you Sammie Ferrer, a 67-year-old woman with a recent diagnosis of a stage IIB, T3 N0 MX, invasive lobular carcinoma of the left breast.  She self-palpated a lump in the upper outer quadrant of the left breast.  She underwent evaluation with a diagnostic mammogram and ultrasound, which was BI-RADS 4, showing in the upper outer quadrant of the left breast irregularly shaped hypoechoic mass at the 12 o'clock position 4 cm from the nipple on the left.  This mass measured 2.3 x 2.2 x 1.5 cm and accounted for the patient's area of palpable concern.  Additionally, at the 1 o'clock position 2 cm from the nipple in the left breast, there is a second irregularly shaped hypoechoic mass with indistinct margins measuring 1.3 x 1.1 x 0.8 cm.  She also underwent a breast MRI which showed in the right breast mild background parenchymal enhancement and no suspicious areas of enhancement or lymphadenopathy.  In the left breast there was mild background parenchymal enhancement, and at the 12 o'clock position 4 cm from the nipple there was a heterogeneously enhancing irregular mass measuring 3.4 in AP dimension x 2.8 cm ML and 2.8 cm SI corresponding to the known biopsy-proven malignancy in the left breast.  In the left breast at the 1 o'clock position 2 cm from the nipple there was also a heterogeneously enhancing oval mass measuring 1.3 cm in maximal diameter.  This likely corresponds to the second biopsy-proven malignancy.  Together, the full extent of the  disease spans a total of 5.2 cm AP x 4.4 cm ML, BI-RADS 6.     The pathology showed in the left breast 12 o'clock position 4 cm from the nipple, ultrasound-guided needle biopsy invasive lobular carcinoma, Madison grade 1/3 lobular carcinoma in situ, classic type, estrogen receptor positive in greater than 70% of the cells and progesterone receptor positive in greater than 90% of cells, and HER2 FISH was nonamplified.  In part B in the left breast at the 1 o'clock position 2 cm from the nipple, ultrasound-guided needle biopsy showed invasive lobular carcinoma, Madison grade 1/3 lobular carcinoma in situ was present, classic subtype, estrogen receptors positive in greater than 70% of the cells, progesterone receptors positive in greater than 90% of cells, and HER2 was nonamplified, Ki-67 20-25%.  She now comes to our clinic for recommendations.     Sammie reports that the breast mass had been there for approximately 1 month before she was seen for evaluation..       She has worked in the past as a personal care assistant and lives in the [facility].       PAST MEDICAL HISTORY:  There is no history of breast cancer in the past.  No history of breast cancer surgery.  She has no history of radiation therapy in the past or radiation exposure.  No history of prior tumor of any kind.  She denies heart problems, heart attack, breathing problems, blood clots, seizures, peptic ulcer disease, osteoporosis or bone fractures.  She does report a history of arthritis.     FAMILY HISTORY:  Positive for breast cancer in a paternal aunt who was diagnosed with breast cancer at an old age.     Her father has a history of stomach cancer.  There is no family history of pancreatic cancer, melanoma, lung cancer or ovarian cancer.     No history of male breast cancer.     ALLERGIES:  No history of drug allergies.  She denies allergies to seafood, iodine, or contrast dye.     PAST MENSTRUAL HISTORY:  She has been pregnant 3 times with  2 live births at age 29 and 31 and 1 miscarriage.  Age at first menstrual period was 14.  She did have a total abdominal hysterectomy and bilateral salpingo-oophorectomy performed in 2003 for endometriosis.  She has no history of hormone replacement therapy.  No history of oral contraceptives.     HABITS: She denies tobacco history.  She denies alcohol history and drinks alcohol only occasionally.     PAST MEDICAL HISTORY:  Past medical history is also remarkable for type 2 diabetes, and she was begun on metformin 2 years ago.  She also has a history of varicose veins and a history of a lipoma resected from her left leg.      Chronic Hepatitis B.  Hepatitis C negative.  HIV negative.       Prior COVID vaccination x 3.      GERM LINE GENETICS: INVITAE testing of 47 genes was negative.      TREATMENT HISTORY:  A.  MammaPrint showed low risk.  B.  Left mastectomy and axillary lymph node sampling.   A. Lymph node, LEFT axillary, excision:  -One benign lymph node (0/1)  B. LEFT breast, skin-sparing mastectomy:  -INVASIVE BREAST CARCINOMA, MIXED INVASIVE LOBULAR CARCINOMA (predominant component) and INVASIVE DUCTAL CARCINOMA (minor component), KALPESH GRADE 3, size 5.5 cm, 12:00, upper outer quadrant and lower outer quadrant  -Ductal carcinoma in situ (DCIS), nuclear grade 2, cribriform and solid type(s), with focal necrosis  -DCIS is admixed with invasive carcinoma, and comprises a minor component (<5%) of the tumor volume   -Lobular carcinoma in situ (LCIS), predominantly classic type (admixed with invasive carcinoma and beyond invasive carcinoma) and focal pleomorphic type (size 2 mm, adjacent to invasive carcinoma)  -Margins are uninvolved by invasive carcinoma  -Invasive carcinoma is 2.5 mm from the nearest (anterior) margin, and is > 5 mm from the posterior margin  -Margins are uninvolved by DCIS and pleomorphic LCIS  -DCIS and pleomorphic LCIS are > 5 mm from the nearest (anterior) margin  -Benign nipple and  skin   -Other findings: fibrocystic change (including microcysts with apocrine metaplasia) and columnar cell change  -Calcifications associated with DCIS, LCIS, and benign ducts and acini  -Prior core biopsy site changes (two biopsy sites identified)  -Invasive carcinoma is estrogen receptor positive (>70%, strong intensity) and progesterone receptor positive (>90%, strong intensity) by immunohistochemistry and is HER2 non-amplified (group 5) by FISH (performed on prior core biopsies, see report [id number], specimens A and B)  DCIS and LCIS present.  -Margins are uninvolved by invasive carcinoma or LCIS.   -Invasive carcinoma is estrogen receptor positive (>70%, strong intensity) and progesterone receptor positive (>90%, strong intensity) by immunohistochemistry and is HER2 non-amplified (group 5) by FISH (performed on prior core biopsies, see report [id number], specimens A and B)  Ki-67 immunohistochemistry (MIB-1, pharmDx, Dako Omnis) from [employer] was performed on invasive carcinoma in the LEFT mastectomy specimen   They report Ki-67 proliferative index of 20-25% (block B5) and 20% (block B27).   C.  Staging:  pT3 Nx because lymph node is not the sentinel node.    D.  She did not want radiation, and risk:benefit did not favor treatment.  E.  Letrozole begun 4-5-22.   F.  Left TRAM flap reconstruction healed as of 12-27-22 but she has some concerns.   G.  Discussion of adjuvant Zometa 12-27-22.  She has some dental issues.  Orders placed.     IMAGING SUMMARY:  A.  1-10-19  MRI LS SPINE IMPRESSION: Degenerative changes of the lumbar spine as described.  B.  3-2-22. MRI of abdomen  Diffuse fatty infiltration of the liver and probable hepatomegaly. No suspicious liver lesions are identified.     INTERVAL HISTORY  She was seen with her daughter.  She has some back pain and concerns about her left breast reconstruction.  She has swelling and some mild pain above the TRAM flap reconstruction. Itching on right  "flank.    No depression or anxiety.  She has some insomnia. Some hot flashes and joint stiffness.  She has thinning hair and this is very distressing for her.    Ongoing LS spine degenerative disease.      REVIEW OF SYSTEMS:  A 10-point review of systems is negative.     She is eating a healthful Mediterranean-style diet.  She is not exercising much.      She is taking vitamin D3 2000 International Units per day.  She does not take calcium.       PHYSICAL EXAMINATION:    VITAL SIGNS:  /74 (BP Location: Right arm, Patient Position: Sitting, Cuff Size: Adult Large)   Pulse 71   Temp 97  F (36.1  C) (Oral)   Resp 18   Ht 1.6 m (5' 2.99\")   Wt 98.9 kg (218 lb)   SpO2 97%   BMI 38.63 kg/m    GENERAL:  Sammie appeared generally well.  HEENT:  She has some alopecia.  Examination of the oropharynx revealed no lesions.  LYMPH:  No palpable cervical, supraclavicular, subclavicular or axillary lymphadenopathy.  BREASTS:  Examination of the right breast reveals no masses.  Left breast has a TRAM flap. There was an area of vesiculation of the incision medially measuring about 1 cm in size.  There were no suspicious findings and no masses or erythema in the incisions of the TRAM flap.  There was some fullness of the tissue above the incision with no masses in the left breast reconstruction.   LUNGS:  Clear to percussion and auscultation.  HEART:  Regular rate and rhythm.  S1, S2.  ABDOMEN:  Soft, nontender, consistent with increased body mass index.  EXTREMITIES:  Without edema.  PSYCH:  Mood was anxious and affect appropriate.  SKIN:  No rashes on right flank where she complains of some itching,      LABORATORY DATA:  CMP showed a calcium of 10.3.  Glucose 107.  CBC normal.      IMAGING:   Mammogram of right breast and US of left breast reconstruction.     Findings:     Mammogram:  Possible focal asymmetry within the right breast, central core, mid  depth. There is no obvious MRI correlate for this area on " MRI  1/25/2022. Favor that this area has been stable dating back to  6/17/2015.     Ultrasound:  Targeted, real-time ultrasound evaluation was performed by the  technologist and radiologist.  In the region of concern within the right breast, 12:00 position to  central core there are no suspicious sonographic findings. No  suspicious cystic or solid mass, architectural distortion or other  abnormality.     In the left breast in the region of the patient's focal pain there is  a raised area of scarring which has the appearance of a keloid. This  has a few small foci of cystic spaces within it. There are other  postoperative changes within the left breast without suspicious  findings.                                                                      IMPRESSION: BI-RADS CATEGORY: 2 - Benign.         RECOMMENDED FOLLOW-UP: Annual Mammography.  Recommend clinical evaluation for scar/possible keloid within the left  breast at the 9:00 position.     The finding and recommendation were discussed with the patient at the  time of evaluation.     I have personally reviewed the examination and initial interpretation  and I agree with the findings.     GETACHEW APODACA MD      ASSESSMENT AND PLAN:  1.  Sammie Ferrer is a 67-year-old woman with a recent diagnosis of a stage IIB, T3 N0 MX, invasive lobular carcinoma of the upper outer quadrant of the left breast which is multifocal grade 1, ER positive in greater than 70% of cells, TN positive in greater than 90% of cells and HER2 nonamplified.  She now comes to clinic with her , for recommendations regarding evaluation and treatment.    2.  MammaPrint before surgery came back as low risk as did the Oncotype after surgery. The Oncotype of the pleomorphic lobular cancer showed a value of 15, which is less than the threshold for chemotherapy from TAILORx which is 26 for a post-menopausal woman.   3.  Sammie Ferrer underwent a left mastectomy and has a TRAM flap in  place.  She started adjuvant hormonal therapy with letrozole and has tolerated it well.   4. Pathology showed -Invasive carcinoma is estrogen receptor positive, progesterone receptor positive and HER2 negative by immunohistochemistry.  Margins negative. pT3 Nx because the lymph node is not sentinel.    5.  She has tolerated the letrozole quite well, although she does have some hot flashes.  She does not need radiation.    6.  TRAM flap econstruction.  Healed.  I spoke with Dr. Feliciano in clinic and they can have Sammie come back to clinic to address her concerns regarding the fluid collection in the incision of the left TRAM flap reconstruction.  7.  Imaging:  Yearly mammography of right breast.  8.  We had a discussion of adjuvant Zometa.   9.  Anemia resolved.  Likely related to her prior surgery.   10.  Dermatology referral for alopecia related to letrozole.   11.  Followup.    Ultrasound left breast today. Follow up with Maritza Che 1-18 with CBC, CMP, Zometa. Follow up with Dr. Feliciano. Dermatology consult with Dr. Juarez for alopecia.    Thank you for allowing us to participate in this patient's care.  The patient was seen and evaluated by me.  I discussed the patient with the fellow and agree with the findings and plan in the note, which was edited by me.    Sincerely,     Kenneth Cisneros MD  Professor  [facility]  [phone]            12.  Followup.  Follow up with Maritza Che 1-18 with CBC, CMP, Zometa. Follow up with Dr. Feliciano. Dermatology consult with Dr. Juarez for alopecia. Follow up with  every 3 months.    Thank you for allowing us to continue to participate in Sammie Ferrer's care.    Patient is seen and discussed with , please see the attestation for further details.    Harry Quintero MD  Hematology/Medical Oncology (PGY-4)  P: [phone]     -DCIS and pleomorphic LCIS are > 5 mm from the nearest (anterior) margin  -Benign nipple and  skin   -Other findings: fibrocystic change (including microcysts with apocrine metaplasia) and columnar cell change  -Calcifications associated with DCIS, LCIS, and benign ducts and acini  -Prior core biopsy site changes (two biopsy sites identified)  -Invasive carcinoma is estrogen receptor positive (>70%, strong intensity) and progesterone receptor positive (>90%, strong intensity) by immunohistochemistry and is HER2 non-amplified (group 5) by FISH (performed on prior core biopsies, see report [id number], specimens A and B)  DCIS and LCIS present.  -Margins are uninvolved by invasive carcinoma or LCIS.   -Invasive carcinoma is estrogen receptor positive (>70%, strong intensity) and progesterone receptor positive (>90%, strong intensity) by immunohistochemistry and is HER2 non-amplified (group 5) by FISH (performed on prior core biopsies, see report [id number], specimens A and B)  Ki-67 immunohistochemistry (MIB-1, pharmDx, Dako Omnis) from [facility] was performed on invasive carcinoma in the LEFT mastectomy specimen   They report Ki-67 proliferative index of 20-25% (block B5) and 20% (block B27).   C.  Staging:  pT3 Nx because lymph node is not the sentinel node.    D.  She did not want radiation, and risk:benefit did not favor treatment.  E.  Letrozole begun 4-5-22.     INTERVAL HISTORY:  Patient reports persistent pain in L breast since surgery, probably slightly worse over the past 2 weeks with an associated new small lump on the surgical scar site. She is taking letrozole as prescribed and reports having body itching with rashes in which she is taking cetirizine. Also reports mild intermittent hot flashes and hair loss. Reports fatigue with difficulty sleeping. Has been independent with daily activities, ECOG = 0-1. Denies new lump/bumps skin changes or pain around neck or axillary area.Denies bone pain. Denies headache or vision loss. Has good appetite, denies unexplained weight loss.     REVIEW OF SYSTEMS:   "  She denies fevers or chills, cough, chest pain, shortness of breath, nausea, vomiting, constipation, diarrhea, bone pain, back pain or headache. The remainder of a 10 point review of systems is negative.    PHYSICAL EXAMINATION:    VITAL SIGNS:  /74 (BP Location: Right arm, Patient Position: Sitting, Cuff Size: Adult Large)   Pulse 71   Temp 97  F (36.1  C) (Oral)   Resp 18   Ht 1.6 m (5' 2.99\")   Wt 98.9 kg (218 lb)   SpO2 97%   BMI 38.63 kg/m     GENERAL:  Sammie appeared generally well.  HEENT:  She has no alopecia.  Examination of the oropharynx revealed no lesions.  LYMPH:  No palpable cervical, supraclavicular, subclavicular or axillary lymphadenopathy.  BREASTS:  Examination of the right breast reveals no masses. Left breast has a TRAM flap. The  left breast wound is well healed but has cystic consistency lump ~1cm inner upper quadrant on the surgical scar site. No pus, erythema or odor.    LUNGS:  Clear to auscultation.  HEART:  Regular rate and rhythm.  S1, S2.  ABDOMEN:  Soft, nontender, consistent with increased body mass index.  SKIN: No rashes seen.  EXTREMITIES:  Without edema.  PSYCH:  Mood was anxious and affect appropriate.     LABORATORY DATA:  CBC and CMP overall WNL.      IMAGING:   MA DIAGNOSTIC RIGHT W/ MADELINE, US BREAST RIGHT LIMITED (12/27/2022)  BI-RADS CATEGORY: 2 - Benign.        ASSESSMENT AND PLAN:  1.  Sammie Ferrer is a 67-year-old woman with a diagnosis of a stage IIB, T3 N0 MX, invasive lobular carcinoma of the upper outer quadrant of the left breast which is multifocal grade 1, ER positive in greater than 70% of cells, NV positive in greater than 90% of cells and HER2 nonamplified.    2.  Sammie Ferrer underwent a left mastectomy and has a TRAM flap in place.   3.  Pathology showed -Invasive carcinoma is estrogen receptor positive, progesterone receptor positive and HER2 negative by immunohistochemistry.  Margins negative. pT3 Nx because the lymph node is not " sentinel.      4.  MammaPrint before surgery came back as low risk as did the Oncotype after surgery. The Oncotype of the pleomorphic lobular cancer showed a value of 15, which is less than the threshold for chemotherapy from TAILORx which is 26 for a post-menopausal woman. She started adjuvant hormonal therapy with letrozole (April 2022) and has tolerated it well although she does have some hot flashes. She does not need radiation.    5.  Zometa. Discussed on the most recent recommendations from [employer] on Zometa as an adjuvant treatment for breast cancer in post-menopausal setting. Discussed benefit of reducing recurrence risk and modest benefit on overall survival as well as side-effects profile, most importantly, flu-like symptoms after infusion. This will be an every 6 months injection for 3 years. She will obtain dental clearance prior to starting the medication.  6.  TRAM flap reconstruction.  Mild dehiscence of the medial aspect of the incision with left breast in prior visit, now with new cystic fluid collection. Obtaining US today. Referral made to plastic surgery (Seen  in the past).  7.  Anemia.  We asked her to stop ASA which had been prescribed for DVT prophylaxis.  She has a history of gastritis 10 years ago, not currently.  History of multiple colonic polyps in 2019.  EGD (12/7/2022( showed gastritis, negative for malignancy and H.pylori. Colonoscopy (12/7/2022) showed no polyp but the preparation was poor so repeat colonoscopy in 3-5 years for surveillance is recommended.  8.  Imaging.  Yearly mammography of right breast 12-27-22, BI-RADS CATEGORY: 2 - Benign   9.  Osteopenia. Most recent DEXA scan 9/26/22. On Ca and vitamin D supplements.  10.  Alopecia and rash. Dermatology referral placed.  11.  Pain in the left femur and LS spine.  She reports continued pain more on the left than the right. Two view films of the femur were obtained as well as CT scan of the lumbosacral spine.  These  studies revealed no evidence for metastases and show DJD of the spine.  I discussed that we cannot do an MRI because she has an expander in place. Pt is not interested in pain management consultation with palliative care            I spent 45 minutes with the patient more than 50% of which was in counseling and coordination of care.      .

## 2022-12-27 NOTE — NURSING NOTE
"Oncology Rooming Note    December 27, 2022 10:18 AM   Ramona Ferrer is a 67 year old female who presents for:    Chief Complaint   Patient presents with     Blood Draw     Labs drawn via  by RN in lab.      Oncology Clinic Visit     RETURN - BREAST CANCER     Initial Vitals: /74 (BP Location: Right arm, Patient Position: Sitting, Cuff Size: Adult Large)   Pulse 71   Temp 97  F (36.1  C) (Oral)   Resp 18   Ht 1.6 m (5' 2.99\")   Wt 98.9 kg (218 lb)   SpO2 97%   BMI 38.63 kg/m   Estimated body mass index is 38.63 kg/m  as calculated from the following:    Height as of this encounter: 1.6 m (5' 2.99\").    Weight as of this encounter: 98.9 kg (218 lb). Body surface area is 2.1 meters squared.  No Pain (0) Comment: Data Unavailable   No LMP recorded. Patient has had a hysterectomy.  Allergies reviewed: Yes  Medications reviewed: Yes    Medications: MEDICATION REFILLS NEEDED TODAY. Provider was notified.  Pharmacy name entered into Ryla:    [id number] - [place], [place] - [address]  [facility] [id number] - [place], [place] - [address]    Clinical concerns: Sanjuana Thomas CMA              "

## 2022-12-27 NOTE — NURSING NOTE
Chief Complaint   Patient presents with     Blood Draw     Labs drawn via  by RN in lab.      Labs collected from venipuncture by RN. Vitals taken. Checked in for appointment(s).    Keena Lomas RN

## 2022-12-27 NOTE — PROGRESS NOTES
Met with patient and her daughter to educate on Zometa and gave a print out to read as well. Also, discussed having dental clearance prior to infusion and patient will update writer when she has dental clearance.  Answered all patient and daughter's questions and verbalized understanding. Irene Todd RN, BSN.

## 2022-12-29 ENCOUNTER — DOCUMENTATION ONLY (OUTPATIENT)
Facility: CLINIC | Age: 67
End: 2022-12-29

## 2022-12-29 DIAGNOSIS — R26.89 IMPAIRED GAIT AND MOBILITY: Primary | ICD-10-CM

## 2023-01-12 ENCOUNTER — PATIENT OUTREACH (OUTPATIENT)
Dept: GERIATRIC MEDICINE | Facility: CLINIC | Age: 68
End: 2023-01-12

## 2023-01-12 ENCOUNTER — PATIENT OUTREACH (OUTPATIENT)
Dept: ONCOLOGY | Facility: CLINIC | Age: 68
End: 2023-01-12

## 2023-01-12 NOTE — PROGRESS NOTES
Patient and her daughter called to verify appointments for Zometa. Expressing concern for long day of appointments and will discuss with Maritza Che. Patient had seen her dentist and was given approval for Zometa. She will need crowns in the future.  Answered all patient and daughter's questions and verbalized understanding. Irene Todd RN, BSN.

## 2023-01-12 NOTE — PROGRESS NOTES
[facility] Care Coordination Contact    Member changed health plan products from [facility] to [facility] effective 01/01/2023. CC to complete items on Product Change/ Health Plan Change check list including MMIS entry. [place] and other required tasks.    Iqra Avila  Care Management Specialist  [facility]  [phone]

## 2023-01-16 DIAGNOSIS — J30.9 ALLERGIC RHINITIS, UNSPECIFIED SEASONALITY, UNSPECIFIED TRIGGER: ICD-10-CM

## 2023-01-17 NOTE — PROGRESS NOTES
ONCOLOGY FOLLOW UP NOTE  01/17/2023    HPI  I would like to refer to you Sammie Ferrer, a 67-year-old woman with a recent diagnosis of a stage IIB, T3 N0 MX, invasive lobular carcinoma of the left breast.  She self-palpated a lump in the upper outer quadrant of the left breast.  She underwent evaluation with a diagnostic mammogram and ultrasound, which was BI-RADS 4, showing in the upper outer quadrant of the left breast irregularly shaped hypoechoic mass at the 12 o'clock position 4 cm from the nipple on the left.  This mass measured 2.3 x 2.2 x 1.5 cm and accounted for the patient's area of palpable concern.  Additionally, at the 1 o'clock position 2 cm from the nipple in the left breast, there is a second irregularly shaped hypoechoic mass with indistinct margins measuring 1.3 x 1.1 x 0.8 cm.  She also underwent a breast MRI which showed in the right breast mild background parenchymal enhancement and no suspicious areas of enhancement or lymphadenopathy.  In the left breast there was mild background parenchymal enhancement, and at the 12 o'clock position 4 cm from the nipple there was a heterogeneously enhancing irregular mass measuring 3.4 in AP dimension x 2.8 cm ML and 2.8 cm SI corresponding to the known biopsy-proven malignancy in the left breast.  In the left breast at the 1 o'clock position 2 cm from the nipple there was also a heterogeneously enhancing oval mass measuring 1.3 cm in maximal diameter.  This likely corresponds to the second biopsy-proven malignancy.  Together, the full extent of the disease spans a total of 5.2 cm AP x 4.4 cm ML, BI-RADS 6.     The pathology showed in the left breast 12 o'clock position 4 cm from the nipple, ultrasound-guided needle biopsy invasive lobular carcinoma, C[place] grade 1/3 lobular carcinoma in situ, classic type, estrogen receptor positive in greater than 70% of the cells and progesterone receptor positive in greater than 90% of cells, and HER2 FISH was  nonamplified.  In part B in the left breast at the 1 o'clock position 2 cm from the nipple, ultrasound-guided needle biopsy showed invasive lobular carcinoma, [place] grade 1/3 lobular carcinoma in situ was present, classic subtype, estrogen receptors positive in greater than 70% of the cells, progesterone receptors positive in greater than 90% of cells, and HER2 was nonamplified, Ki-67 20-25%.  She now comes to our clinic for recommendations.     Sammie reports that the breast mass had been there for approximately 1 month before she was seen for evaluation..       She has worked in the past as a personal care assistant and lives in the [facility].       PAST MEDICAL HISTORY:  There is no history of breast cancer in the past.  No history of breast cancer surgery.  She has no history of radiation therapy in the past or radiation exposure.  No history of prior tumor of any kind.  She denies heart problems, heart attack, breathing problems, blood clots, seizures, peptic ulcer disease, osteoporosis or bone fractures.  She does report a history of arthritis.     FAMILY HISTORY:  Positive for breast cancer in a paternal aunt who was diagnosed with breast cancer at an old age.     Her father has a history of stomach cancer.  There is no family history of pancreatic cancer, melanoma, lung cancer or ovarian cancer.     No history of male breast cancer.     ALLERGIES:  No history of drug allergies.  She denies allergies to seafood, iodine, or contrast dye.     PAST MEDICAL HISTORY:  Past medical history is also remarkable for type 2 diabetes, and she was begun on metformin 2 years ago.  She also has a history of varicose veins and a history of a lipoma resected from her left leg.         TREATMENT HISTORY:  A.  MammaPrint showed low risk.  B.  Left mastectomy and axillary lymph node sampling.   A. Lymph node, LEFT axillary, excision:  -One benign lymph node (0/1)  B. LEFT breast, skin-sparing mastectomy:  -INVASIVE BREAST  CARCINOMA, MIXED INVASIVE LOBULAR CARCINOMA (predominant component) and INVASIVE DUCTAL CARCINOMA (minor component), KALPESH GRADE 3, size 5.5 cm, 12:00, upper outer quadrant and lower outer quadrant  -Ductal carcinoma in situ (DCIS), nuclear grade 2, cribriform and solid type(s), with focal necrosis  -DCIS is admixed with invasive carcinoma, and comprises a minor component (<5%) of the tumor volume   -Lobular carcinoma in situ (LCIS), predominantly classic type (admixed with invasive carcinoma and beyond invasive carcinoma) and focal pleomorphic type (size 2 mm, adjacent to invasive carcinoma)  -Margins are uninvolved by invasive carcinoma  -Invasive carcinoma is 2.5 mm from the nearest (anterior) margin, and is > 5 mm from the posterior margin  -Margins are uninvolved by DCIS and pleomorphic LCIS  -DCIS and pleomorphic LCIS are > 5 mm from the nearest (anterior) margin  -Benign nipple and skin   -Other findings: fibrocystic change (including microcysts with apocrine metaplasia) and columnar cell change  -Calcifications associated with DCIS, LCIS, and benign ducts and acini  -Prior core biopsy site changes (two biopsy sites identified)  -Invasive carcinoma is estrogen receptor positive (>70%, strong intensity) and progesterone receptor positive (>90%, strong intensity) by immunohistochemistry and is HER2 non-amplified (group 5) by FISH (performed on prior core biopsies, see report [id number], specimens A and B)  DCIS and LCIS present.  -Margins are uninvolved by invasive carcinoma or LCIS.   -Invasive carcinoma is estrogen receptor positive (>70%, strong intensity) and progesterone receptor positive (>90%, strong intensity) by immunohistochemistry and is HER2 non-amplified (group 5) by FISH (performed on prior core biopsies, see report [id number], specimens A and B)  Ki-67 immunohistochemistry (MIB-1, pharmDx, [employer]) from [facility] was performed on invasive carcinoma in the LEFT mastectomy specimen   They  report Ki-67 proliferative index of 20-25% (block B5) and 20% (block B27).   C.  Staging:  pT3 Nx because lymph node is not the sentinel node.    D.  She did not want radiation, and risk:benefit did not favor treatment.  E.  Letrozole begun 4-5-22.   F.  Left TRAM flap reconstruction healed as of 12-27-22 but she has some concerns.   G.  Discussion of adjuvant Zometa 12-27-22.  She has some dental issues.  Orders placed.        INTERVAL HISTORY  Today, she is seen in person.  She states that she has been feeling well since she last saw Dr. Cisneros.  She did get dental for her Zometa.  She still is tolerating the letrozole well.  She has no new concerns today.  No new lumps or bumps.  No new aches or pains.  No recent infections, including fevers body aches or chills.  No headaches or double or blurry vision. Breathing is stable. Taking calcium and vitamin D     REVIEW OF SYSTEMS:  A 10-point review of systems is negative.     She is eating a healthful Mediterranean-style diet.  She is not exercising much.      She is taking vitamin D3 2000 International Units per day.  She does not take calcium.       PHYSICAL EXAMINATION:    General: Resting comfortably in no acute distress  Head: Normocephalic, atraumatic   EENT: No scleral icteris, EOMS intact.   Lung: Normal respiratory effort. Speaking fluently without shortness of breath. No audible wheezes or coughing.   Neuro: AAO x3. Moving upper extremities equally and symmetrically   Skin: Warm, dry, intact. No rashes, no suspicious lesions. No petechia or bruising noted on visible skin            LABORATORY DATA:    Most Recent 3 CBC's:Recent Labs   Lab Test 01/18/23  1347 12/27/22  0909 09/26/22  1307   WBC 8.5 7.4 6.7   HGB 13.1 13.3 11.5*   MCV 85 86 93    280 303    Most Recent 3 BMP's:  Recent Labs   Lab Test 01/18/23  1347 12/27/22  0909 09/26/22  1307    138 138   POTASSIUM 4.3 4.3 4.2   CHLORIDE 105 103 102   CO2 26 26 26   BUN 13.6 12.7 9.0   CR 0.87  0.81 0.63   ANIONGAP 11 9 10   EMMA 10.0  9.6 10.3* 9.8   * 107* 108*    Most Recent 2 LFT's:  Recent Labs   Lab Test 01/18/23  1347 12/27/22  0909   AST 30 25   ALT 38* 34   ALKPHOS 79 78   BILITOTAL 0.4 0.4      I reviewed the above labs today.       IMAGING:   No new imaging      ASSESSMENT AND PLAN:  1.  Sammie Ferrer is a 67-year-old woman with a recent diagnosis of a stage IIB, T3 N0 MX, invasive lobular carcinoma of the upper outer quadrant of the left breast which is multifocal grade 1, ER positive in greater than 70% of cells, CT positive in greater than 90% of cells and HER2 nonamplified. Underwent mastectomy. MammaPrint before surgery came back as low risk as did the Oncotype after surgery, no chemotherapy recommended. No RT. Currently on letrozole  - Continue letrozole    - Right sided mammogram yearly       2. Bone health, osteopenia   - Bone density on 9/2022 showed T-score -1.8  - Calcium, vitamin D, weight bearing exercise  - Proceed with first dose of Zometa. Met with dentist and got dental clearance. Reviewed side effects and rational in detail today. She wishes to proceed. Zometa every 6 months for 3 years   - Repeat DEXA 9/2024    ADDENDUM:    Prolia is a treatment to increase bone mass in women at high risk for fracture receiving adjuvant aromatase inhibitor therapy for breast cancer. An ICD-10-CM code from Group 8, Group 9 and Group 10 must be reported.     M81.8     As a treatment to increase bone mass in women at high risk for fracture receiving adjuvant aromatase inhibitor therapy for breast cancer. An ICD-10-CM code from Group 8, Group 9 and Group 10 must be reported.     Z79.811 Long term (current) use of aromatase inhibitors       Patient will call in the interim with any questions or concerns. They voice understanding and agree with the above plan.         Maritza Che PA-C

## 2023-01-18 ENCOUNTER — ONCOLOGY VISIT (OUTPATIENT)
Dept: ONCOLOGY | Facility: CLINIC | Age: 68
End: 2023-01-18
Attending: PHYSICIAN ASSISTANT
Payer: COMMERCIAL

## 2023-01-18 ENCOUNTER — LAB (OUTPATIENT)
Dept: LAB | Facility: CLINIC | Age: 68
End: 2023-01-18
Attending: PHYSICIAN ASSISTANT
Payer: COMMERCIAL

## 2023-01-18 VITALS
HEART RATE: 75 BPM | TEMPERATURE: 97.9 F | RESPIRATION RATE: 16 BRPM | DIASTOLIC BLOOD PRESSURE: 63 MMHG | OXYGEN SATURATION: 98 % | BODY MASS INDEX: 38.79 KG/M2 | WEIGHT: 218.9 LBS | SYSTOLIC BLOOD PRESSURE: 118 MMHG

## 2023-01-18 DIAGNOSIS — M81.8 IDIOPATHIC OSTEOPOROSIS: ICD-10-CM

## 2023-01-18 DIAGNOSIS — Z17.0 MALIGNANT NEOPLASM OF UPPER-OUTER QUADRANT OF LEFT BREAST IN FEMALE, ESTROGEN RECEPTOR POSITIVE (H): Primary | ICD-10-CM

## 2023-01-18 DIAGNOSIS — D75.1 POLYCYTHEMIA: ICD-10-CM

## 2023-01-18 DIAGNOSIS — Z79.811 USE OF AROMATASE INHIBITORS: Primary | ICD-10-CM

## 2023-01-18 DIAGNOSIS — C50.412 MALIGNANT NEOPLASM OF UPPER-OUTER QUADRANT OF LEFT BREAST IN FEMALE, ESTROGEN RECEPTOR POSITIVE (H): ICD-10-CM

## 2023-01-18 DIAGNOSIS — B19.10 HEPATITIS B INFECTION WITHOUT DELTA AGENT WITHOUT HEPATIC COMA, UNSPECIFIED CHRONICITY: ICD-10-CM

## 2023-01-18 DIAGNOSIS — C50.412 MALIGNANT NEOPLASM OF UPPER-OUTER QUADRANT OF LEFT BREAST IN FEMALE, ESTROGEN RECEPTOR POSITIVE (H): Primary | ICD-10-CM

## 2023-01-18 DIAGNOSIS — Z17.0 MALIGNANT NEOPLASM OF UPPER-OUTER QUADRANT OF LEFT BREAST IN FEMALE, ESTROGEN RECEPTOR POSITIVE (H): ICD-10-CM

## 2023-01-18 LAB
ALBUMIN SERPL BCG-MCNC: 4.2 G/DL (ref 3.5–5.2)
ALP SERPL-CCNC: 79 U/L (ref 35–104)
ALT SERPL W P-5'-P-CCNC: 38 U/L (ref 10–35)
ANION GAP SERPL CALCULATED.3IONS-SCNC: 11 MMOL/L (ref 7–15)
AST SERPL W P-5'-P-CCNC: 30 U/L (ref 10–35)
BASOPHILS # BLD AUTO: 0.1 10E3/UL (ref 0–0.2)
BASOPHILS NFR BLD AUTO: 1 %
BILIRUB SERPL-MCNC: 0.4 MG/DL
BUN SERPL-MCNC: 13.6 MG/DL (ref 8–23)
CALCIUM SERPL-MCNC: 10 MG/DL (ref 8.8–10.2)
CALCIUM SERPL-MCNC: 9.6 MG/DL (ref 8.8–10.2)
CHLORIDE SERPL-SCNC: 105 MMOL/L (ref 98–107)
CREAT SERPL-MCNC: 0.87 MG/DL (ref 0.51–0.95)
DEPRECATED HCO3 PLAS-SCNC: 26 MMOL/L (ref 22–29)
EOSINOPHIL # BLD AUTO: 0.2 10E3/UL (ref 0–0.7)
EOSINOPHIL NFR BLD AUTO: 2 %
ERYTHROCYTE [DISTWIDTH] IN BLOOD BY AUTOMATED COUNT: 15.1 % (ref 10–15)
GFR SERPL CREATININE-BSD FRML MDRD: 74 ML/MIN/1.73M2
GLUCOSE SERPL-MCNC: 119 MG/DL (ref 70–99)
HCT VFR BLD AUTO: 40.5 % (ref 35–47)
HGB BLD-MCNC: 13.1 G/DL (ref 11.7–15.7)
IMM GRANULOCYTES # BLD: 0 10E3/UL
IMM GRANULOCYTES NFR BLD: 0 %
LYMPHOCYTES # BLD AUTO: 2.4 10E3/UL (ref 0.8–5.3)
LYMPHOCYTES NFR BLD AUTO: 28 %
MCH RBC QN AUTO: 27.5 PG (ref 26.5–33)
MCHC RBC AUTO-ENTMCNC: 32.3 G/DL (ref 31.5–36.5)
MCV RBC AUTO: 85 FL (ref 78–100)
MONOCYTES # BLD AUTO: 1 10E3/UL (ref 0–1.3)
MONOCYTES NFR BLD AUTO: 12 %
NEUTROPHILS # BLD AUTO: 4.8 10E3/UL (ref 1.6–8.3)
NEUTROPHILS NFR BLD AUTO: 57 %
NRBC # BLD AUTO: 0 10E3/UL
NRBC BLD AUTO-RTO: 0 /100
PLATELET # BLD AUTO: 298 10E3/UL (ref 150–450)
POTASSIUM SERPL-SCNC: 4.3 MMOL/L (ref 3.4–5.3)
PROT SERPL-MCNC: 7.7 G/DL (ref 6.4–8.3)
RBC # BLD AUTO: 4.77 10E6/UL (ref 3.8–5.2)
SODIUM SERPL-SCNC: 142 MMOL/L (ref 136–145)
WBC # BLD AUTO: 8.5 10E3/UL (ref 4–11)

## 2023-01-18 PROCEDURE — 99214 OFFICE O/P EST MOD 30 MIN: CPT | Performed by: PHYSICIAN ASSISTANT

## 2023-01-18 PROCEDURE — G0463 HOSPITAL OUTPT CLINIC VISIT: HCPCS | Mod: 25 | Performed by: PHYSICIAN ASSISTANT

## 2023-01-18 PROCEDURE — G0463 HOSPITAL OUTPT CLINIC VISIT: HCPCS

## 2023-01-18 PROCEDURE — 250N000011 HC RX IP 250 OP 636: Performed by: INTERNAL MEDICINE

## 2023-01-18 PROCEDURE — 258N000003 HC RX IP 258 OP 636: Performed by: INTERNAL MEDICINE

## 2023-01-18 PROCEDURE — 80053 COMPREHEN METABOLIC PANEL: CPT | Performed by: INTERNAL MEDICINE

## 2023-01-18 PROCEDURE — 999N000248 HC STATISTIC IV INSERT WITH US BY RN

## 2023-01-18 PROCEDURE — 36415 COLL VENOUS BLD VENIPUNCTURE: CPT | Performed by: INTERNAL MEDICINE

## 2023-01-18 PROCEDURE — 999N000285 HC STATISTIC VASC ACCESS LAB DRAW WITH PIV START

## 2023-01-18 PROCEDURE — 86692 HEPATITIS DELTA AGENT ANTBDY: CPT | Performed by: PHYSICIAN ASSISTANT

## 2023-01-18 PROCEDURE — 96365 THER/PROPH/DIAG IV INF INIT: CPT

## 2023-01-18 PROCEDURE — 82668 ASSAY OF ERYTHROPOIETIN: CPT | Performed by: PHYSICIAN ASSISTANT

## 2023-01-18 PROCEDURE — 82040 ASSAY OF SERUM ALBUMIN: CPT | Performed by: PHYSICIAN ASSISTANT

## 2023-01-18 PROCEDURE — 85025 COMPLETE CBC W/AUTO DIFF WBC: CPT | Performed by: PHYSICIAN ASSISTANT

## 2023-01-18 RX ORDER — ZOLEDRONIC ACID 0.04 MG/ML
4 INJECTION, SOLUTION INTRAVENOUS ONCE
Status: COMPLETED | OUTPATIENT
Start: 2023-01-18 | End: 2023-01-18

## 2023-01-18 RX ORDER — CETIRIZINE HYDROCHLORIDE 10 MG/1
TABLET ORAL
Qty: 30 TABLET | Refills: 1 | Status: SHIPPED | OUTPATIENT
Start: 2023-01-18 | End: 2023-04-06

## 2023-01-18 RX ADMIN — SODIUM CHLORIDE 250 ML: 9 INJECTION, SOLUTION INTRAVENOUS at 14:33

## 2023-01-18 RX ADMIN — ZOLEDRONIC ACID 4 MG: 0.04 INJECTION, SOLUTION INTRAVENOUS at 14:47

## 2023-01-18 ASSESSMENT — PAIN SCALES - GENERAL: PAINLEVEL: NO PAIN (0)

## 2023-01-18 NOTE — NURSING NOTE
"Oncology Rooming Note    January 18, 2023 1:58 PM   Ramona Ferrer is a 67 year old female who presents for:    Chief Complaint   Patient presents with     Oncology Clinic Visit     Breast Ca     Initial Vitals: /63   Pulse 75   Temp 97.9  F (36.6  C) (Oral)   Resp 16   Wt 99.3 kg (218 lb 14.4 oz)   SpO2 98%   BMI 38.79 kg/m   Estimated body mass index is 38.79 kg/m  as calculated from the following:    Height as of 12/27/22: 1.6 m (5' 2.99\").    Weight as of this encounter: 99.3 kg (218 lb 14.4 oz). Body surface area is 2.1 meters squared.  No Pain (0) Comment: Data Unavailable   No LMP recorded. Patient has had a hysterectomy.  Allergies reviewed: Yes  Medications reviewed: Yes    Medications: Medication refills not needed today.  Pharmacy name entered into [employer]:    [id number] - [place], [place] - [address]  [facility] [id number] - [place], [place] - [address]    Clinical concerns:        Lena Coats CMA              "

## 2023-01-18 NOTE — TELEPHONE ENCOUNTER
I have not seen this patient since more than 6 months, 1 refill done, please advise patient to schedule appointment in clinic.  Patient is due for A1c and other labs

## 2023-01-18 NOTE — TELEPHONE ENCOUNTER
Routing refill request to provider for review/approval because:  Outside age range for protocol    Max Florentino RN

## 2023-01-18 NOTE — PROGRESS NOTES
Infusion Nursing Note:  Ramona Ferrer presents today for cycle 1 day 1 zometa.    Patient seen by provider today: Yes: Maritza Che PA-C   present during visit today: Not Applicable.    Note: Patient new to infusion, oriented to infusion suite and call light.  Patient given  written teaching materials. Basic side effects of medications reviewed today.  Patient confirms that she has a thermometer at home. Instructed to call with any fevers >100.4, shaking chills, other infectious symptoms, uncontrolled nausea, vomiting, diarrhea, constipation or any other new or concerning symptoms.  Provided with the phone number for triage.    Intravenous Access:  Peripheral IV placed.    Treatment Conditions:  Lab Results   Component Value Date    HGB 13.1 01/18/2023    WBC 8.5 01/18/2023    ANEU 1.7 06/14/2021    ANEUTAUTO 4.8 01/18/2023     01/18/2023      Lab Results   Component Value Date     01/18/2023    POTASSIUM 4.3 01/18/2023    MAG 1.6 (L) 08/26/2022    CR 0.87 01/18/2023    EMMA 9.6 01/18/2023    EMMA 10.0 01/18/2023    BILITOTAL 0.4 01/18/2023    ALBUMIN 4.2 01/18/2023    ALT 38 (H) 01/18/2023    AST 30 01/18/2023     Results reviewed, labs MET treatment parameters, ok to proceed with treatment.    Post Infusion Assessment:  Patient tolerated infusion without incident.  Blood return noted pre and post infusion.  Site patent and intact, free from redness, edema or discomfort.  No evidence of extravasations.  Access discontinued per protocol.     Discharge Plan:   Patient declined prescription refills.  Discharge instructions reviewed with: Patient.  Patient and/or family verbalized understanding of discharge instructions and all questions answered.  Copy of AVS reviewed with patient and/or family.  Patient will return once scheduled per Maritza Lima check out note for next appointment.  Patient discharged in stable condition accompanied by: self.  Departure Mode: Ambulatory.      Meron  Meese, RN

## 2023-01-18 NOTE — NURSING NOTE
Chief Complaint   Patient presents with     Oncology Clinic Visit     Breast Ca     Blood Draw     Vitals, blood drawn and PIV placed by [facility] vascular. Pt checked into appt.      NICOL Gaffney LPN

## 2023-01-18 NOTE — PATIENT INSTRUCTIONS
[facility] Triage and after hours / weekends / holidays:  [phone]    Please call the triage or after hours line if you experience a temperature greater than or equal to 100.4, shaking chills, have uncontrolled nausea, vomiting and/or diarrhea, dizziness, shortness of breath, chest pain, bleeding, unexplained bruising, or if you have any other new/concerning symptoms, questions or concerns.      If you are having any concerning symptoms or wish to speak to a provider before your next infusion visit, please call your care coordinator or triage to notify them so we can adequately serve you.     If you need a refill on a narcotic prescription or other medication, please call before your infusion appointment.                January 2023 Sunday Monday Tuesday Wednesday Thursday Friday Saturday   1     2     3     4     5     6     7       8     9     10     11     12     13     14       15     16     17     18    LAB PERIPHERAL   1:30 PM   (15 min.)   [facility] LAB DRAW   [facility]    RETURN   1:45 PM   (45 min.)   Maritza Che PA-C   [facility]    ONC INFUSION 0.5 HR (30 MIN)   3:30 PM   (30 min.)    ONC INFUSION NURSE   [facility] 19     20     21       22     23     24     25     26     27     28       29     30     31                                       February 2023 Sunday Monday Tuesday Wednesday Thursday Friday Saturday                  1     2     3     4       5     6     7     8     9     10     11       12     13     14     15     16     17     18       19     20     21     22     23     24     25       26     27     28                                           Lab Results:  Recent Results (from the past 12 hour(s))   Calcium    Collection Time: 01/18/23  1:47 PM   Result Value Ref Range    Calcium 9.6 8.8 - 10.2 mg/dL   COMPREHENSIVE METABOLIC PANEL    Collection Time: 01/18/23  1:47 PM    Result Value Ref Range    Sodium 142 136 - 145 mmol/L    Potassium 4.3 3.4 - 5.3 mmol/L    Chloride 105 98 - 107 mmol/L    Carbon Dioxide (CO2) 26 22 - 29 mmol/L    Anion Gap 11 7 - 15 mmol/L    Urea Nitrogen 13.6 8.0 - 23.0 mg/dL    Creatinine 0.87 0.51 - 0.95 mg/dL    Calcium 10.0 8.8 - 10.2 mg/dL    Glucose 119 (H) 70 - 99 mg/dL    Alkaline Phosphatase 79 35 - 104 U/L    AST 30 10 - 35 U/L    ALT 38 (H) 10 - 35 U/L    Protein Total 7.7 6.4 - 8.3 g/dL    Albumin 4.2 3.5 - 5.2 g/dL    Bilirubin Total 0.4 <=1.2 mg/dL    GFR Estimate 74 >60 mL/min/1.73m2   CBC with platelets and differential    Collection Time: 01/18/23  1:47 PM   Result Value Ref Range    WBC Count 8.5 4.0 - 11.0 10e3/uL    RBC Count 4.77 3.80 - 5.20 10e6/uL    Hemoglobin 13.1 11.7 - 15.7 g/dL    Hematocrit 40.5 35.0 - 47.0 %    MCV 85 78 - 100 fL    MCH 27.5 26.5 - 33.0 pg    MCHC 32.3 31.5 - 36.5 g/dL    RDW 15.1 (H) 10.0 - 15.0 %    Platelet Count 298 150 - 450 10e3/uL    % Neutrophils 57 %    % Lymphocytes 28 %    % Monocytes 12 %    % Eosinophils 2 %    % Basophils 1 %    % Immature Granulocytes 0 %    NRBCs per 100 WBC 0 <1 /100    Absolute Neutrophils 4.8 1.6 - 8.3 10e3/uL    Absolute Lymphocytes 2.4 0.8 - 5.3 10e3/uL    Absolute Monocytes 1.0 0.0 - 1.3 10e3/uL    Absolute Eosinophils 0.2 0.0 - 0.7 10e3/uL    Absolute Basophils 0.1 0.0 - 0.2 10e3/uL    Absolute Immature Granulocytes 0.0 <=0.4 10e3/uL    Absolute NRBCs 0.0 10e3/uL

## 2023-01-19 LAB — EPO SERPL-ACNC: 10 MU/ML

## 2023-01-20 LAB — HDV AB SER QL IA: NEGATIVE

## 2023-01-23 NOTE — PROGRESS NOTES
Dada Mendiola MD  [facility]  [address] [place] [address], [facility] [address]  [place] [address]     RE:  Ramona Ferrer  MRN:  [id number]  :  1955     Dear Dr. Mendiola:     I would like to refer to you Sammie Ferrer, a 67-year-old woman with a recent diagnosis of a stage IIB, T3 N0 MX, invasive lobular carcinoma of the left breast.  She self-palpated a lump in the upper outer quadrant of the left breast.  She underwent evaluation with a diagnostic mammogram and ultrasound, which was BI-RADS 4, showing in the upper outer quadrant of the left breast irregularly shaped hypoechoic mass at the 12 o'clock position 4 cm from the nipple on the left.  This mass measured 2.3 x 2.2 x 1.5 cm and accounted for the patient's area of palpable concern.  Additionally, at the 1 o'clock position 2 cm from the nipple in the left breast, there is a second irregularly shaped hypoechoic mass with indistinct margins measuring 1.3 x 1.1 x 0.8 cm.  She also underwent a breast MRI which showed in the right breast mild background parenchymal enhancement and no suspicious areas of enhancement or lymphadenopathy.  In the left breast there was mild background parenchymal enhancement, and at the 12 o'clock position 4 cm from the nipple there was a heterogeneously enhancing irregular mass measuring 3.4 in AP dimension x 2.8 cm ML and 2.8 cm SI corresponding to the known biopsy-proven malignancy in the left breast.  In the left breast at the 1 o'clock position 2 cm from the nipple there was also a heterogeneously enhancing oval mass measuring 1.3 cm in maximal diameter.  This likely corresponds to the second biopsy-proven malignancy.  Together, the full extent of the disease spans a total of 5.2 cm AP x 4.4 cm ML, BI-RADS 6.     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  In part B in the left breast at the 1 o'clock position 2 cm from the nipple, ultrasound-guided needle biopsy showed invasive lobular carcinoma, [place] grade 1/3 lobular carcinoma in situ was present, classic subtype, estrogen receptors positive in greater than 70% of the cells, progesterone receptors positive in greater than 90% of cells, and HER2 was nonamplified, Ki-67 20-25%.  She now comes to our clinic for recommendations.     Sammie reports that the breast mass had been there for approximately 1 month before she was seen for evaluation..       She has worked in the past as a personal care assistant and lives in the [facility].       PAST MEDICAL HISTORY:  There is no history of breast cancer in the past.  No history of breast cancer surgery.  She has no history of radiation therapy in the past or radiation exposure.  No history of prior tumor of any kind.  She denies heart problems, heart attack, breathing problems, blood clots, seizures, peptic ulcer disease, osteoporosis or bone fractures.  She does report a history of arthritis.     FAMILY HISTORY:  Positive for breast cancer in a paternal aunt who was diagnosed with breast cancer at an old age.     Her father has a history of stomach cancer.  There is no family history of pancreatic cancer, melanoma, lung cancer or ovarian cancer.     No history of male breast cancer.     ALLERGIES:  No history of drug allergies.  She denies allergies to seafood, iodine, or contrast dye.     PAST MENSTRUAL HISTORY:  She has been pregnant 3 times with 2 live births at age 29 and 31 and 1 miscarriage.  Age at first menstrual period was 14.  She did have a total abdominal hysterectomy and bilateral salpingo-oophorectomy performed in 2003 for endometriosis.  She has no history of hormone replacement therapy.  No  history of oral contraceptives.     HABITS: She denies tobacco history.  She denies alcohol history and drinks alcohol only occasionally.     PAST MEDICAL HISTORY:  Past medical history is also remarkable for type 2 diabetes, and she was begun on metformin 2 years ago.  She also has a history of varicose veins and a history of a lipoma resected from her left leg.      Chronic Hepatitis B.  Hepatitis C negative.  HIV negative.       Prior COVID vaccination x 3.      GERM LINE GENETICS: INVITAE testing of 47 genes was negative.      TREATMENT HISTORY:  A.  MammaPrint showed low risk.  B.  Left mastectomy and axillary lymph node sampling.   A. Lymph node, LEFT axillary, excision:  -One benign lymph node (0/1)  B. LEFT breast, skin-sparing mastectomy:  -INVASIVE BREAST CARCINOMA, MIXED INVASIVE LOBULAR CARCINOMA (predominant component) and INVASIVE DUCTAL CARCINOMA (minor component), KALPESH GRADE 3, size 5.5 cm, 12:00, upper outer quadrant and lower outer quadrant  -Ductal carcinoma in situ (DCIS), nuclear grade 2, cribriform and solid type(s), with focal necrosis  -DCIS is admixed with invasive carcinoma, and comprises a minor component (<5%) of the tumor volume   -Lobular carcinoma in situ (LCIS), predominantly classic type (admixed with invasive carcinoma and beyond invasive carcinoma) and focal pleomorphic type (size 2 mm, adjacent to invasive carcinoma)  -Margins are uninvolved by invasive carcinoma  -Invasive carcinoma is 2.5 mm from the nearest (anterior) margin, and is > 5 mm from the posterior margin  -Margins are uninvolved by DCIS and pleomorphic LCIS  -DCIS and pleomorphic LCIS are > 5 mm from the nearest (anterior) margin  -Benign nipple and skin   -Other findings: fibrocystic change (including microcysts with apocrine metaplasia) and columnar cell change  -Calcifications associated with DCIS, LCIS, and benign ducts and acini  -Prior core biopsy site changes (two biopsy sites identified)  -Invasive  carcinoma is estrogen receptor positive (>70%, strong intensity) and progesterone receptor positive (>90%, strong intensity) by immunohistochemistry and is HER2 non-amplified (group 5) by FISH (performed on prior core biopsies, see report [id number], specimens A and B)  DCIS and LCIS present.  -Margins are uninvolved by invasive carcinoma or LCIS.   -Invasive carcinoma is estrogen receptor positive (>70%, strong intensity) and progesterone receptor positive (>90%, strong intensity) by immunohistochemistry and is HER2 non-amplified (group 5) by FISH (performed on prior core biopsies, see report [id number], specimens A and B) Ki-67 immunohistochemistry (MIB-1, pharmDx, Dako Tolerxis) from [facility] was performed on invasive carcinoma in the LEFT mastectomy specimen   They report Ki-67 proliferative index of 20-25% (block B5) and 20% (block B27).   C.  Staging:  pT3 Nx because lymph node is not the sentinel node.    D.  She did not want radiation, and risk:benefit did not favor treatment.  E.  Letrozole begun 4-5-22.   F.  Left TRAM flap reconstruction healed as of 12-27-22 but she has some concerns.   G.  Adjuvant Zometa 12-27-22 but not tolerated. She did have a reaction to adjuvant Zometa and it cannot be given any further.      INTERVAL HISTORY  Sammie returns to clinic to discuss an infusion reaction she had to Zometa.  She reports that she had shaking chills and a temperature of 104 degrees, which seems somewhat high but she insists that it was this high.  She had fever for 2 nights and then gradually recovered.  She did take Tylenol and that helped, and we will list this as an adverse reaction to Zometa and discontinue the medication.  She does have a most valid negative T score of -1.8 in each femur, and I do think it would be reasonable to consider Prolia, but I would wait 6 months.  Her major complaint is that she has sciatica bilaterally, but worse on the left than the right.  We will continue to monitor this  problem.  Other than the sciatica, she has no other pain, fatigue, depression or anxiety.  She has had COVID vaccination x3.  The remainder of a 10-point review of systems is negative.  She is eating a Mediterranean-style diet, and she is aspiring to exercise 150 minutes per week in the spring when the weather improves.  She is taking vitamin D 2000 International Units, and I did recommend that she discontinue oral calcium at this time and we can reevaluate.    Ongoing LS spine degenerative disease.      REVIEW OF SYSTEMS:  A 10-point review of systems is negative.     She is eating a healthful Mediterranean-style diet.  She is not exercising much.      She is taking vitamin D3 2000 International Units per day.  She does not take calcium.       PHYSICAL EXAMINATION:    VITAL SIGNS:  /57   Pulse 84   Resp 16   SpO2 97%   GENERAL:  Ramona appears generally well.  HEENT:  She has no alopecia, but she does have some hair thinning related to the aromatase inhibitor.  No lesions in the oropharynx.  She has relatively poor dentition but no open sockets.  LYMPH:  There is no palpable cervical, supraclavicular, subclavicular or axillary lymphadenopathy.  BREASTS:  Exam was not performed today.  LUNGS:  Clear to percussion and auscultation.  HEART:  Regular rate and rhythm.  S1, S2.  ABDOMEN:  Soft, nontender, consistent with increased body mass index.  EXTREMITIES:  Without edema.  PSYCH:  Mood and affect were normal.     LABORATORY DATA:  CMP showed a calcium of 10.3.  Glucose 107.  CBC normal.      IMAGING SUMMARY:  December 27, 2022  IMPRESSION: BI-RADS CATEGORY: 2 - Benign.         RECOMMENDED FOLLOW-UP: Annual Mammography.  Recommend clinical evaluation for scar/possible keloid within the left  breast at the 9:00 position.     The finding and recommendation were discussed with the patient at the  time of evaluation.     I have personally reviewed the examination and initial interpretation  and I agree with the  findings.     GETACHEW APODACA MD        ASSESSMENT AND PLAN:  1.  Sammie Ferrer is a 67-year-old woman with a recent diagnosis of a stage IIB, T3 N0 MX, invasive lobular carcinoma of the upper outer quadrant of the left breast which is multifocal grade 1, ER positive in greater than 70% of cells, CA positive in greater than 90% of cells and HER2 nonamplified.  She now comes to clinic with her , for recommendations regarding evaluation and treatment.    2.  MammaPrint before surgery came back as low risk as did the Oncotype after surgery. The Oncotype of the pleomorphic lobular cancer showed a value of 15, which is less than the threshold for chemotherapy from TAILORx which is 26 for a post-menopausal woman.   3.  Sammie Ferrer underwent a left mastectomy and has a TRAM flap in place.  She started adjuvant hormonal therapy with letrozole and has tolerated it well.   4. Pathology showed -Invasive carcinoma is estrogen receptor positive, progesterone receptor positive and HER2 negative by immunohistochemistry.  Margins negative. pT3 Nx because the lymph node is not sentinel. She did not qualify for neoadjuvant chemotherapy based on MammaPrint nor adjuvant chemotherapy based on Oncotype.  She also was node negative (although the LN was not sentinel) and did not meet criteria for [facility]. She was node negative by MRI but the node was not sentinel.  She does have Ki67 20% or greater and size of tumor greater than 5 cm.  She cannot tolerate adjuvant Zoledronic acid.  We would plan to continue AI for 10 years.   5.  Ramona Ferrer returns to clinic after an adjuvant Zometa reaction.  We will list this as an allergy because she had a high fever associated with it, and we will consider Prolia beginning in 6 months.  She does have a most valid negative T score of -1.8 in both femurs, so a bone targeted agent does make sense for her.  Although we were hoping to use adjuvant Zometa, I do not think that is going to be  feasible for her.  6.  Sciatica.  She will see us in followup in 3 months and if this problem is worsening, we will refer her to Dr. Burns.  7.  Plastic surgery.  Sammie does have a TRAM flap reconstruction.  There are a couple of spots along the incision that are healing slowly.  Ramona is adamant about not having any further plastic surgery, and I discussed with her that that is totally reasonable.  I did point out to her that if there are areas of the wounds that are not completely healing, to come back to see us.  She declined a breast exam today, but I did examine her last month.  5.  She has tolerated the letrozole quite well, although she does have some hot flashes.    6.  TRAM flap econstruction.  She does not want any more surgery but will discuss with Dr. Feliciano.   7.  Imaging:  Yearly mammography of right breast.  Yearly mammography of right breast 12-27-22, BI-RADS CATEGORY: 2 - Benign   8.  Dermatology referral for alopecia related to letrozole.   11.  Followup.     Follow up with me July 25 to start Prolia with DIEGO FONTANA. Right mammogram January 30 with MARIZOL Parsons CMP and with me.    Thank you for allowing us to participate in this patient's care.     Sincerely,     Kenneth Cisneros MD  Professor  [facility]  [phone]    I spent 40 minutes with the patient more than 50% of which was in counseling and coordination of care.

## 2023-01-24 ENCOUNTER — ONCOLOGY VISIT (OUTPATIENT)
Dept: ONCOLOGY | Facility: CLINIC | Age: 68
End: 2023-01-24
Attending: INTERNAL MEDICINE
Payer: COMMERCIAL

## 2023-01-24 ENCOUNTER — APPOINTMENT (OUTPATIENT)
Dept: LAB | Facility: CLINIC | Age: 68
End: 2023-01-24
Attending: INTERNAL MEDICINE
Payer: COMMERCIAL

## 2023-01-24 VITALS
SYSTOLIC BLOOD PRESSURE: 113 MMHG | HEART RATE: 84 BPM | DIASTOLIC BLOOD PRESSURE: 57 MMHG | OXYGEN SATURATION: 97 % | RESPIRATION RATE: 16 BRPM

## 2023-01-24 DIAGNOSIS — Z79.811 USE OF AROMATASE INHIBITORS: ICD-10-CM

## 2023-01-24 DIAGNOSIS — C50.412 MALIGNANT NEOPLASM OF UPPER-OUTER QUADRANT OF LEFT BREAST IN FEMALE, ESTROGEN RECEPTOR POSITIVE (H): Primary | ICD-10-CM

## 2023-01-24 DIAGNOSIS — M81.8 IDIOPATHIC OSTEOPOROSIS: ICD-10-CM

## 2023-01-24 DIAGNOSIS — Z17.0 MALIGNANT NEOPLASM OF UPPER-OUTER QUADRANT OF LEFT BREAST IN FEMALE, ESTROGEN RECEPTOR POSITIVE (H): Primary | ICD-10-CM

## 2023-01-24 DIAGNOSIS — M85.80 OSTEOPENIA: ICD-10-CM

## 2023-01-24 LAB
ALBUMIN SERPL BCG-MCNC: 4.1 G/DL (ref 3.5–5.2)
ALP SERPL-CCNC: 76 U/L (ref 35–104)
ALT SERPL W P-5'-P-CCNC: 31 U/L (ref 10–35)
ANION GAP SERPL CALCULATED.3IONS-SCNC: 12 MMOL/L (ref 7–15)
AST SERPL W P-5'-P-CCNC: 25 U/L (ref 10–35)
BASOPHILS # BLD AUTO: 0.1 10E3/UL (ref 0–0.2)
BASOPHILS NFR BLD AUTO: 1 %
BILIRUB SERPL-MCNC: 0.4 MG/DL
BUN SERPL-MCNC: 12.6 MG/DL (ref 8–23)
CALCIUM SERPL-MCNC: 10.9 MG/DL (ref 8.8–10.2)
CHLORIDE SERPL-SCNC: 103 MMOL/L (ref 98–107)
CREAT SERPL-MCNC: 0.7 MG/DL (ref 0.51–0.95)
DEPRECATED HCO3 PLAS-SCNC: 24 MMOL/L (ref 22–29)
EOSINOPHIL # BLD AUTO: 0.2 10E3/UL (ref 0–0.7)
EOSINOPHIL NFR BLD AUTO: 2 %
ERYTHROCYTE [DISTWIDTH] IN BLOOD BY AUTOMATED COUNT: 15 % (ref 10–15)
GFR SERPL CREATININE-BSD FRML MDRD: >90 ML/MIN/1.73M2
GLUCOSE SERPL-MCNC: 128 MG/DL (ref 70–99)
HCT VFR BLD AUTO: 38.5 % (ref 35–47)
HGB BLD-MCNC: 12.3 G/DL (ref 11.7–15.7)
IMM GRANULOCYTES # BLD: 0 10E3/UL
IMM GRANULOCYTES NFR BLD: 0 %
LYMPHOCYTES # BLD AUTO: 2.5 10E3/UL (ref 0.8–5.3)
LYMPHOCYTES NFR BLD AUTO: 32 %
MCH RBC QN AUTO: 27.3 PG (ref 26.5–33)
MCHC RBC AUTO-ENTMCNC: 31.9 G/DL (ref 31.5–36.5)
MCV RBC AUTO: 85 FL (ref 78–100)
MONOCYTES # BLD AUTO: 1 10E3/UL (ref 0–1.3)
MONOCYTES NFR BLD AUTO: 13 %
NEUTROPHILS # BLD AUTO: 4 10E3/UL (ref 1.6–8.3)
NEUTROPHILS NFR BLD AUTO: 52 %
NRBC # BLD AUTO: 0 10E3/UL
NRBC BLD AUTO-RTO: 0 /100
PLATELET # BLD AUTO: 306 10E3/UL (ref 150–450)
POTASSIUM SERPL-SCNC: 4 MMOL/L (ref 3.4–5.3)
PROT SERPL-MCNC: 7.7 G/DL (ref 6.4–8.3)
RBC # BLD AUTO: 4.51 10E6/UL (ref 3.8–5.2)
SODIUM SERPL-SCNC: 139 MMOL/L (ref 136–145)
WBC # BLD AUTO: 7.8 10E3/UL (ref 4–11)

## 2023-01-24 PROCEDURE — 80053 COMPREHEN METABOLIC PANEL: CPT | Performed by: INTERNAL MEDICINE

## 2023-01-24 PROCEDURE — 85025 COMPLETE CBC W/AUTO DIFF WBC: CPT | Performed by: INTERNAL MEDICINE

## 2023-01-24 PROCEDURE — 36415 COLL VENOUS BLD VENIPUNCTURE: CPT | Performed by: INTERNAL MEDICINE

## 2023-01-24 PROCEDURE — G0463 HOSPITAL OUTPT CLINIC VISIT: HCPCS

## 2023-01-24 PROCEDURE — 99215 OFFICE O/P EST HI 40 MIN: CPT | Performed by: INTERNAL MEDICINE

## 2023-01-24 PROCEDURE — G0463 HOSPITAL OUTPT CLINIC VISIT: HCPCS | Performed by: INTERNAL MEDICINE

## 2023-01-24 ASSESSMENT — PAIN SCALES - GENERAL: PAINLEVEL: NO PAIN (0)

## 2023-01-24 NOTE — NURSING NOTE
Chief Complaint   Patient presents with     Blood Draw     Labs drawn via  by RN in lab. VS taken.      Labs drawn via venipuncture. Vital signs taken. Checked into next appointment.   Vidhi Elias RN

## 2023-01-24 NOTE — NURSING NOTE
"Oncology Rooming Note    January 24, 2023 1:53 PM   Ramona Ferrer is a 67 year old female who presents for:    Chief Complaint   Patient presents with     Blood Draw     Labs drawn via  by RN in lab. VS taken.      Oncology Clinic Visit     Malignant neoplasm of upper-outer quadrant of left breast in female, estrogen receptor positive     Initial Vitals: /57   Pulse 84   Resp 16   SpO2 97%  Estimated body mass index is 38.79 kg/m  as calculated from the following:    Height as of 12/27/22: 1.6 m (5' 2.99\").    Weight as of 1/18/23: 99.3 kg (218 lb 14.4 oz). There is no height or weight on file to calculate BSA.  No Pain (0) Comment: Data Unavailable   No LMP recorded. Patient has had a hysterectomy.  Allergies reviewed: Yes  Medications reviewed: Yes    Medications: Medication refills not needed today.  Pharmacy name entered into [employer]:    [id number] - [place], [place] - [address]  [id number] - [place], [place] - [address]    Clinical concerns: none       Palma Browning            "

## 2023-01-24 NOTE — LETTER
2023         RE: Ramona Ferrer  [address]  [facility] [address]        Dear Colleague,    Thank you for referring your patient, Ramona Ferrer, to the [facility]. Please see a copy of my visit note below.    Dada Mendiola MD  [facility]  [address] [facility] [address]  [place] [address]     RE:  Ramona Ferrer  MRN:  [id number]  :  1955     Dear Dr. Mendiola:     I would like to refer to you Sammie Ferrer, a 67-year-old woman with a recent diagnosis of a stage IIB, T3 N0 MX, invasive lobular carcinoma of the left breast.  She self-palpated a lump in the upper outer quadrant of the left breast.  She underwent evaluation with a diagnostic mammogram and ultrasound, which was BI-RADS 4, showing in the upper outer quadrant of the left breast irregularly shaped hypoechoic mass at the 12 o'clock position 4 cm from the nipple on the left.  This mass measured 2.3 x 2.2 x 1.5 cm and accounted for the patient's area of palpable concern.  Additionally, at the 1 o'clock position 2 cm from the nipple in the left breast, there is a second irregularly shaped hypoechoic mass with indistinct margins measuring 1.3 x 1.1 x 0.8 cm.  She also underwent a breast MRI which showed in the right breast mild background parenchymal enhancement and no suspicious areas of enhancement or lymphadenopathy.  In the left breast there was mild background parenchymal enhancement, and at the 12 o'clock position 4 cm from the nipple there was a heterogeneously enhancing irregular mass measuring 3.4 in AP dimension x 2.8 cm ML and 2.8 cm SI corresponding to the known biopsy-proven malignancy in the left breast.  In the left breast at the 1 o'clock position 2 cm from the nipple there was also a heterogeneously enhancing oval mass measuring 1.3 cm in maximal diameter.  This likely corresponds to the second biopsy-proven malignancy.  Together, the full extent of the  disease spans a total of 5.2 cm AP x 4.4 cm ML, BI-RADS 6.     The pathology showed in the left breast 12 o'clock position 4 cm from the nipple, ultrasound-guided needle biopsy invasive lobular carcinoma, [place] grade 1/3 lobular carcinoma in situ, classic type, estrogen receptor positive in greater than 70% of the cells and progesterone receptor positive in greater than 90% of cells, and HER2 FISH was nonamplified.  In part B in the left breast at the 1 o'clock position 2 cm from the nipple, ultrasound-guided needle biopsy showed invasive lobular carcinoma, [place] grade 1/3 lobular carcinoma in situ was present, classic subtype, estrogen receptors positive in greater than 70% of the cells, progesterone receptors positive in greater than 90% of cells, and HER2 was nonamplified, Ki-67 20-25%.  She now comes to our clinic for recommendations.     Sammie reports that the breast mass had been there for approximately 1 month before she was seen for evaluation..       She has worked in the past as a personal care assistant and lives in the [facility].       PAST MEDICAL HISTORY:  There is no history of breast cancer in the past.  No history of breast cancer surgery.  She has no history of radiation therapy in the past or radiation exposure.  No history of prior tumor of any kind.  She denies heart problems, heart attack, breathing problems, blood clots, seizures, peptic ulcer disease, osteoporosis or bone fractures.  She does report a history of arthritis.     FAMILY HISTORY:  Positive for breast cancer in a paternal aunt who was diagnosed with breast cancer at an old age.     Her father has a history of stomach cancer.  There is no family history of pancreatic cancer, melanoma, lung cancer or ovarian cancer.     No history of male breast cancer.     ALLERGIES:  No history of drug allergies.  She denies allergies to seafood, iodine, or contrast dye.     PAST MENSTRUAL HISTORY:  She has been pregnant 3 times with  2 live births at age 29 and 31 and 1 miscarriage.  Age at first menstrual period was 14.  She did have a total abdominal hysterectomy and bilateral salpingo-oophorectomy performed in 2003 for endometriosis.  She has no history of hormone replacement therapy.  No history of oral contraceptives.     HABITS: She denies tobacco history.  She denies alcohol history and drinks alcohol only occasionally.     PAST MEDICAL HISTORY:  Past medical history is also remarkable for type 2 diabetes, and she was begun on metformin 2 years ago.  She also has a history of varicose veins and a history of a lipoma resected from her left leg.      Chronic Hepatitis B.  Hepatitis C negative.  HIV negative.       Prior COVID vaccination x 3.      GERM LINE GENETICS: INVITAE testing of 47 genes was negative.      TREATMENT HISTORY:  A.  MammaPrint showed low risk.  B.  Left mastectomy and axillary lymph node sampling.   A. Lymph node, LEFT axillary, excision:  -One benign lymph node (0/1)  B. LEFT breast, skin-sparing mastectomy:  -INVASIVE BREAST CARCINOMA, MIXED INVASIVE LOBULAR CARCINOMA (predominant component) and INVASIVE DUCTAL CARCINOMA (minor component), KALPESH GRADE 3, size 5.5 cm, 12:00, upper outer quadrant and lower outer quadrant  -Ductal carcinoma in situ (DCIS), nuclear grade 2, cribriform and solid type(s), with focal necrosis  -DCIS is admixed with invasive carcinoma, and comprises a minor component (<5%) of the tumor volume   -Lobular carcinoma in situ (LCIS), predominantly classic type (admixed with invasive carcinoma and beyond invasive carcinoma) and focal pleomorphic type (size 2 mm, adjacent to invasive carcinoma)  -Margins are uninvolved by invasive carcinoma  -Invasive carcinoma is 2.5 mm from the nearest (anterior) margin, and is > 5 mm from the posterior margin  -Margins are uninvolved by DCIS and pleomorphic LCIS  -DCIS and pleomorphic LCIS are > 5 mm from the nearest (anterior) margin  -Benign nipple and  skin   -Other findings: fibrocystic change (including microcysts with apocrine metaplasia) and columnar cell change  -Calcifications associated with DCIS, LCIS, and benign ducts and acini  -Prior core biopsy site changes (two biopsy sites identified)  -Invasive carcinoma is estrogen receptor positive (>70%, strong intensity) and progesterone receptor positive (>90%, strong intensity) by immunohistochemistry and is HER2 non-amplified (group 5) by FISH (performed on prior core biopsies, see report [id number], specimens A and B)  DCIS and LCIS present.  -Margins are uninvolved by invasive carcinoma or LCIS.   -Invasive carcinoma is estrogen receptor positive (>70%, strong intensity) and progesterone receptor positive (>90%, strong intensity) by immunohistochemistry and is HER2 non-amplified (group 5) by FISH (performed on prior core biopsies, see report [id number], specimens A and B) Ki-67 immunohistochemistry (MIB-1, pharmDx, [employer]) from [employer] was performed on invasive carcinoma in the LEFT mastectomy specimen   They report Ki-67 proliferative index of 20-25% (block B5) and 20% (block B27).   C.  Staging:  pT3 Nx because lymph node is not the sentinel node.    D.  She did not want radiation, and risk:benefit did not favor treatment.  E.  Letrozole begun 4-5-22.   F.  Left TRAM flap reconstruction healed as of 12-27-22 but she has some concerns.   G.  Adjuvant Zometa 12-27-22 but not tolerated. She did have a reaction to adjuvant Zometa and it cannot be given any further.      INTERVAL HISTORY  Sammie returns to clinic to discuss an infusion reaction she had to Zometa.  She reports that she had shaking chills and a temperature of 104 degrees, which seems somewhat high but she insists that it was this high.  She had fever for 2 nights and then gradually recovered.  She did take Tylenol and that helped, and we will list this as an adverse reaction to Zometa and discontinue the medication.  She does have a most  valid negative T score of -1.8 in each femur, and I do think it would be reasonable to consider Prolia, but I would wait 6 months.  Her major complaint is that she has sciatica bilaterally, but worse on the left than the right.  We will continue to monitor this problem.  Other than the sciatica, she has no other pain, fatigue, depression or anxiety.  She has had COVID vaccination x3.  The remainder of a 10-point review of systems is negative.  She is eating a Mediterranean-style diet, and she is aspiring to exercise 150 minutes per week in the spring when the weather improves.  She is taking vitamin D 2000 International Units, and I did recommend that she discontinue oral calcium at this time and we can reevaluate.    Ongoing LS spine degenerative disease.      REVIEW OF SYSTEMS:  A 10-point review of systems is negative.     She is eating a healthful Mediterranean-style diet.  She is not exercising much.      She is taking vitamin D3 2000 International Units per day.  She does not take calcium.       PHYSICAL EXAMINATION:    VITAL SIGNS:  /57   Pulse 84   Resp 16   SpO2 97%   GENERAL:  Ramona appears generally well.  HEENT:  She has no alopecia, but she does have some hair thinning related to the aromatase inhibitor.  No lesions in the oropharynx.  She has relatively poor dentition but no open sockets.  LYMPH:  There is no palpable cervical, supraclavicular, subclavicular or axillary lymphadenopathy.  BREASTS:  Exam was not performed today.  LUNGS:  Clear to percussion and auscultation.  HEART:  Regular rate and rhythm.  S1, S2.  ABDOMEN:  Soft, nontender, consistent with increased body mass index.  EXTREMITIES:  Without edema.  PSYCH:  Mood and affect were normal.     LABORATORY DATA:  CMP showed a calcium of 10.3.  Glucose 107.  CBC normal.      IMAGING SUMMARY:  December 27, 2022  IMPRESSION: BI-RADS CATEGORY: 2 - Benign.         RECOMMENDED FOLLOW-UP: Annual Mammography.  Recommend clinical evaluation  for scar/possible keloid within the left  breast at the 9:00 position.     The finding and recommendation were discussed with the patient at the  time of evaluation.     I have personally reviewed the examination and initial interpretation  and I agree with the findings.     GETACHEW APODACA MD        ASSESSMENT AND PLAN:  1.  Sammie Ferrer is a 67-year-old woman with a recent diagnosis of a stage IIB, T3 N0 MX, invasive lobular carcinoma of the upper outer quadrant of the left breast which is multifocal grade 1, ER positive in greater than 70% of cells, OH positive in greater than 90% of cells and HER2 nonamplified.  She now comes to clinic with her , for recommendations regarding evaluation and treatment.    2.  MammaPrint before surgery came back as low risk as did the Oncotype after surgery. The Oncotype of the pleomorphic lobular cancer showed a value of 15, which is less than the threshold for chemotherapy from TAILORx which is 26 for a post-menopausal woman.   3.  Sammie Ferrer underwent a left mastectomy and has a TRAM flap in place.  She started adjuvant hormonal therapy with letrozole and has tolerated it well.   4. Pathology showed -Invasive carcinoma is estrogen receptor positive, progesterone receptor positive and HER2 negative by immunohistochemistry.  Margins negative. pT3 Nx because the lymph node is not sentinel. She did not qualify for neoadjuvant chemotherapy based on MammaPrint nor adjuvant chemotherapy based on Oncotype.  She also was node negative (although the LN was not sentinel) and did not meet criteria for [facility]. She was node negative by MRI but the node was not sentinel.  She does have Ki67 20% or greater and size of tumor greater than 5 cm.  She cannot tolerate adjuvant Zoledronic acid.  We would plan to continue AI for 10 years.   5.  Ramona Ferrer returns to clinic after an adjuvant Zometa reaction.  We will list this as an allergy because she had a high fever associated  with it, and we will consider Prolia beginning in 6 months.  She does have a most valid negative T score of -1.8 in both femurs, so a bone targeted agent does make sense for her.  Although we were hoping to use adjuvant Zometa, I do not think that is going to be feasible for her.  6.  Sciatica.  She will see us in followup in 3 months and if this problem is worsening, we will refer her to Dr. Burns.  7.  Plastic surgery.  Sammie does have a TRAM flap reconstruction.  There are a couple of spots along the incision that are healing slowly.  Ramona is adamant about not having any further plastic surgery, and I discussed with her that that is totally reasonable.  I did point out to her that if there are areas of the wounds that are not completely healing, to come back to see us.  She declined a breast exam today, but I did examine her last month.  5.  She has tolerated the letrozole quite well, although she does have some hot flashes.    6.  TRAM flap econstruction.  She does not want any more surgery but will discuss with Dr. Feliciano.   7.  Imaging:  Yearly mammography of right breast.  Yearly mammography of right breast 12-27-22, BI-RADS CATEGORY: 2 - Benign   8.  Dermatology referral for alopecia related to letrozole.   11.  Followup.     Follow up with me July 25 to start Prolia with DIEGO FONTANA. Right mammogram January 30 with Issa, DIEGO FONTANA and with me.    Thank you for allowing us to participate in this patient's care.     Sincerely,     Kenneth Cisneros MD  Professor  [facility]  [phone]    I spent 40 minutes with the patient more than 50% of which was in counseling and coordination of care.

## 2023-01-25 ENCOUNTER — PATIENT OUTREACH (OUTPATIENT)
Dept: GERIATRIC MEDICINE | Facility: CLINIC | Age: 68
End: 2023-01-25
Payer: COMMERCIAL

## 2023-01-25 NOTE — PROGRESS NOTES
[facility] Care Coordination Contact      [facility] Health Plan or Product Change    CC received notification that member's health plan or health plan product changed from [facility] to [facility]O effective 1/1/2023. CC contacted member and discussed change and face-to-face visit was offered. Member declined need for home visit. CC reviewed previous Health Risk Assessment and POC with member and no changes noted.    Called member and introduced self as member s new CC. Confirmed with member that the welcome letter with writer's name and contact information has been received.  Reviewed [facility]/Health Risk Assessment (HRA) and POC with member. No changes noted.  Transitional HRA completed. Care Plan Summary updated and reflects current services.  Required referral authorization information communicated to CMS: Not applicable  Writer reviewed the following with member:  ER visits: No  Hospitalizations: No  TCU stays: No  Significant health status changes: Denies  Falls/Injuries: No  ADL/IADL changes: No  Changes in services: No  Member reports she has never gotten a [employer] Card. TC to [facility] customer services and they forwarded the request to [facility] wellness department as per [employer] they don't have member on the list to be sent a card. Care coordinator to follow up with [employer] next week to confirm card has been sent out.     Follow-Up Plan: Member informed of future contact, plan to f/u with member with at next regularly scheduled contact.  Contact information shared with member and family, encouraged member to call with any questions or concerns.  Stella Cunha RN  [facility]  [phone]

## 2023-01-26 RX ORDER — MEPERIDINE HYDROCHLORIDE 25 MG/ML
25 INJECTION INTRAMUSCULAR; INTRAVENOUS; SUBCUTANEOUS EVERY 30 MIN PRN
Status: CANCELLED | OUTPATIENT
Start: 2023-07-25

## 2023-01-26 RX ORDER — EPINEPHRINE 1 MG/ML
0.3 INJECTION, SOLUTION INTRAMUSCULAR; SUBCUTANEOUS EVERY 5 MIN PRN
Status: CANCELLED | OUTPATIENT
Start: 2023-07-25

## 2023-01-26 RX ORDER — METHYLPREDNISOLONE SODIUM SUCCINATE 125 MG/2ML
125 INJECTION, POWDER, LYOPHILIZED, FOR SOLUTION INTRAMUSCULAR; INTRAVENOUS
Status: CANCELLED
Start: 2023-07-25

## 2023-01-26 RX ORDER — DIPHENHYDRAMINE HYDROCHLORIDE 50 MG/ML
50 INJECTION INTRAMUSCULAR; INTRAVENOUS
Status: CANCELLED
Start: 2023-07-25

## 2023-01-26 RX ORDER — ALBUTEROL SULFATE 0.83 MG/ML
2.5 SOLUTION RESPIRATORY (INHALATION)
Status: CANCELLED | OUTPATIENT
Start: 2023-07-25

## 2023-01-26 RX ORDER — ALBUTEROL SULFATE 90 UG/1
1-2 AEROSOL, METERED RESPIRATORY (INHALATION)
Status: CANCELLED
Start: 2023-07-25

## 2023-01-27 ENCOUNTER — PATIENT OUTREACH (OUTPATIENT)
Dept: ONCOLOGY | Facility: CLINIC | Age: 68
End: 2023-01-27
Payer: COMMERCIAL

## 2023-01-27 NOTE — PROGRESS NOTES
Spoke to patient with Dr Cisneros's recommendations to switch from Zometa to Prolia with side effects from Zometa. Discussed potential side effects of Prolia and writer mailed out a print out for Prolia to patient's home to review.  Answered all patient's questions and verbalized understanding. Irene Todd RN, BSN.

## 2023-02-01 ENCOUNTER — PATIENT OUTREACH (OUTPATIENT)
Dept: GERIATRIC MEDICINE | Facility: CLINIC | Age: 68
End: 2023-02-01
Payer: COMMERCIAL

## 2023-02-01 NOTE — PROGRESS NOTES
[facility] Care Coordination Contact    TC to [employer] to follow up on card request with 60.00 monthly allowance. According to [employer], a card was requested on 1/25/23 but it appears to be a discount card only.    TC to [facility] customer service and was informed that member is eligible for the $60.00/ month with Hypertension diagnosis but was unsure how to get that information to [employer] and suggested contacting [facility] [employer].  Email to [facility]  [employer] requesting assistance in this matter.    Stella Cunha RN  [facility]  [phone]

## 2023-02-17 ENCOUNTER — PATIENT OUTREACH (OUTPATIENT)
Dept: GERIATRIC MEDICINE | Facility: CLINIC | Age: 68
End: 2023-02-17
Payer: COMMERCIAL

## 2023-02-17 NOTE — PROGRESS NOTES
[facility] Care Coordination Contact    TC to member who reports she has not received her Healthy Spending Card yet.    TC to [employer] and was informed that a card had been sent out 1/25/23 and then another card was requested on 2/10/23 and has been sent out and the card from 1/25/23 deactivated. Informed that member has the 60.00/month food allowance on card.  TC to member with update and informed that it may take 3 weeks to receive.     Stella Cunha RN  [facility]  [phone]

## 2023-03-16 ENCOUNTER — PATIENT OUTREACH (OUTPATIENT)
Dept: GERIATRIC MEDICINE | Facility: CLINIC | Age: 68
End: 2023-03-16
Payer: COMMERCIAL

## 2023-03-16 DIAGNOSIS — Z13.220 SCREENING FOR HYPERLIPIDEMIA: ICD-10-CM

## 2023-03-16 DIAGNOSIS — E03.9 HYPOTHYROIDISM, UNSPECIFIED TYPE: ICD-10-CM

## 2023-03-16 DIAGNOSIS — R73.03 PREDIABETES: Primary | ICD-10-CM

## 2023-03-16 NOTE — PROGRESS NOTES
[facility] Care Coordination Contact     TC and message left from member's daughter Zelda [phone] requesting return call regarding questions on how to access ARE benefits for member.    TC and message left for Zelda returning her call and requested return call.    Stella Cunha RN  [facility]  [phone]

## 2023-03-17 RX ORDER — LEVOTHYROXINE SODIUM 112 UG/1
112 TABLET ORAL DAILY
Qty: 30 TABLET | Refills: 0 | Status: SHIPPED | OUTPATIENT
Start: 2023-03-17 | End: 2023-06-08

## 2023-03-17 NOTE — TELEPHONE ENCOUNTER
Routing refill request to provider for review/approval because:  TSH   Date Value Ref Range Status   12/13/2021 0.74 0.40 - 4.00 mU/L Final   06/04/2021 2.35 0.40 - 4.00 mU/L Final

## 2023-03-17 NOTE — TELEPHONE ENCOUNTER
Called patient, informed her of lab work, she already has an upcoming appointment for labs. Informed her of refill. Made follow up appointment.  For 4/6/23 at 2 pm. Patient is thankful.

## 2023-03-17 NOTE — TELEPHONE ENCOUNTER
Patient is overdue for a thyroid labs and also overdue for prediabetic labs and overdue for office visit.  I have ordered labs in epic ,please advise lab appointment and office visit to see me in clinic , refilled for 1 month only, I can see her on 4/6/2023 at 10 AM and 2 PM appointment , please advise patient and schedule appointment

## 2023-04-06 ENCOUNTER — OFFICE VISIT (OUTPATIENT)
Dept: INTERNAL MEDICINE | Facility: CLINIC | Age: 68
End: 2023-04-06
Payer: COMMERCIAL

## 2023-04-06 VITALS
TEMPERATURE: 98 F | HEIGHT: 63 IN | WEIGHT: 216.3 LBS | OXYGEN SATURATION: 94 % | RESPIRATION RATE: 14 BRPM | DIASTOLIC BLOOD PRESSURE: 80 MMHG | HEART RATE: 82 BPM | BODY MASS INDEX: 38.32 KG/M2 | SYSTOLIC BLOOD PRESSURE: 128 MMHG

## 2023-04-06 DIAGNOSIS — Z00.00 WELCOME TO MEDICARE PREVENTIVE VISIT: Primary | ICD-10-CM

## 2023-04-06 DIAGNOSIS — E03.9 HYPOTHYROIDISM, UNSPECIFIED TYPE: ICD-10-CM

## 2023-04-06 DIAGNOSIS — E66.01 MORBID OBESITY (H): ICD-10-CM

## 2023-04-06 DIAGNOSIS — J30.9 ALLERGIC RHINITIS, UNSPECIFIED SEASONALITY, UNSPECIFIED TRIGGER: ICD-10-CM

## 2023-04-06 DIAGNOSIS — R73.03 PREDIABETES: ICD-10-CM

## 2023-04-06 DIAGNOSIS — I10 PRIMARY HYPERTENSION: ICD-10-CM

## 2023-04-06 DIAGNOSIS — Z13.220 SCREENING FOR HYPERLIPIDEMIA: ICD-10-CM

## 2023-04-06 LAB
ANION GAP SERPL CALCULATED.3IONS-SCNC: 14 MMOL/L (ref 7–15)
BUN SERPL-MCNC: 12.1 MG/DL (ref 8–23)
CALCIUM SERPL-MCNC: 10.1 MG/DL (ref 8.8–10.2)
CHLORIDE SERPL-SCNC: 103 MMOL/L (ref 98–107)
CHOLEST SERPL-MCNC: 184 MG/DL
CREAT SERPL-MCNC: 0.74 MG/DL (ref 0.51–0.95)
CREAT UR-MCNC: 101 MG/DL
DEPRECATED HCO3 PLAS-SCNC: 24 MMOL/L (ref 22–29)
GFR SERPL CREATININE-BSD FRML MDRD: 89 ML/MIN/1.73M2
GLUCOSE SERPL-MCNC: 99 MG/DL (ref 70–99)
HBA1C MFR BLD: 6.3 % (ref 0–5.6)
HDLC SERPL-MCNC: 93 MG/DL
LDLC SERPL CALC-MCNC: 75 MG/DL
MICROALBUMIN UR-MCNC: 207 MG/L
MICROALBUMIN/CREAT UR: 204.95 MG/G CR (ref 0–25)
NONHDLC SERPL-MCNC: 91 MG/DL
POTASSIUM SERPL-SCNC: 4.5 MMOL/L (ref 3.4–5.3)
SODIUM SERPL-SCNC: 141 MMOL/L (ref 136–145)
TRIGL SERPL-MCNC: 82 MG/DL
TSH SERPL DL<=0.005 MIU/L-ACNC: 0.79 UIU/ML (ref 0.3–4.2)

## 2023-04-06 PROCEDURE — 80061 LIPID PANEL: CPT | Performed by: INTERNAL MEDICINE

## 2023-04-06 PROCEDURE — 82043 UR ALBUMIN QUANTITATIVE: CPT | Performed by: INTERNAL MEDICINE

## 2023-04-06 PROCEDURE — 99214 OFFICE O/P EST MOD 30 MIN: CPT | Mod: 25 | Performed by: INTERNAL MEDICINE

## 2023-04-06 PROCEDURE — 80048 BASIC METABOLIC PNL TOTAL CA: CPT | Performed by: INTERNAL MEDICINE

## 2023-04-06 PROCEDURE — G0403 EKG FOR INITIAL PREVENT EXAM: HCPCS | Performed by: INTERNAL MEDICINE

## 2023-04-06 PROCEDURE — G0402 INITIAL PREVENTIVE EXAM: HCPCS | Performed by: INTERNAL MEDICINE

## 2023-04-06 PROCEDURE — 83036 HEMOGLOBIN GLYCOSYLATED A1C: CPT | Performed by: INTERNAL MEDICINE

## 2023-04-06 PROCEDURE — G0009 ADMIN PNEUMOCOCCAL VACCINE: HCPCS | Performed by: INTERNAL MEDICINE

## 2023-04-06 PROCEDURE — 90677 PCV20 VACCINE IM: CPT | Performed by: INTERNAL MEDICINE

## 2023-04-06 PROCEDURE — 36415 COLL VENOUS BLD VENIPUNCTURE: CPT | Performed by: INTERNAL MEDICINE

## 2023-04-06 PROCEDURE — 82570 ASSAY OF URINE CREATININE: CPT | Performed by: INTERNAL MEDICINE

## 2023-04-06 PROCEDURE — 84443 ASSAY THYROID STIM HORMONE: CPT | Performed by: INTERNAL MEDICINE

## 2023-04-06 RX ORDER — LISINOPRIL 10 MG/1
10 TABLET ORAL DAILY
Qty: 90 TABLET | Refills: 1 | Status: SHIPPED | OUTPATIENT
Start: 2023-04-06 | End: 2023-11-07

## 2023-04-06 RX ORDER — METFORMIN HCL 500 MG
1000 TABLET, EXTENDED RELEASE 24 HR ORAL
Qty: 180 TABLET | Refills: 1 | Status: SHIPPED | OUTPATIENT
Start: 2023-04-06 | End: 2023-11-07

## 2023-04-06 RX ORDER — CETIRIZINE HYDROCHLORIDE 10 MG/1
10 TABLET ORAL DAILY
Qty: 90 TABLET | Refills: 0 | Status: SHIPPED | OUTPATIENT
Start: 2023-04-06 | End: 2023-06-08

## 2023-04-06 ASSESSMENT — ENCOUNTER SYMPTOMS
DIZZINESS: 1
MYALGIAS: 1
JOINT SWELLING: 0
NERVOUS/ANXIOUS: 0
WEAKNESS: 1
SHORTNESS OF BREATH: 0
DIARRHEA: 0
HEMATURIA: 0
DYSURIA: 0
PARESTHESIAS: 0
PALPITATIONS: 0
FREQUENCY: 0
SORE THROAT: 0
EYE PAIN: 1
CONSTIPATION: 0
NAUSEA: 0
FEVER: 0
HEADACHES: 1
HEMATOCHEZIA: 0
ARTHRALGIAS: 1
ABDOMINAL PAIN: 1
COUGH: 0
HEARTBURN: 1
CHILLS: 0

## 2023-04-06 ASSESSMENT — ACTIVITIES OF DAILY LIVING (ADL): CURRENT_FUNCTION: NO ASSISTANCE NEEDED

## 2023-04-06 NOTE — NURSING NOTE
"/80   Pulse 82   Temp 98  F (36.7  C) (Tympanic)   Resp 14   Ht 1.588 m (5' 2.5\")   Wt 98.1 kg (216 lb 4.8 oz)   SpO2 94%   BMI 38.93 kg/m       Prior to immunization administration, verified patients identity using patient s name and date of birth. Please see Immunization Activity for additional information.     Screening Questionnaire for Adult Immunization    Are you sick today?   No   Do you have allergies to medications, food, a vaccine component or latex?   No   Have you ever had a serious reaction after receiving a vaccination?   No   Do you have a long-term health problem with heart, lung, kidney, or metabolic disease (e.g., diabetes), asthma, a blood disorder, no spleen, complement component deficiency, a cochlear implant, or a spinal fluid leak?  Are you on long-term aspirin therapy?   No   Do you have cancer, leukemia, HIV/AIDS, or any other immune system problem?   No   Do you have a parent, brother, or sister with an immune system problem?   No   In the past 3 months, have you taken medications that affect  your immune system, such as prednisone, other steroids, or anticancer drugs; drugs for the treatment of rheumatoid arthritis, Crohn s disease, or psoriasis; or have you had radiation treatments?   No   Have you had a seizure, or a brain or other nervous system problem?   No   During the past year, have you received a transfusion of blood or blood    products, or been given immune (gamma) globulin or antiviral drug?   No   For women: Are you pregnant or is there a chance you could become       pregnant during the next month?   No   Have you received any vaccinations in the past 4 weeks?   No     Immunization questionnaire answers were all negative.      Injection of pneumococcal 20 given by Analisa Pantoja MA. Patient instructed to remain in clinic for 15 minutes afterwards, and to report any adverse reactions.     Screening performed by Analisa Pantoja MA on 4/6/2023 at 4:10 " PM.

## 2023-04-06 NOTE — PROGRESS NOTES
"SUBJECTIVE:   Sammie is a 67 year old who presents for Preventive Visit.       View : No data to display.              Patient has been advised of split billing requirements and indicates understanding: Yes  Are you in the first 12 months of your Medicare coverage?  Yes,  Visual Acuity:  Right Eye: 20/50   Left Eye: 20/25  Both Eyes: 20/25    Healthy Habits:     In general, how would you rate your overall health?  Fair    Frequency of exercise:  None    Do you usually eat at least 4 servings of fruit and vegetables a day, include whole grains    & fiber and avoid regularly eating high fat or \"junk\" foods?  No    Taking medications regularly:  Yes    Medication side effects:  Lightheadedness    Ability to successfully perform activities of daily living:  No assistance needed    Home Safety:  No safety concerns identified    Hearing Impairment:  No hearing concerns    In the past 6 months, have you been bothered by leaking of urine?  No    In general, how would you rate your overall mental or emotional health?  Fair      PHQ-2 Total Score: 0    Additional concerns today:  No      Have you ever done Advance Care Planning? (For example, a Health Directive, POLST, or a discussion with a medical provider or your loved ones about your wishes): Yes, advance care planning is on file.       Fall risk  Fallen 2 or more times in the past year?: No  Any fall with injury in the past year?: No    Cognitive Screening   1) Repeat 3 items (Leader, Season, Table)    2) Clock draw: NORMAL  3) 3 item recall: Recalls 3 objects  Results: 3 items recalled: COGNITIVE IMPAIRMENT LESS LIKELY    Mini-CogTM Copyright SHANNON Crawford. Licensed by the author for use in [facility]; reprinted with permission (papi@.[email or web address]). All rights reserved.      Do you have sleep apnea, excessive snoring or daytime drowsiness?: no    Reviewed and updated as needed this visit by clinical staff    Allergies  Meds              Reviewed and updated as " needed this visit by Provider                   Past Medical History:   Diagnosis Date     Breast cancer (H) 12/2021     Complication of anesthesia      DDD (degenerative disc disease), lumbar      Endometriosis      Hypertension      Hypothyroidism           PONV (postoperative nausea and vomiting)      Seasonal allergies      Spinal stenosis, lumbar        Past Surgical History:   Procedure Laterality Date     COLONOSCOPY       COLONOSCOPY  2/20/2012    Procedure:COLONOSCOPY; COLONOSCOPY ; Surgeon:ARUN KITCHEN; Location: GI     COLONOSCOPY N/A 2/15/2019    Procedure: COMBINED COLONOSCOPY, SINGLE OR MULTIPLE BIOPSY/POLYPECTOMY BY BIOPSY;  Surgeon: Connor Sanderson MD;  Location:  GI     COLONOSCOPY N/A 12/7/2022    Procedure: COLONOSCOPY;  Surgeon: Leventhal, Thomas Michael, MD;  Location: [facility] OR     ESOPHAGOSCOPY, GASTROSCOPY, DUODENOSCOPY (EGD), COMBINED N/A 12/7/2022    Procedure: ESOPHAGOGASTRODUODENOSCOPY, WITH BIOPSY;  Surgeon: Leventhal, Thomas Michael, MD;  Location: [facility] OR     GRAFT FREE VASCULARIZED TRANSVERSE RECTUS ABDOMINIS MYOCUTANEOUS Left 8/25/2022    Procedure: Left breast reconstruction with free abdominal flap,  SPY;  Surgeon: NOHEMI Feliciano MD;  Location: [facility] OR     HC CYSTOURETHROSCOPY W/ URETEROSCOPY &/OR PYELOSCOPY; W/ LITHOTRIPSY       HC TRABECULOPLASTY BY LASER SURGERY      PI OU     HYSTERECTOMY, PAP NO LONGER INDICATED       LASER YAG IRIDOTOMY  8/8/2012    Procedure: LASER YAG IRIDOTOMY;  LEFT EYE YAG LASER PUPIL IRIDOTOMY ;  Surgeon: Dakotah Humphreys MD;  Location:  [facility]     LIGATN/STRIP LONG OR SHORT SAPHEN      x's2     MASTECTOMY PARTIAL WITH SENTINEL NODE Left 3/23/2022    Procedure: LEFT SKIN SPARING MASTECTOMY WITH SENTINEL LYMPH NODE BIOPSY;  Surgeon: Dada Mendiola MD;  Location: [facility] OR     PELVIS LAPAROSCOPY,DX       RECONSTRUCT BREAST, INSERT TISSUE EXPANDER BILATERAL, COMBINED Left 3/23/2022    Procedure: Left breast reconstruction with expander and SPY;  Surgeon:  NOHEMI Feliciano MD;  Location: [facility] OR     STRABISMUS SURGERY       [facility] TOTAL ABDOM HYSTERECTOMY      MARYA BSO for stage 4 endometriosis       Current Outpatient Medications   Medication Sig Dispense Refill     Artificial Tear Solution (SOOTHE XP) SOLN Apply 1 drop to eye 3 times daily 15 mL 8     calcium carbonate (OS-EMMA) 500 MG tablet Take 2 tablets (1,000 mg) by mouth daily 180 tablet 3     cetirizine (ZYRTEC) 10 MG tablet Take 1 tablet (10 mg) by mouth daily 90 tablet 0     diclofenac (VOLTAREN) 1 % topical gel Apply 2 g topically 3 times daily 100 g 0     gabapentin (NEURONTIN) 300 MG capsule 1-3 capsules up to 3 times a day 90 capsule 3     ketorolac (ACULAR) 0.5 % ophthalmic solution Place 1 drop into both eyes 4 times daily 5 mL 6     letrozole (FEMARA) 2.5 MG tablet Take 1 tablet (2.5 mg) by mouth daily (Patient taking differently: Take 2.5 mg by mouth every morning Through oncologist) 90 tablet 3     levothyroxine (SYNTHROID/LEVOTHROID) 112 MCG tablet Take 1 tablet (112 mcg) by mouth daily Needs labs and appointment for refills. 30 tablet 0     lisinopril (ZESTRIL) 10 MG tablet Take 1 tablet (10 mg) by mouth daily 90 tablet 1     metFORMIN (GLUCOPHAGE XR) 500 MG 24 hr tablet Take 2 tablets (1,000 mg) by mouth daily (with dinner) 180 tablet 1     olopatadine (PATANOL) 0.1 % ophthalmic solution Place 1 drop into both eyes 2 times daily 5 mL 12     Omega-3 Fatty Acids (FISH OIL) 500 MG CAPS Take by mouth daily       vitamin D3 (CHOLECALCIFEROL) 50 mcg (2000 units) tablet Take 1 tablet (50 mcg) by mouth daily (Patient taking differently: Take 1 tablet by mouth every morning) 90 tablet 3     calcium carbonate 500 mg, elemental, (OSCAL) 500 MG tablet  (Patient not taking: Reported on 2023)         Family History   Problem Relation Age of Onset     Diabetes Mother      Thyroid Disease Mother      Hypertension Mother      Cancer Father          of stomach CA     Heart Disease Father         MI at  age 58     Stomach Cancer Maternal Grandfather      Stomach Cancer Paternal Grandmother      Breast Cancer Paternal Aunt      Glaucoma No family hx of      Macular Degeneration No family hx of      Anesthesia Reaction No family hx of      Bleeding Disorder No family hx of      Clotting Disorder No family hx of        Social History     Tobacco Use     Smoking status: Never     Smokeless tobacco: Never   Vaping Use     Vaping status: Never Used   Substance Use Topics     Alcohol use: Not Currently             4/6/2023     1:47 PM   Alcohol Use   Prescreen: >3 drinks/day or >7 drinks/week? No          View : No data to display.                   Hypertension Follow-up      Do you check your blood pressure regularly outside of the clinic? Yes     Are you following a low salt diet? Yes    Are your blood pressures ever more than 140 on the top number (systolic) OR more   than 90 on the bottom number (diastolic), for example 140/90? No    Hypothyroidism Follow-up      Since last visit, patient describes the following symptoms: Weight stable, no hair loss, no skin changes, no constipation, no loose stools    Prediabetes;  Check BS 2 x week 130-150  , on metformin 1000 mg in pm     History of breast cancer ; follows up with oncologist    Current providers sharing in care for this patient include:   Patient Care Team:  Eugene Michelle MD as PCP - General (Internal Medicine)  Eugene Michelle MD as Assigned PCP  Kenneth Cisneros MD as Assigned Cancer Care Provider  Kitty Cisneros RN as Specialty Care Coordinator (Hematology & Oncology)  NOHEMI Feliciano MD as Assigned Surgical Provider  Kitty Cisneros RN as Specialty Care Coordinator (Hematology & Oncology)  Iqra Jesus PA-C as Assigned Musculoskeletal Provider  Manolo Carver MD as Assigned Neuroscience Provider  Stella Lawrence RN as Lead Care Coordinator (Primary Care - CC)  Kenneth Cisneros MD as MD (Medical  Oncology)  Kenneth Sanford MD as MD (Dermatology)  Eugene Michelle MD as Assigned Pain Medication Provider    The following health maintenance items are reviewed in Epic and correct as of today:  Health Maintenance   Topic Date Due     URINE DRUG SCREEN  Never done     ANNUAL REVIEW OF HM ORDERS  Never done     Pneumococcal Vaccine: 65+ Years (1 - PCV) Never done     HIV SCREENING  Never done     HEPATITIS C SCREENING  Never done     ZOSTER IMMUNIZATION (1 of 2) Never done     HEPATITIS B IMMUNIZATION (2 of 3 - Risk 3-dose series) 09/23/2019     COVID-19 Vaccine (3 - Booster for Pfizer series) 01/23/2022     INFLUENZA VACCINE (1) 09/01/2022     MEDICARE ANNUAL WELLNESS VISIT  12/13/2022     TSH W/FREE T4 REFLEX  12/13/2022     ADVANCE CARE PLANNING  12/07/2023     FALL RISK ASSESSMENT  04/06/2024     MAMMO SCREENING  12/27/2024     COLORECTAL CANCER SCREENING  12/07/2025     LIPID  12/27/2026     DTAP/TDAP/TD IMMUNIZATION (2 - Td or Tdap) 12/07/2028     DEXA  09/26/2037     PHQ-2 (once per calendar year)  Completed     IPV IMMUNIZATION  Aged Out     MENINGITIS IMMUNIZATION  Aged Out     PAP  Discontinued            12/13/2021    10:50 AM 12/22/2021     2:45 PM   Breast CA Risk Assessment (FHS-7)   Do you have a family history of breast, colon, or ovarian cancer? No / Unknown No / Unknown          Pertinent mammograms are reviewed under the imaging tab.    Review of Systems   Constitutional: Negative for chills and fever.   HENT: Negative for congestion, ear pain, hearing loss and sore throat.    Eyes: Positive for visual disturbance.        Sees ophthalmologist    Respiratory: Negative for cough and shortness of breath.    Cardiovascular: Negative for chest pain and palpitations.   Gastrointestinal: Positive for heartburn. Negative for constipation, diarrhea, hematochezia and nausea.   Breasts:  Positive for tenderness.        Follows up with Oncologist    Genitourinary: Negative for dysuria, frequency,  "genital sores, hematuria, pelvic pain, urgency and vaginal bleeding.   Musculoskeletal: Positive for arthralgias and myalgias. Negative for joint swelling.   Skin: Negative for rash.   Neurological: Negative for paresthesias.   Psychiatric/Behavioral: Negative for mood changes. The patient is not nervous/anxious.         OBJECTIVE:   /80   Pulse 82   Temp 98  F (36.7  C) (Tympanic)   Resp 14   Ht 1.588 m (5' 2.5\")   Wt 98.1 kg (216 lb 4.8 oz)   SpO2 94%   BMI 38.93 kg/m   Estimated body mass index is 38.93 kg/m  as calculated from the following:    Height as of this encounter: 1.588 m (5' 2.5\").    Weight as of this encounter: 98.1 kg (216 lb 4.8 oz).  Physical Exam  GENERAL APPEARANCE: healthy, alert and no distress  EYES: Eyes grossly normal to inspection, PERRL and conjunctivae and sclerae normal  RESP: lungs clear to auscultation - no rales, rhonchi or wheezes  BREAST: with oncologist   CV: regular rate and rhythm, normal S1 S2,    ABDOMEN: soft, nontender,  and bowel sounds normal  MS: no LE edema, no calf tenderness  NEURO: Normal strength and tone, sensory exam grossly normal, mentation intact and speech normal  PSYCH: mentation appears normal and affect normal/bright     ASSESSMENT / PLAN:       (Z00.00) Welcome to Medicare preventive visit  (primary encounter diagnosis)  Plan: check BMP, Lipid panel, TSH EKG 12-lead complete w/read - Clinics, up-to-date on mammogram, colonoscopy, DEXA.  EKG reviewed    (R73.03) Prediabetes  Plan: Check A1c today, refilled metFORMIN (GLUCOPHAGE XR) 500 MG 24 hr tablet.explained clearly about the medication,insructions and side effects.            (E03.9) Hypothyroidism, unspecified type  Plan: Check TSH today, on Levoxyl 112 mcg daily, adjust dose after TSH results    (Z13.220) Screening for hyperlipidemia  Plan: Check lipid panel       (E66.01) Morbid obesity (H)  Plan: Blood pressure stable, refilled lisinopril (ZESTRIL) 10 MG tablet as directed.explained " "clearly about the medication,insructions and side effects.       (J30.9) Allergic rhinitis, unspecified seasonality, unspecified trigger  Plan: cetirizine (ZYRTEC) 10 MG tablet refilled as directed.explained clearly about the medication,insructions and side effects.     (I10) Primary hypertension  Plan: Blood pressure stable, refilled lisinopril (ZESTRIL) 10 MG tablet as directed.explained clearly about the medication,insructions and side effects.      Breast cancer follows up with oncologist        Patient requesting letter to Medicare stating that patient is on lisinopril for hypertension and metformin for prediabetes, letter done    Patient has been advised of split billing requirements and indicates understanding: Yes      COUNSELING:  Reviewed preventive health counseling, as reflected in patient instructions       Regular exercise       Healthy diet/nutrition       Immunizations    Vaccinated for: Pneumococcal            BMI:   Estimated body mass index is 38.93 kg/m  as calculated from the following:    Height as of this encounter: 1.588 m (5' 2.5\").    Weight as of this encounter: 98.1 kg (216 lb 4.8 oz).   Weight management plan: Discussed healthy diet and exercise guidelines      She reports that she has never smoked. She has never used smokeless tobacco.      Appropriate preventive services were discussed with this patient, including applicable screening as appropriate for cardiovascular disease, diabetes, osteopenia/osteoporosis, and glaucoma.  As appropriate for age/gender, discussed screening for colorectal cancer, prostate cancer, breast cancer, and cervical cancer. Checklist reviewing preventive services available has been given to the patient.    Reviewed patients plan of care and provided an AVS. The Basic Care Plan (routine screening as documented in Health Maintenance) for Ramona meets the Care Plan requirement. This Care Plan has been established and reviewed with the " Patient.          Eugene Michelle MD  [facility]    Identified Health Risks:

## 2023-04-06 NOTE — LETTER
April 6, 2023      Ramona Ferrer  [address]  [place] [address]        To Whom It May Concern,     Ramona Ferrer has history of Hypertension and Prediabetes and is using Lisinopril for hypertension and Metformin for Prediabetes.         Sincerely,        Eugene Michelle MD

## 2023-04-14 ENCOUNTER — PATIENT OUTREACH (OUTPATIENT)
Dept: ONCOLOGY | Facility: CLINIC | Age: 68
End: 2023-04-14
Payer: COMMERCIAL

## 2023-04-14 NOTE — PROGRESS NOTES
"Patient and her daughter called with c/o L breast pain and feels a \"hard\" area at the 9:00 position the size of a \"grape.\" The lump does not move and is \"hard.\" That half of her L breast is involved with discomfort the last two weeks. In the recent past she had lifted a full case of water, but may not have been the cause for her above symptoms. Patient requesting to see Dr Cisneros to evaluate this L breast symptoms. Bilateral U/S ion 12/27/2022 showed benign results. Will send a message to scheduling to arrange an appointment with Dr Cisneros as she is requesting.  Answered all patient's questions and verbalized understanding. Irene Todd RN, BSN.   "

## 2023-04-19 DIAGNOSIS — C50.412 MALIGNANT NEOPLASM OF UPPER-OUTER QUADRANT OF LEFT BREAST IN FEMALE, ESTROGEN RECEPTOR POSITIVE (H): Primary | ICD-10-CM

## 2023-04-19 DIAGNOSIS — Z17.0 MALIGNANT NEOPLASM OF UPPER-OUTER QUADRANT OF LEFT BREAST IN FEMALE, ESTROGEN RECEPTOR POSITIVE (H): Primary | ICD-10-CM

## 2023-04-19 NOTE — PROGRESS NOTES
"Surgical oncology brief Progress Note  Surgery Cross-Cover  Post Op Check    03/23/2022    Ramona Ferrer is a 66 year old female with h/o ER positive malignant neoplasm of upper outer quadrant left breast who is now POD#0 s/p left skin sparing mastectomy with sentinel lymph node biopsy, left breast reconstruction with expander with Dr. Mendiola.    Patient seen at bedside.  Patient got up to go to the bathroom and became dizzy.  Blood pressure was noted to be 90s over 40s.  Patient was transitioned back to lying down in bed and had improvement of symptoms as well as blood pressure.  Patient reports no symptoms at this time including chest pain, shortness of breath, nausea/vomiting, abdominal pain, fever/chills.    BP (!) 100/34   Pulse 67   Temp 97.7  F (36.5  C) (Oral)   Resp 18   Ht 1.6 m (5' 3\")   Wt 107.8 kg (237 lb 10.5 oz)   SpO2 92%   BMI 42.10 kg/m      General: NAD, non-toxic appearing  HEENT: no swelling of the lips or tongue. Moist mucous membranes. Trachea midline.   CV: RRR, warm and well perfused, no LE edema  Chest: Left breast with dressing in place.  No drainage on dressings.  Pulm: normal rate and work of breathing on RA  Abdomen: Soft, non-tender, non-distended.  Extremities: Radial and DP pulses 2+ bilaterally  Skin: No diaphoresis or rash  Neuro: alert and oriented, CN II - XII grossly intact     A/P: Orthostatic hypotension With blood pressure 90s over 40s.  She was given a 500 cc bolus with improvement of symptoms and blood pressure to 111/51.  Continue plan of care per primary team.     Jimmy Mohan MD  PGY1 N Surgery  3/23/2022  [phone]        " 5/10/2021 OV with [facility]    Next OV is 8/16/23 with [facility]    2/25/2023 lipids

## 2023-04-20 ENCOUNTER — ANCILLARY PROCEDURE (OUTPATIENT)
Dept: MAMMOGRAPHY | Facility: CLINIC | Age: 68
End: 2023-04-20
Attending: INTERNAL MEDICINE
Payer: COMMERCIAL

## 2023-04-20 ENCOUNTER — APPOINTMENT (OUTPATIENT)
Dept: LAB | Facility: CLINIC | Age: 68
End: 2023-04-20
Attending: INTERNAL MEDICINE
Payer: COMMERCIAL

## 2023-04-20 ENCOUNTER — ONCOLOGY VISIT (OUTPATIENT)
Dept: ONCOLOGY | Facility: CLINIC | Age: 68
End: 2023-04-20
Attending: INTERNAL MEDICINE
Payer: COMMERCIAL

## 2023-04-20 VITALS
RESPIRATION RATE: 16 BRPM | BODY MASS INDEX: 39.71 KG/M2 | TEMPERATURE: 97.7 F | DIASTOLIC BLOOD PRESSURE: 68 MMHG | SYSTOLIC BLOOD PRESSURE: 123 MMHG | WEIGHT: 220.6 LBS | HEART RATE: 69 BPM | OXYGEN SATURATION: 99 %

## 2023-04-20 DIAGNOSIS — Z17.0 MALIGNANT NEOPLASM OF UPPER-OUTER QUADRANT OF LEFT BREAST IN FEMALE, ESTROGEN RECEPTOR POSITIVE (H): ICD-10-CM

## 2023-04-20 DIAGNOSIS — C50.412 MALIGNANT NEOPLASM OF UPPER-OUTER QUADRANT OF LEFT BREAST IN FEMALE, ESTROGEN RECEPTOR POSITIVE (H): ICD-10-CM

## 2023-04-20 LAB
ALBUMIN SERPL BCG-MCNC: 4.1 G/DL (ref 3.5–5.2)
ALP SERPL-CCNC: 52 U/L (ref 35–104)
ALT SERPL W P-5'-P-CCNC: 46 U/L (ref 10–35)
ANION GAP SERPL CALCULATED.3IONS-SCNC: 10 MMOL/L (ref 7–15)
AST SERPL W P-5'-P-CCNC: 33 U/L (ref 10–35)
BASOPHILS # BLD AUTO: 0.1 10E3/UL (ref 0–0.2)
BASOPHILS NFR BLD AUTO: 1 %
BILIRUB SERPL-MCNC: 0.4 MG/DL
BUN SERPL-MCNC: 14.1 MG/DL (ref 8–23)
CALCIUM SERPL-MCNC: 9.7 MG/DL (ref 8.8–10.2)
CHLORIDE SERPL-SCNC: 104 MMOL/L (ref 98–107)
CREAT SERPL-MCNC: 0.7 MG/DL (ref 0.51–0.95)
DEPRECATED HCO3 PLAS-SCNC: 25 MMOL/L (ref 22–29)
EOSINOPHIL # BLD AUTO: 0.3 10E3/UL (ref 0–0.7)
EOSINOPHIL NFR BLD AUTO: 5 %
ERYTHROCYTE [DISTWIDTH] IN BLOOD BY AUTOMATED COUNT: 13.6 % (ref 10–15)
GFR SERPL CREATININE-BSD FRML MDRD: >90 ML/MIN/1.73M2
GLUCOSE SERPL-MCNC: 106 MG/DL (ref 70–99)
HCT VFR BLD AUTO: 41.2 % (ref 35–47)
HGB BLD-MCNC: 13.3 G/DL (ref 11.7–15.7)
IMM GRANULOCYTES # BLD: 0 10E3/UL
IMM GRANULOCYTES NFR BLD: 1 %
LYMPHOCYTES # BLD AUTO: 2.6 10E3/UL (ref 0.8–5.3)
LYMPHOCYTES NFR BLD AUTO: 40 %
MCH RBC QN AUTO: 28.9 PG (ref 26.5–33)
MCHC RBC AUTO-ENTMCNC: 32.3 G/DL (ref 31.5–36.5)
MCV RBC AUTO: 89 FL (ref 78–100)
MONOCYTES # BLD AUTO: 0.8 10E3/UL (ref 0–1.3)
MONOCYTES NFR BLD AUTO: 12 %
NEUTROPHILS # BLD AUTO: 2.8 10E3/UL (ref 1.6–8.3)
NEUTROPHILS NFR BLD AUTO: 41 %
NRBC # BLD AUTO: 0 10E3/UL
NRBC BLD AUTO-RTO: 0 /100
PLATELET # BLD AUTO: 267 10E3/UL (ref 150–450)
POTASSIUM SERPL-SCNC: 4.2 MMOL/L (ref 3.4–5.3)
PROT SERPL-MCNC: 7.3 G/DL (ref 6.4–8.3)
RBC # BLD AUTO: 4.61 10E6/UL (ref 3.8–5.2)
SODIUM SERPL-SCNC: 139 MMOL/L (ref 136–145)
WBC # BLD AUTO: 6.5 10E3/UL (ref 4–11)

## 2023-04-20 PROCEDURE — 76642 ULTRASOUND BREAST LIMITED: CPT | Mod: LT | Performed by: STUDENT IN AN ORGANIZED HEALTH CARE EDUCATION/TRAINING PROGRAM

## 2023-04-20 PROCEDURE — G0279 TOMOSYNTHESIS, MAMMO: HCPCS | Mod: LT | Performed by: STUDENT IN AN ORGANIZED HEALTH CARE EDUCATION/TRAINING PROGRAM

## 2023-04-20 PROCEDURE — 36415 COLL VENOUS BLD VENIPUNCTURE: CPT | Performed by: INTERNAL MEDICINE

## 2023-04-20 PROCEDURE — 77065 DX MAMMO INCL CAD UNI: CPT | Mod: LT | Performed by: STUDENT IN AN ORGANIZED HEALTH CARE EDUCATION/TRAINING PROGRAM

## 2023-04-20 PROCEDURE — 85004 AUTOMATED DIFF WBC COUNT: CPT | Performed by: INTERNAL MEDICINE

## 2023-04-20 PROCEDURE — 80053 COMPREHEN METABOLIC PANEL: CPT | Performed by: INTERNAL MEDICINE

## 2023-04-20 ASSESSMENT — PAIN SCALES - GENERAL: PAINLEVEL: MODERATE PAIN (4)

## 2023-04-20 NOTE — LETTER
4/20/2023         RE: Ramona Ferrer  [address]  [place] [address]        Dear Colleague,    Thank you for referring your patient, Ramona Ferrer, to the [facility]. Please see a copy of my visit note below.    Patient was late.  Will reschedule.      Again, thank you for allowing me to participate in the care of your patient.        Sincerely,        Kenneth Cisneros MD

## 2023-04-20 NOTE — NURSING NOTE
Chief Complaint   Patient presents with     Blood Draw     Vitals taken, venipuncture labs drawn     /68 (BP Location: Right arm, Patient Position: Sitting, Cuff Size: Adult Large)   Pulse 69   Temp 97.7  F (36.5  C) (Oral)   Resp 16   Wt 100.1 kg (220 lb 9.6 oz)   SpO2 99%   BMI 39.71 kg/m    Ray Thomas RN on 4/20/2023 at 10:15 AM

## 2023-04-21 DIAGNOSIS — M85.80 OSTEOPENIA, UNSPECIFIED LOCATION: Primary | ICD-10-CM

## 2023-04-21 DIAGNOSIS — J30.9 ALLERGIC RHINITIS, UNSPECIFIED SEASONALITY, UNSPECIFIED TRIGGER: ICD-10-CM

## 2023-04-21 RX ORDER — CALCIUM CARBONATE 500(1250)
TABLET ORAL
Qty: 660 TABLET | Refills: 0 | Status: SHIPPED | OUTPATIENT
Start: 2023-04-21

## 2023-04-21 NOTE — TELEPHONE ENCOUNTER
Medication: Calcium carbonate 500mg    Last clinic visit date: 01/24/23 w/    Any missed appointments or no-shows since last clinic visit?: 04/20/23 Arrived late and had to reschedule    Recommendations for requested medication (if none, N/A): N/A    Next clinic visit date: 07/25/23 w/    Any other pertinent information (if none, N/A): N/A    Pended and Routed to Provider.

## 2023-04-26 RX ORDER — CETIRIZINE HYDROCHLORIDE 10 MG/1
10 TABLET ORAL DAILY
Qty: 90 TABLET | Refills: 0 | OUTPATIENT
Start: 2023-04-26

## 2023-04-26 NOTE — TELEPHONE ENCOUNTER
Cetirizine was refilled for 3 months on 4/6/2023.  Please inform patient and pharmacy.    cetirizine (ZYRTEC) 10 MG tablet 90 tablet 0 4/6/2023  No   Sig - Route: Take 1 tablet (10 mg) by mouth daily - Oral   Sent to pharmacy as: Cetirizine HCl 10 MG Oral Tablet (zyrTEC)   Class: E-Prescribe   Order: [id number]   E-Prescribing Status: Receipt confirmed by pharmacy (4/6/2023  2:48 PM CDT)

## 2023-05-05 DIAGNOSIS — Z17.0 MALIGNANT NEOPLASM OF UPPER-OUTER QUADRANT OF LEFT BREAST IN FEMALE, ESTROGEN RECEPTOR POSITIVE (H): ICD-10-CM

## 2023-05-05 DIAGNOSIS — C50.412 MALIGNANT NEOPLASM OF UPPER-OUTER QUADRANT OF LEFT BREAST IN FEMALE, ESTROGEN RECEPTOR POSITIVE (H): ICD-10-CM

## 2023-05-05 DIAGNOSIS — J30.9 ALLERGIC RHINITIS, UNSPECIFIED SEASONALITY, UNSPECIFIED TRIGGER: ICD-10-CM

## 2023-05-08 RX ORDER — CETIRIZINE HYDROCHLORIDE 10 MG/1
10 TABLET ORAL DAILY
Qty: 90 TABLET | Refills: 0 | OUTPATIENT
Start: 2023-05-08

## 2023-05-08 RX ORDER — CALCIUM CARBONATE 500(1250)
2 TABLET ORAL DAILY
Qty: 660 TABLET | Refills: 0 | Status: CANCELLED | OUTPATIENT
Start: 2023-05-08

## 2023-05-08 NOTE — TELEPHONE ENCOUNTER
"Letrozole 2.5mg tab  Last prescribing provider: Dr. Cisneros on 4/5/22    Last clinic visit date: 1/24/23     Recommendations for requested medication (if none, N/A): 1/24/23, \"She has tolerated the letrozole quite well, although she does have some hot flashes. \"    Any other pertinent information (if none, N/A): NA    Refilled: Y/N, if NO, why?    Routed to Dr. Cisneros.   "

## 2023-05-08 NOTE — TELEPHONE ENCOUNTER
Medication refused, error.  Medication was sent to [facility] on 4/6/23 for a 90 day supply.

## 2023-05-09 RX ORDER — LETROZOLE 2.5 MG/1
TABLET, FILM COATED ORAL
Qty: 90 TABLET | Refills: 0 | Status: SHIPPED | OUTPATIENT
Start: 2023-05-09 | End: 2023-07-23

## 2023-05-18 NOTE — TELEPHONE ENCOUNTER
FUTURE VISIT INFORMATION      FUTURE VISIT INFORMATION:    Date: 8.14.23    Time: 10:35    [place] [facility]  REFERRAL INFORMATION:    Referring provider:  Amelia    Referring providers clinic:  Oncology    Reason for visit/diagnosis  thinning hair on letrozole, Alopecia, needs referral to [facility] in Epic, Ref by Kenneth Cisneros MD in  ONCOLOGY ADULT, Per Nurse    RECORDS REQUESTED FROM:       Clinic name Comments Records Status Imaging Status   [facility]

## 2023-05-30 ENCOUNTER — PATIENT OUTREACH (OUTPATIENT)
Dept: GERIATRIC MEDICINE | Facility: CLINIC | Age: 68
End: 2023-05-30
Payer: COMMERCIAL

## 2023-05-30 NOTE — PROGRESS NOTES
[facility] Care Coordination Contact      [facility] Six-Month Telephone Assessment    6 month telephone assessment completed on 5/30/23.    ER visits: No  Hospitalizations: No  TCU stays: No  Significant health status changes: Denies  Falls/Injuries: No  ADL/IADL changes: No  Changes in services: No    Caregiver Assessment follow up:  NA    Goals: See POC in chart for goal progress documentation. Sammie reports she saw a dentist and needs a filling and crowns. She reports she needs to see another dentist because this dentist informed her that her insurance does not cover crowns and she reports she has a letter from [facility] that her insurance covers 2 crowns a year. Instructed Sammie to contact the dental clinic and update her insurance information with them as she changed from [facility] when she saw them to [facility] 1/1/23 and dental coverage would be different.     Will see member in 6 months for an annual health risk assessment.   Encouraged member to call CC with any questions or concerns in the meantime.   Stella Cunha RN  [facility]  [phone]

## 2023-07-21 DIAGNOSIS — M85.80 OSTEOPENIA: ICD-10-CM

## 2023-07-21 DIAGNOSIS — Z79.811 LONG TERM (CURRENT) USE OF AROMATASE INHIBITORS: ICD-10-CM

## 2023-07-21 DIAGNOSIS — Z17.0 MALIGNANT NEOPLASM OF UPPER-OUTER QUADRANT OF LEFT BREAST IN FEMALE, ESTROGEN RECEPTOR POSITIVE (H): ICD-10-CM

## 2023-07-21 DIAGNOSIS — C50.412 MALIGNANT NEOPLASM OF UPPER-OUTER QUADRANT OF LEFT BREAST IN FEMALE, ESTROGEN RECEPTOR POSITIVE (H): ICD-10-CM

## 2023-07-21 NOTE — TELEPHONE ENCOUNTER
Medication: Letrozole 2.5 mg and Vitamin D3 50 mcg    Last prescribing provider:     Last clinic visit date: 01/24/23 w/    Any missed appointments or no-shows since last clinic visit?: No    Recommendations for requested medication (if none, N/A): N/A    Next clinic visit date: 07/25/23 w/    Any other pertinent information (if none, N/A): N/A    Pended and Routed to Provider.

## 2023-07-23 PROBLEM — M81.8 IDIOPATHIC OSTEOPOROSIS: Status: ACTIVE | Noted: 2023-07-23

## 2023-07-23 RX ORDER — LETROZOLE 2.5 MG/1
TABLET, FILM COATED ORAL
Qty: 90 TABLET | Refills: 0 | Status: SHIPPED | OUTPATIENT
Start: 2023-07-23 | End: 2023-11-06

## 2023-07-23 RX ORDER — CHOLECALCIFEROL (VITAMIN D3) 50 MCG
1 TABLET ORAL EVERY MORNING
Qty: 90 TABLET | Refills: 3 | Status: SHIPPED | OUTPATIENT
Start: 2023-07-23 | End: 2024-08-30

## 2023-07-23 NOTE — PROGRESS NOTES
Dada Mendiola MD  [facility]  [address] [place] [address], [facility] [address]  [place] [address]     RE:  Ramona Ferrer  MRN:  [id number]  :  1955     Dear Dr. Mendiola:     I would like to refer to you Sammie Ferrer, a 68-year-old woman with a recent diagnosis of a stage IIB, T3 N0 MX, invasive lobular carcinoma of the left breast.  She self-palpated a lump in the upper outer quadrant of the left breast.  She underwent evaluation with a diagnostic mammogram and ultrasound, which was BI-RADS 4, showing in the upper outer quadrant of the left breast irregularly shaped hypoechoic mass at the 12 o'clock position 4 cm from the nipple on the left.  This mass measured 2.3 x 2.2 x 1.5 cm and accounted for the patient's area of palpable concern.  Additionally, at the 1 o'clock position 2 cm from the nipple in the left breast, there is a second irregularly shaped hypoechoic mass with indistinct margins measuring 1.3 x 1.1 x 0.8 cm.  She also underwent a breast MRI which showed in the right breast mild background parenchymal enhancement and no suspicious areas of enhancement or lymphadenopathy.  In the left breast there was mild background parenchymal enhancement, and at the 12 o'clock position 4 cm from the nipple there was a heterogeneously enhancing irregular mass measuring 3.4 in AP dimension x 2.8 cm ML and 2.8 cm SI corresponding to the known biopsy-proven malignancy in the left breast.  In the left breast at the 1 o'clock position 2 cm from the nipple there was also a heterogeneously enhancing oval mass measuring 1.3 cm in maximal diameter.  This likely corresponds to the second biopsy-proven malignancy.  Together, the full extent of the disease spans a total of 5.2 cm AP x 4.4 cm ML, BI-RADS 6.     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  In part B in the left breast at the 1 o'clock position 2 cm from the nipple, ultrasound-guided needle biopsy showed invasive lobular carcinoma, Cary grade 1/3 lobular carcinoma in situ was present, classic subtype, estrogen receptors positive in greater than 70% of the cells, progesterone receptors positive in greater than 90% of cells, and HER2 was nonamplified, Ki-67 20-25%.  She now comes to our clinic for recommendations.     Sammie reports that the breast mass had been there for approximately 1 month before she was seen for evaluation..       She has worked in the past as a personal care assistant and lives in the [facility].       PAST MEDICAL HISTORY:  There is no history of breast cancer in the past.  No history of breast cancer surgery.  She has no history of radiation therapy in the past or radiation exposure.  No history of prior tumor of any kind.  She denies heart problems, heart attack, breathing problems, blood clots, seizures, peptic ulcer disease, osteoporosis or bone fractures.  She does report a history of arthritis.     FAMILY HISTORY:  Positive for breast cancer in a paternal aunt who was diagnosed with breast cancer at an old age.     Her father has a history of stomach cancer.  There is no family history of pancreatic cancer, melanoma, lung cancer or ovarian cancer.     No history of male breast cancer.     ALLERGIES:  No history of drug allergies.  She denies allergies to seafood, iodine, or contrast dye.     PAST MENSTRUAL HISTORY:  She has been pregnant 3 times with 2 live births at age 29 and 31 and 1 miscarriage.  Age at first menstrual period was 14.  She did have a total abdominal hysterectomy and bilateral salpingo-oophorectomy performed in 2003 for endometriosis.  She has no history of hormone replacement therapy.  No  history of oral contraceptives.     HABITS: She denies tobacco history.  She denies alcohol history and drinks alcohol only occasionally.     PAST MEDICAL HISTORY:  Past medical history is also remarkable for type 2 diabetes, and she was begun on metformin 2 years ago.  She also has a history of varicose veins and a history of a lipoma resected from her left leg.      Chronic Hepatitis B.  Hepatitis C negative.  HIV negative.       Prior COVID vaccination x 3.      GERM LINE GENETICS: INVITAE testing of 47 genes was negative.      TREATMENT HISTORY:  A.  MammaPrint showed low risk.  B.  Left mastectomy and axillary lymph node sampling.   A. Lymph node, LEFT axillary, excision:  -One benign lymph node (0/1)  B. LEFT breast, skin-sparing mastectomy:  -INVASIVE BREAST CARCINOMA, MIXED INVASIVE LOBULAR CARCINOMA (predominant component) and INVASIVE DUCTAL CARCINOMA (minor component), KALPESH GRADE 3, size 5.5 cm, 12:00, upper outer quadrant and lower outer quadrant  -Ductal carcinoma in situ (DCIS), nuclear grade 2, cribriform and solid type(s), with focal necrosis  -DCIS is admixed with invasive carcinoma, and comprises a minor component (<5%) of the tumor volume   -Lobular carcinoma in situ (LCIS), predominantly classic type (admixed with invasive carcinoma and beyond invasive carcinoma) and focal pleomorphic type (size 2 mm, adjacent to invasive carcinoma)  -Margins are uninvolved by invasive carcinoma  -Invasive carcinoma is 2.5 mm from the nearest (anterior) margin, and is > 5 mm from the posterior margin  -Margins are uninvolved by DCIS and pleomorphic LCIS  -DCIS and pleomorphic LCIS are > 5 mm from the nearest (anterior) margin  -Benign nipple and skin   -Other findings: fibrocystic change (including microcysts with apocrine metaplasia) and columnar cell change  -Calcifications associated with DCIS, LCIS, and benign ducts and acini  -Prior core biopsy site changes (two biopsy sites identified)  -Invasive  carcinoma is estrogen receptor positive (>70%, strong intensity) and progesterone receptor positive (>90%, strong intensity) by immunohistochemistry and is HER2 non-amplified (group 5) by FISH (performed on prior core biopsies, see report [id number], specimens A and B)  DCIS and LCIS present.  -Margins are uninvolved by invasive carcinoma or LCIS.   -Invasive carcinoma is estrogen receptor positive (>70%, strong intensity) and progesterone receptor positive (>90%, strong intensity) by immunohistochemistry and is HER2 non-amplified (group 5) by FISH (performed on prior core biopsies, see report [id number], specimens A and B) Ki-67 immunohistochemistry (MIB-1, [employer]x, [employer]) from [employer] was performed on invasive carcinoma in the LEFT mastectomy specimen   They report Ki-67 proliferative index of 20-25% (block B5) and 20% (block B27).   C.  Staging:  pT3 Nx because lymph node is not the sentinel node.    D.  She did not want radiation, and risk:benefit did not favor treatment.  E.  Letrozole begun 4-5-22.   F.  Left TRAM flap reconstruction healed as of 12-27-22 but she has some concerns.   G.  Adjuvant Zometa 12-27-22 but not tolerated. She did have a reaction to adjuvant Zometa and it cannot be given any further.      INTERVAL HISTORY  Ramona returns to clinic for routine followup and has been feeling well.  She did have a reaction to zoledronic acid with fever and does not want to try it again.  She was switched to Prolia and she is going to proceed for the first time today.  She has no pain, but she does have problems with sleeping.  She has hot flashes with letrozole and the hot flashes do keep her up at night.  She thinks she has other sleep problems and I did suggest that she talk to her primary care provider, Dr. Michelle, who could refer her for sleep studies.  Related to the insomnia, she does have some fatigue.  She denies depression or anxiety.    Alopecia improved.     REVIEW OF SYSTEMS:  A  10-point review of systems was otherwise negative.  She is eating a healthful diet.  She is not exercising.  I did advise her to walk at least 30 minutes a day.  She does not consume alcohol.  She is taking vitamin D.  On her last DEXA scan, her most valid T-score was -1.8 on 09/26/2022.  She does have a TRAM flap that was placed and the incisions have completely healed.    She does complain of blurry vision and we will refer to Ophthalmology.  I discussed with her that I did not think the blurry vision was directly related to letrozole.     PHYSICAL EXAMINATION:    VITAL SIGNS:  /72   Pulse 82   Temp 97.8  F (36.6  C) (Oral)   Resp 16   Wt 99.9 kg (220 lb 4.8 oz)   SpO2 97%   BMI 39.65 kg/m    GENERAL:  Ramona appeared generally well.  HEENT:  No alopecia.  She has moderately good dentition, but is having a crown placed soon.  There are no empty sockets or recent extractions.  LYMPH:  No cervical, supraclavicular, subclavicular or axillary lymphadenopathy.  BREASTS:  Right breast reveals no masses.  Examination of the left breast reveals a TRAM flap in place with no erythema or masses.  Incisions are well healed.  LUNGS:  Clear to percussion and auscultation.  HEART:  Regular rate and rhythm.  S1, S2.  ABDOMEN:  Soft, nontender, consistent with increased body mass index.  EXTREMITIES:  Without edema.  PSYCH:  Mood was somewhat anxious.  Affect was appropriate.     LABORATORY DATA:  CBC and CMP within normal limits.     ASSESSMENT AND PLAN:  1.  Sammie Ferrer is a 67-year-old woman with a recent diagnosis of a stage IIB, T3 N0 MX, invasive lobular carcinoma of the upper outer quadrant of the left breast which is multifocal grade 1, ER positive in greater than 70% of cells, FL positive in greater than 90% of cells and HER2 nonamplified.  She now comes to clinic with her , for recommendations regarding evaluation and treatment.    2.  MammaPrint before surgery came back as low risk as did the  Oncotype after surgery. The Oncotype of the pleomorphic lobular cancer showed a value of 15, which is less than the threshold for chemotherapy from TAILORx which is 26 for a post-menopausal woman.   3.  Sammie Ferrer underwent a left mastectomy and has a TRAM flap in place.  She started adjuvant hormonal therapy with letrozole and has tolerated it well.   4. Pathology showed -Invasive carcinoma is estrogen receptor positive, progesterone receptor positive and HER2 negative by immunohistochemistry.  Margins negative. pT3 Nx because the lymph node is not sentinel. She did not qualify for neoadjuvant chemotherapy based on MammaPrint nor adjuvant chemotherapy based on Oncotype.  She also was node negative (although the LN was not sentinel) and did not meet criteria for MonarchE. She was node negative by MRI but the node was not sentinel.  She does have Ki67 20% or greater and size of tumor greater than 5 cm.  She cannot tolerate adjuvant Zoledronic acid.  We would plan to continue AI for 10 years.   5.   Sammie continues to do relatively well.  She has tolerated the letrozole quite well, although she does have some hot flashes.  She does have bouts of hot flashes with letrozole, but not with joint stiffness.  She would like to continue with letrozole.  She is having trouble sleeping at night and she will talk with her primary care provider about the next steps.  6.   Adjuvant Zometa was not tolerated.  We will change the Zometa to Prolia and today will be her first treatment.  Her DEXA scan showed a most valid negative T-score of -1.8, and the purpose of the Prolia is to maintain bone density in the setting of letrozole adjuvant therapy, but by itself would not expect to have adjuvant benefit in contrast to the zoledronic acid.  7.  Left TRAM flap is healed.    8.  Imaging followup.  Right mammogram will be on 01/30.  9.  Blurry vision.  I discussed with Ramona that it is probably not related to the letrozole, but I  did put in an Ophthalmology consult.  10.  Imaging:  Yearly mammography of right breast.  Yearly mammography of right breast 12-27-22, BI-RADS CATEGORY: 2 - Benign   11.  Followup. Cancel dermatology appointment per patient. Ophthalmology September 7 with Dr. Daniella Shetty. Dexa September 29. Follow up with me January 30 with CBC, CMP, right mammogram and Prolia. Follow up July 30 with me CBC, CMP and Prolia.      Thank you for allowing us to participate in this patient's care.      Sincerely,      Kenneth Cisneros MD  Professor  [facility]  [phone]       I spent 40 minutes with the patient more than 50% of which was in counseling and coordination of care.

## 2023-07-25 ENCOUNTER — ONCOLOGY VISIT (OUTPATIENT)
Dept: ONCOLOGY | Facility: CLINIC | Age: 68
End: 2023-07-25
Attending: INTERNAL MEDICINE
Payer: COMMERCIAL

## 2023-07-25 ENCOUNTER — APPOINTMENT (OUTPATIENT)
Dept: LAB | Facility: CLINIC | Age: 68
End: 2023-07-25
Attending: INTERNAL MEDICINE
Payer: COMMERCIAL

## 2023-07-25 VITALS
OXYGEN SATURATION: 97 % | SYSTOLIC BLOOD PRESSURE: 118 MMHG | WEIGHT: 220.3 LBS | TEMPERATURE: 97.8 F | DIASTOLIC BLOOD PRESSURE: 72 MMHG | RESPIRATION RATE: 16 BRPM | HEART RATE: 82 BPM | BODY MASS INDEX: 39.65 KG/M2

## 2023-07-25 DIAGNOSIS — Z17.0 MALIGNANT NEOPLASM OF UPPER-OUTER QUADRANT OF LEFT BREAST IN FEMALE, ESTROGEN RECEPTOR POSITIVE (H): ICD-10-CM

## 2023-07-25 DIAGNOSIS — Z79.811 USE OF AROMATASE INHIBITORS: ICD-10-CM

## 2023-07-25 DIAGNOSIS — M85.80 OSTEOPENIA, UNSPECIFIED LOCATION: ICD-10-CM

## 2023-07-25 DIAGNOSIS — C50.412 MALIGNANT NEOPLASM OF UPPER-OUTER QUADRANT OF LEFT BREAST IN FEMALE, ESTROGEN RECEPTOR POSITIVE (H): ICD-10-CM

## 2023-07-25 DIAGNOSIS — H53.8 BLURRED VISION: Primary | ICD-10-CM

## 2023-07-25 DIAGNOSIS — Z78.0 ASYMPTOMATIC POSTMENOPAUSAL STATUS: ICD-10-CM

## 2023-07-25 DIAGNOSIS — M85.80 OSTEOPENIA: ICD-10-CM

## 2023-07-25 DIAGNOSIS — M81.8 IDIOPATHIC OSTEOPOROSIS: ICD-10-CM

## 2023-07-25 LAB
ALBUMIN SERPL BCG-MCNC: 4 G/DL (ref 3.5–5.2)
ALP SERPL-CCNC: 55 U/L (ref 35–104)
ALT SERPL W P-5'-P-CCNC: 48 U/L (ref 0–50)
ANION GAP SERPL CALCULATED.3IONS-SCNC: 10 MMOL/L (ref 7–15)
AST SERPL W P-5'-P-CCNC: 33 U/L (ref 0–45)
BASOPHILS # BLD AUTO: 0.1 10E3/UL (ref 0–0.2)
BASOPHILS NFR BLD AUTO: 1 %
BILIRUB SERPL-MCNC: 0.4 MG/DL
BUN SERPL-MCNC: 13.1 MG/DL (ref 8–23)
CALCIUM SERPL-MCNC: 10.1 MG/DL (ref 8.8–10.2)
CHLORIDE SERPL-SCNC: 105 MMOL/L (ref 98–107)
CREAT SERPL-MCNC: 0.72 MG/DL (ref 0.51–0.95)
DEPRECATED HCO3 PLAS-SCNC: 24 MMOL/L (ref 22–29)
EOSINOPHIL # BLD AUTO: 0.2 10E3/UL (ref 0–0.7)
EOSINOPHIL NFR BLD AUTO: 3 %
ERYTHROCYTE [DISTWIDTH] IN BLOOD BY AUTOMATED COUNT: 13.7 % (ref 10–15)
GFR SERPL CREATININE-BSD FRML MDRD: >90 ML/MIN/1.73M2
GLUCOSE SERPL-MCNC: 155 MG/DL (ref 70–99)
HCT VFR BLD AUTO: 40.2 % (ref 35–47)
HGB BLD-MCNC: 13.1 G/DL (ref 11.7–15.7)
IMM GRANULOCYTES # BLD: 0 10E3/UL
IMM GRANULOCYTES NFR BLD: 0 %
LYMPHOCYTES # BLD AUTO: 2.2 10E3/UL (ref 0.8–5.3)
LYMPHOCYTES NFR BLD AUTO: 36 %
MCH RBC QN AUTO: 29.1 PG (ref 26.5–33)
MCHC RBC AUTO-ENTMCNC: 32.6 G/DL (ref 31.5–36.5)
MCV RBC AUTO: 89 FL (ref 78–100)
MONOCYTES # BLD AUTO: 0.5 10E3/UL (ref 0–1.3)
MONOCYTES NFR BLD AUTO: 9 %
NEUTROPHILS # BLD AUTO: 3 10E3/UL (ref 1.6–8.3)
NEUTROPHILS NFR BLD AUTO: 51 %
NRBC # BLD AUTO: 0 10E3/UL
NRBC BLD AUTO-RTO: 0 /100
PLATELET # BLD AUTO: 296 10E3/UL (ref 150–450)
POTASSIUM SERPL-SCNC: 3.9 MMOL/L (ref 3.4–5.3)
PROT SERPL-MCNC: 7 G/DL (ref 6.4–8.3)
RBC # BLD AUTO: 4.5 10E6/UL (ref 3.8–5.2)
SODIUM SERPL-SCNC: 139 MMOL/L (ref 136–145)
WBC # BLD AUTO: 6 10E3/UL (ref 4–11)

## 2023-07-25 PROCEDURE — 96372 THER/PROPH/DIAG INJ SC/IM: CPT | Performed by: INTERNAL MEDICINE

## 2023-07-25 PROCEDURE — 85025 COMPLETE CBC W/AUTO DIFF WBC: CPT | Performed by: INTERNAL MEDICINE

## 2023-07-25 PROCEDURE — 250N000011 HC RX IP 250 OP 636: Mod: JZ | Performed by: INTERNAL MEDICINE

## 2023-07-25 PROCEDURE — 80053 COMPREHEN METABOLIC PANEL: CPT | Performed by: INTERNAL MEDICINE

## 2023-07-25 PROCEDURE — 99215 OFFICE O/P EST HI 40 MIN: CPT | Performed by: INTERNAL MEDICINE

## 2023-07-25 PROCEDURE — 36415 COLL VENOUS BLD VENIPUNCTURE: CPT | Performed by: INTERNAL MEDICINE

## 2023-07-25 PROCEDURE — G0463 HOSPITAL OUTPT CLINIC VISIT: HCPCS | Performed by: INTERNAL MEDICINE

## 2023-07-25 RX ORDER — METHYLPREDNISOLONE SODIUM SUCCINATE 125 MG/2ML
125 INJECTION, POWDER, LYOPHILIZED, FOR SOLUTION INTRAMUSCULAR; INTRAVENOUS
Status: DISCONTINUED | OUTPATIENT
Start: 2023-07-25 | End: 2023-07-27 | Stop reason: HOSPADM

## 2023-07-25 RX ORDER — MEPERIDINE HYDROCHLORIDE 25 MG/ML
25 INJECTION INTRAMUSCULAR; INTRAVENOUS; SUBCUTANEOUS EVERY 30 MIN PRN
Status: DISCONTINUED | OUTPATIENT
Start: 2023-07-25 | End: 2023-07-27 | Stop reason: HOSPADM

## 2023-07-25 RX ORDER — EPINEPHRINE 1 MG/ML
0.3 INJECTION, SOLUTION INTRAMUSCULAR; SUBCUTANEOUS EVERY 5 MIN PRN
Status: DISCONTINUED | OUTPATIENT
Start: 2023-07-25 | End: 2023-07-27 | Stop reason: HOSPADM

## 2023-07-25 RX ORDER — ALBUTEROL SULFATE 0.83 MG/ML
2.5 SOLUTION RESPIRATORY (INHALATION)
Status: DISCONTINUED | OUTPATIENT
Start: 2023-07-25 | End: 2023-07-27 | Stop reason: HOSPADM

## 2023-07-25 RX ORDER — DIPHENHYDRAMINE HYDROCHLORIDE 50 MG/ML
50 INJECTION INTRAMUSCULAR; INTRAVENOUS
Status: DISCONTINUED | OUTPATIENT
Start: 2023-07-25 | End: 2023-07-27 | Stop reason: HOSPADM

## 2023-07-25 RX ORDER — ALBUTEROL SULFATE 90 UG/1
1-2 AEROSOL, METERED RESPIRATORY (INHALATION)
Status: DISCONTINUED | OUTPATIENT
Start: 2023-07-25 | End: 2023-07-27 | Stop reason: HOSPADM

## 2023-07-25 RX ADMIN — DENOSUMAB 60 MG: 60 INJECTION SUBCUTANEOUS at 13:32

## 2023-07-25 ASSESSMENT — PAIN SCALES - GENERAL: PAINLEVEL: MILD PAIN (2)

## 2023-07-25 NOTE — NURSING NOTE
"Oncology Rooming Note    July 25, 2023 12:05 PM   Ramona Ferrer is a 68 year old female who presents for:    Chief Complaint   Patient presents with    Blood Draw     Labs collected from venipuncture by RN. Vitals taken. Checked in for appointment(s).     Oncology Clinic Visit     Breast cancer     Initial Vitals: /72   Pulse 82   Temp 97.8  F (36.6  C) (Oral)   Resp 16   Wt 99.9 kg (220 lb 4.8 oz)   SpO2 97%   BMI 39.65 kg/m   Estimated body mass index is 39.65 kg/m  as calculated from the following:    Height as of 4/6/23: 1.588 m (5' 2.5\").    Weight as of this encounter: 99.9 kg (220 lb 4.8 oz). Body surface area is 2.1 meters squared.  Mild Pain (2) Comment: Data Unavailable   No LMP recorded. Patient has had a hysterectomy.  Allergies reviewed: Yes  Medications reviewed: Yes    Medications: MEDICATION REFILLS NEEDED TODAY. Provider was notified.  Pharmacy name entered into [facility]:    [id number] - [place], [place] - [address]  [id number] - [place], [place] - [address]    Clinical concerns: Pt wants refill of letrozole and calcium.       José Miguel Bloom              "

## 2023-07-25 NOTE — NURSING NOTE
Prolia administered without complication per orders from Kenneth Cisneros. Patient tolerated well and was discharged. See MAR for details.        Tommie Soto on 7/25/2023 at 1:32 PM

## 2023-07-25 NOTE — NURSING NOTE
Chief Complaint   Patient presents with    Blood Draw     Labs collected from venipuncture by RN. Vitals taken. Checked in for appointment(s).      Magdalena GOMEZ RN PHN BSN  BMT/Oncology Lab

## 2023-07-25 NOTE — LETTER
2023         RE: Ramona Ferrer  [address]  [place] [address]        Dear Colleague,    Thank you for referring your patient, Ramona Ferrer, to the [facility]. Please see a copy of my visit note below.    Dada Mendiola MD  [facility] Surgery  [address]  [place] [address]     RE:  Ramona Ferrer  MRN:  [id number]  :  1955     Dear Dr. Mendiola:     I would like to refer to you Sammie Ferrer, a 68-year-old woman with a recent diagnosis of a stage IIB, T3 N0 MX, invasive lobular carcinoma of the left breast.  She self-palpated a lump in the upper outer quadrant of the left breast.  She underwent evaluation with a diagnostic mammogram and ultrasound, which was BI-RADS 4, showing in the upper outer quadrant of the left breast irregularly shaped hypoechoic mass at the 12 o'clock position 4 cm from the nipple on the left.  This mass measured 2.3 x 2.2 x 1.5 cm and accounted for the patient's area of palpable concern.  Additionally, at the 1 o'clock position 2 cm from the nipple in the left breast, there is a second irregularly shaped hypoechoic mass with indistinct margins measuring 1.3 x 1.1 x 0.8 cm.  She also underwent a breast MRI which showed in the right breast mild background parenchymal enhancement and no suspicious areas of enhancement or lymphadenopathy.  In the left breast there was mild background parenchymal enhancement, and at the 12 o'clock position 4 cm from the nipple there was a heterogeneously enhancing irregular mass measuring 3.4 in AP dimension x 2.8 cm ML and 2.8 cm SI corresponding to the known biopsy-proven malignancy in the left breast.  In the left breast at the 1 o'clock position 2 cm from the nipple there was also a heterogeneously enhancing oval mass measuring 1.3 cm in maximal diameter.  This likely corresponds to the second biopsy-proven malignancy.  Together, the full extent of the  disease spans a total of 5.2 cm AP x 4.4 cm ML, BI-RADS 6.     The pathology showed in the left breast 12 o'clock position 4 cm from the nipple, ultrasound-guided needle biopsy invasive lobular carcinoma, [place] grade 1/3 lobular carcinoma in situ, classic type, estrogen receptor positive in greater than 70% of the cells and progesterone receptor positive in greater than 90% of cells, and HER2 FISH was nonamplified.  In part B in the left breast at the 1 o'clock position 2 cm from the nipple, ultrasound-guided needle biopsy showed invasive lobular carcinoma, [place] grade 1/3 lobular carcinoma in situ was present, classic subtype, estrogen receptors positive in greater than 70% of the cells, progesterone receptors positive in greater than 90% of cells, and HER2 was nonamplified, Ki-67 20-25%.  She now comes to our clinic for recommendations.     Sammie reports that the breast mass had been there for approximately 1 month before she was seen for evaluation..       She has worked in the past as a personal care assistant and lives in the [facility].       PAST MEDICAL HISTORY:  There is no history of breast cancer in the past.  No history of breast cancer surgery.  She has no history of radiation therapy in the past or radiation exposure.  No history of prior tumor of any kind.  She denies heart problems, heart attack, breathing problems, blood clots, seizures, peptic ulcer disease, osteoporosis or bone fractures.  She does report a history of arthritis.     FAMILY HISTORY:  Positive for breast cancer in a paternal aunt who was diagnosed with breast cancer at an old age.     Her father has a history of stomach cancer.  There is no family history of pancreatic cancer, melanoma, lung cancer or ovarian cancer.     No history of male breast cancer.     ALLERGIES:  No history of drug allergies.  She denies allergies to seafood, iodine, or contrast dye.     PAST MENSTRUAL HISTORY:  She has been pregnant 3 times with  2 live births at age 29 and 31 and 1 miscarriage.  Age at first menstrual period was 14.  She did have a total abdominal hysterectomy and bilateral salpingo-oophorectomy performed in 2003 for endometriosis.  She has no history of hormone replacement therapy.  No history of oral contraceptives.     HABITS: She denies tobacco history.  She denies alcohol history and drinks alcohol only occasionally.     PAST MEDICAL HISTORY:  Past medical history is also remarkable for type 2 diabetes, and she was begun on metformin 2 years ago.  She also has a history of varicose veins and a history of a lipoma resected from her left leg.      Chronic Hepatitis B.  Hepatitis C negative.  HIV negative.       Prior COVID vaccination x 3.      GERM LINE GENETICS: INVITAE testing of 47 genes was negative.      TREATMENT HISTORY:  A.  MammaPrint showed low risk.  B.  Left mastectomy and axillary lymph node sampling.   A. Lymph node, LEFT axillary, excision:  -One benign lymph node (0/1)  B. LEFT breast, skin-sparing mastectomy:  -INVASIVE BREAST CARCINOMA, MIXED INVASIVE LOBULAR CARCINOMA (predominant component) and INVASIVE DUCTAL CARCINOMA (minor component), KALPESH GRADE 3, size 5.5 cm, 12:00, upper outer quadrant and lower outer quadrant  -Ductal carcinoma in situ (DCIS), nuclear grade 2, cribriform and solid type(s), with focal necrosis  -DCIS is admixed with invasive carcinoma, and comprises a minor component (<5%) of the tumor volume   -Lobular carcinoma in situ (LCIS), predominantly classic type (admixed with invasive carcinoma and beyond invasive carcinoma) and focal pleomorphic type (size 2 mm, adjacent to invasive carcinoma)  -Margins are uninvolved by invasive carcinoma  -Invasive carcinoma is 2.5 mm from the nearest (anterior) margin, and is > 5 mm from the posterior margin  -Margins are uninvolved by DCIS and pleomorphic LCIS  -DCIS and pleomorphic LCIS are > 5 mm from the nearest (anterior) margin  -Benign nipple and  skin   -Other findings: fibrocystic change (including microcysts with apocrine metaplasia) and columnar cell change  -Calcifications associated with DCIS, LCIS, and benign ducts and acini  -Prior core biopsy site changes (two biopsy sites identified)  -Invasive carcinoma is estrogen receptor positive (>70%, strong intensity) and progesterone receptor positive (>90%, strong intensity) by immunohistochemistry and is HER2 non-amplified (group 5) by FISH (performed on prior core biopsies, see report [id number], specimens A and B)  DCIS and LCIS present.  -Margins are uninvolved by invasive carcinoma or LCIS.   -Invasive carcinoma is estrogen receptor positive (>70%, strong intensity) and progesterone receptor positive (>90%, strong intensity) by immunohistochemistry and is HER2 non-amplified (group 5) by FISH (performed on prior core biopsies, see report [id number], specimens A and B) Ki-67 immunohistochemistry (MIB-1, pharmDx, [employer]) from [employer] was performed on invasive carcinoma in the LEFT mastectomy specimen   They report Ki-67 proliferative index of 20-25% (block B5) and 20% (block B27).   C.  Staging:  pT3 Nx because lymph node is not the sentinel node.    D.  She did not want radiation, and risk:benefit did not favor treatment.  E.  Letrozole begun 4-5-22.   F.  Left TRAM flap reconstruction healed as of 12-27-22 but she has some concerns.   G.  Adjuvant Zometa 12-27-22 but not tolerated. She did have a reaction to adjuvant Zometa and it cannot be given any further.      INTERVAL HISTORY  Ramona returns to clinic for routine followup and has been feeling well.  She did have a reaction to zoledronic acid with fever and does not want to try it again.  She was switched to Prolia and she is going to proceed for the first time today.  She has no pain, but she does have problems with sleeping.  She has hot flashes with letrozole and the hot flashes do keep her up at night.  She thinks she has other sleep  problems and I did suggest that she talk to her primary care provider, Dr. Michelle, who could refer her for sleep studies.  Related to the insomnia, she does have some fatigue.  She denies depression or anxiety.    Alopecia improved.     REVIEW OF SYSTEMS:  A 10-point review of systems was otherwise negative.  She is eating a healthful diet.  She is not exercising.  I did advise her to walk at least 30 minutes a day.  She does not consume alcohol.  She is taking vitamin D.  On her last DEXA scan, her most valid T-score was -1.8 on 09/26/2022.  She does have a TRAM flap that was placed and the incisions have completely healed.    She does complain of blurry vision and we will refer to Ophthalmology.  I discussed with her that I did not think the blurry vision was directly related to letrozole.     PHYSICAL EXAMINATION:    VITAL SIGNS:  /72   Pulse 82   Temp 97.8  F (36.6  C) (Oral)   Resp 16   Wt 99.9 kg (220 lb 4.8 oz)   SpO2 97%   BMI 39.65 kg/m    GENERAL:  Ramona appeared generally well.  HEENT:  No alopecia.  She has moderately good dentition, but is having a crown placed soon.  There are no empty sockets or recent extractions.  LYMPH:  No cervical, supraclavicular, subclavicular or axillary lymphadenopathy.  BREASTS:  Right breast reveals no masses.  Examination of the left breast reveals a TRAM flap in place with no erythema or masses.  Incisions are well healed.  LUNGS:  Clear to percussion and auscultation.  HEART:  Regular rate and rhythm.  S1, S2.  ABDOMEN:  Soft, nontender, consistent with increased body mass index.  EXTREMITIES:  Without edema.  PSYCH:  Mood was somewhat anxious.  Affect was appropriate.     LABORATORY DATA:  CBC and CMP within normal limits.     ASSESSMENT AND PLAN:  1.  Sammie Ferrer is a 67-year-old woman with a recent diagnosis of a stage IIB, T3 N0 MX, invasive lobular carcinoma of the upper outer quadrant of the left breast which is multifocal grade 1, ER positive in  greater than 70% of cells, CO positive in greater than 90% of cells and HER2 nonamplified.  She now comes to clinic with her , for recommendations regarding evaluation and treatment.    2.  MammaPrint before surgery came back as low risk as did the Oncotype after surgery. The Oncotype of the pleomorphic lobular cancer showed a value of 15, which is less than the threshold for chemotherapy from TAILORx which is 26 for a post-menopausal woman.   3.  Sammie Ferrer underwent a left mastectomy and has a TRAM flap in place.  She started adjuvant hormonal therapy with letrozole and has tolerated it well.   4. Pathology showed -Invasive carcinoma is estrogen receptor positive, progesterone receptor positive and HER2 negative by immunohistochemistry.  Margins negative. pT3 Nx because the lymph node is not sentinel. She did not qualify for neoadjuvant chemotherapy based on MammaPrint nor adjuvant chemotherapy based on Oncotype.  She also was node negative (although the LN was not sentinel) and did not meet criteria for MonarchE. She was node negative by MRI but the node was not sentinel.  She does have Ki67 20% or greater and size of tumor greater than 5 cm.  She cannot tolerate adjuvant Zoledronic acid.  We would plan to continue AI for 10 years.   5.   Sammie continues to do relatively well.  She has tolerated the letrozole quite well, although she does have some hot flashes.  She does have bouts of hot flashes with letrozole, but not with joint stiffness.  She would like to continue with letrozole.  She is having trouble sleeping at night and she will talk with her primary care provider about the next steps.  6.   Adjuvant Zometa was not tolerated.  We will change the Zometa to Prolia and today will be her first treatment.  Her DEXA scan showed a most valid negative T-score of -1.8, and the purpose of the Prolia is to maintain bone density in the setting of letrozole adjuvant therapy, but by itself would not expect  to have adjuvant benefit in contrast to the zoledronic acid.  7.  Left TRAM flap is healed.    8.  Imaging followup.  Right mammogram will be on 01/30.  9.  Blurry vision.  I discussed with Ramona that it is probably not related to the letrozole, but I did put in an Ophthalmology consult.  10.  Imaging:  Yearly mammography of right breast.  Yearly mammography of right breast 12-27-22, BI-RADS CATEGORY: 2 - Benign   11.  Followup. Cancel dermatology appointment per patient. Ophthalmology September 7 with Dr. Daniella Shetty. Dexa September 29. Follow up with me January 30 with CBC, CMP, right mammogram and Prolia. Follow up July 30 with me CBC, CMP and Prolia.      Thank you for allowing us to participate in this patient's care.      Sincerely,      Kenneth Cisneros MD  Professor  [facility]  [phone]       I spent 40 minutes with the patient more than 50% of which was in counseling and coordination of care.

## 2023-08-14 ENCOUNTER — PRE VISIT (OUTPATIENT)
Dept: DERMATOLOGY | Facility: CLINIC | Age: 68
End: 2023-08-14

## 2023-08-17 ENCOUNTER — TELEPHONE (OUTPATIENT)
Dept: OPTOMETRY | Facility: CLINIC | Age: 68
End: 2023-08-17
Payer: COMMERCIAL

## 2023-08-17 DIAGNOSIS — H10.10 SEASONAL ALLERGIC CONJUNCTIVITIS: ICD-10-CM

## 2023-08-17 RX ORDER — KETOROLAC TROMETHAMINE 5 MG/ML
1 SOLUTION OPHTHALMIC 4 TIMES DAILY
Qty: 5 ML | Refills: 6 | Status: SHIPPED | OUTPATIENT
Start: 2023-08-17

## 2023-08-17 NOTE — TELEPHONE ENCOUNTER
Can not be on steroids , should try an alternative that I will send to pharmacy    Ana Ludwig OD

## 2023-08-17 NOTE — TELEPHONE ENCOUNTER
[facility] Call Center    Phone Message    May a detailed message be left on voicemail: yes     Reason for Call: Medication Refill Request    Has the patient contacted the pharmacy for the refill? Yes   Name of medication being requested:  prednisoLONE Acetate 1 %  Provider who prescribed the medication: Dr. Ludwig  Pharmacy: [id number] [address]  Date medication is needed: ASAP    Please call pt once complete. Thank you.    Action Taken: Message routed to:  Other: [facility]    Travel Screening: Not Applicable

## 2023-09-06 ENCOUNTER — TELEPHONE (OUTPATIENT)
Dept: OPHTHALMOLOGY | Facility: CLINIC | Age: 68
End: 2023-09-06
Payer: COMMERCIAL

## 2023-09-07 ENCOUNTER — OFFICE VISIT (OUTPATIENT)
Dept: OPHTHALMOLOGY | Facility: CLINIC | Age: 68
End: 2023-09-07
Attending: INTERNAL MEDICINE
Payer: COMMERCIAL

## 2023-09-07 DIAGNOSIS — H40.033 ANATOMICAL NARROW ANGLE BORDERLINE GLAUCOMA OF BOTH EYES: ICD-10-CM

## 2023-09-07 DIAGNOSIS — H52.03 HYPEROPIA OF BOTH EYES WITH REGULAR ASTIGMATISM AND PRESBYOPIA: Primary | ICD-10-CM

## 2023-09-07 DIAGNOSIS — H53.8 BLURRED VISION: ICD-10-CM

## 2023-09-07 DIAGNOSIS — H10.13 ALLERGIC CONJUNCTIVITIS OF BOTH EYES: ICD-10-CM

## 2023-09-07 DIAGNOSIS — H52.4 HYPEROPIA OF BOTH EYES WITH REGULAR ASTIGMATISM AND PRESBYOPIA: Primary | ICD-10-CM

## 2023-09-07 DIAGNOSIS — H52.223 HYPEROPIA OF BOTH EYES WITH REGULAR ASTIGMATISM AND PRESBYOPIA: Primary | ICD-10-CM

## 2023-09-07 DIAGNOSIS — M85.80 OSTEOPENIA, UNSPECIFIED LOCATION: ICD-10-CM

## 2023-09-07 PROCEDURE — 92004 COMPRE OPH EXAM NEW PT 1/>: CPT | Performed by: OPTOMETRIST

## 2023-09-07 PROCEDURE — 92015 DETERMINE REFRACTIVE STATE: CPT | Performed by: OPTOMETRIST

## 2023-09-07 RX ORDER — FLUOROMETHOLONE 0.1 %
SUSPENSION, DROPS(FINAL DOSAGE FORM)(ML) OPHTHALMIC (EYE)
Qty: 5 ML | Refills: 0 | Status: SHIPPED | OUTPATIENT
Start: 2023-09-07 | End: 2023-09-28

## 2023-09-07 ASSESSMENT — CUP TO DISC RATIO
OD_RATIO: 0.30
OS_RATIO: 0.30

## 2023-09-07 ASSESSMENT — CONF VISUAL FIELD
OS_SUPERIOR_TEMPORAL_RESTRICTION: 0
OD_SUPERIOR_TEMPORAL_RESTRICTION: 0
OS_SUPERIOR_NASAL_RESTRICTION: 0
OS_INFERIOR_NASAL_RESTRICTION: 0
OD_INFERIOR_NASAL_RESTRICTION: 0
OS_INFERIOR_TEMPORAL_RESTRICTION: 0
METHOD: COUNTING FINGERS
OD_NORMAL: 1
OS_NORMAL: 1
OD_INFERIOR_TEMPORAL_RESTRICTION: 0
OD_SUPERIOR_NASAL_RESTRICTION: 0

## 2023-09-07 ASSESSMENT — REFRACTION_MANIFEST
OD_ADD: +2.50
OD_SPHERE: +2.75
OS_ADD: +2.50
OS_SPHERE: +2.50
OD_AXIS: 100
OS_AXIS: 085
OD_CYLINDER: +2.25
OS_CYLINDER: +2.00

## 2023-09-07 ASSESSMENT — TONOMETRY
OD_IOP_MMHG: 13
OS_IOP_MMHG: 14
IOP_METHOD: ICARE

## 2023-09-07 ASSESSMENT — REFRACTION_WEARINGRX
OS_SPHERE: +3.00
OD_SPHERE: +3.50
OS_ADD: +2.50
OS_AXIS: 085
OD_AXIS: 100
OD_ADD: +2.50
SPECS_TYPE: PAL
OS_CYLINDER: +1.50
OD_CYLINDER: +2.50

## 2023-09-07 ASSESSMENT — VISUAL ACUITY
OD_CC: 20/40
METHOD: SNELLEN - LINEAR
OS_CC: 20/30
OS_CC+: -1
OD_CC+: -1
CORRECTION_TYPE: GLASSES

## 2023-09-07 ASSESSMENT — EXTERNAL EXAM - LEFT EYE: OS_EXAM: NORMAL

## 2023-09-07 ASSESSMENT — EXTERNAL EXAM - RIGHT EYE: OD_EXAM: NORMAL

## 2023-09-07 NOTE — PROGRESS NOTES
A/P  1.) Hyperopia/Astig/Presbyopia OU  -Longstanding decreased BCVA (20/40 right, 20/25 left) per previous records, dating back to 2012  -Probably refractive amblyopia OU    2.) Cataracts OU  -Mild, possible early visual significance. Causing mild myopic shift  -Rec observation at this time. Continue to monitor    3.) Anatomical narrow angle OU  -s/p LPI OU, angles open even after dilation today  -Monitor    4.) Chronic allergic conjunctivitis OU  -Significant papillae OU. Currently using Pataday/AT  -FML pulse (gets flares in spring and fall)    Monitor 1 year comprehensive eye exam, sooner prn    I have confirmed the patient's CC, HPI and reviewed Past Medical History, Past Surgical History, Social History, Family History, Problem List, Medication List and agree with Tech note.     Daniella Shetty, OD FAAO FSLS

## 2023-09-07 NOTE — PATIENT INSTRUCTIONS
Start fluorometholone (FML) steroid eyedrops for allergies - 3x/day for 1 week, 2x/day for 1 week, 1x/day for 1 week then stop    Continue Patanol and artificial tears    Discontinue Cleareyes - trial Lumify over-the-counter eyedrop for occasional redness relief instead. This will not cause rebound redness over time.

## 2023-09-07 NOTE — NURSING NOTE
Chief Complaints and History of Present Illnesses   Patient presents with    Annual Eye Exam     Pt here for new annual eye exam.     Chief Complaint(s) and History of Present Illness(es)       Annual Eye Exam              Laterality: both eyes    Associated symptoms: Negative for glare, haloes and eye pain    Comments: Pt here for new annual eye exam.              Comments    Pt is pre diabetic. Pt has decreased vision at distance since last exam. Denies glare or halos. Pt also notes light sensitivity, pain, allergies and redness.   Lab Results       Component                Value               Date                       A1C                      6.3                 04/06/2023                 A1C                      6.3                 07/20/2022                 A1C                      6.3                 12/27/2021                 A1C                      6.2                 12/13/2021                 A1C                      6.3                 06/04/2021                 A1C                      5.9                 12/21/2020                 A1C                      5.6                 11/19/2019                 A1C                      6.1                 06/10/2019                 A1C                      5.8                 06/13/2012            Bridgette Sandoval, COA on 9/7/2023 at 12:53 PM

## 2023-09-16 ENCOUNTER — HEALTH MAINTENANCE LETTER (OUTPATIENT)
Age: 68
End: 2023-09-16

## 2023-09-18 ENCOUNTER — PATIENT OUTREACH (OUTPATIENT)
Dept: ONCOLOGY | Facility: CLINIC | Age: 68
End: 2023-09-18
Payer: COMMERCIAL

## 2023-09-18 NOTE — PROGRESS NOTES
[facility]: Cancer Care                                                                                          Called Sammie today to review the cancer treatment summary that was prepared and sent via [email or web address]. We discussed the intent and purpose of the document. She would like a copy to be placed in the mail for her which I am happy to do. Sammie states she is doing well and has no survivorship questions or concerns at this time.     Asuncion Ordaz, RN, BSN, OCN  Cancer Survivorship Nurse Coordinator

## 2023-09-29 ENCOUNTER — ANCILLARY PROCEDURE (OUTPATIENT)
Dept: BONE DENSITY | Facility: CLINIC | Age: 68
End: 2023-09-29
Attending: INTERNAL MEDICINE
Payer: COMMERCIAL

## 2023-09-29 DIAGNOSIS — Z78.0 ASYMPTOMATIC POSTMENOPAUSAL STATUS: ICD-10-CM

## 2023-09-29 PROCEDURE — 77080 DXA BONE DENSITY AXIAL: CPT | Performed by: INTERNAL MEDICINE

## 2023-10-04 ENCOUNTER — PATIENT OUTREACH (OUTPATIENT)
Dept: GERIATRIC MEDICINE | Facility: CLINIC | Age: 68
End: 2023-10-04
Payer: COMMERCIAL

## 2023-10-04 NOTE — PROGRESS NOTES
[facility] Care Coordination Contact    Called member to schedule annual HRA home visit. HRA has been scheduled for Thursday, October 12th @2:00pm..  Stella Cunha RN  [facility]  [phone]

## 2023-10-12 ENCOUNTER — PATIENT OUTREACH (OUTPATIENT)
Dept: GERIATRIC MEDICINE | Facility: CLINIC | Age: 68
End: 2023-10-12
Payer: COMMERCIAL

## 2023-10-12 RX ORDER — PREDNISOLONE ACETATE 10 MG/ML
1 SUSPENSION/ DROPS OPHTHALMIC PRN
COMMUNITY

## 2023-10-12 ASSESSMENT — ACTIVITIES OF DAILY LIVING (ADL): DEPENDENT_IADLS:: INDEPENDENT

## 2023-10-12 ASSESSMENT — LIFESTYLE VARIABLES
SKIP TO QUESTIONS 9-10: 1
AUDIT-C TOTAL SCORE: 0

## 2023-10-12 NOTE — Clinical Note
I am the [facility] Care Coordinator and I saw Sammie for her annual HRA/home visit on 10/12/23.

## 2023-10-12 NOTE — PROGRESS NOTES
[facility] Care Coordination Contact    [facility] Home Visit Assessment     Home visit for Health Risk Assessment with Ramona Ferrer completed on October 12, 2023    Type of residence:: [facility] home  Current living arrangement:: I live in a private home with spouse     Assessment completed with:: Patient, Spouse or significant other    Current Care Plan  Member currently receiving the following home care services:  None   Member currently receiving the following community resources: None      Medication Review  Medication reconciliation completed in Epic: Yes  Medication set-up & administration: Independent and sets up on own every two weeks.  Self-administers medications.  Medication Risk Assessment Medication (1 or more, place referral to MTM): N/A: No risk factors identified  MTM Referral Placed: No: No risk factors idenified    Mental/Behavioral Health   Depression Screening:   PHQ-2 Total Score (Adult) - Positive if 3 or more points; Administer PHQ-9 if positive: 0       Mental health DX:: No        Falls Assessment:   Fallen 2 or more times in the past year?: No   Any fall with injury in the past year?: No    ADL/IADL Dependencies:   Dependent ADLs:: Independent  Dependent IADLs:: Independent    [facility] sponsored benefits: Shared information re: Silver Sneakers/gym memberships, ASA, Calcium +D.    PCA Assessment completed at visit: Not Applicable     Elderly Waiver Eligibility: No-does not meet criteria    Care Plan & Recommendations: Member would like to continue to live independently in her home with her spouse. Denies need for any services at this time. Sammie requested list of in network chiropractors and care coordinator provided contact information for a few chiropractic clinics near her and will send her the complete list of chiropractors in the [place] /[place]/ [place] area that take her insurance.     See [facility] for detailed assessment information.    Follow-Up Plan:  Member informed of future contact, plan to f/u with member with a 6 month telephone assessment.  Contact information shared with member and family, encouraged member to call with any questions or concerns at any time.    [place] care continuum providers: Please see Snapshot and Care Management Flowsheets for Specific details of care plan.    This CC note routed to PCP, Eugene Michelle RN  [facility]  [phone]

## 2023-10-18 ENCOUNTER — PATIENT OUTREACH (OUTPATIENT)
Dept: GERIATRIC MEDICINE | Facility: CLINIC | Age: 68
End: 2023-10-18
Payer: COMMERCIAL

## 2023-10-18 NOTE — TELEPHONE ENCOUNTER
[facility] Care Coordination Contact    Received after visit chart from care coordinator.  Completed following tasks: Mailed copy of care plan to client, Mailed Safe Medication Disposal , Mailed [employer] Leave Behind Letter, and Sent the following resources/forms: list of chiropractors.    Iqra Avila  Care Management Specialist  [facility]  [phone]

## 2023-10-18 NOTE — LETTER
October 18, 2023      RAMONA BEY  [address]  [place] [address]      Dear Ramona:    At [facility], we re dedicated to improving your health and wellness. Enclosed is the Care Plan developed with you on 10/12/2023. Please review the Care Plan carefully.    As a reminder, during your visit we talked about:  Ways to manage your physical and mental health  Using health care to maintain and improve your health   Your preventive care needs     Remember to contact your care coordinator if you:  Are hospitalized, or plan to be hospitalized   Have a fall    Have a change in your physical or mental health  Need help finding support or services    If you have questions, or don t agree with your Care Plan, call me at [phone]. You can also call me if your needs change. TTY users, call the [place] [employer] at (307) or 1-530.590.5207 (fepjfq-cp-qwrdto relay service).    Sincerely,      Stella Cunha RN    E-Mail:  [email or web address]  Phone: [phone]      [facility] is a health plan that contracts with both Medicare and the [place] Medical Assistance (Medicaid) program to provide benefits of both programs to enrollees. Enrollment in [facility] depends on contract renewal.    [id number] accepted    [id number] (07/2022)

## 2023-10-31 ENCOUNTER — PATIENT OUTREACH (OUTPATIENT)
Dept: GASTROENTEROLOGY | Facility: CLINIC | Age: 68
End: 2023-10-31
Payer: COMMERCIAL

## 2023-11-04 DIAGNOSIS — R73.03 PREDIABETES: ICD-10-CM

## 2023-11-04 DIAGNOSIS — C50.412 MALIGNANT NEOPLASM OF UPPER-OUTER QUADRANT OF LEFT BREAST IN FEMALE, ESTROGEN RECEPTOR POSITIVE (H): ICD-10-CM

## 2023-11-04 DIAGNOSIS — Z17.0 MALIGNANT NEOPLASM OF UPPER-OUTER QUADRANT OF LEFT BREAST IN FEMALE, ESTROGEN RECEPTOR POSITIVE (H): ICD-10-CM

## 2023-11-06 RX ORDER — LETROZOLE 2.5 MG/1
TABLET, FILM COATED ORAL
Qty: 90 TABLET | Refills: 0 | Status: SHIPPED | OUTPATIENT
Start: 2023-11-06 | End: 2024-01-30

## 2023-11-06 NOTE — TELEPHONE ENCOUNTER
Pending Prescriptions:                       Disp   Refills    letrozole (FEMARA) 2.5 MG tablet [Pharmac*90 tab*0            Sig: TAKE ONE TABLET BY MOUTH ONE TIME DAILY    Last prescribing provider:     Last clinic visit date: 07/25/23 w/    Recommendations for requested medication (if none, N/A): Copied from last OV note:   She has tolerated the letrozole quite well, although she does have some hot flashes.  She does have bouts of hot flashes with letrozole, but not with joint stiffness.  She would like to continue with letrozole.      Any other pertinent information (if none, N/A): N/A    Refilled: Y/N, if NO, why?

## 2023-11-07 ENCOUNTER — OFFICE VISIT (OUTPATIENT)
Dept: INTERNAL MEDICINE | Facility: CLINIC | Age: 68
End: 2023-11-07
Payer: COMMERCIAL

## 2023-11-07 VITALS
RESPIRATION RATE: 18 BRPM | TEMPERATURE: 98.3 F | OXYGEN SATURATION: 96 % | SYSTOLIC BLOOD PRESSURE: 121 MMHG | HEART RATE: 83 BPM | BODY MASS INDEX: 38.98 KG/M2 | DIASTOLIC BLOOD PRESSURE: 73 MMHG | HEIGHT: 63 IN | WEIGHT: 220 LBS

## 2023-11-07 DIAGNOSIS — G47.33 OSA (OBSTRUCTIVE SLEEP APNEA): ICD-10-CM

## 2023-11-07 DIAGNOSIS — E03.9 HYPOTHYROIDISM, UNSPECIFIED TYPE: ICD-10-CM

## 2023-11-07 DIAGNOSIS — G47.00 INSOMNIA, UNSPECIFIED TYPE: ICD-10-CM

## 2023-11-07 DIAGNOSIS — R73.03 PREDIABETES: ICD-10-CM

## 2023-11-07 DIAGNOSIS — I10 PRIMARY HYPERTENSION: Primary | ICD-10-CM

## 2023-11-07 PROBLEM — Z17.0 ESTROGEN RECEPTOR POSITIVE STATUS (ER+): Status: ACTIVE | Noted: 2023-11-07

## 2023-11-07 PROBLEM — N20.0 CALCULUS OF KIDNEY: Status: ACTIVE | Noted: 2019-03-01

## 2023-11-07 PROBLEM — N60.92 ATYPICAL DUCTAL HYPERPLASIA OF LEFT BREAST: Status: ACTIVE | Noted: 2017-11-03

## 2023-11-07 PROBLEM — H11.32 CONJUNCTIVAL HEMORRHAGE, LEFT: Status: ACTIVE | Noted: 2021-05-03

## 2023-11-07 LAB
ANION GAP SERPL CALCULATED.3IONS-SCNC: 10 MMOL/L (ref 7–15)
BUN SERPL-MCNC: 13.7 MG/DL (ref 8–23)
CALCIUM SERPL-MCNC: 9.6 MG/DL (ref 8.8–10.2)
CHLORIDE SERPL-SCNC: 105 MMOL/L (ref 98–107)
CREAT SERPL-MCNC: 0.76 MG/DL (ref 0.51–0.95)
DEPRECATED HCO3 PLAS-SCNC: 25 MMOL/L (ref 22–29)
EGFRCR SERPLBLD CKD-EPI 2021: 86 ML/MIN/1.73M2
GLUCOSE SERPL-MCNC: 106 MG/DL (ref 70–99)
HBA1C MFR BLD: 6 % (ref 0–5.6)
POTASSIUM SERPL-SCNC: 4.3 MMOL/L (ref 3.4–5.3)
SODIUM SERPL-SCNC: 140 MMOL/L (ref 135–145)

## 2023-11-07 PROCEDURE — 83036 HEMOGLOBIN GLYCOSYLATED A1C: CPT | Performed by: INTERNAL MEDICINE

## 2023-11-07 PROCEDURE — 36415 COLL VENOUS BLD VENIPUNCTURE: CPT | Performed by: INTERNAL MEDICINE

## 2023-11-07 PROCEDURE — 80048 BASIC METABOLIC PNL TOTAL CA: CPT | Performed by: INTERNAL MEDICINE

## 2023-11-07 PROCEDURE — 99214 OFFICE O/P EST MOD 30 MIN: CPT | Performed by: INTERNAL MEDICINE

## 2023-11-07 RX ORDER — TRAZODONE HYDROCHLORIDE 50 MG/1
50 TABLET, FILM COATED ORAL AT BEDTIME
Qty: 90 TABLET | Refills: 0 | Status: SHIPPED | OUTPATIENT
Start: 2023-11-07 | End: 2023-11-13 | Stop reason: ALTCHOICE

## 2023-11-07 RX ORDER — METFORMIN HCL 500 MG
1000 TABLET, EXTENDED RELEASE 24 HR ORAL
Qty: 180 TABLET | Refills: 0 | OUTPATIENT
Start: 2023-11-07

## 2023-11-07 RX ORDER — LISINOPRIL 10 MG/1
10 TABLET ORAL DAILY
Qty: 90 TABLET | Refills: 1 | Status: SHIPPED | OUTPATIENT
Start: 2023-11-07 | End: 2024-04-18

## 2023-11-07 RX ORDER — METFORMIN HCL 500 MG
1000 TABLET, EXTENDED RELEASE 24 HR ORAL
Qty: 180 TABLET | Refills: 1 | Status: SHIPPED | OUTPATIENT
Start: 2023-11-07 | End: 2024-04-18

## 2023-11-07 ASSESSMENT — PAIN SCALES - GENERAL: PAINLEVEL: EXTREME PAIN (8)

## 2023-11-07 NOTE — PROGRESS NOTES
"  Assessment & Plan     (I10) Primary hypertension     Comment: Blood pressure stable  Plan: Basic metabolic panel, lisinopril (ZESTRIL) 10         MG tablet refilled as directed.explained clearly about the medication,insructions and side effects.            (G47.33) LALO (obstructive sleep apnea)  Plan: Patient has been diagnosed with mild obstructive sleep apnea many years ago and patient declined to use CPAP at that time ,and currently has snoring and f feeling tired/weak during the daytime. Pt  was referred to sleep study.  Adult Sleep Eval & Management          Referral            (E03.9) Hypothyroidism, unspecified type  Plan: Continue Levoxyl 112 mcg daily    (R73.03) Prediabetes  Plan: Hemoglobin A1c, metFORMIN (GLUCOPHAGE XR) 500         MG 24 hr tablet refilled as directed.explained clearly about the medication,insructions and side effects.            (G47.00) Insomnia, unspecified type  Plan: Started on traZODone (DESYREL) 50 MG tablet as directed.explained clearly about the medication,insructions and side effects. Advised not to drive or operate any machinery while on this med       Review of the result(s) of each unique test - A1c and BMP  Prescription drug management           BMI:   Estimated body mass index is 39.5 kg/m  as calculated from the following:    Height as of this encounter: 1.59 m (5' 2.58\").    Weight as of this encounter: 99.8 kg (220 lb).   Weight management plan: Discussed healthy diet and exercise guidelines      Eugene Michelle MD  [facility]ANEL Cochran is a 68 year old, presenting for the following health issues:  Generalized Weakness and Insomnia      11/7/2023     8:08 AM   Additional Questions   Roomed by Melina Adkins          Hypertension Follow-up    Do you check your blood pressure regularly outside of the clinic? Yes   Are you following a low salt diet? Yes  Are your blood pressures ever more than 140 on the top number " (systolic) OR more   than 90 on the bottom number (diastolic), for example 140/90? No    Hypothyroidism Follow-up    Since last visit, patient describes the following symptoms: Weight stable, no hair loss, no skin changes, no constipation, no loose stools    Patient complains of having insomnia for several years , patient states that she is drinking over-the-counter Tylenol PM/Advil PM with some relief, requesting prescription medication.    Patient also has history of obstructive sleep apnea, not on CPAP.  Has had sleep study in 2012 and declined CPAP at that time, patient does snore and complains of feeling tired/weak during the daytime.     Has history of chronic back pain and follows up with orthopedics spine/spine provider and has had epidural steroid injections.  Patient states she will be making appointment soon with her spine provider..    History of prediabetes: On metformin    History of breast cancer: Follows up with the oncologist.    Past Medical History:   Diagnosis Date    Breast cancer (H) 12/2021    Complication of anesthesia     DDD (degenerative disc disease), lumbar     Endometriosis     Hypertension     Hypothyroidism          PONV (postoperative nausea and vomiting)     Seasonal allergies     Spinal stenosis, lumbar        Current Outpatient Medications   Medication Sig Dispense Refill    Artificial Tear Solution (SOOTHE XP) SOLN Apply 1 drop to eye 3 times daily 15 mL 8    calcium carbonate (OS-EMMA) 500 MG tablet Take 2 tablets (1,000 mg) by mouth daily 180 tablet 3    calcium carbonate 500 mg, elemental, (OSCAL) 500 MG tablet TAKE TWO TABLETS BY MOUTH DAILY 660 tablet 0    cetirizine (ZYRTEC) 10 MG tablet Take 1 tablet (10 mg) by mouth daily 90 tablet 0    diclofenac (VOLTAREN) 1 % topical gel Apply 2 g topically 3 times daily 100 g 0    ketorolac (ACULAR) 0.5 % ophthalmic solution Place 1 drop into both eyes 4 times daily 5 mL 6    letrozole (FEMARA) 2.5 MG tablet TAKE ONE TABLET BY MOUTH  "ONE TIME DAILY 90 tablet 0    levothyroxine (SYNTHROID/LEVOTHROID) 112 MCG tablet Take 1 tablet (112 mcg) by mouth daily 90 tablet 3    lisinopril (ZESTRIL) 10 MG tablet Take 1 tablet (10 mg) by mouth daily 90 tablet 1    metFORMIN (GLUCOPHAGE XR) 500 MG 24 hr tablet Take 2 tablets (1,000 mg) by mouth daily (with dinner) 180 tablet 1    olopatadine (PATANOL) 0.1 % ophthalmic solution Place 1 drop into both eyes 2 times daily 5 mL 12    Omega-3 Fatty Acids (FISH OIL) 500 MG CAPS Take by mouth daily      prednisoLONE acetate (PRED FORTE) 1 % ophthalmic suspension Place 1 drop into both eyes as needed for dry eyes      Vitamin D3 50 mcg (2000 units) tablet Take 1 tablet (50 mcg) by mouth every morning 90 tablet 3          Review of Systems   CONSTITUTIONAL: NEGATIVE for fever, chills,  EYES: NEGATIVE for vision changes or irritation  RESP: NEGATIVE for significant cough or SOB  CV: NEGATIVE for chest pain, palpitations or peripheral edema  MUSCULOSKELETAL: Chronic back pain  PSYCHIATRIC: Insomnia      Objective    /73   Pulse 83   Temp 98.3  F (36.8  C) (Tympanic)   Resp 18   Ht 1.59 m (5' 2.58\")   Wt 99.8 kg (220 lb)   LMP  (LMP Unknown)   SpO2 96%   Breastfeeding No   BMI 39.50 kg/m    Body mass index is 39.5 kg/m .  Physical Exam   GENERAL: healthy, alert and no distress  EYES: Eyes grossly normal to inspection, PERRL and conjunctivae and sclerae normal  RESP: lungs clear to auscultation - no rales, rhonchi or wheezes  CV: regular rate and rhythm, normal S1 S2,    MS: no gross musculoskeletal defects noted, no edema  NEURO: Normal strength and tone, mentation intact and speech normal  PSYCH: mentation appears normal, affect normal/bright               "

## 2023-11-09 ENCOUNTER — TELEPHONE (OUTPATIENT)
Dept: INTERNAL MEDICINE | Facility: CLINIC | Age: 68
End: 2023-11-09
Payer: COMMERCIAL

## 2023-11-09 DIAGNOSIS — G47.00 INSOMNIA, UNSPECIFIED TYPE: Primary | ICD-10-CM

## 2023-11-09 NOTE — TELEPHONE ENCOUNTER
Patient calling, she says she thought she was going to get a prescription for zolpidem sent to her pharmacy but the pharmacy does not have one on file for her. Can this be sent for her?

## 2023-11-09 NOTE — TELEPHONE ENCOUNTER
Sleeping tablet of trazodone was faxed during her office visit to her pharmacy.  Please advise patient to check with her pharmacy     Disp Refills Start End CRISTINE   traZODone (DESYREL) 50 MG tablet 90 tablet 0 11/7/2023  No   Sig - Route: Take 1 tablet (50 mg) by mouth at bedtime - Oral   Sent to pharmacy as: traZODone HCl 50 MG Oral Tablet (DESYREL)   Class: E-Prescribe   Order: [id number]   E-Prescribing Status: Receipt confirmed by pharmacy (11/7/2023  8:38 AM CST)

## 2023-11-13 RX ORDER — ZOLPIDEM TARTRATE 5 MG/1
5 TABLET ORAL
Qty: 15 TABLET | Refills: 0 | Status: SHIPPED | OUTPATIENT
Start: 2023-11-13 | End: 2023-12-12

## 2023-11-13 NOTE — TELEPHONE ENCOUNTER
"Patient states she tried Trazodone 3 nights and did not sleep at all any of those nights, \"none at all\".  States she wants Zolpidem but will not take it every night.  States if she takes Zolpidem one night she sleeps well for 3 nights.      Patient wants a call back with MD response.   LEVI Cruz R.N.    "

## 2023-11-27 NOTE — PROGRESS NOTES
"Patient calls stating that his insurance is requiring his prescription for insulin glargine be sent as ""Lantus Solostar\"". Okay to send as requested? Also note on script states that patient's weight has not been checked recently as his last appointment was a Virtual Visit.       " 2/9/2022    Sammie is a 66 year old who is being evaluated via a billable telephone visit.      What phone number would you like to be contacted at? [phone]  How would you like to obtain your AVS? Ciro    Phone call duration: 60 minutes    Referring Provider: Kenneth Cisneros MD    Present for Today's Visit: Ramona, and [place]      Presenting Information:   I met with Ramona Ferrer today for genetic counseling (telephone visit due to covid19) to discuss her personal and family history of cancer.  She is here today to review this history, cancer screening recommendations, and available genetic testing options.    Personal History:  Ramona is a 66 year old female. She was recently diagnosed with a breast cancer; biopsy on 1/3/2022 revealed multifocal invasive lobular carcinoma. She has met with Dr. Cisneros and Mammaprint testing is in progress. She plans to follow up with him again on 2/22/2022 for treatment planning. She has an surgical consult with  scheduled for 2/18/2022.       She had her first menstrual period at age 14, her first child at age 30, and went through post-surgical menopause at age 48 after her total abdominal hysterectomy and bilateral salpingo-oophorectomy due to endometriosis. She reports no history of oral contraceptive use and that she has never been on hormone replacement therapy.      She has had three colonoscopies in 2009 (one tubulovillous adenoma removed), 2012 (normal), and 2019 (three tubular adenomas and hyperplastic polyps removed). Follow-up has been recommended in 3 years.  She does not regularly do any other cancer screening at this time.  Ramona reported no tobacco use, and social alcohol use.    Family History: Cancer family history and pertinent information reviewed below (Please see scanned pedigree for detailed family history information)    Mother passed at age 95 with a history of skin cancer (non-melanoma) diagnosed in her 90's.      Maternal grandfather passed at age 68 from stomach cancer diagnosed at age 68.    Maternal grandfather's brother had cancer history, although Sammie is unsure of the specific diagnosis.     Father passed at age 68 from stomach cancer diagnosed at age 66.     Paternal uncle passed at age 80 from an unspecified type of cancer.     Paternal aunt, age 84, has a history of breast cancer diagnosed at age 80; treatment included unilateral mastectomy.     Paternal grandfather passed in his 70's from stomach cancer diagnosed in his 70's.    Her maternal ethnicity is [place]. Her paternal ethnicity is [place].  There is no known Ashkenazi Voodoo ancestry on either side of her family. There is no reported consanguinity.    Discussion:    We discussed that Ramona's personal and family history of breast cancer and family history of stomach cancer may be suggestive of a hereditary cancer syndrome. While not overly concerning for a hereditary cancer syndrome, she has a relatively limited family structure on her maternal side, and the specific types of stomach cancer for her relatives are unknown and may have been intestinal vs diffuse. She does report that her father, paternal grandfather and maternal grandfather all passed relatively quickly after being diagnosed.      We reviewed the features of sporadic, familial, and hereditary cancers. In looking at Ramona's family history, it is possible that a cancer susceptibility gene is present due to her lobular breast cancer diagnosis, her paternal aunt's breast cancer history, and her father's and paternal grandfather's stomach cancer diagnosis as well as her maternal grandfather's stomach cancer history.     It would be useful to know if Ramona could find out whether any of her relatives with stomach cancer were diagnosed with the diffuse type of stomach cancer as her personal and family history would be concerning for Hereditary Diffuse Gastric Cancer syndrome, however all  relatives with stomach cancer history have passed; so records may be difficult to track down, and it would not be feasible to hold genetic testing for Ramona as results for her may have a significant impact on her treatment plan.     We discussed the natural history and genetics of hereditary cancers. A detailed handout regarding the information we discussed will be sent to Ramona via [employer]. Topics included: inheritance pattern, cancer risks, cancer screening recommendations, and also risks, benefits and limitations of testing.  We reviewed that the most common cause of hereditary breast cancer is Hereditary Breast and Ovarian Cancer (HBOC) syndrome, which is caused by mutations in the genes BRCA1 and BRCA2. Women with a mutation in either of these genes are at increased risk for breast and ovarian cancer. There is also an increased risk for a second primary breast cancer for women. Men with a mutation in either BRCA1 or BRCA2 are at increased risk for male breast and prostate cancer. Both women and men may also be at increased risk for pancreatic cancer and melanoma.   Additionally, we discussed Hereditary Diffuse Gastric Cancer (HDGC), caused by CDH1 gene mutations.  HDGC is associated with increased risks for diffuse gastric cancer and lobular breast cancer.  Diffuse gastric cancer infiltrates the stomach wall without forming a distinct mass.  Women with HDGC are at increased risk for lobular breast cancer.     Ramona's current personal and family history does not meet strict National Comprehensive Cancer Network guidelines (NCCN) guidelines for genetic testing for high-penetrance breast cancer susceptibility genes, however, given that the types of stomach cancer her multiple relatives had is unknown and that her maternal family structure is relatively small, it would be reasonable for Ramona to pursue genetic testing herself.     Additionally, according to the American Society of Breast Surgeons Consensus  Guideline on Genetic Testing for Hereditary Breast Cancer, genetic testing should be available to all patients who have been diagnosed with breast cancer.  The guidelines state that testing should include BRCA1, BRCA2, and PALB2, and that additional testing may be warranted based on personal and/or family history.     We discussed that there are additional genes that could cause increased risk for breast and gastrointestinal cancers cancer. As many of these genes present with overlapping features in a family and accurate cancer risk cannot always be established based upon the pedigree analysis alone, it would be reasonable for Ramona to consider panel genetic testing to analyze multiple genes at once.    We discussed genetic testing options for Ramona given her personal and family history including a targeted panel of genes associated with increased risk for breast and stomach cancers ([facility] custom panel) as well as expanded options including genes associated with common hereditary cancers ([facility] Common Hereditary Cancers panel; 47 genes) or genes associated with common and rare hereditary cancers ([facility] Multi-Cancer panel; 84 genes). After our discussion, Ramona opted to proceed with testing via the Common Hereditary Cancers panel through [facility].  Genetic testing is available for 47 genes associated with cancers of the breast, ovary, uterus, prostate and gastrointestinal system: [facility] Common Hereditary Cancers panel (APC, XIOMARA, AXIN2, BARD1, BMPR1A, BRCA1, BRCA2, BRIP1, CDH1, CDK4, CDKN2A, CHEK2, CTNNA1, DICER1, EPCAM, GREM1, HOXB13, KIT, MEN1, MLH1, MSH2, MSH3, MSH6, MUTYH, NBN, NF1, NTHL1, PALB2, PDGFRA, PMS2, POLD1, POLE, PTEN,RAD50, RAD51C, RAD51D, SDHA, SDHB, SDHC, SDHD, SMAD4, SMARCA4, STK11, TP53,TSC1, TSC2, VHL).    We discussed that many of these genes are associated with specific hereditary cancer syndromes and published management guidelines: Hereditary Breast and Ovarian Cancer syndrome (BRCA1,  BRCA2), Farooq syndrome (MLH1, MSH2, MSH6, PMS2, EPCAM), Familial Adenomatous Polyposis (APC), Hereditary Diffuse Gastric Cancer (CDH1), Familial Atypical Multiple Mole Melanoma syndrome (CDK4, CDKN2A), Multiple Endocrine Neoplasia type 1 (MEN1), Juvenile Polyposis syndrome (BMPR1A, SMAD4), Cowden syndrome (PTEN), Li Fraumeni syndrome (TP53), Hereditary Paraganglioma and Pheochromocytoma syndrome (SDHA, SDHB, SDHC, SDHD), Peutz-Jeghers syndrome (STK11), MUTYH Associated Polyposis (MUTYH), Tuberous sclerosis complex (TSC1, TSC2), Von Hippel-Lindau disease (VHL), and Neurofibromatosis type 1 (NF1).   The XIOMARA, AXIN2, BRIP1, CHEK2, GREM1, MSH3, NBN, NTHL1, PALB2, POLD1, POLE, RAD51C, and RAD51D genes are associated with increased cancer risk and have published management guidelines for certain cancers.   The remaining genes (BARD1, CTNNA1, DICER1, HOXB13, KIT, PDGFRA, RAD50, and SMARCA4) are associated with increased cancer risk and may allow us to make medical recommendations when mutations are identified.     Consent was obtained over the phone with no witness required due to the current covid19 global pandemic.    Medical Management: For Ramona, we reviewed that the information from genetic testing may determine:    surgery to treat Ramona's active cancer diagnosis (i.e. lumpectomy versus bilateral mastectomy, partial versus total colectomy, etc.),    additional cancer screening for which Ramona may qualify (i.e. mammogram and breast MRI, more frequent colonoscopies, more frequent dermatologic exams, etc.),    options for risk reducing surgeries Ramona could consider (i.e. bilateral mastectomy, etc.),      and targeted chemotherapies for Ramona's active cancer, or if she were to develop certain cancers in the future (i.e. immunotherapy for individuals with Farooq syndrome, PARP inhibitors, etc.).     These recommendations will be discussed in detail once genetic testing is completed.     We discussed that Ramona's  treatment planning is pending genetic testing results. She has a surgical consult schedule for 2/18. For that reason, I will place a STAT request on the BRCAPlus genes (XIOMARA, BRCA1, BRCA2, CDH1, CHEK2, PALB2, PTEN, and TP53) in order to get these results back as soon as possible.     Ramona will have her blood drawn when she goes into [facility] for Dr. iCsneros's labs tomorrow.       Plan:  1) Today Ramona elected to proceed with Common Hereditary Cancers panel genetic testing (47 genes) through [facility].  2) A STAT request was placed on the BRCAPlus genes (including BRCA1 and BRCA2) and we will get these results back in 7-10 days. I will call Ramona with these results as soon as they are available.     Carrie Putnam MS, [facility]  Licensed, Certified Genetic Counselor  [facility]  [phone]  [email or web address]

## 2023-11-29 ENCOUNTER — TELEPHONE (OUTPATIENT)
Dept: ORTHOPEDICS | Facility: CLINIC | Age: 68
End: 2023-11-29
Payer: COMMERCIAL

## 2023-11-29 DIAGNOSIS — L29.9 ITCHING: Primary | ICD-10-CM

## 2023-11-29 NOTE — TELEPHONE ENCOUNTER
Health Call Center    Phone Message    May a detailed message be left on voicemail: yes     Reason for Call: Other: Patient was seen about a year ago and was referred to have injections.  Patient expressed the pain is back and she would like another injection in the same spot. Appointment was offered patient said she's not opposed to that and would be happy to do a follow-up or a phone visit, but thinks since he had initially authorized multiple injections and only needed 1 at the time.  She is hoping he will reauthorize the order so she can get a follow-up injection asap.  She is in extreme pain.  Please contact her to discuss her options.       Action Taken: Message routed to:  [facility] ([facility] Ortho     Travel Screening: Not Applicable

## 2023-11-29 NOTE — TELEPHONE ENCOUNTER
Patient is currently on cetirizine .  Hydroxyzine has been discontinued in her chart, not in current med list.  Please check with patient

## 2023-11-30 NOTE — TELEPHONE ENCOUNTER
Discussed patient's request with spine staff, recommended patient be seen in clinic for re-exam.  ATC called and offered patient appt with Bishop Sukhjinder PA-C in [place] clinic on Monday.  Patient accepted.  [employer] message sent to patient with her appointment information per her request.    Den Black ATC

## 2023-12-01 NOTE — TELEPHONE ENCOUNTER
I have sent  2 messages to nursing staff to check with pt to check about this medication   But refill pool keeps rerouting the refill request to me even though this message has been sent to our nursing staff to check with pt.      Please call pt , routed this message to OSIRIS also

## 2023-12-04 ENCOUNTER — ANCILLARY PROCEDURE (OUTPATIENT)
Dept: GENERAL RADIOLOGY | Facility: CLINIC | Age: 68
End: 2023-12-04
Attending: PHYSICIAN ASSISTANT
Payer: COMMERCIAL

## 2023-12-04 ENCOUNTER — OFFICE VISIT (OUTPATIENT)
Dept: ORTHOPEDICS | Facility: CLINIC | Age: 68
End: 2023-12-04
Payer: COMMERCIAL

## 2023-12-04 VITALS — WEIGHT: 218 LBS | BODY MASS INDEX: 38.62 KG/M2 | HEIGHT: 63 IN

## 2023-12-04 DIAGNOSIS — M54.16 LUMBAR RADICULOPATHY: Primary | ICD-10-CM

## 2023-12-04 DIAGNOSIS — M48.07 FORAMINAL STENOSIS OF LUMBOSACRAL REGION: ICD-10-CM

## 2023-12-04 PROCEDURE — 99213 OFFICE O/P EST LOW 20 MIN: CPT | Performed by: PHYSICIAN ASSISTANT

## 2023-12-04 PROCEDURE — 72110 X-RAY EXAM L-2 SPINE 4/>VWS: CPT | Mod: TC | Performed by: RADIOLOGY

## 2023-12-04 NOTE — LETTER
"    12/4/2023         RE: Ramona Ferrer  [address]  [place] [address]        Dear Colleague,    Thank you for referring your patient, Ramona Ferrer, to the [facility]. Please see a copy of my visit note below.    Reason for Visit:  Chief Complaint   Patient presents with    RECHECK     Patient of Dr. Burns's, last seen over 1 year ago, having return of previous symptoms and seeking injection.     S>  67 year old female with a history of breast cancer s/p left mastectomy in 3/2022 and osteopenia on DEXA scan in 2019, who presents for evaluation of bilateral lateral lower extremity pain, L>R.    She notes symptoms are similar to last year but less severe. She denies any trauma. She notes her injection resolved her pain by 100% for 1 year. She notes pain is starting to come back with same distribution as clinic visit on 7/7/22. Denies any bowel/bladder changes.    Oswestry (CHUY) Questionnaire        12/4/2023     2:50 PM   OSWESTRY DISABILITY INDEX   Count 9   Sum 29   Oswestry Score (%) 64.44 %      Visual Analog Pain Scale  Back Pain Scale 0-10: 7  Right leg pain: 5  Left leg pain: 5  Neck Pain Scale 0-10: 0  Right arm pain: 0  Left arm pain: 0    PROMIS-10 Scores  Global Mental Health Score: (P) 9  Global Physical Health Score: (P) 9  PROMIS TOTAL - SUBSCORES: (P) 18    O>   Alert, oriented x 3, cooperative.  Not in CP distress.  Ht 1.61 m (5' 3.39\")   Wt 98.9 kg (218 lb)   LMP  (LMP Unknown)   BMI 38.15 kg/m    Surgical incision well-healed, no sign of infection.  Ambulates independently.   Grossly neurologically intact.  5/5 bilateral HF, KE, DF, PF. + Straight leg raise, 1+ bilateral patella.     Imaging:   Complete spine films:   Multilevel degenerative changes, most significant at L4-5, L5-S1.  Grade I anterolithesis of L3 over L4. Loss of lumbar lordosis.      CT Lumbar spine from 5/9/22  Large bone spur encroaching on left L5 nerve root. Large bridging bone " spur at L4-5, L5-S1. Vacuum pocket between L4-5 and within the facet joints at L3--5.    XR Lumbar 12/4/23    No fractures, no changes to spinal alignment. degenerative disc disease, L3-4 spondylolisthesis.      Assessment:    - Foraminal stenosis of lumbosacral region   - bilateral L5 radiculopathy  - Loss of lumbar lordosis   - Multilevel spondylosis of the lumbar spine with disc degeneration     P>   Symptoms are similar to 1 year ago with bilateral L5 radiculopathy. She has a large bone spur encroaching the L5 nerve on bilateral L5-S1 foramin. Plan is a for L5-S1 TFESI. Follow up prn based on pain improvement. She notes that the injection improved her pain for 1 year. She notes gabapentin did not help in the past and does not want to use it at this time.     - RTC PRN  - Bilateral L5-S1 TFESI    Bishop LEVI Sellers PA-C

## 2023-12-04 NOTE — PROGRESS NOTES
"Reason for Visit:  Chief Complaint   Patient presents with    RECHECK     Patient of Dr. Burns's, last seen over 1 year ago, having return of previous symptoms and seeking injection.     S>  67 year old female with a history of breast cancer s/p left mastectomy in 3/2022 and osteopenia on DEXA scan in 2019, who presents for evaluation of bilateral lateral lower extremity pain, L>R.    She notes symptoms are similar to last year but less severe. She denies any trauma. She notes her injection resolved her pain by 100% for 1 year. She notes pain is starting to come back with same distribution as clinic visit on 7/7/22. Denies any bowel/bladder changes.    Oswestry (CHUY) Questionnaire        12/4/2023     2:50 PM   OSWESTRY DISABILITY INDEX   Count 9   Sum 29   Oswestry Score (%) 64.44 %      Visual Analog Pain Scale  Back Pain Scale 0-10: 7  Right leg pain: 5  Left leg pain: 5  Neck Pain Scale 0-10: 0  Right arm pain: 0  Left arm pain: 0    PROMIS-10 Scores  Global Mental Health Score: (P) 9  Global Physical Health Score: (P) 9  PROMIS TOTAL - SUBSCORES: (P) 18    O>   Alert, oriented x 3, cooperative.  Not in CP distress.  Ht 1.61 m (5' 3.39\")   Wt 98.9 kg (218 lb)   LMP  (LMP Unknown)   BMI 38.15 kg/m    Surgical incision well-healed, no sign of infection.  Ambulates independently.   Grossly neurologically intact.  5/5 bilateral HF, KE, DF, PF. + Straight leg raise, 1+ bilateral patella.     Imaging:   Complete spine films:   Multilevel degenerative changes, most significant at L4-5, L5-S1.  Grade I anterolithesis of L3 over L4. Loss of lumbar lordosis.      CT Lumbar spine from 5/9/22  Large bone spur encroaching on left L5 nerve root. Large bridging bone spur at L4-5, L5-S1. Vacuum pocket between L4-5 and within the facet joints at L3--5.    XR Lumbar 12/4/23    No fractures, no changes to spinal alignment. degenerative disc disease, L3-4 spondylolisthesis.      Assessment:    - Foraminal stenosis of " lumbosacral region   - bilateral L5 radiculopathy  - Loss of lumbar lordosis   - Multilevel spondylosis of the lumbar spine with disc degeneration     P>   Symptoms are similar to 1 year ago with bilateral L5 radiculopathy. She has a large bone spur encroaching the L5 nerve on bilateral L5-S1 foramin. Plan is a for L5-S1 TFESI. Follow up prn based on pain improvement. She notes that the injection improved her pain for 1 year. She notes gabapentin did not help in the past and does not want to use it at this time.     - RTC PRN  - Bilateral L5-S1 TFESI    Bishop LEVI Sellers PA-C

## 2023-12-05 RX ORDER — HYDROXYZINE HYDROCHLORIDE 25 MG/1
25 TABLET, FILM COATED ORAL
Qty: 20 TABLET | Refills: 0 | Status: SHIPPED | OUTPATIENT
Start: 2023-12-05

## 2023-12-05 NOTE — TELEPHONE ENCOUNTER
"Call to patient. States she has still been taking this for itching. Patient takes this only as needed once daily. She takes it \"a few times a week\". Itching is at the scar site on her breast and also on her stomach and back. Patient states she doesn't know what the itching is from on her stomach and back. Per chart this was previously prescribed by Vidhi Austin - plastic surgery. Patient states she also takes Cetirizine for allergies. Please advise.    "

## 2023-12-08 ENCOUNTER — TELEPHONE (OUTPATIENT)
Dept: MEDSURG UNIT | Facility: CLINIC | Age: 68
End: 2023-12-08
Payer: COMMERCIAL

## 2023-12-08 NOTE — TELEPHONE ENCOUNTER
Lumbar EPI scheduled on 12/11/23 at 1300. CSE, no documented drug allergies, no blood thinners listed in PTA med list. No pre-orders needed.

## 2023-12-11 ENCOUNTER — HOSPITAL ENCOUNTER (OUTPATIENT)
Facility: CLINIC | Age: 68
Discharge: HOME | End: 2023-12-11
Admitting: PHYSICIAN ASSISTANT
Payer: COMMERCIAL

## 2023-12-11 ENCOUNTER — HOSPITAL ENCOUNTER (OUTPATIENT)
Dept: GENERAL RADIOLOGY | Facility: CLINIC | Age: 68
Discharge: HOME | End: 2023-12-11
Attending: PHYSICIAN ASSISTANT
Payer: COMMERCIAL

## 2023-12-11 VITALS — HEART RATE: 67 BPM | SYSTOLIC BLOOD PRESSURE: 140 MMHG | DIASTOLIC BLOOD PRESSURE: 80 MMHG

## 2023-12-11 VITALS — HEART RATE: 68 BPM | SYSTOLIC BLOOD PRESSURE: 141 MMHG | DIASTOLIC BLOOD PRESSURE: 78 MMHG | OXYGEN SATURATION: 97 %

## 2023-12-11 DIAGNOSIS — M54.16 LUMBAR RADICULOPATHY: ICD-10-CM

## 2023-12-11 DIAGNOSIS — M48.07 FORAMINAL STENOSIS OF LUMBOSACRAL REGION: ICD-10-CM

## 2023-12-11 PROCEDURE — 64483 NJX AA&/STRD TFRM EPI L/S 1: CPT | Mod: 50

## 2023-12-11 PROCEDURE — 250N000009 HC RX 250: Performed by: PHYSICIAN ASSISTANT

## 2023-12-11 PROCEDURE — 250N000011 HC RX IP 250 OP 636: Mod: JZ | Performed by: PHYSICIAN ASSISTANT

## 2023-12-11 PROCEDURE — 999N000154 HC STATISTIC RADIOLOGY XRAY, US, CT, MAR, NM

## 2023-12-11 PROCEDURE — 255N000002 HC RX 255 OP 636: Mod: JZ | Performed by: PHYSICIAN ASSISTANT

## 2023-12-11 RX ORDER — NICOTINE POLACRILEX 4 MG
15-30 LOZENGE BUCCAL
Status: DISCONTINUED | OUTPATIENT
Start: 2023-12-11 | End: 2023-12-12 | Stop reason: HOSPADM

## 2023-12-11 RX ORDER — DEXAMETHASONE SODIUM PHOSPHATE 10 MG/ML
20 INJECTION, SOLUTION INTRAMUSCULAR; INTRAVENOUS ONCE
Status: COMPLETED | OUTPATIENT
Start: 2023-12-11 | End: 2023-12-11

## 2023-12-11 RX ORDER — LIDOCAINE HYDROCHLORIDE 10 MG/ML
30 INJECTION, SOLUTION EPIDURAL; INFILTRATION; INTRACAUDAL; PERINEURAL ONCE
Status: COMPLETED | OUTPATIENT
Start: 2023-12-11 | End: 2023-12-11

## 2023-12-11 RX ORDER — IOPAMIDOL 408 MG/ML
10 INJECTION, SOLUTION INTRATHECAL ONCE
Status: COMPLETED | OUTPATIENT
Start: 2023-12-11 | End: 2023-12-11

## 2023-12-11 RX ORDER — DEXTROSE MONOHYDRATE 25 G/50ML
25-50 INJECTION, SOLUTION INTRAVENOUS
Status: DISCONTINUED | OUTPATIENT
Start: 2023-12-11 | End: 2023-12-12 | Stop reason: HOSPADM

## 2023-12-11 RX ADMIN — IOPAMIDOL 2 ML: 408 INJECTION, SOLUTION INTRATHECAL at 13:40

## 2023-12-11 RX ADMIN — LIDOCAINE HYDROCHLORIDE 9 ML: 10 INJECTION, SOLUTION EPIDURAL; INFILTRATION; INTRACAUDAL; PERINEURAL at 13:48

## 2023-12-11 RX ADMIN — DEXAMETHASONE SODIUM PHOSPHATE 20 MG: 10 INJECTION INTRAMUSCULAR; INTRAVENOUS at 13:48

## 2023-12-11 ASSESSMENT — ACTIVITIES OF DAILY LIVING (ADL): ADLS_ACUITY_SCORE: 35

## 2023-12-11 NOTE — PROGRESS NOTES
Patient has tingling in her toes.  Patient states that sensation is coming back.  will be able to assist patient in her wheelchair once she gets home.

## 2023-12-11 NOTE — DISCHARGE INSTRUCTIONS
Steroid Injection Discharge Instructions     After you go home:    You may resume your normal diet.    Care of Puncture Site:    If you have a bandaid on your puncture site, you may remove it the next morning  You may shower tomorrow  No bath tubs, whirlpools or swimming pool for at least 48 hours  Use ice packs as needed for discomfort     Activity:    Minimize your activity today. You may gradually resume your normal activity as tolerated  Avoid vigorous or strenuous activity until your symptoms improve or as directed by your doctor  Do NOT drive a vehicle for a few hours after the injection - or longer if you develop numbness in your arm or leg    Medicines:    You may resume all medications, including blood thinners  Resume your Warfarin/Coumadin at your regular dose today. Follow up with your provider to have your INR rechecked  Resume your Platelet Inhibitors and Aspirin tomorrow at your regular dose  For minor discomfort, you may take Acetaminophen (Tylenol) or Ibuprofen (Advil)    Pain:     You may experience increased or different pain over the next 24-48 hours  For the next 48 hrs - you may use ice packs for discomfort     Call your primary care doctor if:    You have severe pain that does not improve with pain medication  You have chills or a fever greater than 101 F (38 C)  The site is red, swollen, hot or tender  Increase in pain, weakness or numbness  New problems with your bowel or bladder  Any questions or concerns    What to watch for:    It can be normal to have some bruising or slight swelling at the puncture site.   After the procedure, you may have some new weakness or numbness down your arm/leg from the numbing medicine. This should resolve in a few hours.   You may feel some temporary relief from the numbing medicine, but that will wear off within a few hours.  Your symptoms may return to pre procedure level, or can even be worse for the first 1-2 days.  For many people, the steroid begins to  provide some relief within 2-3 days, but it can take up to 2 weeks to obtain the full results.  Some people will get lasting relief from a single injection. Others may require up to 3 injections to get results. If you have more than one steroid injection, they should be given 2 weeks apart.  If you have no improvement in your symptoms after two weeks, please contact the doctor who ordered this procedure to discuss the next steps.  Side effects of your steroid injection are mild and will go away in 2-3 days  Insomnia  Irritability  Flushed face  Water retention  Restlessness  Difficulty sleeping  Increased appetite  Increased blood sugar  If you are diabetic, monitor your blood sugar closely. Contact the provider who manages your diabetes to help you control your blood sugar if needed.    If you have questions or concerns call:                  [facility] Radiology Dept @ [phone]                                    between 8am-4:30pm Mon-Fri    If you have urgent questions outside of these normal business hours, please contact the [facility] on call doctor @ [phone]      The provider who performed your procedure was ___Agustin VELASCO ______________.

## 2023-12-11 NOTE — PROGRESS NOTES
Care Suites Discharge Nursing Note    Patient Information  Name: Ramona Ferrer  Age: 68 year old    Discharge Education:  Discharge instructions reviewed: Yes  Additional education/resources provided: no  Patient/patient representative verbalizes understanding: Yes  Patient discharging on new medications: No  Medication education completed: N/A    Discharge Plans:   Discharge location: home  Discharge ride contacted: Yes  Approximate discharge time: 1425    Discharge Criteria:  Discharge criteria met and vital signs stable: Yes    Patient Belongs:  Patient belongings returned to patient: Yes    Maria Del Rosario Roberts RN

## 2023-12-11 NOTE — PROCEDURES
[facility]    Procedure: Bilateral L5-S1 TFESIs    Date/Time: 12/11/2023 2:00 PM    Performed by: Agustin Moore PA-C  Authorized by: Agustin Moore PA-C      UNIVERSAL PROTOCOL   Site Marked: Yes  Prior Images Obtained and Reviewed:  Yes  Required items: Required blood products, implants, devices and special equipment available    Patient identity confirmed:  Verbally with patient  Patient was reevaluated immediately before administering moderate or deep sedation or anesthesia  Confirmation Checklist:  Patient's identity using two indicators, relevant allergies, procedure was appropriate and matched the consent or emergent situation and correct equipment/implants were available  Time out: Immediately prior to the procedure a time out was called    Universal Protocol: the Joint Commission Universal Protocol was followed    Preparation: Patient was prepped and draped in usual sterile fashion       ANESTHESIA    Local Anesthetic: Lidocaine 1% without epinephrine      SEDATION    Patient Sedated: No    See dictated procedure note for full details.    PROCEDURE    Patient Tolerance:  Patient tolerated the procedure well with no immediate complications  Length of time physician/provider present for 1:1 monitoring during sedation: 0

## 2023-12-14 DIAGNOSIS — M54.16 LUMBAR RADICULOPATHY: Primary | ICD-10-CM

## 2023-12-27 DIAGNOSIS — Z17.0 MALIGNANT NEOPLASM OF UPPER-OUTER QUADRANT OF LEFT BREAST IN FEMALE, ESTROGEN RECEPTOR POSITIVE (H): ICD-10-CM

## 2023-12-27 DIAGNOSIS — C50.412 MALIGNANT NEOPLASM OF UPPER-OUTER QUADRANT OF LEFT BREAST IN FEMALE, ESTROGEN RECEPTOR POSITIVE (H): ICD-10-CM

## 2023-12-27 RX ORDER — LETROZOLE 2.5 MG/1
TABLET, FILM COATED ORAL
Qty: 90 TABLET | Refills: 0 | OUTPATIENT
Start: 2023-12-27

## 2024-01-23 ENCOUNTER — APPOINTMENT (OUTPATIENT)
Dept: OPTOMETRY | Facility: CLINIC | Age: 69
End: 2024-01-23
Payer: COMMERCIAL

## 2024-01-23 PROCEDURE — 92342 FIT SPECTACLES MULTIFOCAL: CPT | Performed by: OPTOMETRIST

## 2024-01-28 NOTE — PROGRESS NOTES
Dada Mendiola MD  [facility]  [address] [place] [address], [facility] [address]  [place] [address]     RE:  Ramona Ferrer  MRN:  [id number]  :  1955     Dear Dr. Mendiola:     I would like to refer to you Sammie Ferrer, a 68-year-old woman with a recent diagnosis of a stage IIB, T3 N0 MX, invasive lobular carcinoma of the left breast.  She self-palpated a lump in the upper outer quadrant of the left breast.  She underwent evaluation with a diagnostic mammogram and ultrasound, which was BI-RADS 4, showing in the upper outer quadrant of the left breast irregularly shaped hypoechoic mass at the 12 o'clock position 4 cm from the nipple on the left.  This mass measured 2.3 x 2.2 x 1.5 cm and accounted for the patient's area of palpable concern.  Additionally, at the 1 o'clock position 2 cm from the nipple in the left breast, there is a second irregularly shaped hypoechoic mass with indistinct margins measuring 1.3 x 1.1 x 0.8 cm.  She also underwent a breast MRI which showed in the right breast mild background parenchymal enhancement and no suspicious areas of enhancement or lymphadenopathy.  In the left breast there was mild background parenchymal enhancement, and at the 12 o'clock position 4 cm from the nipple there was a heterogeneously enhancing irregular mass measuring 3.4 in AP dimension x 2.8 cm ML and 2.8 cm SI corresponding to the known biopsy-proven malignancy in the left breast.  In the left breast at the 1 o'clock position 2 cm from the nipple there was also a heterogeneously enhancing oval mass measuring 1.3 cm in maximal diameter.  This likely corresponds to the second biopsy-proven malignancy.  Together, the full extent of the disease spans a total of 5.2 cm AP x 4.4 cm ML, BI-RADS 6.     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  In part B in the left breast at the 1 o'clock position 2 cm from the nipple, ultrasound-guided needle biopsy showed invasive lobular carcinoma, [place] grade 1/3 lobular carcinoma in situ was present, classic subtype, estrogen receptors positive in greater than 70% of the cells, progesterone receptors positive in greater than 90% of cells, and HER2 was nonamplified, Ki-67 20-25%.  She now comes to our clinic for recommendations.     Sammie reports that the breast mass had been there for approximately 1 month before she was seen for evaluation..       She has worked in the past as a personal care assistant and lives in the [facility].       PAST MEDICAL HISTORY:  There is no history of breast cancer in the past.  No history of breast cancer surgery.  She has no history of radiation therapy in the past or radiation exposure.  No history of prior tumor of any kind.  She denies heart problems, heart attack, breathing problems, blood clots, seizures, peptic ulcer disease, osteoporosis or bone fractures.  She does report a history of arthritis.     FAMILY HISTORY:  Positive for breast cancer in a paternal aunt who was diagnosed with breast cancer at an old age.     Her father has a history of stomach cancer.  There is no family history of pancreatic cancer, melanoma, lung cancer or ovarian cancer.     No history of male breast cancer.     ALLERGIES:  No history of drug allergies.  She denies allergies to seafood, iodine, or contrast dye.     PAST MENSTRUAL HISTORY:  She has been pregnant 3 times with 2 live births at age 29 and 31 and 1 miscarriage.  Age at first menstrual period was 14.  She did have a total abdominal hysterectomy and bilateral salpingo-oophorectomy performed in 2003 for endometriosis.  She has no history of hormone replacement therapy.  No  history of oral contraceptives.     HABITS: She denies tobacco history.  She denies alcohol history and drinks alcohol only occasionally.     PAST MEDICAL HISTORY:  Past medical history is also remarkable for type 2 diabetes, and she was begun on metformin 2 years ago.  She also has a history of varicose veins and a history of a lipoma resected from her left leg.      Chronic Hepatitis B.  Hepatitis C negative.  HIV negative.       Prior COVID vaccination x 3.      GERM LINE GENETICS: INVITAE testing of 47 genes was negative.      TREATMENT HISTORY:  A.  MammaPrint showed low risk.  B.  Left mastectomy and axillary lymph node sampling.   A. Lymph node, LEFT axillary, excision:  -One benign lymph node (0/1)  B. LEFT breast, skin-sparing mastectomy:  -INVASIVE BREAST CARCINOMA, MIXED INVASIVE LOBULAR CARCINOMA (predominant component) and INVASIVE DUCTAL CARCINOMA (minor component), KALPESH GRADE 3, size 5.5 cm, 12:00, upper outer quadrant and lower outer quadrant  -Ductal carcinoma in situ (DCIS), nuclear grade 2, cribriform and solid type(s), with focal necrosis  -DCIS is admixed with invasive carcinoma, and comprises a minor component (<5%) of the tumor volume   -Lobular carcinoma in situ (LCIS), predominantly classic type (admixed with invasive carcinoma and beyond invasive carcinoma) and focal pleomorphic type (size 2 mm, adjacent to invasive carcinoma)  -Margins are uninvolved by invasive carcinoma  -Invasive carcinoma is 2.5 mm from the nearest (anterior) margin, and is > 5 mm from the posterior margin  -Margins are uninvolved by DCIS and pleomorphic LCIS  -DCIS and pleomorphic LCIS are > 5 mm from the nearest (anterior) margin  -Benign nipple and skin   -Other findings: fibrocystic change (including microcysts with apocrine metaplasia) and columnar cell change  -Calcifications associated with DCIS, LCIS, and benign ducts and acini  -Prior core biopsy site changes (two biopsy sites identified)  -Invasive  carcinoma is estrogen receptor positive (>70%, strong intensity) and progesterone receptor positive (>90%, strong intensity) by immunohistochemistry and is HER2 non-amplified (group 5) by FISH (performed on prior core biopsies, see report [id number], specimens A and B)  DCIS and LCIS present.  -Margins are uninvolved by invasive carcinoma or LCIS.   -Invasive carcinoma is estrogen receptor positive (>70%, strong intensity) and progesterone receptor positive (>90%, strong intensity) by immunohistochemistry and is HER2 non-amplified (group 5) by FISH (performed on prior core biopsies, see report [id number], specimens A and B) Ki-67 immunohistochemistry (MIB-1, pharmDx, [employer]) from [facility] was performed on invasive carcinoma in the LEFT mastectomy specimen   They report Ki-67 proliferative index of 20-25% (block B5) and 20% (block B27).   C.  Staging:  pT3 Nx because lymph node is not the sentinel node.    D.  She did not want radiation, and risk:benefit did not favor treatment.  E.  Letrozole begun 4-5-22.   F.  Left TRAM flap reconstruction healed as of 12-27-22 but she has some concerns.   G.  Adjuvant Zometa 12-27-22 but not tolerated. She did have a reaction to adjuvant Zometa and it cannot be given any further.   H. Letrozole Changed to Anastrozole due to arthralgias      INTERVAL HISTORY  Ramona returns to clinic for routine followup and has been feeling generally OK. She mentions she's been having arthralgias affected bilateral knees and wrists. She states the knee pain only started after taking letrozole. She also has ankle/foot pain. She finds her hot flashes manageable. She is still taking Calcium and Vitamin D. She continued to have trouble sleeping and takes Zolpidem PRN for insomnia (she is not interested in a sleep study).            REVIEW OF SYSTEMS:  A 10-point review of systems was otherwise negative.  She is eating a healthful diet.  She is not exercising.  I did advise her to walk at least  30 minutes a day.  She does not consume alcohol.  She is taking vitamin D.  On her last DEXA scan from 9/29/23, her most valid T-score was -1.7 at the right femoral neck (compared to -1.8 from 9/6/2022).   She does have a TRAM flap that was placed and the incisions have completely healed.     Her blurry vision has improved since seeing Ophthalmology.       PHYSICAL EXAMINATION:    VITAL SIGNS: Blood pressure 115/63, pulse 80, temperature 98  F (36.7  C), temperature source Oral, resp. rate 18, weight 100.2 kg (220 lb 14.4 oz), SpO2 94%, not currently breastfeeding.  GENERAL:  Ramona appeared generally well.  HEENT:  No alopecia.  She has moderately good dentition, but is having a crown placed soon.  There are no empty sockets or recent extractions.  LYMPH:  No cervical, supraclavicular, subclavicular or axillary lymphadenopathy.  BREASTS:  Right breast reveals no masses.  Examination of the left breast reveals a TRAM flap in place with no erythema or masses.  Incisions are well healed.  LUNGS:  Clear to percussion and auscultation.  HEART:  Regular rate and rhythm.  S1, S2.  ABDOMEN:  Soft, nontender, consistent with increased body mass index.  EXTREMITIES:  Without edema.  PSYCH:  Mood was somewhat anxious.  Affect was appropriate.     LABORATORY DATA:  CBC and CMP within normal limits.     ASSESSMENT AND PLAN:  1.  Sammie Ferrer is a 67-year-old woman with a recent diagnosis of a stage IIB, T3 N0 MX, invasive lobular carcinoma of the upper outer quadrant of the left breast which is multifocal grade 1, ER positive in greater than 70% of cells, VA positive in greater than 90% of cells and HER2 nonamplified.  She now comes to clinic with her , for recommendations regarding evaluation and treatment.    2.  MammaPrint before surgery came back as low risk as did the Oncotype after surgery. The Oncotype of the pleomorphic lobular cancer showed a value of 15, which is less than the threshold for chemotherapy from  TAILORx which is 26 for a post-menopausal woman.   3.  Sammie Ferrer underwent a left mastectomy and has a TRAM flap in place.  She started adjuvant hormonal therapy with letrozole and has tolerated it well until 1/30/24. She started to develop arthralgias affecting bilateral knees and wrists. Therefore, through shared decision making, she was changed to Anastrozole on 1/30/24.   4. Pathology showed -Invasive carcinoma is estrogen receptor positive, progesterone receptor positive and HER2 negative by immunohistochemistry.  Margins negative. pT3 Nx because the lymph node is not sentinel. She did not qualify for neoadjuvant chemotherapy based on MammaPrint nor adjuvant chemotherapy based on Oncotype.  She also was node negative (although the LN was not sentinel) and did not meet criteria for MonarchE. She was node negative by MRI but the node was not sentinel.  She does have Ki67 20% or greater and size of tumor greater than 5 cm.  She cannot tolerate adjuvant Zoledronic acid.  We would plan to continue AI for 10 years.   5.   Sammie continues to do relatively well.  She has tolerated her AI relatively well aside from arthralgias. Therefore, she was changed to anastrozole today (from letrozole) to see if she has improvement in her arthralgias. A referral was also sent for CanAroma trial (patient agreed) to see if she may be a candidate. She continues to have trouble sleeping and will continue to work with her PCP.   6.   Adjuvant Zometa was not tolerated.  She was changed to Prolia last visit. Today arnold be her 2nd dose. Her most recent DEXA scan from 9/29/23 showed a most valid negative T-score of -1.7.  The purpose of Prolia is to maintain bone density in the setting of AI adjuvant therapy, but by itself would not expect to have adjuvant benefit in contrast to the zoledronic acid.  7.  Left TRAM flap is healed.    8.  Imaging followup.  Right mammogram from 1/30/24 was normal.   9.  Blurry vision.  I discussed with  Ramona that it is probably not related to the letrozole, but I did put in an Ophthalmology consult. She feels this has improved with FML steroid drops, Patanol and artificial tears.   10.  Imaging:  Yearly mammography of right breast from 1/30/24. ACR BI-RADS Category 1: Negative .  11.  Followup. Follow up with Zoya February 28 to assess change to anastrozole with CBC, CMP.  Follow up July 30 with Zoya CBC, CMP and Prolia. Follow up with me January 30, 2025 with CBC, CMP, right mammogram and Prolia.      Thank you for allowing us to participate in this patient's care.  The patient was seen and evaluated by me.  I discussed the patient with the fellow Dr. Glenn Liao and agree with the findings and plan in the note.     Sincerely,     Kenneth Cisneros MD  Professor  [facility]  [phone]        I spent 40 minutes with the patient more than 50% of which was in counseling and coordination of care.

## 2024-01-30 ENCOUNTER — ONCOLOGY VISIT (OUTPATIENT)
Dept: ONCOLOGY | Facility: CLINIC | Age: 69
End: 2024-01-30
Attending: INTERNAL MEDICINE
Payer: COMMERCIAL

## 2024-01-30 ENCOUNTER — ANCILLARY PROCEDURE (OUTPATIENT)
Dept: MAMMOGRAPHY | Facility: CLINIC | Age: 69
End: 2024-01-30
Payer: COMMERCIAL

## 2024-01-30 ENCOUNTER — APPOINTMENT (OUTPATIENT)
Dept: LAB | Facility: CLINIC | Age: 69
End: 2024-01-30
Attending: INTERNAL MEDICINE
Payer: COMMERCIAL

## 2024-01-30 VITALS
OXYGEN SATURATION: 94 % | WEIGHT: 220.9 LBS | DIASTOLIC BLOOD PRESSURE: 63 MMHG | SYSTOLIC BLOOD PRESSURE: 115 MMHG | TEMPERATURE: 98 F | BODY MASS INDEX: 38.66 KG/M2 | RESPIRATION RATE: 18 BRPM | HEART RATE: 80 BPM

## 2024-01-30 DIAGNOSIS — Z79.811 USE OF AROMATASE INHIBITORS: ICD-10-CM

## 2024-01-30 DIAGNOSIS — Z17.0 MALIGNANT NEOPLASM OF UPPER-OUTER QUADRANT OF LEFT BREAST IN FEMALE, ESTROGEN RECEPTOR POSITIVE (H): Primary | ICD-10-CM

## 2024-01-30 DIAGNOSIS — M81.8 IDIOPATHIC OSTEOPOROSIS: ICD-10-CM

## 2024-01-30 DIAGNOSIS — M85.80 OSTEOPENIA, UNSPECIFIED LOCATION: ICD-10-CM

## 2024-01-30 DIAGNOSIS — M79.604 PAIN IN BOTH LOWER EXTREMITIES: ICD-10-CM

## 2024-01-30 DIAGNOSIS — C50.412 MALIGNANT NEOPLASM OF UPPER-OUTER QUADRANT OF LEFT BREAST IN FEMALE, ESTROGEN RECEPTOR POSITIVE (H): Primary | ICD-10-CM

## 2024-01-30 DIAGNOSIS — Z12.31 VISIT FOR SCREENING MAMMOGRAM: ICD-10-CM

## 2024-01-30 DIAGNOSIS — M79.605 PAIN IN BOTH LOWER EXTREMITIES: ICD-10-CM

## 2024-01-30 LAB
ALBUMIN SERPL BCG-MCNC: 4.1 G/DL (ref 3.5–5.2)
ALP SERPL-CCNC: 48 U/L (ref 40–150)
ALT SERPL W P-5'-P-CCNC: 44 U/L (ref 0–50)
ANION GAP SERPL CALCULATED.3IONS-SCNC: 11 MMOL/L (ref 7–15)
AST SERPL W P-5'-P-CCNC: 32 U/L (ref 0–45)
BASOPHILS # BLD AUTO: 0.1 10E3/UL (ref 0–0.2)
BASOPHILS NFR BLD AUTO: 1 %
BILIRUB SERPL-MCNC: 0.4 MG/DL
BUN SERPL-MCNC: 16.7 MG/DL (ref 8–23)
CALCIUM SERPL-MCNC: 9.5 MG/DL (ref 8.8–10.2)
CHLORIDE SERPL-SCNC: 102 MMOL/L (ref 98–107)
CREAT SERPL-MCNC: 0.75 MG/DL (ref 0.51–0.95)
DEPRECATED HCO3 PLAS-SCNC: 25 MMOL/L (ref 22–29)
EGFRCR SERPLBLD CKD-EPI 2021: 87 ML/MIN/1.73M2
EOSINOPHIL # BLD AUTO: 0.2 10E3/UL (ref 0–0.7)
EOSINOPHIL NFR BLD AUTO: 3 %
ERYTHROCYTE [DISTWIDTH] IN BLOOD BY AUTOMATED COUNT: 13.2 % (ref 10–15)
GLUCOSE SERPL-MCNC: 180 MG/DL (ref 70–99)
HCT VFR BLD AUTO: 40.8 % (ref 35–47)
HGB BLD-MCNC: 13.4 G/DL (ref 11.7–15.7)
IMM GRANULOCYTES # BLD: 0 10E3/UL
IMM GRANULOCYTES NFR BLD: 1 %
LYMPHOCYTES # BLD AUTO: 2.2 10E3/UL (ref 0.8–5.3)
LYMPHOCYTES NFR BLD AUTO: 36 %
MCH RBC QN AUTO: 29.8 PG (ref 26.5–33)
MCHC RBC AUTO-ENTMCNC: 32.8 G/DL (ref 31.5–36.5)
MCV RBC AUTO: 91 FL (ref 78–100)
MONOCYTES # BLD AUTO: 0.6 10E3/UL (ref 0–1.3)
MONOCYTES NFR BLD AUTO: 10 %
NEUTROPHILS # BLD AUTO: 3 10E3/UL (ref 1.6–8.3)
NEUTROPHILS NFR BLD AUTO: 49 %
NRBC # BLD AUTO: 0 10E3/UL
NRBC BLD AUTO-RTO: 0 /100
PLATELET # BLD AUTO: 271 10E3/UL (ref 150–450)
POTASSIUM SERPL-SCNC: 3.8 MMOL/L (ref 3.4–5.3)
PROT SERPL-MCNC: 7.3 G/DL (ref 6.4–8.3)
RBC # BLD AUTO: 4.5 10E6/UL (ref 3.8–5.2)
SODIUM SERPL-SCNC: 138 MMOL/L (ref 135–145)
WBC # BLD AUTO: 5.9 10E3/UL (ref 4–11)

## 2024-01-30 PROCEDURE — 80053 COMPREHEN METABOLIC PANEL: CPT | Performed by: INTERNAL MEDICINE

## 2024-01-30 PROCEDURE — G0463 HOSPITAL OUTPT CLINIC VISIT: HCPCS | Mod: 25 | Performed by: INTERNAL MEDICINE

## 2024-01-30 PROCEDURE — 85025 COMPLETE CBC W/AUTO DIFF WBC: CPT | Performed by: INTERNAL MEDICINE

## 2024-01-30 PROCEDURE — 36415 COLL VENOUS BLD VENIPUNCTURE: CPT | Performed by: INTERNAL MEDICINE

## 2024-01-30 PROCEDURE — 99215 OFFICE O/P EST HI 40 MIN: CPT | Performed by: INTERNAL MEDICINE

## 2024-01-30 PROCEDURE — 250N000011 HC RX IP 250 OP 636: Mod: JZ | Performed by: INTERNAL MEDICINE

## 2024-01-30 PROCEDURE — 96372 THER/PROPH/DIAG INJ SC/IM: CPT | Performed by: INTERNAL MEDICINE

## 2024-01-30 PROCEDURE — 77067 SCR MAMMO BI INCL CAD: CPT | Mod: 52 | Performed by: RADIOLOGY

## 2024-01-30 PROCEDURE — 77063 BREAST TOMOSYNTHESIS BI: CPT | Mod: 52 | Performed by: RADIOLOGY

## 2024-01-30 RX ORDER — ALBUTEROL SULFATE 90 UG/1
1-2 AEROSOL, METERED RESPIRATORY (INHALATION)
Status: DISCONTINUED | OUTPATIENT
Start: 2024-01-30 | End: 2024-02-04 | Stop reason: HOSPADM

## 2024-01-30 RX ORDER — MEPERIDINE HYDROCHLORIDE 25 MG/ML
25 INJECTION INTRAMUSCULAR; INTRAVENOUS; SUBCUTANEOUS EVERY 30 MIN PRN
Status: DISCONTINUED | OUTPATIENT
Start: 2024-01-30 | End: 2024-02-04 | Stop reason: HOSPADM

## 2024-01-30 RX ORDER — ANASTROZOLE 1 MG/1
1 TABLET ORAL DAILY
Qty: 90 TABLET | Refills: 3 | Status: SHIPPED | OUTPATIENT
Start: 2024-01-30 | End: 2024-09-04

## 2024-01-30 RX ORDER — EPINEPHRINE 1 MG/ML
0.3 INJECTION, SOLUTION INTRAMUSCULAR; SUBCUTANEOUS EVERY 5 MIN PRN
Status: DISCONTINUED | OUTPATIENT
Start: 2024-01-30 | End: 2024-02-04 | Stop reason: HOSPADM

## 2024-01-30 RX ORDER — DIPHENHYDRAMINE HYDROCHLORIDE 50 MG/ML
50 INJECTION INTRAMUSCULAR; INTRAVENOUS
Status: DISCONTINUED | OUTPATIENT
Start: 2024-01-30 | End: 2024-02-04 | Stop reason: HOSPADM

## 2024-01-30 RX ORDER — METHYLPREDNISOLONE SODIUM SUCCINATE 125 MG/2ML
125 INJECTION, POWDER, LYOPHILIZED, FOR SOLUTION INTRAMUSCULAR; INTRAVENOUS
Status: DISCONTINUED | OUTPATIENT
Start: 2024-01-30 | End: 2024-02-04 | Stop reason: HOSPADM

## 2024-01-30 RX ORDER — ALBUTEROL SULFATE 0.83 MG/ML
2.5 SOLUTION RESPIRATORY (INHALATION)
Status: DISCONTINUED | OUTPATIENT
Start: 2024-01-30 | End: 2024-02-04 | Stop reason: HOSPADM

## 2024-01-30 RX ADMIN — DENOSUMAB 60 MG: 60 INJECTION SUBCUTANEOUS at 14:22

## 2024-01-30 NOTE — NURSING NOTE
Chief Complaint   Patient presents with    Blood Draw     Labs drawn via  by RN in lab. VS Taken.     Labs collected from venipuncture by RN. Vitals taken. Checked in for appointment(s).    Keena Lomas RN

## 2024-01-30 NOTE — LETTER
2024         RE: Ramona Ferrer  [address]  [place] [address]        Dear Colleague,    Thank you for referring your patient, Ramona Ferrer, to the [facility]. Please see a copy of my visit note below.    Dada Mendiola MD  [facility] Surgery  [address]  [place] [address]     RE:  Ramona Ferrer  MRN:  [id number]  :  1955     Dear Dr. Mendiola:     I would like to refer to you Sammie Ferrer, a 68-year-old woman with a recent diagnosis of a stage IIB, T3 N0 MX, invasive lobular carcinoma of the left breast.  She self-palpated a lump in the upper outer quadrant of the left breast.  She underwent evaluation with a diagnostic mammogram and ultrasound, which was BI-RADS 4, showing in the upper outer quadrant of the left breast irregularly shaped hypoechoic mass at the 12 o'clock position 4 cm from the nipple on the left.  This mass measured 2.3 x 2.2 x 1.5 cm and accounted for the patient's area of palpable concern.  Additionally, at the 1 o'clock position 2 cm from the nipple in the left breast, there is a second irregularly shaped hypoechoic mass with indistinct margins measuring 1.3 x 1.1 x 0.8 cm.  She also underwent a breast MRI which showed in the right breast mild background parenchymal enhancement and no suspicious areas of enhancement or lymphadenopathy.  In the left breast there was mild background parenchymal enhancement, and at the 12 o'clock position 4 cm from the nipple there was a heterogeneously enhancing irregular mass measuring 3.4 in AP dimension x 2.8 cm ML and 2.8 cm SI corresponding to the known biopsy-proven malignancy in the left breast.  In the left breast at the 1 o'clock position 2 cm from the nipple there was also a heterogeneously enhancing oval mass measuring 1.3 cm in maximal diameter.  This likely corresponds to the second biopsy-proven malignancy.  Together, the full extent of the  disease spans a total of 5.2 cm AP x 4.4 cm ML, BI-RADS 6.     The pathology showed in the left breast 12 o'clock position 4 cm from the nipple, ultrasound-guided needle biopsy invasive lobular carcinoma, [place] grade 1/3 lobular carcinoma in situ, classic type, estrogen receptor positive in greater than 70% of the cells and progesterone receptor positive in greater than 90% of cells, and HER2 FISH was nonamplified.  In part B in the left breast at the 1 o'clock position 2 cm from the nipple, ultrasound-guided needle biopsy showed invasive lobular carcinoma, [place] grade 1/3 lobular carcinoma in situ was present, classic subtype, estrogen receptors positive in greater than 70% of the cells, progesterone receptors positive in greater than 90% of cells, and HER2 was nonamplified, Ki-67 20-25%.  She now comes to our clinic for recommendations.     Sammie reports that the breast mass had been there for approximately 1 month before she was seen for evaluation..       She has worked in the past as a personal care assistant and lives in the [facility].       PAST MEDICAL HISTORY:  There is no history of breast cancer in the past.  No history of breast cancer surgery.  She has no history of radiation therapy in the past or radiation exposure.  No history of prior tumor of any kind.  She denies heart problems, heart attack, breathing problems, blood clots, seizures, peptic ulcer disease, osteoporosis or bone fractures.  She does report a history of arthritis.     FAMILY HISTORY:  Positive for breast cancer in a paternal aunt who was diagnosed with breast cancer at an old age.     Her father has a history of stomach cancer.  There is no family history of pancreatic cancer, melanoma, lung cancer or ovarian cancer.     No history of male breast cancer.     ALLERGIES:  No history of drug allergies.  She denies allergies to seafood, iodine, or contrast dye.     PAST MENSTRUAL HISTORY:  She has been pregnant 3 times with  2 live births at age 29 and 31 and 1 miscarriage.  Age at first menstrual period was 14.  She did have a total abdominal hysterectomy and bilateral salpingo-oophorectomy performed in 2003 for endometriosis.  She has no history of hormone replacement therapy.  No history of oral contraceptives.     HABITS: She denies tobacco history.  She denies alcohol history and drinks alcohol only occasionally.     PAST MEDICAL HISTORY:  Past medical history is also remarkable for type 2 diabetes, and she was begun on metformin 2 years ago.  She also has a history of varicose veins and a history of a lipoma resected from her left leg.      Chronic Hepatitis B.  Hepatitis C negative.  HIV negative.       Prior COVID vaccination x 3.      GERM LINE GENETICS: INVITAE testing of 47 genes was negative.      TREATMENT HISTORY:  A.  MammaPrint showed low risk.  B.  Left mastectomy and axillary lymph node sampling.   A. Lymph node, LEFT axillary, excision:  -One benign lymph node (0/1)  B. LEFT breast, skin-sparing mastectomy:  -INVASIVE BREAST CARCINOMA, MIXED INVASIVE LOBULAR CARCINOMA (predominant component) and INVASIVE DUCTAL CARCINOMA (minor component), KALPESH GRADE 3, size 5.5 cm, 12:00, upper outer quadrant and lower outer quadrant  -Ductal carcinoma in situ (DCIS), nuclear grade 2, cribriform and solid type(s), with focal necrosis  -DCIS is admixed with invasive carcinoma, and comprises a minor component (<5%) of the tumor volume   -Lobular carcinoma in situ (LCIS), predominantly classic type (admixed with invasive carcinoma and beyond invasive carcinoma) and focal pleomorphic type (size 2 mm, adjacent to invasive carcinoma)  -Margins are uninvolved by invasive carcinoma  -Invasive carcinoma is 2.5 mm from the nearest (anterior) margin, and is > 5 mm from the posterior margin  -Margins are uninvolved by DCIS and pleomorphic LCIS  -DCIS and pleomorphic LCIS are > 5 mm from the nearest (anterior) margin  -Benign nipple and  skin   -Other findings: fibrocystic change (including microcysts with apocrine metaplasia) and columnar cell change  -Calcifications associated with DCIS, LCIS, and benign ducts and acini  -Prior core biopsy site changes (two biopsy sites identified)  -Invasive carcinoma is estrogen receptor positive (>70%, strong intensity) and progesterone receptor positive (>90%, strong intensity) by immunohistochemistry and is HER2 non-amplified (group 5) by FISH (performed on prior core biopsies, see report [id number], specimens A and B)  DCIS and LCIS present.  -Margins are uninvolved by invasive carcinoma or LCIS.   -Invasive carcinoma is estrogen receptor positive (>70%, strong intensity) and progesterone receptor positive (>90%, strong intensity) by immunohistochemistry and is HER2 non-amplified (group 5) by FISH (performed on prior core biopsies, see report [id number], specimens A and B) Ki-67 immunohistochemistry (MIB-1, pharmDx, [employer]) from [employer] was performed on invasive carcinoma in the LEFT mastectomy specimen   They report Ki-67 proliferative index of 20-25% (block B5) and 20% (block B27).   C.  Staging:  pT3 Nx because lymph node is not the sentinel node.    D.  She did not want radiation, and risk:benefit did not favor treatment.  E.  Letrozole begun 4-5-22.   F.  Left TRAM flap reconstruction healed as of 12-27-22 but she has some concerns.   G.  Adjuvant Zometa 12-27-22 but not tolerated. She did have a reaction to adjuvant Zometa and it cannot be given any further.   H. Letrozole Changed to Anastrozole due to arthralgias      INTERVAL HISTORY  Ramona returns to clinic for routine followup and has been feeling generally OK. She mentions she's been having arthralgias affected bilateral knees and wrists. She states the knee pain only started after taking letrozole. She also has ankle/foot pain. She finds her hot flashes manageable. She is still taking Calcium and Vitamin D. She continued to have trouble  sleeping and takes Zolpidem PRN for insomnia (she is not interested in a sleep study).            REVIEW OF SYSTEMS:  A 10-point review of systems was otherwise negative.  She is eating a healthful diet.  She is not exercising.  I did advise her to walk at least 30 minutes a day.  She does not consume alcohol.  She is taking vitamin D.  On her last DEXA scan from 9/29/23, her most valid T-score was -1.7 at the right femoral neck (compared to -1.8 from 9/6/2022).   She does have a TRAM flap that was placed and the incisions have completely healed.     Her blurry vision has improved since seeing Ophthalmology.       PHYSICAL EXAMINATION:    VITAL SIGNS: Blood pressure 115/63, pulse 80, temperature 98  F (36.7  C), temperature source Oral, resp. rate 18, weight 100.2 kg (220 lb 14.4 oz), SpO2 94%, not currently breastfeeding.  GENERAL:  Ramona appeared generally well.  HEENT:  No alopecia.  She has moderately good dentition, but is having a crown placed soon.  There are no empty sockets or recent extractions.  LYMPH:  No cervical, supraclavicular, subclavicular or axillary lymphadenopathy.  BREASTS:  Right breast reveals no masses.  Examination of the left breast reveals a TRAM flap in place with no erythema or masses.  Incisions are well healed.  LUNGS:  Clear to percussion and auscultation.  HEART:  Regular rate and rhythm.  S1, S2.  ABDOMEN:  Soft, nontender, consistent with increased body mass index.  EXTREMITIES:  Without edema.  PSYCH:  Mood was somewhat anxious.  Affect was appropriate.     LABORATORY DATA:  CBC and CMP within normal limits.     ASSESSMENT AND PLAN:  1.  Sammie Ferrer is a 67-year-old woman with a recent diagnosis of a stage IIB, T3 N0 MX, invasive lobular carcinoma of the upper outer quadrant of the left breast which is multifocal grade 1, ER positive in greater than 70% of cells, [place] positive in greater than 90% of cells and HER2 nonamplified.  She now comes to clinic with her , for  recommendations regarding evaluation and treatment.    2.  MammaPrint before surgery came back as low risk as did the Oncotype after surgery. The Oncotype of the pleomorphic lobular cancer showed a value of 15, which is less than the threshold for chemotherapy from TAILORx which is 26 for a post-menopausal woman.   3.  Sammie Ferrer underwent a left mastectomy and has a TRAM flap in place.  She started adjuvant hormonal therapy with letrozole and has tolerated it well until 1/30/24. She started to develop arthralgias affecting bilateral knees and wrists. Therefore, through shared decision making, she was changed to Anastrozole on 1/30/24.   4. Pathology showed -Invasive carcinoma is estrogen receptor positive, progesterone receptor positive and HER2 negative by immunohistochemistry.  Margins negative. pT3 Nx because the lymph node is not sentinel. She did not qualify for neoadjuvant chemotherapy based on MammaPrint nor adjuvant chemotherapy based on Oncotype.  She also was node negative (although the LN was not sentinel) and did not meet criteria for MonarchE. She was node negative by MRI but the node was not sentinel.  She does have Ki67 20% or greater and size of tumor greater than 5 cm.  She cannot tolerate adjuvant Zoledronic acid.  We would plan to continue AI for 10 years.   5.   Sammie continues to do relatively well.  She has tolerated her AI relatively well aside from arthralgias. Therefore, she was changed to anastrozole today (from letrozole) to see if she has improvement in her arthralgias. A referral was also sent for CanAroma trial (patient agreed) to see if she may be a candidate. She continues to have trouble sleeping and will continue to work with her PCP.   6.   Adjuvant Zometa was not tolerated.  She was changed to Prolia last visit. Today arnold be her 2nd dose. Her most recent DEXA scan from 9/29/23 showed a most valid negative T-score of -1.7.  The purpose of Prolia is to maintain bone density in  the setting of AI adjuvant therapy, but by itself would not expect to have adjuvant benefit in contrast to the zoledronic acid.  7.  Left TRAM flap is healed.    8.  Imaging followup.  Right mammogram from 1/30/24 was normal.   9.  Blurry vision.  I discussed with Ramona that it is probably not related to the letrozole, but I did put in an Ophthalmology consult. She feels this has improved with FML steroid drops, Patanol and artificial tears.   10.  Imaging:  Yearly mammography of right breast from 1/30/24. ACR BI-RADS Category 1: Negative .  11.  Followup. Follow up with Zoya February 28 to assess change to anastrozole with CBC, CMP.  Follow up July 30 with Zoya CBC, CMP and Prolia. Follow up with me January 30, 2025 with CBC, CMP, right mammogram and Prolia.      Thank you for allowing us to participate in this patient's care.  The patient was seen and evaluated by me.  I discussed the patient with the fellow Dr. Glenn Liao and agree with the findings and plan in the note.     Sincerely,     Kenneth Cisneros MD  Professor  [facility]  [phone]        I spent 40 minutes with the patient more than 50% of which was in counseling and coordination of care.

## 2024-01-30 NOTE — NURSING NOTE
"Oncology Rooming Note    January 30, 2024 1:29 PM   Ramona Ferrer is a 68 year old female who presents for:    Chief Complaint   Patient presents with    Blood Draw     Labs drawn via  by RN in lab. VS Taken.    Oncology Clinic Visit     RTN for Breast Cancer     Initial Vitals: /63 (BP Location: Right arm, Patient Position: Sitting, Cuff Size: Adult Large)   Pulse 80   Temp 98  F (36.7  C) (Oral)   Resp 18   Wt 100.2 kg (220 lb 14.4 oz)   LMP  (LMP Unknown)   SpO2 94%   BMI 38.66 kg/m   Estimated body mass index is 38.66 kg/m  as calculated from the following:    Height as of 12/4/23: 1.61 m (5' 3.39\").    Weight as of this encounter: 100.2 kg (220 lb 14.4 oz). Body surface area is 2.12 meters squared.  Data Unavailable Comment: Data Unavailable   No LMP recorded (lmp unknown). Patient has had a hysterectomy.  Allergies reviewed: Yes  Medications reviewed: Yes    Medications: Medication refills not needed today.  Pharmacy name entered into [employer]:    [id number] - [place], [place] - [address]  [id number] - [place], [place] - [address]    Frailty Screening:   Is the patient here for a new oncology consult visit in cancer care? 2. No      Clinical concerns: none       Clarisa Garrison MA             "

## 2024-02-02 DIAGNOSIS — G47.00 INSOMNIA, UNSPECIFIED TYPE: ICD-10-CM

## 2024-02-02 RX ORDER — ZOLPIDEM TARTRATE 5 MG/1
5 TABLET ORAL
Qty: 15 TABLET | Refills: 0 | Status: SHIPPED | OUTPATIENT
Start: 2024-02-02 | End: 2024-04-12

## 2024-02-05 NOTE — TELEPHONE ENCOUNTER
DIAGNOSIS: Pain in both lower extremities [M79.604, M79.605]    APPOINTMENT DATE: 2/7/24   NOTES STATUS DETAILS   OFFICE NOTE from referring provider Internal 1/30/24 Kenneth Cisneros MD [facility]   OFFICE NOTE from other specialist Internal 1/10/19 Jocelin Mancuso APRN CNP [facility] Brain Clinic   3/30/22 Kitty Cisneros RN [facility]  Hematology & Oncology  7/6/22 Yvan Sosa MD [facility] Neurology   7/7/22  Michael Burns MD [facility] Orthopaedic Surgery   7/20/22 Joe Michelle MD [facility] PCP  8/27/22 Skye Bond OTR OT   12/4/23 Bishop RENETTA Sellers [facility]    MEDICATION LIST Internal    US Internal 3/30/22 US lower extremity   6/14/2021 US Lower Extremity    DEXA (osteoporosis/bone health) Internal 9/26/22 DX Hip/ Pelvis/Spine    XRAYS (IMAGES & REPORTS) Internal 5/9/22 XR Femur Left

## 2024-02-07 ENCOUNTER — PRE VISIT (OUTPATIENT)
Dept: ORTHOPEDICS | Facility: CLINIC | Age: 69
End: 2024-02-07

## 2024-02-27 NOTE — PROGRESS NOTES
Sammie Ferrer is seen for follow-up for a diagnosis of a stage IIB, T3 N0 MX, invasive lobular carcinoma of the left breast.  She self-palpated a lump in the upper outer quadrant of the left breast.  She underwent evaluation with a diagnostic mammogram and ultrasound, which was BI-RADS 4, showing in the upper outer quadrant of the left breast irregularly shaped hypoechoic mass at the 12 o'clock position 4 cm from the nipple on the left.  This mass measured 2.3 x 2.2 x 1.5 cm and accounted for the patient's area of palpable concern. Additionally, at the 1 o'clock position 2 cm from the nipple in the left breast, there is a second irregularly shaped hypoechoic mass with indistinct margins measuring 1.3 x 1.1 x 0.8 cm.  She also underwent a breast MRI which showed in the right breast mild background parenchymal enhancement and no suspicious areas of enhancement or lymphadenopathy.  In the left breast there was mild background parenchymal enhancement, and at the 12 o'clock position 4 cm from the nipple there was a heterogeneously enhancing irregular mass measuring 3.4 in AP dimension x 2.8 cm ML and 2.8 cm SI corresponding to the known biopsy-proven malignancy in the left breast.  In the left breast at the 1 o'clock position 2 cm from the nipple there was also a heterogeneously enhancing oval mass measuring 1.3 cm in maximal diameter.  This likely corresponds to the second biopsy-proven malignancy.  Together, the full extent of the disease spans a total of 5.2 cm AP x 4.4 cm ML, BI-RADS 6.     The pathology done 1/3/2022 showed invasive lobular carcinoma, [place] grade 1/3 lobular carcinoma in situ, classic type, estrogen receptor positive in greater than 70% of the cells and progesterone receptor positive in greater than 90% of cells, and HER2 FISH was nonamplified.  In part B in the left breast at the 1 o'clock position 2 cm from the nipple, ultrasound-guided needle biopsy showed invasive lobular carcinoma,  [place] grade 1/3 lobular carcinoma in situ was present, classic subtype, estrogen receptors positive in greater than 70% of the cells, progesterone receptors positive in greater than 90% of cells, and HER2 was nonamplified, Ki-67 20-25%.       Sammie reports that the breast mass had been there for approximately 1 month before she was seen for evaluation..       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       PAST MEDICAL HISTORY:  There is no history of breast cancer in the past.  No history of breast cancer surgery. She has no history of radiation therapy in the past or radiation exposure.  No history of prior tumor of any kind. She denies heart problems, heart attack, breathing problems, blood clots, seizures, peptic ulcer disease, osteoporosis or bone fractures.  She does report a history of arthritis.     FAMILY HISTORY:  Positive for breast cancer in a paternal aunt who was diagnosed with breast cancer at an old age.     Her father has a history of stomach cancer.  There is no family history of pancreatic cancer, melanoma, lung cancer or ovarian cancer.     No history of male breast cancer.     ALLERGIES:  No history of drug allergies.  She denies allergies to seafood, iodine, or contrast dye.     PAST MENSTRUAL HISTORY:  She has been pregnant 3 times with 2 live births at age 29 and 31 and 1 miscarriage.  Age at first menstrual period was 14.  She did have a total abdominal hysterectomy and bilateral salpingo-oophorectomy performed in 2003 for endometriosis.  She has no history of hormone replacement therapy.  No history of oral contraceptives.     HABITS: She denies tobacco history.  She denies alcohol history and drinks alcohol only occasionally.     PAST MEDICAL HISTORY:  Past medical history is also remarkable for type 2 diabetes, and she was begun on metformin 2 years ago.  She also has a history of varicose veins and a history of a lipoma resected from her  left leg.      Chronic Hepatitis B.  Hepatitis C negative.  HIV negative.      Prior COVID vaccination x 3.      GERM LINE GENETICS: INVITAE testing of 47 genes was negative.      TREATMENT HISTORY:  A. MammaPrint showed low risk.  B. Left mastectomy and axillary lymph node sampling.   A. Lymph node, LEFT axillary, excision:  -One benign lymph node (0/1)  B. LEFT breast, skin-sparing mastectomy:  -INVASIVE BREAST CARCINOMA, MIXED INVASIVE LOBULAR CARCINOMA (predominant component) and INVASIVE DUCTAL CARCINOMA (minor component), KALPESH GRADE 3, size 5.5 cm, 12:00, upper outer quadrant and lower outer quadrant  -Ductal carcinoma in situ (DCIS), nuclear grade 2, cribriform and solid type(s), with focal necrosis  -DCIS is admixed with invasive carcinoma, and comprises a minor component (<5%) of the tumor volume   -Lobular carcinoma in situ (LCIS), predominantly classic type (admixed with invasive carcinoma and beyond invasive carcinoma) and focal pleomorphic type (size 2 mm, adjacent to invasive carcinoma)  -Margins are uninvolved by invasive carcinoma  -Invasive carcinoma is 2.5 mm from the nearest (anterior) margin, and is > 5 mm from the posterior margin  -Margins are uninvolved by DCIS and pleomorphic LCIS  -DCIS and pleomorphic LCIS are > 5 mm from the nearest (anterior) margin  -Benign nipple and skin   -Other findings: fibrocystic change (including microcysts with apocrine metaplasia) and columnar cell change  -Calcifications associated with DCIS, LCIS, and benign ducts and acini  -Prior core biopsy site changes (two biopsy sites identified)  -Invasive carcinoma is estrogen receptor positive (>70%, strong intensity) and progesterone receptor positive (>90%, strong intensity) by immunohistochemistry and is HER2 non-amplified (group 5) by FISH (performed on prior core biopsies, see report [id number], specimens A and B)  DCIS and LCIS present.  -Margins are uninvolved by invasive carcinoma or LCIS.   -Invasive  carcinoma is estrogen receptor positive (>70%, strong intensity) and progesterone receptor positive (>90%, strong intensity) by immunohistochemistry and is HER2 non-amplified (group 5) by FISH (performed on prior core biopsies, see report [id number], specimens A and B) Ki-67 immunohistochemistry (MIB-1, pharmDx, [employer]) from [facility] was performed on invasive carcinoma in the LEFT mastectomy specimen.  They report Ki-67 proliferative index of 20-25% (block B5) and 20% (block B27).   C. Staging:  pT3 Nx because lymph node is not the sentinel node.    D. She did not want radiation, and risk:benefit did not favor treatment.  E. Letrozole begun 4-5-22.   F. Left TRAM flap reconstruction healed as of 12-27-22 but she has some concerns.   G. Adjuvant Zometa 12-27-22 but not tolerated. She did have a reaction to adjuvant Zometa and it cannot be given any further.   H. Letrozole Changed to Anastrozole due to arthralgias      INTERVAL HISTORY:  Ramona returns to clinic for evaluation and toxicity check after changing aromatase inhibitors from letrozole to anastrozole.  -The first day she took anastrozole she has nausea and vomiting.  She is now taking in the am with food and she reports that the nausea has improved and she has not had any more vomiting after the first 2 days.  -Arthralgias are better on the new AI.  She is only having tingling nerve pain 1-2 times week, before it was every day 2-3 times.  Her knee pain and wrist pain has improved.  -She does have hot flashes a few times per day.  -She has started to go to [employer] and is walking  On the treadmill.  -Appt with ophthalmologist in April 2024.  She feels she needs a new glasses prescription.  -She otherwise denies unexplained weight loss, drenching night sweats, headache, dizziness or double vision.  -She denies cough, chest pain, shortness of breath at rest.    -She denies abdominal pain, constipation or diarrhea.  -She has not noted any changes to  her breast, new rashes or skin changes.  She does report dry skin.  -She is not having any unusual bleeding.  -She has no new focal pain.    REVIEW OF SYSTEMS:  A 10-point review of systems was otherwise negative.       PHYSICAL EXAMINATION:    VITAL SIGNS: Blood pressure 122/78, pulse 80, temperature 97.9  F (36.6  C), temperature source Tympanic, resp. rate 18, weight 97.8 kg (215 lb 9.6 oz), SpO2 98%, not currently breastfeeding.  GENERAL: Very pleasant 68-year-old female who is alert, oriented, and in no acute distress today.  HEENT:  No alopecia.    BREASTS: Deferred today as she had a breast exam on 1/30/2024.  LUNGS:  Clear to auscultation bilaterally.  HEART:  Regular rate and rhythm.  S1, S2.  ABDOMEN:  Soft, nondistended.  EXTREMITIES:  Without edema.  PSYCH: Affect appropriate.     LABORATORY DATA:    Most Recent 3 CBC's:  Recent Labs   Lab Test 02/28/24  1404 01/30/24  1242 07/25/23  1150   WBC 5.7 5.9 6.0   HGB 13.8 13.4 13.1   MCV 92 91 89    271 296   ANEUTAUTO 2.5 3.0 3.0     Most Recent 3 BMP's:  Recent Labs   Lab Test 02/28/24  1404 01/30/24  1242 11/07/23  0848 07/25/23  1150    138 140 139   POTASSIUM 4.9 3.8 4.3 3.9   CHLORIDE 102 102 105 105   CO2 25 25 25 24   BUN 12.8 16.7 13.7 13.1   CR 0.78 0.75 0.76 0.72   ANIONGAP 11 11 10 10   EMMA 9.9 9.5 9.6 10.1   * 180* 106* 155*   PROTTOTAL 7.7 7.3  --  7.0   ALBUMIN 4.2 4.1  --  4.0    Most Recent 3 LFT's:  Recent Labs   Lab Test 02/28/24  1404 01/30/24  1242 07/25/23  1150   AST 35 32 33   ALT 43 44 48   ALKPHOS 57 48 55   BILITOTAL 0.4 0.4 0.4    Most Recent 2 TSH and T4:  Recent Labs   Lab Test 04/06/23  1505 12/13/21  1142 06/04/21  1428 12/21/20  1024   TSH 0.79 0.74   < > 8.67*   T4  --   --   --  0.75*    < > = values in this interval not displayed.     I reviewed the above labs today.    ASSESSMENT AND PLAN:  Stage IIB, T3 N0 MX, invasive lobular carcinoma of the upper outer quadrant of the left breast which is multifocal  grade 1, ER positive in greater than 70% of cells, VA positive in greater than 90% of cells and HER2 non-amplified:  -Post left mastectomy with TRAM flap.   -Right digital screening mammogram done on 1/30/2024 was BI-RADS 1 negative.  She is due for repeat mammogram in January 2025.  -Started adjuvant hormonal therapy with letrozole, which she tolerated well until 1/3/2024.  After discussion with Dr. Cisneros on that date, she was changed to anastrozole.  -Today, as above, she reports that she is tolerating this better.  She did have some nausea initially, but this has improved when she started taking this with food.  -Her arthralgias and neuralgias have improved as well.  -She will continue and follow-up with Dr. Cisneros as scheduled in July.  She is due for Prolia in July as well.    Arthralgias:  -Changed aromatase inhibitor as above.  These have improved, and she is pleased  -Referral sent for CanAroma trial to see if she might be a candidate at her last visit.  Not discussed today.    Bone health:  -Adjuvant Zometa was not tolerated.  -She was changed to Prolia every 6 months, last dose administered on 1/30/2024.  Due in July.  -She and Dr. Cisneros discussed that the purpose of Prolia is to maintain bones density in the setting of aromatase inhibitor, but by itself would not expect to have adjuvant benefit in contrast to Zometa.  -Her most recent DEXA scan from 9/29/23 showed a most valid negative T-score of -1.7.  She would be due for repeat imaging in September 2025.    EFRAIN Ching, CNP  [facility]  [address] [place] [id number]  [phone]    36 minutes spent on the date of the encounter doing chart review, review of test results, interpretation of tests, patient visit, and documentation.           no no discharge, no irritation, no pain, no redness, and no visual changes.

## 2024-02-28 ENCOUNTER — APPOINTMENT (OUTPATIENT)
Dept: LAB | Facility: CLINIC | Age: 69
End: 2024-02-28
Attending: NURSE PRACTITIONER
Payer: COMMERCIAL

## 2024-02-28 ENCOUNTER — ONCOLOGY VISIT (OUTPATIENT)
Dept: ONCOLOGY | Facility: CLINIC | Age: 69
End: 2024-02-28
Attending: NURSE PRACTITIONER
Payer: COMMERCIAL

## 2024-02-28 VITALS
WEIGHT: 215.6 LBS | RESPIRATION RATE: 18 BRPM | TEMPERATURE: 97.9 F | SYSTOLIC BLOOD PRESSURE: 122 MMHG | OXYGEN SATURATION: 98 % | DIASTOLIC BLOOD PRESSURE: 78 MMHG | HEART RATE: 80 BPM | BODY MASS INDEX: 37.73 KG/M2

## 2024-02-28 DIAGNOSIS — C50.412 MALIGNANT NEOPLASM OF UPPER-OUTER QUADRANT OF LEFT BREAST IN FEMALE, ESTROGEN RECEPTOR POSITIVE (H): ICD-10-CM

## 2024-02-28 DIAGNOSIS — Z17.0 MALIGNANT NEOPLASM OF UPPER-OUTER QUADRANT OF LEFT BREAST IN FEMALE, ESTROGEN RECEPTOR POSITIVE (H): ICD-10-CM

## 2024-02-28 LAB
ALBUMIN SERPL BCG-MCNC: 4.2 G/DL (ref 3.5–5.2)
ALP SERPL-CCNC: 57 U/L (ref 40–150)
ALT SERPL W P-5'-P-CCNC: 43 U/L (ref 0–50)
ANION GAP SERPL CALCULATED.3IONS-SCNC: 11 MMOL/L (ref 7–15)
AST SERPL W P-5'-P-CCNC: 35 U/L (ref 0–45)
BASOPHILS # BLD AUTO: 0.1 10E3/UL (ref 0–0.2)
BASOPHILS NFR BLD AUTO: 1 %
BILIRUB SERPL-MCNC: 0.4 MG/DL
BUN SERPL-MCNC: 12.8 MG/DL (ref 8–23)
CALCIUM SERPL-MCNC: 9.9 MG/DL (ref 8.8–10.2)
CHLORIDE SERPL-SCNC: 102 MMOL/L (ref 98–107)
CREAT SERPL-MCNC: 0.78 MG/DL (ref 0.51–0.95)
DEPRECATED HCO3 PLAS-SCNC: 25 MMOL/L (ref 22–29)
EGFRCR SERPLBLD CKD-EPI 2021: 83 ML/MIN/1.73M2
EOSINOPHIL # BLD AUTO: 0.2 10E3/UL (ref 0–0.7)
EOSINOPHIL NFR BLD AUTO: 3 %
ERYTHROCYTE [DISTWIDTH] IN BLOOD BY AUTOMATED COUNT: 13.2 % (ref 10–15)
GLUCOSE SERPL-MCNC: 102 MG/DL (ref 70–99)
HCT VFR BLD AUTO: 42.8 % (ref 35–47)
HGB BLD-MCNC: 13.8 G/DL (ref 11.7–15.7)
IMM GRANULOCYTES # BLD: 0 10E3/UL
IMM GRANULOCYTES NFR BLD: 0 %
LYMPHOCYTES # BLD AUTO: 2.4 10E3/UL (ref 0.8–5.3)
LYMPHOCYTES NFR BLD AUTO: 42 %
MCH RBC QN AUTO: 29.6 PG (ref 26.5–33)
MCHC RBC AUTO-ENTMCNC: 32.2 G/DL (ref 31.5–36.5)
MCV RBC AUTO: 92 FL (ref 78–100)
MONOCYTES # BLD AUTO: 0.6 10E3/UL (ref 0–1.3)
MONOCYTES NFR BLD AUTO: 10 %
NEUTROPHILS # BLD AUTO: 2.5 10E3/UL (ref 1.6–8.3)
NEUTROPHILS NFR BLD AUTO: 44 %
NRBC # BLD AUTO: 0 10E3/UL
NRBC BLD AUTO-RTO: 0 /100
PLATELET # BLD AUTO: 311 10E3/UL (ref 150–450)
POTASSIUM SERPL-SCNC: 4.9 MMOL/L (ref 3.4–5.3)
PROT SERPL-MCNC: 7.7 G/DL (ref 6.4–8.3)
RBC # BLD AUTO: 4.67 10E6/UL (ref 3.8–5.2)
SODIUM SERPL-SCNC: 138 MMOL/L (ref 135–145)
WBC # BLD AUTO: 5.7 10E3/UL (ref 4–11)

## 2024-02-28 PROCEDURE — G0463 HOSPITAL OUTPT CLINIC VISIT: HCPCS | Performed by: NURSE PRACTITIONER

## 2024-02-28 PROCEDURE — 82040 ASSAY OF SERUM ALBUMIN: CPT | Performed by: NURSE PRACTITIONER

## 2024-02-28 PROCEDURE — 85025 COMPLETE CBC W/AUTO DIFF WBC: CPT | Performed by: NURSE PRACTITIONER

## 2024-02-28 PROCEDURE — 36415 COLL VENOUS BLD VENIPUNCTURE: CPT | Performed by: NURSE PRACTITIONER

## 2024-02-28 PROCEDURE — 99214 OFFICE O/P EST MOD 30 MIN: CPT | Performed by: NURSE PRACTITIONER

## 2024-02-28 RX ORDER — LETROZOLE 2.5 MG/1
2.5 TABLET, FILM COATED ORAL DAILY
COMMUNITY
End: 2024-02-28 | Stop reason: ALTCHOICE

## 2024-02-28 RX ORDER — CALCIUM CARBONATE 500(1250)
2 TABLET ORAL DAILY
Qty: 180 TABLET | Refills: 3 | Status: SHIPPED | OUTPATIENT
Start: 2024-02-28

## 2024-02-28 ASSESSMENT — PAIN SCALES - GENERAL: PAINLEVEL: NO PAIN (0)

## 2024-02-28 NOTE — NURSING NOTE
Chief Complaint   Patient presents with    Blood Draw     Labs drawn via  by RN. VS taken.     Labs collected from venipuncture by RN. Vitals taken. Checked in for appointment(s).     Molly Medellin RN

## 2024-02-28 NOTE — LETTER
2/28/2024         RE: Ramona Ferrer  [address]  [place] [address]        Dear Colleague,    Thank you for referring your patient, Ramona Ferrer, to the [facility]. Please see a copy of my visit note below.    Sammie Ferrer is seen for follow-up for a diagnosis of a stage IIB, T3 N0 MX, invasive lobular carcinoma of the left breast.  She self-palpated a lump in the upper outer quadrant of the left breast.  She underwent evaluation with a diagnostic mammogram and ultrasound, which was BI-RADS 4, showing in the upper outer quadrant of the left breast irregularly shaped hypoechoic mass at the 12 o'clock position 4 cm from the nipple on the left.  This mass measured 2.3 x 2.2 x 1.5 cm and accounted for the patient's area of palpable concern. Additionally, at the 1 o'clock position 2 cm from the nipple in the left breast, there is a second irregularly shaped hypoechoic mass with indistinct margins measuring 1.3 x 1.1 x 0.8 cm.  She also underwent a breast MRI which showed in the right breast mild background parenchymal enhancement and no suspicious areas of enhancement or lymphadenopathy.  In the left breast there was mild background parenchymal enhancement, and at the 12 o'clock position 4 cm from the nipple there was a heterogeneously enhancing irregular mass measuring 3.4 in AP dimension x 2.8 cm ML and 2.8 cm SI corresponding to the known biopsy-proven malignancy in the left breast.  In the left breast at the 1 o'clock position 2 cm from the nipple there was also a heterogeneously enhancing oval mass measuring 1.3 cm in maximal diameter.  This likely corresponds to the second biopsy-proven malignancy.  Together, the full extent of the disease spans a total of 5.2 cm AP x 4.4 cm ML, BI-RADS 6.     The pathology done 1/3/2022 showed invasive lobular carcinoma, [place] grade 1/3 lobular carcinoma in situ, classic type, estrogen receptor positive in greater than  70% of the cells and progesterone receptor positive in greater than 90% of cells, and HER2 FISH was nonamplified.  In part B in the left breast at the 1 o'clock position 2 cm from the nipple, ultrasound-guided needle biopsy showed invasive lobular carcinoma, Cathy grade 1/3 lobular carcinoma in situ was present, classic subtype, estrogen receptors positive in greater than 70% of the cells, progesterone receptors positive in greater than 90% of cells, and HER2 was nonamplified, Ki-67 20-25%.       Sammie reports that the breast mass had been there for approximately 1 month before she was seen for evaluation..       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       PAST MEDICAL HISTORY:  There is no history of breast cancer in the past.  No history of breast cancer surgery. She has no history of radiation therapy in the past or radiation exposure.  No history of prior tumor of any kind. She denies heart problems, heart attack, breathing problems, blood clots, seizures, peptic ulcer disease, osteoporosis or bone fractures.  She does report a history of arthritis.     FAMILY HISTORY:  Positive for breast cancer in a paternal aunt who was diagnosed with breast cancer at an old age.     Her father has a history of stomach cancer.  There is no family history of pancreatic cancer, melanoma, lung cancer or ovarian cancer.     No history of male breast cancer.     ALLERGIES:  No history of drug allergies.  She denies allergies to seafood, iodine, or contrast dye.     PAST MENSTRUAL HISTORY:  She has been pregnant 3 times with 2 live births at age 29 and 31 and 1 miscarriage.  Age at first menstrual period was 14.  She did have a total abdominal hysterectomy and bilateral salpingo-oophorectomy performed in 2003 for endometriosis.  She has no history of hormone replacement therapy.  No history of oral contraceptives.     HABITS: She denies tobacco history.  She denies alcohol  history and drinks alcohol only occasionally.     PAST MEDICAL HISTORY:  Past medical history is also remarkable for type 2 diabetes, and she was begun on metformin 2 years ago.  She also has a history of varicose veins and a history of a lipoma resected from her left leg.      Chronic Hepatitis B.  Hepatitis C negative.  HIV negative.      Prior COVID vaccination x 3.      GERM LINE GENETICS: INVITAE testing of 47 genes was negative.      TREATMENT HISTORY:  A. MammaPrint showed low risk.  B. Left mastectomy and axillary lymph node sampling.   A. Lymph node, LEFT axillary, excision:  -One benign lymph node (0/1)  B. LEFT breast, skin-sparing mastectomy:  -INVASIVE BREAST CARCINOMA, MIXED INVASIVE LOBULAR CARCINOMA (predominant component) and INVASIVE DUCTAL CARCINOMA (minor component), KALPESH GRADE 3, size 5.5 cm, 12:00, upper outer quadrant and lower outer quadrant  -Ductal carcinoma in situ (DCIS), nuclear grade 2, cribriform and solid type(s), with focal necrosis  -DCIS is admixed with invasive carcinoma, and comprises a minor component (<5%) of the tumor volume   -Lobular carcinoma in situ (LCIS), predominantly classic type (admixed with invasive carcinoma and beyond invasive carcinoma) and focal pleomorphic type (size 2 mm, adjacent to invasive carcinoma)  -Margins are uninvolved by invasive carcinoma  -Invasive carcinoma is 2.5 mm from the nearest (anterior) margin, and is > 5 mm from the posterior margin  -Margins are uninvolved by DCIS and pleomorphic LCIS  -DCIS and pleomorphic LCIS are > 5 mm from the nearest (anterior) margin  -Benign nipple and skin   -Other findings: fibrocystic change (including microcysts with apocrine metaplasia) and columnar cell change  -Calcifications associated with DCIS, LCIS, and benign ducts and acini  -Prior core biopsy site changes (two biopsy sites identified)  -Invasive carcinoma is estrogen receptor positive (>70%, strong intensity) and progesterone receptor positive  (>90%, strong intensity) by immunohistochemistry and is HER2 non-amplified (group 5) by FISH (performed on prior core biopsies, see report [id number], specimens A and B)  DCIS and LCIS present.  -Margins are uninvolved by invasive carcinoma or LCIS.   -Invasive carcinoma is estrogen receptor positive (>70%, strong intensity) and progesterone receptor positive (>90%, strong intensity) by immunohistochemistry and is HER2 non-amplified (group 5) by FISH (performed on prior core biopsies, see report [id number], specimens A and B) Ki-67 immunohistochemistry (MIB-1, pharmDx, [employer]) from [facility] was performed on invasive carcinoma in the LEFT mastectomy specimen.  They report Ki-67 proliferative index of 20-25% (block B5) and 20% (block B27).   C. Staging:  pT3 Nx because lymph node is not the sentinel node.    D. She did not want radiation, and risk:benefit did not favor treatment.  E. Letrozole begun 4-5-22.   F. Left TRAM flap reconstruction healed as of 12-27-22 but she has some concerns.   G. Adjuvant Zometa 12-27-22 but not tolerated. She did have a reaction to adjuvant Zometa and it cannot be given any further.   H. Letrozole Changed to Anastrozole due to arthralgias      INTERVAL HISTORY:  Ramona returns to clinic for evaluation and toxicity check after changing aromatase inhibitors from letrozole to anastrozole.  -The first day she took anastrozole she has nausea and vomiting.  She is now taking in the am with food and she reports that the nausea has improved and she has not had any more vomiting after the first 2 days.  -Arthralgias are better on the new AI.  She is only having tingling nerve pain 1-2 times week, before it was every day 2-3 times.  Her knee pain and wrist pain has improved.  -She does have hot flashes a few times per day.  -She has started to go to [employer] and is walking  On the treadmill.  -Appt with ophthalmologist in April 2024.  She feels she needs a new glasses  prescription.  -She otherwise denies unexplained weight loss, drenching night sweats, headache, dizziness or double vision.  -She denies cough, chest pain, shortness of breath at rest.    -She denies abdominal pain, constipation or diarrhea.  -She has not noted any changes to her breast, new rashes or skin changes.  She does report dry skin.  -She is not having any unusual bleeding.  -She has no new focal pain.    REVIEW OF SYSTEMS:  A 10-point review of systems was otherwise negative.       PHYSICAL EXAMINATION:    VITAL SIGNS: Blood pressure 122/78, pulse 80, temperature 97.9  F (36.6  C), temperature source Tympanic, resp. rate 18, weight 97.8 kg (215 lb 9.6 oz), SpO2 98%, not currently breastfeeding.  GENERAL: Very pleasant 68-year-old female who is alert, oriented, and in no acute distress today.  HEENT:  No alopecia.    BREASTS: Deferred today as she had a breast exam on 1/30/2024.  LUNGS:  Clear to auscultation bilaterally.  HEART:  Regular rate and rhythm.  S1, S2.  ABDOMEN:  Soft, nondistended.  EXTREMITIES:  Without edema.  PSYCH: Affect appropriate.     LABORATORY DATA:    Most Recent 3 CBC's:  Recent Labs   Lab Test 02/28/24  1404 01/30/24  1242 07/25/23  1150   WBC 5.7 5.9 6.0   HGB 13.8 13.4 13.1   MCV 92 91 89    271 296   ANEUTAUTO 2.5 3.0 3.0     Most Recent 3 BMP's:  Recent Labs   Lab Test 02/28/24  1404 01/30/24  1242 11/07/23  0848 07/25/23  1150    138 140 139   POTASSIUM 4.9 3.8 4.3 3.9   CHLORIDE 102 102 105 105   CO2 25 25 25 24   BUN 12.8 16.7 13.7 13.1   CR 0.78 0.75 0.76 0.72   ANIONGAP 11 11 10 10   EMMA 9.9 9.5 9.6 10.1   * 180* 106* 155*   PROTTOTAL 7.7 7.3  --  7.0   ALBUMIN 4.2 4.1  --  4.0    Most Recent 3 LFT's:  Recent Labs   Lab Test 02/28/24  1404 01/30/24  1242 07/25/23  1150   AST 35 32 33   ALT 43 44 48   ALKPHOS 57 48 55   BILITOTAL 0.4 0.4 0.4    Most Recent 2 TSH and T4:  Recent Labs   Lab Test 04/06/23  1505 12/13/21  1142 06/04/21  1428 12/21/20  1024    TSH 0.79 0.74   < > 8.67*   T4  --   --   --  0.75*    < > = values in this interval not displayed.     I reviewed the above labs today.    ASSESSMENT AND PLAN:  Stage IIB, T3 N0 MX, invasive lobular carcinoma of the upper outer quadrant of the left breast which is multifocal grade 1, ER positive in greater than 70% of cells, MN positive in greater than 90% of cells and HER2 non-amplified:  -Post left mastectomy with TRAM flap.   -Right digital screening mammogram done on 1/30/2024 was BI-RADS 1 negative.  She is due for repeat mammogram in January 2025.  -Started adjuvant hormonal therapy with letrozole, which she tolerated well until 1/3/2024.  After discussion with Dr. Cisneros on that date, she was changed to anastrozole.  -Today, as above, she reports that she is tolerating this better.  She did have some nausea initially, but this has improved when she started taking this with food.  -Her arthralgias and neuralgias have improved as well.  -She will continue and follow-up with Dr. Cisneros as scheduled in July.  She is due for Prolia in July as well.    Arthralgias:  -Changed aromatase inhibitor as above.  These have improved, and she is pleased  -Referral sent for CanAroma trial to see if she might be a candidate at her last visit.  Not discussed today.    Bone health:  -Adjuvant Zometa was not tolerated.  -She was changed to Prolia every 6 months, last dose administered on 1/30/2024.  Due in July.  -She and Dr. Cisneros discussed that the purpose of Prolia is to maintain bones density in the setting of aromatase inhibitor, but by itself would not expect to have adjuvant benefit in contrast to Zometa.  -Her most recent DEXA scan from 9/29/23 showed a most valid negative T-score of -1.7.  She would be due for repeat imaging in September 2025.    Zoya Alamo, EFRAIN, CNP  [facility] [address] [address] [phone]    36 minutes spent on the date of the encounter doing  chart review, review of test results, interpretation of tests, patient visit, and documentation.

## 2024-02-28 NOTE — NURSING NOTE
"Oncology Rooming Note    February 28, 2024 2:28 PM   Ramona Ferrer is a 68 year old female who presents for:    Chief Complaint   Patient presents with    Blood Draw     Labs drawn via  by RN. VS taken.    Oncology Clinic Visit     Malignant Neoplasm of Left Breast     Initial Vitals: /78 (BP Location: Right arm, Patient Position: Sitting, Cuff Size: Adult Large)   Pulse 80   Temp 97.9  F (36.6  C) (Tympanic)   Resp 18   Wt 97.8 kg (215 lb 9.6 oz)   LMP  (LMP Unknown)   SpO2 98%   BMI 37.73 kg/m   Estimated body mass index is 37.73 kg/m  as calculated from the following:    Height as of 12/4/23: 1.61 m (5' 3.39\").    Weight as of this encounter: 97.8 kg (215 lb 9.6 oz). Body surface area is 2.09 meters squared.  No Pain (0) Comment: Data Unavailable   No LMP recorded (lmp unknown). Patient has had a hysterectomy.  Allergies reviewed: Yes  Medications reviewed: Yes    Medications: Pt is requesting refills of Anastrozole and Calcium.  Provider was notified.  Pharmacy name entered into [employer]:    [id number] - [place], [place] - [address]  [id number] - [place], [place] - [address] [id number] - [place] [facility] [address]    Frailty Screening:   Is the patient here for a new oncology consult visit in cancer care? 2. No      Clinical concerns:  Medications.       Jocelin Urrutia LPN  2/28/2024              "

## 2024-03-12 ENCOUNTER — PATIENT OUTREACH (OUTPATIENT)
Dept: CARE COORDINATION | Facility: CLINIC | Age: 69
End: 2024-03-12
Payer: COMMERCIAL

## 2024-03-26 ENCOUNTER — PATIENT OUTREACH (OUTPATIENT)
Dept: CARE COORDINATION | Facility: CLINIC | Age: 69
End: 2024-03-26
Payer: COMMERCIAL

## 2024-03-26 ENCOUNTER — NURSE TRIAGE (OUTPATIENT)
Dept: ONCOLOGY | Facility: CLINIC | Age: 69
End: 2024-03-26
Payer: COMMERCIAL

## 2024-03-26 DIAGNOSIS — Z17.0 MALIGNANT NEOPLASM OF UPPER-OUTER QUADRANT OF LEFT BREAST IN FEMALE, ESTROGEN RECEPTOR POSITIVE (H): ICD-10-CM

## 2024-03-26 DIAGNOSIS — N60.92 ATYPICAL DUCTAL HYPERPLASIA OF LEFT BREAST: ICD-10-CM

## 2024-03-26 DIAGNOSIS — N63.0 LUMP OR MASS IN BREAST: Primary | ICD-10-CM

## 2024-03-26 DIAGNOSIS — C50.412 MALIGNANT NEOPLASM OF UPPER-OUTER QUADRANT OF LEFT BREAST IN FEMALE, ESTROGEN RECEPTOR POSITIVE (H): ICD-10-CM

## 2024-03-26 NOTE — TELEPHONE ENCOUNTER
Oncology Nurse Triage - Reporting Symptoms  Situation:   DaughterZelda, reporting the following symptoms: new lump    Background:   Treating Provider:  Dr. Kenneth Cisneros    Date of last office visit: 2/28/24 w/ Zoya Alamo    Recent treatments: Yes: oral anastrozole    Assessment  New lump discovered on left breast 1.5-2 weeks ago, hard lump, located above scars closer to chest, causing a lot of anxiety and pt is not sleeping.   Denies pain, redness, swelling, fevers/chills, chest pain, SOB.   She states they have not done a mammogram on the left breast since surgery, only on the right breast.     Daughter wondering if pt should have mammogram/scans and see provider all in same day?     Recommendations:   Writer educated that lump could be scar tissue forming, as this changes over time, but definitely will send message to care team about what scans should be ordered and have scheduling contact her at [phone] to get appts scheduled.

## 2024-03-27 ENCOUNTER — PATIENT OUTREACH (OUTPATIENT)
Dept: GERIATRIC MEDICINE | Facility: CLINIC | Age: 69
End: 2024-03-27
Payer: COMMERCIAL

## 2024-03-27 NOTE — PROGRESS NOTES
[facility] Care Coordination Contact    W LVM for [facility]br to remind MA renewal paperwork because the eligibility reviews due date (04/01/24).  CHW provided contact information.     MANOJ Oconnell  [facility]  [phone]

## 2024-03-27 NOTE — TELEPHONE ENCOUNTER
0800 Secure chat to Zoya Alamo.  Per Zoya, okay for US to left breast and have in person WILL visit.   0841 LVM for daughter, Zelda, informing US and WILL visit will be ordered, informed mammogram is not ordered as pt had a mastectomy and there is no breast tissue to have mammogram on, so US would be best option in this case. Informed will have scheduling call to set up appts and left triage number [phone] for call back.   IB message sent to scheduling.

## 2024-03-28 ENCOUNTER — ANCILLARY PROCEDURE (OUTPATIENT)
Dept: MAMMOGRAPHY | Facility: CLINIC | Age: 69
End: 2024-03-28
Attending: NURSE PRACTITIONER
Payer: COMMERCIAL

## 2024-03-28 ENCOUNTER — ONCOLOGY VISIT (OUTPATIENT)
Dept: ONCOLOGY | Facility: CLINIC | Age: 69
End: 2024-03-28
Attending: NURSE PRACTITIONER
Payer: COMMERCIAL

## 2024-03-28 VITALS
TEMPERATURE: 98.1 F | HEART RATE: 76 BPM | RESPIRATION RATE: 16 BRPM | HEIGHT: 63 IN | OXYGEN SATURATION: 100 % | WEIGHT: 218.1 LBS | SYSTOLIC BLOOD PRESSURE: 115 MMHG | BODY MASS INDEX: 38.64 KG/M2 | DIASTOLIC BLOOD PRESSURE: 61 MMHG

## 2024-03-28 DIAGNOSIS — Z17.0 MALIGNANT NEOPLASM OF UPPER-OUTER QUADRANT OF LEFT BREAST IN FEMALE, ESTROGEN RECEPTOR POSITIVE (H): ICD-10-CM

## 2024-03-28 DIAGNOSIS — C50.412 MALIGNANT NEOPLASM OF UPPER-OUTER QUADRANT OF LEFT BREAST IN FEMALE, ESTROGEN RECEPTOR POSITIVE (H): ICD-10-CM

## 2024-03-28 DIAGNOSIS — N63.21 MASS OF UPPER OUTER QUADRANT OF LEFT BREAST: Primary | ICD-10-CM

## 2024-03-28 DIAGNOSIS — Z79.811 USE OF AROMATASE INHIBITORS: ICD-10-CM

## 2024-03-28 PROCEDURE — 99214 OFFICE O/P EST MOD 30 MIN: CPT | Performed by: NURSE PRACTITIONER

## 2024-03-28 PROCEDURE — G0463 HOSPITAL OUTPT CLINIC VISIT: HCPCS | Performed by: NURSE PRACTITIONER

## 2024-03-28 PROCEDURE — 76642 ULTRASOUND BREAST LIMITED: CPT | Mod: LT | Performed by: RADIOLOGY

## 2024-03-28 ASSESSMENT — PAIN SCALES - GENERAL: PAINLEVEL: NO PAIN (0)

## 2024-03-28 NOTE — NURSING NOTE
"Oncology Rooming Note    March 28, 2024 3:05 PM   Ramona Frerer is a 68 year old female who presents for:    Chief Complaint   Patient presents with    Oncology Clinic Visit     [facility] RETURN - BREAST CANCER     Initial Vitals: /61 (BP Location: Right arm, Patient Position: Chair, Cuff Size: Adult Large)   Pulse 76   Temp 98.1  F (36.7  C)   Resp 16   Ht 1.61 m (5' 3.39\")   Wt 98.9 kg (218 lb 1.6 oz)   LMP  (LMP Unknown)   SpO2 100%   BMI 38.17 kg/m   Estimated body mass index is 38.17 kg/m  as calculated from the following:    Height as of this encounter: 1.61 m (5' 3.39\").    Weight as of this encounter: 98.9 kg (218 lb 1.6 oz). Body surface area is 2.1 meters squared.  No Pain (0) Comment: Data Unavailable   No LMP recorded (lmp unknown). Patient has had a hysterectomy.  Allergies reviewed: Yes  Medications reviewed: Yes    Medications: Medication refills not needed today.  Pharmacy name entered into [facility]:    [id number] - [place], [place] - [address]  [id number] - [place], [place] - [address] [id number] - [place] [address]    Frailty Screening:   Is the patient here for a new oncology consult visit in cancer care? 2. No    Dave Oates LPN              "

## 2024-03-28 NOTE — LETTER
3/28/2024         RE: Ramona Ferrer  [address]  [place] [address]        Dear Colleague,    Thank you for referring your patient, Ramona Ferrer, to the [facility]. Please see a copy of my visit note below.    Sammie Ferrer is seen for follow-up for a diagnosis of a stage IIB, T3 N0 MX, invasive lobular carcinoma of the left breast.  She self-palpated a lump in the upper outer quadrant of the left breast.  She underwent evaluation with a diagnostic mammogram and ultrasound, which was BI-RADS 4, showing in the upper outer quadrant of the left breast irregularly shaped hypoechoic mass at the 12 o'clock position 4 cm from the nipple on the left.  This mass measured 2.3 x 2.2 x 1.5 cm and accounted for the patient's area of palpable concern. Additionally, at the 1 o'clock position 2 cm from the nipple in the left breast, there is a second irregularly shaped hypoechoic mass with indistinct margins measuring 1.3 x 1.1 x 0.8 cm.  She also underwent a breast MRI which showed in the right breast mild background parenchymal enhancement and no suspicious areas of enhancement or lymphadenopathy.  In the left breast there was mild background parenchymal enhancement, and at the 12 o'clock position 4 cm from the nipple there was a heterogeneously enhancing irregular mass measuring 3.4 in AP dimension x 2.8 cm ML and 2.8 cm SI corresponding to the known biopsy-proven malignancy in the left breast.  In the left breast at the 1 o'clock position 2 cm from the nipple there was also a heterogeneously enhancing oval mass measuring 1.3 cm in maximal diameter.  This likely corresponds to the second biopsy-proven malignancy.  Together, the full extent of the disease spans a total of 5.2 cm AP x 4.4 cm ML, BI-RADS 6.     The pathology done 1/3/2022 showed invasive lobular carcinoma, [place] grade 1/3 lobular carcinoma in situ, classic type, estrogen receptor positive in greater than  70% of the cells and progesterone receptor positive in greater than 90% of cells, and HER2 FISH was nonamplified.  In part B in the left breast at the 1 o'clock position 2 cm from the nipple, ultrasound-guided needle biopsy showed invasive lobular carcinoma, [place] grade 1/3 lobular carcinoma in situ was present, classic subtype, estrogen receptors positive in greater than 70% of the cells, progesterone receptors positive in greater than 90% of cells, and HER2 was nonamplified, Ki-67 20-25%.       Sammie reports that the breast mass had been there for approximately 1 month before she was seen for evaluation.      She has worked in the past as a personal care assistant and lives in the [facility].       PAST MEDICAL HISTORY:  There is no history of breast cancer in the past.  No history of breast cancer surgery. She has no history of radiation therapy in the past or radiation exposure.  No history of prior tumor of any kind. She denies heart problems, heart attack, breathing problems, blood clots, seizures, peptic ulcer disease, osteoporosis or bone fractures.  She does report a history of arthritis.     FAMILY HISTORY:  Positive for breast cancer in a paternal aunt who was diagnosed with breast cancer at an old age.     Her father has a history of stomach cancer.  There is no family history of pancreatic cancer, melanoma, lung cancer or ovarian cancer.     No history of male breast cancer.     ALLERGIES:  No history of drug allergies.  She denies allergies to seafood, iodine, or contrast dye.     PAST MENSTRUAL HISTORY:  She has been pregnant 3 times with 2 live births at age 29 and 31 and 1 miscarriage.  Age at first menstrual period was 14.  She did have a total abdominal hysterectomy and bilateral salpingo-oophorectomy performed in 2003 for endometriosis.  She has no history of hormone replacement therapy.  No history of oral contraceptives.     HABITS: She denies tobacco history.  She denies alcohol history  and drinks alcohol only occasionally.     PAST MEDICAL HISTORY:  Past medical history is also remarkable for type 2 diabetes, and she was begun on metformin 2 years ago.  She also has a history of varicose veins and a history of a lipoma resected from her left leg.      Chronic Hepatitis B.  Hepatitis C negative.  HIV negative.      Prior COVID vaccination x 3.      GERM LINE GENETICS: INVITAE testing of 47 genes was negative.      TREATMENT HISTORY:  A. MammaPrint showed low risk.  B. Left mastectomy and axillary lymph node sampling.   A. Lymph node, LEFT axillary, excision:  -One benign lymph node (0/1)  B. LEFT breast, skin-sparing mastectomy:  -INVASIVE BREAST CARCINOMA, MIXED INVASIVE LOBULAR CARCINOMA (predominant component) and INVASIVE DUCTAL CARCINOMA (minor component), KALPESH GRADE 3, size 5.5 cm, 12:00, upper outer quadrant and lower outer quadrant  -Ductal carcinoma in situ (DCIS), nuclear grade 2, cribriform and solid type(s), with focal necrosis  -DCIS is admixed with invasive carcinoma, and comprises a minor component (<5%) of the tumor volume   -Lobular carcinoma in situ (LCIS), predominantly classic type (admixed with invasive carcinoma and beyond invasive carcinoma) and focal pleomorphic type (size 2 mm, adjacent to invasive carcinoma)  -Margins are uninvolved by invasive carcinoma  -Invasive carcinoma is 2.5 mm from the nearest (anterior) margin, and is > 5 mm from the posterior margin  -Margins are uninvolved by DCIS and pleomorphic LCIS  -DCIS and pleomorphic LCIS are > 5 mm from the nearest (anterior) margin  -Benign nipple and skin   -Other findings: fibrocystic change (including microcysts with apocrine metaplasia) and columnar cell change  -Calcifications associated with DCIS, LCIS, and benign ducts and acini  -Prior core biopsy site changes (two biopsy sites identified)  -Invasive carcinoma is estrogen receptor positive (>70%, strong intensity) and progesterone receptor positive (>90%,  "strong intensity) by immunohistochemistry and is HER2 non-amplified (group 5) by FISH (performed on prior core biopsies, see report [id number], specimens A and B)  DCIS and LCIS present.  -Margins are uninvolved by invasive carcinoma or LCIS.   -Invasive carcinoma is estrogen receptor positive (>70%, strong intensity) and progesterone receptor positive (>90%, strong intensity) by immunohistochemistry and is HER2 non-amplified (group 5) by FISH (performed on prior core biopsies, see report [id number], specimens A and B) Ki-67 immunohistochemistry (MIB[facility] from [facility] was performed on invasive carcinoma in the LEFT mastectomy specimen.  They report Ki-67 proliferative index of 20-25% (block B5) and 20% (block B27).   C. Staging:  pT3 Nx because lymph node is not the sentinel node.    D. She did not want radiation, and risk:benefit did not favor treatment.  E. Letrozole begun 4-5-22.   F. Left TRAM flap reconstruction healed as of 12-27-22 but she has some concerns.   G. Adjuvant Zometa 12-27-22 but not tolerated. She did have a reaction to adjuvant Zometa and it cannot be given any further.   H. Letrozole Changed to Anastrozole due to arthralgias      INTERVAL HISTORY:  Ramona returns to clinic today for an acute visit.  Her daughter messaged triage to report she found a new lump in her left chest wall above her mastectomy scar.  -She noticed this a couple of weeks ago.  -She has no other breast concerns.  -She continues to do well on her aromatase inhibitor.    REVIEW OF SYSTEMS:  A 10-point review of systems was otherwise negative.       PHYSICAL EXAMINATION:    VITAL SIGNS: Blood pressure 115/61, pulse 76, temperature 98.1  F (36.7  C), resp. rate 16, height 1.61 m (5' 3.39\"), weight 98.9 kg (218 lb 1.6 oz), SpO2 100%, not currently breastfeeding.  GENERAL: Very pleasant 68-year-old female who is alert, oriented, and in no acute distress today.  HEENT:  No alopecia.    BREASTS: Left breast at " 12:00 in the skin of her chest wall above her breast is a small 6 x 4 mm soft, oval mobile lesion.  LUNGS: She is speaking in full, fluid sentences with no cough noted.  ABDOMEN:  Soft, nondistended.  EXTREMITIES:  Without edema.  PSYCH: Affect appropriate.     LABORATORY DATA:    No new labs to review today.    IMAGING:  Ultrasound today: Formal read pending, but discussed with radiologist that this is consistent with fat necrosis.      ASSESSMENT AND PLAN:  Stage IIB, T3 N0 MX, invasive lobular carcinoma of the upper outer quadrant of the left breast which is multifocal grade 1, ER positive in greater than 70% of cells, OH positive in greater than 90% of cells and HER2 non-amplified:  -Post left mastectomy with TRAM flap.   -Right digital screening mammogram done on 1/30/2024 was BI-RADS 1 negative.  She is due for repeat mammogram in January 2025.  -Left chest wall ultrasound today without any concerns per radiologist, with the mass she is feeling consistent with fat necrosis.  We do review when to call for any other concerning findings such as masses, overlying skin changes, redness, warmth, and she states understanding.  -Started adjuvant hormonal therapy with letrozole, which she tolerated well until 1/3/2024.  After discussion with Dr. Cisneros on that date, she was changed to anastrozole.  -She continues to tolerate anastrozole very well and has no new concerns today.  -Her arthralgias and neuralgias have improved as well.  -She will continue and follow-up with Dr. Cisneros as scheduled in July.  She is due for Prolia in July as well.    Arthralgias:  -Changed aromatase inhibitor as above.  These have improved, and she is pleased    Bone health:  Not discussed today at this acute visit.  She does have Prolia scheduled in July and is aware of this appointment.  -Adjuvant Zometa was not tolerated.  -She was changed to Prolia every 6 months, last dose administered on 1/30/2024.  Due in July.  -She and Dr. Cisneros  discussed that the purpose of Prolia is to maintain bones density in the setting of aromatase inhibitor, but by itself would not expect to have adjuvant benefit in contrast to Zometa.  -Her most recent DEXA scan from 9/29/23 showed a most valid negative T-score of -1.7.  She would be due for repeat imaging in September 2025.    EFRAIN Ching, CNP  [facility] [place] [id number] [phone]    22 minutes spent on the date of the encounter doing chart review, review of test results, interpretation of tests, patient visit, and documentation and discussion with radiology.

## 2024-03-28 NOTE — PROGRESS NOTES
Sammie Ferrer is seen for follow-up for a diagnosis of a stage IIB, T3 N0 MX, invasive lobular carcinoma of the left breast.  She self-palpated a lump in the upper outer quadrant of the left breast.  She underwent evaluation with a diagnostic mammogram and ultrasound, which was BI-RADS 4, showing in the upper outer quadrant of the left breast irregularly shaped hypoechoic mass at the 12 o'clock position 4 cm from the nipple on the left.  This mass measured 2.3 x 2.2 x 1.5 cm and accounted for the patient's area of palpable concern. Additionally, at the 1 o'clock position 2 cm from the nipple in the left breast, there is a second irregularly shaped hypoechoic mass with indistinct margins measuring 1.3 x 1.1 x 0.8 cm.  She also underwent a breast MRI which showed in the right breast mild background parenchymal enhancement and no suspicious areas of enhancement or lymphadenopathy.  In the left breast there was mild background parenchymal enhancement, and at the 12 o'clock position 4 cm from the nipple there was a heterogeneously enhancing irregular mass measuring 3.4 in AP dimension x 2.8 cm ML and 2.8 cm SI corresponding to the known biopsy-proven malignancy in the left breast.  In the left breast at the 1 o'clock position 2 cm from the nipple there was also a heterogeneously enhancing oval mass measuring 1.3 cm in maximal diameter.  This likely corresponds to the second biopsy-proven malignancy.  Together, the full extent of the disease spans a total of 5.2 cm AP x 4.4 cm ML, BI-RADS 6.     The pathology done 1/3/2022 showed invasive lobular carcinoma, Cathy grade 1/3 lobular carcinoma in situ, classic type, estrogen receptor positive in greater than 70% of the cells and progesterone receptor positive in greater than 90% of cells, and HER2 FISH was nonamplified.  In part B in the left breast at the 1 o'clock position 2 cm from the nipple, ultrasound-guided needle biopsy showed invasive lobular carcinoma,  [place] grade 1/3 lobular carcinoma in situ was present, classic subtype, estrogen receptors positive in greater than 70% of the cells, progesterone receptors positive in greater than 90% of cells, and HER2 was nonamplified, Ki-67 20-25%.       Sammie reports that the breast mass had been there for approximately 1 month before she was seen for evaluation.      She has worked in the past as a personal care assistant and lives in the [facility].       PAST MEDICAL HISTORY:  There is no history of breast cancer in the past.  No history of breast cancer surgery. She has no history of radiation therapy in the past or radiation exposure.  No history of prior tumor of any kind. She denies heart problems, heart attack, breathing problems, blood clots, seizures, peptic ulcer disease, osteoporosis or bone fractures.  She does report a history of arthritis.     FAMILY HISTORY:  Positive for breast cancer in a paternal aunt who was diagnosed with breast cancer at an old age.     Her father has a history of stomach cancer.  There is no family history of pancreatic cancer, melanoma, lung cancer or ovarian cancer.     No history of male breast cancer.     ALLERGIES:  No history of drug allergies.  She denies allergies to seafood, iodine, or contrast dye.     PAST MENSTRUAL HISTORY:  She has been pregnant 3 times with 2 live births at age 29 and 31 and 1 miscarriage.  Age at first menstrual period was 14.  She did have a total abdominal hysterectomy and bilateral salpingo-oophorectomy performed in 2003 for endometriosis.  She has no history of hormone replacement therapy.  No history of oral contraceptives.     HABITS: She denies tobacco history.  She denies alcohol history and drinks alcohol only occasionally.     PAST MEDICAL HISTORY:  Past medical history is also remarkable for type 2 diabetes, and she was begun on metformin 2 years ago.  She also has a history of varicose veins and a history of a lipoma resected from her  left leg.      Chronic Hepatitis B.  Hepatitis C negative.  HIV negative.      Prior COVID vaccination x 3.      GERM LINE GENETICS: INVITAE testing of 47 genes was negative.      TREATMENT HISTORY:  A. MammaPrint showed low risk.  B. Left mastectomy and axillary lymph node sampling.   A. Lymph node, LEFT axillary, excision:  -One benign lymph node (0/1)  B. LEFT breast, skin-sparing mastectomy:  -INVASIVE BREAST CARCINOMA, MIXED INVASIVE LOBULAR CARCINOMA (predominant component) and INVASIVE DUCTAL CARCINOMA (minor component), KALPESH GRADE 3, size 5.5 cm, 12:00, upper outer quadrant and lower outer quadrant  -Ductal carcinoma in situ (DCIS), nuclear grade 2, cribriform and solid type(s), with focal necrosis  -DCIS is admixed with invasive carcinoma, and comprises a minor component (<5%) of the tumor volume   -Lobular carcinoma in situ (LCIS), predominantly classic type (admixed with invasive carcinoma and beyond invasive carcinoma) and focal pleomorphic type (size 2 mm, adjacent to invasive carcinoma)  -Margins are uninvolved by invasive carcinoma  -Invasive carcinoma is 2.5 mm from the nearest (anterior) margin, and is > 5 mm from the posterior margin  -Margins are uninvolved by DCIS and pleomorphic LCIS  -DCIS and pleomorphic LCIS are > 5 mm from the nearest (anterior) margin  -Benign nipple and skin   -Other findings: fibrocystic change (including microcysts with apocrine metaplasia) and columnar cell change  -Calcifications associated with DCIS, LCIS, and benign ducts and acini  -Prior core biopsy site changes (two biopsy sites identified)  -Invasive carcinoma is estrogen receptor positive (>70%, strong intensity) and progesterone receptor positive (>90%, strong intensity) by immunohistochemistry and is HER2 non-amplified (group 5) by FISH (performed on prior core biopsies, see report [id number], specimens A and B)  DCIS and LCIS present.  -Margins are uninvolved by invasive carcinoma or LCIS.   -Invasive  "carcinoma is estrogen receptor positive (>70%, strong intensity) and progesterone receptor positive (>90%, strong intensity) by immunohistochemistry and is HER2 non-amplified (group 5) by FISH (performed on prior core biopsies, see report [id number], specimens A and B) Ki-67 immunohistochemistry (MIB-1, pharmDx, [employer]) from [facility] was performed on invasive carcinoma in the LEFT mastectomy specimen.  They report Ki-67 proliferative index of 20-25% (block B5) and 20% (block B27).   C. Staging:  pT3 Nx because lymph node is not the sentinel node.    D. She did not want radiation, and risk:benefit did not favor treatment.  E. Letrozole begun 4-5-22.   F. Left TRAM flap reconstruction healed as of 12-27-22 but she has some concerns.   G. Adjuvant Zometa 12-27-22 but not tolerated. She did have a reaction to adjuvant Zometa and it cannot be given any further.   H. Letrozole Changed to Anastrozole due to arthralgias      INTERVAL HISTORY:  Ramona returns to clinic today for an acute visit.  Her daughter messaged triage to report she found a new lump in her left chest wall above her mastectomy scar.  -She noticed this a couple of weeks ago.  -She has no other breast concerns.  -She continues to do well on her aromatase inhibitor.    REVIEW OF SYSTEMS:  A 10-point review of systems was otherwise negative.       PHYSICAL EXAMINATION:    VITAL SIGNS: Blood pressure 115/61, pulse 76, temperature 98.1  F (36.7  C), resp. rate 16, height 1.61 m (5' 3.39\"), weight 98.9 kg (218 lb 1.6 oz), SpO2 100%, not currently breastfeeding.  GENERAL: Very pleasant 68-year-old female who is alert, oriented, and in no acute distress today.  HEENT:  No alopecia.    BREASTS: Left breast at 12:00 in the skin of her chest wall above her breast is a small 6 x 4 mm soft, oval mobile lesion.  LUNGS: She is speaking in full, fluid sentences with no cough noted.  ABDOMEN:  Soft, nondistended.  EXTREMITIES:  Without edema.  PSYCH: Affect " appropriate.     LABORATORY DATA:    No new labs to review today.    IMAGING:  Ultrasound today: Formal read pending, but discussed with radiologist that this is consistent with fat necrosis.      ASSESSMENT AND PLAN:  Stage IIB, T3 N0 MX, invasive lobular carcinoma of the upper outer quadrant of the left breast which is multifocal grade 1, ER positive in greater than 70% of cells, AL positive in greater than 90% of cells and HER2 non-amplified:  -Post left mastectomy with TRAM flap.   -Right digital screening mammogram done on 1/30/2024 was BI-RADS 1 negative.  She is due for repeat mammogram in January 2025.  -Left chest wall ultrasound today without any concerns per radiologist, with the mass she is feeling consistent with fat necrosis.  We do review when to call for any other concerning findings such as masses, overlying skin changes, redness, warmth, and she states understanding.  -Started adjuvant hormonal therapy with letrozole, which she tolerated well until 1/3/2024.  After discussion with Dr. Cisneros on that date, she was changed to anastrozole.  -She continues to tolerate anastrozole very well and has no new concerns today.  -Her arthralgias and neuralgias have improved as well.  -She will continue and follow-up with Dr. Cisneros as scheduled in July.  She is due for Prolia in July as well.    Arthralgias:  -Changed aromatase inhibitor as above.  These have improved, and she is pleased    Bone health:  Not discussed today at this acute visit.  She does have Prolia scheduled in July and is aware of this appointment.  -Adjuvant Zometa was not tolerated.  -She was changed to Prolia every 6 months, last dose administered on 1/30/2024.  Due in July.  -She and Dr. Cisneros discussed that the purpose of Prolia is to maintain bones density in the setting of aromatase inhibitor, but by itself would not expect to have adjuvant benefit in contrast to Zometa.  -Her most recent DEXA scan from 9/29/23 showed a most valid  negative T-score of -1.7.  She would be due for repeat imaging in September 2025.    EFRAIN Ching, CNP  [facility]  [address] [place] [id number]  [phone]    22 minutes spent on the date of the encounter doing chart review, review of test results, interpretation of tests, patient visit, and documentation and discussion with radiology.      ADDENDUM:  US report:  Narrative & Impression  Examinations: US BREAST LEFT LIMITED 1-3 QUADRANTS, 3/28/2024 2:41 PM     Comparisons: 4/20/2023, 12/27/2022     History: History of left breast cancer, s/p mastectomy with TRAM flap.  Now with new palpable lump in the reconstructed left breast.     Findings:     Targeted left breast ultrasound evaluation was performed by the  technologist and radiologist. In the area of the palpable lumps in the  upper inner quadrant of the left breast, superior to the surgical  scar, there are multiple areas of benign fat necrosis. No suspicious  findings.                                                                      IMPRESSION: BI-RADS CATEGORY: 2 - Benign.     RECOMMENDED FOLLOW-UP: Routine yearly mammography beginning at age 40  or as discussed with your provider.     The finding and recommendation were discussed with the patient at the  time of evaluation.     The patient was given the results of the examination.     I have personally reviewed the examination and initial interpretation  and I agree with the findings.

## 2024-04-12 ENCOUNTER — TELEPHONE (OUTPATIENT)
Dept: INTERNAL MEDICINE | Facility: CLINIC | Age: 69
End: 2024-04-12
Payer: COMMERCIAL

## 2024-04-12 DIAGNOSIS — G47.00 INSOMNIA, UNSPECIFIED TYPE: ICD-10-CM

## 2024-04-12 RX ORDER — ZOLPIDEM TARTRATE 5 MG/1
5 TABLET ORAL
Qty: 15 TABLET | Refills: 0 | Status: SHIPPED | OUTPATIENT
Start: 2024-04-12 | End: 2024-05-08

## 2024-04-12 NOTE — TELEPHONE ENCOUNTER
Pt is due for DM/HTN appt and lipid and dm labs this month. Pt cancelled her appt on 04/10/24. Pt needs appt in clinic,  Please schedule on any of my same-day or approval only slot.   Ambien refilled at this time, no further refills until appt. Pl advise pt

## 2024-04-17 ENCOUNTER — OFFICE VISIT (OUTPATIENT)
Dept: OPTOMETRY | Facility: CLINIC | Age: 69
End: 2024-04-17
Payer: COMMERCIAL

## 2024-04-17 DIAGNOSIS — H04.129 DRY EYE: ICD-10-CM

## 2024-04-17 DIAGNOSIS — H10.10 SEASONAL ALLERGIC CONJUNCTIVITIS: ICD-10-CM

## 2024-04-17 DIAGNOSIS — H25.13 NUCLEAR SCLEROSIS OF BOTH EYES: ICD-10-CM

## 2024-04-17 DIAGNOSIS — H10.13 ALLERGIC CONJUNCTIVITIS, BILATERAL: Primary | ICD-10-CM

## 2024-04-17 DIAGNOSIS — H02.889 MEIBOMIAN GLAND DYSFUNCTION: ICD-10-CM

## 2024-04-17 DIAGNOSIS — H52.03 HYPEROPIA WITH ASTIGMATISM, BILATERAL: ICD-10-CM

## 2024-04-17 DIAGNOSIS — E11.9 TYPE 2 DIABETES MELLITUS WITHOUT RETINOPATHY (H): ICD-10-CM

## 2024-04-17 DIAGNOSIS — H52.203 HYPEROPIA WITH ASTIGMATISM, BILATERAL: ICD-10-CM

## 2024-04-17 PROCEDURE — 92014 COMPRE OPH EXAM EST PT 1/>: CPT | Performed by: OPTOMETRIST

## 2024-04-17 PROCEDURE — 92015 DETERMINE REFRACTIVE STATE: CPT | Performed by: OPTOMETRIST

## 2024-04-17 RX ORDER — PREDNISOLONE ACETATE 10 MG/ML
1 SUSPENSION/ DROPS OPHTHALMIC 4 TIMES DAILY
Qty: 2.5 ML | Refills: 1 | Status: SHIPPED | OUTPATIENT
Start: 2024-04-17 | End: 2024-04-24

## 2024-04-17 RX ORDER — OLOPATADINE HYDROCHLORIDE 1 MG/ML
1 SOLUTION/ DROPS OPHTHALMIC 2 TIMES DAILY
Qty: 2.5 ML | Refills: 11 | Status: SHIPPED | OUTPATIENT
Start: 2024-04-17

## 2024-04-17 ASSESSMENT — REFRACTION_MANIFEST
OS_AXIS: 080
OS_SPHERE: +2.75
OS_CYLINDER: +2.00
OD_CYLINDER: +2.00
OD_SPHERE: +4.75
OD_SPHERE: +3.50
OS_CYLINDER: +1.75
OD_AXIS: 105
OD_ADD: +2.50
METHOD_AUTOREFRACTION: 1
OS_ADD: +2.50
OD_AXIS: 094
OS_AXIS: 063
OD_CYLINDER: +2.00
OS_SPHERE: +3.00

## 2024-04-17 ASSESSMENT — REFRACTION_WEARINGRX
SPECS_TYPE: PAL
OD_CYLINDER: +2.25
OD_AXIS: 100
OD_SPHERE: +2.75
OS_ADD: +2.50
OS_CYLINDER: +2.00
OS_SPHERE: +2.50
OD_ADD: +2.50
OS_AXIS: 085

## 2024-04-17 ASSESSMENT — KERATOMETRY
OD_AXISANGLE_DEGREES: 94
OS_AXISANGLE2_DEGREES: 160
OD_K2POWER_DIOPTERS: 44.87
OS_K1POWER_DIOPTERS: 41.12
OS_K2POWER_DIOPTERS: 44.87
OD_K1POWER_DIOPTERS: 41.87
OS_AXISANGLE_DEGREES: 70
OD_AXISANGLE2_DEGREES: 4

## 2024-04-17 ASSESSMENT — VISUAL ACUITY
OD_CC: 20/60
OD_CC: 20/40
OS_CC: 20/80
OS_CC: 20/30
METHOD: SNELLEN - LINEAR
CORRECTION_TYPE: GLASSES

## 2024-04-17 ASSESSMENT — CONF VISUAL FIELD
OS_SUPERIOR_NASAL_RESTRICTION: 0
METHOD: COUNTING FINGERS
OS_INFERIOR_NASAL_RESTRICTION: 0
OD_NORMAL: 1
OD_SUPERIOR_TEMPORAL_RESTRICTION: 0
OS_INFERIOR_TEMPORAL_RESTRICTION: 0
OD_INFERIOR_NASAL_RESTRICTION: 0
OD_SUPERIOR_NASAL_RESTRICTION: 0
OS_SUPERIOR_TEMPORAL_RESTRICTION: 0
OD_INFERIOR_TEMPORAL_RESTRICTION: 0
OS_NORMAL: 1

## 2024-04-17 ASSESSMENT — CUP TO DISC RATIO
OS_RATIO: 0.30
OD_RATIO: 0.30

## 2024-04-17 ASSESSMENT — EXTERNAL EXAM - LEFT EYE: OS_EXAM: NORMAL

## 2024-04-17 ASSESSMENT — TONOMETRY
OS_IOP_MMHG: 16
IOP_METHOD: APPLANATION
OD_IOP_MMHG: 16

## 2024-04-17 ASSESSMENT — EXTERNAL EXAM - RIGHT EYE: OD_EXAM: NORMAL

## 2024-04-17 NOTE — LETTER
4/17/2024         RE: Ramona Ferrer  [address]  [place] [address]        Dear Colleague,    Thank you [facility] referring your patient, [facility] Ferrer[facility]. Please see a copy of my visit note below.    Chief Complaint   Patient presents with     Annual Eye Exam      Can not read with new glasses   Last Eye Exam: 09/2023  Dilated Previously: Yes, side effects of dilation explained today    What are you currently using to see?  Glasses - went back to old glasses - got new ones couple months ago and can't read in them        Distance Vision Acuity: Noticed gradual change in both eyes in glasses     Near Vision Acuity: Not satisfied in glasses     Eye Comfort: itchy  Do you use eye drops? : Yes: Acular 1-2 times a day - pt is requesting pred forte and wants to know of she can take that too      Hazel De - Optometric Assistant           Medical, surgical and family histories reviewed and updated 4/17/2024.       OBJECTIVE: See Ophthalmology exam    ASSESSMENT:    ICD-10-CM    1. Allergic conjunctivitis, bilateral  H10.13 EYE EXAM (SIMPLE-NONBILLABLE)     REFRACTION     olopatadine (PATANOL) 0.1 % ophthalmic solution      2. Seasonal allergic conjunctivitis  H10.10 prednisoLONE acetate (PRED FORTE) 1 % ophthalmic suspension      3. Hyperopia with astigmatism, bilateral  H52.03 EYE EXAM (SIMPLE-NONBILLABLE)    H52.203 REFRACTION      4. Nuclear sclerosis of both eyes  H25.13       5. Meibomian gland dysfunction  H02.889       6. Dry eye  H04.129       7. Type 2 diabetes mellitus without retinopathy (H)  E11.9           PLAN:   Would like to wait for CE  Artificial tears  as needed   See allergy plan    Ana Ludwig OD     Again, thank you for allowing me to participate in the care of your patient.        Sincerely,        Ana Ludwig, OD

## 2024-04-17 NOTE — PROGRESS NOTES
Chief Complaint   Patient presents with    Annual Eye Exam      Can not read with new glasses   Last Eye Exam: 09/2023  Dilated Previously: Yes, side effects of dilation explained today    What are you currently using to see?  Glasses - went back to old glasses - got new ones couple months ago and can't read in them        Distance Vision Acuity: Noticed gradual change in both eyes in glasses     Near Vision Acuity: Not satisfied in glasses     Eye Comfort: itchy  Do you use eye drops? : Yes: Acular 1-2 times a day - pt is requesting pred forte and wants to know of she can take that too      Hazel De - Optometric Assistant           Medical, surgical and family histories reviewed and updated 4/17/2024.       OBJECTIVE: See Ophthalmology exam    ASSESSMENT:    ICD-10-CM    1. Allergic conjunctivitis, bilateral  H10.13 EYE EXAM (SIMPLE-NONBILLABLE)     REFRACTION     olopatadine (PATANOL) 0.1 % ophthalmic solution      2. Seasonal allergic conjunctivitis  H10.10 prednisoLONE acetate (PRED FORTE) 1 % ophthalmic suspension      3. Hyperopia with astigmatism, bilateral  H52.03 EYE EXAM (SIMPLE-NONBILLABLE)    H52.203 REFRACTION      4. Nuclear sclerosis of both eyes  H25.13       5. Meibomian gland dysfunction  H02.889       6. Dry eye  H04.129       7. Type 2 diabetes mellitus without retinopathy (H)  E11.9           PLAN:   Would like to wait for CE  Artificial tears  as needed   See allergy plan    Ana Ludwig OD

## 2024-04-17 NOTE — PATIENT INSTRUCTIONS
Prednisolone drops four times daily for one week then two times daily for one week, start patanol two times daily   Discontinue prednisolone after second week     May add acular to patanol two times daily also if needed    Preservative free artificial tears  as needed     Lumify for redness as needed daily after the prednisolone

## 2024-04-18 ENCOUNTER — OFFICE VISIT (OUTPATIENT)
Dept: INTERNAL MEDICINE | Facility: CLINIC | Age: 69
End: 2024-04-18
Payer: COMMERCIAL

## 2024-04-18 VITALS
BODY MASS INDEX: 38.09 KG/M2 | DIASTOLIC BLOOD PRESSURE: 76 MMHG | TEMPERATURE: 98 F | SYSTOLIC BLOOD PRESSURE: 135 MMHG | OXYGEN SATURATION: 98 % | RESPIRATION RATE: 18 BRPM | HEIGHT: 63 IN | WEIGHT: 215 LBS | HEART RATE: 82 BPM

## 2024-04-18 DIAGNOSIS — Z02.89 ENCOUNTER FOR COMPLETION OF FORM WITH PATIENT: ICD-10-CM

## 2024-04-18 DIAGNOSIS — I10 PRIMARY HYPERTENSION: ICD-10-CM

## 2024-04-18 DIAGNOSIS — E03.9 HYPOTHYROIDISM, UNSPECIFIED TYPE: ICD-10-CM

## 2024-04-18 DIAGNOSIS — M48.061 SPINAL STENOSIS OF LUMBAR REGION, UNSPECIFIED WHETHER NEUROGENIC CLAUDICATION PRESENT: ICD-10-CM

## 2024-04-18 DIAGNOSIS — E11.9 TYPE 2 DIABETES MELLITUS WITHOUT RETINOPATHY (H): Primary | ICD-10-CM

## 2024-04-18 DIAGNOSIS — Z00.00 ENCOUNTER FOR MEDICARE ANNUAL WELLNESS EXAM: ICD-10-CM

## 2024-04-18 DIAGNOSIS — E66.01 MORBID OBESITY (H): ICD-10-CM

## 2024-04-18 PROBLEM — R73.03 PREDIABETES: Status: RESOLVED | Noted: 2021-12-16 | Resolved: 2024-04-18

## 2024-04-18 LAB — HBA1C MFR BLD: 6.2 % (ref 0–5.6)

## 2024-04-18 PROCEDURE — 36415 COLL VENOUS BLD VENIPUNCTURE: CPT | Performed by: INTERNAL MEDICINE

## 2024-04-18 PROCEDURE — 99214 OFFICE O/P EST MOD 30 MIN: CPT | Mod: 25 | Performed by: INTERNAL MEDICINE

## 2024-04-18 PROCEDURE — 82570 ASSAY OF URINE CREATININE: CPT | Performed by: INTERNAL MEDICINE

## 2024-04-18 PROCEDURE — 80061 LIPID PANEL: CPT | Performed by: INTERNAL MEDICINE

## 2024-04-18 PROCEDURE — G0438 PPPS, INITIAL VISIT: HCPCS | Performed by: INTERNAL MEDICINE

## 2024-04-18 PROCEDURE — 83036 HEMOGLOBIN GLYCOSYLATED A1C: CPT | Performed by: INTERNAL MEDICINE

## 2024-04-18 PROCEDURE — 84443 ASSAY THYROID STIM HORMONE: CPT | Performed by: INTERNAL MEDICINE

## 2024-04-18 PROCEDURE — 82043 UR ALBUMIN QUANTITATIVE: CPT | Performed by: INTERNAL MEDICINE

## 2024-04-18 RX ORDER — LEVOTHYROXINE SODIUM 112 UG/1
112 TABLET ORAL DAILY
Qty: 90 TABLET | Refills: 1 | Status: SHIPPED | OUTPATIENT
Start: 2024-04-18

## 2024-04-18 RX ORDER — RESPIRATORY SYNCYTIAL VIRUS VACCINE 120MCG/0.5
0.5 KIT INTRAMUSCULAR ONCE
Qty: 1 EACH | Refills: 0 | Status: CANCELLED | OUTPATIENT
Start: 2024-04-18 | End: 2024-04-18

## 2024-04-18 RX ORDER — METFORMIN HCL 500 MG
1000 TABLET, EXTENDED RELEASE 24 HR ORAL
Qty: 180 TABLET | Refills: 1 | Status: SHIPPED | OUTPATIENT
Start: 2024-04-18

## 2024-04-18 RX ORDER — LISINOPRIL 10 MG/1
10 TABLET ORAL DAILY
Qty: 90 TABLET | Refills: 1 | Status: SHIPPED | OUTPATIENT
Start: 2024-04-18

## 2024-04-18 SDOH — HEALTH STABILITY: PHYSICAL HEALTH: ON AVERAGE, HOW MANY DAYS PER WEEK DO YOU ENGAGE IN MODERATE TO STRENUOUS EXERCISE (LIKE A BRISK WALK)?: 3 DAYS

## 2024-04-18 ASSESSMENT — SOCIAL DETERMINANTS OF HEALTH (SDOH): HOW OFTEN DO YOU GET TOGETHER WITH FRIENDS OR RELATIVES?: TWICE A WEEK

## 2024-04-18 NOTE — COMMUNITY RESOURCES LIST (ENGLISH)
April 18, 2024           YOUR PERSONALIZED LIST OF SERVICES & PROGRAMS           & SHELTER    Housing      [address] [place] [address] - Violence Prevention Services - Domestic Violence Shelter  [place] [address] (Distance: 4.7 miles)  Phone: [phone]) [phone]  Website: https://[address] [place], [place] [address]Sefaira.[email or web address]/  Language: English      [facility] - Shelter for individuals  [address] [place] [address] (Distance: 7.0 miles)  Language: English, Tajik  Fee: Free      [facility] - Housing Stabilization Services  Phone: (958) 895-1764  Website: https://[employer]/Housing-Stabilization.html  Language: English  Hours: Mon 9:00 AM - 5:00 PM Tue 9:00 AM - 5:00 PM Wed 9:00 AM - 5:00 PM Thu 9:00 AM - 5:00 PM Fri 9:00 AM - 5:00 PM  Fee: Insurance  Accessibility: Deaf or hard of hearing, Translation services    Case Management      [facility] - Housing search assistance  [address] [place] [address] [address] [address] (Distance: 2.0 miles)  Phone: ([phone]) [phone]  Language: English  Fee: Sliding scale, Self pay      [facility] - Housing search assistance  [address] [place] [address] (Distance: 12.2 miles)  Phone: (508) [phone]  Language: English  Fee: Free, Sliding scale, Insurance  Accessibility: Translation services, [place] accessible, Blind accommodation, Deaf or hard of hearing      [facility] - Housing Stabilization Services  Phone: (383) 919-8046  Website: https://homebasemn.com/  Language: English  Hours: Mon 8:00 AM - 4:00 PM Tue 8:00 AM - 4:00 PM Wed 8:00 AM - 4:00 PM Thu 8:00 AM - 4:00 PM Fri 8:00 AM - 4:00 PM  Fee: Free  Accessibility: Blind accommodation, Deaf or hard of hearing  Transportation Options: Free transportation    Drop-In Services      [facility] [place] - [facility]  [address] [place] [address] (Distance: 8.9 miles)  Website: https://www.amn.info/elders   Language: English      [facility] [facility] [employer]  [address] [facility] [address] [place] [address] (Distance: 16.9 miles)  Phone: (668) 649-3788  Language: English  Fee: Free      [facility]DRY [facility]  Website: http://www.laundrylove.org               IMPORTANT NUMBERS & WEBSITES        Emergency Services  911  .   [facility]  211 http://211unitedway.org  .   Poison Control  (275) 455-9564 http://[place]poison.org http://wisconsinpoison.org  .     Suicide and Crisis Lifeline  988 http://988Grand [place]ine.org  .   Childhelp [place] Child Abuse Hotline  710.600.5635 http://Childhelphotline.org   .   National Sexual Assault Hotline  (537) 254-6424 (HOPE) http://ConteXtream.org   .     [place] Runaway Safeline  (375) 423-4436 (RUNAWAY) http://E[employer] Exchange.Ruth Kunstadter â€“ [employer]  .   Pregnancy & Postpartum Support  Call/text 495-598-2721  [place]: http://ppsupportmn.org  [place]: http://World Energy.com/wi  .   Substance Abuse National Helpline ([facility])  145-351-HELP (3186) http://Findtreatment.gov   .                DISCLAIMER: These resources have been generated via the [employer] Platform. [employer] does not endorse any service providers mentioned in this resource list. [employer] does not guarantee that the services mentioned in this resource list will be available to you or will improve your health or wellness.    [facility]

## 2024-04-18 NOTE — PROGRESS NOTES
Preventive Care Visit  [facility]  Eugene Michelle MD, Internal Medicine  Apr 18, 2024      Assessment & Plan     (Z00.00) Encounter for Medicare annual wellness exam  Plan: Recent CBC and CMP from her oncologist reviewed, up-to-date on mammogram, colonoscopy, DEXA, immunizations reviewed discussed Shingrix vaccine with patient patient declines.      (E11.9) Type 2 diabetes mellitus without retinopathy (H)    Plan: Refilled metFORMIN (GLUCOPHAGE XR) 500 MG 24 hr tablet as directed.explained clearly about the medication,insructions and side effects.  Check HEMOGLOBIN A1C, , Albumin Random Urine Quantitative with Creat Ratio, Lipid panel reflex to direct LDL Non-fasting and will discuss statin after lipid results          (E03.9) Hypothyroidism, unspecified type  Plan: TSH WITH FREE T4 REFLEX, refilled levothyroxine (SYNTHROID/LEVOTHROID) 112 MCG tablet as directed.explained clearly about the medication,insructions and side effects.            (I10) Primary hypertension  Comment: Blood pressure stable  Plan: lisinopril (ZESTRIL) 10 MG tablet refilled as directed.explained clearly about the medication,insructions and side effects.  Advised to follow low salt diet and exercise and f/u in 6 mths.         (M48.061) Spinal stenosis of lumbar region, unspecified whether neurogenic claudication present  (Z02.89) Encounter for completion of form with patient  Plan: Handicap parking permit form filled until 12/24 , patient follows up with orthopedics and gets RYLAND and patient scheduling appointment with Ortho and 12/24       (E66.01) Morbid obesity (H)  Plan: -Discussed in detail about Diet,calorie intake,and importance of regular exercise,     History of breast cancer follows up with the oncologist    Patient has been advised of split billing requirements and indicates understanding: Yes  Review of the result(s) of each unique test - A1c, TSH, lipid panel  Prescription drug  management        Counseling  Appropriate preventive services were discussed with this patient, including applicable screening as appropriate for fall prevention, nutrition, physical activity,  weight loss       Subjective   Sammie is a 68 year old, presenting for the following:  Medicare Visit      Health Care Directive  Patient does not have a Health Care Directive or Living Will: Discussed advance care planning with patient; however, patient declined at this time.    HPI      Diabetes Follow-up    How often are you checking your blood sugar? A few times a month -150  What time of day are you checking your blood sugars (select all that apply)?  Before meals  Have you had any blood sugars above 200?  No  Have you had any blood sugars below 70?  No  What symptoms do you notice when your blood sugar is low?  None  What concerns do you have today about your diabetes? None   Do you have any of these symptoms? (Select all that apply)  No numbness or tingling in feet.  No redness, sores or blisters on feet.  No complaints of excessive thirst.  No reports of blurry vision.  No significant changes to weight.      BP Readings from Last 2 Encounters:   04/18/24 135/76   03/28/24 115/61     Hemoglobin A1C (%)   Date Value   11/07/2023 6.0 (H)   04/06/2023 6.3 (H)   06/04/2021 6.3 (H)   12/21/2020 5.9 (H)     LDL Cholesterol Calculated (mg/dL)   Date Value   04/06/2023 75   12/27/2021 59   12/21/2020 71   12/07/2018 71       Hypertension Follow-up    Do you check your blood pressure regularly outside of the clinic? Yes   Are you following a low salt diet? Yes  Are your blood pressures ever more than 140 on the top number (systolic) OR more   than 90 on the bottom number (diastolic), for example 140/90? No    Hypothyroidism Follow-up    Since last visit, patient describes the following symptoms: Weight stable, no hair loss, no skin changes, no constipation, no loose stools      Patient is also requesting handicap parking  permit form filled today, patient had this filled through her orthopedics in December 2023 for 6 months, patient has history of lumbar spinal stenosis/lumbar radiculopathy/facet arthropathy and had epidural steroid injection in December 2023 4/18/2024   General Health   How would you rate your overall physical health? Good   Feel stress (tense, anxious, or unable to sleep) To some extent   (!) STRESS CONCERN      4/18/2024   Nutrition   Diet: Low fat/cholesterol         4/18/2024   Exercise   Days per week of moderate/strenous exercise 3 days         4/18/2024   Social Factors   Frequency of gathering with friends or relatives Twice a week   Worry food won't last until get money to buy more No   Food not last or not have enough money for food? No   Do you have housing?  No   Are you worried about losing your housing? No   Lack of transportation? No   Unable to get utilities (heat,electricity)? No   Want help with housing or utility concern? No   (!) HOUSING CONCERN PRESENT      4/18/2024   Fall Risk   Fallen 2 or more times in the past year? No   Trouble with walking or balance? Yes   Gait Speed Test (Document in seconds) 5   Gait Speed Test Interpretation Less than or equal to 5.00 seconds - PASS    Less than or equal to 5.00 seconds - PASS          4/18/2024   Activities of Daily Living- Home Safety   Needs help with the following daily activites None of the above   Safety concerns in the home Poor lighting         4/18/2024   Dental   Dentist two times every year? Yes         4/18/2024   Hearing Screening   Hearing concerns? None of the above         4/18/2024   Driving Risk Screening   Patient/family members have concerns about driving No         4/18/2024   General Alertness/Fatigue Screening   Have you been more tired than usual lately? (!) YES         4/18/2024   Urinary Incontinence Screening   Bothered by leaking urine in past 6 months No         4/18/2024   TB Screening   Were you born outside of  the [place]? Yes         Today's PHQ-2 Score:       4/18/2024     1:47 PM   PHQ-2 ( 1999 [employer])   Q1: Little interest or pleasure in doing things 0   Q2: Feeling down, depressed or hopeless 0   PHQ-2 Score 0   Q1: Little interest or pleasure in doing things Not at all   Q2: Feeling down, depressed or hopeless Not at all   PHQ-2 Score 0           4/18/2024   Substance Use   Alcohol more than 3/day or more than 7/wk No   Do you have a current opioid prescription? No   How severe/bad is pain from 1 to 10? 6/10 ( back pain) follows up with orthopedics and gets steroid inj to back    Do you use any other substances recreationally? No         Social History     Tobacco Use    Smoking status: Never     Passive exposure: Never    Smokeless tobacco: Never   Vaping Use    Vaping status: Never Used   Substance Use Topics    Alcohol use: Not Currently    Drug use: No           1/30/2024   LAST FHS-7 RESULTS   1st degree relative breast or ovarian cancer No   Any relative bilateral breast cancer No   Any male have breast cancer No   Any ONE woman have BOTH breast AND ovarian cancer No   Any woman with breast cancer before 50yrs No   2 or more relatives with breast AND/OR ovarian cancer No   2 or more relatives with breast AND/OR bowel cancer No        Mammogram 01/24             Reviewed and updated as needed this visit by Provider                    Past Medical History:   Diagnosis Date    Breast cancer (H) 12/2021    Complication of anesthesia     DDD (degenerative disc disease), lumbar     Endometriosis     Hypertension     Hypothyroidism          PONV (postoperative nausea and vomiting)     Seasonal allergies     Spinal stenosis, lumbar      Past Surgical History:   Procedure Laterality Date    COLONOSCOPY      COLONOSCOPY  2/20/2012    Procedure:COLONOSCOPY; COLONOSCOPY ; Surgeon:ARUN KITCHEN; Location: GI    COLONOSCOPY N/A 2/15/2019    Procedure: COMBINED COLONOSCOPY, SINGLE OR MULTIPLE BIOPSY/POLYPECTOMY BY BIOPSY;   Surgeon: Connor Sanderson MD;  Location:  GI    COLONOSCOPY N/A 12/7/2022    Procedure: COLONOSCOPY;  Surgeon: Leventhal, Thomas Michael, MD;  Location: [facility] OR    ESOPHAGOSCOPY, GASTROSCOPY, DUODENOSCOPY (EGD), COMBINED N/A 12/7/2022    Procedure: ESOPHAGOGASTRODUODENOSCOPY, WITH BIOPSY;  Surgeon: Leventhal, Thomas Michael, MD;  Location: [facility] OR    GRAFT FREE VASCULARIZED TRANSVERSE RECTUS ABDOMINIS MYOCUTANEOUS Left 8/25/2022    Procedure: Left breast reconstruction with free abdominal flap,  SPY;  Surgeon: NOHEMI Feliciano MD;  Location: [facility] OR    HC CYSTOURETHROSCOPY W/ URETEROSCOPY &/OR PYELOSCOPY; W/ LITHOTRIPSY      HC TRABECULOPLASTY BY LASER SURGERY      PI OU    HYSTERECTOMY, PAP NO LONGER INDICATED      LASER YAG IRIDOTOMY  8/8/2012    Procedure: LASER YAG IRIDOTOMY;  LEFT EYE YAG LASER PUPIL IRIDOTOMY ;  Surgeon: Dakotah Humphreys MD;  Location:  [facility]    LIGATN/STRIP LONG OR SHORT SAPHEN      x's2    MASTECTOMY PARTIAL WITH SENTINEL NODE Left 3/23/2022    Procedure: LEFT SKIN SPARING MASTECTOMY WITH SENTINEL LYMPH NODE BIOPSY;  Surgeon: Dada Mendiola MD;  Location: [facility] OR    PELVIS LAPAROSCOPY,DX      RECONSTRUCT BREAST, INSERT TISSUE EXPANDER BILATERAL, COMBINED Left 3/23/2022    Procedure: Left breast reconstruction with expander and SPY;  Surgeon: NOHEMI Feliciano MD;  Location: [facility] OR    STRABISMUS SURGERY      [facility] TOTAL ABDOM HYSTERECTOMY  2003    MARYA BSO for stage 4 endometriosis     Current Outpatient Medications   Medication Sig Dispense Refill    anastrozole (ARIMIDEX) 1 MG tablet Take 1 tablet (1 mg) by mouth daily 90 tablet 3    Artificial Tear Solution (SOOTHE XP) SOLN Apply 1 drop to eye 3 times daily 15 mL 8    calcium carbonate (OS-EMMA) 500 MG tablet Take 2 tablets (1,000 mg) by mouth daily 180 tablet 3    calcium carbonate 500 mg, elemental, (OSCAL) 500 MG tablet TAKE TWO TABLETS BY MOUTH DAILY 660 tablet 0    cetirizine (ZYRTEC) 10 MG tablet Take 1 tablet (10 mg) by mouth daily  90 tablet 1    diclofenac (VOLTAREN) 1 % topical gel Apply 2 g topically 3 times daily 100 g 0    hydrOXYzine HCl (ATARAX) 25 MG tablet Take 1 tablet (25 mg) by mouth nightly as needed for itching Do not to drive or operate any machinery while on this med 20 tablet 0    ketorolac (ACULAR) 0.5 % ophthalmic solution Place 1 drop into both eyes 4 times daily 5 mL 6    levothyroxine (SYNTHROID/LEVOTHROID) 112 MCG tablet Take 1 tablet (112 mcg) by mouth daily Profile Rx: patient will contact pharmacy when needed 90 tablet 1    lisinopril (ZESTRIL) 10 MG tablet Take 1 tablet (10 mg) by mouth daily 90 tablet 1    metFORMIN (GLUCOPHAGE XR) 500 MG 24 hr tablet Take 2 tablets (1,000 mg) by mouth daily (with dinner) 180 tablet 1    olopatadine (PATANOL) 0.1 % ophthalmic solution Place 1 drop into both eyes 2 times daily 2.5 mL 11    olopatadine (PATANOL) 0.1 % ophthalmic solution Place 1 drop into both eyes 2 times daily 5 mL 12    Omega-3 Fatty Acids (FISH OIL) 500 MG CAPS Take by mouth daily      prednisoLONE acetate (PRED FORTE) 1 % ophthalmic suspension Apply 1 drop to eye 4 times daily for 7 days 2.5 mL 1    prednisoLONE acetate (PRED FORTE) 1 % ophthalmic suspension Place 1 drop into both eyes as needed for dry eyes      Vitamin D3 50 mcg (2000 units) tablet Take 1 tablet (50 mcg) by mouth every morning 90 tablet 3    zolpidem (AMBIEN) 5 MG tablet TAKE 1 TABLET (5 MG) BY MOUTH NIGHTLY AS NEEDED FOR SLEEP 15 tablet 0     Current providers sharing in care for this patient include:  Patient Care Team:  Eugene Michelle MD as PCP - General (Internal Medicine)  Eugene Michelle MD as Assigned PCP  Kenneth Cisneros MD as Assigned Cancer Care Provider  NOHEMI Feliciano MD as Assigned Surgical Provider  Manolo Carver MD as Assigned Neuroscience Provider  Stella Lawrence RN as Lead Care Coordinator (Primary Care - CC)  Kenneth Cisneros MD as MD (Medical  "Oncology)  Kenneth Sanford MD as MD (Dermatology)  Daniella Shetty OD (Optometry)  Bishop SUSAN Sellers PA-C as Assigned Musculoskeletal Provider    The following health maintenance items are reviewed in Epic and correct as of today:  Health Maintenance   Topic Date Due    URINE DRUG SCREEN  Never done    ANNUAL REVIEW OF HM ORDERS  Never done    HEPATITIS C SCREENING  Never done    ZOSTER IMMUNIZATION (1 of 2) Never done    HEPATITIS A IMMUNIZATION (1 of 2 - Risk 2-dose series) Never done    RSV VACCINE (Pregnancy & 60+) (1 - 1-dose 60+ series) Never done    HEPATITIS B IMMUNIZATION (2 of 3 - Risk 3-dose series) 09/23/2019    COVID-19 Vaccine (3 - Pfizer risk series) 12/26/2021    DIABETIC FOOT EXAM  07/20/2023    INFLUENZA VACCINE (1) 09/01/2023    A1C  02/07/2024    LIPID  04/06/2024    MICROALBUMIN  04/06/2024    MEDICARE ANNUAL WELLNESS VISIT  04/06/2024    TSH W/FREE T4 REFLEX  04/06/2024    MAMMO SCREENING  01/30/2025    BMP  02/28/2025    EYE EXAM  04/17/2025    FALL RISK ASSESSMENT  04/18/2025    COLORECTAL CANCER SCREENING  12/07/2025    ADVANCE CARE PLANNING  04/06/2028    DTAP/TDAP/TD IMMUNIZATION (2 - Td or Tdap) 12/07/2028    DEXA  09/29/2038    PHQ-2 (once per calendar year)  Completed    Pneumococcal Vaccine: 65+ Years  Completed    IPV IMMUNIZATION  Aged Out    HPV IMMUNIZATION  Aged Out    MENINGITIS IMMUNIZATION  Aged Out    RSV MONOCLONAL ANTIBODY  Aged Out    PAP  Discontinued         Objective    Exam  /76   Pulse 82   Temp 98  F (36.7  C)   Resp 18   Ht 1.6 m (5' 3\")   Wt 97.5 kg (215 lb)   LMP  (LMP Unknown)   SpO2 98%   BMI 38.09 kg/m     Estimated body mass index is 38.09 kg/m  as calculated from the following:    Height as of this encounter: 1.6 m (5' 3\").    Weight as of this encounter: 97.5 kg (215 lb).    Physical Exam  GENERAL: alert and no distress  EYES: Eyes grossly normal to inspection, PERRL and conjunctivae and sclerae normal  HENT: ear canals and TM's " normal, nose and mouth without ulcers or lesions  RESP: lungs clear to auscultation - no rales, rhonchi or wheezes  BREAST: With oncologist  CV: regular rate and rhythm, normal S1 S2,    ABDOMEN: soft, nontender,   and bowel sounds normal  MS: Has spider veins and varicose veins bilateral lower extremity more in left lower extremity, otherwise no edema, no calf tenderness bilaterally  NEURO: Normal strength and tone, mentation intact and speech normal  PSYCH: mentation appears normal, affect normal/bright  Diabetic foot exam: normal DP and PT pulses, no trophic changes or ulcerative lesions, normal sensory exam, and normal monofilament exam         4/18/2024   Mini Cog   Clock Draw Score 2 Normal   3 Item Recall 3 objects recalled   Mini Cog Total Score 5              Signed Electronically by: Eugene Michelle MD

## 2024-04-19 LAB
CHOLEST SERPL-MCNC: 175 MG/DL
CREAT UR-MCNC: 151 MG/DL
FASTING STATUS PATIENT QL REPORTED: NO
HDLC SERPL-MCNC: 87 MG/DL
LDLC SERPL CALC-MCNC: 64 MG/DL
MICROALBUMIN UR-MCNC: 344 MG/L
MICROALBUMIN/CREAT UR: 227.81 MG/G CR (ref 0–25)
NONHDLC SERPL-MCNC: 88 MG/DL
TRIGL SERPL-MCNC: 122 MG/DL
TSH SERPL DL<=0.005 MIU/L-ACNC: 1.55 UIU/ML (ref 0.3–4.2)

## 2024-04-20 NOTE — TELEPHONE ENCOUNTER
Spoke to patient and relayed message- orthopedic referral order.    This document is complete and the patient is ready for discharge.

## 2024-04-25 ENCOUNTER — PATIENT OUTREACH (OUTPATIENT)
Dept: GERIATRIC MEDICINE | Facility: CLINIC | Age: 69
End: 2024-04-25
Payer: COMMERCIAL

## 2024-04-25 NOTE — PROGRESS NOTES
[facility] Care Coordination Contact      [facility] Six-Month Telephone Assessment    6 month telephone assessment completed on 4/25/24.    ER visits: No  Hospitalizations: No  TCU stays: No  Significant health status changes: Denies  Falls/Injuries: No  ADL/IADL changes: No  Changes in services: No    Caregiver Assessment follow up:  NA    Goals: See POC in chart for goal progress documentation.      Discussed that MA is showing inactive as of 3/31/24. Member reports that MA renewal was sent in March and additional requested documentation was sent April 8th. Sammie reports her spouse confirmed with [facility] that they received their information.     Will see member in 6 months for an annual health risk assessment.   Encouraged member to call CC with any questions or concerns in the meantime.     Stella Cunha RN  [facility]  [phone]

## 2024-05-08 ENCOUNTER — TELEPHONE (OUTPATIENT)
Dept: INTERNAL MEDICINE | Facility: CLINIC | Age: 69
End: 2024-05-08
Payer: COMMERCIAL

## 2024-05-08 DIAGNOSIS — G47.00 INSOMNIA, UNSPECIFIED TYPE: ICD-10-CM

## 2024-05-08 RX ORDER — ZOLPIDEM TARTRATE 5 MG/1
5 TABLET ORAL
Qty: 20 TABLET | Refills: 0 | Status: SHIPPED | OUTPATIENT
Start: 2024-05-08 | End: 2024-09-06

## 2024-05-08 RX ORDER — ZOLPIDEM TARTRATE 5 MG/1
5 TABLET ORAL
Qty: 15 TABLET | Refills: 0 | OUTPATIENT
Start: 2024-05-08

## 2024-05-08 NOTE — TELEPHONE ENCOUNTER
Patient is not due for Ambien refill, patient picked up treatment on 4/22/2024 not due until 5/21/2024

## 2024-05-08 NOTE — TELEPHONE ENCOUNTER
Patient states 15 tablets/month of Ambien is not enough. She is hoping to get 30 tablets/month. PCP please advise. May leave a detailed message per patient.

## 2024-05-09 NOTE — TELEPHONE ENCOUNTER
This medication is not for daily use, it is for as needed, we can increase to 20 pills per month, med refilled ,please advise pt.

## 2024-06-21 ENCOUNTER — PATIENT OUTREACH (OUTPATIENT)
Dept: GERIATRIC MEDICINE | Facility: CLINIC | Age: 69
End: 2024-06-21
Payer: COMMERCIAL

## 2024-06-21 NOTE — PROGRESS NOTES
[facility] Care Coordination Contact    TC to member to discuss notification of MA and UCARE term. According to spouse Robyn he has tried to contact [facility] several times and has gotten call back. Reports that he has spoken to a financial worker in the past who informed them that there was a mistake and she had everything needed to process Sammie's MA.  Instructed to keep trying to contact [facility] and gave contact information to [facility] keep your coverage and senior linkage line.     Stella Cunha RN  [facility]  [phone]

## 2024-06-22 ENCOUNTER — APPOINTMENT (OUTPATIENT)
Dept: ULTRASOUND IMAGING | Facility: CLINIC | Age: 69
End: 2024-06-22
Attending: EMERGENCY MEDICINE
Payer: COMMERCIAL

## 2024-06-22 ENCOUNTER — HOSPITAL ENCOUNTER (EMERGENCY)
Facility: CLINIC | Age: 69
Discharge: HOME | End: 2024-06-22
Attending: EMERGENCY MEDICINE | Admitting: EMERGENCY MEDICINE
Payer: COMMERCIAL

## 2024-06-22 VITALS
HEIGHT: 63 IN | DIASTOLIC BLOOD PRESSURE: 101 MMHG | TEMPERATURE: 98 F | OXYGEN SATURATION: 98 % | BODY MASS INDEX: 38.91 KG/M2 | SYSTOLIC BLOOD PRESSURE: 175 MMHG | HEART RATE: 70 BPM | RESPIRATION RATE: 20 BRPM | WEIGHT: 219.58 LBS

## 2024-06-22 DIAGNOSIS — F41.0 PANIC ATTACK: ICD-10-CM

## 2024-06-22 DIAGNOSIS — R07.89 NON-CARDIAC CHEST PAIN: ICD-10-CM

## 2024-06-22 LAB
ALBUMIN SERPL BCG-MCNC: 3.9 G/DL (ref 3.5–5.2)
ALP SERPL-CCNC: 69 U/L (ref 40–150)
ALT SERPL W P-5'-P-CCNC: 29 U/L (ref 0–50)
ANION GAP SERPL CALCULATED.3IONS-SCNC: 12 MMOL/L (ref 7–15)
AST SERPL W P-5'-P-CCNC: 20 U/L (ref 0–45)
BASOPHILS # BLD AUTO: 0.1 10E3/UL (ref 0–0.2)
BASOPHILS NFR BLD AUTO: 1 %
BILIRUB SERPL-MCNC: 0.2 MG/DL
BUN SERPL-MCNC: 11.6 MG/DL (ref 8–23)
CALCIUM SERPL-MCNC: 9.6 MG/DL (ref 8.8–10.2)
CHLORIDE SERPL-SCNC: 104 MMOL/L (ref 98–107)
CREAT SERPL-MCNC: 0.69 MG/DL (ref 0.51–0.95)
DEPRECATED HCO3 PLAS-SCNC: 23 MMOL/L (ref 22–29)
EGFRCR SERPLBLD CKD-EPI 2021: >90 ML/MIN/1.73M2
EOSINOPHIL # BLD AUTO: 0.2 10E3/UL (ref 0–0.7)
EOSINOPHIL NFR BLD AUTO: 3 %
ERYTHROCYTE [DISTWIDTH] IN BLOOD BY AUTOMATED COUNT: 13.2 % (ref 10–15)
GLUCOSE SERPL-MCNC: 120 MG/DL (ref 70–99)
HCT VFR BLD AUTO: 42 % (ref 35–47)
HGB BLD-MCNC: 13.7 G/DL (ref 11.7–15.7)
HOLD SPECIMEN: NORMAL
HOLD SPECIMEN: NORMAL
IMM GRANULOCYTES # BLD: 0 10E3/UL
IMM GRANULOCYTES NFR BLD: 1 %
LYMPHOCYTES # BLD AUTO: 3.3 10E3/UL (ref 0.8–5.3)
LYMPHOCYTES NFR BLD AUTO: 48 %
MCH RBC QN AUTO: 29.5 PG (ref 26.5–33)
MCHC RBC AUTO-ENTMCNC: 32.6 G/DL (ref 31.5–36.5)
MCV RBC AUTO: 91 FL (ref 78–100)
MONOCYTES # BLD AUTO: 0.7 10E3/UL (ref 0–1.3)
MONOCYTES NFR BLD AUTO: 10 %
NEUTROPHILS # BLD AUTO: 2.6 10E3/UL (ref 1.6–8.3)
NEUTROPHILS NFR BLD AUTO: 38 %
NRBC # BLD AUTO: 0 10E3/UL
NRBC BLD AUTO-RTO: 0 /100
PLATELET # BLD AUTO: 284 10E3/UL (ref 150–450)
POTASSIUM SERPL-SCNC: 4.5 MMOL/L (ref 3.4–5.3)
PROT SERPL-MCNC: 7.5 G/DL (ref 6.4–8.3)
RBC # BLD AUTO: 4.64 10E6/UL (ref 3.8–5.2)
SODIUM SERPL-SCNC: 139 MMOL/L (ref 135–145)
TROPONIN T SERPL HS-MCNC: <6 NG/L
WBC # BLD AUTO: 6.9 10E3/UL (ref 4–11)

## 2024-06-22 PROCEDURE — 36415 COLL VENOUS BLD VENIPUNCTURE: CPT | Performed by: EMERGENCY MEDICINE

## 2024-06-22 PROCEDURE — 93970 EXTREMITY STUDY: CPT

## 2024-06-22 PROCEDURE — 84484 ASSAY OF TROPONIN QUANT: CPT | Performed by: EMERGENCY MEDICINE

## 2024-06-22 PROCEDURE — 85025 COMPLETE CBC W/AUTO DIFF WBC: CPT | Performed by: EMERGENCY MEDICINE

## 2024-06-22 PROCEDURE — 80053 COMPREHEN METABOLIC PANEL: CPT | Performed by: EMERGENCY MEDICINE

## 2024-06-22 PROCEDURE — 93005 ELECTROCARDIOGRAM TRACING: CPT

## 2024-06-22 PROCEDURE — 99285 EMERGENCY DEPT VISIT HI MDM: CPT | Mod: 25

## 2024-06-22 ASSESSMENT — COLUMBIA-SUICIDE SEVERITY RATING SCALE - C-SSRS
1. IN THE PAST MONTH, HAVE YOU WISHED YOU WERE DEAD OR WISHED YOU COULD GO TO SLEEP AND NOT WAKE UP?: NO
2. HAVE YOU ACTUALLY HAD ANY THOUGHTS OF KILLING YOURSELF IN THE PAST MONTH?: NO
6. HAVE YOU EVER DONE ANYTHING, STARTED TO DO ANYTHING, OR PREPARED TO DO ANYTHING TO END YOUR LIFE?: NO

## 2024-06-22 ASSESSMENT — ACTIVITIES OF DAILY LIVING (ADL)
ADLS_ACUITY_SCORE: 40
ADLS_ACUITY_SCORE: 36

## 2024-06-22 NOTE — ED PROVIDER NOTES
Emergency Department Note      History of Present Illness     Chief Complaint  Chest Pain    HPI  Ramona Ferrer is a 69 year old female with a history of breast cancer who presents to the ED with her  for evaluation of chest pain. The patient reports that around 2200 tonight she began to have sudden onset tachycardia, swelling and numbness bilaterally in her hands and face, vision changes from baseline, generalized fatigue, chest tightness, and lower abdominal pain. Adds that over the past few days she has been having multiple episodes of these symptoms. She denies any known history of panic attacks as well as having a cough, fever, or episodes of emesis.  That said she has had episodes like this before.  Notably, the patient's daughter, on the phone, was most concerned with a blood clot, so she requested an ultrasound.       Independent Historian  Daughter as detailed above.    Review of External Notes  None    Past Medical History   Medical History and Problem List  Past Medical History:   Diagnosis Date    Breast cancer 12/2021    DDD (degenerative disc disease), lumbar     Endometriosis     Hepatitis B infection     Hypertension     Hypothyroidism     Seasonal allergies     Spinal stenosis, lumbar        Medications  anastrozole (ARIMIDEX) 1 MG tablet  Artificial Tear Solution (SOOTHE XP) SOLN  calcium carbonate (OS-EMMA) 500 MG tablet  calcium carbonate 500 mg, elemental, (OSCAL) 500 MG tablet  cetirizine (ZYRTEC) 10 MG tablet  diclofenac (VOLTAREN) 1 % topical gel  hydrOXYzine HCl (ATARAX) 25 MG tablet  ketorolac (ACULAR) 0.5 % ophthalmic solution  levothyroxine (SYNTHROID/LEVOTHROID) 112 MCG tablet  lisinopril (ZESTRIL) 10 MG tablet  metFORMIN (GLUCOPHAGE XR) 500 MG 24 hr tablet  olopatadine (PATANOL) 0.1 % ophthalmic solution  olopatadine (PATANOL) 0.1 % ophthalmic solution  Omega-3 Fatty Acids (FISH OIL) 500 MG CAPS  prednisoLONE acetate (PRED FORTE) 1 % ophthalmic suspension  Vitamin D3 50 mcg  "(2000 units) tablet  zolpidem (AMBIEN) 5 MG tablet        Surgical History   Past Surgical History:   Procedure Laterality Date    COLONOSCOPY  02/20/2012    Procedure:COLONOSCOPY; COLONOSCOPY ; Surgeon:ARUN KITCHEN; Location: GI    COLONOSCOPY N/A 02/15/2019    Procedure: COMBINED COLONOSCOPY, SINGLE OR MULTIPLE BIOPSY/POLYPECTOMY BY BIOPSY;  Surgeon: Connor Sanderson MD;  Location:  GI    COLONOSCOPY N/A 12/07/2022    Procedure: COLONOSCOPY;  Surgeon: Leventhal, Thomas Michael, MD;  Location: [facility] OR    ESOPHAGOSCOPY, GASTROSCOPY, DUODENOSCOPY (EGD), COMBINED N/A 12/07/2022    Procedure: ESOPHAGOGASTRODUODENOSCOPY, WITH BIOPSY;  Surgeon: Leventhal, Thomas Michael, MD;  Location: [facility] OR    GRAFT FREE VASCULARIZED TRANSVERSE RECTUS ABDOMINIS MYOCUTANEOUS Left 08/25/2022    Procedure: Left breast reconstruction with free abdominal flap,  SPY;  Surgeon: NOHEMI Feliciano MD;  Location: [facility] OR    HC CYSTOURETHROSCOPY W/ URETEROSCOPY &/OR PYELOSCOPY; W/ LITHOTRIPSY      HC TRABECULOPLASTY BY LASER SURGERY      PI OU    HYSTERECTOMY      LASER YAG IRIDOTOMY  08/08/2012    Procedure: LASER YAG IRIDOTOMY;  LEFT EYE YAG LASER PUPIL IRIDOTOMY ;  Surgeon: Dakotah Humphreys MD;  Location:  EC    MASTECTOMY PARTIAL WITH SENTINEL NODE Left 03/23/2022    Procedure: LEFT SKIN SPARING MASTECTOMY WITH SENTINEL LYMPH NODE BIOPSY;  Surgeon: Dada Mendiola MD;  Location: [facility] OR    PELVIS LAPAROSCOPY,DX      RECONSTRUCT BREAST, INSERT TISSUE EXPANDER BILATERAL, COMBINED Left 03/23/2022    Procedure: Left breast reconstruction with expander and SPY;  Surgeon: NOHEMI Feliciano MD;  Location: [facility] OR    STRABISMUS SURGERY      VEIN LIGATION AND STRIPPING       Physical Exam   Patient Vitals for the past 24 hrs:   BP Temp Temp src Pulse Resp SpO2 Height Weight   06/22/24 0026 175/101 98  F (36.7  C) Temporal 70 20 98 % 1.6 m (5' 3\") 99.6 kg (219 lb 9.3 oz)     Physical Exam  Nursing note and vitals reviewed.  Constitutional: " Cooperative.   HENT:   Mouth/Throat: Mucous membranes are normal.   Cardiovascular: Normal rate, regular rhythm and normal heart sounds.  No murmur.  Pulmonary/Chest: Effort normal and breath sounds normal. No respiratory distress. No wheezes. No rales.   Abdominal: Soft. Normal appearance. No distension. There is no tenderness.   Neurological: Alert.  Oriented x 3  Skin: Skin is warm and dry. No rash noted.   Psychiatric: Mildly anxious appearing    Diagnostics   Lab Results   Labs Ordered and Resulted from Time of ED Arrival to Time of ED Departure   COMPREHENSIVE METABOLIC PANEL - Abnormal       Result Value    Sodium 139      Potassium 4.5      Carbon Dioxide (CO2) 23      Anion Gap 12      Urea Nitrogen 11.6      Creatinine 0.69      GFR Estimate >90      Calcium 9.6      Chloride 104      Glucose 120 (*)     Alkaline Phosphatase 69      AST 20      ALT 29      Protein Total 7.5      Albumin 3.9      Bilirubin Total 0.2     TROPONIN T, HIGH SENSITIVITY - Normal    Troponin T, High Sensitivity <6     CBC WITH PLATELETS AND DIFFERENTIAL    WBC Count 6.9      RBC Count 4.64      Hemoglobin 13.7      Hematocrit 42.0      MCV 91      MCH 29.5      MCHC 32.6      RDW 13.2      Platelet Count 284      % Neutrophils 38      % Lymphocytes 48      % Monocytes 10      % Eosinophils 3      % Basophils 1      % Immature Granulocytes 1      NRBCs per 100 WBC 0      Absolute Neutrophils 2.6      Absolute Lymphocytes 3.3      Absolute Monocytes 0.7      Absolute Eosinophils 0.2      Absolute Basophils 0.1      Absolute Immature Granulocytes 0.0      Absolute NRBCs 0.0         Imaging  US Lower Extremity Venous Duplex Bilateral   Final Result   IMPRESSION:   1.  No deep venous thrombosis in the bilateral lower extremities.          EKG   ECG taken at 0021, ECG read at 0025  Normal Sinus Rhythm  Minimal Voltage Criteria for LVH, may be normal variant (R in aVL)   Rate 68 bpm. NH interval 182 ms. QRS duration 86 ms. QT/QTc  430/457 ms. P-R-T axes 56 10 49.    Independent Interpretation  None  ED Course    Medications Administered  Medications - No data to display    Discussion of Management  None    Social Determinants of Health adding to complexity of care  None    ED Course  ED Course as of 06/22/24 0324   Sat Jun 22, 2024   0110 I reviewed care everywhere and updated Epic.    0114 I obtained history and examined the patient as noted above.    0215 I rechecked and updated the patient.      Medical Decision Making / Diagnosis     ABDIRIZAK Ferrer is a 69 year old female who presents with palpitations, chest pressure, face and hand tingling and shortness of breath that was transient.  With a negative workup as above this is most consistent with acute panic attack and hyperventilation syndrome.  Symptoms have resolved at this time.  She has had recurrence of this in the past.  No fevers or concern for an infectious etiology.  Clinical story not concerning for pulmonary embolism, pneumothorax, pneumonia.  Abdominal exam is normal.  Ultrasound of the lower extremities was performed at the request of a family member which was negative.  At this time I am comfortable with plan for discharge having ruled out emergent conditions as his family    Disposition  The patient was discharged.     ICD-10 Codes:    ICD-10-CM    1. Panic attack  F41.0       2. Non-cardiac chest pain  R07.89          Scribe Disclosure:  I, Aaliyah Laguna, am serving as a scribe at 2:00 AM on 6/22/2024 to document services personally performed by Mingo Jones MD based on my observations and the provider's statements to me.        Mingo Jones MD  06/22/24 0324

## 2024-06-22 NOTE — ED TRIAGE NOTES
Pt reports she was sleeping and woke with increased heart rate and pain in her chest. Pt feels weak. Pt also has pain in her right knee for the last few weeks.

## 2024-06-24 LAB
ATRIAL RATE - MUSE: 68 BPM
DIASTOLIC BLOOD PRESSURE - MUSE: NORMAL MMHG
INTERPRETATION ECG - MUSE: NORMAL
P AXIS - MUSE: 56 DEGREES
PR INTERVAL - MUSE: 182 MS
QRS DURATION - MUSE: 86 MS
QT - MUSE: 430 MS
QTC - MUSE: 457 MS
R AXIS - MUSE: 10 DEGREES
SYSTOLIC BLOOD PRESSURE - MUSE: NORMAL MMHG
T AXIS - MUSE: 49 DEGREES
VENTRICULAR RATE- MUSE: 68 BPM

## 2024-06-25 ENCOUNTER — PATIENT OUTREACH (OUTPATIENT)
Dept: GERIATRIC MEDICINE | Facility: CLINIC | Age: 69
End: 2024-06-25
Payer: COMMERCIAL

## 2024-06-25 NOTE — PROGRESS NOTES
[facility] Care Coordination Contact  CC received notification of Emergency Room visit.  ER visit occurred on 6/22/24 at [facility] with Dx of Panic Attack, non-cardiac chest pain. .    CC contacted member and reviewed discharge summary.  Member has a follow-up appointment with PCP: No: Offered Assistance with setting up a follow up appointment Member reports she will contact PCP   Member's [facility] wicho period is up 6/30/24 and member is not eligible for MA.  Member reports that they have contacted [facility] regarding supplemental insurance. Member has Medicare part A and B  Member has had a change in condition: No  New referrals placed: No  Home Visit Needed: No  Care plan reviewed and updated.  PCP notified of ED visit via EMR.    Stella Cunha RN  [facility]  [phone]

## 2024-07-04 ENCOUNTER — PATIENT OUTREACH (OUTPATIENT)
Dept: GERIATRIC MEDICINE | Facility: CLINIC | Age: 69
End: 2024-07-04

## 2024-07-04 NOTE — PROGRESS NOTES
[facility] Care Coordination Contact    No longer active with [facility] community case management effective 6/30/24.  Reason for community disenrollment: MA [place] Cunha RN  [facility]  [phone]

## 2024-08-28 DIAGNOSIS — C50.412 MALIGNANT NEOPLASM OF UPPER-OUTER QUADRANT OF LEFT BREAST IN FEMALE, ESTROGEN RECEPTOR POSITIVE (H): ICD-10-CM

## 2024-08-28 DIAGNOSIS — Z17.0 MALIGNANT NEOPLASM OF UPPER-OUTER QUADRANT OF LEFT BREAST IN FEMALE, ESTROGEN RECEPTOR POSITIVE (H): ICD-10-CM

## 2024-08-28 DIAGNOSIS — J30.9 ALLERGIC RHINITIS, UNSPECIFIED SEASONALITY, UNSPECIFIED TRIGGER: ICD-10-CM

## 2024-08-28 DIAGNOSIS — G47.00 INSOMNIA, UNSPECIFIED TYPE: ICD-10-CM

## 2024-08-28 NOTE — TELEPHONE ENCOUNTER
Vitamin d3 50mcg tab  Last prescribing provider: Dr. Cisneros    Last clinic visit date: 3/28/24 w/ Zoya    Recommendations for requested medication (if none, N/A): NA    Any other pertinent information (if none, N/A): NA    Refilled: Y/N, if NO, why?

## 2024-08-30 RX ORDER — CHOLECALCIFEROL (VITAMIN D3) 50 MCG
1 TABLET ORAL EVERY MORNING
Qty: 90 TABLET | Refills: 0 | Status: SHIPPED | OUTPATIENT
Start: 2024-08-30

## 2024-08-31 ENCOUNTER — HEALTH MAINTENANCE LETTER (OUTPATIENT)
Age: 69
End: 2024-08-31

## 2024-09-03 NOTE — PROGRESS NOTES
Sammie Ferrer is seen for follow-up for a diagnosis of a stage IIB, T3 N0 MX, invasive lobular carcinoma of the left breast.  She self-palpated a lump in the upper outer quadrant of the left breast.  She underwent evaluation with a diagnostic mammogram and ultrasound, which was BI-RADS 4, showing in the upper outer quadrant of the left breast irregularly shaped hypoechoic mass at the 12 o'clock position 4 cm from the nipple on the left.  This mass measured 2.3 x 2.2 x 1.5 cm and accounted for the patient's area of palpable concern. Additionally, at the 1 o'clock position 2 cm from the nipple in the left breast, there is a second irregularly shaped hypoechoic mass with indistinct margins measuring 1.3 x 1.1 x 0.8 cm.  She also underwent a breast MRI which showed in the right breast mild background parenchymal enhancement and no suspicious areas of enhancement or lymphadenopathy.  In the left breast there was mild background parenchymal enhancement, and at the 12 o'clock position 4 cm from the nipple there was a heterogeneously enhancing irregular mass measuring 3.4 in AP dimension x 2.8 cm ML and 2.8 cm SI corresponding to the known biopsy-proven malignancy in the left breast.  In the left breast at the 1 o'clock position 2 cm from the nipple there was also a heterogeneously enhancing oval mass measuring 1.3 cm in maximal diameter.  This likely corresponds to the second biopsy-proven malignancy.  Together, the full extent of the disease spans a total of 5.2 cm AP x 4.4 cm ML, BI-RADS 6.     The pathology done 1/3/2022 showed invasive lobular carcinoma, [place] grade 1/3 lobular carcinoma in situ, classic type, estrogen receptor positive in greater than 70% of the cells and progesterone receptor positive in greater than 90% of cells, and HER2 FISH was nonamplified.  In part B in the left breast at the 1 o'clock position 2 cm from the nipple, ultrasound-guided needle biopsy showed invasive lobular carcinoma,  [place] grade 1/3 lobular carcinoma in situ was present, classic subtype, estrogen receptors positive in greater than 70% of the cells, progesterone receptors positive in greater than 90% of cells, and HER2 was nonamplified, Ki-67 20-25%.       Sammie reports that the breast mass had been there for approximately 1 month before she was seen for evaluation.      She has worked in the past as a personal care assistant and lives in the [facility].       PAST MEDICAL HISTORY:  There is no history of breast cancer in the past.  No history of breast cancer surgery. She has no history of radiation therapy in the past or radiation exposure.  No history of prior tumor of any kind. She denies heart problems, heart attack, breathing problems, blood clots, seizures, peptic ulcer disease, osteoporosis or bone fractures.  She does report a history of arthritis.     FAMILY HISTORY:  Positive for breast cancer in a paternal aunt who was diagnosed with breast cancer at an old age.     Her father has a history of stomach cancer.  There is no family history of pancreatic cancer, melanoma, lung cancer or ovarian cancer.     No history of male breast cancer.     ALLERGIES:  No history of drug allergies.  She denies allergies to seafood, iodine, or contrast dye.     PAST MENSTRUAL HISTORY:  She has been pregnant 3 times with 2 live births at age 29 and 31 and 1 miscarriage.  Age at first menstrual period was 14.  She did have a total abdominal hysterectomy and bilateral salpingo-oophorectomy performed in 2003 for endometriosis.  She has no history of hormone replacement therapy.  No history of oral contraceptives.     HABITS: She denies tobacco history.  She denies alcohol history and drinks alcohol only occasionally.     PAST MEDICAL HISTORY:  Past medical history is also remarkable for type 2 diabetes, and she was begun on metformin 2 years ago.  She also has a history of varicose veins and a history of a lipoma resected from her  left leg.      Chronic Hepatitis B.  Hepatitis C negative.  HIV negative.      Prior COVID vaccination x 3.      GERM LINE GENETICS: INVITAE testing of 47 genes was negative.      TREATMENT HISTORY:  A. MammaPrint showed low risk.  B. Left mastectomy and axillary lymph node sampling.   A. Lymph node, LEFT axillary, excision:  -One benign lymph node (0/1)  B. LEFT breast, skin-sparing mastectomy:  -INVASIVE BREAST CARCINOMA, MIXED INVASIVE LOBULAR CARCINOMA (predominant component) and INVASIVE DUCTAL CARCINOMA (minor component), KALPESH GRADE 3, size 5.5 cm, 12:00, upper outer quadrant and lower outer quadrant  -Ductal carcinoma in situ (DCIS), nuclear grade 2, cribriform and solid type(s), with focal necrosis  -DCIS is admixed with invasive carcinoma, and comprises a minor component (<5%) of the tumor volume   -Lobular carcinoma in situ (LCIS), predominantly classic type (admixed with invasive carcinoma and beyond invasive carcinoma) and focal pleomorphic type (size 2 mm, adjacent to invasive carcinoma)  -Margins are uninvolved by invasive carcinoma  -Invasive carcinoma is 2.5 mm from the nearest (anterior) margin, and is > 5 mm from the posterior margin  -Margins are uninvolved by DCIS and pleomorphic LCIS  -DCIS and pleomorphic LCIS are > 5 mm from the nearest (anterior) margin  -Benign nipple and skin   -Other findings: fibrocystic change (including microcysts with apocrine metaplasia) and columnar cell change  -Calcifications associated with DCIS, LCIS, and benign ducts and acini  -Prior core biopsy site changes (two biopsy sites identified)  -Invasive carcinoma is estrogen receptor positive (>70%, strong intensity) and progesterone receptor positive (>90%, strong intensity) by immunohistochemistry and is HER2 non-amplified (group 5) by FISH (performed on prior core biopsies, see report [id number], specimens A and B)  DCIS and LCIS present.  -Margins are uninvolved by invasive carcinoma or LCIS.   -Invasive  carcinoma is estrogen receptor positive (>70%, strong intensity) and progesterone receptor positive (>90%, strong intensity) by immunohistochemistry and is HER2 non-amplified (group 5) by FISH (performed on prior core biopsies, see report [id number], specimens A and B) Ki-67 immunohistochemistry (MIB-1, pharmDx, [employer]) from [employer] was performed on invasive carcinoma in the LEFT mastectomy specimen.  They report Ki-67 proliferative index of 20-25% (block B5) and 20% (block B27).   C. Staging:  pT3 Nx because lymph node is not the sentinel node.    D. She did not want radiation, and risk:benefit did not favor treatment.  E. Letrozole begun 4-5-22.   F. Left TRAM flap reconstruction healed as of 12-27-22 but she has some concerns.   G. Adjuvant Zometa 12-27-22 but not tolerated. She did have a reaction to adjuvant Zometa and it cannot be given any further.   H. Letrozole changed to anastrozole due to arthralgias      INTERVAL HISTORY:  Sammie returns to clinic today for evaluation while on cancer directed therapy with anastrozole.  -She has noticed an area in her lower abdomen that feels harder.  It is not bothersome at all - she just noted it one day.   -She does sometimes have hot flashes.  Joint pain better!  -No new breast concerns.   -No unexplained weight loss or drenching night sweats.   -No new headaches, persistent dizziness or double vision.  Occasionally intermittent dizziness   -No new cough  -No nausea, vomiting or upper abdominal pain.  No new constipation  -Denies lower extremity swelling.  -No unusual bleeding or bruising.     REVIEW OF SYSTEMS:  A 10-point review of systems was otherwise negative.       PHYSICAL EXAMINATION:    VITAL SIGNS: Blood pressure 111/74, pulse 77, temperature 97.7  F (36.5  C), temperature source Oral, resp. rate 16, weight 96.6 kg (213 lb), SpO2 97%, not currently breastfeeding.  GENERAL: Very pleasant 69-year-old female who is alert, oriented, and in no acute  distress today.  HEENT:  No alopecia.    BREASTS: Left breast at 12:00 in the skin of her chest wall above her breast is a small 6 x 4 mm soft, oval mobile lesion.  LUNGS: She is speaking in full, fluid sentences with no cough noted.  ABDOMEN:  Soft, nondistended.  EXTREMITIES:  Without edema.  PSYCH: Affect appropriate.     LABORATORY DATA:    Most Recent 3 CBC's:  Recent Labs   Lab Test 09/04/24  1138 06/22/24  0029 02/28/24  1404   WBC 5.8 6.9 5.7   HGB 13.5 13.7 13.8   MCV 90 91 92    284 311   ANEUTAUTO 2.8 2.6 2.5     Most Recent 3 BMP's:  Recent Labs   Lab Test 09/04/24  1138 06/22/24  0029 02/28/24  1404    139 138   POTASSIUM 4.5 4.5 4.9   CHLORIDE 104 104 102   CO2 27 23 25   BUN 12.4 11.6 12.8   CR 0.81 0.69 0.78   ANIONGAP 9 12 11   EMMA 9.8 9.6 9.9   * 120* 102*   PROTTOTAL 7.6 7.5 7.7   ALBUMIN 4.1 3.9 4.2    Most Recent 3 LFT's:  Recent Labs   Lab Test 09/04/24 1138 06/22/24  0029 02/28/24  1404   AST 29 20 35   ALT 39 29 43   ALKPHOS 57 69 57   BILITOTAL 0.5 0.2 0.4     I reviewed the above labs today.     IMAGING:  No new imaging today.     ASSESSMENT AND PLAN:  Stage IIB, T3 N0 MX, invasive lobular carcinoma of the upper outer quadrant of the left breast which is multifocal grade 1, ER positive in greater than 70% of cells, SC positive in greater than 90% of cells and HER2 non-amplified:  -Post left mastectomy with TRAM flap.   -Right digital screening mammogram done on 1/30/2024 was BI-RADS 1 negative.  She is due and scheduled for repeat mammogram in and to see Dr. Cisneros in early 2025.  -She continues to tolerate anastrozole very well and has no new concerns today.  -Her arthralgias and neuralgias have improved as well.    Arthralgias:  -Changed aromatase inhibitor as above.  These have improved, and she is pleased with this.     Bone health:  -She missed her appointment in July to see Dr. oRwe for Prolia, and is here today for follow-up and injection.  -Adjuvant Zometa  was not tolerated.  -She was changed to Prolia every 6 months, last dose administered on 1/30/2024.  Due in July, rescheduled for today.  -She and Dr. Cisneros discussed that the purpose of Prolia is to maintain bones density in the setting of aromatase inhibitor, but by itself would not expect to have adjuvant benefit in contrast to Zometa.  -Her most recent DEXA scan from 9/29/23 showed a most valid negative T-score of -1.7.  She would be due for repeat imaging in September 2025.    EFRAIN Ching, CNP  [facility]  [address] [id number] [phone]    32 minutes spent on the date of the encounter doing chart review, review of test results, interpretation of tests, patient visit, and documentation.

## 2024-09-04 ENCOUNTER — APPOINTMENT (OUTPATIENT)
Dept: LAB | Facility: CLINIC | Age: 69
End: 2024-09-04
Attending: INTERNAL MEDICINE
Payer: COMMERCIAL

## 2024-09-04 ENCOUNTER — ONCOLOGY VISIT (OUTPATIENT)
Dept: ONCOLOGY | Facility: CLINIC | Age: 69
End: 2024-09-04
Attending: INTERNAL MEDICINE
Payer: COMMERCIAL

## 2024-09-04 VITALS
BODY MASS INDEX: 37.73 KG/M2 | DIASTOLIC BLOOD PRESSURE: 74 MMHG | SYSTOLIC BLOOD PRESSURE: 111 MMHG | HEART RATE: 77 BPM | WEIGHT: 213 LBS | RESPIRATION RATE: 16 BRPM | TEMPERATURE: 97.7 F | OXYGEN SATURATION: 97 %

## 2024-09-04 DIAGNOSIS — C50.412 MALIGNANT NEOPLASM OF UPPER-OUTER QUADRANT OF LEFT BREAST IN FEMALE, ESTROGEN RECEPTOR POSITIVE (H): ICD-10-CM

## 2024-09-04 DIAGNOSIS — Z79.811 USE OF AROMATASE INHIBITORS: Primary | ICD-10-CM

## 2024-09-04 DIAGNOSIS — M85.80 OSTEOPENIA, UNSPECIFIED LOCATION: ICD-10-CM

## 2024-09-04 DIAGNOSIS — M81.8 IDIOPATHIC OSTEOPOROSIS: ICD-10-CM

## 2024-09-04 DIAGNOSIS — Z17.0 MALIGNANT NEOPLASM OF UPPER-OUTER QUADRANT OF LEFT BREAST IN FEMALE, ESTROGEN RECEPTOR POSITIVE (H): ICD-10-CM

## 2024-09-04 LAB
ALBUMIN SERPL BCG-MCNC: 4.1 G/DL (ref 3.5–5.2)
ALP SERPL-CCNC: 57 U/L (ref 40–150)
ALT SERPL W P-5'-P-CCNC: 39 U/L (ref 0–50)
ANION GAP SERPL CALCULATED.3IONS-SCNC: 9 MMOL/L (ref 7–15)
AST SERPL W P-5'-P-CCNC: 29 U/L (ref 0–45)
BASOPHILS # BLD AUTO: 0.1 10E3/UL (ref 0–0.2)
BASOPHILS NFR BLD AUTO: 1 %
BILIRUB SERPL-MCNC: 0.5 MG/DL
BUN SERPL-MCNC: 12.4 MG/DL (ref 8–23)
CALCIUM SERPL-MCNC: 9.8 MG/DL (ref 8.8–10.4)
CHLORIDE SERPL-SCNC: 104 MMOL/L (ref 98–107)
CREAT SERPL-MCNC: 0.81 MG/DL (ref 0.51–0.95)
EGFRCR SERPLBLD CKD-EPI 2021: 79 ML/MIN/1.73M2
EOSINOPHIL # BLD AUTO: 0.2 10E3/UL (ref 0–0.7)
EOSINOPHIL NFR BLD AUTO: 4 %
ERYTHROCYTE [DISTWIDTH] IN BLOOD BY AUTOMATED COUNT: 13.7 % (ref 10–15)
GLUCOSE SERPL-MCNC: 103 MG/DL (ref 70–99)
HCO3 SERPL-SCNC: 27 MMOL/L (ref 22–29)
HCT VFR BLD AUTO: 41.3 % (ref 35–47)
HGB BLD-MCNC: 13.5 G/DL (ref 11.7–15.7)
IMM GRANULOCYTES # BLD: 0 10E3/UL
IMM GRANULOCYTES NFR BLD: 0 %
LYMPHOCYTES # BLD AUTO: 2.1 10E3/UL (ref 0.8–5.3)
LYMPHOCYTES NFR BLD AUTO: 37 %
MCH RBC QN AUTO: 29.5 PG (ref 26.5–33)
MCHC RBC AUTO-ENTMCNC: 32.7 G/DL (ref 31.5–36.5)
MCV RBC AUTO: 90 FL (ref 78–100)
MONOCYTES # BLD AUTO: 0.6 10E3/UL (ref 0–1.3)
MONOCYTES NFR BLD AUTO: 10 %
NEUTROPHILS # BLD AUTO: 2.8 10E3/UL (ref 1.6–8.3)
NEUTROPHILS NFR BLD AUTO: 48 %
NRBC # BLD AUTO: 0 10E3/UL
NRBC BLD AUTO-RTO: 0 /100
PLATELET # BLD AUTO: 295 10E3/UL (ref 150–450)
POTASSIUM SERPL-SCNC: 4.5 MMOL/L (ref 3.4–5.3)
PROT SERPL-MCNC: 7.6 G/DL (ref 6.4–8.3)
RBC # BLD AUTO: 4.58 10E6/UL (ref 3.8–5.2)
SODIUM SERPL-SCNC: 140 MMOL/L (ref 135–145)
WBC # BLD AUTO: 5.8 10E3/UL (ref 4–11)

## 2024-09-04 PROCEDURE — 250N000011 HC RX IP 250 OP 636: Performed by: NURSE PRACTITIONER

## 2024-09-04 PROCEDURE — 80053 COMPREHEN METABOLIC PANEL: CPT | Performed by: NURSE PRACTITIONER

## 2024-09-04 PROCEDURE — G0463 HOSPITAL OUTPT CLINIC VISIT: HCPCS | Performed by: NURSE PRACTITIONER

## 2024-09-04 PROCEDURE — 99214 OFFICE O/P EST MOD 30 MIN: CPT | Performed by: NURSE PRACTITIONER

## 2024-09-04 PROCEDURE — 36415 COLL VENOUS BLD VENIPUNCTURE: CPT | Performed by: NURSE PRACTITIONER

## 2024-09-04 PROCEDURE — 96372 THER/PROPH/DIAG INJ SC/IM: CPT | Performed by: NURSE PRACTITIONER

## 2024-09-04 PROCEDURE — 85025 COMPLETE CBC W/AUTO DIFF WBC: CPT | Performed by: NURSE PRACTITIONER

## 2024-09-04 RX ORDER — ALBUTEROL SULFATE 90 UG/1
1-2 AEROSOL, METERED RESPIRATORY (INHALATION)
Start: 2024-09-04

## 2024-09-04 RX ORDER — MEPERIDINE HYDROCHLORIDE 25 MG/ML
25 INJECTION INTRAMUSCULAR; INTRAVENOUS; SUBCUTANEOUS EVERY 30 MIN PRN
OUTPATIENT
Start: 2024-09-04

## 2024-09-04 RX ORDER — ANASTROZOLE 1 MG/1
1 TABLET ORAL DAILY
Qty: 90 TABLET | Refills: 3 | Status: SHIPPED | OUTPATIENT
Start: 2024-09-04

## 2024-09-04 RX ORDER — DIPHENHYDRAMINE HYDROCHLORIDE 50 MG/ML
50 INJECTION INTRAMUSCULAR; INTRAVENOUS
Start: 2024-09-04

## 2024-09-04 RX ORDER — ALBUTEROL SULFATE 0.83 MG/ML
2.5 SOLUTION RESPIRATORY (INHALATION)
OUTPATIENT
Start: 2024-09-04

## 2024-09-04 RX ORDER — EPINEPHRINE 1 MG/ML
0.3 INJECTION, SOLUTION INTRAMUSCULAR; SUBCUTANEOUS EVERY 5 MIN PRN
OUTPATIENT
Start: 2024-09-04

## 2024-09-04 RX ORDER — METHYLPREDNISOLONE SODIUM SUCCINATE 125 MG/2ML
125 INJECTION, POWDER, LYOPHILIZED, FOR SOLUTION INTRAMUSCULAR; INTRAVENOUS
Start: 2024-09-04

## 2024-09-04 RX ADMIN — DENOSUMAB 60 MG: 60 INJECTION SUBCUTANEOUS at 12:33

## 2024-09-04 ASSESSMENT — PAIN SCALES - GENERAL: PAINLEVEL: NO PAIN (0)

## 2024-09-04 NOTE — NURSING NOTE
"Oncology Rooming Note    September 4, 2024 11:53 AM   Ramona Ferrer is a 69 year old female who presents for:    Chief Complaint   Patient presents with    Blood Draw     Vitals taken, , labs drawn, checked into next appt    Oncology Clinic Visit     RTN CCSL Breast CA     Initial Vitals: /74 (BP Location: Left arm, Patient Position: Sitting, Cuff Size: Adult Large)   Pulse 77   Temp 97.7  F (36.5  C) (Oral)   Resp 16   Wt 96.6 kg (213 lb)   LMP  (LMP Unknown)   SpO2 97%   BMI 37.73 kg/m   Estimated body mass index is 37.73 kg/m  as calculated from the following:    Height as of 6/22/24: 1.6 m (5' 3\").    Weight as of this encounter: 96.6 kg (213 lb). Body surface area is 2.07 meters squared.  No Pain (0) Comment: Data Unavailable   No LMP recorded (lmp unknown). Patient has had a hysterectomy.  Allergies reviewed: Yes  Medications reviewed: Yes    Medications: MEDICATION REFILLS NEEDED TODAY. Provider was NOT notified.  Pharmacy name entered into [facility]:    [id number] - [place], [place] - [address]  [id number] - [place], [place] - [address] [id number] - [place], [place] - [address] [place]    Frailty Screening:   Is the patient here for a new oncology consult visit in cancer care? 2. No      Clinical concerns: request refills on all medications       Elijah Fischer             "

## 2024-09-04 NOTE — LETTER
9/4/2024      Ramona Ferrer  [address]  [place] [address]      Dear Colleague,    Thank you for referring your patient, Ramona Ferrer, to the [facility]. Please see a copy of my visit note below.    Sammie Ferrer is seen for follow-up for a diagnosis of a stage IIB, T3 N0 MX, invasive lobular carcinoma of the left breast.  She self-palpated a lump in the upper outer quadrant of the left breast.  She underwent evaluation with a diagnostic mammogram and ultrasound, which was BI-RADS 4, showing in the upper outer quadrant of the left breast irregularly shaped hypoechoic mass at the 12 o'clock position 4 cm from the nipple on the left.  This mass measured 2.3 x 2.2 x 1.5 cm and accounted for the patient's area of palpable concern. Additionally, at the 1 o'clock position 2 cm from the nipple in the left breast, there is a second irregularly shaped hypoechoic mass with indistinct margins measuring 1.3 x 1.1 x 0.8 cm.  She also underwent a breast MRI which showed in the right breast mild background parenchymal enhancement and no suspicious areas of enhancement or lymphadenopathy.  In the left breast there was mild background parenchymal enhancement, and at the 12 o'clock position 4 cm from the nipple there was a heterogeneously enhancing irregular mass measuring 3.4 in AP dimension x 2.8 cm ML and 2.8 cm SI corresponding to the known biopsy-proven malignancy in the left breast.  In the left breast at the 1 o'clock position 2 cm from the nipple there was also a heterogeneously enhancing oval mass measuring 1.3 cm in maximal diameter.  This likely corresponds to the second biopsy-proven malignancy.  Together, the full extent of the disease spans a total of 5.2 cm AP x 4.4 cm ML, BI-RADS 6.     The pathology done 1/3/2022 showed invasive lobular carcinoma, Cathy grade 1/3 lobular carcinoma in situ, classic type, estrogen receptor positive in greater than 70% of the  cells and progesterone receptor positive in greater than 90% of cells, and HER2 FISH was nonamplified.  In part B in the left breast at the 1 o'clock position 2 cm from the nipple, ultrasound-guided needle biopsy showed invasive lobular carcinoma, [place] grade 1/3 lobular carcinoma in situ was present, classic subtype, estrogen receptors positive in greater than 70% of the cells, progesterone receptors positive in greater than 90% of cells, and HER2 was nonamplified, Ki-67 20-25%.       Sammie reports that the breast mass had been there for approximately 1 month before she was seen for evaluation.      She has worked in the past as a personal care assistant and lives in the [facility].       PAST MEDICAL HISTORY:  There is no history of breast cancer in the past.  No history of breast cancer surgery. She has no history of radiation therapy in the past or radiation exposure.  No history of prior tumor of any kind. She denies heart problems, heart attack, breathing problems, blood clots, seizures, peptic ulcer disease, osteoporosis or bone fractures.  She does report a history of arthritis.     FAMILY HISTORY:  Positive for breast cancer in a paternal aunt who was diagnosed with breast cancer at an old age.     Her father has a history of stomach cancer.  There is no family history of pancreatic cancer, melanoma, lung cancer or ovarian cancer.     No history of male breast cancer.     ALLERGIES:  No history of drug allergies.  She denies allergies to seafood, iodine, or contrast dye.     PAST MENSTRUAL HISTORY:  She has been pregnant 3 times with 2 live births at age 29 and 31 and 1 miscarriage.  Age at first menstrual period was 14.  She did have a total abdominal hysterectomy and bilateral salpingo-oophorectomy performed in 2003 for endometriosis.  She has no history of hormone replacement therapy.  No history of oral contraceptives.     HABITS: She denies tobacco history.  She denies alcohol history and drinks  alcohol only occasionally.     PAST MEDICAL HISTORY:  Past medical history is also remarkable for type 2 diabetes, and she was begun on metformin 2 years ago.  She also has a history of varicose veins and a history of a lipoma resected from her left leg.      Chronic Hepatitis B.  Hepatitis C negative.  HIV negative.      Prior COVID vaccination x 3.      GERM LINE GENETICS: INVITAE testing of 47 genes was negative.      TREATMENT HISTORY:  A. MammaPrint showed low risk.  B. Left mastectomy and axillary lymph node sampling.   A. Lymph node, LEFT axillary, excision:  -One benign lymph node (0/1)  B. LEFT breast, skin-sparing mastectomy:  -INVASIVE BREAST CARCINOMA, MIXED INVASIVE LOBULAR CARCINOMA (predominant component) and INVASIVE DUCTAL CARCINOMA (minor component), KALPESH GRADE 3, size 5.5 cm, 12:00, upper outer quadrant and lower outer quadrant  -Ductal carcinoma in situ (DCIS), nuclear grade 2, cribriform and solid type(s), with focal necrosis  -DCIS is admixed with invasive carcinoma, and comprises a minor component (<5%) of the tumor volume   -Lobular carcinoma in situ (LCIS), predominantly classic type (admixed with invasive carcinoma and beyond invasive carcinoma) and focal pleomorphic type (size 2 mm, adjacent to invasive carcinoma)  -Margins are uninvolved by invasive carcinoma  -Invasive carcinoma is 2.5 mm from the nearest (anterior) margin, and is > 5 mm from the posterior margin  -Margins are uninvolved by DCIS and pleomorphic LCIS  -DCIS and pleomorphic LCIS are > 5 mm from the nearest (anterior) margin  -Benign nipple and skin   -Other findings: fibrocystic change (including microcysts with apocrine metaplasia) and columnar cell change  -Calcifications associated with DCIS, LCIS, and benign ducts and acini  -Prior core biopsy site changes (two biopsy sites identified)  -Invasive carcinoma is estrogen receptor positive (>70%, strong intensity) and progesterone receptor positive (>90%, strong  intensity) by immunohistochemistry and is HER2 non-amplified (group 5) by FISH (performed on prior core biopsies, see report [id number], specimens A and B)  DCIS and LCIS present.  -Margins are uninvolved by invasive carcinoma or LCIS.   -Invasive carcinoma is estrogen receptor positive (>70%, strong intensity) and progesterone receptor positive (>90%, strong intensity) by immunohistochemistry and is HER2 non-amplified (group 5) by FISH (performed on prior core biopsies, see report [id number], specimens A and B) Ki-67 immunohistochemistry (MIB-1, pharmDx, [employer]) from [facility] was performed on invasive carcinoma in the LEFT mastectomy specimen.  They report Ki-67 proliferative index of 20-25% (block B5) and 20% (block B27).   C. Staging:  pT3 Nx because lymph node is not the sentinel node.    D. She did not want radiation, and risk:benefit did not favor treatment.  E. Letrozole begun 4-5-22.   F. Left TRAM flap reconstruction healed as of 12-27-22 but she has some concerns.   G. Adjuvant Zometa 12-27-22 but not tolerated. She did have a reaction to adjuvant Zometa and it cannot be given any further.   H. Letrozole changed to anastrozole due to arthralgias      INTERVAL HISTORY:  Sammie returns to clinic today for evaluation while on cancer directed therapy with anastrozole.  -She has noticed an area in her lower abdomen that feels harder.  It is not bothersome at all - she just noted it one day.   -She does sometimes have hot flashes.  Joint pain better!  -No new breast concerns.   -No unexplained weight loss or drenching night sweats.   -No new headaches, persistent dizziness or double vision.  Occasionally intermittent dizziness   -No new cough  -No nausea, vomiting or upper abdominal pain.  No new constipation  -Denies lower extremity swelling.  -No unusual bleeding or bruising.     REVIEW OF SYSTEMS:  A 10-point review of systems was otherwise negative.       PHYSICAL EXAMINATION:    VITAL SIGNS: Blood  pressure 111/74, pulse 77, temperature 97.7  F (36.5  C), temperature source Oral, resp. rate 16, weight 96.6 kg (213 lb), SpO2 97%, not currently breastfeeding.  GENERAL: Very pleasant 69-year-old female who is alert, oriented, and in no acute distress today.  HEENT:  No alopecia.    BREASTS: Left breast at 12:00 in the skin of her chest wall above her breast is a small 6 x 4 mm soft, oval mobile lesion.  LUNGS: She is speaking in full, fluid sentences with no cough noted.  ABDOMEN:  Soft, nondistended.  EXTREMITIES:  Without edema.  PSYCH: Affect appropriate.     LABORATORY DATA:    Most Recent 3 CBC's:  Recent Labs   Lab Test 09/04/24  1138 06/22/24  0029 02/28/24  1404   WBC 5.8 6.9 5.7   HGB 13.5 13.7 13.8   MCV 90 91 92    284 311   ANEUTAUTO 2.8 2.6 2.5     Most Recent 3 BMP's:  Recent Labs   Lab Test 09/04/24 1138 06/22/24  0029 02/28/24  1404    139 138   POTASSIUM 4.5 4.5 4.9   CHLORIDE 104 104 102   CO2 27 23 25   BUN 12.4 11.6 12.8   CR 0.81 0.69 0.78   ANIONGAP 9 12 11   EMMA 9.8 9.6 9.9   * 120* 102*   PROTTOTAL 7.6 7.5 7.7   ALBUMIN 4.1 3.9 4.2    Most Recent 3 LFT's:  Recent Labs   Lab Test 09/04/24 1138 06/22/24  0029 02/28/24  1404   AST 29 20 35   ALT 39 29 43   ALKPHOS 57 69 57   BILITOTAL 0.5 0.2 0.4     I reviewed the above labs today.     IMAGING:  No new imaging today.     ASSESSMENT AND PLAN:  Stage IIB, T3 N0 MX, invasive lobular carcinoma of the upper outer quadrant of the left breast which is multifocal grade 1, ER positive in greater than 70% of cells, VA positive in greater than 90% of cells and HER2 non-amplified:  -Post left mastectomy with TRAM flap.   -Right digital screening mammogram done on 1/30/2024 was BI-RADS 1 negative.  She is due and scheduled for repeat mammogram in and to see Dr. Cisneros in early 2025.  -She continues to tolerate anastrozole very well and has no new concerns today.  -Her arthralgias and neuralgias have improved as  well.    Arthralgias:  -Changed aromatase inhibitor as above.  These have improved, and she is pleased with this.     Bone health:  -She missed her appointment in July to see Dr. Rowe for Prolia, and is here today for follow-up and injection.  -Adjuvant Zometa was not tolerated.  -She was changed to Prolia every 6 months, last dose administered on 1/30/2024.  Due in July, rescheduled for today.  -She and Dr. Cisneros discussed that the purpose of Prolia is to maintain bones density in the setting of aromatase inhibitor, but by itself would not expect to have adjuvant benefit in contrast to Zometa.  -Her most recent DEXA scan from 9/29/23 showed a most valid negative T-score of -1.7.  She would be due for repeat imaging in September 2025.    EFRAIN Ching, CNP  [facility] [address] [address]  [phone]    32 minutes spent on the date of the encounter doing chart review, review of test results, interpretation of tests, patient visit, and documentation.        Again, thank you for allowing me to participate in the care of your patient.        Sincerely,        EFRAIN Ocampo CNP

## 2024-09-04 NOTE — NURSING NOTE
Chief Complaint   Patient presents with    Blood Draw     Vitals taken, , labs drawn, checked into next appt     /74 (BP Location: Left arm, Patient Position: Sitting, Cuff Size: Adult Large)   Pulse 77   Temp 97.7  F (36.5  C) (Oral)   Resp 16   Wt 96.6 kg (213 lb)   LMP  (LMP Unknown)   SpO2 97%   BMI 37.73 kg/m    Rya Thomas RN on 9/4/2024 at 11:40 AM

## 2024-09-04 NOTE — NURSING NOTE
Prolia injection 60 mg given subcutaneous in right arm by rn in clinic. Patient tolerated the injection without incident. See MAR for more details.    Mingo Oliver RN

## 2024-09-06 RX ORDER — ZOLPIDEM TARTRATE 5 MG/1
5 TABLET ORAL
Qty: 20 TABLET | Refills: 0 | Status: SHIPPED | OUTPATIENT
Start: 2024-09-06

## 2024-09-06 RX ORDER — CETIRIZINE HYDROCHLORIDE 10 MG/1
10 TABLET ORAL DAILY
Qty: 30 TABLET | Refills: 0 | Status: SHIPPED | OUTPATIENT
Start: 2024-09-06

## 2024-10-08 ENCOUNTER — NURSE TRIAGE (OUTPATIENT)
Dept: INTERNAL MEDICINE | Facility: CLINIC | Age: 69
End: 2024-10-08
Payer: COMMERCIAL

## 2024-10-08 NOTE — TELEPHONE ENCOUNTER
"Pt states starting last night, she has stomach, abdominal pain.   PT states it is \"all over\". She thought at first was UTI, but now she thinks it is something else.     She states she has \"UTI Medication\",at home and took some of this. AZO. Not helping.     Pain is Both above and below belly button. More on the left side. Also on the right but not as bad.   Having \"Normal Bms\". Feels bloated in her stomach.     No vomiting. Not eating today. Mild nausea. Little bit of burning with urine.   Took Tylenol and ibuprofen. Didn't really help.    Pain level, 9/10.   Pt is refusing the ED. She states she has to pay out of pocket for ED and declines going.   She is requesting appointment for provider.   Dr Michelle, Should we see if pt would be eligible for ADS?         Reason for Disposition   SEVERE abdominal pain (e.g., excruciating)    Additional Information   Negative: Passed out (i.e., fainted, collapsed and was not responding)   Negative: Shock suspected (e.g., cold/pale/clammy skin, too weak to stand, low BP, rapid pulse)   Negative: Sounds like a life-threatening emergency to the triager   Negative: Followed an abdomen (stomach) injury   Negative: Chest pain   Negative: Abdominal pain and pregnant < 20 weeks   Negative: Abdominal pain and pregnant 20 or more weeks   Negative: Pain is mainly in upper abdomen (if needed ask: 'is it mainly above the belly button?')   Negative: Abdomen bloating or swelling are main symptoms    Protocols used: Abdominal Pain - Female-A-OH    "

## 2024-10-08 NOTE — TELEPHONE ENCOUNTER
I do not have any openings today, my recommendation for pt to be seen in ER or ADS. please advise patient

## 2024-10-08 NOTE — TELEPHONE ENCOUNTER
Called to [facility] to see if patient would be appropriate- [facility] is full. Recommended to call [facility].     [facility] willing to take patient. Called to patient, she is already at urgent care- does not want appointment.     Summer RN 12:25 PM October 8, 2024   [facility]

## 2024-10-22 ENCOUNTER — OFFICE VISIT (OUTPATIENT)
Dept: INTERNAL MEDICINE | Facility: CLINIC | Age: 69
End: 2024-10-22
Payer: COMMERCIAL

## 2024-10-22 VITALS
OXYGEN SATURATION: 96 % | HEART RATE: 81 BPM | BODY MASS INDEX: 38.38 KG/M2 | DIASTOLIC BLOOD PRESSURE: 73 MMHG | WEIGHT: 216.6 LBS | HEIGHT: 63 IN | SYSTOLIC BLOOD PRESSURE: 130 MMHG | TEMPERATURE: 97.7 F | RESPIRATION RATE: 14 BRPM

## 2024-10-22 DIAGNOSIS — E66.01 MORBID OBESITY (H): Primary | ICD-10-CM

## 2024-10-22 DIAGNOSIS — E03.9 HYPOTHYROIDISM, UNSPECIFIED TYPE: ICD-10-CM

## 2024-10-22 DIAGNOSIS — I10 PRIMARY HYPERTENSION: ICD-10-CM

## 2024-10-22 DIAGNOSIS — R73.03 PREDIABETES: ICD-10-CM

## 2024-10-22 DIAGNOSIS — J30.9 ALLERGIC RHINITIS, UNSPECIFIED SEASONALITY, UNSPECIFIED TRIGGER: ICD-10-CM

## 2024-10-22 PROBLEM — C50.412 BREAST CANCER OF UPPER-OUTER QUADRANT OF LEFT FEMALE BREAST (H): Status: RESOLVED | Noted: 2022-02-01 | Resolved: 2024-10-22

## 2024-10-22 PROBLEM — H10.13 ALLERGIC CONJUNCTIVITIS OF BOTH EYES: Status: RESOLVED | Noted: 2019-01-25 | Resolved: 2024-10-22

## 2024-10-22 PROBLEM — H11.32 CONJUNCTIVAL HEMORRHAGE, LEFT: Status: RESOLVED | Noted: 2021-05-03 | Resolved: 2024-10-22

## 2024-10-22 LAB
EST. AVERAGE GLUCOSE BLD GHB EST-MCNC: 134 MG/DL
HBA1C MFR BLD: 6.3 % (ref 0–5.6)

## 2024-10-22 PROCEDURE — 36415 COLL VENOUS BLD VENIPUNCTURE: CPT | Performed by: INTERNAL MEDICINE

## 2024-10-22 PROCEDURE — 99214 OFFICE O/P EST MOD 30 MIN: CPT | Performed by: INTERNAL MEDICINE

## 2024-10-22 PROCEDURE — G2211 COMPLEX E/M VISIT ADD ON: HCPCS | Performed by: INTERNAL MEDICINE

## 2024-10-22 PROCEDURE — 83036 HEMOGLOBIN GLYCOSYLATED A1C: CPT | Performed by: INTERNAL MEDICINE

## 2024-10-22 RX ORDER — CETIRIZINE HYDROCHLORIDE 10 MG/1
10 TABLET ORAL DAILY
Qty: 30 TABLET | Refills: 0 | Status: SHIPPED | OUTPATIENT
Start: 2024-10-22

## 2024-10-22 RX ORDER — LEVOTHYROXINE SODIUM 112 UG/1
112 TABLET ORAL DAILY
Qty: 90 TABLET | Refills: 1 | Status: SHIPPED | OUTPATIENT
Start: 2024-10-22

## 2024-10-22 RX ORDER — LISINOPRIL 10 MG/1
10 TABLET ORAL DAILY
Qty: 90 TABLET | Refills: 1 | Status: SHIPPED | OUTPATIENT
Start: 2024-10-22

## 2024-10-22 RX ORDER — METFORMIN HYDROCHLORIDE 500 MG/1
1000 TABLET, EXTENDED RELEASE ORAL
Qty: 180 TABLET | Refills: 1 | Status: SHIPPED | OUTPATIENT
Start: 2024-10-22

## 2024-10-22 NOTE — NURSING NOTE
"/73   Pulse 81   Temp 97.7  F (36.5  C) (Tympanic)   Resp 14   Ht 1.6 m (5' 3\")   Wt 98.2 kg (216 lb 9.6 oz)   LMP  (LMP Unknown)   SpO2 96%   BMI 38.37 kg/m      "

## 2024-10-22 NOTE — PROGRESS NOTES
"  Assessment & Plan       (I10) Primary hypertension  Comment: BP stable   Plan: refilled lisinopril (ZESTRIL) 10 MG tablet as directed.explained clearly about the medication,insructions and side effects.  Advised to follow low salt diet and exercise and f/u in 6 mths.        (E03.9) Hypothyroidism, unspecified type  Plan: levothyroxine (SYNTHROID/LEVOTHROID) 112 MCG         Tablet refilled.explained clearly about the medication,insructions and side effects.            (R73.03) Prediabetes  Plan: A1c 6.3 today, refilled metFORMIN (GLUCOPHAGE XR) 500 MG 24 hr tablet, as directed.explained clearly about the medication,insructions and side effects.  Hemoglobin A1c            (J30.9) Allergic rhinitis, unspecified seasonality, unspecified trigger  Comment: stable  Plan: cetirizine (ZYRTEC) 10 MG tablet refilled as directed.explained clearly about the medication,insructions and side effects.       (E66.01) Morbid obesity (H)    Plan:  -Discussed in detail about Diet,calorie intake,and importance of regular exercise.      MED REC REQUIRED  Post Medication Reconciliation Status:  Discharge medications reconciled, continue medications without change  BMI  Estimated body mass index is 38.37 kg/m  as calculated from the following:    Height as of this encounter: 1.6 m (5' 3\").    Weight as of this encounter: 98.2 kg (216 lb 9.6 oz).   Weight management plan: Discussed healthy diet and exercise guidelines      The longitudinal plan of care for the diagnosis(es)/condition(s) as documented were addressed during this visit. Due to the added complexity in care, I will continue to support Sammie in the subsequent management and with ongoing continuity of care.      Subjective   Sammie is a 69 year old, presenting for the following health issues:  Hypertension, Thyroid Problem, and ER F/U ([facility])      10/22/2024    10:11 AM   Additional Questions   Roomed by rosa   Accompanied by self         10/22/2024    10:11 AM "   Patient Reported Additional Medications   Patient reports taking the following new medications none     HPI       Pre diabetes Follow-up    How often are you checking your blood sugar? Not at all  What concerns do you have today about your diabetes? None   Do you have any of these symptoms? (Select all that apply)  No numbness or tingling in feet.  No redness, sores or blisters on feet.  No complaints of excessive thirst.  No reports of blurry vision.  No significant changes to weight.      BP Readings from Last 2 Encounters:   10/22/24 130/73   09/04/24 111/74     Hemoglobin A1C (%)   Date Value   04/18/2024 6.2 (H)   11/07/2023 6.0 (H)   06/04/2021 6.3 (H)   12/21/2020 5.9 (H)     LDL Cholesterol Calculated (mg/dL)   Date Value   04/18/2024 64   04/06/2023 75   12/21/2020 71   12/07/2018 71        Hypertension Follow-up    Do you check your blood pressure regularly outside of the clinic? No   Are you following a low salt diet? Yes  Are your blood pressures ever more than 140 on the top number (systolic) OR more   than 90 on the bottom number (diastolic), for example 140/90? Yes    Hypothyroidism Follow-up    Since last visit, patient describes the following symptoms: Weight stable, no hair loss, no skin changes, no constipation, no loose stools    ED/UC Followup:    Facility: [facility]  Date of visit: 10/6/24  Reason for visit: kidney stones  Current Status: pain has resolved  .Recently seen in UC for Diverticulitis, UTI and kidney stones, was treated with cipro and flagyl , pain has resolved.  has appt with urologist this week       How many servings of fruits and vegetables do you eat daily?  2-3  On average, how many sweetened beverages do you drink each day (Examples: soda, juice, sweet tea, etc.  Do NOT count diet or artificially sweetened beverages)?   1  How many days per week do you exercise enough to make your heart beat faster? 3 or less  How many minutes a day do you exercise enough to  make your heart beat faster? 9 or less  How many days per week do you miss taking your medication? 0          Past Medical History:   Diagnosis Date    Breast cancer 12/2021    DDD (degenerative disc disease), lumbar     Endometriosis     Hepatitis B infection     Hypertension     Hypothyroidism          Seasonal allergies     Spinal stenosis, lumbar        Current Outpatient Medications   Medication Sig Dispense Refill    anastrozole (ARIMIDEX) 1 MG tablet Take 1 tablet (1 mg) by mouth daily. 90 tablet 3    Artificial Tear Solution (SOOTHE XP) SOLN Apply 1 drop to eye 3 times daily 15 mL 8    calcium carbonate (OS-EMMA) 500 MG tablet Take 2 tablets (1,000 mg) by mouth daily 180 tablet 3    cetirizine (ZYRTEC) 10 MG tablet Take 1 tablet (10 mg) by mouth daily. 30 tablet 0    ketorolac (ACULAR) 0.5 % ophthalmic solution Place 1 drop into both eyes 4 times daily 5 mL 6    levothyroxine (SYNTHROID/LEVOTHROID) 112 MCG tablet Take 1 tablet (112 mcg) by mouth daily. 90 tablet 1    lisinopril (ZESTRIL) 10 MG tablet Take 1 tablet (10 mg) by mouth daily. 90 tablet 1    metFORMIN (GLUCOPHAGE XR) 500 MG 24 hr tablet Take 2 tablets (1,000 mg) by mouth daily (with dinner). 180 tablet 1    olopatadine (PATANOL) 0.1 % ophthalmic solution Place 1 drop into both eyes 2 times daily 5 mL 12    Omega-3 Fatty Acids (FISH OIL) 500 MG CAPS Take by mouth daily      prednisoLONE acetate (PRED FORTE) 1 % ophthalmic suspension Place 1 drop into both eyes as needed for dry eyes      VITAMIN D3 50 MCG (2000 UT) tablet TAKE 1 TABLET (50 MCG) BY MOUTH EVERY MORNING 90 tablet 0    zolpidem (AMBIEN) 5 MG tablet Take 1 tablet (5 mg) by mouth nightly as needed for sleep 20 tablet 0    diclofenac (VOLTAREN) 1 % topical gel Apply 2 g topically 3 times daily (Patient not taking: Reported on 10/22/2024) 100 g 0    hydrOXYzine HCl (ATARAX) 25 MG tablet Take 1 tablet (25 mg) by mouth nightly as needed for itching Do not to drive or operate any machinery  "while on this med (Patient not taking: Reported on 9/4/2024) 20 tablet 0           Review of Systems  CONSTITUTIONAL: NEGATIVE for fever, chills,   RESP: NEGATIVE for significant cough or SOB  CV: NEGATIVE for chest pain, palpitations or peripheral edema  GI; no abd pain  : no dysuria  NEURO: NEGATIVE for weakness, dizziness   PSYCHIATRIC: NEGATIVE for changes in mood or affect      Objective    /73   Pulse 81   Temp 97.7  F (36.5  C) (Tympanic)   Resp 14   Ht 1.6 m (5' 3\")   Wt 98.2 kg (216 lb 9.6 oz)   LMP  (LMP Unknown)   SpO2 96%   BMI 38.37 kg/m    Body mass index is 38.37 kg/m .  Physical Exam   GENERAL: alert and no distress  EYES: Eyes grossly normal to inspection, PERRL and conjunctivae and sclerae normal  RESP: lungs clear to auscultation - no rales, rhonchi or wheezes  CV: regular rate and rhythm, normal S1 S2  ABDOMEN: soft, nontender, and bowel sounds normal  MS: no gross musculoskeletal defects noted, no edema  NEURO: Normal strength and tone, mentation intact and speech normal  PSYCH: mentation appears normal, affect normal/bright  Diabetic foot exam: normal DP and PT pulses, no trophic changes or ulcerative lesions, normal sensory exam, and normal monofilament exam    Results for orders placed or performed in visit on 10/22/24 (from the past 24 hour(s))   Hemoglobin A1c   Result Value Ref Range    Estimated Average Glucose 134 (H) <117 mg/dL    Hemoglobin A1C 6.3 (H) 0.0 - 5.6 %           Signed Electronically by: Eugene Michelle MD    "

## 2024-11-11 ENCOUNTER — OFFICE VISIT (OUTPATIENT)
Dept: INTERNAL MEDICINE | Facility: CLINIC | Age: 69
End: 2024-11-11
Payer: COMMERCIAL

## 2024-11-11 VITALS
DIASTOLIC BLOOD PRESSURE: 82 MMHG | WEIGHT: 215.4 LBS | HEART RATE: 87 BPM | HEIGHT: 63 IN | RESPIRATION RATE: 16 BRPM | OXYGEN SATURATION: 97 % | BODY MASS INDEX: 38.16 KG/M2 | SYSTOLIC BLOOD PRESSURE: 128 MMHG | TEMPERATURE: 98.2 F

## 2024-11-11 DIAGNOSIS — I10 PRIMARY HYPERTENSION: ICD-10-CM

## 2024-11-11 DIAGNOSIS — E66.01 MORBID OBESITY (H): ICD-10-CM

## 2024-11-11 DIAGNOSIS — R73.03 PREDIABETES: ICD-10-CM

## 2024-11-11 DIAGNOSIS — Z01.818 PREOP GENERAL PHYSICAL EXAM: Primary | ICD-10-CM

## 2024-11-11 DIAGNOSIS — N20.0 CALCULUS OF KIDNEY: ICD-10-CM

## 2024-11-11 LAB — HGB BLD-MCNC: 14.3 G/DL (ref 11.7–15.7)

## 2024-11-11 PROCEDURE — 85018 HEMOGLOBIN: CPT | Performed by: INTERNAL MEDICINE

## 2024-11-11 PROCEDURE — 93000 ELECTROCARDIOGRAM COMPLETE: CPT | Performed by: INTERNAL MEDICINE

## 2024-11-11 PROCEDURE — 36415 COLL VENOUS BLD VENIPUNCTURE: CPT | Performed by: INTERNAL MEDICINE

## 2024-11-11 PROCEDURE — 99215 OFFICE O/P EST HI 40 MIN: CPT | Performed by: INTERNAL MEDICINE

## 2024-11-11 PROCEDURE — 84132 ASSAY OF SERUM POTASSIUM: CPT | Performed by: INTERNAL MEDICINE

## 2024-11-11 NOTE — NURSING NOTE
"/82   Pulse 87   Temp 98.2  F (36.8  C) (Tympanic)   Resp 16   Ht 1.6 m (5' 3\")   Wt 97.7 kg (215 lb 6.4 oz)   LMP  (LMP Unknown)   SpO2 97%   BMI 38.16 kg/m      "

## 2024-11-11 NOTE — PROGRESS NOTES
Preoperative Evaluation  [facility]  [address]  [address]  [facility] [address]  Phone: [phone]  Primary Provider: Eugene Michelle MD  Pre-op Performing Provider: Eugene Michelle MD  Nov 11, 2024 11/11/2024   Surgical Information   What procedure is being done? URETERAL STENT PLACEMENT  Comments   Facility or Hospital where procedure/surgery will be performed: [email or web address]    Who is doing the procedure / surgery? Dr Juarez    Date of surgery / procedure: 11/18/2024    Time of surgery / procedure: no    Where do you plan to recover after surgery? at home with family        Patient-reported     Fax number for surgical facility: [phone]    Assessment & Plan     The proposed surgical procedure is considered LOW risk.    (Z01.818) Preop general physical exam  (primary encounter diagnosis)  Comment: URETERAL STENT PLACEMENT  Plan: Hemoglobin, Potassium, EKG 12-lead complete         w/read - Clinics            (N20.0) Calculus of kidney  Plan: URETERAL STENT PLACEMENT    (I10) Primary hypertension  Plan: BP well controlled on lisinopril.      (R73.03) Prediabetes  Plan: on metformin with recent A1c 6.3      (E66.01) Obesity (BMI 35.0-39.9) with comorbidity (H)  Plan:  Discussed in detail about Diet,calorie intake,and importance of regular exercise         - No identified additional risk factors other than previously addressed    Antiplatelet or Anticoagulation Medication Instructions   - aspirin: Discontinue aspirin 7- days prior to procedure to reduce bleeding risk.      Additional Medication Instructions   - ACE/ARB: DO NOT TAKE on day of surgery (minimum 11 hours for general anesthesia).   - metformin: DO NOT TAKE day of surgery.    Recommendation  Approval given to proceed with proposed procedure, without further diagnostic evaluation.      Review of the result(s) of each unique test - Hgb  43  minutes spent by me on the date of the  encounter doing chart review, history and exam, documentation and further activities per the note      Subjective   Sammie is a 69 year old, presenting for the following:  Pre-Op Exam          11/11/2024     2:48 PM   Additional Questions   Roomed by MICHELLE   Accompanied by SELF         11/11/2024     2:48 PM   Patient Reported Additional Medications   Patient reports taking the following new medications NONE       HPI related to upcoming procedure: URETERAL STENT PLACEMENT         11/11/2024   Pre-Op Questionnaire   Have you ever had a heart attack or stroke? No    Have you ever had surgery on your heart or blood vessels, such as a stent placement, a coronary artery bypass, or surgery on an artery in your head, neck, heart, or legs? No    Do you have chest pain with activity? No    Do you have a history of heart failure? No    Do you currently have a cold, bronchitis or symptoms of other infection? No    Do you have a cough, shortness of breath, or wheezing? No    Do you or anyone in your family have previous history of blood clots? No    Do you or does anyone in your family have a serious bleeding problem such as prolonged bleeding following surgeries or cuts? No    Have you ever had problems with anemia or been told to take iron pills? No    Have you had any abnormal blood loss such as black, tarry or bloody stools, or abnormal vaginal bleeding? No    Have you ever had a blood transfusion? No    Are you willing to have a blood transfusion if it is medically needed before, during, or after your surgery? Yes    Have you or any of your relatives ever had problems with anesthesia? No    Do you have sleep apnea, excessive snoring or daytime drowsiness? No    Do you have any artifical heart valves or other implanted medical devices like a pacemaker, defibrillator, or continuous glucose monitor? No    Do you have artificial joints? No    Are you allergic to latex? No        Patient-reported     Health Care  Directive  Patient does not have a Health Care Directive: Discussed advance care planning with patient; information given to patient to review.         Status of Chronic Conditions:    HYPERTENSION - Patient has history of HTN , currently denies any symptoms referable to elevated blood pressure. Specifically denies chest pain, palpitations, dyspnea, orthopnea, PND or peripheral edema. Blood pressure readings have been in normal range. Current medication regimen is as listed below. Patient denies any side effects of medication.     HYPOTHYROIDISM - Patient has a longstanding history of chronic Hypothyroidism. Patient has been doing well, noting no tremor, insomnia, hair loss or changes in skin texture. Continues to take medications as directed, without adverse reactions or side effects. Last TSH   Lab Results   Component Value Date    TSH 1.55 04/18/2024   .      Patient Active Problem List    Diagnosis Date Noted    Prediabetes 10/22/2024     Priority: Medium    Estrogen receptor positive status (ER+) 11/07/2023     Priority: Medium    Idiopathic osteoporosis 07/23/2023     Priority: Medium    Use of aromatase inhibitors 07/21/2023     Priority: Medium    Osteopenia 07/21/2023     Priority: Medium    Malignant neoplasm of upper-outer quadrant of left breast in female, estrogen receptor positive (H) 12/27/2022     Priority: Medium    S/P TRAM (transverse rectus abdominis muscle) flap breast reconstruction 08/25/2022     Priority: Medium    Lumbar radiculopathy 07/20/2022     Priority: Medium    Foraminal stenosis of lumbar region 07/20/2022     Priority: Medium    S/P breast reconstruction, left 03/23/2022     Priority: Medium    Dry eye 01/06/2022     Priority: Medium    Allergic conjunctivitis, bilateral 01/06/2022     Priority: Medium    Meibomianitis, unspecified laterality 01/06/2022     Priority: Medium    Gastritis, presence of bleeding unspecified, unspecified chronicity, unspecified gastritis type 06/04/2021      Priority: Medium    Stress incontinence 05/21/2019     Priority: Medium    Calculus of kidney 03/01/2019     Priority: Medium    Anatomical narrow angle - Both Eyes 01/25/2019     Priority: Medium    Exotropia of right eye 01/25/2019     Priority: Medium    Obesity (BMI 35.0-39.9) with comorbidity (H) 12/07/2018     Priority: Medium    Primary hypertension 12/07/2018     Priority: Medium    Atypical ductal hyperplasia of left breast 11/03/2017     Priority: Medium     Formatting of this note might be different from the original. Added automatically from request for surgery [id number]      LALO (obstructive sleep apnea) 09/24/2012     Priority: Medium     mild AHI 6.4/hour, RDI 10.7/hour. Desats to 85%.      DDD (degenerative disc disease), lumbar      Priority: Medium    Spinal stenosis, lumbar      Priority: Medium    Hypothyroidism      Priority: Medium    Varicose veins of legs 09/08/2008     Priority: Medium    History of hepatitis B 09/01/2004     Priority: Medium     Formatting of this note might be different from the original. Patient stated she has never had Hepatitis B Formatting of this note might be different from the original. Patient stated she has never had Hepatitis B Formatting of this note might be different from the original. Patient stated she has never had Hepatitis B        Past Medical History:   Diagnosis Date    Breast cancer 12/2021    DDD (degenerative disc disease), lumbar     Endometriosis     Hepatitis B infection     Hypertension     Hypothyroidism          Seasonal allergies     Spinal stenosis, lumbar      Past Surgical History:   Procedure Laterality Date    COLONOSCOPY  02/20/2012    Procedure:COLONOSCOPY; COLONOSCOPY ; Surgeon:ARUN KITCHEN; Location: GI    COLONOSCOPY N/A 02/15/2019    Procedure: COMBINED COLONOSCOPY, SINGLE OR MULTIPLE BIOPSY/POLYPECTOMY BY BIOPSY;  Surgeon: Connor Sanderson MD;  Location:  GI    COLONOSCOPY N/A 12/07/2022    Procedure: COLONOSCOPY;   Surgeon: Leventhal, Thomas Michael, MD;  Location: [facility] OR    ESOPHAGOSCOPY, GASTROSCOPY, DUODENOSCOPY (EGD), COMBINED N/A 12/07/2022    Procedure: ESOPHAGOGASTRODUODENOSCOPY, WITH BIOPSY;  Surgeon: Leventhal, Thomas Michael, MD;  Location: [facility] OR    GRAFT FREE VASCULARIZED TRANSVERSE RECTUS ABDOMINIS MYOCUTANEOUS Left 08/25/2022    Procedure: Left breast reconstruction with free abdominal flap,  SPY;  Surgeon: NOHEMI Feliciano MD;  Location: [facility] OR    HC CYSTOURETHROSCOPY W/ URETEROSCOPY &/OR PYELOSCOPY; W/ LITHOTRIPSY      HC TRABECULOPLASTY BY LASER SURGERY      PI OU    HYSTERECTOMY      LASER YAG IRIDOTOMY  08/08/2012    Procedure: LASER YAG IRIDOTOMY;  LEFT EYE YAG LASER PUPIL IRIDOTOMY ;  Surgeon: Dakotah Humphreys MD;  Location: [facility]    MASTECTOMY PARTIAL WITH SENTINEL NODE Left 03/23/2022    Procedure: LEFT SKIN SPARING MASTECTOMY WITH SENTINEL LYMPH NODE BIOPSY;  Surgeon: Dada Mendiola MD;  Location: [facility] OR    PELVIS LAPAROSCOPY,DX      RECONSTRUCT BREAST, INSERT TISSUE EXPANDER BILATERAL, COMBINED Left 03/23/2022    Procedure: Left breast reconstruction with expander and SPY;  Surgeon: NOHEMI Feliciano MD;  Location: [facility] OR    STRABISMUS SURGERY      VEIN LIGATION AND STRIPPING       Current Outpatient Medications   Medication Sig Dispense Refill    anastrozole (ARIMIDEX) 1 MG tablet Take 1 tablet (1 mg) by mouth daily. 90 tablet 3    Artificial Tear Solution (SOOTHE XP) SOLN Apply 1 drop to eye 3 times daily 15 mL 8    calcium carbonate (OS-EMMA) 500 MG tablet Take 2 tablets (1,000 mg) by mouth daily 180 tablet 3    cetirizine (ZYRTEC) 10 MG tablet Take 1 tablet (10 mg) by mouth daily. 30 tablet 0    diclofenac (VOLTAREN) 1 % topical gel Apply 2 g topically 3 times daily (Patient not taking: Reported on 10/22/2024) 100 g 0    hydrOXYzine HCl (ATARAX) 25 MG tablet Take 1 tablet (25 mg) by mouth nightly as needed for itching Do not to drive or operate any machinery while on this med (Patient not  taking: Reported on 2024) 20 tablet 0    ketorolac (ACULAR) 0.5 % ophthalmic solution Place 1 drop into both eyes 4 times daily 5 mL 6    levothyroxine (SYNTHROID/LEVOTHROID) 112 MCG tablet Take 1 tablet (112 mcg) by mouth daily. 90 tablet 1    lisinopril (ZESTRIL) 10 MG tablet Take 1 tablet (10 mg) by mouth daily. 90 tablet 1    metFORMIN (GLUCOPHAGE XR) 500 MG 24 hr tablet Take 2 tablets (1,000 mg) by mouth daily (with dinner). 180 tablet 1    olopatadine (PATANOL) 0.1 % ophthalmic solution Place 1 drop into both eyes 2 times daily 5 mL 12    Omega-3 Fatty Acids (FISH OIL) 500 MG CAPS Take by mouth daily      prednisoLONE acetate (PRED FORTE) 1 % ophthalmic suspension Place 1 drop into both eyes as needed for dry eyes      VITAMIN D3 50 MCG (2000 UT) tablet TAKE 1 TABLET (50 MCG) BY MOUTH EVERY MORNING 90 tablet 0    zolpidem (AMBIEN) 5 MG tablet Take 1 tablet (5 mg) by mouth nightly as needed for sleep 20 tablet 0       Allergies   Allergen Reactions    Seasonal Allergies         Social History     Tobacco Use    Smoking status: Never     Passive exposure: Never    Smokeless tobacco: Never   Substance Use Topics    Alcohol use: Not Currently     Family History   Problem Relation Age of Onset    Diabetes Mother     Thyroid Disease Mother     Hypertension Mother     Cancer Father          of stomach CA    Heart Disease Father         MI at age 58    Stomach Cancer Maternal Grandfather     Stomach Cancer Paternal Grandmother     Breast Cancer Paternal Aunt 80        Passed away    Glaucoma No family hx of     Macular Degeneration No family hx of     Anesthesia Reaction No family hx of     Bleeding Disorder No family hx of     Clotting Disorder No family hx of      History   Drug Use No             Review of Systems  CONSTITUTIONAL: NEGATIVE for fever, chills,    EYES: NEGATIVE for vision changes or irritation  ENT/MOUTH: NEGATIVE for ear, mouth and throat problems  RESP: NEGATIVE for significant cough or  "SOB  CV: NEGATIVE for chest pain, palpitations or peripheral edema  GI: NEGATIVE for nausea, abdominal pain, heartburn, or change in bowel habits  : kidney stones,   MUSCULOSKELETAL: NEGATIVE for significant arthralgias or myalgia  NEURO: NEGATIVE for weakness, dizziness or paresthesias  ENDOCRINE: NEGATIVE for temperature intolerance, skin/hair changes  HEME: NEGATIVE for bleeding problems  PSYCHIATRIC: NEGATIVE for changes in mood or affect    Objective    /82   Pulse 87   Temp 98.2  F (36.8  C) (Tympanic)   Resp 16   Ht 1.6 m (5' 3\")   Wt 97.7 kg (215 lb 6.4 oz)   LMP  (LMP Unknown)   SpO2 97%   BMI 38.16 kg/m     Estimated body mass index is 38.16 kg/m  as calculated from the following:    Height as of this encounter: 1.6 m (5' 3\").    Weight as of this encounter: 97.7 kg (215 lb 6.4 oz).  Physical Exam  GENERAL: alert and no distress  EYES: Eyes grossly normal to inspection, PERRL and conjunctivae and sclerae normal  HENT: ear canals and TM's normal, nose and mouth without ulcers or lesions  RESP: lungs clear to auscultation - no rales, rhonchi or wheezes  CV: regular rate and rhythm, normal S1 S2,   ABDOMEN: soft, nontender, and bowel sounds normal  MS: no gross musculoskeletal defects noted, no edema  NEURO: Normal strength and tone, mentation intact and speech normal  PSYCH: mentation appears normal, affect normal/bright    Recent Labs   Lab Test 10/22/24  1057 09/04/24  1138 06/22/24  0029 04/18/24  1433   HGB  --  13.5 13.7  --    PLT  --  295 284  --    NA  --  140 139  --    POTASSIUM  --  4.5 4.5  --    CR  --  0.81 0.69  --    A1C 6.3*  --   --  6.2*        Diagnostics  Recent Results (from the past 24 hours)   Hemoglobin    Collection Time: 11/11/24  3:29 PM   Result Value Ref Range    Hemoglobin 14.3 11.7 - 15.7 g/dL      EKG: Sinus Rhythm, normal intervals, no acute ST/T changes c/w ischemia,  unchanged from previous tracings    Revised Cardiac Risk Index (RCRI)  The patient has the " following serious cardiovascular risks for perioperative complications:   - No serious cardiac risks = 0 points           Signed Electronically by: Eugene Michelle MD  A copy of this evaluation report is provided to the requesting physician.

## 2024-11-12 LAB — POTASSIUM SERPL-SCNC: 4.6 MMOL/L (ref 3.4–5.3)

## 2024-11-19 ENCOUNTER — OFFICE VISIT (OUTPATIENT)
Dept: OPTOMETRY | Facility: CLINIC | Age: 69
End: 2024-11-19
Payer: COMMERCIAL

## 2024-11-19 DIAGNOSIS — H52.03 HYPEROPIA WITH ASTIGMATISM, BILATERAL: ICD-10-CM

## 2024-11-19 DIAGNOSIS — H25.813 MIXED TYPE AGE-RELATED CATARACT, BOTH EYES: Primary | ICD-10-CM

## 2024-11-19 DIAGNOSIS — H10.13 ALLERGIC CONJUNCTIVITIS, BILATERAL: ICD-10-CM

## 2024-11-19 DIAGNOSIS — H50.111 CONGENITAL EXOTROPIA OF RIGHT EYE: ICD-10-CM

## 2024-11-19 DIAGNOSIS — H04.129 DRY EYE: ICD-10-CM

## 2024-11-19 DIAGNOSIS — H40.033 ANATOMICAL NARROW ANGLE BORDERLINE GLAUCOMA OF BOTH EYES: ICD-10-CM

## 2024-11-19 DIAGNOSIS — H52.203 HYPEROPIA WITH ASTIGMATISM, BILATERAL: ICD-10-CM

## 2024-11-19 DIAGNOSIS — H53.001 AMBLYOPIA OF EYE, RIGHT: ICD-10-CM

## 2024-11-19 PROCEDURE — 92012 INTRM OPH EXAM EST PATIENT: CPT | Performed by: OPTOMETRIST

## 2024-11-19 ASSESSMENT — VISUAL ACUITY
OS_PH_CC: 20/50
CORRECTION_TYPE: GLASSES
OS_CC: 20/50
OD_PH_CC: 20/70
OS_CC+: -2
OS_CC: 20/70
OD_PH_CC+: -1
OD_CC: 20/100-1
OD_CC: 20/70
METHOD: SNELLEN - LINEAR
OS_PH_CC+: -1

## 2024-11-19 ASSESSMENT — REFRACTION_MANIFEST
OS_AXIS: 080
OD_CYLINDER: +2.00
OS_SPHERE: +2.50
OD_SPHERE: +3.50
OS_CYLINDER: +2.25
OD_AXIS: 105

## 2024-11-19 ASSESSMENT — REFRACTION_WEARINGRX
OS_ADD: +2.50
OD_AXIS: 105
OD_CYLINDER: +2.00
SPECS_TYPE: PAL
OD_SPHERE: +3.50
OS_SPHERE: +2.75
OS_CYLINDER: +2.00
OD_ADD: +2.50
OS_AXIS: 080

## 2024-11-19 ASSESSMENT — CUP TO DISC RATIO
OS_RATIO: 0.30
OD_RATIO: 0.30

## 2024-11-19 ASSESSMENT — EXTERNAL EXAM - LEFT EYE: OS_EXAM: NORMAL

## 2024-11-19 ASSESSMENT — TONOMETRY
IOP_METHOD: APPLANATION
OD_IOP_MMHG: 13
OS_IOP_MMHG: 12

## 2024-11-19 ASSESSMENT — EXTERNAL EXAM - RIGHT EYE: OD_EXAM: NORMAL

## 2024-11-19 NOTE — PROGRESS NOTES
Chief Complaint   Patient presents with    Blurred Vision Evaluation     Laterality:both eyesOnset:gradualOnset:5 months ago  Now in distance having hard time to read street signs      Medical, surgical and family histories reviewed and updated 11/19/2024.     Chronic allergic conjunctivitis patanol two times daily and steroid pulse in spring  LPI each eye   History of breast ca   Well controlled diabetic without retinopathy   OBJECTIVE: See Ophthalmology exam    ASSESSMENT:    ICD-10-CM    1. Mixed type age-related cataract, both eyes  H25.813       2. Allergic conjunctivitis, bilateral  H10.13       3. Hyperopia with astigmatism, bilateral  H52.03     H52.203       4. Dry eye  H04.129       5. Amblyopia of eye, right  H53.001       6. Congenital exotropia of right eye  H50.111       7. Anatomical narrow angle borderline glaucoma of both eyes  H40.033       Cataract is worse in non-amblyopic eye    PLAN:   Refer to [facility] for CE consult      Ana Ludwig OD

## 2024-11-19 NOTE — LETTER
11/19/2024      Ramona Ferrer  [address]  [place] [address]      Dear Colleague,    Thank you for referring your patient, [facility]rer[facility]. Please see a copy of my visit note below.    Chief Complaint   Patient presents with     Blurred Vision Evaluation     Laterality:both eyesOnset:gradualOnset:5 months ago  Now in distance having hard time to read street signs      Medical, surgical and family histories reviewed and updated 11/19/2024.     Chronic allergic conjunctivitis patanol two times daily and steroid pulse in spring  LPI each eye   History of breast ca   Well controlled diabetic without retinopathy   OBJECTIVE: See Ophthalmology exam    ASSESSMENT:    ICD-10-CM    1. Mixed type age-related cataract, both eyes  H25.813       2. Allergic conjunctivitis, bilateral  H10.13       3. Hyperopia with astigmatism, bilateral  H52.03     H52.203       4. Dry eye  H04.129       5. Amblyopia of eye, right  H53.001       6. Congenital exotropia of right eye  H50.111       7. Anatomical narrow angle borderline glaucoma of both eyes  H40.033       Cataract is worse in non-amblyopic eye    PLAN:   Refer to [facility] for CE consult      Ana Ludwig OD       Again, thank you for allowing me to participate in the care of your patient.        Sincerely,        Ana Ludwig, OD

## 2024-12-13 ENCOUNTER — TRANSFERRED RECORDS (OUTPATIENT)
Dept: HEALTH INFORMATION MANAGEMENT | Facility: CLINIC | Age: 69
End: 2024-12-13
Payer: COMMERCIAL

## 2025-01-06 ENCOUNTER — OFFICE VISIT (OUTPATIENT)
Dept: INTERNAL MEDICINE | Facility: CLINIC | Age: 70
End: 2025-01-06
Payer: COMMERCIAL

## 2025-01-06 VITALS
OXYGEN SATURATION: 95 % | HEIGHT: 63 IN | TEMPERATURE: 97.8 F | WEIGHT: 218 LBS | SYSTOLIC BLOOD PRESSURE: 124 MMHG | RESPIRATION RATE: 17 BRPM | DIASTOLIC BLOOD PRESSURE: 84 MMHG | BODY MASS INDEX: 38.62 KG/M2 | HEART RATE: 108 BPM

## 2025-01-06 DIAGNOSIS — Z01.818 PREOP GENERAL PHYSICAL EXAM: Primary | ICD-10-CM

## 2025-01-06 DIAGNOSIS — H26.9 CATARACT OF BOTH EYES, UNSPECIFIED CATARACT TYPE: ICD-10-CM

## 2025-01-06 DIAGNOSIS — E03.9 HYPOTHYROIDISM, UNSPECIFIED TYPE: ICD-10-CM

## 2025-01-06 DIAGNOSIS — Z02.89 ENCOUNTER FOR COMPLETION OF FORM WITH PATIENT: ICD-10-CM

## 2025-01-06 DIAGNOSIS — I10 PRIMARY HYPERTENSION: ICD-10-CM

## 2025-01-06 DIAGNOSIS — J30.9 ALLERGIC RHINITIS, UNSPECIFIED SEASONALITY, UNSPECIFIED TRIGGER: ICD-10-CM

## 2025-01-06 DIAGNOSIS — G47.00 INSOMNIA, UNSPECIFIED TYPE: ICD-10-CM

## 2025-01-06 DIAGNOSIS — R73.03 PREDIABETES: ICD-10-CM

## 2025-01-06 LAB — HGB BLD-MCNC: 14 G/DL (ref 11.7–15.7)

## 2025-01-06 PROCEDURE — 36415 COLL VENOUS BLD VENIPUNCTURE: CPT | Performed by: INTERNAL MEDICINE

## 2025-01-06 PROCEDURE — 85018 HEMOGLOBIN: CPT | Performed by: INTERNAL MEDICINE

## 2025-01-06 PROCEDURE — 99215 OFFICE O/P EST HI 40 MIN: CPT | Performed by: INTERNAL MEDICINE

## 2025-01-06 PROCEDURE — 84132 ASSAY OF SERUM POTASSIUM: CPT | Performed by: INTERNAL MEDICINE

## 2025-01-06 RX ORDER — CETIRIZINE HYDROCHLORIDE 10 MG/1
10 TABLET ORAL DAILY
Qty: 90 TABLET | Refills: 0 | Status: SHIPPED | OUTPATIENT
Start: 2025-01-06

## 2025-01-06 RX ORDER — ZOLPIDEM TARTRATE 5 MG/1
5 TABLET ORAL
Qty: 20 TABLET | Refills: 0 | Status: SHIPPED | OUTPATIENT
Start: 2025-01-06

## 2025-01-06 NOTE — PROGRESS NOTES
Preoperative Evaluation  [facility]  [address]  [address]  [facility] [address]  Phone: [phone]  Primary Provider: Eugene Michelle MD  Pre-op Performing Provider: Eugene Michelle MD  Jan 6, 2025 1/6/2025   Surgical Information   What procedure is being done? cataract sergery   Facility or Hospital where procedure/surgery will be performed: 01 14 2025   Who is doing the procedure / surgery? dr Rome   Date of surgery / procedure: 14 january 2025   Time of surgery / procedure: 2pm   Where do you plan to recover after surgery? at home with family     Fax number for surgical facility: [phone]    Assessment & Plan     The proposed surgical procedure is considered LOW risk.    (Z01.818) Preop general physical exam  (primary encounter diagnosis)  Comment: bilateral cataract surgery   Plan: Hemoglobin, Potassium            (H26.9) Cataract of both eyes, unspecified cataract type  Plan: bilateral cataract repair     (G47.00) Insomnia, unspecified type  Comment: stable   Plan: zolpidem (AMBIEN) 5 MG tablet refilled.explained clearly about the medication,insructions and side effects.Advised not to drive or operate any machinery while on this med      (J30.9) Allergic rhinitis, unspecified seasonality, unspecified trigger  Comment: stable   Plan: cetirizine (ZYRTEC) 10 MG tablet refilled.explained clearly about the medication,insructions and side effects.         (I10) Primary hypertension  Plan: BP stable     (R73.03) Prediabetes  Plan: on metformin     (E03.9) Hypothyroidism, unspecified type  Plan: continue levoxyl       (Z02.89) Encounter for completion of form with patient  Plan: handicap parking permit completed          - No identified additional risk factors other than previously addressed    Antiplatelet or Anticoagulation Medication Instructions   - aspirin: Discontinue aspirin 7 days prior to procedure to reduce bleeding risk.      Additional Medication Instructions   - ACE/ARB/ARNI (lisinopril, enalapril, losartan, valsartan, olmesartan, sacubritril/valsartan) : DO NOT TAKE on day of surgery (minimum 11 hours for general anesthesia).   - metformin: DO NOT TAKE day of surgery.    Recommendation  Approval given to proceed with proposed procedure, without further diagnostic evaluation.      Review of the result(s) of each unique test - Hgb  Prescription drug management  45 minutes spent by me on the date of the encounter doing chart review, history and exam, documentation and further activities per the note      Subjective   Sammie is a 69 year old, presenting for the following:  Pre-Op Exam          1/6/2025    10:41 AM   Additional Questions   Roomed by rosa   Accompanied by self         1/6/2025    10:41 AM   Patient Reported Additional Medications   Patient reports taking the following new medications none     HPI related to upcoming procedure: cataract surgery- 01/14/25 ( rt eye)  02/4/25 ( lt eye)         1/6/2025   Pre-Op Questionnaire   Have you ever had a heart attack or stroke? No   Have you ever had surgery on your heart or blood vessels, such as a stent placement, a coronary artery bypass, or surgery on an artery in your head, neck, heart, or legs? No   Do you have chest pain with activity? No   Do you have a history of heart failure? No   Do you currently have a cold, bronchitis or symptoms of other infection? No   Do you have a cough, shortness of breath, or wheezing? No   Do you or anyone in your family have previous history of blood clots? No   Do you or does anyone in your family have a serious bleeding problem such as prolonged bleeding following surgeries or cuts? No   Have you ever had problems with anemia or been told to take iron pills? No   Have you had any abnormal blood loss such as black, tarry or bloody stools, or abnormal vaginal bleeding? No   Have you ever had a blood transfusion? No   Are you willing to have a  blood transfusion if it is medically needed before, during, or after your surgery? Yes   Have you or any of your relatives ever had problems with anesthesia? No   Do you have sleep apnea, excessive snoring or daytime drowsiness? No   Do you have any artifical heart valves or other implanted medical devices like a pacemaker, defibrillator, or continuous glucose monitor? No   Do you have artificial joints? No   Are you allergic to latex? No     Health Care Directive  Patient does not have a Health Care Directive: Discussed advance care planning with patient; information given to patient to review.   56}    Status of Chronic Conditions:      HYPERTENSION - Patient has longstanding history of HTN , currently denies any symptoms referable to elevated blood pressure. Specifically denies chest pain, palpitations, dyspnea, orthopnea, PND or peripheral edema. Blood pressure readings have been in normal range. Current medication regimen is as listed below. Patient denies any side effects of medication.     HYPOTHYROIDISM - Patient has a longstanding history of chronic Hypothyroidism. Patient has been doing well, noting no tremor, insomnia, hair loss or changes in skin texture. Continues to take medications as directed, without adverse reactions or side effects. Last TSH   Lab Results   Component Value Date    TSH 1.55 04/18/2024   .    H/o Prediabetes; on metformin    H/o spinal stenosis and  DDD lumbar- requesting handicap parking permit form filled today .    H/o Insomnia; on Ambien with good symptom relief and requesting refills.      Patient Active Problem List    Diagnosis Date Noted    Prediabetes 10/22/2024     Priority: Medium    Estrogen receptor positive status (ER+) 11/07/2023     Priority: Medium    Idiopathic osteoporosis 07/23/2023     Priority: Medium    Use of aromatase inhibitors 07/21/2023     Priority: Medium    Osteopenia 07/21/2023     Priority: Medium    Malignant neoplasm of upper-outer quadrant of left  breast in female, estrogen receptor positive (H) 12/27/2022     Priority: Medium    S/P TRAM (transverse rectus abdominis muscle) flap breast reconstruction 08/25/2022     Priority: Medium    Lumbar radiculopathy 07/20/2022     Priority: Medium    Foraminal stenosis of lumbar region 07/20/2022     Priority: Medium    S/P breast reconstruction, left 03/23/2022     Priority: Medium    Dry eye 01/06/2022     Priority: Medium    Allergic conjunctivitis, bilateral 01/06/2022     Priority: Medium    Meibomianitis, unspecified laterality 01/06/2022     Priority: Medium    Gastritis, presence of bleeding unspecified, unspecified chronicity, unspecified gastritis type 06/04/2021     Priority: Medium    Stress incontinence 05/21/2019     Priority: Medium    Calculus of kidney 03/01/2019     Priority: Medium    Anatomical narrow angle - Both Eyes 01/25/2019     Priority: Medium    Exotropia of right eye 01/25/2019     Priority: Medium    Obesity (BMI 35.0-39.9) with comorbidity (H) 12/07/2018     Priority: Medium    Primary hypertension 12/07/2018     Priority: Medium    Atypical ductal hyperplasia of left breast 11/03/2017     Priority: Medium     Formatting of this note might be different from the original. Added automatically from request for surgery [id number]      LALO (obstructive sleep apnea) 09/24/2012     Priority: Medium     mild AHI 6.4/hour, RDI 10.7/hour. Desats to 85%.      DDD (degenerative disc disease), lumbar      Priority: Medium    Spinal stenosis, lumbar      Priority: Medium    Hypothyroidism      Priority: Medium    Varicose veins of legs 09/08/2008     Priority: Medium    History of hepatitis B 09/01/2004     Priority: Medium     Formatting of this note might be different from the original. Patient stated she has never had Hepatitis B Formatting of this note might be different from the original. Patient stated she has never had Hepatitis B Formatting of this note might be different from the original.  Patient stated she has never had Hepatitis B        Past Medical History:   Diagnosis Date    Breast cancer 12/2021    DDD (degenerative disc disease), lumbar     Endometriosis     Hepatitis B infection     Hypertension     Hypothyroidism          Seasonal allergies     Spinal stenosis, lumbar      Past Surgical History:   Procedure Laterality Date    COLONOSCOPY  02/20/2012    Procedure:COLONOSCOPY; COLONOSCOPY ; Surgeon:ARUN KITCHEN; Location: GI    COLONOSCOPY N/A 02/15/2019    Procedure: COMBINED COLONOSCOPY, SINGLE OR MULTIPLE BIOPSY/POLYPECTOMY BY BIOPSY;  Surgeon: Connor Sanderson MD;  Location:  GI    COLONOSCOPY N/A 12/07/2022    Procedure: COLONOSCOPY;  Surgeon: Leventhal, Thomas Michael, MD;  Location: [facility] OR    ESOPHAGOSCOPY, GASTROSCOPY, DUODENOSCOPY (EGD), COMBINED N/A 12/07/2022    Procedure: ESOPHAGOGASTRODUODENOSCOPY, WITH BIOPSY;  Surgeon: Leventhal, Thomas Michael, MD;  Location: [facility] OR    GRAFT FREE VASCULARIZED TRANSVERSE RECTUS ABDOMINIS MYOCUTANEOUS Left 08/25/2022    Procedure: Left breast reconstruction with free abdominal flap,  SPY;  Surgeon: NOHEMI Feliciano MD;  Location: [facility] OR    HC CYSTOURETHROSCOPY W/ URETEROSCOPY &/OR PYELOSCOPY; W/ LITHOTRIPSY      HC TRABECULOPLASTY BY LASER SURGERY      PI OU    HYSTERECTOMY      LASER YAG IRIDOTOMY  08/08/2012    Procedure: LASER YAG IRIDOTOMY;  LEFT EYE YAG LASER PUPIL IRIDOTOMY ;  Surgeon: Dakotah Humphreys MD;  Location:  [facility]    MASTECTOMY PARTIAL WITH SENTINEL NODE Left 03/23/2022    Procedure: LEFT SKIN SPARING MASTECTOMY WITH SENTINEL LYMPH NODE BIOPSY;  Surgeon: Dada Mendiola MD;  Location: [facility] OR    PELVIS LAPAROSCOPY,DX      RECONSTRUCT BREAST, INSERT TISSUE EXPANDER BILATERAL, COMBINED Left 03/23/2022    Procedure: Left breast reconstruction with expander and SPY;  Surgeon: NOHEMI Feliciano MD;  Location: [facility] OR    STRABISMUS SURGERY      VEIN LIGATION AND STRIPPING       Current Outpatient Medications   Medication Sig  Dispense Refill    anastrozole (ARIMIDEX) 1 MG tablet Take 1 tablet (1 mg) by mouth daily. 90 tablet 3    Artificial Tear Solution (SOOTHE XP) SOLN Apply 1 drop to eye 3 times daily 15 mL 8    calcium carbonate (OS-EMMA) 500 MG tablet Take 2 tablets (1,000 mg) by mouth daily 180 tablet 3    diclofenac (VOLTAREN) 1 % topical gel Apply 2 g topically 3 times daily 100 g 0    hydrOXYzine HCl (ATARAX) 25 MG tablet Take 1 tablet (25 mg) by mouth nightly as needed for itching Do not to drive or operate any machinery while on this med 20 tablet 0    ketorolac (ACULAR) 0.5 % ophthalmic solution Place 1 drop into both eyes 4 times daily 5 mL 6    levothyroxine (SYNTHROID/LEVOTHROID) 112 MCG tablet Take 1 tablet (112 mcg) by mouth daily. 90 tablet 1    lisinopril (ZESTRIL) 10 MG tablet Take 1 tablet (10 mg) by mouth daily. 90 tablet 1    metFORMIN (GLUCOPHAGE XR) 500 MG 24 hr tablet Take 2 tablets (1,000 mg) by mouth daily (with dinner). 180 tablet 1    olopatadine (PATANOL) 0.1 % ophthalmic solution Place 1 drop into both eyes 2 times daily 5 mL 12    Omega-3 Fatty Acids (FISH OIL) 500 MG CAPS Take by mouth daily      prednisoLONE acetate (PRED FORTE) 1 % ophthalmic suspension Place 1 drop into both eyes as needed for dry eyes      VITAMIN D3 50 MCG (2000 UT) tablet TAKE 1 TABLET (50 MCG) BY MOUTH EVERY MORNING 90 tablet 0    zolpidem (AMBIEN) 5 MG tablet Take 1 tablet (5 mg) by mouth nightly as needed for sleep. 20 tablet 0    cetirizine (ZYRTEC) 10 MG tablet Take 1 tablet (10 mg) by mouth daily. 90 tablet 0       Allergies   Allergen Reactions    Seasonal Allergies         Social History     Tobacco Use    Smoking status: Never     Passive exposure: Never    Smokeless tobacco: Never   Substance Use Topics    Alcohol use: Not Currently     Family History   Problem Relation Age of Onset    Diabetes Mother     Thyroid Disease Mother     Hypertension Mother     Cancer Father          of stomach CA    Heart Disease Father      "    MI at age 58    Stomach Cancer Maternal Grandfather     Stomach Cancer Paternal Grandmother     Breast Cancer Paternal Aunt 80        Passed away    Glaucoma No family hx of     Macular Degeneration No family hx of     Anesthesia Reaction No family hx of     Bleeding Disorder No family hx of     Clotting Disorder No family hx of      History   Drug Use No             Review of Systems  CONSTITUTIONAL: NEGATIVE for fever, chills,    EYES: bilateral cataracts   ENT/MOUTH: NEGATIVE for ear, mouth and throat problems  RESP: NEGATIVE for significant cough or SOB  BREAST: NEGATIVE for masses, tenderness or discharge  CV: NEGATIVE for chest pain, palpitations or peripheral edema  GI: NEGATIVE for nausea, abdominal pain, heartburn, or change in bowel habits  : NEGATIVE for frequency, dysuria, or hematuria  MUSCULOSKELETAL: NEGATIVE for significant arthralgias or myalgia  NEURO: NEGATIVE for weakness, dizziness or paresthesias  ENDOCRINE: NEGATIVE for temperature intolerance, skin/hair changes  HEME: NEGATIVE for bleeding problems  PSYCHIATRIC: NEGATIVE for changes in mood or affect    Objective    /84   Pulse 108   Temp 97.8  F (36.6  C) (Tympanic)   Resp 17   Ht 1.6 m (5' 3\")   Wt 98.9 kg (218 lb)   LMP  (LMP Unknown)   SpO2 95%   BMI 38.62 kg/m     Estimated body mass index is 38.62 kg/m  as calculated from the following:    Height as of this encounter: 1.6 m (5' 3\").    Weight as of this encounter: 98.9 kg (218 lb).  Physical Exam  GENERAL: alert and no distress  EYES: Eyes grossly normal to inspection, PERRL and conjunctivae and sclerae normal  HENT: ear canals and TM's normal, nose and mouth without ulcers or lesions  RESP: lungs clear to auscultation - no rales, rhonchi or wheezes  CV: regular rate and rhythm, normal S1 S2,    ABDOMEN: soft, nontender,  and bowel sounds normal  MS: no gross musculoskeletal defects noted, no edema  NEURO: Normal strength and tone, mentation intact and speech " normal  PSYCH: mentation appears normal, affect normal/bright    Recent Labs   Lab Test 11/11/24  1529 10/22/24  1057 09/04/24  1138 06/22/24  0029 04/18/24  1433   HGB 14.3  --  13.5 13.7  --    PLT  --   --  295 284  --    NA  --   --  140 139  --    POTASSIUM 4.6  --  4.5 4.5  --    CR  --   --  0.81 0.69  --    A1C  --  6.3*  --   --  6.2*        Diagnostics  Recent Results (from the past 24 hours)   Hemoglobin    Collection Time: 01/06/25 11:22 AM   Result Value Ref Range    Hemoglobin 14.0 11.7 - 15.7 g/dL      No EKG required for low risk surgery (cataract, skin procedure, breast biopsy, etc).    Revised Cardiac Risk Index (RCRI)  The patient has the following serious cardiovascular risks for perioperative complications:   - No serious cardiac risks = 0 points           Signed Electronically by: Eugene Michelle MD  A copy of this evaluation report is provided to the requesting physician.

## 2025-01-06 NOTE — NURSING NOTE
"/84   Pulse 108   Temp 97.8  F (36.6  C) (Tympanic)   Resp 17   Ht 1.6 m (5' 3\")   Wt 98.9 kg (218 lb)   LMP  (LMP Unknown)   SpO2 95%   BMI 38.62 kg/m      "

## 2025-01-07 LAB — POTASSIUM SERPL-SCNC: 4.2 MMOL/L (ref 3.4–5.3)

## 2025-01-09 ENCOUNTER — TELEPHONE (OUTPATIENT)
Dept: INTERNAL MEDICINE | Facility: CLINIC | Age: 70
End: 2025-01-09
Payer: COMMERCIAL

## 2025-01-09 NOTE — TELEPHONE ENCOUNTER
Dr Michelle filled out a disability parking certificate during the 1/8/25 office visit. Copied to chart. Patient will pick this up at our .

## 2025-01-30 DIAGNOSIS — C50.412 MALIGNANT NEOPLASM OF UPPER-OUTER QUADRANT OF LEFT BREAST IN FEMALE, ESTROGEN RECEPTOR POSITIVE (H): ICD-10-CM

## 2025-01-30 DIAGNOSIS — Z17.0 MALIGNANT NEOPLASM OF UPPER-OUTER QUADRANT OF LEFT BREAST IN FEMALE, ESTROGEN RECEPTOR POSITIVE (H): ICD-10-CM

## 2025-01-30 RX ORDER — ANASTROZOLE 1 MG/1
1 TABLET ORAL DAILY
Qty: 90 TABLET | Refills: 3 | Status: SHIPPED | OUTPATIENT
Start: 2025-01-30

## 2025-01-30 NOTE — TELEPHONE ENCOUNTER
Pending Prescriptions:                       Disp   Refills    anastrozole (ARIMIDEX) 1 MG tablet        90 tab*3            Sig: Take 1 tablet (1 mg) by mouth daily.    Last prescribing provider: Zoya Alamo    Last clinic visit date: 09/04/24 w/Zoya Alamo    Recommendations for requested medication (if none, N/A): Copied from last OV note: -She continues to tolerate anastrozole very well and has no new concerns today     Any other pertinent information (if none, N/A): N/A    Refilled: Y/N, if NO, why?

## 2025-02-02 ENCOUNTER — HEALTH MAINTENANCE LETTER (OUTPATIENT)
Age: 70
End: 2025-02-02

## 2025-02-02 NOTE — PROGRESS NOTES
Dada Mendiola MD  [facility]  [address] [place] [address], [employer] [address]  [place] [address]     RE:  Ramona Ferrer  MRN:  [id number]  :  1955     Dear Dr. Mendiola:     I would like to refer to you Sammie Ferrer, a 68-year-old woman with a recent diagnosis of a stage IIB, T3 N0 MX, invasive lobular carcinoma of the left breast.  She self-palpated a lump in the upper outer quadrant of the left breast.  She underwent evaluation with a diagnostic mammogram and ultrasound, which was BI-RADS 4, showing in the upper outer quadrant of the left breast irregularly shaped hypoechoic mass at the 12 o'clock position 4 cm from the nipple on the left.  This mass measured 2.3 x 2.2 x 1.5 cm and accounted for the patient's area of palpable concern.  Additionally, at the 1 o'clock position 2 cm from the nipple in the left breast, there is a second irregularly shaped hypoechoic mass with indistinct margins measuring 1.3 x 1.1 x 0.8 cm.  She also underwent a breast MRI which showed in the right breast mild background parenchymal enhancement and no suspicious areas of enhancement or lymphadenopathy.  In the left breast there was mild background parenchymal enhancement, and at the 12 o'clock position 4 cm from the nipple there was a heterogeneously enhancing irregular mass measuring 3.4 in AP dimension x 2.8 cm ML and 2.8 cm SI corresponding to the known biopsy-proven malignancy in the left breast.  In the left breast at the 1 o'clock position 2 cm from the nipple there was also a heterogeneously enhancing oval mass measuring 1.3 cm in maximal diameter.  This likely corresponds to the second biopsy-proven malignancy.  Together, the full extent of the disease spans a total of 5.2 cm AP x 4.4 cm ML, BI-RADS 6.     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  In part B in the left breast at the 1 o'clock position 2 cm from the nipple, ultrasound-guided needle biopsy showed invasive lobular carcinoma, [place] grade 1/3 lobular carcinoma in situ was present, classic subtype, estrogen receptors positive in greater than 70% of the cells, progesterone receptors positive in greater than 90% of cells, and HER2 was nonamplified, Ki-67 20-25%.  She now comes to our clinic for recommendations.     Sammie reports that the breast mass had been there for approximately 1 month before she was seen for evaluation..       She has worked in the past as a personal care assistant and lives in the [facility].       PAST MEDICAL HISTORY:  There is no history of breast cancer in the past.  No history of breast cancer surgery.  She has no history of radiation therapy in the past or radiation exposure.  No history of prior tumor of any kind.  She denies heart problems, heart attack, breathing problems, blood clots, seizures, peptic ulcer disease, osteoporosis or bone fractures.  She does report a history of arthritis.     FAMILY HISTORY:  Positive for breast cancer in a paternal aunt who was diagnosed with breast cancer at an old age.     Her father has a history of stomach cancer.  There is no family history of pancreatic cancer, melanoma, lung cancer or ovarian cancer.     No history of male breast cancer.     ALLERGIES:  No history of drug allergies.  She denies allergies to seafood, iodine, or contrast dye.     PAST MENSTRUAL HISTORY:  She has been pregnant 3 times with 2 live births at age 29 and 31 and 1 miscarriage.  Age at first menstrual period was 14.  She did have a total abdominal hysterectomy and bilateral salpingo-oophorectomy performed in 2003 for endometriosis.  She has no history of hormone replacement therapy.  No  history of oral contraceptives.     HABITS: She denies tobacco history.  She denies alcohol history and drinks alcohol only occasionally.     PAST MEDICAL HISTORY:  Past medical history is also remarkable for type 2 diabetes, and she was begun on metformin 2 years ago.  She also has a history of varicose veins and a history of a lipoma resected from her left leg.      Chronic Hepatitis B.  Hepatitis C negative.  HIV negative.       Prior COVID vaccination x 3.      GERM LINE GENETICS: INVITAE testing of 47 genes was negative.      TREATMENT HISTORY:  A.  MammaPrint showed low risk.  B.  Left mastectomy and axillary lymph node sampling.   A. Lymph node, LEFT axillary, excision:  -One benign lymph node (0/1)  B. LEFT breast, skin-sparing mastectomy:  -INVASIVE BREAST CARCINOMA, MIXED INVASIVE LOBULAR CARCINOMA (predominant component) and INVASIVE DUCTAL CARCINOMA (minor component), KALPESH GRADE 3, size 5.5 cm, 12:00, upper outer quadrant and lower outer quadrant  -Ductal carcinoma in situ (DCIS), nuclear grade 2, cribriform and solid type(s), with focal necrosis  -DCIS is admixed with invasive carcinoma, and comprises a minor component (<5%) of the tumor volume   -Lobular carcinoma in situ (LCIS), predominantly classic type (admixed with invasive carcinoma and beyond invasive carcinoma) and focal pleomorphic type (size 2 mm, adjacent to invasive carcinoma)  -Margins are uninvolved by invasive carcinoma  -Invasive carcinoma is 2.5 mm from the nearest (anterior) margin, and is > 5 mm from the posterior margin  -Margins are uninvolved by DCIS and pleomorphic LCIS  -DCIS and pleomorphic LCIS are > 5 mm from the nearest (anterior) margin  -Benign nipple and skin   -Other findings: fibrocystic change (including microcysts with apocrine metaplasia) and columnar cell change  -Calcifications associated with DCIS, LCIS, and benign ducts and acini  -Prior core biopsy site changes (two biopsy sites identified)  -Invasive  carcinoma is estrogen receptor positive (>70%, strong intensity) and progesterone receptor positive (>90%, strong intensity) by immunohistochemistry and is HER2 non-amplified (group 5) by FISH (performed on prior core biopsies, see report [id number], specimens A and B)  DCIS and LCIS present.  -Margins are uninvolved by invasive carcinoma or LCIS.   -Invasive carcinoma is estrogen receptor positive (>70%, strong intensity) and progesterone receptor positive (>90%, strong intensity) by immunohistochemistry and is HER2 non-amplified (group 5) by FISH (performed on prior core biopsies, see report [id number], specimens A and B) Ki-67 immunohistochemistry (MIB-1, pharmDx, [employer]) from [facility] was performed on invasive carcinoma in the LEFT mastectomy specimen   They report Ki-67 proliferative index of 20-25% (block B5) and 20% (block B27).   C.  Staging:  pT3 Nx because lymph node is not the sentinel node.    D.  She did not want radiation, and risk:benefit did not favor treatment.  E.  Letrozole begun 4-5-22.   F.  Left TRAM flap reconstruction healed as of 12-27-22 but she has some concerns.   G.  Adjuvant Zometa 12-27-22 but not tolerated. She did have a reaction to adjuvant Zometa and it cannot be given any further.   H. Letrozole Changed to Anastrozole due to arthralgias.       INTERVAL HISTORY  Ramona returns to clinic for routine followup and has been feeling generally OK. She mentions she's been having arthralgias affected bilateral knees and wrists. She states the knee pain only started after taking letrozole. She also has ankle/foot pain. She finds her hot flashes manageable. She is still taking Calcium and Vitamin D.  Her  accompanied her on today's visit.    She has had left shoulder pain for the last month.  She does not want to have an MRI but would rather wait and have physical therapy.  The symptoms are consistent with frozen shoulder.  In   She is eating a healthful diet.  She is not  exercising.  I did advise her to walk at least 30 minutes a day.  She does not consume alcohol.  She is taking vitamin D.  On her last DEXA scan from 9/29/23, her most valid T-score was -1.7 at the right femoral neck (compared to -1.8 from 9/6/2022).   She does have a TRAM flap that was placed and the incisions have completely healed.     REVIEW OF SYSTEMS:  A 10-point review of systems was otherwise negative.        PHYSICAL EXAMINATION:    VITAL SIGNS: /80 (BP Location: Right arm, Patient Position: Sitting, Cuff Size: Adult Large)   Pulse 81   Temp 97.4  F (36.3  C) (Oral)   Resp 18   Wt 99.4 kg (219 lb 1.6 oz)   LMP  (LMP Unknown)   SpO2 98%   BMI 38.81 kg/m      GENERAL:  Ramona appeared generally well.  HEENT:  No alopecia.  She has moderately good dentition, but is having a crown placed soon.  There are no empty sockets or recent extractions.  LYMPH:  No cervical, supraclavicular, subclavicular or axillary lymphadenopathy.  BREASTS:  Right breast reveals no masses.  Examination of the left breast reveals a TRAM flap in place with no erythema or masses.  Incisions are well healed without erythema.  There is a 1 cm bump consistent with fat necrosis at the medial aspect of the left breast reconstruction.  The overlying skin has no changes.  LUNGS:  Clear to percussion and auscultation.  HEART:  Regular rate and rhythm.  S1, S2.  ABDOMEN:  Soft, nontender, consistent with increased body mass index.  EXTREMITIES:  Without edema.  PSYCH:  Mood was somewhat anxious.  Affect was appropriate.     LABORATORY DATA:  CBC and CMP within normal limits.  The glucose was elevated but nonfasting.    IMAGING:  Mammogram February 4, 2025  BREAST DENSITY: The breasts are heterogeneously dense, which may  obscure small masses.     Findings:     Mammogram:  No suspicious mammographic finding. No significant change.                                                                      IMPRESSION: BI-RADS CATEGORY: 1 -   Negative.     RECOMMENDED FOLLOW-UP: Routine yearly mammography beginning at age 40  or as discussed with your provider.     The finding and recommendation were discussed with the patient at the  time of evaluation.     JERMAINE JOAQUIN MD      ASSESSMENT AND PLAN:  1.  Sammie Ferrer is a 69-year-old woman with a recent diagnosis of a stage IIB, T3 N0 MX, invasive lobular carcinoma of the upper outer quadrant of the left breast which is multifocal grade 1, ER positive in greater than 70% of cells, MI positive in greater than 90% of cells and HER2 nonamplified.    2.  MammaPrint before surgery came back as low risk as did the Oncotype after surgery. The Oncotype of the pleomorphic lobular cancer showed a value of 15, which is less than the threshold for chemotherapy from TAILORx which is 26 for a post-menopausal woman.   3.  Sammie Ferrer underwent a left mastectomy and has a TRAM flap in place.  She started adjuvant hormonal therapy with letrozole and has tolerated it well until 1/30/24. She started to develop arthralgias affecting bilateral knees and wrists. Therefore, through shared decision making, she was changed to Anastrozole on 1/30/24.   4. Pathology showed -Invasive carcinoma is estrogen receptor positive, progesterone receptor positive and HER2 negative by immunohistochemistry.  Margins negative. pT3 Nx because the lymph node is not sentinel. She did not qualify for neoadjuvant chemotherapy based on MammaPrint nor adjuvant chemotherapy based on Oncotype.  She also was node negative (although the LN was not sentinel) and did not meet criteria for Mon[facility]. She was node negative by MRI but the node was not sentinel.  She does have Ki67 20% or greater and size of tumor greater than 5 cm.  She cannot tolerate adjuvant Zoledronic acid.  Our current plan is to continue AI for 10 years.    5.   Sammie continues to do relatively well.  She has tolerated her AI relatively well aside from arthralgias. Therefore,  she was changed to anastrozole today (from letrozole) to see if she has improvement in her arthralgias. A referral was also sent for CanAroma trial (patient agreed) to see if she may be a candidate. She continues to have trouble sleeping and will continue to work with her PCP.   6.   Adjuvant Zometa was not tolerated.  She was changed to Prolia last visit.  Her most recent DEXA scan from 9/29/23 showed a most valid negative T-score of -1.7.  The purpose of Prolia is to maintain bone density in the setting of AI adjuvant therapy, but by itself would not expect to have adjuvant benefit in contrast to the zoledronic acid.   7.  Left TRAM flap is healed with a medial area of fat necrosis measuring about 1 cm in size.  8.  Imaging followup.  Right mammogram   9.  Blurry vision.  I discussed with Ramona that it is probably not related to the letrozole, but I did put in an Ophthalmology consult. She feels this has improved with FML steroid drops, Patanol and artificial tears.   10.  Imaging: Continue with yearly mammography.  Today's mammogram is BI-RADS 1.  11.  Followup.   Left shoulder MRI.  Referral to Dr. Norbert Sadler in sports medicine for frozen left shoulder.  Prolia 3-3-25 Follow up with Zoya in 6 months with CBC, CMP, right mammogram and Prolia. Follow up with me in March 2026 with CBC, CMP, right mammogram and Prolia.       Thank you for allowing us to participate in this patient's care.       Sincerely,      Kenneth Cisneros MD  [facility]  [facility]  [phone]        I spent 40 minutes with the patient more than 50% of which was in counseling and coordination of care.

## 2025-02-04 ENCOUNTER — APPOINTMENT (OUTPATIENT)
Dept: LAB | Facility: CLINIC | Age: 70
End: 2025-02-04
Attending: INTERNAL MEDICINE
Payer: COMMERCIAL

## 2025-02-04 ENCOUNTER — ANCILLARY PROCEDURE (OUTPATIENT)
Dept: MAMMOGRAPHY | Facility: CLINIC | Age: 70
End: 2025-02-04
Attending: INTERNAL MEDICINE
Payer: COMMERCIAL

## 2025-02-04 ENCOUNTER — ONCOLOGY VISIT (OUTPATIENT)
Dept: ONCOLOGY | Facility: CLINIC | Age: 70
End: 2025-02-04
Attending: INTERNAL MEDICINE
Payer: COMMERCIAL

## 2025-02-04 VITALS
OXYGEN SATURATION: 98 % | SYSTOLIC BLOOD PRESSURE: 132 MMHG | WEIGHT: 219.1 LBS | TEMPERATURE: 97.4 F | HEART RATE: 81 BPM | BODY MASS INDEX: 38.81 KG/M2 | RESPIRATION RATE: 18 BRPM | DIASTOLIC BLOOD PRESSURE: 80 MMHG

## 2025-02-04 DIAGNOSIS — G89.29 CHRONIC LEFT SHOULDER PAIN: ICD-10-CM

## 2025-02-04 DIAGNOSIS — Z17.0 MALIGNANT NEOPLASM OF UPPER-OUTER QUADRANT OF LEFT BREAST IN FEMALE, ESTROGEN RECEPTOR POSITIVE (H): ICD-10-CM

## 2025-02-04 DIAGNOSIS — M85.80 OSTEOPENIA, UNSPECIFIED LOCATION: ICD-10-CM

## 2025-02-04 DIAGNOSIS — C50.412 MALIGNANT NEOPLASM OF UPPER-OUTER QUADRANT OF LEFT BREAST IN FEMALE, ESTROGEN RECEPTOR POSITIVE (H): ICD-10-CM

## 2025-02-04 DIAGNOSIS — Z17.0 ESTROGEN RECEPTOR POSITIVE STATUS (ER+): Primary | ICD-10-CM

## 2025-02-04 DIAGNOSIS — M25.512 CHRONIC LEFT SHOULDER PAIN: ICD-10-CM

## 2025-02-04 DIAGNOSIS — Z79.811 USE OF AROMATASE INHIBITORS: ICD-10-CM

## 2025-02-04 DIAGNOSIS — M81.8 IDIOPATHIC OSTEOPOROSIS: ICD-10-CM

## 2025-02-04 LAB
ALBUMIN SERPL BCG-MCNC: 4.2 G/DL (ref 3.5–5.2)
ALP SERPL-CCNC: 58 U/L (ref 40–150)
ALT SERPL W P-5'-P-CCNC: 34 U/L (ref 0–50)
ANION GAP SERPL CALCULATED.3IONS-SCNC: 12 MMOL/L (ref 7–15)
AST SERPL W P-5'-P-CCNC: 27 U/L (ref 0–45)
BASOPHILS # BLD AUTO: 0.1 10E3/UL (ref 0–0.2)
BASOPHILS NFR BLD AUTO: 1 %
BILIRUB SERPL-MCNC: 0.4 MG/DL
BUN SERPL-MCNC: 16 MG/DL (ref 8–23)
CALCIUM SERPL-MCNC: 9.9 MG/DL (ref 8.8–10.4)
CHLORIDE SERPL-SCNC: 104 MMOL/L (ref 98–107)
CREAT SERPL-MCNC: 0.8 MG/DL (ref 0.51–0.95)
EGFRCR SERPLBLD CKD-EPI 2021: 80 ML/MIN/1.73M2
EOSINOPHIL # BLD AUTO: 0.2 10E3/UL (ref 0–0.7)
EOSINOPHIL NFR BLD AUTO: 3 %
ERYTHROCYTE [DISTWIDTH] IN BLOOD BY AUTOMATED COUNT: 13.3 % (ref 10–15)
GLUCOSE SERPL-MCNC: 143 MG/DL (ref 70–99)
HCO3 SERPL-SCNC: 26 MMOL/L (ref 22–29)
HCT VFR BLD AUTO: 42.3 % (ref 35–47)
HGB BLD-MCNC: 13.8 G/DL (ref 11.7–15.7)
IMM GRANULOCYTES # BLD: 0 10E3/UL
IMM GRANULOCYTES NFR BLD: 1 %
LYMPHOCYTES # BLD AUTO: 2.7 10E3/UL (ref 0.8–5.3)
LYMPHOCYTES NFR BLD AUTO: 43 %
MCH RBC QN AUTO: 29.6 PG (ref 26.5–33)
MCHC RBC AUTO-ENTMCNC: 32.6 G/DL (ref 31.5–36.5)
MCV RBC AUTO: 91 FL (ref 78–100)
MONOCYTES # BLD AUTO: 0.6 10E3/UL (ref 0–1.3)
MONOCYTES NFR BLD AUTO: 11 %
NEUTROPHILS # BLD AUTO: 2.6 10E3/UL (ref 1.6–8.3)
NEUTROPHILS NFR BLD AUTO: 42 %
NRBC # BLD AUTO: 0 10E3/UL
NRBC BLD AUTO-RTO: 0 /100
PLATELET # BLD AUTO: 278 10E3/UL (ref 150–450)
POTASSIUM SERPL-SCNC: 3.7 MMOL/L (ref 3.4–5.3)
PROT SERPL-MCNC: 7.5 G/DL (ref 6.4–8.3)
RBC # BLD AUTO: 4.66 10E6/UL (ref 3.8–5.2)
SODIUM SERPL-SCNC: 142 MMOL/L (ref 135–145)
WBC # BLD AUTO: 6.1 10E3/UL (ref 4–11)

## 2025-02-04 PROCEDURE — G0279 TOMOSYNTHESIS, MAMMO: HCPCS | Performed by: RADIOLOGY

## 2025-02-04 PROCEDURE — 36415 COLL VENOUS BLD VENIPUNCTURE: CPT | Performed by: INTERNAL MEDICINE

## 2025-02-04 PROCEDURE — G0463 HOSPITAL OUTPT CLINIC VISIT: HCPCS | Performed by: INTERNAL MEDICINE

## 2025-02-04 PROCEDURE — 85018 HEMOGLOBIN: CPT | Performed by: INTERNAL MEDICINE

## 2025-02-04 PROCEDURE — 85004 AUTOMATED DIFF WBC COUNT: CPT | Performed by: INTERNAL MEDICINE

## 2025-02-04 PROCEDURE — 85049 AUTOMATED PLATELET COUNT: CPT | Performed by: INTERNAL MEDICINE

## 2025-02-04 PROCEDURE — 80053 COMPREHEN METABOLIC PANEL: CPT | Performed by: INTERNAL MEDICINE

## 2025-02-04 PROCEDURE — 77065 DX MAMMO INCL CAD UNI: CPT | Mod: RT | Performed by: RADIOLOGY

## 2025-02-04 RX ORDER — ALBUTEROL SULFATE 90 UG/1
1-2 INHALANT RESPIRATORY (INHALATION)
Start: 2025-03-03

## 2025-02-04 RX ORDER — MOXIFLOXACIN 5 MG/ML
SOLUTION/ DROPS OPHTHALMIC
COMMUNITY
Start: 2025-01-27

## 2025-02-04 RX ORDER — METHYLPREDNISOLONE SODIUM SUCCINATE 125 MG/2ML
125 INJECTION INTRAMUSCULAR; INTRAVENOUS
Status: DISCONTINUED | OUTPATIENT
Start: 2025-02-04 | End: 2025-02-06 | Stop reason: HOSPADM

## 2025-02-04 RX ORDER — ACETAMINOPHEN 500 MG
500-1000 TABLET ORAL EVERY 6 HOURS PRN
COMMUNITY

## 2025-02-04 RX ORDER — ALBUTEROL SULFATE 90 UG/1
1-2 INHALANT RESPIRATORY (INHALATION)
Status: DISCONTINUED | OUTPATIENT
Start: 2025-02-04 | End: 2025-02-06 | Stop reason: HOSPADM

## 2025-02-04 RX ORDER — EPINEPHRINE 1 MG/ML
0.3 INJECTION, SOLUTION INTRAMUSCULAR; SUBCUTANEOUS EVERY 5 MIN PRN
Status: DISCONTINUED | OUTPATIENT
Start: 2025-02-04 | End: 2025-02-06 | Stop reason: HOSPADM

## 2025-02-04 RX ORDER — EPINEPHRINE 1 MG/ML
0.3 INJECTION, SOLUTION INTRAMUSCULAR; SUBCUTANEOUS EVERY 5 MIN PRN
OUTPATIENT
Start: 2025-03-03

## 2025-02-04 RX ORDER — DIPHENHYDRAMINE HYDROCHLORIDE 50 MG/ML
50 INJECTION INTRAMUSCULAR; INTRAVENOUS
Status: DISCONTINUED | OUTPATIENT
Start: 2025-02-04 | End: 2025-02-06 | Stop reason: HOSPADM

## 2025-02-04 RX ORDER — MEPERIDINE HYDROCHLORIDE 25 MG/ML
25 INJECTION INTRAMUSCULAR; INTRAVENOUS; SUBCUTANEOUS EVERY 30 MIN PRN
OUTPATIENT
Start: 2025-03-03

## 2025-02-04 RX ORDER — ALBUTEROL SULFATE 0.83 MG/ML
2.5 SOLUTION RESPIRATORY (INHALATION)
OUTPATIENT
Start: 2025-03-03

## 2025-02-04 RX ORDER — DIPHENHYDRAMINE HYDROCHLORIDE 50 MG/ML
50 INJECTION INTRAMUSCULAR; INTRAVENOUS
Start: 2025-03-03

## 2025-02-04 RX ORDER — MEPERIDINE HYDROCHLORIDE 25 MG/ML
25 INJECTION INTRAMUSCULAR; INTRAVENOUS; SUBCUTANEOUS EVERY 30 MIN PRN
Status: DISCONTINUED | OUTPATIENT
Start: 2025-02-04 | End: 2025-02-06 | Stop reason: HOSPADM

## 2025-02-04 RX ORDER — METHYLPREDNISOLONE SODIUM SUCCINATE 125 MG/2ML
125 INJECTION INTRAMUSCULAR; INTRAVENOUS
Start: 2025-03-03

## 2025-02-04 RX ORDER — ALBUTEROL SULFATE 0.83 MG/ML
2.5 SOLUTION RESPIRATORY (INHALATION)
Status: DISCONTINUED | OUTPATIENT
Start: 2025-02-04 | End: 2025-02-06 | Stop reason: HOSPADM

## 2025-02-04 ASSESSMENT — PAIN SCALES - GENERAL: PAINLEVEL_OUTOF10: SEVERE PAIN (7)

## 2025-02-04 NOTE — NURSING NOTE
Chief Complaint   Patient presents with    Blood Draw     Vitals, blood draw, vpt by MA. Pt to check-in for provider appt after imaging appt.     Blessingselena Rowe MA

## 2025-02-04 NOTE — LETTER
2025      Ramona Ferrer  [address]  [facility] [address]      Dear Colleague,    Thank you for referring your patient, Ramona Ferrer, to the [facility]. Please see a copy of my visit note below.    Dada Mendiola MD  [facility]  [address]  [place] [address]     RE:  Ramona Ferrer  MRN:  [id number]  :  1955     Dear Dr. Mendiola:     I would like to refer to you Sammie Ferrer, a 68-year-old woman with a recent diagnosis of a stage IIB, T3 N0 MX, invasive lobular carcinoma of the left breast.  She self-palpated a lump in the upper outer quadrant of the left breast.  She underwent evaluation with a diagnostic mammogram and ultrasound, which was BI-RADS 4, showing in the upper outer quadrant of the left breast irregularly shaped hypoechoic mass at the 12 o'clock position 4 cm from the nipple on the left.  This mass measured 2.3 x 2.2 x 1.5 cm and accounted for the patient's area of palpable concern.  Additionally, at the 1 o'clock position 2 cm from the nipple in the left breast, there is a second irregularly shaped hypoechoic mass with indistinct margins measuring 1.3 x 1.1 x 0.8 cm.  She also underwent a breast MRI which showed in the right breast mild background parenchymal enhancement and no suspicious areas of enhancement or lymphadenopathy.  In the left breast there was mild background parenchymal enhancement, and at the 12 o'clock position 4 cm from the nipple there was a heterogeneously enhancing irregular mass measuring 3.4 in AP dimension x 2.8 cm ML and 2.8 cm SI corresponding to the known biopsy-proven malignancy in the left breast.  In the left breast at the 1 o'clock position 2 cm from the nipple there was also a heterogeneously enhancing oval mass measuring 1.3 cm in maximal diameter.  This likely corresponds to the second biopsy-proven malignancy.  Together, the full extent of the disease spans a  total of 5.2 cm AP x 4.4 cm ML, BI-RADS 6.     The pathology showed in the left breast 12 o'clock position 4 cm from the nipple, ultrasound-guided needle biopsy invasive lobular carcinoma, [place] grade 1/3 lobular carcinoma in situ, classic type, estrogen receptor positive in greater than 70% of the cells and progesterone receptor positive in greater than 90% of cells, and HER2 FISH was nonamplified.  In part B in the left breast at the 1 o'clock position 2 cm from the nipple, ultrasound-guided needle biopsy showed invasive lobular carcinoma, [place] grade 1/3 lobular carcinoma in situ was present, classic subtype, estrogen receptors positive in greater than 70% of the cells, progesterone receptors positive in greater than 90% of cells, and HER2 was nonamplified, Ki-67 20-25%.  She now comes to our clinic for recommendations.     Sammie reports that the breast mass had been there for approximately 1 month before she was seen for evaluation..       She has worked in the past as a personal care assistant and lives in the [facility].       PAST MEDICAL HISTORY:  There is no history of breast cancer in the past.  No history of breast cancer surgery.  She has no history of radiation therapy in the past or radiation exposure.  No history of prior tumor of any kind.  She denies heart problems, heart attack, breathing problems, blood clots, seizures, peptic ulcer disease, osteoporosis or bone fractures.  She does report a history of arthritis.     FAMILY HISTORY:  Positive for breast cancer in a paternal aunt who was diagnosed with breast cancer at an old age.     Her father has a history of stomach cancer.  There is no family history of pancreatic cancer, melanoma, lung cancer or ovarian cancer.     No history of male breast cancer.     ALLERGIES:  No history of drug allergies.  She denies allergies to seafood, iodine, or contrast dye.     PAST MENSTRUAL HISTORY:  She has been pregnant 3 times with 2 live births at  age 29 and 31 and 1 miscarriage.  Age at first menstrual period was 14.  She did have a total abdominal hysterectomy and bilateral salpingo-oophorectomy performed in 2003 for endometriosis.  She has no history of hormone replacement therapy.  No history of oral contraceptives.     HABITS: She denies tobacco history.  She denies alcohol history and drinks alcohol only occasionally.     PAST MEDICAL HISTORY:  Past medical history is also remarkable for type 2 diabetes, and she was begun on metformin 2 years ago.  She also has a history of varicose veins and a history of a lipoma resected from her left leg.      Chronic Hepatitis B.  Hepatitis C negative.  HIV negative.       Prior COVID vaccination x 3.      GERM LINE GENETICS: INVITAE testing of 47 genes was negative.      TREATMENT HISTORY:  A.  MammaPrint showed low risk.  B.  Left mastectomy and axillary lymph node sampling.   A. Lymph node, LEFT axillary, excision:  -One benign lymph node (0/1)  B. LEFT breast, skin-sparing mastectomy:  -INVASIVE BREAST CARCINOMA, MIXED INVASIVE LOBULAR CARCINOMA (predominant component) and INVASIVE DUCTAL CARCINOMA (minor component), KALPESH GRADE 3, size 5.5 cm, 12:00, upper outer quadrant and lower outer quadrant  -Ductal carcinoma in situ (DCIS), nuclear grade 2, cribriform and solid type(s), with focal necrosis  -DCIS is admixed with invasive carcinoma, and comprises a minor component (<5%) of the tumor volume   -Lobular carcinoma in situ (LCIS), predominantly classic type (admixed with invasive carcinoma and beyond invasive carcinoma) and focal pleomorphic type (size 2 mm, adjacent to invasive carcinoma)  -Margins are uninvolved by invasive carcinoma  -Invasive carcinoma is 2.5 mm from the nearest (anterior) margin, and is > 5 mm from the posterior margin  -Margins are uninvolved by DCIS and pleomorphic LCIS  -DCIS and pleomorphic LCIS are > 5 mm from the nearest (anterior) margin  -Benign nipple and skin   -Other  findings: fibrocystic change (including microcysts with apocrine metaplasia) and columnar cell change  -Calcifications associated with DCIS, LCIS, and benign ducts and acini  -Prior core biopsy site changes (two biopsy sites identified)  -Invasive carcinoma is estrogen receptor positive (>70%, strong intensity) and progesterone receptor positive (>90%, strong intensity) by immunohistochemistry and is HER2 non-amplified (group 5) by FISH (performed on prior core biopsies, see report [id number], specimens A and B)  DCIS and LCIS present.  -Margins are uninvolved by invasive carcinoma or LCIS.   -Invasive carcinoma is estrogen receptor positive (>70%, strong intensity) and progesterone receptor positive (>90%, strong intensity) by immunohistochemistry and is HER2 non-amplified (group 5) by FISH (performed on prior core biopsies, see report [id number], specimens A and B) Ki-67 immunohistochemistry (MIB-1, pharmDx, [employer]) from [employer] was performed on invasive carcinoma in the LEFT mastectomy specimen   They report Ki-67 proliferative index of 20-25% (block B5) and 20% (block B27).   C.  Staging:  pT3 Nx because lymph node is not the sentinel node.    D.  She did not want radiation, and risk:benefit did not favor treatment.  E.  Letrozole begun 4-5-22.   F.  Left TRAM flap reconstruction healed as of 12-27-22 but she has some concerns.   G.  Adjuvant Zometa 12-27-22 but not tolerated. She did have a reaction to adjuvant Zometa and it cannot be given any further.   H. Letrozole Changed to Anastrozole due to arthralgias.       INTERVAL HISTORY  Ramona returns to clinic for routine followup and has been feeling generally OK. She mentions she's been having arthralgias affected bilateral knees and wrists. She states the knee pain only started after taking letrozole. She also has ankle/foot pain. She finds her hot flashes manageable. She is still taking Calcium and Vitamin D.  Her  accompanied her on today's  visit.    She has had left shoulder pain for the last month.  She does not want to have an MRI but would rather wait and have physical therapy.  The symptoms are consistent with frozen shoulder.  In   She is eating a healthful diet.  She is not exercising.  I did advise her to walk at least 30 minutes a day.  She does not consume alcohol.  She is taking vitamin D.  On her last DEXA scan from 9/29/23, her most valid T-score was -1.7 at the right femoral neck (compared to -1.8 from 9/6/2022).   She does have a TRAM flap that was placed and the incisions have completely healed.     REVIEW OF SYSTEMS:  A 10-point review of systems was otherwise negative.        PHYSICAL EXAMINATION:    VITAL SIGNS: /80 (BP Location: Right arm, Patient Position: Sitting, Cuff Size: Adult Large)   Pulse 81   Temp 97.4  F (36.3  C) (Oral)   Resp 18   Wt 99.4 kg (219 lb 1.6 oz)   LMP  (LMP Unknown)   SpO2 98%   BMI 38.81 kg/m      GENERAL:  Ramona appeared generally well.  HEENT:  No alopecia.  She has moderately good dentition, but is having a crown placed soon.  There are no empty sockets or recent extractions.  LYMPH:  No cervical, supraclavicular, subclavicular or axillary lymphadenopathy.  BREASTS:  Right breast reveals no masses.  Examination of the left breast reveals a TRAM flap in place with no erythema or masses.  Incisions are well healed without erythema.  There is a 1 cm bump consistent with fat necrosis at the medial aspect of the left breast reconstruction.  The overlying skin has no changes.  LUNGS:  Clear to percussion and auscultation.  HEART:  Regular rate and rhythm.  S1, S2.  ABDOMEN:  Soft, nontender, consistent with increased body mass index.  EXTREMITIES:  Without edema.  PSYCH:  Mood was somewhat anxious.  Affect was appropriate.     LABORATORY DATA:  CBC and CMP within normal limits.  The glucose was elevated but nonfasting.    IMAGING:  Mammogram February 4, 2025  BREAST DENSITY: The breasts are  heterogeneously dense, which may  obscure small masses.     Findings:     Mammogram:  No suspicious mammographic finding. No significant change.                                                                      IMPRESSION: BI-RADS CATEGORY: 1 -  Negative.     RECOMMENDED FOLLOW-UP: Routine yearly mammography beginning at age 40  or as discussed with your provider.     The finding and recommendation were discussed with the patient at the  time of evaluation.     JERMAINE JOAQUIN MD      ASSESSMENT AND PLAN:  1.  Sammie Ferrer is a 69-year-old woman with a recent diagnosis of a stage IIB, T3 N0 MX, invasive lobular carcinoma of the upper outer quadrant of the left breast which is multifocal grade 1, ER positive in greater than 70% of cells, UT positive in greater than 90% of cells and HER2 nonamplified.    2.  MammaPrint before surgery came back as low risk as did the Oncotype after surgery. The Oncotype of the pleomorphic lobular cancer showed a value of 15, which is less than the threshold for chemotherapy from TAILORx which is 26 for a post-menopausal woman.   3.  Sammie Ferrer underwent a left mastectomy and has a TRAM flap in place.  She started adjuvant hormonal therapy with letrozole and has tolerated it well until 1/30/24. She started to develop arthralgias affecting bilateral knees and wrists. Therefore, through shared decision making, she was changed to Anastrozole on 1/30/24.   4. Pathology showed -Invasive carcinoma is estrogen receptor positive, progesterone receptor positive and HER2 negative by immunohistochemistry.  Margins negative. pT3 Nx because the lymph node is not sentinel. She did not qualify for neoadjuvant chemotherapy based on MammaPrint nor adjuvant chemotherapy based on Oncotype.  She also was node negative (although the LN was not sentinel) and did not meet criteria for MonarchE. She was node negative by MRI but the node was not sentinel.  She does have Ki67 20% or greater and size of  tumor greater than 5 cm.  She cannot tolerate adjuvant Zoledronic acid.  Our current plan is to continue AI for 10 years.    5.   Sammie continues to do relatively well.  She has tolerated her AI relatively well aside from arthralgias. Therefore, she was changed to anastrozole today (from letrozole) to see if she has improvement in her arthralgias. A referral was also sent for CanAroma trial (patient agreed) to see if she may be a candidate. She continues to have trouble sleeping and will continue to work with her PCP.   6.   Adjuvant Zometa was not tolerated.  She was changed to Prolia last visit.  Her most recent DEXA scan from 9/29/23 showed a most valid negative T-score of -1.7.  The purpose of Prolia is to maintain bone density in the setting of AI adjuvant therapy, but by itself would not expect to have adjuvant benefit in contrast to the zoledronic acid.   7.  Left TRAM flap is healed with a medial area of fat necrosis measuring about 1 cm in size.  8.  Imaging followup.  Right mammogram   9.  Blurry vision.  I discussed with Ramona that it is probably not related to the letrozole, but I did put in an Ophthalmology consult. She feels this has improved with FML steroid drops, Patanol and artificial tears.   10.  Imaging: Continue with yearly mammography.  Today's mammogram is BI-RADS 1.  11.  Followup.   Left shoulder MRI.  Referral to Dr. Norbert Sadler in sports medicine for frozen left shoulder.  Prolia 3-3-25 Follow up with Zoya in 6 months with CBC, CMP, right mammogram and Prolia. Follow up with me in March 2026 with CBC, CMP, right mammogram and Prolia.       Thank you for allowing us to participate in this patient's care.       Sincerely,      Kenneth Cisneros MD  Professor  [facility]  [phone]        I spent 40 minutes with the patient more than 50% of which was in counseling and coordination of care.       Again, thank you for allowing me to participate in the care of your patient.         Sincerely,        Kenneth Cisneros MD    Electronically signed

## 2025-02-04 NOTE — NURSING NOTE
"Oncology Rooming Note    February 4, 2025 3:04 PM   Ramona Ferrer is a 69 year old female who presents for:    Chief Complaint   Patient presents with    Blood Draw     Vitals, blood draw, vpt by MA. Pt to check-in for provider appt after imaging appt.    Oncology Clinic Visit     RRT Breast CA     Initial Vitals: /80 (BP Location: Right arm, Patient Position: Sitting, Cuff Size: Adult Large)   Pulse 81   Temp 97.4  F (36.3  C) (Oral)   Resp 18   Wt 99.4 kg (219 lb 1.6 oz)   LMP  (LMP Unknown)   SpO2 98%   BMI 38.81 kg/m   Estimated body mass index is 38.81 kg/m  as calculated from the following:    Height as of 1/6/25: 1.6 m (5' 3\").    Weight as of this encounter: 99.4 kg (219 lb 1.6 oz). Body surface area is 2.1 meters squared.  Severe Pain (7) Comment: Data Unavailable   No LMP recorded (lmp unknown). Patient has had a hysterectomy.  Allergies reviewed: Yes  Medications reviewed: Yes    Medications: MEDICATION REFILLS NEEDED TODAY. Provider was NOT notified.  Pharmacy name entered into [employer]:    [id number] - [place], [place] - [address]  [id number] - [place], [place] [facility] [address] [facility]    Frailty Screening:   Is the patient here for a new oncology consult visit in cancer care? 2. No      Clinical concerns: Request refill of Anastrozole. Discuss taking calcium states has kidney stones.  Discuss pain in left arm.      Elijah Fischer             "

## 2025-02-05 RX ORDER — MEPERIDINE HYDROCHLORIDE 25 MG/ML
25 INJECTION INTRAMUSCULAR; INTRAVENOUS; SUBCUTANEOUS EVERY 30 MIN PRN
OUTPATIENT
Start: 2026-02-26

## 2025-02-05 RX ORDER — EPINEPHRINE 1 MG/ML
0.3 INJECTION, SOLUTION INTRAMUSCULAR; SUBCUTANEOUS EVERY 5 MIN PRN
OUTPATIENT
Start: 2025-08-30

## 2025-02-05 RX ORDER — ALBUTEROL SULFATE 0.83 MG/ML
2.5 SOLUTION RESPIRATORY (INHALATION)
OUTPATIENT
Start: 2025-08-30

## 2025-02-05 RX ORDER — ALBUTEROL SULFATE 90 UG/1
1-2 INHALANT RESPIRATORY (INHALATION)
Start: 2025-08-30

## 2025-02-05 RX ORDER — METHYLPREDNISOLONE SODIUM SUCCINATE 125 MG/2ML
125 INJECTION INTRAMUSCULAR; INTRAVENOUS
Start: 2025-08-30

## 2025-02-05 RX ORDER — MEPERIDINE HYDROCHLORIDE 25 MG/ML
25 INJECTION INTRAMUSCULAR; INTRAVENOUS; SUBCUTANEOUS EVERY 30 MIN PRN
OUTPATIENT
Start: 2025-08-30

## 2025-02-05 RX ORDER — EPINEPHRINE 1 MG/ML
0.3 INJECTION, SOLUTION INTRAMUSCULAR; SUBCUTANEOUS EVERY 5 MIN PRN
OUTPATIENT
Start: 2026-02-26

## 2025-02-05 RX ORDER — ALBUTEROL SULFATE 90 UG/1
1-2 INHALANT RESPIRATORY (INHALATION)
Start: 2026-02-26

## 2025-02-05 RX ORDER — ALBUTEROL SULFATE 0.83 MG/ML
2.5 SOLUTION RESPIRATORY (INHALATION)
OUTPATIENT
Start: 2026-02-26

## 2025-02-05 RX ORDER — DIPHENHYDRAMINE HYDROCHLORIDE 50 MG/ML
50 INJECTION INTRAMUSCULAR; INTRAVENOUS
Start: 2026-02-26

## 2025-02-05 RX ORDER — DIPHENHYDRAMINE HYDROCHLORIDE 50 MG/ML
50 INJECTION INTRAMUSCULAR; INTRAVENOUS
Start: 2025-08-30

## 2025-02-05 RX ORDER — METHYLPREDNISOLONE SODIUM SUCCINATE 125 MG/2ML
125 INJECTION INTRAMUSCULAR; INTRAVENOUS
Start: 2026-02-26

## 2025-02-06 ENCOUNTER — PATIENT OUTREACH (OUTPATIENT)
Dept: CARE COORDINATION | Facility: CLINIC | Age: 70
End: 2025-02-06
Payer: COMMERCIAL

## 2025-02-10 ENCOUNTER — PATIENT OUTREACH (OUTPATIENT)
Dept: CARE COORDINATION | Facility: CLINIC | Age: 70
End: 2025-02-10
Payer: COMMERCIAL

## 2025-02-24 ENCOUNTER — TRANSFERRED RECORDS (OUTPATIENT)
Dept: HEALTH INFORMATION MANAGEMENT | Facility: CLINIC | Age: 70
End: 2025-02-24
Payer: COMMERCIAL

## 2025-03-04 ENCOUNTER — INFUSION THERAPY VISIT (OUTPATIENT)
Dept: ONCOLOGY | Facility: CLINIC | Age: 70
End: 2025-03-04
Attending: INTERNAL MEDICINE
Payer: COMMERCIAL

## 2025-03-04 VITALS
WEIGHT: 219 LBS | SYSTOLIC BLOOD PRESSURE: 139 MMHG | DIASTOLIC BLOOD PRESSURE: 71 MMHG | RESPIRATION RATE: 16 BRPM | OXYGEN SATURATION: 97 % | TEMPERATURE: 97.7 F | BODY MASS INDEX: 38.79 KG/M2 | HEART RATE: 77 BPM

## 2025-03-04 DIAGNOSIS — Z17.0 ESTROGEN RECEPTOR POSITIVE STATUS (ER+): ICD-10-CM

## 2025-03-04 DIAGNOSIS — M85.80 OSTEOPENIA, UNSPECIFIED LOCATION: ICD-10-CM

## 2025-03-04 DIAGNOSIS — M81.8 IDIOPATHIC OSTEOPOROSIS: Primary | ICD-10-CM

## 2025-03-04 DIAGNOSIS — C50.412 MALIGNANT NEOPLASM OF UPPER-OUTER QUADRANT OF LEFT BREAST IN FEMALE, ESTROGEN RECEPTOR POSITIVE (H): ICD-10-CM

## 2025-03-04 DIAGNOSIS — Z17.0 MALIGNANT NEOPLASM OF UPPER-OUTER QUADRANT OF LEFT BREAST IN FEMALE, ESTROGEN RECEPTOR POSITIVE (H): ICD-10-CM

## 2025-03-04 DIAGNOSIS — Z79.811 USE OF AROMATASE INHIBITORS: ICD-10-CM

## 2025-03-04 LAB
ALBUMIN SERPL BCG-MCNC: 4.3 G/DL (ref 3.5–5.2)
ALP SERPL-CCNC: 66 U/L (ref 40–150)
ALT SERPL W P-5'-P-CCNC: 22 U/L (ref 0–50)
ANION GAP SERPL CALCULATED.3IONS-SCNC: 11 MMOL/L (ref 7–15)
AST SERPL W P-5'-P-CCNC: 23 U/L (ref 0–45)
BASOPHILS # BLD AUTO: 0.1 10E3/UL (ref 0–0.2)
BASOPHILS NFR BLD AUTO: 1 %
BILIRUB SERPL-MCNC: 0.3 MG/DL
BUN SERPL-MCNC: 12.9 MG/DL (ref 8–23)
CALCIUM SERPL-MCNC: 10.3 MG/DL (ref 8.8–10.4)
CHLORIDE SERPL-SCNC: 101 MMOL/L (ref 98–107)
CREAT SERPL-MCNC: 0.7 MG/DL (ref 0.51–0.95)
EGFRCR SERPLBLD CKD-EPI 2021: >90 ML/MIN/1.73M2
EOSINOPHIL # BLD AUTO: 0.2 10E3/UL (ref 0–0.7)
EOSINOPHIL NFR BLD AUTO: 2 %
ERYTHROCYTE [DISTWIDTH] IN BLOOD BY AUTOMATED COUNT: 13.2 % (ref 10–15)
GLUCOSE SERPL-MCNC: 76 MG/DL (ref 70–99)
HCO3 SERPL-SCNC: 27 MMOL/L (ref 22–29)
HCT VFR BLD AUTO: 43.1 % (ref 35–47)
HGB BLD-MCNC: 14 G/DL (ref 11.7–15.7)
IMM GRANULOCYTES # BLD: 0.1 10E3/UL
IMM GRANULOCYTES NFR BLD: 1 %
LYMPHOCYTES # BLD AUTO: 3.6 10E3/UL (ref 0.8–5.3)
LYMPHOCYTES NFR BLD AUTO: 33 %
MCH RBC QN AUTO: 29.8 PG (ref 26.5–33)
MCHC RBC AUTO-ENTMCNC: 32.5 G/DL (ref 31.5–36.5)
MCV RBC AUTO: 92 FL (ref 78–100)
MONOCYTES # BLD AUTO: 1.3 10E3/UL (ref 0–1.3)
MONOCYTES NFR BLD AUTO: 12 %
NEUTROPHILS # BLD AUTO: 5.5 10E3/UL (ref 1.6–8.3)
NEUTROPHILS NFR BLD AUTO: 51 %
NRBC # BLD AUTO: 0 10E3/UL
NRBC BLD AUTO-RTO: 0 /100
PLATELET # BLD AUTO: 313 10E3/UL (ref 150–450)
POTASSIUM SERPL-SCNC: 4.1 MMOL/L (ref 3.4–5.3)
PROT SERPL-MCNC: 8 G/DL (ref 6.4–8.3)
RBC # BLD AUTO: 4.7 10E6/UL (ref 3.8–5.2)
SODIUM SERPL-SCNC: 139 MMOL/L (ref 135–145)
WBC # BLD AUTO: 10.8 10E3/UL (ref 4–11)

## 2025-03-04 PROCEDURE — 36415 COLL VENOUS BLD VENIPUNCTURE: CPT

## 2025-03-04 PROCEDURE — 96372 THER/PROPH/DIAG INJ SC/IM: CPT | Performed by: INTERNAL MEDICINE

## 2025-03-04 PROCEDURE — 250N000011 HC RX IP 250 OP 636: Mod: JZ | Performed by: INTERNAL MEDICINE

## 2025-03-04 PROCEDURE — 85048 AUTOMATED LEUKOCYTE COUNT: CPT

## 2025-03-04 PROCEDURE — 85004 AUTOMATED DIFF WBC COUNT: CPT

## 2025-03-04 PROCEDURE — 80053 COMPREHEN METABOLIC PANEL: CPT

## 2025-03-04 RX ADMIN — DENOSUMAB 60 MG: 60 INJECTION SUBCUTANEOUS at 15:11

## 2025-03-04 ASSESSMENT — PAIN SCALES - GENERAL: PAINLEVEL_OUTOF10: SEVERE PAIN (7)

## 2025-03-04 NOTE — PROGRESS NOTES
Infusion Nursing Note:  Ramona Ferrer presents today for Prolia.    Patient seen by provider today: No   present during visit today: Not Applicable.    Note: Patient reports continuing pain in left arm and shoulder.  Reports that she has been seeing a physician for the pain and has recently had a MRI to the area to assess.  Patient reports to taking vitamin D daily but denies taking Calcium supplements as she is prone to kidney stones.       Intravenous Access:  No Intravenous access/labs at this visit.    Treatment Conditions:  Lab Results   Component Value Date    HGB 14.0 03/04/2025    WBC 10.8 03/04/2025    ANEU 1.7 06/14/2021    ANEUTAUTO 5.5 03/04/2025     03/04/2025        Lab Results   Component Value Date     03/04/2025    POTASSIUM 4.1 03/04/2025    MAG 1.6 (L) 08/26/2022    CR 0.70 03/04/2025    EMMA 10.3 03/04/2025    BILITOTAL 0.3 03/04/2025    ALBUMIN 4.3 03/04/2025    ALT 22 03/04/2025    AST 23 03/04/2025       Results reviewed, labs MET treatment parameters, ok to proceed with treatment.      Post Infusion Assessment:  Patient tolerated one Prolia injection without incident to right arm.       Discharge Plan:   Patient declined prescription refills.  Discharge instructions reviewed with: Patient.  Patient and/or family verbalized understanding of discharge instructions and all questions answered.  AVS to patient via [employer].  Patient will return 9/9/25 for labs, to see Dr. Cisneros and receive Prolia for next appointment.   Patient discharged in stable condition accompanied by: self.  Departure Mode: Ambulatory.      Meron Harrison RN

## 2025-03-04 NOTE — NURSING NOTE
Chief Complaint   Patient presents with    Blood Draw     Labs collected from venipuncture by RN. Vitals taken. Checked in for appointment(s).      Labs collected from venipuncture by RN. Vitals taken. Checked in for appointment(s).     Kristina Cha RN

## 2025-03-10 ENCOUNTER — OFFICE VISIT (OUTPATIENT)
Dept: SLEEP MEDICINE | Facility: CLINIC | Age: 70
End: 2025-03-10
Payer: COMMERCIAL

## 2025-03-10 VITALS
BODY MASS INDEX: 38.55 KG/M2 | SYSTOLIC BLOOD PRESSURE: 116 MMHG | DIASTOLIC BLOOD PRESSURE: 79 MMHG | OXYGEN SATURATION: 95 % | HEART RATE: 77 BPM | WEIGHT: 217.6 LBS | HEIGHT: 63 IN

## 2025-03-10 DIAGNOSIS — F51.04 CHRONIC INSOMNIA: ICD-10-CM

## 2025-03-10 DIAGNOSIS — G47.33 OSA (OBSTRUCTIVE SLEEP APNEA): Primary | ICD-10-CM

## 2025-03-10 PROCEDURE — 1125F AMNT PAIN NOTED PAIN PRSNT: CPT | Performed by: INTERNAL MEDICINE

## 2025-03-10 PROCEDURE — 3078F DIAST BP <80 MM HG: CPT | Performed by: INTERNAL MEDICINE

## 2025-03-10 PROCEDURE — 99204 OFFICE O/P NEW MOD 45 MIN: CPT | Performed by: INTERNAL MEDICINE

## 2025-03-10 PROCEDURE — 3074F SYST BP LT 130 MM HG: CPT | Performed by: INTERNAL MEDICINE

## 2025-03-10 ASSESSMENT — SLEEP AND FATIGUE QUESTIONNAIRES
HOW LIKELY ARE YOU TO NOD OFF OR FALL ASLEEP WHILE LYING DOWN TO REST IN THE AFTERNOON WHEN CIRCUMSTANCES PERMIT: SLIGHT CHANCE OF DOZING
HOW LIKELY ARE YOU TO NOD OFF OR FALL ASLEEP WHILE SITTING INACTIVE IN A PUBLIC PLACE: SLIGHT CHANCE OF DOZING
HOW LIKELY ARE YOU TO NOD OFF OR FALL ASLEEP WHILE SITTING AND TALKING TO SOMEONE: SLIGHT CHANCE OF DOZING
HOW LIKELY ARE YOU TO NOD OFF OR FALL ASLEEP IN A CAR, WHILE STOPPED FOR A FEW MINUTES IN TRAFFIC: WOULD NEVER DOZE
HOW LIKELY ARE YOU TO NOD OFF OR FALL ASLEEP WHILE WATCHING TV: SLIGHT CHANCE OF DOZING
HOW LIKELY ARE YOU TO NOD OFF OR FALL ASLEEP WHILE SITTING QUIETLY AFTER LUNCH WITHOUT ALCOHOL: WOULD NEVER DOZE
HOW LIKELY ARE YOU TO NOD OFF OR FALL ASLEEP WHILE SITTING AND READING: MODERATE CHANCE OF DOZING
HOW LIKELY ARE YOU TO NOD OFF OR FALL ASLEEP WHEN YOU ARE A PASSENGER IN A CAR FOR AN HOUR WITHOUT A BREAK: WOULD NEVER DOZE

## 2025-03-10 NOTE — NURSING NOTE
"Chief Complaint   Patient presents with    Sleep Problem     Trouble falling asleep & staying asleep, snoring,  witnessed apnea       Initial /79   Pulse 77   Ht 1.6 m (5' 3\")   Wt 98.7 kg (217 lb 9.6 oz)   LMP  (LMP Unknown)   SpO2 95%   BMI 38.55 kg/m   Estimated body mass index is 38.55 kg/m  as calculated from the following:    Height as of this encounter: 1.6 m (5' 3\").    Weight as of this encounter: 98.7 kg (217 lb 9.6 oz).    Medication Reconciliation: complete  ESS:   Neck circumference: 13.5 inches / 34 centimeters.    DME: N/A    "

## 2025-03-10 NOTE — PROGRESS NOTES
Sleep Consultation:    Date on this visit: 3/10/2025    Ramona Ferrer  is referred by No ref. provider found for a sleep consultation.     Primary Physician: Eugene Michelle     Ramona Ferrer is a 69-year-old female with history of breast cancer, hypertension, hypothyroidism, chronic insomnia, who is sent by primary care for a sleep evaluation.     She was accompanied by her  for today's appointment. 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 She is originally from [facility].     Chart review shows that a PSG was performed in 2012 at a weight of 210 lbs, which reported mild obstructive sleep apnea with an apnea-hypopnea index of 6.4/h and RDI of 10.7/h.    She did not pursue treatment for her sleep apnea at the time.     Patient has a chronic history of sleep initiation difficulty.  She is prescribed zolpidem 5 mg which is taken intermittently, about 2 times a week.  He seems to have delayed sleep phase circadian rhythm.    Ramona goes to sleep at 2:00- 4:00 AM. She wakes up at 10:00 AM. She falls asleep in 60 minutes.  Ramona has difficulty falling asleep and takes Zolpidem 5 mg about 2 times per week. .  She wakes up 2-3 times a night for  go to the bathroom and uncertain reasons.       Ramona does snore frequently. Patient's  does complain of snoring and snorting. She does have witnessed apneas.They never sleep separately.  Patient sleeps on her back. She has occasional morning headaches and frequent morning dry mouth, denies no restless legs.     She has a history of panic attacks in sleep and nightmares in her sleep.     Ramona denies any sleep walking, sleep talking, dream enactment, sleep paralysis, and hypnogogic/hypnopompic hallucinations.     Ramona denies difficulty breathing through her nose.      Patient's Gladstone Sleepiness score 6/24 consistent with no daytime sleepiness.      Ramona naps 0 times per week. She takes frequent inadvertant naps.   She denies closing eyes, dozing, and falling asleep while driving. Patient was counseled on the importance of driving while alert, to pull over if drowsy, or nap before getting into the vehicle if sleepy.      She uses 1-2 cups/day of coffee. Last caffeine intake is usually before noon.    Problem List:  Patient Active Problem List    Diagnosis Date Noted    Prediabetes 10/22/2024     Priority: Medium    Estrogen receptor positive status (ER+) 11/07/2023     Priority: Medium    Idiopathic osteoporosis 07/23/2023     Priority: Medium    Use of aromatase inhibitors 07/21/2023     Priority: Medium    Osteopenia 07/21/2023     Priority: Medium    Malignant neoplasm of upper-outer quadrant of left breast in female, estrogen receptor positive (H) 12/27/2022     Priority: Medium    S/P TRAM (transverse rectus abdominis muscle) flap breast reconstruction 08/25/2022     Priority: Medium    Lumbar radiculopathy 07/20/2022     Priority: Medium    Foraminal stenosis of lumbar region 07/20/2022     Priority: Medium    S/P breast reconstruction, left 03/23/2022     Priority: Medium    Dry eye 01/06/2022     Priority: Medium    Allergic conjunctivitis, bilateral 01/06/2022     Priority: Medium    Meibomianitis, unspecified laterality 01/06/2022     Priority: Medium    Gastritis, presence of bleeding unspecified, unspecified chronicity, unspecified gastritis type 06/04/2021     Priority: Medium    Stress incontinence 05/21/2019     Priority: Medium    Calculus of kidney 03/01/2019     Priority: Medium    Anatomical narrow angle - Both Eyes 01/25/2019     Priority: Medium    Exotropia of right eye 01/25/2019     Priority: Medium    Obesity (BMI 35.0-39.9) with comorbidity (H) 12/07/2018     Priority: Medium    Primary hypertension 12/07/2018     Priority: Medium    Atypical ductal hyperplasia of left breast 11/03/2017     Priority: Medium     Formatting of this note might be different from the original. Added automatically from  "request for surgery [id number]      LALO (obstructive sleep apnea) 09/24/2012     Priority: Medium     mild AHI 6.4/hour, RDI 10.7/hour. Desats to 85%.      DDD (degenerative disc disease), lumbar      Priority: Medium    Spinal stenosis, lumbar      Priority: Medium    Hypothyroidism      Priority: Medium    Varicose veins of legs 09/08/2008     Priority: Medium    History of hepatitis B 09/01/2004     Priority: Medium     Formatting of this note might be different from the original. Patient stated she has never had Hepatitis B Formatting of this note might be different from the original. Patient stated she has never had Hepatitis B Formatting of this note might be different from the original. Patient stated she has never had Hepatitis B          Past Medical/Surgical History:  Past Medical History:   Diagnosis Date    Breast cancer 12/2021    DDD (degenerative disc disease), lumbar     Endometriosis     Hepatitis B infection     Hypertension     Hypothyroidism          Seasonal allergies     Spinal stenosis, lumbar      Physical Examination:  Vitals: /79   Pulse 77   Ht 1.6 m (5' 3\")   Wt 98.7 kg (217 lb 9.6 oz)   LMP  (LMP Unknown)   SpO2 95%   BMI 38.55 kg/m    BMI= Body mass index is 38.55 kg/m .  GENERAL APPEARANCE: healthy, alert, and no distress  HENT: oropharynx crowded  NEURO: mentation intact and speech normal  PSYCH: mentation appears normal and affect normal/bright  Mallampati Class: IV.  Tonsillar Stage: 1  hidden by pillars.    Impression/Plan:    Obstructive sleep apnea  Chronic Insomnia     Patient is a 69-year-old female with history of breast cancer, hypertension, hypothyroidism, who presents to clinic for evaluation of sleep apnea and insomnia.  In 2012, patient was diagnosed with mild obstructive sleep apnea at a weight of 210 pounds.  She did not pursue treatment at the time.  Symptoms of sleep apnea has worsened and poor sleep quality is reported.  At this time, a reevaluation of sleep " disordered breathing through his sleep study is recommended.  Patient declines having a sleep study in the sleep lab.  We can proceed with a home sleep test, and I counseled her regarding limitations of home sleep testing.  After reassessment of current sleep apnea severity, we can consider therapy options.    Insomnia is chronic and perpetuated by psychosocial stressors and possibly chronic psychophysiologic hyperarousal.  We reviewed optimization of stimulus control and sleep-wake schedules.  She uses zolpidem 5 mg intermittently.  Adverse effects associated with chronic use of hypnotic agents was discussed.    Plan:     Home sleep apnea testing     She will follow up with me after her sleep study has been competed to review the results and discuss plan of care.       Polysomnography & HST reviewed.  Limitations of HST reviewed.   Obstructive sleep apnea reviewed.  Complications of untreated sleep apnea were reviewed.    I spent a total of 50 minutes for this appointment on this date of service which include time spent before, during and after the visit for chart review, patient care, counseling and coordination of care.      Electronically signed by Dr. Ronald Augila, Diplomate, Sleep Medicine, ABPN         CC: No ref. provider found

## 2025-04-15 ENCOUNTER — NURSE TRIAGE (OUTPATIENT)
Dept: ONCOLOGY | Facility: CLINIC | Age: 70
End: 2025-04-15
Payer: COMMERCIAL

## 2025-04-15 NOTE — TELEPHONE ENCOUNTER
Oncology Nurse Triage - Reporting Symptoms    Situation:   Daughter, Zelda, on behalf of Ramona reporting the following symptoms: shoulder pain.     Background:   Treating Provider:  Dr. Kenneth Cisneros    Date of last office visit:  2/4/25     Recent treatments: Yes: 3/4/25 Prolia    Assessment  Onset of symptoms: 6 months, but worse in past 3-4 months     Bilateral shoulder pain that radiates down to elbow- started originally as intermittent, but now is constant, rates pain 7-8/10 during call. Pt can hear crackling, can barely lift arms, affects her sleep, left arm is worse. Pt feels pain is bone pain, not muscular.   She went to ortho as advised, they performed XR and MRI (performed 2/27/25). Ortho suggested injections in shoulder and neck- did both injections, but neither worked. Ortho said if injections were not helpful, then surgery on shoulder would be next step, but pt does not feel surgery is appropriate and refuses to do it. Denies any injury or trauma to shoulders.     Taking Tylenol 1g TID- said this brings pain down from 9/10 to 7-8/10, said pain is still not tolerable enough. Tried ice and heat, tried Icy Hot, pain patches.     Wonders if from anastrozole? Does she need a bone density scan?    Recommendations:   1603 page to Dr. Cisneros to middle line for recommendations.   Daughter requests nurse call pt's phone number listed on file with recommendations.   1619 return call from Dr. Cisneros, who would like to see pt in clinic to further eval/discuss. He said there is an opening Thursday 1010am, okay to offer to pt. He will talk with Janette in scheduling.   1621 [facility] for pt requesting call back to triage at [phone] option 5, option 2, ask for triage   1622 call to Zelda, reviewed recommendations. They are in agreement to come to clinic on 4/17 at 1010am to see Dr. Cisneros. Writer advised pt continue with Tylenol for now, but if pain increases- if it goes to 10/10 and she is not getting relief, she should  "go to ED to be evaluated soon. Zelda states, \"I know you have to say that, but she will not go. The ED will just give her a narcotic and she can't take them. We need to get to the root of the problem.\"   1629 IB message sent to Janette in scheduling.   "

## 2025-04-17 ENCOUNTER — ONCOLOGY VISIT (OUTPATIENT)
Dept: ONCOLOGY | Facility: CLINIC | Age: 70
End: 2025-04-17
Attending: INTERNAL MEDICINE
Payer: COMMERCIAL

## 2025-04-17 VITALS
TEMPERATURE: 97.7 F | RESPIRATION RATE: 16 BRPM | WEIGHT: 223.3 LBS | DIASTOLIC BLOOD PRESSURE: 76 MMHG | OXYGEN SATURATION: 99 % | BODY MASS INDEX: 39.56 KG/M2 | HEART RATE: 70 BPM | SYSTOLIC BLOOD PRESSURE: 131 MMHG

## 2025-04-17 DIAGNOSIS — C50.412 MALIGNANT NEOPLASM OF UPPER-OUTER QUADRANT OF LEFT BREAST IN FEMALE, ESTROGEN RECEPTOR POSITIVE (H): Primary | ICD-10-CM

## 2025-04-17 DIAGNOSIS — Z17.0 MALIGNANT NEOPLASM OF UPPER-OUTER QUADRANT OF LEFT BREAST IN FEMALE, ESTROGEN RECEPTOR POSITIVE (H): Primary | ICD-10-CM

## 2025-04-17 PROCEDURE — G0463 HOSPITAL OUTPT CLINIC VISIT: HCPCS | Performed by: INTERNAL MEDICINE

## 2025-04-17 RX ORDER — EXEMESTANE 25 MG/1
25 TABLET ORAL DAILY
Qty: 90 TABLET | Refills: 3 | Status: SHIPPED | OUTPATIENT
Start: 2025-04-17

## 2025-04-17 ASSESSMENT — PAIN SCALES - GENERAL: PAINLEVEL_OUTOF10: SEVERE PAIN (7)

## 2025-04-17 NOTE — LETTER
2025      Ramona Ferrer  [address]  [place] [address]      Dear Colleague,    Thank you for referring your patient, Ramona Ferrer, to the [facility]. Please see a copy of my visit note below.    Dada Mendiola MD  [facility] Surgery  [address], [address]  [place] [address]     RE:  Ramona Ferrer  MRN:  [id number]  :  1955     Dear Dr. Mendiola:     I would like to refer to you Sammie Ferrer, a 68-year-old woman with a recent diagnosis of a stage IIB, T3 N0 MX, invasive lobular carcinoma of the left breast.  She self-palpated a lump in the upper outer quadrant of the left breast.  She underwent evaluation with a diagnostic mammogram and ultrasound, which was BI-RADS 4, showing in the upper outer quadrant of the left breast irregularly shaped hypoechoic mass at the 12 o'clock position 4 cm from the nipple on the left.  This mass measured 2.3 x 2.2 x 1.5 cm and accounted for the patient's area of palpable concern.  Additionally, at the 1 o'clock position 2 cm from the nipple in the left breast, there is a second irregularly shaped hypoechoic mass with indistinct margins measuring 1.3 x 1.1 x 0.8 cm.  She also underwent a breast MRI which showed in the right breast mild background parenchymal enhancement and no suspicious areas of enhancement or lymphadenopathy.  In the left breast there was mild background parenchymal enhancement, and at the 12 o'clock position 4 cm from the nipple there was a heterogeneously enhancing irregular mass measuring 3.4 in AP dimension x 2.8 cm ML and 2.8 cm SI corresponding to the known biopsy-proven malignancy in the left breast.  In the left breast at the 1 o'clock position 2 cm from the nipple there was also a heterogeneously enhancing oval mass measuring 1.3 cm in maximal diameter.  This likely corresponds to the second biopsy-proven malignancy.  Together, the full extent of the disease spans a  total of 5.2 cm AP x 4.4 cm ML, BI-RADS 6.     The pathology showed in the left breast 12 o'clock position 4 cm from the nipple, ultrasound-guided needle biopsy invasive lobular carcinoma, [place] grade 1/3 lobular carcinoma in situ, classic type, estrogen receptor positive in greater than 70% of the cells and progesterone receptor positive in greater than 90% of cells, and HER2 FISH was nonamplified.  In part B in the left breast at the 1 o'clock position 2 cm from the nipple, ultrasound-guided needle biopsy showed invasive lobular carcinoma, [place] grade 1/3 lobular carcinoma in situ was present, classic subtype, estrogen receptors positive in greater than 70% of the cells, progesterone receptors positive in greater than 90% of cells, and HER2 was nonamplified, Ki-67 20-25%.  She now comes to our clinic for recommendations.     Sammie reports that the breast mass had been there for approximately 1 month before she was seen for evaluation..       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       PAST MEDICAL HISTORY:  There is no history of breast cancer in the past.  No history of breast cancer surgery.  She has no history of radiation therapy in the past or radiation exposure.  No history of prior tumor of any kind.  She denies heart problems, heart attack, breathing problems, blood clots, seizures, peptic ulcer disease, osteoporosis or bone fractures.  She does report a history of arthritis.     FAMILY HISTORY:  Positive for breast cancer in a paternal aunt who was diagnosed with breast cancer at an old age.     Her father has a history of stomach cancer.  There is no family history of pancreatic cancer, melanoma, lung cancer or ovarian cancer.     No history of male breast cancer.     ALLERGIES:  No history of drug allergies.  She denies allergies to seafood, iodine, or contrast dye.     PAST MENSTRUAL HISTORY:  She has been pregnant 3 times with 2 live births at  age 29 and 31 and 1 miscarriage.  Age at first menstrual period was 14.  She did have a total abdominal hysterectomy and bilateral salpingo-oophorectomy performed in 2003 for endometriosis.  She has no history of hormone replacement therapy.  No history of oral contraceptives.     HABITS: She denies tobacco history.  She denies alcohol history and drinks alcohol only occasionally.     PAST MEDICAL HISTORY:  Past medical history is also remarkable for type 2 diabetes, and she was begun on metformin 2 years ago.  She also has a history of varicose veins and a history of a lipoma resected from her left leg.      Chronic Hepatitis B.  Hepatitis C negative.  HIV negative.       Prior COVID vaccination x 3.      GERM LINE GENETICS: INVITAE testing of 47 genes was negative.      TREATMENT HISTORY:  A.  MammaPrint showed low risk.  B.  Left mastectomy and axillary lymph node sampling.   A. Lymph node, LEFT axillary, excision:  -One benign lymph node (0/1)  B. LEFT breast, skin-sparing mastectomy:  -INVASIVE BREAST CARCINOMA, MIXED INVASIVE LOBULAR CARCINOMA (predominant component) and INVASIVE DUCTAL CARCINOMA (minor component), KALPESH GRADE 3, size 5.5 cm, 12:00, upper outer quadrant and lower outer quadrant  -Ductal carcinoma in situ (DCIS), nuclear grade 2, cribriform and solid type(s), with focal necrosis  -DCIS is admixed with invasive carcinoma, and comprises a minor component (<5%) of the tumor volume   -Lobular carcinoma in situ (LCIS), predominantly classic type (admixed with invasive carcinoma and beyond invasive carcinoma) and focal pleomorphic type (size 2 mm, adjacent to invasive carcinoma)  -Margins are uninvolved by invasive carcinoma  -Invasive carcinoma is 2.5 mm from the nearest (anterior) margin, and is > 5 mm from the posterior margin  -Margins are uninvolved by DCIS and pleomorphic LCIS  -DCIS and pleomorphic LCIS are > 5 mm from the nearest (anterior) margin  -Benign nipple and skin   -Other  findings: fibrocystic change (including microcysts with apocrine metaplasia) and columnar cell change  -Calcifications associated with DCIS, LCIS, and benign ducts and acini  -Prior core biopsy site changes (two biopsy sites identified)  -Invasive carcinoma is estrogen receptor positive (>70%, strong intensity) and progesterone receptor positive (>90%, strong intensity) by immunohistochemistry and is HER2 non-amplified (group 5) by FISH (performed on prior core biopsies, see report [id number], specimens A and B)  DCIS and LCIS present.  -Margins are uninvolved by invasive carcinoma or LCIS.   -Invasive carcinoma is estrogen receptor positive (>70%, strong intensity) and progesterone receptor positive (>90%, strong intensity) by immunohistochemistry and is HER2 non-amplified (group 5) by FISH (performed on prior core biopsies, see report [id number], specimens A and B) Ki-67 immunohistochemistry (MIB-1, pharmDx, [employer]) from [employer] was performed on invasive carcinoma in the LEFT mastectomy specimen   They report Ki-67 proliferative index of 20-25% (block B5) and 20% (block B27).   C.  Staging:  pT3 Nx because lymph node is not the sentinel node.    D.  She did not want radiation, and risk:benefit did not favor treatment.  E.  Letrozole begun 4-5-22.   F.  Left TRAM flap reconstruction healed as of 12-27-22 but she has some concerns.   G.  Adjuvant Zometa 12-27-22 but not tolerated. She did have a reaction to adjuvant Zometa and it cannot be given any further.   H. Letrozole Changed to Anastrozole due to arthralgias January 30, 2024.  I.  Anastrozole changed to exemestane because of arthralgias. April 17, 2025.     INTERVAL HISTORY  Sammie returns to clinic with a complaint of joint stiffness related to anastrozole.  She has been on anastrozole for approximately 15 months.  She wants to change to a different aromatase inhibitor and we will change to exemestane.  She wants to know how much the joint stiffness in  her left shoulder may be related to the anastrozole.  Her daughter Zelda was on the call today     She has had left shoulder pain for the last month.  She does not want to have an MRI but would rather wait and have physical therapy.  The symptoms are consistent with frozen shoulder.  She continues with physical therapy and has had steroid injection.    She is eating a healthful diet.  She is not exercising.  I did advise her to walk at least 30 minutes a day.  She does not consume alcohol.  She is taking vitamin D.  On her last DEXA scan from 9/29/23, her most valid T-score was -1.7 at the right femoral neck (compared to -1.8 from 9/6/2022).   She does have a TRAM flap that was placed and the incisions have completely healed.     REVIEW OF SYSTEMS:  A 10-point review of systems was otherwise negative.        PHYSICAL EXAMINATION:    VITAL SIGNS: /76 (BP Location: Right arm, Patient Position: Sitting, Cuff Size: Adult Large)   Pulse 70   Temp 97.7  F (36.5  C) (Oral)   Resp 16   Wt 101.3 kg (223 lb 4.8 oz)   LMP  (LMP Unknown)   SpO2 99%   BMI 39.56 kg/m       GENERAL:  Ramona appeared generally well.  HEENT:  No alopecia.  She has moderately good dentition, but is having a crown placed soon.  There are no empty sockets or recent extractions.  LYMPH:  No cervical, supraclavicular, subclavicular or axillary lymphadenopathy.  BREASTS: Deferred no breast complaints reconstruction.    LUNGS:  Clear to percussion and auscultation.  HEART:  Regular rate and rhythm.  S1, S2.  ABDOMEN:  Soft, nontender, consistent with increased body mass index.  EXTREMITIES: She has trace edema in the left hand.  She is unable to raise her left arm above 90 degrees.  PSYCH:  Mood was somewhat anxious.  Affect was appropriate.     LABORATORY DATA:  CBC and CMP within normal limits.      IMAGING:  Mammogram February 4, 2025  BREAST DENSITY: The breasts are heterogeneously dense, which may  obscure small masses.     Findings:      Mammogram:  No suspicious mammographic finding. No significant change.                                                                      IMPRESSION: BI-RADS CATEGORY: 1 -  Negative.     RECOMMENDED FOLLOW-UP: Routine yearly mammography beginning at age 40  or as discussed with your provider.     The finding and recommendation were discussed with the patient at the  time of evaluation.     JERMAINE JOAQUIN MD     MRI left shoulder February 26, 2025  Impression    1. Supraspinatus and infraspinatus tendinosis with low-grade partial articular surface tendon tearing.  2. No full-thickness rotator cuff tendon tear.  3. Moderate degenerative arthrosis of the glenohumeral joint with diffuse degenerative labral tearing. Humeral head is slightly posteriorly subluxed.  4. Moderate to large glenohumeral joint effusion with synovitis. Numerous intra-articular osteocartilaginous bodies.  5. Tendinosis of the intra-articular long head biceps tendon with low-grade interstitial partial tearing.     ASSESSMENT AND PLAN:  Sammie Ferrer is a 69-year-old woman with a recent diagnosis of a stage IIB, T3 N0 MX, invasive lobular carcinoma of the upper outer quadrant of the left breast which is multifocal grade 1, ER positive in greater than 70% of cells, MA positive in greater than 90% of cells and HER2 nonamplified. MammaPrint before surgery came back as low risk as did the Oncotype after surgery. The Oncotype of the pleomorphic lobular cancer showed a value of 15, which is less than the threshold for chemotherapy from TAILORx which is 26 for a post-menopausal woman.   Sammie Ferrer underwent a left mastectomy and has a TRAM flap in place.  She started adjuvant hormonal therapy with letrozole and has tolerated it well until 1/30/24. She started to develop arthralgias affecting bilateral knees and wrists. Therefore, through shared decision making, she was changed to Anastrozole on 1/30/24.  Pathology showed -Invasive carcinoma is  estrogen receptor positive, progesterone receptor positive and HER2 negative by immunohistochemistry.  Margins negative. pT3 Nx because the lymph node is not sentinel. She did not qualify for neoadjuvant chemotherapy based on MammaPrint nor adjuvant chemotherapy based on Oncotype.  She also was node negative (although the LN was not sentinel) and did not meet criteria for MonarchE. She was node negative by MRI but the node was not sentinel.  She does have Ki67 20% or greater and size of tumor greater than 5 cm.    She cannot tolerate adjuvant Zoledronic acid.  Our current plan is to continue AI for 10 years.  Adjuvant Zometa was not tolerated.  She was changed to Prolia last visit.  Her most recent DEXA scan from 9/29/23 showed a most valid negative T-score of -1.7.  The purpose of Prolia is to maintain bone density in the setting of AI adjuvant therapy, but by itself would not expect to have adjuvant benefit in contrast to the zoledronic acid.  We will continue with Prolia which is well-tolerated and we will give it every 6 months.  Sammie continues to do relatively well but has had difficulty tolerating anastrozole due to stiffness in her left shoulder.  It is possible that the anastrozole may be exacerbating a left frozen shoulder.  Sammie complains of joint stiffness related to anastrozole.  She has been on anastrozole for approximately 15 months.  She wants to change to a different aromatase inhibitor and we will change to exemestane.  She wants to know how much the joint stiffness in her left shoulder may be related to the anastrozole.  Will therefore change the AI to exemestane.  Left TRAM flap is healed with a medial area of fat necrosis measuring about 1 cm in size.  Imaging: Continue with yearly right mammography.  Today's mammogram is BI-RADS 1.  Follow-up for left frozen shoulder at [facility] per her wishes.  She did not want lymphedema clinic evaluation.  Followup.  Follow-up with me in 1 month to assess  tolerance of exemestane with CBC and CMP.  Prolia every 6 months next Prolia is due in September and follow-up visit with Zoya with CBC and CMP.  Follow up with Zoya in 6 months with CBC, CMP.   Follow up with me in March 2026 with CBC, CMP, right mammogram and Prolia.       Thank you for allowing us to participate in this patient's care.       Sincerely,      Kenneth Cisneros MD  Professor  [facility]  [phone]        I spent 40 minutes with the patient more than 50% of which was in counseling and coordination of care.      Again, thank you for allowing me to participate in the care of your patient.        Sincerely,        Kenneth Cisneros MD    Electronically signed

## 2025-04-17 NOTE — PROGRESS NOTES
Dada Mendiola MD  [facility]  [address] [place] [address], [facility] [address]  [place] [address]     RE:  Ramona Ferrer  MRN:  [id number]  :  1955     Dear Dr. Mendiola:     I would like to refer to you Sammie Ferrer, a 68-year-old woman with a recent diagnosis of a stage IIB, T3 N0 MX, invasive lobular carcinoma of the left breast.  She self-palpated a lump in the upper outer quadrant of the left breast.  She underwent evaluation with a diagnostic mammogram and ultrasound, which was BI-RADS 4, showing in the upper outer quadrant of the left breast irregularly shaped hypoechoic mass at the 12 o'clock position 4 cm from the nipple on the left.  This mass measured 2.3 x 2.2 x 1.5 cm and accounted for the patient's area of palpable concern.  Additionally, at the 1 o'clock position 2 cm from the nipple in the left breast, there is a second irregularly shaped hypoechoic mass with indistinct margins measuring 1.3 x 1.1 x 0.8 cm.  She also underwent a breast MRI which showed in the right breast mild background parenchymal enhancement and no suspicious areas of enhancement or lymphadenopathy.  In the left breast there was mild background parenchymal enhancement, and at the 12 o'clock position 4 cm from the nipple there was a heterogeneously enhancing irregular mass measuring 3.4 in AP dimension x 2.8 cm ML and 2.8 cm SI corresponding to the known biopsy-proven malignancy in the left breast.  In the left breast at the 1 o'clock position 2 cm from the nipple there was also a heterogeneously enhancing oval mass measuring 1.3 cm in maximal diameter.  This likely corresponds to the second biopsy-proven malignancy.  Together, the full extent of the disease spans a total of 5.2 cm AP x 4.4 cm ML, BI-RADS 6.     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  In part B in the left breast at the 1 o'clock position 2 cm from the nipple, ultrasound-guided needle biopsy showed invasive lobular carcinoma, Swiss grade 1/3 lobular carcinoma in situ was present, classic subtype, estrogen receptors positive in greater than 70% of the cells, progesterone receptors positive in greater than 90% of cells, and HER2 was nonamplified, Ki-67 20-25%.  She now comes to our clinic for recommendations.     Sammie reports that the breast mass had been there for approximately 1 month before she was seen for evaluation..       She has worked in the past as a personal care assistant and lives in the [facility].       PAST MEDICAL HISTORY:  There is no history of breast cancer in the past.  No history of breast cancer surgery.  She has no history of radiation therapy in the past or radiation exposure.  No history of prior tumor of any kind.  She denies heart problems, heart attack, breathing problems, blood clots, seizures, peptic ulcer disease, osteoporosis or bone fractures.  She does report a history of arthritis.     FAMILY HISTORY:  Positive for breast cancer in a paternal aunt who was diagnosed with breast cancer at an old age.     Her father has a history of stomach cancer.  There is no family history of pancreatic cancer, melanoma, lung cancer or ovarian cancer.     No history of male breast cancer.     ALLERGIES:  No history of drug allergies.  She denies allergies to seafood, iodine, or contrast dye.     PAST MENSTRUAL HISTORY:  She has been pregnant 3 times with 2 live births at age 29 and 31 and 1 miscarriage.  Age at first menstrual period was 14.  She did have a total abdominal hysterectomy and bilateral salpingo-oophorectomy performed in 2003 for endometriosis.  She has no history of hormone replacement therapy.  No  history of oral contraceptives.     HABITS: She denies tobacco history.  She denies alcohol history and drinks alcohol only occasionally.     PAST MEDICAL HISTORY:  Past medical history is also remarkable for type 2 diabetes, and she was begun on metformin 2 years ago.  She also has a history of varicose veins and a history of a lipoma resected from her left leg.      Chronic Hepatitis B.  Hepatitis C negative.  HIV negative.       Prior COVID vaccination x 3.      GERM LINE GENETICS: INVITAE testing of 47 genes was negative.      TREATMENT HISTORY:  A.  MammaPrint showed low risk.  B.  Left mastectomy and axillary lymph node sampling.   A. Lymph node, LEFT axillary, excision:  -One benign lymph node (0/1)  B. LEFT breast, skin-sparing mastectomy:  -INVASIVE BREAST CARCINOMA, MIXED INVASIVE LOBULAR CARCINOMA (predominant component) and INVASIVE DUCTAL CARCINOMA (minor component), KALPESH GRADE 3, size 5.5 cm, 12:00, upper outer quadrant and lower outer quadrant  -Ductal carcinoma in situ (DCIS), nuclear grade 2, cribriform and solid type(s), with focal necrosis  -DCIS is admixed with invasive carcinoma, and comprises a minor component (<5%) of the tumor volume   -Lobular carcinoma in situ (LCIS), predominantly classic type (admixed with invasive carcinoma and beyond invasive carcinoma) and focal pleomorphic type (size 2 mm, adjacent to invasive carcinoma)  -Margins are uninvolved by invasive carcinoma  -Invasive carcinoma is 2.5 mm from the nearest (anterior) margin, and is > 5 mm from the posterior margin  -Margins are uninvolved by DCIS and pleomorphic LCIS  -DCIS and pleomorphic LCIS are > 5 mm from the nearest (anterior) margin  -Benign nipple and skin   -Other findings: fibrocystic change (including microcysts with apocrine metaplasia) and columnar cell change  -Calcifications associated with DCIS, LCIS, and benign ducts and acini  -Prior core biopsy site changes (two biopsy sites identified)  -Invasive  carcinoma is estrogen receptor positive (>70%, strong intensity) and progesterone receptor positive (>90%, strong intensity) by immunohistochemistry and is HER2 non-amplified (group 5) by FISH (performed on prior core biopsies, see report [id number], specimens A and B)  DCIS and LCIS present.  -Margins are uninvolved by invasive carcinoma or LCIS.   -Invasive carcinoma is estrogen receptor positive (>70%, strong intensity) and progesterone receptor positive (>90%, strong intensity) by immunohistochemistry and is HER2 non-amplified (group 5) by FISH (performed on prior core biopsies, see report [id number], specimens A and B) Ki-67 immunohistochemistry (MIB-1, pharmDx, [employer] Key[employer]ok[employer]) from [facility] was performed on invasive carcinoma in the LEFT mastectomy specimen   They report Ki-67 proliferative index of 20-25% (block B5) and 20% (block B27).   C.  Staging:  pT3 Nx because lymph node is not the sentinel node.    D.  She did not want radiation, and risk:benefit did not favor treatment.  E.  Letrozole begun 4-5-22.   F.  Left TRAM flap reconstruction healed as of 12-27-22 but she has some concerns.   G.  Adjuvant Zometa 12-27-22 but not tolerated. She did have a reaction to adjuvant Zometa and it cannot be given any further.   H. Letrozole Changed to Anastrozole due to arthralgias January 30, 2024.  I.  Anastrozole changed to exemestane because of arthralgias. April 17, 2025.     INTERVAL HISTORY  Sammie returns to clinic with a complaint of joint stiffness related to anastrozole.  She has been on anastrozole for approximately 15 months.  She wants to change to a different aromatase inhibitor and we will change to exemestane.  She wants to know how much the joint stiffness in her left shoulder may be related to the anastrozole.  Her daughter Zelda was on the call today     She has had left shoulder pain for the last month.  She does not want to have an MRI but would rather wait and have physical therapy.  The symptoms  are consistent with frozen shoulder.  She continues with physical therapy and has had steroid injection.    She is eating a healthful diet.  She is not exercising.  I did advise her to walk at least 30 minutes a day.  She does not consume alcohol.  She is taking vitamin D.  On her last DEXA scan from 9/29/23, her most valid T-score was -1.7 at the right femoral neck (compared to -1.8 from 9/6/2022).   She does have a TRAM flap that was placed and the incisions have completely healed.     REVIEW OF SYSTEMS:  A 10-point review of systems was otherwise negative.        PHYSICAL EXAMINATION:    VITAL SIGNS: /76 (BP Location: Right arm, Patient Position: Sitting, Cuff Size: Adult Large)   Pulse 70   Temp 97.7  F (36.5  C) (Oral)   Resp 16   Wt 101.3 kg (223 lb 4.8 oz)   LMP  (LMP Unknown)   SpO2 99%   BMI 39.56 kg/m       GENERAL:  Ramona appeared generally well.  HEENT:  No alopecia.  She has moderately good dentition, but is having a crown placed soon.  There are no empty sockets or recent extractions.  LYMPH:  No cervical, supraclavicular, subclavicular or axillary lymphadenopathy.  BREASTS: Deferred no breast complaints reconstruction.    LUNGS:  Clear to percussion and auscultation.  HEART:  Regular rate and rhythm.  S1, S2.  ABDOMEN:  Soft, nontender, consistent with increased body mass index.  EXTREMITIES: She has trace edema in the left hand.  She is unable to raise her left arm above 90 degrees.  PSYCH:  Mood was somewhat anxious.  Affect was appropriate.     LABORATORY DATA:  CBC and CMP within normal limits.      IMAGING:  Mammogram February 4, 2025  BREAST DENSITY: The breasts are heterogeneously dense, which may  obscure small masses.     Findings:     Mammogram:  No suspicious mammographic finding. No significant change.                                                                      IMPRESSION: BI-RADS CATEGORY: 1 -  Negative.     RECOMMENDED FOLLOW-UP: Routine yearly mammography  beginning at age 40  or as discussed with your provider.     The finding and recommendation were discussed with the patient at the  time of evaluation.     JERMAINE JOAQUIN MD     MRI left shoulder February 26, 2025  Impression    1. Supraspinatus and infraspinatus tendinosis with low-grade partial articular surface tendon tearing.  2. No full-thickness rotator cuff tendon tear.  3. Moderate degenerative arthrosis of the glenohumeral joint with diffuse degenerative labral tearing. Humeral head is slightly posteriorly subluxed.  4. Moderate to large glenohumeral joint effusion with synovitis. Numerous intra-articular osteocartilaginous bodies.  5. Tendinosis of the intra-articular long head biceps tendon with low-grade interstitial partial tearing.     ASSESSMENT AND PLAN:  Sammie Ferrer is a 69-year-old woman with a recent diagnosis of a stage IIB, T3 N0 MX, invasive lobular carcinoma of the upper outer quadrant of the left breast which is multifocal grade 1, ER positive in greater than 70% of cells, IN positive in greater than 90% of cells and HER2 nonamplified. MammaPrint before surgery came back as low risk as did the Oncotype after surgery. The Oncotype of the pleomorphic lobular cancer showed a value of 15, which is less than the threshold for chemotherapy from TAILORx which is 26 for a post-menopausal woman.   Sammie Ferrer underwent a left mastectomy and has a TRAM flap in place.  She started adjuvant hormonal therapy with letrozole and has tolerated it well until 1/30/24. She started to develop arthralgias affecting bilateral knees and wrists. Therefore, through shared decision making, she was changed to Anastrozole on 1/30/24.  Pathology showed -Invasive carcinoma is estrogen receptor positive, progesterone receptor positive and HER2 negative by immunohistochemistry.  Margins negative. pT3 Nx because the lymph node is not sentinel. She did not qualify for neoadjuvant chemotherapy based on MammaPrint nor  adjuvant chemotherapy based on Oncotype.  She also was node negative (although the LN was not sentinel) and did not meet criteria for MonarchE. She was node negative by MRI but the node was not sentinel.  She does have Ki67 20% or greater and size of tumor greater than 5 cm.    She cannot tolerate adjuvant Zoledronic acid.  Our current plan is to continue AI for 10 years.  Adjuvant Zometa was not tolerated.  She was changed to Prolia last visit.  Her most recent DEXA scan from 9/29/23 showed a most valid negative T-score of -1.7.  The purpose of Prolia is to maintain bone density in the setting of AI adjuvant therapy, but by itself would not expect to have adjuvant benefit in contrast to the zoledronic acid.  We will continue with Prolia which is well-tolerated and we will give it every 6 months.  Sammie continues to do relatively well but has had difficulty tolerating anastrozole due to stiffness in her left shoulder.  It is possible that the anastrozole may be exacerbating a left frozen shoulder.  Sammie complains of joint stiffness related to anastrozole.  She has been on anastrozole for approximately 15 months.  She wants to change to a different aromatase inhibitor and we will change to exemestane.  She wants to know how much the joint stiffness in her left shoulder may be related to the anastrozole.  Will therefore change the AI to exemestane.  Left TRAM flap is healed with a medial area of fat necrosis measuring about 1 cm in size.  Imaging: Continue with yearly right mammography.  Today's mammogram is BI-RADS 1.  Follow-up for left frozen shoulder at [facility] per her wishes.  She did not want lymphedema clinic evaluation.  Followup.  Follow-up with me in 1 month to assess tolerance of exemestane with CBC and CMP.  Prolia every 6 months next Prolia is due in September and follow-up visit with Zoya with CBC and CMP.  Follow up with Zoya in 6 months with MARIZOL, CMP.   Follow up with me in March 2026 with MARIZOL, CMP,  right mammogram and Prolia.       Thank you for allowing us to participate in this patient's care.       Sincerely,      Kenneth Cisneros MD  Professor  [facility]  [phone]      T3N1Mx  grade 1.   Current Therapy: OP ONC Denosumab (Prolia) EVERY 6 MONTHS    Intent of Therapy: Other curative.         I spent 40 minutes with the patient more than 50% of which was in counseling and coordination of care.

## 2025-04-17 NOTE — NURSING NOTE
"Oncology Rooming Note    April 17, 2025 10:03 AM   Ramona Ferrer is a 69 year old female who presents for:    Chief Complaint   Patient presents with    Oncology Clinic Visit     Malignant neoplasm of upper-outer quadrant of left breast in female, estrogen receptor positive         Initial Vitals: /76 (BP Location: Right arm, Patient Position: Sitting, Cuff Size: Adult Large)   Pulse 70   Temp 97.7  F (36.5  C) (Oral)   Resp 16   Wt 101.3 kg (223 lb 4.8 oz)   LMP  (LMP Unknown)   SpO2 99%   BMI 39.56 kg/m   Estimated body mass index is 39.56 kg/m  as calculated from the following:    Height as of 3/10/25: 1.6 m (5' 3\").    Weight as of this encounter: 101.3 kg (223 lb 4.8 oz). Body surface area is 2.12 meters squared.  Severe Pain (7) Comment: Data Unavailable   No LMP recorded (lmp unknown). Patient has had a hysterectomy.  Allergies reviewed: Yes  Medications reviewed: Yes    Medications: MEDICATION REFILLS NEEDED TODAY. Provider was NOT notified.  Pharmacy name entered into [employer]:    [id number] - [place], [place] - [address]  [id number] - [place], [place] - [address]    Frailty Screening:   Is the patient here for a new oncology consult visit in cancer care? 2. No    PHQ9:  Did this patient require a PHQ9?: No      Clinical concerns:     Refill request - lisinopril (ZESTRIL) 10 MG tablet       Ivy Garcia            "

## 2025-05-10 ENCOUNTER — HEALTH MAINTENANCE LETTER (OUTPATIENT)
Age: 70
End: 2025-05-10

## 2025-05-13 DIAGNOSIS — C50.412 MALIGNANT NEOPLASM OF UPPER-OUTER QUADRANT OF LEFT BREAST IN FEMALE, ESTROGEN RECEPTOR POSITIVE (H): ICD-10-CM

## 2025-05-13 DIAGNOSIS — Z17.0 MALIGNANT NEOPLASM OF UPPER-OUTER QUADRANT OF LEFT BREAST IN FEMALE, ESTROGEN RECEPTOR POSITIVE (H): ICD-10-CM

## 2025-05-13 RX ORDER — EXEMESTANE 25 MG/1
25 TABLET ORAL DAILY
Qty: 90 TABLET | Refills: 3 | Status: SHIPPED | OUTPATIENT
Start: 2025-05-13

## 2025-05-13 NOTE — PROGRESS NOTES
Sammie's Exemestane Rx has been sent to [facility] - [place], [place]     Thanks,  Cat

## 2025-05-25 DIAGNOSIS — G47.00 INSOMNIA, UNSPECIFIED TYPE: ICD-10-CM

## 2025-05-27 RX ORDER — ZOLPIDEM TARTRATE 5 MG/1
5 TABLET ORAL
Qty: 20 TABLET | Refills: 0 | OUTPATIENT
Start: 2025-05-27

## 2025-05-28 ENCOUNTER — PATIENT OUTREACH (OUTPATIENT)
Dept: CARE COORDINATION | Facility: CLINIC | Age: 70
End: 2025-05-28
Payer: COMMERCIAL

## 2025-05-31 ENCOUNTER — HEALTH MAINTENANCE LETTER (OUTPATIENT)
Age: 70
End: 2025-05-31

## 2025-06-09 NOTE — PROGRESS NOTES
Dada Mendiola MD  [facility] Surgery  [address] [place] [address], [facility] [address]  [place] [address]     RE:  Ramona Ferrer  MRN:  [id number]  :  1955     Dear Dr. Mendiola:     I would like to refer to you Sammie Ferrer, a 68-year-old woman with a recent diagnosis of a stage IIB, T3 N0 MX, invasive lobular carcinoma of the left breast.  She self-palpated a lump in the upper outer quadrant of the left breast.  She underwent evaluation with a diagnostic mammogram and ultrasound, which was BI-RADS 4, showing in the upper outer quadrant of the left breast irregularly shaped hypoechoic mass at the 12 o'clock position 4 cm from the nipple on the left.  This mass measured 2.3 x 2.2 x 1.5 cm and accounted for the patient's area of palpable concern.  Additionally, at the 1 o'clock position 2 cm from the nipple in the left breast, there is a second irregularly shaped hypoechoic mass with indistinct margins measuring 1.3 x 1.1 x 0.8 cm.  She also underwent a breast MRI which showed in the right breast mild background parenchymal enhancement and no suspicious areas of enhancement or lymphadenopathy.  In the left breast there was mild background parenchymal enhancement, and at the 12 o'clock position 4 cm from the nipple there was a heterogeneously enhancing irregular mass measuring 3.4 in AP dimension x 2.8 cm ML and 2.8 cm SI corresponding to the known biopsy-proven malignancy in the left breast.  In the left breast at the 1 o'clock position 2 cm from the nipple there was also a heterogeneously enhancing oval mass measuring 1.3 cm in maximal diameter.  This likely corresponds to the second biopsy-proven malignancy.  Together, the full extent of the disease spans a total of 5.2 cm AP x 4.4 cm ML, BI-RADS 6.     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  In part B in the left breast at the 1 o'clock position 2 cm from the nipple, ultrasound-guided needle biopsy showed invasive lobular carcinoma, Millry grade 1/3 lobular carcinoma in situ was present, classic subtype, estrogen receptors positive in greater than 70% of the cells, progesterone receptors positive in greater than 90% of cells, and HER2 was nonamplified, Ki-67 20-25%.  She now comes to our clinic for recommendations.     Sammie reports that the breast mass had been there for approximately 1 month before she was seen for evaluation..       She has worked in the past as a personal care assistant and lives in the [facility].       PAST MEDICAL HISTORY:  There is no history of breast cancer in the past.  No history of breast cancer surgery.  She has no history of radiation therapy in the past or radiation exposure.  No history of prior tumor of any kind.  She denies heart problems, heart attack, breathing problems, blood clots, seizures, peptic ulcer disease, osteoporosis or bone fractures.  She does report a history of arthritis.     FAMILY HISTORY:  Positive for breast cancer in a paternal aunt who was diagnosed with breast cancer at an old age.     Her father has a history of stomach cancer.  There is no family history of pancreatic cancer, melanoma, lung cancer or ovarian cancer.     No history of male breast cancer.     ALLERGIES:  No history of drug allergies.  She denies allergies to seafood, iodine, or contrast dye.     PAST MENSTRUAL HISTORY:  She has been pregnant 3 times with 2 live births at age 29 and 31 and 1 miscarriage.  Age at first menstrual period was 14.  She did have a total abdominal hysterectomy and bilateral salpingo-oophorectomy performed in 2003 for endometriosis.  She has no history of hormone replacement therapy.  No  history of oral contraceptives.     HABITS: She denies tobacco history.  She denies alcohol history and drinks alcohol only occasionally.     PAST MEDICAL HISTORY:  Past medical history is also remarkable for type 2 diabetes, and she was begun on metformin 2 years ago.  She also has a history of varicose veins and a history of a lipoma resected from her left leg.      Chronic Hepatitis B.  Hepatitis C negative.  HIV negative.       Prior COVID vaccination x 3.      GERM LINE GENETICS: INVITAE testing of 47 genes was negative.      TREATMENT HISTORY:  A.  MammaPrint showed low risk.  B.  Left mastectomy and axillary lymph node sampling.   A. Lymph node, LEFT axillary, excision:  -One benign lymph node (0/1)  B. LEFT breast, skin-sparing mastectomy:  -INVASIVE BREAST CARCINOMA, MIXED INVASIVE LOBULAR CARCINOMA (predominant component) and INVASIVE DUCTAL CARCINOMA (minor component), KALPESH GRADE 3, size 5.5 cm, 12:00, upper outer quadrant and lower outer quadrant  -Ductal carcinoma in situ (DCIS), nuclear grade 2, cribriform and solid type(s), with focal necrosis  -DCIS is admixed with invasive carcinoma, and comprises a minor component (<5%) of the tumor volume   -Lobular carcinoma in situ (LCIS), predominantly classic type (admixed with invasive carcinoma and beyond invasive carcinoma) and focal pleomorphic type (size 2 mm, adjacent to invasive carcinoma)  -Margins are uninvolved by invasive carcinoma  -Invasive carcinoma is 2.5 mm from the nearest (anterior) margin, and is > 5 mm from the posterior margin  -Margins are uninvolved by DCIS and pleomorphic LCIS  -DCIS and pleomorphic LCIS are > 5 mm from the nearest (anterior) margin  -Benign nipple and skin   -Other findings: fibrocystic change (including microcysts with apocrine metaplasia) and columnar cell change  -Calcifications associated with DCIS, LCIS, and benign ducts and acini  -Prior core biopsy site changes (two biopsy sites identified)  -Invasive  carcinoma is estrogen receptor positive (>70%, strong intensity) and progesterone receptor positive (>90%, strong intensity) by immunohistochemistry and is HER2 non-amplified (group 5) by FISH (performed on prior core biopsies, see report [id number], specimens A and B)  DCIS and LCIS present.  -Margins are uninvolved by invasive carcinoma or LCIS.   -Invasive carcinoma is estrogen receptor positive (>70%, strong intensity) and progesterone receptor positive (>90%, strong intensity) by immunohistochemistry and is HER2 non-amplified (group 5) by FISH (performed on prior core biopsies, see report [id number], specimens A and B) Ki-67 immunohistochemistry (MIB-1, pharmDx, [employer]) from [facility] was performed on invasive carcinoma in the LEFT mastectomy specimen   They report Ki-67 proliferative index of 20-25% (block B5) and 20% (block B27).   C.  Staging:  pT3 Nx because lymph node is not the sentinel node.    D.  She did not want radiation, and risk:benefit did not favor treatment.  E.  Letrozole begun 4-5-22.   F.  Left TRAM flap reconstruction healed as of 12-27-22 but she has some concerns.   G.  Adjuvant Zometa 12-27-22 but not tolerated. She did have a reaction to adjuvant Zometa and it cannot be given any further.   H. Letrozole Changed to Anastrozole due to arthralgias January 30, 2024.  I.  Anastrozole changed to exemestane because of arthralgias. April 17, 2025.     INTERVAL HISTORY  Sammie returns to clinic with a complaint of joint stiffness related to anastrozole.  She has been on anastrozole for approximately 15 months.  She wants to change to a different aromatase inhibitor and we will change to exemestane.  She wants to know how much the joint stiffness in her left shoulder may be related to the anastrozole.  Her daughter Zelda was on the call today     She has had left shoulder pain for the last month.  She does not want to have an MRI but would rather wait and have physical therapy.  The symptoms  are consistent with frozen shoulder.  She continues with physical therapy and has had steroid injection.     She is eating a healthful diet.  She is not exercising.  I did advise her to walk at least 30 minutes a day.  She does not consume alcohol.  She is taking vitamin D.  On her last DEXA scan from 9/29/23, her most valid T-score was -1.7 at the right femoral neck (compared to -1.8 from 9/6/2022).   She does have a TRAM flap that was placed and the incisions have completely healed.     REVIEW OF SYSTEMS:  A 10-point review of systems was otherwise negative.        PHYSICAL EXAMINATION:    VITAL SIGNS: /64 (BP Location: Right arm, Patient Position: Sitting, Cuff Size: Adult Large)   Pulse 79   Temp 98.2  F (36.8  C) (Oral)   Resp 16   Wt 103.5 kg (228 lb 1.6 oz)   LMP  (LMP Unknown)   SpO2 94%   BMI 40.41 kg/m         GENERAL:  Ramona appeared generally well.  HEENT:  No alopecia.  She has moderately good dentition, but is having a crown placed soon.  There are no empty sockets or recent extractions.  LYMPH:  No cervical, supraclavicular, subclavicular or axillary lymphadenopathy.  BREASTS: Deferred no breast complaints reconstruction.    LUNGS:  Clear to percussion and auscultation.  HEART:  Regular rate and rhythm.  S1, S2.  ABDOMEN:  Soft, nontender, consistent with increased body mass index.  EXTREMITIES: She has trace edema in the left hand.  She is unable to raise her left arm above 90 degrees.  PSYCH:  Mood was somewhat anxious.  Affect was appropriate.     LABORATORY DATA:  CBC and CMP within normal limits.      IMAGING:  Mammogram February 4, 2025  BREAST DENSITY: The breasts are heterogeneously dense, which may  obscure small masses.     Findings:     Mammogram:  No suspicious mammographic finding. No significant change.                                                                      IMPRESSION: BI-RADS CATEGORY: 1 -  Negative.     RECOMMENDED FOLLOW-UP: Routine yearly mammography  beginning at age 40  or as discussed with your provider.     The finding and recommendation were discussed with the patient at the  time of evaluation.     JERMAINE JOAQUIN MD      MRI left shoulder February 26, 2025  Impression     1. Supraspinatus and infraspinatus tendinosis with low-grade partial articular surface tendon tearing.  2. No full-thickness rotator cuff tendon tear.  3. Moderate degenerative arthrosis of the glenohumeral joint with diffuse degenerative labral tearing. Humeral head is slightly posteriorly subluxed.  4. Moderate to large glenohumeral joint effusion with synovitis. Numerous intra-articular osteocartilaginous bodies.  5. Tendinosis of the intra-articular long head biceps tendon with low-grade interstitial partial tearing.     ASSESSMENT AND PLAN:  Sammie Ferrer is a 69-year-old woman with a recent diagnosis of a stage IIB, T3 N0 MX, invasive lobular carcinoma of the upper outer quadrant of the left breast which is multifocal grade 1, ER positive in greater than 70% of cells, NC positive in greater than 90% of cells and HER2 nonamplified. MammaPrint before surgery came back as low risk as did the Oncotype after surgery. The Oncotype of the pleomorphic lobular cancer showed a value of 15, which is less than the threshold for chemotherapy from TAILORx which is 26 for a post-menopausal woman.   Sammie Ferrer underwent a left mastectomy and has a TRAM flap in place.  She started adjuvant hormonal therapy with letrozole and has tolerated it well until 1/30/24. She started to develop arthralgias affecting bilateral knees and wrists. Therefore, through shared decision making, she was changed to Anastrozole on 1/30/24.  Pathology showed -Invasive carcinoma is estrogen receptor positive, progesterone receptor positive and HER2 negative by immunohistochemistry.  Margins negative. pT3 Nx because the lymph node is not sentinel. She did not qualify for neoadjuvant chemotherapy based on MammaPrint nor  adjuvant chemotherapy based on Oncotype.  She also was node negative (although the LN was not sentinel) and did not meet criteria for MonarchE. She was node negative by MRI but the node was not sentinel.  She does have Ki67 20% or greater and size of tumor greater than 5 cm.    She cannot tolerate adjuvant Zoledronic acid.  Our current plan is to continue AI for 10 years.  Adjuvant Zometa was not tolerated.  She was changed to Prolia last visit.  Her most recent DEXA scan from 9/29/23 showed a most valid negative T-score of -1.7.  The purpose of Prolia is to maintain bone density in the setting of AI adjuvant therapy, but by itself would not expect to have adjuvant benefit in contrast to the zoledronic acid.  We will continue with Prolia which is well-tolerated and we will give it every 6 months.  Sammie continues to do relatively well but has had difficulty tolerating anastrozole due to stiffness in her left shoulder.  It is possible that the anastrozole may be exacerbating a left frozen shoulder.  Sammie complains of joint stiffness related to anastrozole.  She has been on anastrozole for approximately 15 months.  She wants to change to a different aromatase inhibitor and we will change to exemestane.  She wants to know how much the joint stiffness in her left shoulder may be related to the anastrozole.  Will therefore change the AI to exemestane.  Left TRAM flap is healed with a medial area of fat necrosis measuring about 1 cm in size.  Imaging: Continue with yearly right mammography.  Today's mammogram is BI-RADS 1.  Follow-up for left frozen shoulder at [facility] [place] per her wishes.  She did not want lymphedema clinic evaluation.  Followup.  Follow-up with me in 1 month to assess tolerance of exemestane with CBC and CMP.  Prolia every 6 months next Prolia is due in September and follow-up visit with Zoya with CBC and CMP.  Follow up with Zoya in 6 months with MARIZOL, CMP.   Follow up with me in March 2026 with MARIZOL, CMP,  right mammogram and Prolia.       Thank you for allowing us to participate in this patient's care.       Sincerely,      Kenneth Cisneros MD  Professor  [facility]  [phone]        T3N1Mx  grade 1.   Current Therapy: OP ONC Denosumab (Prolia) EVERY 6 MONTHS     Intent of Therapy: Other curative.         I spent 40 minutes with the patient more than 50% of which was in counseling and coordination of care.

## 2025-06-10 ENCOUNTER — APPOINTMENT (OUTPATIENT)
Dept: LAB | Facility: CLINIC | Age: 70
End: 2025-06-10
Attending: INTERNAL MEDICINE
Payer: COMMERCIAL

## 2025-06-10 ENCOUNTER — ONCOLOGY VISIT (OUTPATIENT)
Dept: ONCOLOGY | Facility: CLINIC | Age: 70
End: 2025-06-10
Attending: INTERNAL MEDICINE
Payer: COMMERCIAL

## 2025-06-10 VITALS
WEIGHT: 228.1 LBS | DIASTOLIC BLOOD PRESSURE: 64 MMHG | HEART RATE: 79 BPM | OXYGEN SATURATION: 94 % | TEMPERATURE: 98.2 F | BODY MASS INDEX: 40.41 KG/M2 | RESPIRATION RATE: 16 BRPM | SYSTOLIC BLOOD PRESSURE: 121 MMHG

## 2025-06-10 DIAGNOSIS — C50.412 MALIGNANT NEOPLASM OF UPPER-OUTER QUADRANT OF LEFT BREAST IN FEMALE, ESTROGEN RECEPTOR POSITIVE (H): ICD-10-CM

## 2025-06-10 DIAGNOSIS — Z78.0 ASYMPTOMATIC POSTMENOPAUSAL STATUS: Primary | ICD-10-CM

## 2025-06-10 DIAGNOSIS — Z17.0 MALIGNANT NEOPLASM OF UPPER-OUTER QUADRANT OF LEFT BREAST IN FEMALE, ESTROGEN RECEPTOR POSITIVE (H): ICD-10-CM

## 2025-06-10 LAB
ALBUMIN SERPL BCG-MCNC: 4.3 G/DL (ref 3.5–5.2)
ALP SERPL-CCNC: 51 U/L (ref 40–150)
ALT SERPL W P-5'-P-CCNC: 29 U/L (ref 0–50)
ANION GAP SERPL CALCULATED.3IONS-SCNC: 11 MMOL/L (ref 7–15)
AST SERPL W P-5'-P-CCNC: 25 U/L (ref 0–45)
BASOPHILS # BLD AUTO: 0.1 10E3/UL (ref 0–0.2)
BASOPHILS NFR BLD AUTO: 1 %
BILIRUB SERPL-MCNC: 0.4 MG/DL
BUN SERPL-MCNC: 11.5 MG/DL (ref 8–23)
CALCIUM SERPL-MCNC: 10.7 MG/DL (ref 8.8–10.4)
CHLORIDE SERPL-SCNC: 104 MMOL/L (ref 98–107)
CREAT SERPL-MCNC: 0.71 MG/DL (ref 0.51–0.95)
CRP SERPL-MCNC: <3 MG/L
EGFRCR SERPLBLD CKD-EPI 2021: >90 ML/MIN/1.73M2
EOSINOPHIL # BLD AUTO: 0.4 10E3/UL (ref 0–0.7)
EOSINOPHIL NFR BLD AUTO: 5 %
ERYTHROCYTE [DISTWIDTH] IN BLOOD BY AUTOMATED COUNT: 13.3 % (ref 10–15)
ERYTHROCYTE [SEDIMENTATION RATE] IN BLOOD BY WESTERGREN METHOD: 1 MM/HR (ref 0–30)
GLUCOSE SERPL-MCNC: 100 MG/DL (ref 70–99)
HCO3 SERPL-SCNC: 25 MMOL/L (ref 22–29)
HCT VFR BLD AUTO: 42.3 % (ref 35–47)
HGB BLD-MCNC: 14 G/DL (ref 11.7–15.7)
IMM GRANULOCYTES # BLD: 0 10E3/UL
IMM GRANULOCYTES NFR BLD: 1 %
LYMPHOCYTES # BLD AUTO: 2.6 10E3/UL (ref 0.8–5.3)
LYMPHOCYTES NFR BLD AUTO: 36 %
MCH RBC QN AUTO: 30.1 PG (ref 26.5–33)
MCHC RBC AUTO-ENTMCNC: 33.1 G/DL (ref 31.5–36.5)
MCV RBC AUTO: 91 FL (ref 78–100)
MONOCYTES # BLD AUTO: 0.9 10E3/UL (ref 0–1.3)
MONOCYTES NFR BLD AUTO: 12 %
NEUTROPHILS # BLD AUTO: 3.3 10E3/UL (ref 1.6–8.3)
NEUTROPHILS NFR BLD AUTO: 45 %
NRBC # BLD AUTO: 0 10E3/UL
NRBC BLD AUTO-RTO: 0 /100
PLATELET # BLD AUTO: 280 10E3/UL (ref 150–450)
POTASSIUM SERPL-SCNC: 4.6 MMOL/L (ref 3.4–5.3)
PROT SERPL-MCNC: 7.3 G/DL (ref 6.4–8.3)
RBC # BLD AUTO: 4.65 10E6/UL (ref 3.8–5.2)
SODIUM SERPL-SCNC: 140 MMOL/L (ref 135–145)
WBC # BLD AUTO: 7.2 10E3/UL (ref 4–11)

## 2025-06-10 PROCEDURE — 36415 COLL VENOUS BLD VENIPUNCTURE: CPT | Performed by: INTERNAL MEDICINE

## 2025-06-10 PROCEDURE — 85652 RBC SED RATE AUTOMATED: CPT | Performed by: INTERNAL MEDICINE

## 2025-06-10 PROCEDURE — 85004 AUTOMATED DIFF WBC COUNT: CPT | Performed by: INTERNAL MEDICINE

## 2025-06-10 PROCEDURE — 80053 COMPREHEN METABOLIC PANEL: CPT | Performed by: INTERNAL MEDICINE

## 2025-06-10 PROCEDURE — 99215 OFFICE O/P EST HI 40 MIN: CPT | Performed by: INTERNAL MEDICINE

## 2025-06-10 PROCEDURE — G0463 HOSPITAL OUTPT CLINIC VISIT: HCPCS | Performed by: INTERNAL MEDICINE

## 2025-06-10 PROCEDURE — 86140 C-REACTIVE PROTEIN: CPT | Performed by: INTERNAL MEDICINE

## 2025-06-10 RX ORDER — EXEMESTANE 25 MG/1
25 TABLET ORAL DAILY
Qty: 90 TABLET | Refills: 3 | Status: SHIPPED | OUTPATIENT
Start: 2025-06-10

## 2025-06-10 ASSESSMENT — PAIN SCALES - GENERAL: PAINLEVEL_OUTOF10: MODERATE PAIN (5)

## 2025-06-10 NOTE — NURSING NOTE
Chief Complaint   Patient presents with    Blood Draw     Labs drawn with  by rn.  VS taken.     Labs drawn with  by rn.  Pt tolerated well.  VS taken.  Pt checked in for next appt.  Kathryn Peoples RN

## 2025-06-10 NOTE — LETTER
6/10/2025      Ramona Ferrer  [address]  [facility] [address]      Dear Colleague,    Thank you for referring your patient, Ramona Ferrer, to the [facility]. Please see a copy of my visit note below.    Dada Mendiola MD  [facility]  [address] [place], [address]  [place] [address]     RE:  Ramona Ferrer  MRN:  [id number]  :  1955     Dear Dr. Mendiola:     I would like to refer to you Sammie Ferrer, a 70-year-old woman with a recent diagnosis of a stage IIB, T3 N0 MX, invasive lobular carcinoma of the left breast.  She self-palpated a lump in the upper outer quadrant of the left breast.  She underwent evaluation with a diagnostic mammogram and ultrasound, which was BI-RADS 4, showing in the upper outer quadrant of the left breast irregularly shaped hypoechoic mass at the 12 o'clock position 4 cm from the nipple on the left.  This mass measured 2.3 x 2.2 x 1.5 cm and accounted for the patient's area of palpable concern.  Additionally, at the 1 o'clock position 2 cm from the nipple in the left breast, there is a second irregularly shaped hypoechoic mass with indistinct margins measuring 1.3 x 1.1 x 0.8 cm.  She also underwent a breast MRI which showed in the right breast mild background parenchymal enhancement and no suspicious areas of enhancement or lymphadenopathy.  In the left breast there was mild background parenchymal enhancement, and at the 12 o'clock position 4 cm from the nipple there was a heterogeneously enhancing irregular mass measuring 3.4 in AP dimension x 2.8 cm ML and 2.8 cm SI corresponding to the known biopsy-proven malignancy in the left breast.  In the left breast at the 1 o'clock position 2 cm from the nipple there was also a heterogeneously enhancing oval mass measuring 1.3 cm in maximal diameter.  This likely corresponds to the second biopsy-proven malignancy.  Together, the full extent of the disease spans a  total of 5.2 cm AP x 4.4 cm ML, BI-RADS 6.     The pathology showed in the left breast 12 o'clock position 4 cm from the nipple, ultrasound-guided needle biopsy invasive lobular carcinoma, [place] grade 1/3 lobular carcinoma in situ, classic type, estrogen receptor positive in greater than 70% of the cells and progesterone receptor positive in greater than 90% of cells, and HER2 FISH was nonamplified.  In part B in the left breast at the 1 o'clock position 2 cm from the nipple, ultrasound-guided needle biopsy showed invasive lobular carcinoma, [place] grade 1/3 lobular carcinoma in situ was present, classic subtype, estrogen receptors positive in greater than 70% of the cells, progesterone receptors positive in greater than 90% of cells, and HER2 was nonamplified, Ki-67 20-25%.  She now comes to our clinic for recommendations.     Sammie reports that the breast mass had been there for approximately 1 month before she was seen for evaluation..       She has worked in the past as a personal care assistant and lives in the [facility].       PAST MEDICAL HISTORY:  There is no history of breast cancer in the past.  No history of breast cancer surgery.  She has no history of radiation therapy in the past or radiation exposure.  No history of prior tumor of any kind.  She denies heart problems, heart attack, breathing problems, blood clots, seizures, peptic ulcer disease, osteoporosis or bone fractures.  She does report a history of arthritis.     FAMILY HISTORY:  Positive for breast cancer in a paternal aunt who was diagnosed with breast cancer at an old age.     Her father has a history of stomach cancer.  There is no family history of pancreatic cancer, melanoma, lung cancer or ovarian cancer.     No history of male breast cancer.     ALLERGIES:  No history of drug allergies.  She denies allergies to seafood, iodine, or contrast dye.     PAST MENSTRUAL HISTORY:  She has been pregnant 3 times with 2 live births at  age 29 and 31 and 1 miscarriage.  Age at first menstrual period was 14.  She did have a total abdominal hysterectomy and bilateral salpingo-oophorectomy performed in 2003 for endometriosis.  She has no history of hormone replacement therapy.  No history of oral contraceptives.     HABITS: She denies tobacco history.  She denies alcohol history and drinks alcohol only occasionally.     PAST MEDICAL HISTORY:  Past medical history is also remarkable for type 2 diabetes, and she was begun on metformin 2 years ago.  She also has a history of varicose veins and a history of a lipoma resected from her left leg.      Chronic Hepatitis B.  Hepatitis C negative.  HIV negative.       Prior COVID vaccination x 3.      GERM LINE GENETICS: INVITAE testing of 47 genes was negative.      TREATMENT HISTORY:  A.  MammaPrint showed low risk.  B.  Left mastectomy and axillary lymph node sampling.   A. Lymph node, LEFT axillary, excision:  -One benign lymph node (0/1)  B. LEFT breast, skin-sparing mastectomy:  -INVASIVE BREAST CARCINOMA, MIXED INVASIVE LOBULAR CARCINOMA (predominant component) and INVASIVE DUCTAL CARCINOMA (minor component), KALPESH GRADE 3, size 5.5 cm, 12:00, upper outer quadrant and lower outer quadrant  -Ductal carcinoma in situ (DCIS), nuclear grade 2, cribriform and solid type(s), with focal necrosis  -DCIS is admixed with invasive carcinoma, and comprises a minor component (<5%) of the tumor volume   -Lobular carcinoma in situ (LCIS), predominantly classic type (admixed with invasive carcinoma and beyond invasive carcinoma) and focal pleomorphic type (size 2 mm, adjacent to invasive carcinoma)  -Margins are uninvolved by invasive carcinoma  -Invasive carcinoma is 2.5 mm from the nearest (anterior) margin, and is > 5 mm from the posterior margin  -Margins are uninvolved by DCIS and pleomorphic LCIS  -DCIS and pleomorphic LCIS are > 5 mm from the nearest (anterior) margin  -Benign nipple and skin   -Other  findings: fibrocystic change (including microcysts with apocrine metaplasia) and columnar cell change  -Calcifications associated with DCIS, LCIS, and benign ducts and acini  -Prior core biopsy site changes (two biopsy sites identified)  -Invasive carcinoma is estrogen receptor positive (>70%, strong intensity) and progesterone receptor positive (>90%, strong intensity) by immunohistochemistry and is HER2 non-amplified (group 5) by FISH (performed on prior core biopsies, see report [id number], specimens A and B)  DCIS and LCIS present.  -Margins are uninvolved by invasive carcinoma or LCIS.   -Invasive carcinoma is estrogen receptor positive (>70%, strong intensity) and progesterone receptor positive (>90%, strong intensity) by immunohistochemistry and is HER2 non-amplified (group 5) by FISH (performed on prior core biopsies, see report [id number], specimens A and B) Ki-67 immunohistochemistry (MIB-1, pharmDx, Dako Porchis) from [facility] was performed on invasive carcinoma in the LEFT mastectomy specimen   They report Ki-67 proliferative index of 20-25% (block B5) and 20% (block B27).   C.  Staging:  pT3 Nx because lymph node is not the sentinel node.    D.  She did not want radiation, and risk:benefit did not favor treatment.  E.  Letrozole begun 4-5-22.   F.  Left TRAM flap reconstruction healed as of 12-27-22 but she has some concerns.   G.  Adjuvant Zometa 12-27-22 but not tolerated. She did have a reaction to adjuvant Zometa and it cannot be given any further.   H. Letrozole Changed to Anastrozole due to arthralgias January 30, 2024.  I.  Anastrozole changed to exemestane because of arthralgias. April 17, 2025.     INTERVAL HISTORY  Sammie has tolerated the change of aromatase inhibitor to exemestane.  She feels that her joints are not worse.  She feels that she can continue with the exemestane.  Our plan is to continue the exemestane for total of 7 years.  She does have a history of left mastectomy and TRAM  flap reconstruction.  She does have occasional headaches but there is no change in pattern.  She does have continued left shoulder pain and some chronic back pain which is unchanged.     She has had left shoulder pain for the several month.  She did have an MRI which showed a partial tendon tear in the rotator cuff.  She has follow-up for this problem at [facility].     She is eating a healthful diet.  She is not exercising.  I did advise her to walk at least 30 minutes a day.  She does not consume alcohol.  She is taking vitamin D.         REVIEW OF SYSTEMS:  A 10-point review of systems was otherwise negative.        PHYSICAL EXAMINATION:    VITAL SIGNS: /64 (BP Location: Right arm, Patient Position: Sitting, Cuff Size: Adult Large)   Pulse 79   Temp 98.2  F (36.8  C) (Oral)   Resp 16   Wt 103.5 kg (228 lb 1.6 oz)   LMP  (LMP Unknown)   SpO2 94%   BMI 40.41 kg/m    GENERAL:  Ramona appeared generally well.  HEENT:  No alopecia.  She has moderately good dentition, but is having a crown placed soon.  There are no empty sockets or recent extractions.  LYMPH:  No cervical, supraclavicular, subclavicular or axillary lymphadenopathy.  BREASTS: Deferred no breast complaints, reconstruction.    LUNGS:  Clear to percussion and auscultation.  HEART:  Regular rate and rhythm.  S1, S2.  ABDOMEN:  Soft, nontender, consistent with increased body mass index.  EXTREMITIES: She has trace edema in the left hand.  She is unable to raise her left arm above 90 degrees.  PSYCH:  Mood was somewhat anxious.  Affect was appropriate.     LABORATORY DATA: CMP was remarkable for a calcium of 10.7.  Otherwise normal rate CBC was normal.     IMAGING:  Mammogram February 4, 2025  BREAST DENSITY: The breasts are heterogeneously dense, which may  obscure small masses.     Findings:     Mammogram:  No suspicious mammographic finding. No significant change.                                                                      IMPRESSION:  BI-RADS CATEGORY: 1 -  Negative.     RECOMMENDED FOLLOW-UP: Routine yearly mammography beginning at age 40  or as discussed with your provider.     The finding and recommendation were discussed with the patient at the  time of evaluation.     JERMAINE JOAQUIN MD      MRI left shoulder February 26, 2025  Impression     1. Supraspinatus and infraspinatus tendinosis with low-grade partial articular surface tendon tearing.  2. No full-thickness rotator cuff tendon tear.  3. Moderate degenerative arthrosis of the glenohumeral joint with diffuse degenerative labral tearing. Humeral head is slightly posteriorly subluxed.  4. Moderate to large glenohumeral joint effusion with synovitis. Numerous intra-articular osteocartilaginous bodies.  5. Tendinosis of the intra-articular long head biceps tendon with low-grade interstitial partial tearing.     ASSESSMENT AND PLAN:  Sammie Ferrer is a 70-year-old woman with a recent diagnosis of a stage IIB, T3 N0 MX, invasive lobular carcinoma of the upper outer quadrant of the left breast which is multifocal grade 1, ER positive in greater than 70% of cells, HI positive in greater than 90% of cells and HER2 nonamplified. MammaPrint before surgery came back as low risk as did the Oncotype after surgery. The Oncotype of the pleomorphic lobular cancer showed a value of 15, which is less than the threshold for chemotherapy from TAILORx which is 26 for a post-menopausal woman.   Sammie Ferrer underwent a left mastectomy and has a TRAM flap in place.  She started adjuvant hormonal therapy with letrozole and has tolerated it well until 1/30/24. She started to develop arthralgias affecting bilateral knees and wrists. Therefore, through shared decision making, she was changed to Anastrozole on 1/30/24.  Pathology showed -Invasive carcinoma is estrogen receptor positive, progesterone receptor positive and HER2 negative by immunohistochemistry.  Margins negative. pT3 Nx because the lymph node is  not sentinel. She did not qualify for neoadjuvant chemotherapy based on MammaPrint nor adjuvant chemotherapy based on Oncotype.  She also was node negative (although the LN was not sentinel) and did not meet criteria for [facility]. She was node negative by MRI but the node was not sentinel.  She does have Ki67 20% or greater and size of tumor greater than 5 cm.    She cannot tolerate adjuvant Zoledronic acid.  Our current plan is to continue AI for 10 years.  Adjuvant Zometa was not tolerated.  She was changed to Prolia last visit.  Her most recent DEXA scan from 9/29/23 showed a most valid negative T-score of -1.7.  The purpose of Prolia is to maintain bone density in the setting of AI adjuvant therapy, but by itself would not expect to have adjuvant benefit in contrast to the zoledronic acid.  We will continue with Prolia which is well-tolerated and we will give it every 6 months.  Discontinue calcium supplementation.  Calcium 10.7.  Sammie continues to do relatively well but has had difficulty tolerating anastrozole due to stiffness in her left shoulder.  The left shoulder problem been related to a rotator cuff issue and she has been followed at [facility] for that.  Regardless, we changed the changed the AI to exemestane.  She has tolerated exemestane quite well and has had no joint complaints associated with it.  Left TRAM flap is healed with a medial area of fat necrosis measuring about 1 cm in size.  Imaging: Continue with yearly right mammography.  Today's mammogram is BI-RADS 1.  Follow-up for left frozen shoulder at [facility] per her wishes.  She did not want lymphedema clinic evaluation.  Followup.  Follow up with Zoya CBC and CMP and Prolia September 2 with Dexa in October, 2025.  Continue Prolia every 6 months.   Follow up with me March 3, 2026 with CBC, CMP, Prolia and right mammogram.      Thank you for allowing us to participate in this patient's care.       Sincerely,      Kenneth Cisneros,  MD  Professor  [facility]  [phone]        T3N1Mx  grade 1.   Current Therapy: OP ONC Denosumab (Prolia) EVERY 6 MONTHS     Intent of Therapy: Other curative.         I spent 40 minutes with the patient more than 50% of which was in counseling and coordination of care.    Again, thank you for allowing me to participate in the care of your patient.        Sincerely,        Kenneth Cisneros MD    Electronically signed

## 2025-06-10 NOTE — NURSING NOTE
"Oncology Rooming Note    Jannie 10, 2025 2:51 PM   Ramona Ferrer is a 69 year old female who presents for:    Chief Complaint   Patient presents with    Blood Draw     Labs drawn with  by rn.  VS taken.    Oncology Clinic Visit     Breast Cancer     Initial Vitals: /64 (BP Location: Right arm, Patient Position: Sitting, Cuff Size: Adult Large)   Pulse 79   Temp 98.2  F (36.8  C) (Oral)   Resp 16   Wt 103.5 kg (228 lb 1.6 oz)   LMP  (LMP Unknown)   SpO2 94%   BMI 40.41 kg/m   Estimated body mass index is 40.41 kg/m  as calculated from the following:    Height as of 3/10/25: 1.6 m (5' 3\").    Weight as of this encounter: 103.5 kg (228 lb 1.6 oz). Body surface area is 2.14 meters squared.  Moderate Pain (5) Comment: Data Unavailable   No LMP recorded (lmp unknown). Patient has had a hysterectomy.  Allergies reviewed: Yes  Medications reviewed: Yes    Medications: Medication refills not needed today.  Pharmacy name entered into [employer] - Payments Anytime | Anywhere:    [id number] - [place], [place] - [address] [facility] [place], [place] - [address] [place] [address] PHARMAC[address] [place] [address] - [address]  [place], [place] - [address] [facility]    Frailty Screening:   Is the patient here for a new oncology consult visit in cancer care? 2. No    PHQ9:  Did this patient require a PHQ9?: No      Clinical concerns: none.      Humaira Acosta LPN            "

## 2025-06-17 ENCOUNTER — RESULTS FOLLOW-UP (OUTPATIENT)
Dept: ONCOLOGY | Facility: CLINIC | Age: 70
End: 2025-06-17

## 2025-07-27 NOTE — TELEPHONE ENCOUNTER
Dr. Michelle has not prescribed multivitamin for this patient  I reviewed the chart: There is no report about vitamin deficiency    The patient can buy multivitamin over-the-counter.  The patient can discuss multivitamin prescription with Dr. Michelle when Dr. Michelle returns back to the office   complains of pain/discomfort

## 2025-07-29 ENCOUNTER — OFFICE VISIT (OUTPATIENT)
Dept: SLEEP MEDICINE | Facility: CLINIC | Age: 70
End: 2025-07-29
Payer: COMMERCIAL

## 2025-07-29 DIAGNOSIS — G47.33 OSA (OBSTRUCTIVE SLEEP APNEA): ICD-10-CM

## 2025-07-29 NOTE — PROGRESS NOTES
Pt is completing a home sleep test. Pt was instructed on how to put on the Noxturnal T3 device and associated equipment before going to bed and given the opportunity to practice putting it on before leaving the sleep center. Pt was reminded to bring the home sleep test kit back to the center tomorrow, at the scheduled time for download and reporting. Patient was instructed to complete study using the following treatment?  None  Neck circumference: 34 CM / 13.5 inches.  ESS: 6  Stop Ban  Device number: 39  Lorena Lee MA

## 2025-07-30 ENCOUNTER — DOCUMENTATION ONLY (OUTPATIENT)
Dept: SLEEP MEDICINE | Facility: CLINIC | Age: 70
End: 2025-07-30
Payer: COMMERCIAL

## 2025-07-30 NOTE — PROGRESS NOTES
Pt returned HST device. It was downloaded and forwarded data to the clinical specialist for scoring.   Lorena Lee MA

## 2025-08-04 ENCOUNTER — MYC MEDICAL ADVICE (OUTPATIENT)
Dept: SLEEP MEDICINE | Facility: CLINIC | Age: 70
End: 2025-08-04
Payer: COMMERCIAL

## 2025-08-04 DIAGNOSIS — G47.33 OSA (OBSTRUCTIVE SLEEP APNEA): Primary | ICD-10-CM

## 2025-08-23 ENCOUNTER — HEALTH MAINTENANCE LETTER (OUTPATIENT)
Age: 70
End: 2025-08-23

## (undated) DEVICE — CLOSURE SYS SKIN PREMIERPRO EXOFINFUSION 4X60CM 3473

## (undated) DEVICE — SU PDS II 0 CTX 36" Z370T

## (undated) DEVICE — SU PROLENE 5-0 RB-1DA 36"  8556H

## (undated) DEVICE — RETR ELASTIC STAYS 3311-1G

## (undated) DEVICE — STPL SKIN 35W ROTATING HEAD PRW35

## (undated) DEVICE — NDL ANGIOCATH 24GA 0.75" 4053

## (undated) DEVICE — SYR 05ML LL W/O NDL

## (undated) DEVICE — CLIP HORIZON MED BLUE 002200

## (undated) DEVICE — KIT SPY ELITE DISP LC3006

## (undated) DEVICE — SU SILK 3-0 TIE 12X30" A304H

## (undated) DEVICE — ENDO BITE BLOCK ADULT OMNI-BLOC

## (undated) DEVICE — RETR BLADE LONE STAR 20MM SPIRA 3 FINGER 3335-4G

## (undated) DEVICE — DRAPE POUCH INSTRUMENT 1018

## (undated) DEVICE — DRAPE SHEET MED 44X70" 9355

## (undated) DEVICE — CLIP GEM MICRO GEM1521

## (undated) DEVICE — ESU PENCIL SMOKE EVAC W/ROCKER SWITCH 0703-047-000

## (undated) DEVICE — SUCTION MANIFOLD NEPTUNE 2 SYS 4 PORT 0702-020-000

## (undated) DEVICE — DRAPE IOBAN INCISE 23X17" 6650EZ

## (undated) DEVICE — SU MONOCRYL 2-0 SH 27" UND Y417H

## (undated) DEVICE — SU ETHILON 3-0 PS-1 18" 1663H

## (undated) DEVICE — GLOVE PROTEXIS POWDER FREE SMT 8.0  2D72PT80X

## (undated) DEVICE — SU SILK 3-0 SH 30" K832H

## (undated) DEVICE — CONNECTOR STOPCOCK 3 WAY MALE LL HI-FLO MX9311L

## (undated) DEVICE — SYR 30ML SLIP TIP W/O NDL 302833

## (undated) DEVICE — Device

## (undated) DEVICE — GOWN IMPERVIOUS ZONED XLG 9041

## (undated) DEVICE — LABEL MEDICATION SYSTEM 3303-P

## (undated) DEVICE — LINEN TOWEL PACK X30 5481

## (undated) DEVICE — SUCTION CATH AIRLIFE TRI-FLO W/CONTROL PORT 14FR  T60C

## (undated) DEVICE — BNDG ESMARK 4" STERILE LF 820-3412

## (undated) DEVICE — SU MONOCRYL 3-0 PS-1 27" Y936H

## (undated) DEVICE — SYR 05ML SLIP TIP W/O NDL 309647

## (undated) DEVICE — CATH TRAY FOLEY SURESTEP 16FR W/TMP PRB STLK LATEX A319416AM

## (undated) DEVICE — CLIP HORIZON SM RED WIDE SLOT 001201

## (undated) DEVICE — DRAPE FLUID WARMING 52"X66" ORS-301

## (undated) DEVICE — SUCTION MANIFOLD NEPTUNE 2 SYS 1 PORT 702-025-000

## (undated) DEVICE — DRAIN JACKSON PRATT CHANNEL 15FR ROUND HUBLESS SIL JP-2228

## (undated) DEVICE — LINEN TOWEL PACK X6 WHITE 5487

## (undated) DEVICE — ENDO FORCEP SPIKED SERRATED SHAFT JUMBO 239CM G56998

## (undated) DEVICE — GLOVE PROTEXIS W/NEU-THERA 7.0  2D73TE70

## (undated) DEVICE — SPONGE COTTONOID 1/2X3" 20-07S

## (undated) DEVICE — KIT ENDO FIRST STEP DISINFECTANT 200ML W/POUCH EP-4

## (undated) DEVICE — CABLE DOPPLER FLOW EXTENSION  DP-CAB01

## (undated) DEVICE — ESU FCP BIPOLAR NONSTICK STR 4"X0.4MM W/CORD 19-3002AU

## (undated) DEVICE — EYE SPONGE SPEAR WECK CEL 0008685

## (undated) DEVICE — SPONGE KITTNER 30-101

## (undated) DEVICE — DRAPE SHEET REV FOLD 3/4 9349

## (undated) DEVICE — GEL ULTRASOUND AQUASONIC 20GM 01-01

## (undated) DEVICE — WIPES FOLEY CARE SURESTEP PROVON DFC100

## (undated) DEVICE — PHOTON GUIDE INVUITY WIDE FLAT 104015

## (undated) DEVICE — PREP CHLORAPREP 26ML TINTED HI-LITE ORANGE 930815

## (undated) DEVICE — SU SILK 2-0 SH 30" K833H

## (undated) DEVICE — SYR 03ML LL W/O NDL 309657

## (undated) DEVICE — STPL SKIN 35W 059037

## (undated) DEVICE — SOL WATER IRRIG 1000ML BOTTLE 2F7114

## (undated) DEVICE — SYR 30ML LL W/O NDL 302832

## (undated) DEVICE — ESU ELEC BLADE 2.75" COATED/INSULATED E1455

## (undated) DEVICE — PAD CHUX UNDERPAD 23X24" 7136

## (undated) DEVICE — SU STRATAFIX MONOCRYL 3-0 SPIRAL SH 23CM SXMP1B427

## (undated) DEVICE — ESU ELEC BLADE 6" COATED/INSULATED E1455-6

## (undated) DEVICE — SU STRATAFIX MONOCRYL 3-0 SPIRAL PS-2 45CM SXMP1B107

## (undated) DEVICE — PITCHER STERILE 1000ML  SSK9004A

## (undated) DEVICE — SOL NACL 0.9% IRRIG 1000ML BOTTLE 2F7124

## (undated) DEVICE — SYR 50ML SLIP TIP W/O NDL 309654

## (undated) DEVICE — DRSG KERLIX 4 1/2"X4YDS ROLL 6715

## (undated) DEVICE — BNDG ABDOMINAL BINDER 9X45-62" 79-89071

## (undated) DEVICE — LIGHT HANDLE X1 31140133

## (undated) DEVICE — SU SILK 2-0 TIE 12X30" A305H

## (undated) DEVICE — SOL WATER IRRIG 500ML BOTTLE 2F7113

## (undated) DEVICE — DRAIN JACKSON PRATT RESERVOIR 100ML SU130-1305

## (undated) DEVICE — SUCTION MINISQUAIR SMOKE EVAC CAPTURE DEVICE SQ20012-01

## (undated) DEVICE — KIT ENDO TURNOVER/PROCEDURE CARRY-ON 101822

## (undated) DEVICE — PROTECTOR ARM ONE-STEP TRENDELENBURG 40418

## (undated) DEVICE — RETR ELASTIC STAYS LONE STAR SHARP 5MM 8/PACK 3311-8G

## (undated) DEVICE — SENSOR MONITOR SM PATCH TISSUE VIOPTIX OXY-2-SPD-1-P5

## (undated) DEVICE — DRSG PRIMAPORE 02X3" 7133

## (undated) DEVICE — SU DERMABOND PRINEO 22CM CLR222US

## (undated) DEVICE — SNARE CAPIVATOR ROUND COLD SNR BX10 M00561101

## (undated) DEVICE — ESU GROUND PAD ADULT W/CORD E7507

## (undated) DEVICE — SU VICRYL 0 CTX 36" J370H

## (undated) DEVICE — SU PROLENE 0 CT-1 30" 8424H

## (undated) DEVICE — SU ETHILON 8-0 BV130-4 5" 2815G

## (undated) DEVICE — SUCTION CARDIAC FRAZIER TIP 6FR STERILE 3" 10053

## (undated) DEVICE — WIPE INSTRUMENT MEROCEL 400200

## (undated) DEVICE — NDL ANGIOCATH 22GA 1" 4050

## (undated) DEVICE — TUBING SUCTION 12"X1/4" N612

## (undated) DEVICE — GOWN IMPERVIOUS 2XL BLUE

## (undated) DEVICE — SPECIMEN CONTAINER 3OZ W/FORMALIN 59901

## (undated) DEVICE — CLIP GEM MICRO GEM2431

## (undated) RX ORDER — ACETAMINOPHEN 325 MG/1
TABLET ORAL
Status: DISPENSED
Start: 2022-03-23

## (undated) RX ORDER — PAPAVERINE HYDROCHLORIDE 30 MG/ML
INJECTION INTRAMUSCULAR; INTRAVENOUS
Status: DISPENSED
Start: 2022-08-25

## (undated) RX ORDER — HEPARIN SODIUM 1000 [USP'U]/ML
INJECTION, SOLUTION INTRAVENOUS; SUBCUTANEOUS
Status: DISPENSED
Start: 2022-08-25

## (undated) RX ORDER — FENTANYL CITRATE-0.9 % NACL/PF 10 MCG/ML
PLASTIC BAG, INJECTION (ML) INTRAVENOUS
Status: DISPENSED
Start: 2022-08-25

## (undated) RX ORDER — HYDROMORPHONE HYDROCHLORIDE 1 MG/ML
INJECTION, SOLUTION INTRAMUSCULAR; INTRAVENOUS; SUBCUTANEOUS
Status: DISPENSED
Start: 2022-08-25

## (undated) RX ORDER — CEFAZOLIN SODIUM 1 G/3ML
INJECTION, POWDER, FOR SOLUTION INTRAMUSCULAR; INTRAVENOUS
Status: DISPENSED
Start: 2022-03-23

## (undated) RX ORDER — ONDANSETRON 2 MG/ML
INJECTION INTRAMUSCULAR; INTRAVENOUS
Status: DISPENSED
Start: 2022-08-25

## (undated) RX ORDER — CEFAZOLIN SODIUM/WATER 2 G/20 ML
SYRINGE (ML) INTRAVENOUS
Status: DISPENSED
Start: 2022-08-25

## (undated) RX ORDER — ENOXAPARIN SODIUM 100 MG/ML
INJECTION SUBCUTANEOUS
Status: DISPENSED
Start: 2022-08-25

## (undated) RX ORDER — FENTANYL CITRATE 50 UG/ML
INJECTION, SOLUTION INTRAMUSCULAR; INTRAVENOUS
Status: DISPENSED
Start: 2022-08-25

## (undated) RX ORDER — FENTANYL CITRATE 50 UG/ML
INJECTION, SOLUTION INTRAMUSCULAR; INTRAVENOUS
Status: DISPENSED
Start: 2022-03-23

## (undated) RX ORDER — ISOSULFAN BLUE 50 MG/5ML
INJECTION, SOLUTION SUBCUTANEOUS
Status: DISPENSED
Start: 2022-03-23

## (undated) RX ORDER — OXYCODONE HYDROCHLORIDE 5 MG/1
TABLET ORAL
Status: DISPENSED
Start: 2022-08-25

## (undated) RX ORDER — DEXAMETHASONE SODIUM PHOSPHATE 10 MG/ML
INJECTION, SOLUTION INTRAMUSCULAR; INTRAVENOUS
Status: DISPENSED
Start: 2023-12-11

## (undated) RX ORDER — PROPOFOL 10 MG/ML
INJECTION, EMULSION INTRAVENOUS
Status: DISPENSED
Start: 2022-08-25

## (undated) RX ORDER — SODIUM CHLORIDE 9 MG/ML
INJECTION, SOLUTION INTRAVENOUS
Status: DISPENSED
Start: 2022-03-23

## (undated) RX ORDER — DEXTROSE MONOHYDRATE, SODIUM CHLORIDE, AND POTASSIUM CHLORIDE 50; 1.49; 4.5 G/1000ML; G/1000ML; G/1000ML
INJECTION, SOLUTION INTRAVENOUS
Status: DISPENSED
Start: 2022-08-25

## (undated) RX ORDER — FENTANYL CITRATE 50 UG/ML
INJECTION, SOLUTION INTRAMUSCULAR; INTRAVENOUS
Status: DISPENSED
Start: 2019-02-15

## (undated) RX ORDER — HYDROMORPHONE HYDROCHLORIDE 1 MG/ML
INJECTION, SOLUTION INTRAMUSCULAR; INTRAVENOUS; SUBCUTANEOUS
Status: DISPENSED
Start: 2022-03-23

## (undated) RX ORDER — GENTAMICIN 40 MG/ML
INJECTION, SOLUTION INTRAMUSCULAR; INTRAVENOUS
Status: DISPENSED
Start: 2022-03-23

## (undated) RX ORDER — DEXAMETHASONE SODIUM PHOSPHATE 10 MG/ML
INJECTION, SOLUTION INTRAMUSCULAR; INTRAVENOUS
Status: DISPENSED
Start: 2022-08-04

## (undated) RX ORDER — DEXAMETHASONE SODIUM PHOSPHATE 4 MG/ML
INJECTION, SOLUTION INTRA-ARTICULAR; INTRALESIONAL; INTRAMUSCULAR; INTRAVENOUS; SOFT TISSUE
Status: DISPENSED
Start: 2022-08-25

## (undated) RX ORDER — CEFAZOLIN SODIUM 1 G/3ML
INJECTION, POWDER, FOR SOLUTION INTRAMUSCULAR; INTRAVENOUS
Status: DISPENSED
Start: 2022-08-25

## (undated) RX ORDER — LIDOCAINE HYDROCHLORIDE 20 MG/ML
INJECTION, SOLUTION EPIDURAL; INFILTRATION; INTRACAUDAL; PERINEURAL
Status: DISPENSED
Start: 2022-08-25

## (undated) RX ORDER — CALCIUM CHLORIDE 100 MG/ML
INJECTION INTRAVENOUS; INTRAVENTRICULAR
Status: DISPENSED
Start: 2022-08-25

## (undated) RX ORDER — EPHEDRINE SULFATE 50 MG/ML
INJECTION, SOLUTION INTRAMUSCULAR; INTRAVENOUS; SUBCUTANEOUS
Status: DISPENSED
Start: 2022-08-25

## (undated) RX ORDER — LIDOCAINE HYDROCHLORIDE 10 MG/ML
INJECTION, SOLUTION EPIDURAL; INFILTRATION; INTRACAUDAL; PERINEURAL
Status: DISPENSED
Start: 2022-08-04

## (undated) RX ORDER — HYDROMORPHONE HCL IN WATER/PF 6 MG/30 ML
PATIENT CONTROLLED ANALGESIA SYRINGE INTRAVENOUS
Status: DISPENSED
Start: 2022-03-23

## (undated) RX ORDER — SODIUM CHLORIDE, SODIUM LACTATE, POTASSIUM CHLORIDE, CALCIUM CHLORIDE 600; 310; 30; 20 MG/100ML; MG/100ML; MG/100ML; MG/100ML
INJECTION, SOLUTION INTRAVENOUS
Status: DISPENSED
Start: 2022-03-23

## (undated) RX ORDER — ROCURONIUM BROMIDE 50 MG/5 ML
SYRINGE (ML) INTRAVENOUS
Status: DISPENSED
Start: 2022-03-23

## (undated) RX ORDER — LIDOCAINE HYDROCHLORIDE 10 MG/ML
INJECTION, SOLUTION EPIDURAL; INFILTRATION; INTRACAUDAL; PERINEURAL
Status: DISPENSED
Start: 2023-12-11

## (undated) RX ORDER — GLYCOPYRROLATE 0.2 MG/ML
INJECTION, SOLUTION INTRAMUSCULAR; INTRAVENOUS
Status: DISPENSED
Start: 2022-08-25